# Patient Record
Sex: FEMALE | Race: BLACK OR AFRICAN AMERICAN | NOT HISPANIC OR LATINO | Employment: FULL TIME | URBAN - METROPOLITAN AREA
[De-identification: names, ages, dates, MRNs, and addresses within clinical notes are randomized per-mention and may not be internally consistent; named-entity substitution may affect disease eponyms.]

---

## 2020-06-19 ENCOUNTER — OFFICE VISIT (OUTPATIENT)
Dept: CARDIOLOGY | Facility: CLINIC | Age: 60
End: 2020-06-19

## 2020-06-19 VITALS
DIASTOLIC BLOOD PRESSURE: 82 MMHG | WEIGHT: 212 LBS | HEIGHT: 61 IN | HEART RATE: 75 BPM | SYSTOLIC BLOOD PRESSURE: 120 MMHG | BODY MASS INDEX: 40.02 KG/M2

## 2020-06-19 DIAGNOSIS — Z01.810 PREOP CARDIOVASCULAR EXAM: Primary | ICD-10-CM

## 2020-06-19 DIAGNOSIS — I10 BENIGN ESSENTIAL HTN: ICD-10-CM

## 2020-06-19 PROCEDURE — 93000 ELECTROCARDIOGRAM COMPLETE: CPT | Performed by: INTERNAL MEDICINE

## 2020-06-19 PROCEDURE — 99204 OFFICE O/P NEW MOD 45 MIN: CPT | Performed by: INTERNAL MEDICINE

## 2020-06-19 RX ORDER — AMLODIPINE BESYLATE AND BENAZEPRIL HYDROCHLORIDE 10; 20 MG/1; MG/1
1 CAPSULE ORAL DAILY
Status: ON HOLD | COMMUNITY
End: 2023-01-27

## 2020-06-19 RX ORDER — TRAZODONE HYDROCHLORIDE 100 MG/1
100 TABLET ORAL NIGHTLY
Status: ON HOLD | COMMUNITY
End: 2023-01-27

## 2020-06-19 NOTE — PROGRESS NOTES
Subjective:     Encounter Date:06/19/20      Patient ID: Hali Tejeda is a 59 y.o. female.    Chief Complaint: preop card evaluation  History of Present Illness    Dear Dr. Orozco,    I had the pleasure of seeing this patient in the office today for initial evaluation and consultation.  I appreciate that you sent her in to see us.  They come in today to be seen for preoperative assessment of cardiac risk prior to gastric sleeve removal.    She denies any cardiac symptoms.  She denies any chest pain, pressure, tightness, squeezing, or heartburn.  She has not experienced any feeling of palpitations, tachycardia or heart racing and no presyncope or syncope.  There have not been any problems with dizziness or lightheadedness.  There have not been any orthopnea or PND, and no problems with lower extremity edema.  She denies any shortness of breath at rest or with activity and has not had any wheezing.  She has not had any problems with unexplained nausea or vomiting. She has continued to perform daily activities of living without any specific problem or change in the level of activity.  She has not been recently hospitalized for any reason.    This patient has no known cardiac history.  This patient has no history of coronary artery disease, congestive heart failure, rheumatic fever, rheumatic heart disease, congenital heart disease or heart murmur.  This patient has never required invasive cardiovascular evaluation.        The following portions of the patient's history were reviewed and updated as appropriate: allergies, current medications, past family history, past medical history, past social history, past surgical history and problem list.    Past Medical History:   Diagnosis Date   • HTN (hypertension)        Past Surgical History:   Procedure Laterality Date   • HYSTERECTOMY     • LAPAROSCOPIC GASTRIC BANDING         Social History     Socioeconomic History   • Marital status: Other     Spouse name: Not  on file   • Number of children: Not on file   • Years of education: Not on file   • Highest education level: Not on file   Tobacco Use   • Smoking status: Former Smoker   • Smokeless tobacco: Never Used   Substance and Sexual Activity   • Alcohol use: Yes       Review of Systems   Constitution: Negative for chills, decreased appetite, fever and night sweats.   HENT: Negative for ear discharge, ear pain, hearing loss, nosebleeds and sore throat.    Eyes: Negative for blurred vision, double vision and pain.   Cardiovascular: Negative for cyanosis.   Respiratory: Negative for hemoptysis and sputum production.    Endocrine: Negative for cold intolerance and heat intolerance.   Hematologic/Lymphatic: Negative for adenopathy.   Skin: Negative for dry skin, itching, nail changes, rash and suspicious lesions.   Musculoskeletal: Negative for arthritis, gout, muscle cramps, muscle weakness, myalgias and neck pain.   Gastrointestinal: Negative for anorexia, bowel incontinence, constipation, diarrhea, dysphagia, hematemesis and jaundice.   Genitourinary: Negative for bladder incontinence, dysuria, flank pain, frequency, hematuria and nocturia.   Neurological: Negative for focal weakness, numbness, paresthesias and seizures.   Psychiatric/Behavioral: Negative for altered mental status, hallucinations, hypervigilance, suicidal ideas and thoughts of violence.   Allergic/Immunologic: Negative for persistent infections.         ECG 12 Lead  Date/Time: 6/19/2020 8:50 AM  Performed by: Simone Montenegro III, MD  Authorized by: Simone Montenegro III, MD   Comparison: compared with previous ECG   Similar to previous ECG  Rhythm: sinus rhythm  Rate: normal  Conduction: conduction normal  ST Segments: ST segments normal  T Waves: T waves normal  QRS axis: normal  Other: no other findings    Clinical impression: normal ECG               Objective:     Vitals:    06/19/20 0821   BP: 120/82   Pulse: 75   Weight: 96.2 kg (212 lb)   Height: 154.9 cm  "(61\")         Physical Exam   Constitutional: She is oriented to person, place, and time. She appears well-developed and well-nourished. No distress.   HENT:   Head: Normocephalic and atraumatic.   Nose: Nose normal.   Mouth/Throat: Oropharynx is clear and moist.   Eyes: Pupils are equal, round, and reactive to light. Conjunctivae and EOM are normal. Right eye exhibits no discharge. Left eye exhibits no discharge.   Neck: Normal range of motion. Neck supple. No tracheal deviation present. No thyromegaly present.   Cardiovascular: Normal rate, regular rhythm, S1 normal, S2 normal, normal heart sounds and normal pulses. Exam reveals no S3.   Pulmonary/Chest: Effort normal and breath sounds normal. No stridor. No respiratory distress. She exhibits no tenderness.   Abdominal: Soft. Bowel sounds are normal. She exhibits no distension and no mass. There is no tenderness. There is no rebound and no guarding.   Musculoskeletal: Normal range of motion. She exhibits no tenderness or deformity.   Lymphadenopathy:     She has no cervical adenopathy.   Neurological: She is alert and oriented to person, place, and time. She has normal reflexes.   Skin: Skin is warm and dry. No rash noted. She is not diaphoretic. No erythema.   Psychiatric: She has a normal mood and affect. Thought content normal.       Lab Review:             Performed        Assessment:          Diagnosis Plan   1. Preop cardiovascular exam  ECG 12 Lead   2. Benign essential HTN  ECG 12 Lead          Plan:       This patient is at low cardiac risk for the anticipated surgical procedure.  Her estimated risk probability for perioperative myocardial infarction or cardiac arrest is 0.04% ([place]).  She does not meet guideline recommendations for any additional cardiac testing at this time.    Thank you very much for allowing us to participate in the care of this pleasant patient.  Please don't hesitate to call if I can be of assistance in any way.      Current " Outpatient Medications:   •  amLODIPine-benazepril (LOTREL) 10-20 MG per capsule, Take 1 capsule by mouth Daily., Disp: , Rfl:   •  Multiple Vitamin (MULTI-VITAMIN PO), Take 1 tablet by mouth Daily., Disp: , Rfl:   •  traZODone (DESYREL) 100 MG tablet, Take 100 mg by mouth Every Night., Disp: , Rfl:          No follow-ups on file.

## 2020-07-09 PROCEDURE — 88313 SPECIAL STAINS GROUP 2: CPT | Performed by: SURGERY

## 2020-07-09 PROCEDURE — 88300 SURGICAL PATH GROSS: CPT | Performed by: SURGERY

## 2020-07-09 PROCEDURE — 88307 TISSUE EXAM BY PATHOLOGIST: CPT | Performed by: SURGERY

## 2020-07-09 PROCEDURE — 88342 IMHCHEM/IMCYTCHM 1ST ANTB: CPT | Performed by: SURGERY

## 2020-07-10 ENCOUNTER — LAB REQUISITION (OUTPATIENT)
Dept: LAB | Facility: HOSPITAL | Age: 60
End: 2020-07-10

## 2020-07-10 DIAGNOSIS — E66.01 MORBID (SEVERE) OBESITY DUE TO EXCESS CALORIES (HCC): ICD-10-CM

## 2020-07-10 DIAGNOSIS — K95.09 OTHER COMPLICATIONS OF GASTRIC BAND PROCEDURE: ICD-10-CM

## 2020-07-13 LAB
CYTO UR: NORMAL
LAB AP CASE REPORT: NORMAL
PATH REPORT.FINAL DX SPEC: NORMAL
PATH REPORT.GROSS SPEC: NORMAL

## 2023-01-12 ENCOUNTER — HOSPITAL ENCOUNTER (EMERGENCY)
Facility: HOSPITAL | Age: 63
Discharge: HOME | End: 2023-01-12
Attending: EMERGENCY MEDICINE | Admitting: EMERGENCY MEDICINE
Payer: COMMERCIAL

## 2023-01-12 ENCOUNTER — APPOINTMENT (OUTPATIENT)
Dept: CT IMAGING | Facility: HOSPITAL | Age: 63
End: 2023-01-12
Payer: COMMERCIAL

## 2023-01-12 VITALS
WEIGHT: 177 LBS | HEART RATE: 76 BPM | HEIGHT: 61 IN | BODY MASS INDEX: 33.42 KG/M2 | RESPIRATION RATE: 18 BRPM | DIASTOLIC BLOOD PRESSURE: 87 MMHG | OXYGEN SATURATION: 98 % | SYSTOLIC BLOOD PRESSURE: 144 MMHG | TEMPERATURE: 98.2 F

## 2023-01-12 DIAGNOSIS — K52.9 COLITIS: ICD-10-CM

## 2023-01-12 DIAGNOSIS — R10.32 ABDOMINAL PAIN, ACUTE, LEFT LOWER QUADRANT: Primary | ICD-10-CM

## 2023-01-12 LAB
ALBUMIN SERPL-MCNC: 3.5 G/DL (ref 3.5–5.2)
ALBUMIN/GLOB SERPL: 0.8 G/DL
ALP SERPL-CCNC: 61 U/L (ref 39–117)
ALT SERPL W P-5'-P-CCNC: 7 U/L (ref 1–33)
ANION GAP SERPL CALCULATED.3IONS-SCNC: 10.8 MMOL/L (ref 5–15)
AST SERPL-CCNC: 13 U/L (ref 1–32)
BACTERIA UR QL AUTO: ABNORMAL /HPF
BASOPHILS # BLD AUTO: 0.03 10*3/MM3 (ref 0–0.2)
BASOPHILS NFR BLD AUTO: 0.2 % (ref 0–1.5)
BILIRUB SERPL-MCNC: 0.3 MG/DL (ref 0–1.2)
BILIRUB UR QL STRIP: ABNORMAL
BUN SERPL-MCNC: 11 MG/DL (ref 8–23)
BUN/CREAT SERPL: 12.1 (ref 7–25)
CALCIUM SPEC-SCNC: 9.5 MG/DL (ref 8.6–10.5)
CHLORIDE SERPL-SCNC: 106 MMOL/L (ref 98–107)
CLARITY UR: ABNORMAL
CO2 SERPL-SCNC: 29.2 MMOL/L (ref 22–29)
COLOR UR: ABNORMAL
CREAT SERPL-MCNC: 0.91 MG/DL (ref 0.57–1)
D-LACTATE SERPL-SCNC: 1.1 MMOL/L (ref 0.5–2)
DEPRECATED RDW RBC AUTO: 42.4 FL (ref 37–54)
EGFRCR SERPLBLD CKD-EPI 2021: 71 ML/MIN/1.73
EOSINOPHIL # BLD AUTO: 0.06 10*3/MM3 (ref 0–0.4)
EOSINOPHIL NFR BLD AUTO: 0.4 % (ref 0.3–6.2)
ERYTHROCYTE [DISTWIDTH] IN BLOOD BY AUTOMATED COUNT: 13.3 % (ref 12.3–15.4)
GLOBULIN UR ELPH-MCNC: 4.3 GM/DL
GLUCOSE SERPL-MCNC: 77 MG/DL (ref 65–99)
GLUCOSE UR STRIP-MCNC: NEGATIVE MG/DL
GRAN CASTS URNS QL MICRO: ABNORMAL /LPF
HCT VFR BLD AUTO: 43.7 % (ref 34–46.6)
HGB BLD-MCNC: 14.4 G/DL (ref 12–15.9)
HGB UR QL STRIP.AUTO: ABNORMAL
HOLD SPECIMEN: NORMAL
HOLD SPECIMEN: NORMAL
HYALINE CASTS UR QL AUTO: ABNORMAL /LPF
IMM GRANULOCYTES # BLD AUTO: 0.08 10*3/MM3 (ref 0–0.05)
IMM GRANULOCYTES NFR BLD AUTO: 0.5 % (ref 0–0.5)
KETONES UR QL STRIP: ABNORMAL
LEUKOCYTE ESTERASE UR QL STRIP.AUTO: ABNORMAL
LIPASE SERPL-CCNC: 16 U/L (ref 13–60)
LYMPHOCYTES # BLD AUTO: 3.51 10*3/MM3 (ref 0.7–3.1)
LYMPHOCYTES NFR BLD AUTO: 22.9 % (ref 19.6–45.3)
MCH RBC QN AUTO: 29 PG (ref 26.6–33)
MCHC RBC AUTO-ENTMCNC: 33 G/DL (ref 31.5–35.7)
MCV RBC AUTO: 88.1 FL (ref 79–97)
MONOCYTES # BLD AUTO: 0.91 10*3/MM3 (ref 0.1–0.9)
MONOCYTES NFR BLD AUTO: 5.9 % (ref 5–12)
NEUTROPHILS NFR BLD AUTO: 10.73 10*3/MM3 (ref 1.7–7)
NEUTROPHILS NFR BLD AUTO: 70.1 % (ref 42.7–76)
NITRITE UR QL STRIP: NEGATIVE
NRBC BLD AUTO-RTO: 0 /100 WBC (ref 0–0.2)
PH UR STRIP.AUTO: 5.5 [PH] (ref 5–8)
PLATELET # BLD AUTO: 342 10*3/MM3 (ref 140–450)
PMV BLD AUTO: 11.1 FL (ref 6–12)
POTASSIUM SERPL-SCNC: 3.8 MMOL/L (ref 3.5–5.2)
PROT SERPL-MCNC: 7.8 G/DL (ref 6–8.5)
PROT UR QL STRIP: ABNORMAL
RBC # BLD AUTO: 4.96 10*6/MM3 (ref 3.77–5.28)
RBC # UR STRIP: ABNORMAL /HPF
RBC CASTS #/AREA URNS LPF: ABNORMAL /LPF
REF LAB TEST METHOD: ABNORMAL
SODIUM SERPL-SCNC: 146 MMOL/L (ref 136–145)
SP GR UR STRIP: >=1.03 (ref 1–1.03)
SQUAMOUS #/AREA URNS HPF: ABNORMAL /HPF
STARCH GRANULES URNS QL MICRO: ABNORMAL /HPF
UROBILINOGEN UR QL STRIP: ABNORMAL
WBC # UR STRIP: ABNORMAL /HPF
WBC NRBC COR # BLD: 15.32 10*3/MM3 (ref 3.4–10.8)
WHOLE BLOOD HOLD COAG: NORMAL
WHOLE BLOOD HOLD SPECIMEN: NORMAL

## 2023-01-12 PROCEDURE — 25010000002 ONDANSETRON PER 1 MG: Performed by: EMERGENCY MEDICINE

## 2023-01-12 PROCEDURE — 99283 EMERGENCY DEPT VISIT LOW MDM: CPT

## 2023-01-12 PROCEDURE — 81001 URINALYSIS AUTO W/SCOPE: CPT

## 2023-01-12 PROCEDURE — 85025 COMPLETE CBC W/AUTO DIFF WBC: CPT

## 2023-01-12 PROCEDURE — 25010000002 MORPHINE PER 10 MG: Performed by: EMERGENCY MEDICINE

## 2023-01-12 PROCEDURE — 83605 ASSAY OF LACTIC ACID: CPT

## 2023-01-12 PROCEDURE — 25010000002 PIPERACILLIN SOD-TAZOBACTAM PER 1 G: Performed by: EMERGENCY MEDICINE

## 2023-01-12 PROCEDURE — 80053 COMPREHEN METABOLIC PANEL: CPT

## 2023-01-12 PROCEDURE — 36415 COLL VENOUS BLD VENIPUNCTURE: CPT

## 2023-01-12 PROCEDURE — 74177 CT ABD & PELVIS W/CONTRAST: CPT

## 2023-01-12 PROCEDURE — 83690 ASSAY OF LIPASE: CPT

## 2023-01-12 PROCEDURE — 96375 TX/PRO/DX INJ NEW DRUG ADDON: CPT

## 2023-01-12 PROCEDURE — 96365 THER/PROPH/DIAG IV INF INIT: CPT

## 2023-01-12 PROCEDURE — 25010000002 IOPAMIDOL 61 % SOLUTION: Performed by: EMERGENCY MEDICINE

## 2023-01-12 RX ORDER — ONDANSETRON 4 MG/1
4 TABLET, ORALLY DISINTEGRATING ORAL 4 TIMES DAILY PRN
Qty: 12 TABLET | Refills: 0 | Status: SHIPPED | OUTPATIENT
Start: 2023-01-12 | End: 2023-03-06

## 2023-01-12 RX ORDER — DICYCLOMINE HYDROCHLORIDE 10 MG/1
10 CAPSULE ORAL 3 TIMES DAILY PRN
Qty: 21 CAPSULE | Refills: 0 | Status: SHIPPED | OUTPATIENT
Start: 2023-01-12 | End: 2023-01-26 | Stop reason: DRUGHIGH

## 2023-01-12 RX ORDER — SODIUM CHLORIDE 0.9 % (FLUSH) 0.9 %
10 SYRINGE (ML) INJECTION AS NEEDED
Status: DISCONTINUED | OUTPATIENT
Start: 2023-01-12 | End: 2023-01-12 | Stop reason: HOSPADM

## 2023-01-12 RX ORDER — MORPHINE SULFATE 2 MG/ML
2 INJECTION, SOLUTION INTRAMUSCULAR; INTRAVENOUS ONCE
Status: COMPLETED | OUTPATIENT
Start: 2023-01-12 | End: 2023-01-12

## 2023-01-12 RX ORDER — ONDANSETRON 2 MG/ML
4 INJECTION INTRAMUSCULAR; INTRAVENOUS ONCE
Status: COMPLETED | OUTPATIENT
Start: 2023-01-12 | End: 2023-01-12

## 2023-01-12 RX ORDER — AMOXICILLIN AND CLAVULANATE POTASSIUM 875; 125 MG/1; MG/1
1 TABLET, FILM COATED ORAL 2 TIMES DAILY
Qty: 14 TABLET | Refills: 0 | Status: SHIPPED | OUTPATIENT
Start: 2023-01-12 | End: 2023-01-19

## 2023-01-12 RX ADMIN — TAZOBACTAM SODIUM AND PIPERACILLIN SODIUM 3.38 G: 375; 3 INJECTION, SOLUTION INTRAVENOUS at 17:50

## 2023-01-12 RX ADMIN — ONDANSETRON 4 MG: 2 INJECTION INTRAMUSCULAR; INTRAVENOUS at 15:51

## 2023-01-12 RX ADMIN — IOPAMIDOL 85 ML: 612 INJECTION, SOLUTION INTRAVENOUS at 17:21

## 2023-01-12 RX ADMIN — MORPHINE SULFATE 2 MG: 2 INJECTION, SOLUTION INTRAMUSCULAR; INTRAVENOUS at 15:51

## 2023-01-12 NOTE — ED TRIAGE NOTES
Patient to ed from  with complaints of lower abd pain x 3 weeks. Patient states nausea and vomiting.

## 2023-01-12 NOTE — DISCHARGE INSTRUCTIONS
Take antibiotics as prescribed until they are gone.  Use nausea medicine to help with any nausea.  Use the Bentyl to help with any abdominal cramping.  Follow-up with your primary care doctor as well as Dr. Guerrero with gastroenterology.  Their office should call you to schedule an appointment.  Return here for any fevers over 100.4, worsened pain, uncontrolled nausea vomiting

## 2023-01-12 NOTE — ED PROVIDER NOTES
EMERGENCY DEPARTMENT ENCOUNTER    Room Number:  13/13  Date of encounter:  1/12/2023  PCP: Richard Delagdo MD  Patient Care Team:  Richard Delgado MD as PCP - General (Internal Medicine)   Independent Historians: Patient    HPI:  Chief Complaint: Abdominal pain  A complete HPI/ROS/PMH/PSH/SH/FH are unobtainable due to: Nothing    Chronic or social conditions impacting patient care (social determinants of health): None    Context: Hali Tejeda is a 62 y.o. female who presents to the ED c/o of lower quadrant abdominal pain for the last 2 to 3 weeks.  She reports it is gotten progressively worse.  She reports yesterday with more severe episode.  She has had some nausea and vomiting with it.  She states that in the past she has had symptoms in the same location and it resolved with over-the-counter medications.  She reports this time is not improving.  She called her primary care doctor who directed her to the emergency room for further evaluation.  The pain is in her left lower quadrant.  It does not radiate.  She reports it is severe.  She does report prior gastric surgeries.  She states that she used suppositories per her primary care doctor recommendation and she developed some diarrhea.    Review of prior external notes (non-ED): Cardiology note dated 6/19/2020 with benign essential hypertension.    Review of prior external test results outside of this encounter: She has surgical tissue pathology report dated 7/9/2020 with a portion of her stomach and explanted Lap-Band.    PAST MEDICAL HISTORY  Active Ambulatory Problems     Diagnosis Date Noted   • No Active Ambulatory Problems     Resolved Ambulatory Problems     Diagnosis Date Noted   • No Resolved Ambulatory Problems     Past Medical History:   Diagnosis Date   • HTN (hypertension)        The patient has started, but not completed, their COVID-19 vaccination series.    PAST SURGICAL HISTORY  Past Surgical History:   Procedure Laterality Date   • HYSTERECTOMY      • LAPAROSCOPIC GASTRIC BANDING           FAMILY HISTORY  Family History   Problem Relation Age of Onset   • Diabetes Mother          SOCIAL HISTORY  Social History     Socioeconomic History   • Marital status: Single   Tobacco Use   • Smoking status: Former   • Smokeless tobacco: Never   Substance and Sexual Activity   • Alcohol use: Yes         ALLERGIES  Sulfa antibiotics        REVIEW OF SYSTEMS  Review of Systems   No chest pain, no shortness of breath, positive vomiting, positive nausea, positive vomiting, no fever, no chills, no headache, positive diarrhea  All systems reviewed and negative except for those discussed in HPI.       PHYSICAL EXAM    I have reviewed the triage vital signs and nursing notes.    ED Triage Vitals   Temp Heart Rate Resp BP SpO2   01/12/23 1352 01/12/23 1352 01/12/23 1352 01/12/23 1355 01/12/23 1352   98.2 °F (36.8 °C) 107 18 (!) 165/111 95 %      Temp src Heart Rate Source Patient Position BP Location FiO2 (%)   -- -- -- -- --              Physical Exam  GENERAL: Awake, alert, no acute distress  SKIN: Warm, dry  HENT: Normocephalic, atraumatic  EYES: no scleral icterus  CV: regular rhythm, regular rate  RESPIRATORY: normal effort, lungs clear  ABDOMEN: soft, mild to moderate tenderness in the left lower quadrant, nondistended  MUSCULOSKELETAL: no deformity  NEURO: alert, moves all extremities, follows commands          LAB RESULTS  Recent Results (from the past 24 hour(s))   Lactic Acid, Plasma    Collection Time: 01/12/23  2:06 PM    Specimen: Blood   Result Value Ref Range    Lactate 1.1 0.5 - 2.0 mmol/L   Green Top (Gel)    Collection Time: 01/12/23  2:06 PM   Result Value Ref Range    Extra Tube Hold for add-ons.    Lavender Top    Collection Time: 01/12/23  2:06 PM   Result Value Ref Range    Extra Tube hold for add-on    Gold Top - SST    Collection Time: 01/12/23  2:06 PM   Result Value Ref Range    Extra Tube Hold for add-ons.    Light Blue Top    Collection Time: 01/12/23   2:06 PM   Result Value Ref Range    Extra Tube Hold for add-ons.    CBC Auto Differential    Collection Time: 01/12/23  2:06 PM    Specimen: Blood   Result Value Ref Range    WBC 15.32 (H) 3.40 - 10.80 10*3/mm3    RBC 4.96 3.77 - 5.28 10*6/mm3    Hemoglobin 14.4 12.0 - 15.9 g/dL    Hematocrit 43.7 34.0 - 46.6 %    MCV 88.1 79.0 - 97.0 fL    MCH 29.0 26.6 - 33.0 pg    MCHC 33.0 31.5 - 35.7 g/dL    RDW 13.3 12.3 - 15.4 %    RDW-SD 42.4 37.0 - 54.0 fl    MPV 11.1 6.0 - 12.0 fL    Platelets 342 140 - 450 10*3/mm3    Neutrophil % 70.1 42.7 - 76.0 %    Lymphocyte % 22.9 19.6 - 45.3 %    Monocyte % 5.9 5.0 - 12.0 %    Eosinophil % 0.4 0.3 - 6.2 %    Basophil % 0.2 0.0 - 1.5 %    Immature Grans % 0.5 0.0 - 0.5 %    Neutrophils, Absolute 10.73 (H) 1.70 - 7.00 10*3/mm3    Lymphocytes, Absolute 3.51 (H) 0.70 - 3.10 10*3/mm3    Monocytes, Absolute 0.91 (H) 0.10 - 0.90 10*3/mm3    Eosinophils, Absolute 0.06 0.00 - 0.40 10*3/mm3    Basophils, Absolute 0.03 0.00 - 0.20 10*3/mm3    Immature Grans, Absolute 0.08 (H) 0.00 - 0.05 10*3/mm3    nRBC 0.0 0.0 - 0.2 /100 WBC   Urinalysis With Microscopic If Indicated (No Culture) - Urine, Clean Catch    Collection Time: 01/12/23  2:10 PM    Specimen: Urine, Clean Catch   Result Value Ref Range    Color, UA Dark Yellow (A) Yellow, Straw    Appearance, UA Cloudy (A) Clear    pH, UA 5.5 5.0 - 8.0    Specific Gravity, UA >=1.030 1.005 - 1.030    Glucose, UA Negative Negative    Ketones, UA 15 mg/dL (1+) (A) Negative    Bilirubin, UA Small (1+) (A) Negative    Blood, UA Moderate (2+) (A) Negative    Protein, UA 30 mg/dL (1+) (A) Negative    Leuk Esterase, UA Small (1+) (A) Negative    Nitrite, UA Negative Negative    Urobilinogen, UA 1.0 E.U./dL 0.2 - 1.0 E.U./dL   Urinalysis, Microscopic Only - Urine, Clean Catch    Collection Time: 01/12/23  2:10 PM    Specimen: Urine, Clean Catch   Result Value Ref Range    RBC, UA 31-50 (A) None Seen, 0-2 /HPF    WBC, UA 13-20 (A) None Seen, 0-2 /HPF     Bacteria, UA None Seen None Seen /HPF    Squamous Epithelial Cells, UA 7-12 (A) None Seen, 0-2 /HPF    Hyaline Casts, UA Too Numerous to Count None Seen /LPF    RBC Casts 0-2 None Seen /LPF    Granular Casts, UA 0-2 None Seen /LPF    Starch, UA Small/1+ None Seen /HPF    Methodology Manual Light Microscopy    Comprehensive Metabolic Panel    Collection Time: 01/12/23  4:01 PM    Specimen: Arm, Left; Blood   Result Value Ref Range    Glucose 77 65 - 99 mg/dL    BUN 11 8 - 23 mg/dL    Creatinine 0.91 0.57 - 1.00 mg/dL    Sodium 146 (H) 136 - 145 mmol/L    Potassium 3.8 3.5 - 5.2 mmol/L    Chloride 106 98 - 107 mmol/L    CO2 29.2 (H) 22.0 - 29.0 mmol/L    Calcium 9.5 8.6 - 10.5 mg/dL    Total Protein 7.8 6.0 - 8.5 g/dL    Albumin 3.5 3.5 - 5.2 g/dL    ALT (SGPT) 7 1 - 33 U/L    AST (SGOT) 13 1 - 32 U/L    Alkaline Phosphatase 61 39 - 117 U/L    Total Bilirubin 0.3 0.0 - 1.2 mg/dL    Globulin 4.3 gm/dL    A/G Ratio 0.8 g/dL    BUN/Creatinine Ratio 12.1 7.0 - 25.0    Anion Gap 10.8 5.0 - 15.0 mmol/L    eGFR 71.0 >60.0 mL/min/1.73   Lipase    Collection Time: 01/12/23  4:01 PM    Specimen: Arm, Left; Blood   Result Value Ref Range    Lipase 16 13 - 60 U/L       Ordered the above labs and independently reviewed the results.        RADIOLOGY  CT Abdomen Pelvis With Contrast    Result Date: 1/12/2023  CT ABDOMEN AND PELVIS WITH IV CONTRAST  HISTORY: Left lower quadrant pain 2-3 weeks. Prior gastric surgery. Diarrhea.  TECHNIQUE: Radiation dose reduction techniques were utilized, including automated exposure control and exposure modulation based on body size. 3 mm images were obtained through the abdomen and pelvis after the administration of IV contrast.  COMPARISON: None  FINDINGS: Lower chest: Dependent bibasilar atelectasis. The heart size is within normal limits. Motion artifact..  Liver: Mild hepatic steatosis with geographic areas of sparing.  Biliary tract: Within normal limits.  Spleen: Within normal limits.   Pancreas: Within normal limits.  Adrenals: Within normal limits.  Kidneys:  Within normal limits.  Bowel:  Small hiatal hernia with thickening of the distal esophagus. Sleeve gastrectomy with incomplete distention of the stomach. Small bowel loops are normal in caliber normal appendix.  The proximal colon is stool-filled. Featureless appearance of the colon from the mid transverse level to the sigmoid with mildly prominent air-fluid levels measuring up to 5 cm. Long segment of inflammatory changes involving the splenic flexure to the sigmoid where there is more focal masslike thickening. Pericolonic inflammatory changes with small volume free fluid. Scattered colonic diverticula. FINDINGS favor an infectious or inflammatory etiology, ischemic considered less likely.  No obvious pneumatosis or free intraperitoneal air.  Vasculature:    Within normal limits.  Lymph Nodes:  Scattered subcentimeter nodes.                                                        Pelvic organs: Volume free fluid in the pelvis. Bladder incompletely distended. Hysterectomy..  Abdominal/Pelvic Wall: Tiny fat-containing supraumbilical hernia and umbilical hernia. Scarring infraumbilical region..  Bones: Multilevel degenerative changes. Grade 1 anterolisthesis of L4 4 on L5..      1.  Long segment of colitis of the transverse colon to the sigmoid with scattered diverticula, bowel wall thickening, pericolonic inflammation, and small volume free fluid. Findings favor an infectious or inflammatory process however, conservative follow-up after treatment and/or colonoscopy recommended to exclude an underlying mass. 2.  Scattered colonic diverticula. 3.  Status post sleeve gastrectomy with small hiatal hernia and thickening of the distal esophagus. 4.  Please see above for additional findings/recommendations.  I discussed these findings with Dr. Cruz at 4:36 PM on 01/12/2023.    This report was finalized on 1/12/2023 5:40 PM by Dr. Garrett Ramirez M.D.         I ordered the above noted radiological studies. Reviewed by me and discussed with radiologist.  See dictation for official radiology interpretation.      PROCEDURES    Procedures      MEDICATIONS GIVEN IN ER    Medications   sodium chloride 0.9 % flush 10 mL (has no administration in time range)   piperacillin-tazobactam (ZOSYN) 3.375 g in iso-osmotic dextrose 50 ml (premix) (has no administration in time range)   ondansetron (ZOFRAN) injection 4 mg (4 mg Intravenous Given 1/12/23 1551)   morphine injection 2 mg (2 mg Intravenous Given 1/12/23 1551)   iopamidol (ISOVUE-300) 61 % injection 100 mL (85 mL Intravenous Given 1/12/23 1721)         PROGRESS, DATA ANALYSIS, CONSULTS, AND MEDICAL DECISION MAKING    All labs have been independently reviewed by me.  All radiology studies have been reviewed by me and discussed with radiologist dictating the report.   EKG's independently viewed and interpreted by me.  Discussion below represents my analysis of pertinent findings related to patient's condition, differential diagnosis, treatment plan and final disposition.    Differential diagnosis includes but is not limited to diverticulitis, colitis, kidney stone, UTI, pyelonephritis, pelvic abscess.    ED Course as of 01/12/23 1748   Thu Jan 12, 2023 1737 Discussing with Dr. Ramirez with radiology by phone.  CT shows signs consistent with a colitis. [TR]   1744 I reviewed the work-up and findings with the patient at the bedside.  Answered all questions.  I reviewed the patient's CT scan and I see no free air or obstruction.  The patient has a colitis per the radiologist.  She does have a leukocytosis of 15,000.  She has a normal lactic acid which mostly excludes ischemic colitis.  Her urinalysis has no bacteria.  Plan to start her on antibiotics.  I will give her a dose of IV antibiotics here and then I will discharge her home on nausea medicine, antibiotics for colitis, GI follow-up.  She is agreeable. [TR]      ED  Course User Index  [TR] Jarrod Cruz MD           PPE: The patient wore a mask and I wore an N95 mask throughout the entire patient encounter.       AS OF 17:48 EST VITALS:    BP - (!) 165/111  HR - 107  TEMP - 98.2 °F (36.8 °C)  O2 SATS - 95%        DIAGNOSIS  Final diagnoses:   Abdominal pain, acute, left lower quadrant   Colitis         DISPOSITION  ED Disposition     ED Disposition   Discharge    Condition   Stable    Comment   --                Note Disclaimer: At [facility], we believe that sharing information builds trust and better relationships. You are receiving this note because you recently visited [facility]. It is possible you will see health information before a provider has talked with you about it. This kind of information can be easy to misunderstand. To help you fully understand what it means for your health, we urge you to discuss this note with your provider.       Jarrod Cruz MD  01/12/23 [id number]

## 2023-01-12 NOTE — Clinical Note
[facility] EMERGENCY DEPARTMENT  [address] [facility] [address]  Phone: [phone]    Hali Tejeda was seen and treated in our emergency department on 1/12/2023.  She may return to work on 01/14/2023.         Thank you for choosing [facility].    Jarrod Cruz MD

## 2023-01-24 ENCOUNTER — TELEPHONE (OUTPATIENT)
Dept: GASTROENTEROLOGY | Facility: CLINIC | Age: 63
End: 2023-01-24
Payer: COMMERCIAL

## 2023-01-24 ENCOUNTER — TELEPHONE (OUTPATIENT)
Dept: GASTROENTEROLOGY | Facility: CLINIC | Age: 63
End: 2023-01-24

## 2023-01-24 ENCOUNTER — OFFICE VISIT (OUTPATIENT)
Dept: GASTROENTEROLOGY | Facility: CLINIC | Age: 63
End: 2023-01-24
Payer: COMMERCIAL

## 2023-01-24 VITALS
HEART RATE: 80 BPM | WEIGHT: 174.4 LBS | SYSTOLIC BLOOD PRESSURE: 179 MMHG | HEIGHT: 67 IN | OXYGEN SATURATION: 94 % | DIASTOLIC BLOOD PRESSURE: 96 MMHG | BODY MASS INDEX: 27.37 KG/M2 | TEMPERATURE: 98.1 F

## 2023-01-24 DIAGNOSIS — K51.519: Primary | ICD-10-CM

## 2023-01-24 PROCEDURE — 99204 OFFICE O/P NEW MOD 45 MIN: CPT | Performed by: INTERNAL MEDICINE

## 2023-01-24 RX ORDER — LOSARTAN POTASSIUM AND HYDROCHLOROTHIAZIDE 12.5; 5 MG/1; MG/1
1 TABLET ORAL DAILY
COMMUNITY
Start: 2022-11-07 | End: 2023-02-21 | Stop reason: HOSPADM

## 2023-01-24 RX ORDER — LEVOCETIRIZINE DIHYDROCHLORIDE 5 MG/1
5 TABLET, FILM COATED ORAL AS NEEDED
COMMUNITY
Start: 2022-11-21 | End: 2023-02-21 | Stop reason: HOSPADM

## 2023-01-24 RX ORDER — MESALAMINE 1.2 G/1
4800 TABLET, DELAYED RELEASE ORAL
Qty: 120 TABLET | Refills: 11 | Status: SHIPPED | OUTPATIENT
Start: 2023-01-24 | End: 2023-02-21 | Stop reason: HOSPADM

## 2023-01-24 NOTE — TELEPHONE ENCOUNTER
Caller: Hali Tejeda    Relationship to patient: Self    Best call back number: [phone]    Patient is needing:PATIENT WAS SEEN TODAY (01/24/23) BY DR. STYLES AND IS CALLING TO SEE IF IT IS OKAY FOR PATIENT TO DRINK GINGER ALE AND EAT CHICKEN NOODLE SOUP WITH GRILLED CHEESE ON WHITE BREAD, SAYS SHE IS PICKING UP MEDICATION FROM PHARMACY NOW. BEST TO CONTACT BETWEEN NOW AND 3:05PM.

## 2023-01-24 NOTE — PROGRESS NOTES
Chief Complaint   Patient presents with   • Abdominal Pain     LLQ   • Diverticulitis     Hali Tejeda is a 62 y.o. female who presents with a 1 month history of left lower quadrant pain nausea vomiting diarrhea  HPI     62-year-old female with history of hypertension and Lap-Band presented to the ER on January 12 with several week history of left lower quadrant pain nausea vomiting diarrhea.  Labs show elevated white counts with colitis extending from the transverse to the sigmoid colon.  Scattered diverticulosis was noted in the sigmoid.  Patient referred for GI evaluation.  Interestingly though no diverticulitis was identified patient was given orders been and per reports she was told she had diverticulitis.    Past Medical History:   Diagnosis Date   • HTN (hypertension)        Current Outpatient Medications:   •  levocetirizine (XYZAL) 5 MG tablet, Take 5 mg by mouth As Needed., Disp: , Rfl:   •  losartan-hydrochlorothiazide (HYZAAR) 50-12.5 MG per tablet, Take 1 tablet by mouth Daily., Disp: , Rfl:   •  Multiple Vitamin (MULTI-VITAMIN PO), Take 1 tablet by mouth Daily., Disp: , Rfl:   •  amLODIPine-benazepril (LOTREL) 10-20 MG per capsule, Take 1 capsule by mouth Daily., Disp: , Rfl:   •  dicyclomine (BENTYL) 10 MG capsule, Take 1 capsule by mouth 3 (Three) Times a Day As Needed (abdominal cramping)., Disp: 21 capsule, Rfl: 0  •  mesalamine (Lialda) 1.2 g EC tablet, Take 4 tablets by mouth Daily With Breakfast., Disp: 120 tablet, Rfl: 11  •  ondansetron ODT (ZOFRAN-ODT) 4 MG disintegrating tablet, Place 1 tablet on the tongue 4 (Four) Times a Day As Needed for Nausea or Vomiting., Disp: 12 tablet, Rfl: 0  •  traZODone (DESYREL) 100 MG tablet, Take 100 mg by mouth Every Night., Disp: , Rfl:   Allergies   Allergen Reactions   • Sulfa Antibiotics Itching     Social History     Socioeconomic History   • Marital status: Single   Tobacco Use   • Smoking status: Former   • Smokeless tobacco: Never   • Tobacco  comments:     Quit 2004   Substance and Sexual Activity   • Alcohol use: Yes     Comment: occ wine   • Drug use: Never     Family History   Problem Relation Age of Onset   • Diabetes Mother      Review of Systems   Constitutional: Negative.    HENT: Negative.    Eyes: Negative.    Respiratory: Negative.    Cardiovascular: Negative.    Gastrointestinal: Positive for abdominal distention, abdominal pain, diarrhea, nausea and vomiting. Negative for anal bleeding, blood in stool, constipation and rectal pain.   Endocrine: Negative.    Musculoskeletal: Negative.    Skin: Negative.    Allergic/Immunologic: Negative.    Hematological: Negative.      Vitals:    01/24/23 1201   BP: 179/96   Pulse: 80   Temp: 98.1 °F (36.7 °C)   SpO2: 94%     Physical Exam  Vitals and nursing note reviewed.   Constitutional:       Appearance: Normal appearance. She is well-developed. She is obese.   HENT:      Head: Normocephalic and atraumatic.   Eyes:      General: No scleral icterus.     Pupils: Pupils are equal, round, and reactive to light.   Cardiovascular:      Rate and Rhythm: Normal rate and regular rhythm.      Heart sounds: Normal heart sounds. No murmur heard.    No friction rub. No gallop.   Pulmonary:      Effort: Pulmonary effort is normal.      Breath sounds: Normal breath sounds. No wheezing or rales.   Abdominal:      General: Bowel sounds are normal. There is no distension or abdominal bruit.      Palpations: Abdomen is soft. Abdomen is not rigid. There is no shifting dullness, fluid wave, mass or pulsatile mass.      Tenderness: There is abdominal tenderness. There is no guarding.      Hernia: No hernia is present.   Musculoskeletal:         General: No swelling or tenderness. Normal range of motion.   Skin:     General: Skin is warm and dry.      Coloration: Skin is not jaundiced.      Findings: No rash.   Neurological:      General: No focal deficit present.      Mental Status: She is alert and oriented to person, place,  and time.      Cranial Nerves: No cranial nerve deficit.   Psychiatric:         Behavior: Behavior normal.         Thought Content: Thought content normal.       Diagnoses and all orders for this visit:    Left sided colitis with complication (HCC)  -     Case Request; Standing  -     Implement Anesthesia orders day of procedure.; Standing  -     Obtain informed consent; Standing  -     Case Request    Other orders  -     losartan-hydrochlorothiazide (HYZAAR) 50-12.5 MG per tablet; Take 1 tablet by mouth Daily.  -     levocetirizine (XYZAL) 5 MG tablet; Take 5 mg by mouth As Needed.  -     mesalamine (Lialda) 1.2 g EC tablet; Take 4 tablets by mouth Daily With Breakfast.    Patient 62-year-old female with history of hypertension status post lap band and hysterectomy presenting with weeks of left lower quadrant pain and diarrhea with nausea and vomiting.  Patient underwent CT of the abdomen pelvis revealing transverse and left colon colitis as well as sigmoid diverticulosis.  For some reason patient was given Augmentin and told she had diverticulitis though clearly this is not the case.  CT findings show thickening of the transverse descending and sigmoid.  No apparent diverticulitis is identified.  At this point with ongoing symptoms which recurred as soon as she started to take anything above clear liquids would recommend trial of mesalamine and arrange colonoscopy to evaluate left colon.  We will follow-up clinically based on findings.

## 2023-01-24 NOTE — TELEPHONE ENCOUNTER
Returned patient's phone call. No answer. Left message on an identified VM. Advised she should avoid drinks with carbonation in them, chicken noodle soup would be fine. Advised grill cheese should be avoided. Needs to be a low fat food. Advised she may have turkey sandwich instead. Advised to call back for questions.

## 2023-01-24 NOTE — TELEPHONE ENCOUNTER
AICHA patient via telephone for COLONOSCOPY. Scheduled 02/13/2023 with arrival time of 1130AM. Prep paperwork mailed to verified address on file. Patient advised arrival time may change based on [facility] guidelines. AICHA NUÑEZ

## 2023-01-25 ENCOUNTER — TELEPHONE (OUTPATIENT)
Dept: GASTROENTEROLOGY | Facility: CLINIC | Age: 63
End: 2023-01-25

## 2023-01-25 NOTE — TELEPHONE ENCOUNTER
Caller: Hali Tejeda    Relationship to patient: Self    Best call back number: [phone]    Chief complaint: PATIENT STATED THAT SHE WAS SEEN YESTERDAY WITH DR. STYLES AND THAT SHE NEEDS TO RESCHEDULE HER COLONOSCOPY APPT TO A FRI.    Requested date: ON A FRI.    If rescheduling, when is the original appointment:02/13/23

## 2023-01-26 RX ORDER — DICYCLOMINE HYDROCHLORIDE 10 MG/1
10 CAPSULE ORAL 3 TIMES DAILY PRN
Qty: 90 CAPSULE | Refills: 1 | Status: ON HOLD | OUTPATIENT
Start: 2023-01-26 | End: 2023-01-27

## 2023-01-26 NOTE — TELEPHONE ENCOUNTER
Returned patient's phone call. She states she continues to have lower abdominal pain, especially on the left side. She states she has taken Mesalamine for two days and has not seen a change in her pain.   When asked if she is taking her Mesalamine 4 tablets every morning, she states she takes it differently.  She states her the label on her bottle says to take one tablet before each meal and at night.     Advised she needs to take all four tablets in the morning. She states she will start tomorrow.     She questions if she can get another prescription for Dicyclomine? She states she is out of the medication and it does help with the cramping most of the time.   She questions if she can get a higher dose or can she take it more frequently than 3 times a day.     Advised a note will be sent to Dr. Lutz. She verb understanding.

## 2023-01-26 NOTE — TELEPHONE ENCOUNTER
Caller: Hali Tejeda    Relationship to patient: Self    Best call back number: [phone]    Patient is needing: SHE IS REQUESTING DR. STYLES BE MADE AWARE THAT THE MEDICATION FOR HER STOMACH PAIN IS NOT WORKING AND SHE NEEDS A REPLACEMENT PAIN MEDICATION FOR HER PAIN IN HER STOMACH. I WAS UNABLE TO WARM TRANSFER/ NO ANSWER. PLEASE REVIEW AND CONTACT PATIENT. SAYS SHE WILL JUST KEEP HER ORIGINAL COLONOSCOPY APPT.

## 2023-01-27 ENCOUNTER — APPOINTMENT (OUTPATIENT)
Dept: CT IMAGING | Facility: HOSPITAL | Age: 63
DRG: 329 | End: 2023-01-27
Payer: COMMERCIAL

## 2023-01-27 ENCOUNTER — HOSPITAL ENCOUNTER (INPATIENT)
Facility: HOSPITAL | Age: 63
LOS: 25 days | Discharge: HOME | DRG: 329 | End: 2023-02-21
Attending: EMERGENCY MEDICINE | Admitting: HOSPITALIST
Payer: COMMERCIAL

## 2023-01-27 ENCOUNTER — TELEPHONE (OUTPATIENT)
Dept: GASTROENTEROLOGY | Facility: CLINIC | Age: 63
End: 2023-01-27

## 2023-01-27 DIAGNOSIS — K56.699 STRICTURE OF SIGMOID COLON: ICD-10-CM

## 2023-01-27 DIAGNOSIS — K52.9 COLITIS: Primary | ICD-10-CM

## 2023-01-27 DIAGNOSIS — R11.2 NAUSEA AND VOMITING, UNSPECIFIED VOMITING TYPE: ICD-10-CM

## 2023-01-27 LAB
ALBUMIN SERPL-MCNC: 4.2 G/DL (ref 3.5–5.2)
ALBUMIN/GLOB SERPL: 1 G/DL
ALP SERPL-CCNC: 67 U/L (ref 39–117)
ALT SERPL W P-5'-P-CCNC: 8 U/L (ref 1–33)
ANION GAP SERPL CALCULATED.3IONS-SCNC: 12.6 MMOL/L (ref 5–15)
AST SERPL-CCNC: 15 U/L (ref 1–32)
BACTERIA UR QL AUTO: ABNORMAL /HPF
BASOPHILS # BLD AUTO: 0.04 10*3/MM3 (ref 0–0.2)
BASOPHILS NFR BLD AUTO: 0.2 % (ref 0–1.5)
BILIRUB SERPL-MCNC: 0.5 MG/DL (ref 0–1.2)
BILIRUB UR QL STRIP: NEGATIVE
BUN SERPL-MCNC: 8 MG/DL (ref 8–23)
BUN/CREAT SERPL: 9.5 (ref 7–25)
CALCIUM SPEC-SCNC: 10.1 MG/DL (ref 8.6–10.5)
CHLORIDE SERPL-SCNC: 104 MMOL/L (ref 98–107)
CLARITY UR: CLEAR
CO2 SERPL-SCNC: 27.4 MMOL/L (ref 22–29)
COLOR UR: ABNORMAL
CREAT SERPL-MCNC: 0.84 MG/DL (ref 0.57–1)
D-LACTATE SERPL-SCNC: 1 MMOL/L (ref 0.5–2)
D-LACTATE SERPL-SCNC: 2.6 MMOL/L (ref 0.5–2)
DEPRECATED RDW RBC AUTO: 41.2 FL (ref 37–54)
EGFRCR SERPLBLD CKD-EPI 2021: 78.2 ML/MIN/1.73
EOSINOPHIL # BLD AUTO: 0.06 10*3/MM3 (ref 0–0.4)
EOSINOPHIL NFR BLD AUTO: 0.3 % (ref 0.3–6.2)
ERYTHROCYTE [DISTWIDTH] IN BLOOD BY AUTOMATED COUNT: 12.9 % (ref 12.3–15.4)
GLOBULIN UR ELPH-MCNC: 4.3 GM/DL
GLUCOSE SERPL-MCNC: 110 MG/DL (ref 65–99)
GLUCOSE UR STRIP-MCNC: NEGATIVE MG/DL
HCT VFR BLD AUTO: 46.2 % (ref 34–46.6)
HGB BLD-MCNC: 15.2 G/DL (ref 12–15.9)
HGB UR QL STRIP.AUTO: ABNORMAL
HOLD SPECIMEN: NORMAL
HOLD SPECIMEN: NORMAL
HYALINE CASTS UR QL AUTO: ABNORMAL /LPF
IMM GRANULOCYTES # BLD AUTO: 0.07 10*3/MM3 (ref 0–0.05)
IMM GRANULOCYTES NFR BLD AUTO: 0.4 % (ref 0–0.5)
KETONES UR QL STRIP: ABNORMAL
LEUKOCYTE ESTERASE UR QL STRIP.AUTO: ABNORMAL
LIPASE SERPL-CCNC: 20 U/L (ref 13–60)
LYMPHOCYTES # BLD AUTO: 2.11 10*3/MM3 (ref 0.7–3.1)
LYMPHOCYTES NFR BLD AUTO: 12.1 % (ref 19.6–45.3)
MCH RBC QN AUTO: 29.1 PG (ref 26.6–33)
MCHC RBC AUTO-ENTMCNC: 32.9 G/DL (ref 31.5–35.7)
MCV RBC AUTO: 88.3 FL (ref 79–97)
MONOCYTES # BLD AUTO: 0.95 10*3/MM3 (ref 0.1–0.9)
MONOCYTES NFR BLD AUTO: 5.5 % (ref 5–12)
NEUTROPHILS NFR BLD AUTO: 14.14 10*3/MM3 (ref 1.7–7)
NEUTROPHILS NFR BLD AUTO: 81.5 % (ref 42.7–76)
NITRITE UR QL STRIP: NEGATIVE
NRBC BLD AUTO-RTO: 0 /100 WBC (ref 0–0.2)
PH UR STRIP.AUTO: 6 [PH] (ref 5–8)
PLATELET # BLD AUTO: 330 10*3/MM3 (ref 140–450)
PMV BLD AUTO: 10.1 FL (ref 6–12)
POTASSIUM SERPL-SCNC: 3.4 MMOL/L (ref 3.5–5.2)
PROT SERPL-MCNC: 8.5 G/DL (ref 6–8.5)
PROT UR QL STRIP: ABNORMAL
RBC # BLD AUTO: 5.23 10*6/MM3 (ref 3.77–5.28)
RBC # UR STRIP: ABNORMAL /HPF
REF LAB TEST METHOD: ABNORMAL
SODIUM SERPL-SCNC: 144 MMOL/L (ref 136–145)
SP GR UR STRIP: >=1.03 (ref 1–1.03)
SQUAMOUS #/AREA URNS HPF: ABNORMAL /HPF
TRANS CELLS #/AREA URNS HPF: ABNORMAL /HPF
UROBILINOGEN UR QL STRIP: ABNORMAL
WBC # UR STRIP: ABNORMAL /HPF
WBC NRBC COR # BLD: 17.37 10*3/MM3 (ref 3.4–10.8)
WHOLE BLOOD HOLD COAG: NORMAL
WHOLE BLOOD HOLD SPECIMEN: NORMAL

## 2023-01-27 PROCEDURE — 74176 CT ABD & PELVIS W/O CONTRAST: CPT

## 2023-01-27 PROCEDURE — 25010000002 AMPICILLIN-SULBACTAM PER 1.5 G: Performed by: EMERGENCY MEDICINE

## 2023-01-27 PROCEDURE — 83690 ASSAY OF LIPASE: CPT

## 2023-01-27 PROCEDURE — 25010000002 HYDROMORPHONE 1 MG/ML SOLUTION: Performed by: EMERGENCY MEDICINE

## 2023-01-27 PROCEDURE — 25010000002 HYDROMORPHONE PER 4 MG: Performed by: HOSPITALIST

## 2023-01-27 PROCEDURE — 87040 BLOOD CULTURE FOR BACTERIA: CPT | Performed by: EMERGENCY MEDICINE

## 2023-01-27 PROCEDURE — 25010000002 AMPICILLIN-SULBACTAM PER 1.5 G: Performed by: HOSPITALIST

## 2023-01-27 PROCEDURE — 83605 ASSAY OF LACTIC ACID: CPT | Performed by: EMERGENCY MEDICINE

## 2023-01-27 PROCEDURE — 25010000002 IOPAMIDOL 61 % SOLUTION: Performed by: EMERGENCY MEDICINE

## 2023-01-27 PROCEDURE — 99284 EMERGENCY DEPT VISIT MOD MDM: CPT

## 2023-01-27 PROCEDURE — 36415 COLL VENOUS BLD VENIPUNCTURE: CPT

## 2023-01-27 PROCEDURE — 25010000002 ONDANSETRON PER 1 MG: Performed by: EMERGENCY MEDICINE

## 2023-01-27 PROCEDURE — 85025 COMPLETE CBC W/AUTO DIFF WBC: CPT

## 2023-01-27 PROCEDURE — 83605 ASSAY OF LACTIC ACID: CPT

## 2023-01-27 PROCEDURE — 80053 COMPREHEN METABOLIC PANEL: CPT

## 2023-01-27 PROCEDURE — 25010000002 SODIUM CHLORIDE 0.9 % WITH KCL 20 MEQ 20-0.9 MEQ/L-% SOLUTION: Performed by: HOSPITALIST

## 2023-01-27 PROCEDURE — 74177 CT ABD & PELVIS W/CONTRAST: CPT

## 2023-01-27 PROCEDURE — 81001 URINALYSIS AUTO W/SCOPE: CPT

## 2023-01-27 RX ORDER — ONDANSETRON 2 MG/ML
4 INJECTION INTRAMUSCULAR; INTRAVENOUS ONCE
Status: COMPLETED | OUTPATIENT
Start: 2023-01-27 | End: 2023-01-27

## 2023-01-27 RX ORDER — SODIUM CHLORIDE AND POTASSIUM CHLORIDE 150; 900 MG/100ML; MG/100ML
75 INJECTION, SOLUTION INTRAVENOUS CONTINUOUS
Status: DISCONTINUED | OUTPATIENT
Start: 2023-01-27 | End: 2023-02-01

## 2023-01-27 RX ORDER — HYDROMORPHONE HYDROCHLORIDE 1 MG/ML
0.5 INJECTION, SOLUTION INTRAMUSCULAR; INTRAVENOUS; SUBCUTANEOUS EVERY 4 HOURS PRN
Status: DISCONTINUED | OUTPATIENT
Start: 2023-01-27 | End: 2023-01-29

## 2023-01-27 RX ORDER — SODIUM CHLORIDE 0.9 % (FLUSH) 0.9 %
10 SYRINGE (ML) INJECTION AS NEEDED
Status: DISCONTINUED | OUTPATIENT
Start: 2023-01-27 | End: 2023-02-03

## 2023-01-27 RX ORDER — ONDANSETRON 2 MG/ML
4 INJECTION INTRAMUSCULAR; INTRAVENOUS EVERY 6 HOURS PRN
Status: DISCONTINUED | OUTPATIENT
Start: 2023-01-27 | End: 2023-01-30

## 2023-01-27 RX ORDER — FAMOTIDINE 10 MG/ML
20 INJECTION, SOLUTION INTRAVENOUS EVERY 12 HOURS SCHEDULED
Status: DISCONTINUED | OUTPATIENT
Start: 2023-01-27 | End: 2023-01-28

## 2023-01-27 RX ADMIN — HYDROMORPHONE HYDROCHLORIDE 0.5 MG: 1 INJECTION, SOLUTION INTRAMUSCULAR; INTRAVENOUS; SUBCUTANEOUS at 20:36

## 2023-01-27 RX ADMIN — AMPICILLIN SODIUM AND SULBACTAM SODIUM 3 G: 2; 1 INJECTION, POWDER, FOR SOLUTION INTRAMUSCULAR; INTRAVENOUS at 18:19

## 2023-01-27 RX ADMIN — ONDANSETRON 4 MG: 2 INJECTION INTRAMUSCULAR; INTRAVENOUS at 16:39

## 2023-01-27 RX ADMIN — SODIUM CHLORIDE, POTASSIUM CHLORIDE, SODIUM LACTATE AND CALCIUM CHLORIDE 1000 ML: 600; 310; 30; 20 INJECTION, SOLUTION INTRAVENOUS at 16:43

## 2023-01-27 RX ADMIN — AMPICILLIN SODIUM AND SULBACTAM SODIUM 3 G: 2; 1 INJECTION, POWDER, FOR SOLUTION INTRAMUSCULAR; INTRAVENOUS at 22:20

## 2023-01-27 RX ADMIN — HYDROMORPHONE HYDROCHLORIDE 1 MG: 1 INJECTION, SOLUTION INTRAMUSCULAR; INTRAVENOUS; SUBCUTANEOUS at 16:39

## 2023-01-27 RX ADMIN — POTASSIUM CHLORIDE AND SODIUM CHLORIDE 75 ML/HR: 900; 150 INJECTION, SOLUTION INTRAVENOUS at 22:19

## 2023-01-27 RX ADMIN — IOPAMIDOL 85 ML: 612 INJECTION, SOLUTION INTRAVENOUS at 17:06

## 2023-01-27 RX ADMIN — FAMOTIDINE 20 MG: 10 INJECTION INTRAVENOUS at 22:20

## 2023-01-27 NOTE — TELEPHONE ENCOUNTER
Caller: Hali Tejeda    Relationship: Self    Best call back number: [phone]    What is the best time to reach you: ANYTIME    Who are you requesting to speak with (clinical staff, provider,  specific staff member): CLINICAL    What was the call regarding: ON-GOING PAIN. PATIENT WOULD LIKE A CALL BACK ASAP.     Do you require a callback: YES

## 2023-01-27 NOTE — ED NOTES
"Nursing report ED to floor  Hali Tejeda  62 y.o.  female    HPI :   Chief Complaint   Patient presents with    Abdominal Pain       Admitting doctor:   Gideon Hope MD    Admitting diagnosis:   The encounter diagnosis was Colitis.    Code status:   Current Code Status       Date Active Code Status Order ID Comments User Context       Not on file            Allergies:   Sulfa antibiotics    Isolation:   No active isolations    Intake and Output  No intake or output data in the 24 hours ending 01/27/23 1810    Weight:       01/27/23  1428   Weight: 78.9 kg (174 lb)       Most recent vitals:   Vitals:    01/27/23 1428 01/27/23 1525 01/27/23 1540 01/27/23 1805   BP: (!) 163/105 (!) 160/114 (!) 167/110 114/78   Pulse:  90 84 82   Resp:       Temp:       TempSrc:       SpO2:  99% (!) 84% 97%   Weight: 78.9 kg (174 lb)      Height: 154.9 cm (61\")          Active LDAs/IV Access:   Lines, Drains & Airways       Active LDAs       Name Placement date Placement time Site Days    Peripheral IV 01/27/23 1809 Left Antecubital 01/27/23  1809  Antecubital  less than 1                    Labs (abnormal labs have a star):   Labs Reviewed   COMPREHENSIVE METABOLIC PANEL - Abnormal; Notable for the following components:       Result Value    Glucose 110 (*)     Potassium 3.4 (*)     All other components within normal limits    Narrative:     GFR Normal >60  Chronic Kidney Disease <60  Kidney Failure <15     URINALYSIS W/ MICROSCOPIC IF INDICATED (NO CULTURE) - Abnormal; Notable for the following components:    Color, UA Dark Yellow (*)     Ketones, UA 40 mg/dL (2+) (*)     Blood, UA Trace (*)     Protein, UA 30 mg/dL (1+) (*)     Leuk Esterase, UA Trace (*)     All other components within normal limits   LACTIC ACID, PLASMA - Abnormal; Notable for the following components:    Lactate 2.6 (*)     All other components within normal limits   CBC WITH AUTO DIFFERENTIAL - Abnormal; Notable for the following components:    WBC 17.37 (*)  "    Neutrophil % 81.5 (*)     Lymphocyte % 12.1 (*)     Neutrophils, Absolute 14.14 (*)     Monocytes, Absolute 0.95 (*)     Immature Grans, Absolute 0.07 (*)     All other components within normal limits   URINALYSIS, MICROSCOPIC ONLY - Abnormal; Notable for the following components:    RBC, UA 6-12 (*)     Bacteria, UA Trace (*)     Squamous Epithelial Cells, UA 3-6 (*)     All other components within normal limits   LIPASE - Normal   BLOOD CULTURE   BLOOD CULTURE   RAINBOW DRAW    Narrative:     The following orders were created for panel order [place] Draw.  Procedure                               Abnormality         Status                     ---------                               -----------         ------                     Green Top (Gel)[[id number]]                                  Final result               Lavender Top[[id number]]                                     Final result               Gold Top - SST[[id number]]                                   Final result               Light Blue Top[[id number]]                                   Final result                 Please view results for these tests on the individual orders.   LACTIC ACID, REFLEX   CBC AND DIFFERENTIAL    Narrative:     The following orders were created for panel order CBC & Differential.  Procedure                               Abnormality         Status                     ---------                               -----------         ------                     CBC Auto Differential[[id number]]        Abnormal            Final result                 Please view results for these tests on the individual orders.   GREEN TOP   LAVENDER TOP   GOLD TOP - SST   LIGHT BLUE TOP       EKG:   No orders to display       Meds given in ED:   Medications   sodium chloride 0.9 % flush 10 mL (has no administration in time range)   ampicillin-sulbactam (UNASYN) 3 g in sodium chloride 0.9 % 100 mL IVPB-VTB (has no administration in time range)    HYDROmorphone (DILAUDID) injection 1 mg (1 mg Intravenous Given 1/27/23 1639)   ondansetron (ZOFRAN) injection 4 mg (4 mg Intravenous Given 1/27/23 1639)   lactated ringers bolus 1,000 mL (1,000 mL Intravenous New Bag 1/27/23 1643)   iopamidol (ISOVUE-300) 61 % injection 100 mL (85 mL Intravenous Given by Other 1/27/23 1706)       Imaging results:  No radiology results for the last day    Ambulatory status:   - assist x 1    Social issues:   Social History     Socioeconomic History    Marital status: Single   Tobacco Use    Smoking status: Former    Smokeless tobacco: Never    Tobacco comments:     Quit 2004   Substance and Sexual Activity    Alcohol use: Yes     Comment: occ wine    Drug use: Never       NIH Stroke Scale:         Allan Pulliam RN  01/27/23 18:10 EST

## 2023-01-27 NOTE — TELEPHONE ENCOUNTER
----- Message from Shelley Robledo sent at 1/27/2023 11:15 AM EST -----  Regarding: STOMACH PAIN  Contact: [phone]      Caller: Hali Tejeda    Relationship to Patient: Self    Best Call Back Number: [phone]    Chief Complaint:  Patient is having stomach pain. Patient is taking medicine that was recently prescribed and stated that it is not working.

## 2023-01-27 NOTE — TELEPHONE ENCOUNTER
Called pt, she states dicyclomine is not helping and she is having a lot of abdominal pain and is going to the ER now.  Advised will send an update to Dr Lutz.

## 2023-01-27 NOTE — ED PROVIDER NOTES
EMERGENCY DEPARTMENT ENCOUNTER    Room Number:  34/34  Date seen:  1/27/2023  PCP: Richard Delgado MD      HPI:  Chief Complaint: Abdominal pain  A complete HPI/ROS/PMH/PSH/SH/FH are unobtainable due to: None  Context: Hali Tejeda is a 62 y.o. female who presents to the ED c/o abdominal pain.  Left lower quadrant pain for the past month.  Pain is constant.  She has had occasional loose stool.  Nothing makes this worse or better.  She was seen in the emergency department recently diagnosed with colitis.  She was given IV antibiotics x1 in the emergency department and discharged home with oral antibiotics.  She then followed up with Dr. Lutz, gastroenterology.  Plan to have an outpatient colonoscopy in the near future.  However, her pain is worsening which is why she is here today.  No fever, vomiting.          PAST MEDICAL HISTORY  Active Ambulatory Problems     Diagnosis Date Noted   • Left sided colitis with complication (HCC) 01/24/2023     Resolved Ambulatory Problems     Diagnosis Date Noted   • No Resolved Ambulatory Problems     Past Medical History:   Diagnosis Date   • HTN (hypertension)          PAST SURGICAL HISTORY  Past Surgical History:   Procedure Laterality Date   • COLONOSCOPY  2010    negative per pt   • HYSTERECTOMY     • LAPAROSCOPIC GASTRIC BANDING           FAMILY HISTORY  Family History   Problem Relation Age of Onset   • Diabetes Mother          SOCIAL HISTORY  Social History     Socioeconomic History   • Marital status: Single   Tobacco Use   • Smoking status: Former   • Smokeless tobacco: Never   • Tobacco comments:     Quit 2004   Substance and Sexual Activity   • Alcohol use: Yes     Comment: occ wine   • Drug use: Never         ALLERGIES  Sulfa antibiotics        REVIEW OF SYSTEMS  Review of Systems     All systems reviewed and negative except for those discussed in HPI.       PHYSICAL EXAM  ED Triage Vitals   Temp Heart Rate Resp BP SpO2   01/27/23 1425 01/27/23 1425 01/27/23 1425  01/27/23 1428 01/27/23 1425   98.9 °F (37.2 °C) 98 17 (!) 163/105 97 %      Temp src Heart Rate Source Patient Position BP Location FiO2 (%)   01/27/23 1425 -- -- -- --   Tympanic           Physical Exam      GENERAL: no acute distress  HENT: nares patent  EYES: no scleral icterus  CV: regular rhythm, normal rate  RESPIRATORY: normal effort, clear to auscultation bilaterally  ABDOMEN: soft, left lower quadrant tenderness without rebound or guarding  MUSCULOSKELETAL: no deformity  NEURO: alert, moves all extremities, follows commands  PSYCH:  calm, cooperative  SKIN: warm, dry    Vital signs and nursing notes reviewed.          LAB RESULTS  Recent Results (from the past 24 hour(s))   Comprehensive Metabolic Panel    Collection Time: 01/27/23  2:45 PM    Specimen: Blood   Result Value Ref Range    Glucose 110 (H) 65 - 99 mg/dL    BUN 8 8 - 23 mg/dL    Creatinine 0.84 0.57 - 1.00 mg/dL    Sodium 144 136 - 145 mmol/L    Potassium 3.4 (L) 3.5 - 5.2 mmol/L    Chloride 104 98 - 107 mmol/L    CO2 27.4 22.0 - 29.0 mmol/L    Calcium 10.1 8.6 - 10.5 mg/dL    Total Protein 8.5 6.0 - 8.5 g/dL    Albumin 4.2 3.5 - 5.2 g/dL    ALT (SGPT) 8 1 - 33 U/L    AST (SGOT) 15 1 - 32 U/L    Alkaline Phosphatase 67 39 - 117 U/L    Total Bilirubin 0.5 0.0 - 1.2 mg/dL    Globulin 4.3 gm/dL    A/G Ratio 1.0 g/dL    BUN/Creatinine Ratio 9.5 7.0 - 25.0    Anion Gap 12.6 5.0 - 15.0 mmol/L    eGFR 78.2 >60.0 mL/min/1.73   Lipase    Collection Time: 01/27/23  2:45 PM    Specimen: Blood   Result Value Ref Range    Lipase 20 13 - 60 U/L   Lactic Acid, Plasma    Collection Time: 01/27/23  2:45 PM    Specimen: Blood   Result Value Ref Range    Lactate 2.6 (C) 0.5 - 2.0 mmol/L   Green Top (Gel)    Collection Time: 01/27/23  2:45 PM   Result Value Ref Range    Extra Tube Hold for add-ons.    Lavender Top    Collection Time: 01/27/23  2:45 PM   Result Value Ref Range    Extra Tube hold for add-on    Gold Top - SST    Collection Time: 01/27/23  2:45 PM    Result Value Ref Range    Extra Tube Hold for add-ons.    Light Blue Top    Collection Time: 01/27/23  2:45 PM   Result Value Ref Range    Extra Tube Hold for add-ons.    CBC Auto Differential    Collection Time: 01/27/23  2:45 PM    Specimen: Blood   Result Value Ref Range    WBC 17.37 (H) 3.40 - 10.80 10*3/mm3    RBC 5.23 3.77 - 5.28 10*6/mm3    Hemoglobin 15.2 12.0 - 15.9 g/dL    Hematocrit 46.2 34.0 - 46.6 %    MCV 88.3 79.0 - 97.0 fL    MCH 29.1 26.6 - 33.0 pg    MCHC 32.9 31.5 - 35.7 g/dL    RDW 12.9 12.3 - 15.4 %    RDW-SD 41.2 37.0 - 54.0 fl    MPV 10.1 6.0 - 12.0 fL    Platelets 330 140 - 450 10*3/mm3    Neutrophil % 81.5 (H) 42.7 - 76.0 %    Lymphocyte % 12.1 (L) 19.6 - 45.3 %    Monocyte % 5.5 5.0 - 12.0 %    Eosinophil % 0.3 0.3 - 6.2 %    Basophil % 0.2 0.0 - 1.5 %    Immature Grans % 0.4 0.0 - 0.5 %    Neutrophils, Absolute 14.14 (H) 1.70 - 7.00 10*3/mm3    Lymphocytes, Absolute 2.11 0.70 - 3.10 10*3/mm3    Monocytes, Absolute 0.95 (H) 0.10 - 0.90 10*3/mm3    Eosinophils, Absolute 0.06 0.00 - 0.40 10*3/mm3    Basophils, Absolute 0.04 0.00 - 0.20 10*3/mm3    Immature Grans, Absolute 0.07 (H) 0.00 - 0.05 10*3/mm3    nRBC 0.0 0.0 - 0.2 /100 WBC   Urinalysis With Microscopic If Indicated (No Culture) - Urine, Clean Catch    Collection Time: 01/27/23  2:46 PM    Specimen: Urine, Clean Catch   Result Value Ref Range    Color, UA Dark Yellow (A) Yellow, Straw    Appearance, UA Clear Clear    pH, UA 6.0 5.0 - 8.0    Specific Gravity, UA >=1.030 1.005 - 1.030    Glucose, UA Negative Negative    Ketones, UA 40 mg/dL (2+) (A) Negative    Bilirubin, UA Negative Negative    Blood, UA Trace (A) Negative    Protein, UA 30 mg/dL (1+) (A) Negative    Leuk Esterase, UA Trace (A) Negative    Nitrite, UA Negative Negative    Urobilinogen, UA 1.0 E.U./dL 0.2 - 1.0 E.U./dL   Urinalysis, Microscopic Only - Urine, Clean Catch    Collection Time: 01/27/23  2:46 PM    Specimen: Urine, Clean Catch   Result Value Ref Range     RBC, UA 6-12 (A) None Seen, 0-2 /HPF    WBC, UA 0-2 None Seen, 0-2 /HPF    Bacteria, UA Trace (A) None Seen /HPF    Squamous Epithelial Cells, UA 3-6 (A) None Seen, 0-2 /HPF    Transitional Epithelial Cells, UA 0-2 0 - 2 /HPF    Hyaline Casts, UA 0-2 None Seen /LPF    Methodology Manual Light Microscopy        Ordered the above labs and reviewed the results.        RADIOLOGY  No Radiology Exams Resulted Within Past 24 Hours    Ordered the above noted radiological studies. Reviewed by me in PACS.          PROCEDURES  Procedures          MEDICATIONS GIVEN IN ER  Medications   sodium chloride 0.9 % flush 10 mL (has no administration in time range)   ampicillin-sulbactam (UNASYN) 3 g in sodium chloride 0.9 % 100 mL IVPB-VTB (has no administration in time range)   HYDROmorphone (DILAUDID) injection 1 mg (1 mg Intravenous Given 1/27/23 1639)   ondansetron (ZOFRAN) injection 4 mg (4 mg Intravenous Given 1/27/23 1639)   lactated ringers bolus 1,000 mL (1,000 mL Intravenous New Bag 1/27/23 1643)   iopamidol (ISOVUE-300) 61 % injection 100 mL (85 mL Intravenous Given by Other 1/27/23 1706)           MEDICAL DECISION MAKING, PROGRESS, and CONSULTS    All labs have been independently reviewed by me.  All radiology studies have been reviewed by me and discussed with radiologist dictating the report.   EKG's independently viewed and interpreted by me.  Discussion below represents my analysis of pertinent findings related to patient's condition, differential diagnosis, treatment plan and final disposition.      Orders placed during this visit:  Orders Placed This Encounter   Procedures   • Blood Culture - Blood,   • Blood Culture - Blood,   • CT Abdomen Pelvis Without Contrast   • [place] Draw   • Comprehensive Metabolic Panel   • Lipase   • Urinalysis With Microscopic If Indicated (No Culture) - Urine, Clean Catch   • Lactic Acid, Plasma   • CBC Auto Differential   • STAT Lactic Acid, Reflex   • Urinalysis, Microscopic Only -  Urine, Clean Catch   • NPO Diet NPO Type: Strict NPO   • Undress & Gown   • LIPPS (on-call MD unless specified)   • Insert Peripheral IV   • Inpatient Admission   • CBC & Differential   • Green Top (Gel)   • Lavender Top   • Gold Top - SST   • Light Blue Top         Additional sources:    - External (non-ED) record review: On medical chart review, patient saw Dr. Lutz with gastroenterology in 1/24/2023.  Patient had left colon colitis.  Patient completed course with Augmentin.  Patient has ongoing symptoms which recurred as soon as she started to take anything beyond clear liquids.  Therefore, Dr. Lutz recommended trial of mesalamine.  However, patient states that she has been unable to take those pills.    Of note, I do not see in the ER documentation from 1/12/2023 that she was diagnosed with diverticulitis.  Her discharge diagnosis was in fact colitis.        Differential diagnosis:    Differential diagnosis includes but not limited to:  - hepatobiliary pathology such as cholecystitis, cholangitis, and symptomatic cholelithiasis  - Pancreatitis  - Dyspepsia  - Small bowel obstruction  - Appendicitis  - Diverticulitis  - UTI including pyelonephritis  - Ureteral stone  - Zoster  - Colitis, including infectious and ischemic  - Atypical ACS        Independent interpretation of labs, radiology studies, and discussions with consultants:  ED Course as of 01/27/23 1808   Fri Jan 27, 2023   1606 WBC(!): 17.37 [TD]   1606 Lactate(!!): 2.6 [TD]   1606 Creatinine: 0.84 [TD]   1606 Sodium: 144 [TD]   1606 Potassium(!): 3.4 [TD]   1807 Patient is satting 97% on room air. [TD]   1807 I discussed the CT results with Dr. Valencia, radiology.  Patient continues to have sigmoid colitis but also now has right-sided colitis.  This is a new finding compared to CT scan from 2 weeks ago. [TD]      ED Course User Index  [TD] Morris Archer II, MD       Although the patient has had a recent CT scan, she has worsening leukocytosis  and has seemed to failed outpatient oral antibiotics.    I discussed the case with Dr. Hope, hospitalist.  We reviewed patient's labs and imaging and exam.  He will admit.       PPE: The patient wore a mask throughout the entire encounter. I wore a well-fitting mask.    DIAGNOSIS  Final diagnoses:   Colitis         DISPOSITION  Admit      Latest Documented Vital Signs:  As of 18:08 EST  BP- (!) 167/110 HR- 84 Temp- 98.9 °F (37.2 °C) (Tympanic) O2 sat- 97%      --    Please note that portions of this were completed with a voice recognition program.       Note Disclaimer: At [facility], we believe that sharing information builds trust and better relationships. You are receiving this note because you are receiving care at [facility] or recently visited. It is possible you will see health information before a provider has talked with you about it. This kind of information can be easy to misunderstand. To help you fully understand what it means for your health, we urge you to discuss this note with your provider.       Morris Archer II, MD  01/27/23 7281

## 2023-01-28 LAB
ANION GAP SERPL CALCULATED.3IONS-SCNC: 7.6 MMOL/L (ref 5–15)
BASOPHILS # BLD AUTO: 0.03 10*3/MM3 (ref 0–0.2)
BASOPHILS NFR BLD AUTO: 0.2 % (ref 0–1.5)
BUN SERPL-MCNC: 8 MG/DL (ref 8–23)
BUN/CREAT SERPL: 11.4 (ref 7–25)
CALCIUM SPEC-SCNC: 9.3 MG/DL (ref 8.6–10.5)
CHLORIDE SERPL-SCNC: 106 MMOL/L (ref 98–107)
CO2 SERPL-SCNC: 25.4 MMOL/L (ref 22–29)
CREAT SERPL-MCNC: 0.7 MG/DL (ref 0.57–1)
DEPRECATED RDW RBC AUTO: 39.8 FL (ref 37–54)
EGFRCR SERPLBLD CKD-EPI 2021: 97.3 ML/MIN/1.73
EOSINOPHIL # BLD AUTO: 0.05 10*3/MM3 (ref 0–0.4)
EOSINOPHIL NFR BLD AUTO: 0.3 % (ref 0.3–6.2)
ERYTHROCYTE [DISTWIDTH] IN BLOOD BY AUTOMATED COUNT: 12.8 % (ref 12.3–15.4)
GLUCOSE SERPL-MCNC: 73 MG/DL (ref 65–99)
HCT VFR BLD AUTO: 40.3 % (ref 34–46.6)
HGB BLD-MCNC: 13.1 G/DL (ref 12–15.9)
IMM GRANULOCYTES # BLD AUTO: 0.07 10*3/MM3 (ref 0–0.05)
IMM GRANULOCYTES NFR BLD AUTO: 0.5 % (ref 0–0.5)
LYMPHOCYTES # BLD AUTO: 2.52 10*3/MM3 (ref 0.7–3.1)
LYMPHOCYTES NFR BLD AUTO: 16.6 % (ref 19.6–45.3)
MCH RBC QN AUTO: 28.1 PG (ref 26.6–33)
MCHC RBC AUTO-ENTMCNC: 32.5 G/DL (ref 31.5–35.7)
MCV RBC AUTO: 86.5 FL (ref 79–97)
MONOCYTES # BLD AUTO: 1.05 10*3/MM3 (ref 0.1–0.9)
MONOCYTES NFR BLD AUTO: 6.9 % (ref 5–12)
NEUTROPHILS NFR BLD AUTO: 11.49 10*3/MM3 (ref 1.7–7)
NEUTROPHILS NFR BLD AUTO: 75.5 % (ref 42.7–76)
NRBC BLD AUTO-RTO: 0 /100 WBC (ref 0–0.2)
PLATELET # BLD AUTO: 287 10*3/MM3 (ref 140–450)
PMV BLD AUTO: 10.5 FL (ref 6–12)
POTASSIUM SERPL-SCNC: 3.4 MMOL/L (ref 3.5–5.2)
RBC # BLD AUTO: 4.66 10*6/MM3 (ref 3.77–5.28)
SODIUM SERPL-SCNC: 139 MMOL/L (ref 136–145)
WBC NRBC COR # BLD: 15.21 10*3/MM3 (ref 3.4–10.8)

## 2023-01-28 PROCEDURE — 99222 1ST HOSP IP/OBS MODERATE 55: CPT | Performed by: INTERNAL MEDICINE

## 2023-01-28 PROCEDURE — 25010000002 AMPICILLIN-SULBACTAM PER 1.5 G: Performed by: HOSPITALIST

## 2023-01-28 PROCEDURE — 25010000002 ONDANSETRON PER 1 MG: Performed by: HOSPITALIST

## 2023-01-28 PROCEDURE — 85025 COMPLETE CBC W/AUTO DIFF WBC: CPT | Performed by: HOSPITALIST

## 2023-01-28 PROCEDURE — 25010000002 HYDROMORPHONE PER 4 MG: Performed by: HOSPITALIST

## 2023-01-28 PROCEDURE — 80048 BASIC METABOLIC PNL TOTAL CA: CPT | Performed by: HOSPITALIST

## 2023-01-28 PROCEDURE — 25010000002 SODIUM CHLORIDE 0.9 % WITH KCL 20 MEQ 20-0.9 MEQ/L-% SOLUTION: Performed by: HOSPITALIST

## 2023-01-28 RX ORDER — POLYETHYLENE GLYCOL 3350 17 G/17G
17 POWDER, FOR SOLUTION ORAL ONCE
Status: COMPLETED | OUTPATIENT
Start: 2023-01-28 | End: 2023-01-28

## 2023-01-28 RX ORDER — POLYETHYLENE GLYCOL 3350 17 G/17G
17 POWDER, FOR SOLUTION ORAL DAILY
Status: DISCONTINUED | OUTPATIENT
Start: 2023-01-28 | End: 2023-01-28

## 2023-01-28 RX ORDER — DIPHENOXYLATE HYDROCHLORIDE AND ATROPINE SULFATE 2.5; .025 MG/1; MG/1
1 TABLET ORAL DAILY
Status: DISCONTINUED | OUTPATIENT
Start: 2023-01-28 | End: 2023-02-02

## 2023-01-28 RX ORDER — CETIRIZINE HYDROCHLORIDE 10 MG/1
10 TABLET ORAL DAILY
Status: DISCONTINUED | OUTPATIENT
Start: 2023-01-28 | End: 2023-02-02

## 2023-01-28 RX ORDER — MESALAMINE 1.2 G/1
4.8 TABLET, DELAYED RELEASE ORAL
Status: DISCONTINUED | OUTPATIENT
Start: 2023-01-28 | End: 2023-02-02

## 2023-01-28 RX ORDER — LOSARTAN POTASSIUM 25 MG/1
25 TABLET ORAL
Status: DISCONTINUED | OUTPATIENT
Start: 2023-01-28 | End: 2023-01-31

## 2023-01-28 RX ORDER — POLYETHYLENE GLYCOL 3350 17 G/17G
17 POWDER, FOR SOLUTION ORAL 2 TIMES DAILY
Status: DISCONTINUED | OUTPATIENT
Start: 2023-01-28 | End: 2023-02-02

## 2023-01-28 RX ORDER — LOSARTAN POTASSIUM 50 MG/1
50 TABLET ORAL
Status: DISCONTINUED | OUTPATIENT
Start: 2023-01-28 | End: 2023-01-28

## 2023-01-28 RX ORDER — PANTOPRAZOLE SODIUM 40 MG/1
40 TABLET, DELAYED RELEASE ORAL
Status: DISCONTINUED | OUTPATIENT
Start: 2023-01-29 | End: 2023-01-29

## 2023-01-28 RX ADMIN — FAMOTIDINE 20 MG: 10 INJECTION INTRAVENOUS at 10:46

## 2023-01-28 RX ADMIN — HYDROMORPHONE HYDROCHLORIDE 0.5 MG: 1 INJECTION, SOLUTION INTRAMUSCULAR; INTRAVENOUS; SUBCUTANEOUS at 12:15

## 2023-01-28 RX ADMIN — Medication 1 TABLET: at 15:20

## 2023-01-28 RX ADMIN — HYDROMORPHONE HYDROCHLORIDE 0.5 MG: 1 INJECTION, SOLUTION INTRAMUSCULAR; INTRAVENOUS; SUBCUTANEOUS at 07:40

## 2023-01-28 RX ADMIN — HYDROMORPHONE HYDROCHLORIDE 0.5 MG: 1 INJECTION, SOLUTION INTRAMUSCULAR; INTRAVENOUS; SUBCUTANEOUS at 01:46

## 2023-01-28 RX ADMIN — POLYETHYLENE GLYCOL 3350 17 G: 17 POWDER, FOR SOLUTION ORAL at 12:15

## 2023-01-28 RX ADMIN — AMPICILLIN SODIUM AND SULBACTAM SODIUM 3 G: 2; 1 INJECTION, POWDER, FOR SOLUTION INTRAMUSCULAR; INTRAVENOUS at 16:32

## 2023-01-28 RX ADMIN — LOSARTAN POTASSIUM 25 MG: 25 TABLET, FILM COATED ORAL at 15:20

## 2023-01-28 RX ADMIN — HYDROMORPHONE HYDROCHLORIDE 0.5 MG: 1 INJECTION, SOLUTION INTRAMUSCULAR; INTRAVENOUS; SUBCUTANEOUS at 16:25

## 2023-01-28 RX ADMIN — AMPICILLIN SODIUM AND SULBACTAM SODIUM 3 G: 2; 1 INJECTION, POWDER, FOR SOLUTION INTRAMUSCULAR; INTRAVENOUS at 10:46

## 2023-01-28 RX ADMIN — AMPICILLIN SODIUM AND SULBACTAM SODIUM 3 G: 2; 1 INJECTION, POWDER, FOR SOLUTION INTRAMUSCULAR; INTRAVENOUS at 05:05

## 2023-01-28 RX ADMIN — HYDROMORPHONE HYDROCHLORIDE 0.5 MG: 1 INJECTION, SOLUTION INTRAMUSCULAR; INTRAVENOUS; SUBCUTANEOUS at 20:41

## 2023-01-28 RX ADMIN — POTASSIUM CHLORIDE AND SODIUM CHLORIDE 75 ML/HR: 900; 150 INJECTION, SOLUTION INTRAVENOUS at 15:20

## 2023-01-28 RX ADMIN — POLYETHYLENE GLYCOL 3350 17 G: 17 POWDER, FOR SOLUTION ORAL at 20:41

## 2023-01-28 RX ADMIN — AMPICILLIN SODIUM AND SULBACTAM SODIUM 3 G: 2; 1 INJECTION, POWDER, FOR SOLUTION INTRAMUSCULAR; INTRAVENOUS at 23:13

## 2023-01-28 RX ADMIN — ONDANSETRON 4 MG: 2 INJECTION INTRAMUSCULAR; INTRAVENOUS at 13:29

## 2023-01-28 NOTE — CONSULTS
[facility]  Initial Inpatient Consult Note    Referring Provider: Gideon rhodes    Reason for Consultation: Diarrhea abnormal imaging    Subjective     History of present illness:    62 y.o. female complaining of roughly 1 month of abdominal pain, diarrhea.  Loose stools have a bit of blood in them.  There is nocturnal diarrhea.  Abdominal pain is lower quadrants bilaterally worse with movement better at rest.  No relation to bowel movements or eating.  She had a colonoscopy in 2010 that was normal.  She saw Dr. Lutz last week in the office who recommended a colonoscopy on February 13.  Her abdominal pain and diarrhea worsen so she came to the emergency room.  CAT scan 1 month ago showing left-sided colitis, CAT scan done yesterday in the emergency room results are below.  No extraintestinal manifestations of inflammatory bowel disease.  No recent sick contacts travel, and she is not had stool studies to evaluate for infectious processes yet.  she was empirically started on mesalamine but did show no improved    Past Medical History:  Past Medical History:   Diagnosis Date   • HTN (hypertension)      Past Surgical History:  Past Surgical History:   Procedure Laterality Date   • BREAST LUMPECTOMY Left     benign   • COLONOSCOPY  2010    negative per pt   • GASTRIC SLEEVE LAPAROSCOPIC  2020   • HYSTERECTOMY  2005   • LAPAROSCOPIC GASTRIC BANDING      lap band removal      Social History:   Social History     Tobacco Use   • Smoking status: Former   • Smokeless tobacco: Never   • Tobacco comments:     Quit 2004   Substance Use Topics   • Alcohol use: Yes     Alcohol/week: 1.0 standard drink     Types: 1 Glasses of wine per week     Comment: occ wine      Family History:  Family History   Problem Relation Age of Onset   • Diabetes Mother        Home Meds:  Medications Prior to Admission   Medication Sig Dispense Refill Last Dose   • levocetirizine (XYZAL) 5 MG tablet Take 5 mg by mouth As  Needed.      • losartan-hydrochlorothiazide (HYZAAR) 50-12.5 MG per tablet Take 1 tablet by mouth Daily.      • Multiple Vitamin (MULTI-VITAMIN PO) Take 1 tablet by mouth Daily.      • ondansetron ODT (ZOFRAN-ODT) 4 MG disintegrating tablet Place 1 tablet on the tongue 4 (Four) Times a Day As Needed for Nausea or Vomiting. 12 tablet 0    • mesalamine (Lialda) 1.2 g EC tablet Take 4 tablets by mouth Daily With Breakfast. 120 tablet 11      Current Meds:   ampicillin-sulbactam, 3 g, Intravenous, Q6H  famotidine, 20 mg, Intravenous, Q12H      Allergies:  Allergies   Allergen Reactions   • Sulfa Antibiotics Itching     Review of Systems  There is weakness of fatigue all other systems reviewed and negative     Objective     Vital Signs  Temp:  [96.8 °F (36 °C)-98.9 °F (37.2 °C)] 97.8 °F (36.6 °C)  Heart Rate:  [64-98] 70  Resp:  [14-18] 14  BP: (114-167)/() 128/79  Physical Exam:  General Appearance:    Alert, cooperative, in no acute distress   Head:    Normocephalic, without obvious abnormality, atraumatic   Eyes:          conjunctivae and sclerae normal, no   icterus   Throat:   no thrush, oral mucosa moist   Neck:   Supple, no adenopathy   Lungs:     Clear to auscultation bilaterally    Heart:    Regular rhythm and normal rate    Chest Wall:    No abnormalities observed   Abdomen:     Soft, nondistended, nontender; normal bowel sounds   Extremities:   no edema, no redness   Skin:   No bruising or rash   Psychiatric:  normal mood and insight     Results Review:   I reviewed the patient's new clinical results.    Results from last 7 days   Lab Units 01/28/23  0629 01/27/23  1445   WBC 10*3/mm3 15.21* 17.37*   HEMOGLOBIN g/dL 13.1 15.2   HEMATOCRIT % 40.3 46.2   PLATELETS 10*3/mm3 287 330     Results from last 7 days   Lab Units 01/28/23  0629 01/27/23  1445   SODIUM mmol/L 139 144   POTASSIUM mmol/L 3.4* 3.4*   CHLORIDE mmol/L 106 104   CO2 mmol/L 25.4 27.4   BUN mg/dL 8 8   CREATININE mg/dL 0.70 0.84   CALCIUM  mg/dL 9.3 10.1   BILIRUBIN mg/dL  --  0.5   ALK PHOS U/L  --  67   ALT (SGPT) U/L  --  8   AST (SGOT) U/L  --  15   GLUCOSE mg/dL 73 110*         Lab Results   Lab Value Date/Time    LIPASE 20 01/27/2023 1445    LIPASE 16 01/12/2023 1601       Radiology:  CT Abdomen Pelvis Without Contrast   Final Result   1. Colitis associated abnormal colonic dilatation involving the   ascending colon, hepatic flexure, proximal transverse colon where there   is also pericolonic inflammation. There is a second area of more   extensive abnormal wall thickening involving the proximal to mid sigmoid   colon which is decompressed with a greater degree of surrounding   inflammation. There is a potential associated obstructing component at   this location as the colon proximal to the sigmoid colon is dilated.   When compared to the prior exam 15 days ago, the right colitis is new   and what appears to represent colitis of the proximal sigmoid colon is   not significantly changed. The lack of interval change following   treatment and the presence of an obstructive component raises the   likelihood that there could be an associated neoplastic process at this   location though the distinction is difficult to make with CT and there   remains a great deal of inflammation surrounding the proximal sigmoid   colon.   2. Apparently, this exam was performed with intravenous contrast though   there is no contrast demonstrated on this exam. This suggests that there   has been extravasation of contrast into the arm and that could be   confirmed with physical exam or x-ray of the arm.   3. Previous gastric surgery. Small hiatal hernia.       Discussed with Dr. Cruz in the emergency department on 01/27/2023 at   5:50 PM.        Radiation dose reduction techniques were utilized, including automated   exposure control and exposure modulation based on body size.       This report was finalized on 1/27/2023 6:43 PM by Dr. Donovan Valencia M.D.               Assessment & Plan   Patient Active Problem List   Diagnosis   • Left sided colitis with complication (HCC)   • Colitis       Assessment:  1. Abdominal pain  2. Diarrhea  3. Rectal bleeding  4. Abnormal imaging x2    Plan:  · Suspect inflammatory bowel disease but we will need to rule out C. difficile and enteroinvasive bacterium first  · GI PCR and C. difficile ordered  · If negative consider unprepped lower endoscopic evaluation in the near future      I discussed the patients findings and my recommendations with patient and nursing staff.    Rodo Ramirez MD

## 2023-01-28 NOTE — PLAN OF CARE
Goal Outcome Evaluation:  Plan of Care Reviewed With: patient        Progress: no change  Outcome Evaluation: Patient from ED, c/o left lower abdominal pain and n/v. No nausea or vomiting noted since arriving to [facility]. IV Dilaudid given for pain, IVFs infusing, IV Unasyn given as ordered, pt tolerating clear liquid diet, hypoactive bowels sounds noted, VSS, afebrile, on room air, GI to consult this AM, scd's on, up ad clara, voiding freely.

## 2023-01-28 NOTE — H&P (VIEW-ONLY)
[facility], [facility]  Initial Inpatient Consult Note    Referring Provider: Gideon rhodes    Reason for Consultation: Diarrhea abnormal imaging    Subjective     History of present illness:    62 y.o. female complaining of roughly 1 month of abdominal pain, diarrhea.  Loose stools have a bit of blood in them.  There is nocturnal diarrhea.  Abdominal pain is lower quadrants bilaterally worse with movement better at rest.  No relation to bowel movements or eating.  She had a colonoscopy in 2010 that was normal.  She saw Dr. Lutz last week in the office who recommended a colonoscopy on February 13.  Her abdominal pain and diarrhea worsen so she came to the emergency room.  CAT scan 1 month ago showing left-sided colitis, CAT scan done yesterday in the emergency room results are below.  No extraintestinal manifestations of inflammatory bowel disease.  No recent sick contacts travel, and she is not had stool studies to evaluate for infectious processes yet.  she was empirically started on mesalamine but did show no improved    Past Medical History:  Past Medical History:   Diagnosis Date   • HTN (hypertension)      Past Surgical History:  Past Surgical History:   Procedure Laterality Date   • BREAST LUMPECTOMY Left     benign   • COLONOSCOPY  2010    negative per pt   • GASTRIC SLEEVE LAPAROSCOPIC  2020   • HYSTERECTOMY  2005   • LAPAROSCOPIC GASTRIC BANDING      lap band removal      Social History:   Social History     Tobacco Use   • Smoking status: Former   • Smokeless tobacco: Never   • Tobacco comments:     Quit 2004   Substance Use Topics   • Alcohol use: Yes     Alcohol/week: 1.0 standard drink     Types: 1 Glasses of wine per week     Comment: occ wine      Family History:  Family History   Problem Relation Age of Onset   • Diabetes Mother        Home Meds:  Medications Prior to Admission   Medication Sig Dispense Refill Last Dose   • levocetirizine (XYZAL) 5 MG tablet Take 5 mg by mouth As  Needed.      • losartan-hydrochlorothiazide (HYZAAR) 50-12.5 MG per tablet Take 1 tablet by mouth Daily.      • Multiple Vitamin (MULTI-VITAMIN PO) Take 1 tablet by mouth Daily.      • ondansetron ODT (ZOFRAN-ODT) 4 MG disintegrating tablet Place 1 tablet on the tongue 4 (Four) Times a Day As Needed for Nausea or Vomiting. 12 tablet 0    • mesalamine (Lialda) 1.2 g EC tablet Take 4 tablets by mouth Daily With Breakfast. 120 tablet 11      Current Meds:   ampicillin-sulbactam, 3 g, Intravenous, Q6H  famotidine, 20 mg, Intravenous, Q12H      Allergies:  Allergies   Allergen Reactions   • Sulfa Antibiotics Itching     Review of Systems  There is weakness of fatigue all other systems reviewed and negative     Objective     Vital Signs  Temp:  [96.8 °F (36 °C)-98.9 °F (37.2 °C)] 97.8 °F (36.6 °C)  Heart Rate:  [64-98] 70  Resp:  [14-18] 14  BP: (114-167)/() 128/79  Physical Exam:  General Appearance:    Alert, cooperative, in no acute distress   Head:    Normocephalic, without obvious abnormality, atraumatic   Eyes:          conjunctivae and sclerae normal, no   icterus   Throat:   no thrush, oral mucosa moist   Neck:   Supple, no adenopathy   Lungs:     Clear to auscultation bilaterally    Heart:    Regular rhythm and normal rate    Chest Wall:    No abnormalities observed   Abdomen:     Soft, nondistended, nontender; normal bowel sounds   Extremities:   no edema, no redness   Skin:   No bruising or rash   Psychiatric:  normal mood and insight     Results Review:   I reviewed the patient's new clinical results.    Results from last 7 days   Lab Units 01/28/23  0629 01/27/23  1445   WBC 10*3/mm3 15.21* 17.37*   HEMOGLOBIN g/dL 13.1 15.2   HEMATOCRIT % 40.3 46.2   PLATELETS 10*3/mm3 287 330     Results from last 7 days   Lab Units 01/28/23  0629 01/27/23  1445   SODIUM mmol/L 139 144   POTASSIUM mmol/L 3.4* 3.4*   CHLORIDE mmol/L 106 104   CO2 mmol/L 25.4 27.4   BUN mg/dL 8 8   CREATININE mg/dL 0.70 0.84   CALCIUM  mg/dL 9.3 10.1   BILIRUBIN mg/dL  --  0.5   ALK PHOS U/L  --  67   ALT (SGPT) U/L  --  8   AST (SGOT) U/L  --  15   GLUCOSE mg/dL 73 110*         Lab Results   Lab Value Date/Time    LIPASE 20 01/27/2023 1445    LIPASE 16 01/12/2023 1601       Radiology:  CT Abdomen Pelvis Without Contrast   Final Result   1. Colitis associated abnormal colonic dilatation involving the   ascending colon, hepatic flexure, proximal transverse colon where there   is also pericolonic inflammation. There is a second area of more   extensive abnormal wall thickening involving the proximal to mid sigmoid   colon which is decompressed with a greater degree of surrounding   inflammation. There is a potential associated obstructing component at   this location as the colon proximal to the sigmoid colon is dilated.   When compared to the prior exam 15 days ago, the right colitis is new   and what appears to represent colitis of the proximal sigmoid colon is   not significantly changed. The lack of interval change following   treatment and the presence of an obstructive component raises the   likelihood that there could be an associated neoplastic process at this   location though the distinction is difficult to make with CT and there   remains a great deal of inflammation surrounding the proximal sigmoid   colon.   2. Apparently, this exam was performed with intravenous contrast though   there is no contrast demonstrated on this exam. This suggests that there   has been extravasation of contrast into the arm and that could be   confirmed with physical exam or x-ray of the arm.   3. Previous gastric surgery. Small hiatal hernia.       Discussed with Dr. Cruz in the emergency department on 01/27/2023 at   5:50 PM.        Radiation dose reduction techniques were utilized, including automated   exposure control and exposure modulation based on body size.       This report was finalized on 1/27/2023 6:43 PM by Dr. Donovan Valencia M.D.               Assessment & Plan   Patient Active Problem List   Diagnosis   • Left sided colitis with complication (HCC)   • Colitis       Assessment:  1. Abdominal pain  2. Diarrhea  3. Rectal bleeding  4. Abnormal imaging x2    Plan:  · Suspect inflammatory bowel disease but we will need to rule out C. difficile and enteroinvasive bacterium first  · GI PCR and C. difficile ordered  · If negative consider unprepped lower endoscopic evaluation in the near future      I discussed the patients findings and my recommendations with patient and nursing staff.    Rodo Ramirez MD

## 2023-01-28 NOTE — H&P
"History and physical    Primary care physician  Dr. Delgado    Chief complaint  Abdominal pain    History of present illness  62-year-old female with history of hypertension presented to [facility] emergency room with left lower quadrant abdominal pain for last 1 month.  Patient also have occasional loose stools.  Patient denies any nausea vomiting.  Patient recently diagnosed with colitis and treated with oral antibiotics without any improvement.  Patient has noticed blood in the stools.  Patient evaluated in ER with CT abdominal pelvis which shows worsening colitis and failed outpatient treatment admit for management.  Patient has no fever chills chest pain shortness of breath with sweats weight loss or weight gain.     PAST MEDICAL HISTORY  • HTN        PAST SURGICAL HISTORY              Procedure Laterality Date   • COLONOSCOPY   2010     negative per pt   • HYSTERECTOMY       • LAPAROSCOPIC GASTRIC BANDING             FAMILY HISTORY           Problem Relation Age of Onset   • Diabetes Mother        SOCIAL HISTORY                 Socioeconomic History   • Marital status: Single   Tobacco Use   • Smoking status: Former   • Smokeless tobacco: Never   • Tobacco comments:       Quit 2004   Substance and Sexual Activity   • Alcohol use: Yes       Comment: occ wine   • Drug use: Never         ALLERGIES  Sulfa antibiotics  Home medications reviewed     REVIEW OF SYSTEMS  All systems reviewed and negative except for those discussed in HPI.      PHYSICAL EXAM  Blood pressure 128/79, pulse 70, temperature 97.8 °F (36.6 °C), temperature source Oral, resp. rate 14, height 154.9 cm (60.98\"), weight 78.9 kg (174 lb), SpO2 97 %.    GENERAL:  Awake and alert in no acute distress  HEENT:  Unremarkable  NECK:  Supple  CV: regular rhythm, normal rate  RESPIRATORY: normal effort, clear to auscultation bilaterally  ABDOMEN: soft, left lower quadrant tenderness bowel sounds positive  MUSCULOSKELETAL: no deformity  NEURO: " alert, moves all extremities, follows commands  PSYCH:  calm, cooperative  SKIN: warm, dry     LAB RESULTS  Lab Results (last 24 hours)     Procedure Component Value Units Date/Time    Basic Metabolic Panel [[id number]]  (Abnormal) Collected: 01/28/23 0629    Specimen: Blood Updated: 01/28/23 0840     Glucose 73 mg/dL      BUN 8 mg/dL      Creatinine 0.70 mg/dL      Sodium 139 mmol/L      Potassium 3.4 mmol/L      Chloride 106 mmol/L      CO2 25.4 mmol/L      Calcium 9.3 mg/dL      BUN/Creatinine Ratio 11.4     Anion Gap 7.6 mmol/L      eGFR 97.3 mL/min/1.73     Narrative:      GFR Normal >60  Chronic Kidney Disease <60  Kidney Failure <15      CBC & Differential [[id number]]  (Abnormal) Collected: 01/28/23 0629    Specimen: Blood Updated: 01/28/23 0753    Narrative:      The following orders were created for panel order CBC & Differential.  Procedure                               Abnormality         Status                     ---------                               -----------         ------                     CBC Auto Differential[[id number]]        Abnormal            Final result                 Please view results for these tests on the individual orders.    CBC Auto Differential [[id number]]  (Abnormal) Collected: 01/28/23 0629    Specimen: Blood Updated: 01/28/23 0753     WBC 15.21 10*3/mm3      RBC 4.66 10*6/mm3      Hemoglobin 13.1 g/dL      Hematocrit 40.3 %      MCV 86.5 fL      MCH 28.1 pg      MCHC 32.5 g/dL      RDW 12.8 %      RDW-SD 39.8 fl      MPV 10.5 fL      Platelets 287 10*3/mm3      Neutrophil % 75.5 %      Lymphocyte % 16.6 %      Monocyte % 6.9 %      Eosinophil % 0.3 %      Basophil % 0.2 %      Immature Grans % 0.5 %      Neutrophils, Absolute 11.49 10*3/mm3      Lymphocytes, Absolute 2.52 10*3/mm3      Monocytes, Absolute 1.05 10*3/mm3      Eosinophils, Absolute 0.05 10*3/mm3      Basophils, Absolute 0.03 10*3/mm3      Immature Grans, Absolute 0.07 10*3/mm3      nRBC 0.0 /100 WBC     STAT  Lactic Acid, Reflex [[id number]]  (Normal) Collected: 01/27/23 1808    Specimen: Blood from Arm, Left Updated: 01/27/23 1844     Lactate 1.0 mmol/L     Blood Culture - Blood, Arm, Left [[id number]] Collected: 01/27/23 1808    Specimen: Blood from Arm, Left Updated: 01/27/23 1819    Blood Culture - Blood, Arm, Right [[id number]] Collected: 01/27/23 1808    Specimen: Blood from Arm, Right Updated: 01/27/23 1818    Urinalysis, Microscopic Only - Urine, Clean Catch [[id number]]  (Abnormal) Collected: 01/27/23 1446    Specimen: Urine, Clean Catch Updated: 01/27/23 1611     RBC, UA 6-12 /HPF      WBC, UA 0-2 /HPF      Bacteria, UA Trace /HPF      Squamous Epithelial Cells, UA 3-6 /HPF      Transitional Epithelial Cells, UA 0-2 /HPF      Hyaline Casts, UA 0-2 /LPF      Methodology Manual Light Microscopy    [place] Draw [[id number]] Collected: 01/27/23 1445    Specimen: Blood Updated: 01/27/23 1601    Narrative:      The following orders were created for panel order [place] Draw.  Procedure                               Abnormality         Status                     ---------                               -----------         ------                     Green Top (Gel)[[id number]]                                  Final result               Lavender Top[[id number]]                                     Final result               Gold Top - SST[[id number]]                                   Final result               Light Blue Top[[id number]]                                   Final result                 Please view results for these tests on the individual orders.    Lavender Top [[id number]] Collected: 01/27/23 1445    Specimen: Blood Updated: 01/27/23 1601     Extra Tube hold for add-on     Comment: Auto resulted       Green Top (Gel) [[id number]] Collected: 01/27/23 1445    Specimen: Blood Updated: 01/27/23 1601     Extra Tube Hold for add-ons.     Comment: Auto resulted.       Gold Top - SST [[id number]] Collected: 01/27/23 1445     Specimen: Blood Updated: 01/27/23 1601     Extra Tube Hold for add-ons.     Comment: Auto resulted.       Light Blue Top [[id number]] Collected: 01/27/23 1445    Specimen: Blood Updated: 01/27/23 1601     Extra Tube Hold for add-ons.     Comment: Auto resulted       Urinalysis With Microscopic If Indicated (No Culture) - Urine, Clean Catch [[id number]]  (Abnormal) Collected: 01/27/23 1446    Specimen: Urine, Clean Catch Updated: 01/27/23 1554     Color, UA Dark Yellow     Appearance, UA Clear     pH, UA 6.0     Specific Gravity, UA >=1.030     Glucose, UA Negative     Ketones, UA 40 mg/dL (2+)     Bilirubin, UA Negative     Blood, UA Trace     Protein, UA 30 mg/dL (1+)     Leuk Esterase, UA Trace     Nitrite, UA Negative     Urobilinogen, UA 1.0 E.U./dL    Comprehensive Metabolic Panel [[id number]]  (Abnormal) Collected: 01/27/23 1445    Specimen: Blood Updated: 01/27/23 1523     Glucose 110 mg/dL      BUN 8 mg/dL      Creatinine 0.84 mg/dL      Sodium 144 mmol/L      Potassium 3.4 mmol/L      Chloride 104 mmol/L      CO2 27.4 mmol/L      Calcium 10.1 mg/dL      Total Protein 8.5 g/dL      Albumin 4.2 g/dL      ALT (SGPT) 8 U/L      AST (SGOT) 15 U/L      Alkaline Phosphatase 67 U/L      Total Bilirubin 0.5 mg/dL      Globulin 4.3 gm/dL      A/G Ratio 1.0 g/dL      BUN/Creatinine Ratio 9.5     Anion Gap 12.6 mmol/L      eGFR 78.2 mL/min/1.73     Narrative:      GFR Normal >60  Chronic Kidney Disease <60  Kidney Failure <15      Lipase [[id number]]  (Normal) Collected: 01/27/23 1445    Specimen: Blood Updated: 01/27/23 1523     Lipase 20 U/L     Lactic Acid, Plasma [[id number]]  (Abnormal) Collected: 01/27/23 1445    Specimen: Blood Updated: 01/27/23 1519     Lactate 2.6 mmol/L     CBC & Differential [[id number]]  (Abnormal) Collected: 01/27/23 1445    Specimen: Blood Updated: 01/27/23 1456    Narrative:      The following orders were created for panel order CBC & Differential.  Procedure                                Abnormality         Status                     ---------                               -----------         ------                     CBC Auto Differential[[id number]]        Abnormal            Final result                 Please view results for these tests on the individual orders.    CBC Auto Differential [[id number]]  (Abnormal) Collected: 01/27/23 1445    Specimen: Blood Updated: 01/27/23 1456     WBC 17.37 10*3/mm3      RBC 5.23 10*6/mm3      Hemoglobin 15.2 g/dL      Hematocrit 46.2 %      MCV 88.3 fL      MCH 29.1 pg      MCHC 32.9 g/dL      RDW 12.9 %      RDW-SD 41.2 fl      MPV 10.1 fL      Platelets 330 10*3/mm3      Neutrophil % 81.5 %      Lymphocyte % 12.1 %      Monocyte % 5.5 %      Eosinophil % 0.3 %      Basophil % 0.2 %      Immature Grans % 0.4 %      Neutrophils, Absolute 14.14 10*3/mm3      Lymphocytes, Absolute 2.11 10*3/mm3      Monocytes, Absolute 0.95 10*3/mm3      Eosinophils, Absolute 0.06 10*3/mm3      Basophils, Absolute 0.04 10*3/mm3      Immature Grans, Absolute 0.07 10*3/mm3      nRBC 0.0 /100 WBC         Imaging Results (Last 24 Hours)     Procedure Component Value Units Date/Time    CT Abdomen Pelvis Without Contrast [[id number]] Collected: 01/27/23 1801     Updated: 01/27/23 1846    Narrative:      CT ABDOMEN AND PELVIS WITHOUT IV CONTRAST     HISTORY: Left lower quadrant pain for one month     TECHNIQUE:  CT includes axial imaging from the lung bases to the  trochanters without intravenous contrast and without use of oral  contrast. Apparently, this was requested as exam with IV contrast though  the exam demonstrates no IV contrast suggesting that there was a  contrast extravasation during this exam and this is being looked into.  Data reconstructed in coronal and sagittal planes. Radiation dose  reduction techniques were utilized, including automated exposure control  and exposure modulation based on body size.     COMPARISON: CT abdomen and pelvis with IV contrast 01/12/2023      FINDINGS: There is abnormal distention of the cecum, right colon,  hepatic flexure, transverse colon. The right colon measures 7.3 cm  transverse dimension. There is also abnormal wall thickening involving  the ascending colon, hepatic flexure, proximal transverse colon. There  is also abnormal wall thickening involving the proximal and mid sigmoid  colon which is decompressed. There is abnormal pericolonic inflammation  particularly within the left pelvis surrounding the proximal sigmoid  colon though also surrounding the ascending colon and hepatic flexure of  the colon. There are air-filled, mildly distended small bowel loops  without small bowel dilatation.     Lung bases appear clear. There is a small hiatal hernia and there is  evidence for previous gastric surgery with chain sutures extending along  the stomach. The adrenal glands, kidneys, pancreas appear within normal  limits. There is no malena enlargement within the abdomen or pelvis.  There is no hydronephrosis.       Impression:      1. Colitis associated abnormal colonic dilatation involving the  ascending colon, hepatic flexure, proximal transverse colon where there  is also pericolonic inflammation. There is a second area of more  extensive abnormal wall thickening involving the proximal to mid sigmoid  colon which is decompressed with a greater degree of surrounding  inflammation. There is a potential associated obstructing component at  this location as the colon proximal to the sigmoid colon is dilated.  When compared to the prior exam 15 days ago, the right colitis is new  and what appears to represent colitis of the proximal sigmoid colon is  not significantly changed. The lack of interval change following  treatment and the presence of an obstructive component raises the  likelihood that there could be an associated neoplastic process at this  location though the distinction is difficult to make with CT and there  remains a great deal of  inflammation surrounding the proximal sigmoid  colon.  2. Apparently, this exam was performed with intravenous contrast though  there is no contrast demonstrated on this exam. This suggests that there  has been extravasation of contrast into the arm and that could be  confirmed with physical exam or x-ray of the arm.  3. Previous gastric surgery. Small hiatal hernia.     Discussed with Dr. Cruz in the emergency department on 01/27/2023 at  5:50 PM.      Radiation dose reduction techniques were utilized, including automated  exposure control and exposure modulation based on body size.     This report was finalized on 1/27/2023 6:43 PM by Dr. Donovan Valencia M.D.             Current Facility-Administered Medications:   •  ampicillin-sulbactam (UNASYN) 3 g in sodium chloride 0.9 % 100 mL IVPB-VTB, 3 g, Intravenous, Q6H, Gideon Hope MD, 3 g at 01/28/23 1046  •  cetirizine (zyrTEC) tablet 10 mg, 10 mg, Oral, Daily, Gideon Hope MD  •  HYDROmorphone (DILAUDID) injection 0.5 mg, 0.5 mg, Intravenous, Q4H PRN, Gideon Hope MD, 0.5 mg at 01/28/23 1215  •  losartan (COZAAR) tablet 25 mg, 25 mg, Oral, Q24H, Gideon Hope MD  •  mesalamine (LIALDA) EC tablet 4.8 g, 4.8 g, Oral, Daily With Breakfast, Gideon Hope MD  •  multivitamin (THERAGRAN) tablet 1 tablet, 1 tablet, Oral, Daily, Gideon Hope MD  •  ondansetron (ZOFRAN) injection 4 mg, 4 mg, Intravenous, Q6H PRN, Gideon Hope MD  •  [START ON 1/29/2023] pantoprazole (PROTONIX) EC tablet 40 mg, 40 mg, Oral, Q AM, Gideon Hope MD  •  polyethylene glycol (MIRALAX) packet 17 g, 17 g, Oral, BID, Gideon Hope MD  •  sodium chloride 0.9 % flush 10 mL, 10 mL, Intravenous, PRN, Morris Archer II, MD  •  sodium chloride 0.9 % with KCl 20 mEq/L infusion, 75 mL/hr, Intravenous, Continuous, Gideon Hope MD, Last Rate: 75 mL/hr at 01/27/23 2219, 75 mL/hr at 01/27/23 2219     ASSESSMENT  Worsening colitis with failed outpatient  treatment  Hematochezia  Hypertension  Gastroesophageal reflux disease    PLAN  Admit  IVF  IV antibiotics  Check GI panel and C. difficile toxin  Gastroenterology consult  Continue home medications  Stress ulcer DVT prophylaxis  Supportive care  Patient is full code  Discussed with nursing staff  Follow closely further recommendation current hospital course    SANG SINGLETARY MD

## 2023-01-28 NOTE — PLAN OF CARE
Goal Outcome Evaluation:              Outcome Evaluation: Patient c/o intermitting LLQ abdominal cramping, adequate pain control with Dilaudid IV, zofran given x1 for nausea, vss, afebrile, IVF infusing, received IV antibiotic, initiated Miralax today, no bowel movement this shift, needing stool sample, initiated contact spore isolation to rule out c-diff.

## 2023-01-29 LAB
ADV 40+41 DNA STL QL NAA+NON-PROBE: NOT DETECTED
ALBUMIN SERPL-MCNC: 3 G/DL (ref 3.5–5.2)
ALBUMIN/GLOB SERPL: 1 G/DL
ALP SERPL-CCNC: 49 U/L (ref 39–117)
ALT SERPL W P-5'-P-CCNC: 5 U/L (ref 1–33)
ANION GAP SERPL CALCULATED.3IONS-SCNC: 9 MMOL/L (ref 5–15)
AST SERPL-CCNC: 12 U/L (ref 1–32)
ASTRO TYP 1-8 RNA STL QL NAA+NON-PROBE: NOT DETECTED
BASOPHILS # BLD AUTO: 0.02 10*3/MM3 (ref 0–0.2)
BASOPHILS NFR BLD AUTO: 0.2 % (ref 0–1.5)
BILIRUB SERPL-MCNC: 0.4 MG/DL (ref 0–1.2)
BUN SERPL-MCNC: 5 MG/DL (ref 8–23)
BUN/CREAT SERPL: 9.4 (ref 7–25)
C CAYETANENSIS DNA STL QL NAA+NON-PROBE: NOT DETECTED
C COLI+JEJ+UPSA DNA STL QL NAA+NON-PROBE: NOT DETECTED
C DIFF TOX GENS STL QL NAA+PROBE: NEGATIVE
CALCIUM SPEC-SCNC: 8.5 MG/DL (ref 8.6–10.5)
CHLORIDE SERPL-SCNC: 109 MMOL/L (ref 98–107)
CHOLEST SERPL-MCNC: 143 MG/DL (ref 0–200)
CO2 SERPL-SCNC: 24 MMOL/L (ref 22–29)
CREAT SERPL-MCNC: 0.53 MG/DL (ref 0.57–1)
CRYPTOSP DNA STL QL NAA+NON-PROBE: NOT DETECTED
DEPRECATED RDW RBC AUTO: 39.2 FL (ref 37–54)
E HISTOLYT DNA STL QL NAA+NON-PROBE: NOT DETECTED
EAEC PAA PLAS AGGR+AATA ST NAA+NON-PRB: NOT DETECTED
EC STX1+STX2 GENES STL QL NAA+NON-PROBE: NOT DETECTED
EGFRCR SERPLBLD CKD-EPI 2021: 104.1 ML/MIN/1.73
EOSINOPHIL # BLD AUTO: 0.07 10*3/MM3 (ref 0–0.4)
EOSINOPHIL NFR BLD AUTO: 0.6 % (ref 0.3–6.2)
EPEC EAE GENE STL QL NAA+NON-PROBE: NOT DETECTED
ERYTHROCYTE [DISTWIDTH] IN BLOOD BY AUTOMATED COUNT: 12.5 % (ref 12.3–15.4)
ETEC LTA+ST1A+ST1B TOX ST NAA+NON-PROBE: NOT DETECTED
G LAMBLIA DNA STL QL NAA+NON-PROBE: NOT DETECTED
GLOBULIN UR ELPH-MCNC: 2.9 GM/DL
GLUCOSE SERPL-MCNC: 78 MG/DL (ref 65–99)
HBA1C MFR BLD: 5.6 % (ref 4.8–5.6)
HCT VFR BLD AUTO: 36.3 % (ref 34–46.6)
HDLC SERPL-MCNC: 50 MG/DL (ref 40–60)
HGB BLD-MCNC: 12.1 G/DL (ref 12–15.9)
IMM GRANULOCYTES # BLD AUTO: 0.05 10*3/MM3 (ref 0–0.05)
IMM GRANULOCYTES NFR BLD AUTO: 0.5 % (ref 0–0.5)
LDLC SERPL CALC-MCNC: 82 MG/DL (ref 0–100)
LDLC/HDLC SERPL: 1.65 {RATIO}
LYMPHOCYTES # BLD AUTO: 1.99 10*3/MM3 (ref 0.7–3.1)
LYMPHOCYTES NFR BLD AUTO: 18.4 % (ref 19.6–45.3)
MCH RBC QN AUTO: 28.6 PG (ref 26.6–33)
MCHC RBC AUTO-ENTMCNC: 33.3 G/DL (ref 31.5–35.7)
MCV RBC AUTO: 85.8 FL (ref 79–97)
MONOCYTES # BLD AUTO: 0.74 10*3/MM3 (ref 0.1–0.9)
MONOCYTES NFR BLD AUTO: 6.9 % (ref 5–12)
NEUTROPHILS NFR BLD AUTO: 7.92 10*3/MM3 (ref 1.7–7)
NEUTROPHILS NFR BLD AUTO: 73.4 % (ref 42.7–76)
NOROVIRUS GI+II RNA STL QL NAA+NON-PROBE: NOT DETECTED
NRBC BLD AUTO-RTO: 0 /100 WBC (ref 0–0.2)
P SHIGELLOIDES DNA STL QL NAA+NON-PROBE: NOT DETECTED
PLATELET # BLD AUTO: 245 10*3/MM3 (ref 140–450)
PMV BLD AUTO: 10.6 FL (ref 6–12)
POTASSIUM SERPL-SCNC: 2.9 MMOL/L (ref 3.5–5.2)
PROT SERPL-MCNC: 5.9 G/DL (ref 6–8.5)
RBC # BLD AUTO: 4.23 10*6/MM3 (ref 3.77–5.28)
RVA RNA STL QL NAA+NON-PROBE: NOT DETECTED
S ENT+BONG DNA STL QL NAA+NON-PROBE: NOT DETECTED
SAPO I+II+IV+V RNA STL QL NAA+NON-PROBE: NOT DETECTED
SHIGELLA SP+EIEC IPAH ST NAA+NON-PROBE: NOT DETECTED
SODIUM SERPL-SCNC: 142 MMOL/L (ref 136–145)
TRIGL SERPL-MCNC: 53 MG/DL (ref 0–150)
TSH SERPL DL<=0.05 MIU/L-ACNC: 1.38 UIU/ML (ref 0.27–4.2)
V CHOL+PARA+VUL DNA STL QL NAA+NON-PROBE: NOT DETECTED
V CHOLERAE DNA STL QL NAA+NON-PROBE: NOT DETECTED
VLDLC SERPL-MCNC: 11 MG/DL (ref 5–40)
WBC NRBC COR # BLD: 10.79 10*3/MM3 (ref 3.4–10.8)
Y ENTEROCOL DNA STL QL NAA+NON-PROBE: NOT DETECTED

## 2023-01-29 PROCEDURE — 25010000002 HYDROMORPHONE PER 4 MG: Performed by: HOSPITALIST

## 2023-01-29 PROCEDURE — 25010000002 SODIUM CHLORIDE 0.9 % WITH KCL 20 MEQ 20-0.9 MEQ/L-% SOLUTION: Performed by: HOSPITALIST

## 2023-01-29 PROCEDURE — 87493 C DIFF AMPLIFIED PROBE: CPT | Performed by: INTERNAL MEDICINE

## 2023-01-29 PROCEDURE — 87507 IADNA-DNA/RNA PROBE TQ 12-25: CPT | Performed by: INTERNAL MEDICINE

## 2023-01-29 PROCEDURE — 83993 ASSAY FOR CALPROTECTIN FECAL: CPT | Performed by: INTERNAL MEDICINE

## 2023-01-29 PROCEDURE — 25010000002 ONDANSETRON PER 1 MG: Performed by: HOSPITALIST

## 2023-01-29 PROCEDURE — 83036 HEMOGLOBIN GLYCOSYLATED A1C: CPT | Performed by: HOSPITALIST

## 2023-01-29 PROCEDURE — 25010000002 AMPICILLIN-SULBACTAM PER 1.5 G: Performed by: HOSPITALIST

## 2023-01-29 PROCEDURE — 80050 GENERAL HEALTH PANEL: CPT | Performed by: HOSPITALIST

## 2023-01-29 PROCEDURE — 99232 SBSQ HOSP IP/OBS MODERATE 35: CPT | Performed by: INTERNAL MEDICINE

## 2023-01-29 PROCEDURE — 80061 LIPID PANEL: CPT | Performed by: HOSPITALIST

## 2023-01-29 RX ORDER — BISACODYL 10 MG
10 SUPPOSITORY, RECTAL RECTAL DAILY
Status: DISCONTINUED | OUTPATIENT
Start: 2023-01-29 | End: 2023-02-02

## 2023-01-29 RX ORDER — PANTOPRAZOLE SODIUM 40 MG/1
40 TABLET, DELAYED RELEASE ORAL
Status: DISCONTINUED | OUTPATIENT
Start: 2023-01-29 | End: 2023-02-02

## 2023-01-29 RX ORDER — DOCUSATE SODIUM 100 MG/1
100 CAPSULE, LIQUID FILLED ORAL 2 TIMES DAILY
Status: DISCONTINUED | OUTPATIENT
Start: 2023-01-29 | End: 2023-02-02

## 2023-01-29 RX ORDER — HYDROCODONE BITARTRATE AND ACETAMINOPHEN 5; 325 MG/1; MG/1
1 TABLET ORAL EVERY 6 HOURS PRN
Status: DISCONTINUED | OUTPATIENT
Start: 2023-01-29 | End: 2023-01-31

## 2023-01-29 RX ORDER — POTASSIUM CHLORIDE 750 MG/1
10 TABLET, FILM COATED, EXTENDED RELEASE ORAL DAILY
Status: DISCONTINUED | OUTPATIENT
Start: 2023-01-29 | End: 2023-01-29

## 2023-01-29 RX ORDER — POTASSIUM CHLORIDE 750 MG/1
40 TABLET, FILM COATED, EXTENDED RELEASE ORAL ONCE
Status: COMPLETED | OUTPATIENT
Start: 2023-01-29 | End: 2023-01-29

## 2023-01-29 RX ADMIN — LOSARTAN POTASSIUM 25 MG: 25 TABLET, FILM COATED ORAL at 09:56

## 2023-01-29 RX ADMIN — POTASSIUM CHLORIDE AND SODIUM CHLORIDE 75 ML/HR: 900; 150 INJECTION, SOLUTION INTRAVENOUS at 21:24

## 2023-01-29 RX ADMIN — Medication 1 TABLET: at 09:56

## 2023-01-29 RX ADMIN — PANTOPRAZOLE SODIUM 40 MG: 40 TABLET, DELAYED RELEASE ORAL at 16:48

## 2023-01-29 RX ADMIN — POTASSIUM CHLORIDE AND SODIUM CHLORIDE 75 ML/HR: 900; 150 INJECTION, SOLUTION INTRAVENOUS at 07:28

## 2023-01-29 RX ADMIN — AMPICILLIN SODIUM AND SULBACTAM SODIUM 3 G: 2; 1 INJECTION, POWDER, FOR SOLUTION INTRAMUSCULAR; INTRAVENOUS at 10:10

## 2023-01-29 RX ADMIN — POTASSIUM CHLORIDE 40 MEQ: 750 TABLET, EXTENDED RELEASE ORAL at 16:48

## 2023-01-29 RX ADMIN — POLYETHYLENE GLYCOL 3350 17 G: 17 POWDER, FOR SOLUTION ORAL at 09:56

## 2023-01-29 RX ADMIN — HYDROMORPHONE HYDROCHLORIDE 0.5 MG: 1 INJECTION, SOLUTION INTRAMUSCULAR; INTRAVENOUS; SUBCUTANEOUS at 12:09

## 2023-01-29 RX ADMIN — PANTOPRAZOLE SODIUM 40 MG: 40 TABLET, DELAYED RELEASE ORAL at 05:31

## 2023-01-29 RX ADMIN — AMPICILLIN SODIUM AND SULBACTAM SODIUM 3 G: 2; 1 INJECTION, POWDER, FOR SOLUTION INTRAMUSCULAR; INTRAVENOUS at 23:12

## 2023-01-29 RX ADMIN — HYDROCODONE BITARTRATE AND ACETAMINOPHEN 1 TABLET: 5; 325 TABLET ORAL at 19:30

## 2023-01-29 RX ADMIN — Medication 10 MG: at 14:24

## 2023-01-29 RX ADMIN — HYDROMORPHONE HYDROCHLORIDE 0.5 MG: 1 INJECTION, SOLUTION INTRAMUSCULAR; INTRAVENOUS; SUBCUTANEOUS at 07:26

## 2023-01-29 RX ADMIN — HYDROMORPHONE HYDROCHLORIDE 0.5 MG: 1 INJECTION, SOLUTION INTRAMUSCULAR; INTRAVENOUS; SUBCUTANEOUS at 03:09

## 2023-01-29 RX ADMIN — DOCUSATE SODIUM 100 MG: 100 CAPSULE, LIQUID FILLED ORAL at 21:15

## 2023-01-29 RX ADMIN — AMPICILLIN SODIUM AND SULBACTAM SODIUM 3 G: 2; 1 INJECTION, POWDER, FOR SOLUTION INTRAMUSCULAR; INTRAVENOUS at 05:31

## 2023-01-29 RX ADMIN — AMPICILLIN SODIUM AND SULBACTAM SODIUM 3 G: 2; 1 INJECTION, POWDER, FOR SOLUTION INTRAMUSCULAR; INTRAVENOUS at 16:48

## 2023-01-29 RX ADMIN — ONDANSETRON 4 MG: 2 INJECTION INTRAMUSCULAR; INTRAVENOUS at 23:07

## 2023-01-29 NOTE — PROGRESS NOTES
[facility]  Inpatient Progress Note    Reason for Follow Up: Nominal pain, rectal bleeding, diarrhea    Subjective     Interval History:   No stool since being admitted, wants me to order a suppository    Current Facility-Administered Medications:   •  ampicillin-sulbactam (UNASYN) 3 g in sodium chloride 0.9 % 100 mL IVPB-VTB, 3 g, Intravenous, Q6H, Gideon Hope MD, 3 g at 01/29/23 1010  •  cetirizine (zyrTEC) tablet 10 mg, 10 mg, Oral, Daily, Gideon Hope MD  •  HYDROmorphone (DILAUDID) injection 0.5 mg, 0.5 mg, Intravenous, Q4H PRN, Gideon Hope MD, 0.5 mg at 01/29/23 1209  •  losartan (COZAAR) tablet 25 mg, 25 mg, Oral, Q24H, Gideon Hope MD, 25 mg at 01/29/23 0956  •  mesalamine (LIALDA) EC tablet 4.8 g, 4.8 g, Oral, Daily With Breakfast, Gideon Hope MD  •  multivitamin (THERAGRAN) tablet 1 tablet, 1 tablet, Oral, Daily, Gideon Hope MD, 1 tablet at 01/29/23 0956  •  ondansetron (ZOFRAN) injection 4 mg, 4 mg, Intravenous, Q6H PRN, Gideon Hope MD, 4 mg at 01/28/23 1329  •  pantoprazole (PROTONIX) EC tablet 40 mg, 40 mg, Oral, Q AM, Gideon Hope MD, 40 mg at 01/29/23 0531  •  polyethylene glycol (MIRALAX) packet 17 g, 17 g, Oral, BID, Gideon Hope MD, 17 g at 01/29/23 0956  •  sodium chloride 0.9 % flush 10 mL, 10 mL, Intravenous, PRN, Morris Archer II, MD  •  sodium chloride 0.9 % with KCl 20 mEq/L infusion, 75 mL/hr, Intravenous, Continuous, Gideon Hope MD, Last Rate: 75 mL/hr at 01/29/23 0728, 75 mL/hr at 01/29/23 0728  Review of Systems:    There is weakness of fatigue all other systems reviewed and negative    Objective     Vital Signs  Temp:  [97.6 °F (36.4 °C)-98.6 °F (37 °C)] 98.3 °F (36.8 °C)  Heart Rate:  [67-81] 81  Resp:  [14-16] 16  BP: (126-149)/(75-90) 142/78  Body mass index is 32.89 kg/m².    Intake/Output Summary (Last 24 hours) at 1/29/2023 1236  Last data filed at 1/29/2023 0500  Gross per 24 hour   Intake 1420 ml   Output 800 ml   Net 620 ml     No  intake/output data recorded.     Physical Exam:   General: patient awake, alert and cooperative   Eyes: Normal lids and lashes, no scleral icterus   Neck: supple, normal ROM   Skin: warm and dry, not jaundiced   Cardiovascular: regular rhythm and rate, no murmurs auscultated   Pulm: clear to auscultation bilaterally, regular and unlabored   Abdomen: soft, nontender, nondistended; normal bowel sounds   Extremities: no rash or edema   Psychiatric: Normal mood and behavior; memory intact     Results Review:     I reviewed the patient's new clinical results.    Results from last 7 days   Lab Units 01/29/23  0756 01/28/23  0629 01/27/23  1445   WBC 10*3/mm3 10.79 15.21* 17.37*   HEMOGLOBIN g/dL 12.1 13.1 15.2   HEMATOCRIT % 36.3 40.3 46.2   PLATELETS 10*3/mm3 245 287 330     Results from last 7 days   Lab Units 01/29/23  0756 01/28/23  0629 01/27/23  1445   SODIUM mmol/L 142 139 144   POTASSIUM mmol/L 2.9* 3.4* 3.4*   CHLORIDE mmol/L 109* 106 104   CO2 mmol/L 24.0 25.4 27.4   BUN mg/dL 5* 8 8   CREATININE mg/dL 0.53* 0.70 0.84   CALCIUM mg/dL 8.5* 9.3 10.1   BILIRUBIN mg/dL 0.4  --  0.5   ALK PHOS U/L 49  --  67   ALT (SGPT) U/L 5  --  8   AST (SGOT) U/L 12  --  15   GLUCOSE mg/dL 78 73 110*         Lab Results   Lab Value Date/Time    LIPASE 20 01/27/2023 1445    LIPASE 16 01/12/2023 1601       Radiology:  CT Abdomen Pelvis Without Contrast   Final Result   1. Colitis associated abnormal colonic dilatation involving the   ascending colon, hepatic flexure, proximal transverse colon where there   is also pericolonic inflammation. There is a second area of more   extensive abnormal wall thickening involving the proximal to mid sigmoid   colon which is decompressed with a greater degree of surrounding   inflammation. There is a potential associated obstructing component at   this location as the colon proximal to the sigmoid colon is dilated.   When compared to the prior exam 15 days ago, the right colitis is new   and what  appears to represent colitis of the proximal sigmoid colon is   not significantly changed. The lack of interval change following   treatment and the presence of an obstructive component raises the   likelihood that there could be an associated neoplastic process at this   location though the distinction is difficult to make with CT and there   remains a great deal of inflammation surrounding the proximal sigmoid   colon.   2. Apparently, this exam was performed with intravenous contrast though   there is no contrast demonstrated on this exam. This suggests that there   has been extravasation of contrast into the arm and that could be   confirmed with physical exam or x-ray of the arm.   3. Previous gastric surgery. Small hiatal hernia.       Discussed with Dr. Cruz in the emergency department on 01/27/2023 at   5:50 PM.        Radiation dose reduction techniques were utilized, including automated   exposure control and exposure modulation based on body size.       This report was finalized on 1/27/2023 6:43 PM by Dr. Donovan Valencia M.D.              Assessment & Plan     Patient Active Problem List   Diagnosis   • Left sided colitis with complication (HCC)   • Colitis       Assessment:  1. Abdominal pain  2. Diarrhea  3. Rectal bleeding  4. Abnormal imaging x2     Plan:  • Suspect inflammatory bowel disease but we will need to rule out C. difficile and enteroinvasive bacterium first  • GI PCR and C. difficile ordered  • If negative consider unprepped lower endoscopic evaluation in the near future  • Adding a suppository to her medical regimen, if that does not help with bowel movements we can consider enemas     I discussed the patients findings and my recommendations with patient and nursing staff.    Rodo Ramirez MD

## 2023-01-29 NOTE — PLAN OF CARE
Goal Outcome Evaluation:  Plan of Care Reviewed With: patient           Outcome Evaluation: VSS, no c/ nausea, pain managed with dilaudid, walked unit x3 times, IVF infusing and IV abx given, voiding freely, no BM/ stool sample, in contact spore isolation for C-Diff rule out, pt stated at home she uses a suppository to get herself going.

## 2023-01-29 NOTE — PROGRESS NOTES
"Daily progress note    Primary care physician  Dr. Delgado    Chief complaint  Doing same with no new complaint but still with abdominal pain and no BM since admission.  Patient denies any nausea vomiting and passing gas.    History of present illness  62-year-old female with history of hypertension presented to [facility] emergency room with left lower quadrant abdominal pain for last 1 month.  Patient also have occasional loose stools.  Patient denies any nausea vomiting.  Patient recently diagnosed with colitis and treated with oral antibiotics without any improvement.  Patient has noticed blood in the stools.  Patient evaluated in ER with CT abdominal pelvis which shows worsening colitis and failed outpatient treatment admit for management.  Patient has no fever chills chest pain shortness of breath with sweats weight loss or weight gain.     REVIEW OF SYSTEMS  All systems reviewed and negative except for those discussed in HPI.      PHYSICAL EXAM  Blood pressure 142/78, pulse 81, temperature 98.3 °F (36.8 °C), temperature source Oral, resp. rate 16, height 154.9 cm (60.98\"), weight 78.9 kg (174 lb), SpO2 99 %.    GENERAL:  Awake and alert in no acute distress  HEENT:  Unremarkable  NECK:  Supple  CV: regular rhythm, normal rate  RESPIRATORY: normal effort, clear to auscultation bilaterally  ABDOMEN: soft, left lower quadrant tenderness bowel sounds positive  MUSCULOSKELETAL: no deformity  NEURO: alert, moves all extremities, follows commands  PSYCH:  calm, cooperative  SKIN: warm, dry     LAB RESULTS  Lab Results (last 24 hours)     Procedure Component Value Units Date/Time    Comprehensive Metabolic Panel [[id number]]  (Abnormal) Collected: 01/29/23 0756    Specimen: Blood Updated: 01/29/23 0909     Glucose 78 mg/dL      BUN 5 mg/dL      Creatinine 0.53 mg/dL      Sodium 142 mmol/L      Potassium 2.9 mmol/L      Chloride 109 mmol/L      CO2 24.0 mmol/L      Calcium 8.5 mg/dL      Total Protein 5.9 g/dL  "     Albumin 3.0 g/dL      ALT (SGPT) 5 U/L      AST (SGOT) 12 U/L      Alkaline Phosphatase 49 U/L      Total Bilirubin 0.4 mg/dL      Globulin 2.9 gm/dL      A/G Ratio 1.0 g/dL      BUN/Creatinine Ratio 9.4     Anion Gap 9.0 mmol/L      eGFR 104.1 mL/min/1.73     Narrative:      GFR Normal >60  Chronic Kidney Disease <60  Kidney Failure <15      TSH [[id number]]  (Normal) Collected: 01/29/23 0756    Specimen: Blood Updated: 01/29/23 0904     TSH 1.380 uIU/mL     Lipid Panel [[id number]] Collected: 01/29/23 0756    Specimen: Blood Updated: 01/29/23 0859     Total Cholesterol 143 mg/dL      Triglycerides 53 mg/dL      HDL Cholesterol 50 mg/dL      LDL Cholesterol  82 mg/dL      VLDL Cholesterol 11 mg/dL      LDL/HDL Ratio 1.65    Narrative:      Cholesterol Reference Ranges  (U.S. Department of Health and Human Services ATP III Classifications)    Desirable          <200 mg/dL  Borderline High    200-239 mg/dL  High Risk          >240 mg/dL      Triglyceride Reference Ranges  (U.S. Department of Health and Human Services ATP III Classifications)    Normal           <150 mg/dL  Borderline High  150-199 mg/dL  High             200-499 mg/dL  Very High        >500 mg/dL    HDL Reference Ranges  (U.S. Department of Health and Human Services ATP III Classifications)    Low     <40 mg/dl (major risk factor for CHD)  High    >60 mg/dl ('negative' risk factor for CHD)        LDL Reference Ranges  (U.S. Department of Health and Human Services ATP III Classifications)    Optimal          <100 mg/dL  Near Optimal     100-129 mg/dL  Borderline High  130-159 mg/dL  High             160-189 mg/dL  Very High        >189 mg/dL    Hemoglobin A1c [[id number]]  (Normal) Collected: 01/29/23 0756    Specimen: Blood Updated: 01/29/23 0849     Hemoglobin A1C 5.60 %     Narrative:      Hemoglobin A1C Ranges:    Increased Risk for Diabetes  5.7% to 6.4%  Diabetes                     >= 6.5%  Diabetic Goal                < 7.0%    CBC &  Differential [[id number]]  (Abnormal) Collected: 01/29/23 0756    Specimen: Blood Updated: 01/29/23 0840    Narrative:      The following orders were created for panel order CBC & Differential.  Procedure                               Abnormality         Status                     ---------                               -----------         ------                     CBC Auto Differential[[id number]]        Abnormal            Final result                 Please view results for these tests on the individual orders.    CBC Auto Differential [[id number]]  (Abnormal) Collected: 01/29/23 0756    Specimen: Blood Updated: 01/29/23 0840     WBC 10.79 10*3/mm3      RBC 4.23 10*6/mm3      Hemoglobin 12.1 g/dL      Hematocrit 36.3 %      MCV 85.8 fL      MCH 28.6 pg      MCHC 33.3 g/dL      RDW 12.5 %      RDW-SD 39.2 fl      MPV 10.6 fL      Platelets 245 10*3/mm3      Neutrophil % 73.4 %      Lymphocyte % 18.4 %      Monocyte % 6.9 %      Eosinophil % 0.6 %      Basophil % 0.2 %      Immature Grans % 0.5 %      Neutrophils, Absolute 7.92 10*3/mm3      Lymphocytes, Absolute 1.99 10*3/mm3      Monocytes, Absolute 0.74 10*3/mm3      Eosinophils, Absolute 0.07 10*3/mm3      Basophils, Absolute 0.02 10*3/mm3      Immature Grans, Absolute 0.05 10*3/mm3      nRBC 0.0 /100 WBC     Blood Culture - Blood, Arm, Right [[id number]]  (Normal) Collected: 01/27/23 1808    Specimen: Blood from Arm, Right Updated: 01/28/23 1831     Blood Culture No growth at 24 hours    Blood Culture - Blood, Arm, Left [[id number]]  (Normal) Collected: 01/27/23 1808    Specimen: Blood from Arm, Left Updated: 01/28/23 1831     Blood Culture No growth at 24 hours        Imaging Results (Last 24 Hours)     ** No results found for the last 24 hours. **          Current Facility-Administered Medications:   •  ampicillin-sulbactam (UNASYN) 3 g in sodium chloride 0.9 % 100 mL IVPB-VTB, 3 g, Intravenous, Q6H, Gideon Hope MD, 3 g at 01/29/23 1010  •  bisacodyl  (DULCOLAX) suppository 10 mg, 10 mg, Rectal, Daily, Rodo Ramirez MD, 10 mg at 01/29/23 1424  •  cetirizine (zyrTEC) tablet 10 mg, 10 mg, Oral, Daily, Sang Hope MD  •  HYDROmorphone (DILAUDID) injection 0.5 mg, 0.5 mg, Intravenous, Q4H PRN, Sang Hope MD, 0.5 mg at 01/29/23 1209  •  losartan (COZAAR) tablet 25 mg, 25 mg, Oral, Q24H, Sang Hope MD, 25 mg at 01/29/23 0956  •  mesalamine (LIALDA) EC tablet 4.8 g, 4.8 g, Oral, Daily With Breakfast, Sang Hope MD  •  multivitamin (THERAGRAN) tablet 1 tablet, 1 tablet, Oral, Daily, Sang Hope MD, 1 tablet at 01/29/23 0956  •  ondansetron (ZOFRAN) injection 4 mg, 4 mg, Intravenous, Q6H PRN, Sang Hope MD, 4 mg at 01/28/23 1329  •  pantoprazole (PROTONIX) EC tablet 40 mg, 40 mg, Oral, Q AM, Sang Hope MD, 40 mg at 01/29/23 0531  •  polyethylene glycol (MIRALAX) packet 17 g, 17 g, Oral, BID, Sang Hope MD, 17 g at 01/29/23 0956  •  sodium chloride 0.9 % flush 10 mL, 10 mL, Intravenous, PRN, Morris Archer II, MD  •  sodium chloride 0.9 % with KCl 20 mEq/L infusion, 75 mL/hr, Intravenous, Continuous, Sang Hope MD, Last Rate: 75 mL/hr at 01/29/23 0728, 75 mL/hr at 01/29/23 0728     ASSESSMENT  Worsening colitis with failed outpatient treatment  Hematochezia  Hypertension  Gastroesophageal reflux disease    PLAN  CPM  IVF  IV antibiotics  Bowel program  Check GI panel and C. difficile toxin  Gastroenterology consult appreciated  Continue home medications  Stress ulcer DVT prophylaxis  Supportive care  PT OT  Discussed with nursing staff  Follow closely further recommendation current hospital course    SANG HOPE MD    Copied text in this note has been reviewed and is accurate as of 01/29/23

## 2023-01-30 LAB
ANION GAP SERPL CALCULATED.3IONS-SCNC: 7.7 MMOL/L (ref 5–15)
BASOPHILS # BLD AUTO: 0.03 10*3/MM3 (ref 0–0.2)
BASOPHILS NFR BLD AUTO: 0.2 % (ref 0–1.5)
BUN SERPL-MCNC: 3 MG/DL (ref 8–23)
BUN/CREAT SERPL: 5 (ref 7–25)
CALCIUM SPEC-SCNC: 8.7 MG/DL (ref 8.6–10.5)
CHLORIDE SERPL-SCNC: 107 MMOL/L (ref 98–107)
CO2 SERPL-SCNC: 25.3 MMOL/L (ref 22–29)
CREAT SERPL-MCNC: 0.6 MG/DL (ref 0.57–1)
DEPRECATED RDW RBC AUTO: 40.5 FL (ref 37–54)
EGFRCR SERPLBLD CKD-EPI 2021: 101 ML/MIN/1.73
EOSINOPHIL # BLD AUTO: 0.09 10*3/MM3 (ref 0–0.4)
EOSINOPHIL NFR BLD AUTO: 0.6 % (ref 0.3–6.2)
ERYTHROCYTE [DISTWIDTH] IN BLOOD BY AUTOMATED COUNT: 12.7 % (ref 12.3–15.4)
GLUCOSE SERPL-MCNC: 78 MG/DL (ref 65–99)
HCT VFR BLD AUTO: 39.6 % (ref 34–46.6)
HGB BLD-MCNC: 13 G/DL (ref 12–15.9)
IMM GRANULOCYTES # BLD AUTO: 0.04 10*3/MM3 (ref 0–0.05)
IMM GRANULOCYTES NFR BLD AUTO: 0.3 % (ref 0–0.5)
LYMPHOCYTES # BLD AUTO: 2.35 10*3/MM3 (ref 0.7–3.1)
LYMPHOCYTES NFR BLD AUTO: 16.3 % (ref 19.6–45.3)
MAGNESIUM SERPL-MCNC: 2 MG/DL (ref 1.6–2.4)
MCH RBC QN AUTO: 28.8 PG (ref 26.6–33)
MCHC RBC AUTO-ENTMCNC: 32.8 G/DL (ref 31.5–35.7)
MCV RBC AUTO: 87.6 FL (ref 79–97)
MONOCYTES # BLD AUTO: 0.79 10*3/MM3 (ref 0.1–0.9)
MONOCYTES NFR BLD AUTO: 5.5 % (ref 5–12)
NEUTROPHILS NFR BLD AUTO: 11.1 10*3/MM3 (ref 1.7–7)
NEUTROPHILS NFR BLD AUTO: 77.1 % (ref 42.7–76)
NRBC BLD AUTO-RTO: 0 /100 WBC (ref 0–0.2)
PLATELET # BLD AUTO: 239 10*3/MM3 (ref 140–450)
PMV BLD AUTO: 11 FL (ref 6–12)
POTASSIUM SERPL-SCNC: 3.3 MMOL/L (ref 3.5–5.2)
RBC # BLD AUTO: 4.52 10*6/MM3 (ref 3.77–5.28)
SODIUM SERPL-SCNC: 140 MMOL/L (ref 136–145)
WBC NRBC COR # BLD: 14.4 10*3/MM3 (ref 3.4–10.8)

## 2023-01-30 PROCEDURE — 25010000002 AMPICILLIN-SULBACTAM PER 1.5 G: Performed by: HOSPITALIST

## 2023-01-30 PROCEDURE — 83735 ASSAY OF MAGNESIUM: CPT | Performed by: HOSPITALIST

## 2023-01-30 PROCEDURE — 97161 PT EVAL LOW COMPLEX 20 MIN: CPT

## 2023-01-30 PROCEDURE — 97116 GAIT TRAINING THERAPY: CPT

## 2023-01-30 PROCEDURE — 80048 BASIC METABOLIC PNL TOTAL CA: CPT | Performed by: HOSPITALIST

## 2023-01-30 PROCEDURE — 85025 COMPLETE CBC W/AUTO DIFF WBC: CPT | Performed by: HOSPITALIST

## 2023-01-30 PROCEDURE — 25010000002 SODIUM CHLORIDE 0.9 % WITH KCL 20 MEQ 20-0.9 MEQ/L-% SOLUTION: Performed by: HOSPITALIST

## 2023-01-30 PROCEDURE — 99232 SBSQ HOSP IP/OBS MODERATE 35: CPT | Performed by: PHYSICIAN ASSISTANT

## 2023-01-30 PROCEDURE — 25010000002 HYDROMORPHONE PER 4 MG: Performed by: HOSPITALIST

## 2023-01-30 PROCEDURE — 25010000002 ONDANSETRON PER 1 MG: Performed by: HOSPITALIST

## 2023-01-30 RX ORDER — MINERAL OIL 100 G/100G
1 OIL RECTAL ONCE
Status: COMPLETED | OUTPATIENT
Start: 2023-01-30 | End: 2023-01-30

## 2023-01-30 RX ORDER — HYDROMORPHONE HYDROCHLORIDE 1 MG/ML
0.5 INJECTION, SOLUTION INTRAMUSCULAR; INTRAVENOUS; SUBCUTANEOUS ONCE
Status: COMPLETED | OUTPATIENT
Start: 2023-01-30 | End: 2023-01-30

## 2023-01-30 RX ORDER — ONDANSETRON 2 MG/ML
4 INJECTION INTRAMUSCULAR; INTRAVENOUS EVERY 6 HOURS
Status: DISCONTINUED | OUTPATIENT
Start: 2023-01-30 | End: 2023-02-03

## 2023-01-30 RX ORDER — POTASSIUM CHLORIDE 750 MG/1
40 TABLET, FILM COATED, EXTENDED RELEASE ORAL ONCE
Status: COMPLETED | OUTPATIENT
Start: 2023-01-30 | End: 2023-01-30

## 2023-01-30 RX ADMIN — AMPICILLIN SODIUM AND SULBACTAM SODIUM 3 G: 2; 1 INJECTION, POWDER, FOR SOLUTION INTRAMUSCULAR; INTRAVENOUS at 11:48

## 2023-01-30 RX ADMIN — Medication 10 MG: at 11:47

## 2023-01-30 RX ADMIN — POTASSIUM CHLORIDE AND SODIUM CHLORIDE 75 ML/HR: 900; 150 INJECTION, SOLUTION INTRAVENOUS at 18:42

## 2023-01-30 RX ADMIN — DOCUSATE SODIUM 100 MG: 100 CAPSULE, LIQUID FILLED ORAL at 21:35

## 2023-01-30 RX ADMIN — MINERAL OIL 1 ENEMA: 100 ENEMA RECTAL at 17:58

## 2023-01-30 RX ADMIN — ONDANSETRON 4 MG: 2 INJECTION INTRAMUSCULAR; INTRAVENOUS at 08:12

## 2023-01-30 RX ADMIN — AMPICILLIN SODIUM AND SULBACTAM SODIUM 3 G: 2; 1 INJECTION, POWDER, FOR SOLUTION INTRAMUSCULAR; INTRAVENOUS at 05:09

## 2023-01-30 RX ADMIN — AMPICILLIN SODIUM AND SULBACTAM SODIUM 3 G: 2; 1 INJECTION, POWDER, FOR SOLUTION INTRAMUSCULAR; INTRAVENOUS at 18:42

## 2023-01-30 RX ADMIN — POLYETHYLENE GLYCOL 3350 17 G: 17 POWDER, FOR SOLUTION ORAL at 11:47

## 2023-01-30 RX ADMIN — POTASSIUM CHLORIDE 40 MEQ: 750 TABLET, EXTENDED RELEASE ORAL at 15:32

## 2023-01-30 RX ADMIN — ONDANSETRON 4 MG: 2 INJECTION INTRAMUSCULAR; INTRAVENOUS at 15:32

## 2023-01-30 RX ADMIN — DOCUSATE SODIUM 100 MG: 100 CAPSULE, LIQUID FILLED ORAL at 11:47

## 2023-01-30 RX ADMIN — HYDROCODONE BITARTRATE AND ACETAMINOPHEN 1 TABLET: 5; 325 TABLET ORAL at 01:32

## 2023-01-30 RX ADMIN — LOSARTAN POTASSIUM 25 MG: 25 TABLET, FILM COATED ORAL at 11:47

## 2023-01-30 RX ADMIN — HYDROCODONE BITARTRATE AND ACETAMINOPHEN 1 TABLET: 5; 325 TABLET ORAL at 19:54

## 2023-01-30 RX ADMIN — HYDROMORPHONE HYDROCHLORIDE 0.5 MG: 1 INJECTION, SOLUTION INTRAMUSCULAR; INTRAVENOUS; SUBCUTANEOUS at 09:15

## 2023-01-30 RX ADMIN — PANTOPRAZOLE SODIUM 40 MG: 40 TABLET, DELAYED RELEASE ORAL at 18:36

## 2023-01-30 NOTE — PLAN OF CARE
Goal Outcome Evaluation:  Plan of Care Reviewed With: patient        Progress: no change  Outcome Evaluation: VSS, IVF infusing, stool sample collected and sent, zofran given for c/o nausea, norco given for c/o pain, in isolation for c-diff rule out, IV antibiotics given per orders

## 2023-01-30 NOTE — PROGRESS NOTES
Discharge Planning Assessment  [facility]     Patient Name: Hali Tejeda  MRN: [id number]  Today's Date: 1/30/2023    Admit Date: 1/27/2023    Plan: Home no needs   Discharge Needs Assessment     Row Name 01/30/23 1301       Living Environment    People in Home alone    Current Living Arrangements home    Primary Care Provided by self    Provides Primary Care For no one    Family Caregiver if Needed child(paige), adult;parent(s)    Quality of Family Relationships helpful;involved;supportive    Able to Return to Prior Arrangements yes       Transition Planning    Patient/Family Anticipates Transition to home    Patient/Family Anticipated Services at Transition none    Transportation Anticipated family or friend will provide       Discharge Needs Assessment    Equipment Currently Used at Home none    Concerns to be Addressed no discharge needs identified    Equipment Needed After Discharge none    Provided Post Acute Provider List? N/A    N/A Provider List Comment Denies needs. Plan is home               Discharge Plan     Row Name 01/30/23 5903       Plan    Plan Home no needs    Plan Comments Introduced self and role of CCP. Facesheet verified. Patient lives alone in one story house. There are 2 steps to enter. There are no steps once inside. Prior to admission, patient was independent with ADL's, worked full-time and caontinues to drive. Uses no DMEs. Has never used a Home Health agency nor been to a rehab facility. DC plan is to return home. Stated her mother will assist as needed and will provided transportation at DC. Denies any needs/equipment.              Continued Care and Services - Admitted Since 1/27/2023    Coordination has not been started for this encounter.          Demographic Summary     Row Name 01/30/23 1300       General Information    Admission Type inpatient    Arrived From home    Reason for Consult discharge planning    Preferred Language English               Functional Status     Row  Name 01/30/23 1300       Functional Status    Usual Activity Tolerance good    Current Activity Tolerance good       Functional Status, IADL    Medications independent    Meal Preparation independent    Housekeeping independent    Laundry independent    Shopping independent       Mental Status    General Appearance WDL WDL       Employment/    Employment Status employed full-time               Psychosocial     Row Name 01/30/23 1301       Values/Beliefs    Spiritual, Cultural Beliefs, Yarsanism Practices, Values that Affect Care no       Behavior WDL    Behavior WDL WDL       Emotion Mood WDL    Emotion/Mood/Affect WDL WDL       Speech WDL    Speech WDL WDL       Perceptual State WDL    Perceptual State WDL WDL       Coping/Stress    Major Change/Loss/Stressor none    Sources of Support adult child(paige);parent(s)    Reaction to Health Status adjusting       Developmental Stage (Eriksson's)    Developmental Stage Stage 7 (35-65 years/Middle Adulthood) Generativity vs. Stagnation               Abuse/Neglect     Row Name 01/30/23 1301       Personal Safety    Feels Unsafe at Home or Work/School no    Feels Threatened by Someone no    Does Anyone Try to Keep You From Having Contact with Others or Doing Things Outside Your Home? no    Physical Signs of Abuse Present no                Dayanna Diaz, RN

## 2023-01-30 NOTE — PLAN OF CARE
Goal Outcome Evaluation:  Plan of Care Reviewed With: patient, mother           Outcome Evaluation: Pt seen for PT eval this PM. Pt admitted with abdominal pain, nausea, vomiting. Pt typically independent: ambulatory, working, driving. Today, she completed all functional mobility independent and ambulated 800' without AD. No balance concerns and no indication for PT in the acute care setting. Encouraged pt to ambulate in hallway throughout the day she she feels up for it. PT to sign off, anticipate return home at d/c.

## 2023-01-30 NOTE — PROGRESS NOTES
"Daily progress note    Primary care physician  Dr. Delgado    Chief complaint  Still with severe abdominal pain relieved with IV Dilaudid.  Patient denies any nausea vomiting and still have no BM yet.    History of present illness  62-year-old female with history of hypertension presented to [facility] emergency room with left lower quadrant abdominal pain for last 1 month.  Patient also have occasional loose stools.  Patient denies any nausea vomiting.  Patient recently diagnosed with colitis and treated with oral antibiotics without any improvement.  Patient has noticed blood in the stools.  Patient evaluated in ER with CT abdominal pelvis which shows worsening colitis and failed outpatient treatment admit for management.  Patient has no fever chills chest pain shortness of breath with sweats weight loss or weight gain.     REVIEW OF SYSTEMS  Unremarkable except severe abdominal pain     PHYSICAL EXAM  Blood pressure 144/82, pulse 70, temperature 98 °F (36.7 °C), temperature source Oral, resp. rate 14, height 154.9 cm (60.98\"), weight 78.9 kg (174 lb), SpO2 92 %.    GENERAL:  Awake and alert in no acute distress  HEENT:  Unremarkable  NECK:  Supple  CV: regular rhythm, normal rate  RESPIRATORY: normal effort, clear to auscultation bilaterally  ABDOMEN: soft, left lower quadrant tenderness bowel sounds positive  MUSCULOSKELETAL: no deformity  NEURO: alert, moves all extremities, follows commands  PSYCH:  calm, cooperative  SKIN: warm, dry     LAB RESULTS  Lab Results (last 24 hours)     Procedure Component Value Units Date/Time    Basic Metabolic Panel [[id number]]  (Abnormal) Collected: 01/30/23 0531    Specimen: Blood Updated: 01/30/23 0636     Glucose 78 mg/dL      BUN 3 mg/dL      Creatinine 0.60 mg/dL      Sodium 140 mmol/L      Potassium 3.3 mmol/L      Chloride 107 mmol/L      CO2 25.3 mmol/L      Calcium 8.7 mg/dL      BUN/Creatinine Ratio 5.0     Anion Gap 7.7 mmol/L      eGFR 101.0 mL/min/1.73     " Narrative:      GFR Normal >60  Chronic Kidney Disease <60  Kidney Failure <15      Magnesium [[id number]]  (Normal) Collected: 01/30/23 0531    Specimen: Blood Updated: 01/30/23 0636     Magnesium 2.0 mg/dL     CBC & Differential [[id number]]  (Abnormal) Collected: 01/30/23 0531    Specimen: Blood Updated: 01/30/23 0619    Narrative:      The following orders were created for panel order CBC & Differential.  Procedure                               Abnormality         Status                     ---------                               -----------         ------                     CBC Auto Differential[[id number]]        Abnormal            Final result                 Please view results for these tests on the individual orders.    CBC Auto Differential [[id number]]  (Abnormal) Collected: 01/30/23 0531    Specimen: Blood Updated: 01/30/23 0619     WBC 14.40 10*3/mm3      RBC 4.52 10*6/mm3      Hemoglobin 13.0 g/dL      Hematocrit 39.6 %      MCV 87.6 fL      MCH 28.8 pg      MCHC 32.8 g/dL      RDW 12.7 %      RDW-SD 40.5 fl      MPV 11.0 fL      Platelets 239 10*3/mm3      Neutrophil % 77.1 %      Lymphocyte % 16.3 %      Monocyte % 5.5 %      Eosinophil % 0.6 %      Basophil % 0.2 %      Immature Grans % 0.3 %      Neutrophils, Absolute 11.10 10*3/mm3      Lymphocytes, Absolute 2.35 10*3/mm3      Monocytes, Absolute 0.79 10*3/mm3      Eosinophils, Absolute 0.09 10*3/mm3      Basophils, Absolute 0.03 10*3/mm3      Immature Grans, Absolute 0.04 10*3/mm3      nRBC 0.0 /100 WBC     Gastrointestinal Panel, PCR - Stool, Per Rectum [[id number]]  (Normal) Collected: 01/29/23 2021    Specimen: Stool from Per Rectum Updated: 01/29/23 2156     Campylobacter Not Detected     Plesiomonas shigelloides Not Detected     Salmonella Not Detected     Vibrio Not Detected     Vibrio cholerae Not Detected     Yersinia enterocolitica Not Detected     Enteroaggregative E. coli (EAEC) Not Detected     Enteropathogenic E. coli (EPEC) Not  Detected     Enterotoxigenic E. coli (ETEC) lt/st Not Detected     Shiga-like toxin-producing E. coli (STEC) stx1/stx2 Not Detected     Shigella/Enteroinvasive E. coli (EIEC) Not Detected     Cryptosporidium Not Detected     Cyclospora cayetanensis Not Detected     Entamoeba histolytica Not Detected     Giardia lamblia Not Detected     Adenovirus F40/41 Not Detected     Astrovirus Not Detected     Norovirus GI/GII Not Detected     Rotavirus A Not Detected     Sapovirus (I, II, IV or V) Not Detected    Narrative:      If Aeromonas, Staphylococcus aureus or Bacillus cereus are suspected, please order [id number]: Stool Culture, Aeromonas, S aureus, B Cereus.    Calprotectin, Fecal - Stool, [[id number]] Collected: 01/29/23 2021    Specimen: Stool Updated: 01/29/23 2127    Clostridioides difficile Toxin - Stool, Per Rectum [[id number]]  (Normal) Collected: 01/29/23 2021    Specimen: Stool from Per Rectum Updated: 01/29/23 2120    Narrative:      The following orders were created for panel order Clostridioides difficile Toxin - Stool, Per Rectum.  Procedure                               Abnormality         Status                     ---------                               -----------         ------                     Clostridioides difficile...[[id number]]  Normal              Final result                 Please view results for these tests on the individual orders.    Clostridioides difficile Toxin, PCR - Stool, Per Rectum [[id number]]  (Normal) Collected: 01/29/23 2021    Specimen: Stool from Per Rectum Updated: 01/29/23 2120     C. Difficile Toxins by PCR Negative    Narrative:      The result indicates the absence of toxigenic C. difficile from stool specimen.     Blood Culture - Blood, Arm, Right [[id number]]  (Normal) Collected: 01/27/23 1808    Specimen: Blood from Arm, Right Updated: 01/29/23 1831     Blood Culture No growth at 2 days    Blood Culture - Blood, Arm, Left [[id number]]  (Normal) Collected: 01/27/23 1808     Specimen: Blood from Arm, Left Updated: 01/29/23 1831     Blood Culture No growth at 2 days        Imaging Results (Last 24 Hours)     ** No results found for the last 24 hours. **          Current Facility-Administered Medications:   •  ampicillin-sulbactam (UNASYN) 3 g in sodium chloride 0.9 % 100 mL IVPB-VTB, 3 g, Intravenous, Q6H, Gideon Hope MD, 3 g at 01/30/23 1148  •  bisacodyl (DULCOLAX) suppository 10 mg, 10 mg, Rectal, Daily, Rodo Ramirez MD, 10 mg at 01/30/23 1147  •  cetirizine (zyrTEC) tablet 10 mg, 10 mg, Oral, Daily, Gideon Hope MD  •  docusate sodium (COLACE) capsule 100 mg, 100 mg, Oral, BID, Gideon Hope MD, 100 mg at 01/30/23 1147  •  HYDROcodone-acetaminophen (NORCO) 5-325 MG per tablet 1 tablet, 1 tablet, Oral, Q6H PRN, Gideon Hope MD, 1 tablet at 01/30/23 0132  •  losartan (COZAAR) tablet 25 mg, 25 mg, Oral, Q24H, Gideon Hope MD, 25 mg at 01/30/23 1147  •  mesalamine (LIALDA) EC tablet 4.8 g, 4.8 g, Oral, Daily With Breakfast, Gideon Hope MD  •  multivitamin (THERAGRAN) tablet 1 tablet, 1 tablet, Oral, Daily, Gideon Hope MD, 1 tablet at 01/29/23 0956  •  ondansetron (ZOFRAN) injection 4 mg, 4 mg, Intravenous, Q6H PRN, Gideon Hope MD, 4 mg at 01/30/23 0812  •  pantoprazole (PROTONIX) EC tablet 40 mg, 40 mg, Oral, BID AC, Gideon Hope MD, 40 mg at 01/29/23 1648  •  polyethylene glycol (MIRALAX) packet 17 g, 17 g, Oral, BID, Gideon Hope MD, 17 g at 01/30/23 1147  •  sodium chloride 0.9 % flush 10 mL, 10 mL, Intravenous, PRN, Morris Archer II, MD  •  sodium chloride 0.9 % with KCl 20 mEq/L infusion, 75 mL/hr, Intravenous, Continuous, Gideon Hope MD, Last Rate: 75 mL/hr at 01/30/23 0922, 75 mL/hr at 01/30/23 0922     ASSESSMENT  Worsening colitis with failed outpatient treatment  Hematochezia  Hypertension  Gastroesophageal reflux disease    PLAN  CPM  Continue IVF  Continue IV antibiotics  Replace potassium  Bowel program  Gastroenterology consult  appreciated  Continue home medications  Stress ulcer DVT prophylaxis  Supportive care  PT OT  Discussed with nursing staff  Discharge planning    SANG SINGLETARY MD    Copied text in this note has been reviewed and is accurate as of 01/30/23

## 2023-01-30 NOTE — PAYOR COMM NOTE
"Hali Aleman (62 y.o. Female)       INPATIENT REQUEST FOR [id number]    CONTACT ROME REYNAGA  P# [phone]  F# [phone]      Date of Birth   1960    Social Security Number       Address   [address] [facility] [address]    Home Phone   [phone]    MRN   [id number]       [place]   None    Marital Status   Single                            Admission Date   1/27/23    Admission Type   Emergency    Admitting Provider   Gideon Hope MD    Attending Provider   Gideon Hope MD    Department, Room/Bed   [facility] [address] [place], 89/1       Discharge Date       Discharge Disposition       Discharge Destination                               Attending Provider: Gideon Hope MD    Allergies: Sulfa Antibiotics    Isolation: None   Infection: None   Code Status: CPR    Ht: 154.9 cm (60.98\")   Wt: 78.9 kg (174 lb)    Admission Cmt: None   Principal Problem: Colitis [K52.9]                 Active Insurance as of 1/27/2023     Primary Coverage     Payor Plan Insurance Group Employer/Plan Group    [facility] [employer] [facility] EMPL[employer] [id number]     Payor Plan Address Payor Plan Phone Number Payor Plan Fax Number Effective Dates    [phone]  1/1/2022 - None Entered    [place] [address]       Subscriber Name Subscriber Birth Date Member ID       HALI ALEMAN 1960 [id number]                 Emergency Contacts      (Rel.) Home Phone Work Phone Mobile Phone    SANDRA ALEMAN (Daughter) [phone] -- --    BRADYAMAURI (Mother) [phone] -- [phone]               History & Physical      Gideon Hope MD at 01/28/23 0900          History and physical    Primary care physician  Dr. Delgado    Chief complaint  Abdominal pain    History of present illness  62-year-old female with history of hypertension presented to [facility] emergency room with left lower quadrant abdominal pain for last 1 month.  Patient also have " "occasional loose stools.  Patient denies any nausea vomiting.  Patient recently diagnosed with colitis and treated with oral antibiotics without any improvement.  Patient has noticed blood in the stools.  Patient evaluated in ER with CT abdominal pelvis which shows worsening colitis and failed outpatient treatment admit for management.  Patient has no fever chills chest pain shortness of breath with sweats weight loss or weight gain.     PAST MEDICAL HISTORY  • HTN        PAST SURGICAL HISTORY              Procedure Laterality Date   • COLONOSCOPY   2010     negative per pt   • HYSTERECTOMY       • LAPAROSCOPIC GASTRIC BANDING             FAMILY HISTORY           Problem Relation Age of Onset   • Diabetes Mother        SOCIAL HISTORY                 Socioeconomic History   • Marital status: Single   Tobacco Use   • Smoking status: Former   • Smokeless tobacco: Never   • Tobacco comments:       Quit 2004   Substance and Sexual Activity   • Alcohol use: Yes       Comment: occ wine   • Drug use: Never         ALLERGIES  Sulfa antibiotics  Home medications reviewed     REVIEW OF SYSTEMS  All systems reviewed and negative except for those discussed in HPI.      PHYSICAL EXAM  Blood pressure 128/79, pulse 70, temperature 97.8 °F (36.6 °C), temperature source Oral, resp. rate 14, height 154.9 cm (60.98\"), weight 78.9 kg (174 lb), SpO2 97 %.    GENERAL:  Awake and alert in no acute distress  HEENT:  Unremarkable  NECK:  Supple  CV: regular rhythm, normal rate  RESPIRATORY: normal effort, clear to auscultation bilaterally  ABDOMEN: soft, left lower quadrant tenderness bowel sounds positive  MUSCULOSKELETAL: no deformity  NEURO: alert, moves all extremities, follows commands  PSYCH:  calm, cooperative  SKIN: warm, dry     LAB RESULTS  Lab Results (last 24 hours)     Procedure Component Value Units Date/Time    Basic Metabolic Panel [[id number]]  (Abnormal) Collected: 01/28/23 0629    Specimen: Blood Updated: 01/28/23 0840     " Glucose 73 mg/dL      BUN 8 mg/dL      Creatinine 0.70 mg/dL      Sodium 139 mmol/L      Potassium 3.4 mmol/L      Chloride 106 mmol/L      CO2 25.4 mmol/L      Calcium 9.3 mg/dL      BUN/Creatinine Ratio 11.4     Anion Gap 7.6 mmol/L      eGFR 97.3 mL/min/1.73     Narrative:      GFR Normal >60  Chronic Kidney Disease <60  Kidney Failure <15      CBC & Differential [[id number]]  (Abnormal) Collected: 01/28/23 0629    Specimen: Blood Updated: 01/28/23 0753    Narrative:      The following orders were created for panel order CBC & Differential.  Procedure                               Abnormality         Status                     ---------                               -----------         ------                     CBC Auto Differential[[id number]]        Abnormal            Final result                 Please view results for these tests on the individual orders.    CBC Auto Differential [[id number]]  (Abnormal) Collected: 01/28/23 0629    Specimen: Blood Updated: 01/28/23 0753     WBC 15.21 10*3/mm3      RBC 4.66 10*6/mm3      Hemoglobin 13.1 g/dL      Hematocrit 40.3 %      MCV 86.5 fL      MCH 28.1 pg      MCHC 32.5 g/dL      RDW 12.8 %      RDW-SD 39.8 fl      MPV 10.5 fL      Platelets 287 10*3/mm3      Neutrophil % 75.5 %      Lymphocyte % 16.6 %      Monocyte % 6.9 %      Eosinophil % 0.3 %      Basophil % 0.2 %      Immature Grans % 0.5 %      Neutrophils, Absolute 11.49 10*3/mm3      Lymphocytes, Absolute 2.52 10*3/mm3      Monocytes, Absolute 1.05 10*3/mm3      Eosinophils, Absolute 0.05 10*3/mm3      Basophils, Absolute 0.03 10*3/mm3      Immature Grans, Absolute 0.07 10*3/mm3      nRBC 0.0 /100 WBC     STAT Lactic Acid, Reflex [[id number]]  (Normal) Collected: 01/27/23 1808    Specimen: Blood from Arm, Left Updated: 01/27/23 1844     Lactate 1.0 mmol/L     Blood Culture - Blood, Arm, Left [[id number]] Collected: 01/27/23 1808    Specimen: Blood from Arm, Left Updated: 01/27/23 1819    Blood Culture - Blood,  Arm, Right [[id number]] Collected: 01/27/23 1808    Specimen: Blood from Arm, Right Updated: 01/27/23 1818    Urinalysis, Microscopic Only - Urine, Clean Catch [[id number]]  (Abnormal) Collected: 01/27/23 1446    Specimen: Urine, Clean Catch Updated: 01/27/23 1611     RBC, UA 6-12 /HPF      WBC, UA 0-2 /HPF      Bacteria, UA Trace /HPF      Squamous Epithelial Cells, UA 3-6 /HPF      Transitional Epithelial Cells, UA 0-2 /HPF      Hyaline Casts, UA 0-2 /LPF      Methodology Manual Light Microscopy    [place] Draw [[id number]] Collected: 01/27/23 1445    Specimen: Blood Updated: 01/27/23 1601    Narrative:      The following orders were created for panel order [place] Draw.  Procedure                               Abnormality         Status                     ---------                               -----------         ------                     Green Top (Gel)[[id number]]                                  Final result               Lavender Top[[id number]]                                     Final result               Gold Top - SST[[id number]]                                   Final result               Light Blue Top[[id number]]                                   Final result                 Please view results for these tests on the individual orders.    Lavender Top [[id number]] Collected: 01/27/23 1445    Specimen: Blood Updated: 01/27/23 1601     Extra Tube hold for add-on     Comment: Auto resulted       Green Top (Gel) [[id number]] Collected: 01/27/23 1445    Specimen: Blood Updated: 01/27/23 1601     Extra Tube Hold for add-ons.     Comment: Auto resulted.       Gold Top - SST [[id number]] Collected: 01/27/23 1445    Specimen: Blood Updated: 01/27/23 1601     Extra Tube Hold for add-ons.     Comment: Auto resulted.       Light Blue Top [[id number]] Collected: 01/27/23 1445    Specimen: Blood Updated: 01/27/23 1601     Extra Tube Hold for add-ons.     Comment: Auto resulted       Urinalysis With Microscopic If Indicated  (No Culture) - Urine, Clean Catch [[id number]]  (Abnormal) Collected: 01/27/23 1446    Specimen: Urine, Clean Catch Updated: 01/27/23 1554     Color, UA Dark Yellow     Appearance, UA Clear     pH, UA 6.0     Specific Gravity, UA >=1.030     Glucose, UA Negative     Ketones, UA 40 mg/dL (2+)     Bilirubin, UA Negative     Blood, UA Trace     Protein, UA 30 mg/dL (1+)     Leuk Esterase, UA Trace     Nitrite, UA Negative     Urobilinogen, UA 1.0 E.U./dL    Comprehensive Metabolic Panel [[id number]]  (Abnormal) Collected: 01/27/23 1445    Specimen: Blood Updated: 01/27/23 1523     Glucose 110 mg/dL      BUN 8 mg/dL      Creatinine 0.84 mg/dL      Sodium 144 mmol/L      Potassium 3.4 mmol/L      Chloride 104 mmol/L      CO2 27.4 mmol/L      Calcium 10.1 mg/dL      Total Protein 8.5 g/dL      Albumin 4.2 g/dL      ALT (SGPT) 8 U/L      AST (SGOT) 15 U/L      Alkaline Phosphatase 67 U/L      Total Bilirubin 0.5 mg/dL      Globulin 4.3 gm/dL      A/G Ratio 1.0 g/dL      BUN/Creatinine Ratio 9.5     Anion Gap 12.6 mmol/L      eGFR 78.2 mL/min/1.73     Narrative:      GFR Normal >60  Chronic Kidney Disease <60  Kidney Failure <15      Lipase [[id number]]  (Normal) Collected: 01/27/23 1445    Specimen: Blood Updated: 01/27/23 1523     Lipase 20 U/L     Lactic Acid, Plasma [[id number]]  (Abnormal) Collected: 01/27/23 1445    Specimen: Blood Updated: 01/27/23 1519     Lactate 2.6 mmol/L     CBC & Differential [[id number]]  (Abnormal) Collected: 01/27/23 1445    Specimen: Blood Updated: 01/27/23 1456    Narrative:      The following orders were created for panel order CBC & Differential.  Procedure                               Abnormality         Status                     ---------                               -----------         ------                     CBC Auto Differential[[id number]]        Abnormal            Final result                 Please view results for these tests on the individual orders.    CBC Auto Differential  [[id number]]  (Abnormal) Collected: 01/27/23 1445    Specimen: Blood Updated: 01/27/23 1456     WBC 17.37 10*3/mm3      RBC 5.23 10*6/mm3      Hemoglobin 15.2 g/dL      Hematocrit 46.2 %      MCV 88.3 fL      MCH 29.1 pg      MCHC 32.9 g/dL      RDW 12.9 %      RDW-SD 41.2 fl      MPV 10.1 fL      Platelets 330 10*3/mm3      Neutrophil % 81.5 %      Lymphocyte % 12.1 %      Monocyte % 5.5 %      Eosinophil % 0.3 %      Basophil % 0.2 %      Immature Grans % 0.4 %      Neutrophils, Absolute 14.14 10*3/mm3      Lymphocytes, Absolute 2.11 10*3/mm3      Monocytes, Absolute 0.95 10*3/mm3      Eosinophils, Absolute 0.06 10*3/mm3      Basophils, Absolute 0.04 10*3/mm3      Immature Grans, Absolute 0.07 10*3/mm3      nRBC 0.0 /100 WBC         Imaging Results (Last 24 Hours)     Procedure Component Value Units Date/Time    CT Abdomen Pelvis Without Contrast [[id number]] Collected: 01/27/23 1801     Updated: 01/27/23 1846    Narrative:      CT ABDOMEN AND PELVIS WITHOUT IV CONTRAST     HISTORY: Left lower quadrant pain for one month     TECHNIQUE:  CT includes axial imaging from the lung bases to the  trochanters without intravenous contrast and without use of oral  contrast. Apparently, this was requested as exam with IV contrast though  the exam demonstrates no IV contrast suggesting that there was a  contrast extravasation during this exam and this is being looked into.  Data reconstructed in coronal and sagittal planes. Radiation dose  reduction techniques were utilized, including automated exposure control  and exposure modulation based on body size.     COMPARISON: CT abdomen and pelvis with IV contrast 01/12/2023     FINDINGS: There is abnormal distention of the cecum, right colon,  hepatic flexure, transverse colon. The right colon measures 7.3 cm  transverse dimension. There is also abnormal wall thickening involving  the ascending colon, hepatic flexure, proximal transverse colon. There  is also abnormal wall  thickening involving the proximal and mid sigmoid  colon which is decompressed. There is abnormal pericolonic inflammation  particularly within the left pelvis surrounding the proximal sigmoid  colon though also surrounding the ascending colon and hepatic flexure of  the colon. There are air-filled, mildly distended small bowel loops  without small bowel dilatation.     Lung bases appear clear. There is a small hiatal hernia and there is  evidence for previous gastric surgery with chain sutures extending along  the stomach. The adrenal glands, kidneys, pancreas appear within normal  limits. There is no malena enlargement within the abdomen or pelvis.  There is no hydronephrosis.       Impression:      1. Colitis associated abnormal colonic dilatation involving the  ascending colon, hepatic flexure, proximal transverse colon where there  is also pericolonic inflammation. There is a second area of more  extensive abnormal wall thickening involving the proximal to mid sigmoid  colon which is decompressed with a greater degree of surrounding  inflammation. There is a potential associated obstructing component at  this location as the colon proximal to the sigmoid colon is dilated.  When compared to the prior exam 15 days ago, the right colitis is new  and what appears to represent colitis of the proximal sigmoid colon is  not significantly changed. The lack of interval change following  treatment and the presence of an obstructive component raises the  likelihood that there could be an associated neoplastic process at this  location though the distinction is difficult to make with CT and there  remains a great deal of inflammation surrounding the proximal sigmoid  colon.  2. Apparently, this exam was performed with intravenous contrast though  there is no contrast demonstrated on this exam. This suggests that there  has been extravasation of contrast into the arm and that could be  confirmed with physical exam or x-ray  of the arm.  3. Previous gastric surgery. Small hiatal hernia.     Discussed with Dr. Cruz in the emergency department on 01/27/2023 at  5:50 PM.      Radiation dose reduction techniques were utilized, including automated  exposure control and exposure modulation based on body size.     This report was finalized on 1/27/2023 6:43 PM by Dr. Donovan Valencia M.D.             Current Facility-Administered Medications:   •  ampicillin-sulbactam (UNASYN) 3 g in sodium chloride 0.9 % 100 mL IVPB-VTB, 3 g, Intravenous, Q6H, Gideon Hope MD, 3 g at 01/28/23 1046  •  cetirizine (zyrTEC) tablet 10 mg, 10 mg, Oral, Daily, Gideon Hope MD  •  HYDROmorphone (DILAUDID) injection 0.5 mg, 0.5 mg, Intravenous, Q4H PRN, Gideon Hope MD, 0.5 mg at 01/28/23 1215  •  losartan (COZAAR) tablet 25 mg, 25 mg, Oral, Q24H, Gideon Hope MD  •  mesalamine (LIALDA) EC tablet 4.8 g, 4.8 g, Oral, Daily With Breakfast, Gideon Hope MD  •  multivitamin (THERAGRAN) tablet 1 tablet, 1 tablet, Oral, Daily, Gideon Hope MD  •  ondansetron (ZOFRAN) injection 4 mg, 4 mg, Intravenous, Q6H PRN, Gideon Hope MD  •  [START ON 1/29/2023] pantoprazole (PROTONIX) EC tablet 40 mg, 40 mg, Oral, Q AM, Gideon Hope MD  •  polyethylene glycol (MIRALAX) packet 17 g, 17 g, Oral, BID, Gideon Hope MD  •  sodium chloride 0.9 % flush 10 mL, 10 mL, Intravenous, PRN, Morris Archer II, MD  •  sodium chloride 0.9 % with KCl 20 mEq/L infusion, 75 mL/hr, Intravenous, Continuous, Gideon Hope MD, Last Rate: 75 mL/hr at 01/27/23 2219, 75 mL/hr at 01/27/23 2219     ASSESSMENT  Worsening colitis with failed outpatient treatment  Hematochezia  Hypertension  Gastroesophageal reflux disease    PLAN  Admit  IVF  IV antibiotics  Check GI panel and C. difficile toxin  Gastroenterology consult  Continue home medications  Stress ulcer DVT prophylaxis  Supportive care  Patient is full code  Discussed with nursing staff  Follow closely further recommendation current  "hospital course    SANG SINGLETARY MD      Electronically signed by Sang Singletary MD at 01/28/23 1308       Operative/Procedure Notes (last 72 hours)  Notes from 01/27/23 1400 through 01/30/23 1400   No notes of this type exist for this encounter.            Physician Progress Notes (last 72 hours)      Sang Singletary MD at 01/30/23 1247          Daily progress note    Primary care physician  Dr. Delgado    Chief complaint  Still with severe abdominal pain relieved with IV Dilaudid.  Patient denies any nausea vomiting and still have no BM yet.    History of present illness  62-year-old female with history of hypertension presented to [facility] [place], operated by [facility] emergency room with left lower quadrant abdominal pain for last 1 month.  Patient also have occasional loose stools.  Patient denies any nausea vomiting.  Patient recently diagnosed with colitis and treated with oral antibiotics without any improvement.  Patient has noticed blood in the stools.  Patient evaluated in ER with CT abdominal pelvis which shows worsening colitis and failed outpatient treatment admit for management.  Patient has no fever chills chest pain shortness of breath with sweats weight loss or weight gain.     REVIEW OF SYSTEMS  Unremarkable except severe abdominal pain     PHYSICAL EXAM  Blood pressure 144/82, pulse 70, temperature 98 °F (36.7 °C), temperature source Oral, resp. rate 14, height 154.9 cm (60.98\"), weight 78.9 kg (174 lb), SpO2 92 %.    GENERAL:  Awake and alert in no acute distress  HEENT:  Unremarkable  NECK:  Supple  CV: regular rhythm, normal rate  RESPIRATORY: normal effort, clear to auscultation bilaterally  ABDOMEN: soft, left lower quadrant tenderness bowel sounds positive  MUSCULOSKELETAL: no deformity  NEURO: alert, moves all extremities, follows commands  PSYCH:  calm, cooperative  SKIN: warm, dry     LAB RESULTS  Lab Results (last 24 hours)     Procedure Component Value Units Date/Time    Basic Metabolic Panel [[id number]]  " (Abnormal) Collected: 01/30/23 0531    Specimen: Blood Updated: 01/30/23 0636     Glucose 78 mg/dL      BUN 3 mg/dL      Creatinine 0.60 mg/dL      Sodium 140 mmol/L      Potassium 3.3 mmol/L      Chloride 107 mmol/L      CO2 25.3 mmol/L      Calcium 8.7 mg/dL      BUN/Creatinine Ratio 5.0     Anion Gap 7.7 mmol/L      eGFR 101.0 mL/min/1.73     Narrative:      GFR Normal >60  Chronic Kidney Disease <60  Kidney Failure <15      Magnesium [[id number]]  (Normal) Collected: 01/30/23 0531    Specimen: Blood Updated: 01/30/23 0636     Magnesium 2.0 mg/dL     CBC & Differential [[id number]]  (Abnormal) Collected: 01/30/23 0531    Specimen: Blood Updated: 01/30/23 0619    Narrative:      The following orders were created for panel order CBC & Differential.  Procedure                               Abnormality         Status                     ---------                               -----------         ------                     CBC Auto Differential[[id number]]        Abnormal            Final result                 Please view results for these tests on the individual orders.    CBC Auto Differential [[id number]]  (Abnormal) Collected: 01/30/23 0531    Specimen: Blood Updated: 01/30/23 0619     WBC 14.40 10*3/mm3      RBC 4.52 10*6/mm3      Hemoglobin 13.0 g/dL      Hematocrit 39.6 %      MCV 87.6 fL      MCH 28.8 pg      MCHC 32.8 g/dL      RDW 12.7 %      RDW-SD 40.5 fl      MPV 11.0 fL      Platelets 239 10*3/mm3      Neutrophil % 77.1 %      Lymphocyte % 16.3 %      Monocyte % 5.5 %      Eosinophil % 0.6 %      Basophil % 0.2 %      Immature Grans % 0.3 %      Neutrophils, Absolute 11.10 10*3/mm3      Lymphocytes, Absolute 2.35 10*3/mm3      Monocytes, Absolute 0.79 10*3/mm3      Eosinophils, Absolute 0.09 10*3/mm3      Basophils, Absolute 0.03 10*3/mm3      Immature Grans, Absolute 0.04 10*3/mm3      nRBC 0.0 /100 WBC     Gastrointestinal Panel, PCR - Stool, Per Rectum [[id number]]  (Normal) Collected: 01/29/23 2021     Specimen: Stool from Per Rectum Updated: 01/29/23 2156     Campylobacter Not Detected     Plesiomonas shigelloides Not Detected     Salmonella Not Detected     Vibrio Not Detected     Vibrio cholerae Not Detected     Yersinia enterocolitica Not Detected     Enteroaggregative E. coli (EAEC) Not Detected     Enteropathogenic E. coli (EPEC) Not Detected     Enterotoxigenic E. coli (ETEC) lt/st Not Detected     Shiga-like toxin-producing E. coli (STEC) stx1/stx2 Not Detected     Shigella/Enteroinvasive E. coli (EIEC) Not Detected     Cryptosporidium Not Detected     Cyclospora cayetanensis Not Detected     Entamoeba histolytica Not Detected     Giardia lamblia Not Detected     Adenovirus F40/41 Not Detected     Astrovirus Not Detected     Norovirus GI/GII Not Detected     Rotavirus A Not Detected     Sapovirus (I, II, IV or V) Not Detected    Narrative:      If Aeromonas, Staphylococcus aureus or Bacillus cereus are suspected, please order [id number]: Stool Culture, Aeromonas, S aureus, B Cereus.    Calprotectin, Fecal - Stool, [[id number]] Collected: 01/29/23 2021    Specimen: Stool Updated: 01/29/23 2127    Clostridioides difficile Toxin - Stool, Per Rectum [[id number]]  (Normal) Collected: 01/29/23 2021    Specimen: Stool from Per Rectum Updated: 01/29/23 2120    Narrative:      The following orders were created for panel order Clostridioides difficile Toxin - Stool, Per Rectum.  Procedure                               Abnormality         Status                     ---------                               -----------         ------                     Clostridioides difficile...[[id number]]  Normal              Final result                 Please view results for these tests on the individual orders.    Clostridioides difficile Toxin, PCR - Stool, Per Rectum [[id number]]  (Normal) Collected: 01/29/23 2021    Specimen: Stool from Per Rectum Updated: 01/29/23 2120     C. Difficile Toxins by PCR Negative    Narrative:      The  result indicates the absence of toxigenic C. difficile from stool specimen.     Blood Culture - Blood, Arm, Right [[id number]]  (Normal) Collected: 01/27/23 1808    Specimen: Blood from Arm, Right Updated: 01/29/23 1831     Blood Culture No growth at 2 days    Blood Culture - Blood, Arm, Left [[id number]]  (Normal) Collected: 01/27/23 1808    Specimen: Blood from Arm, Left Updated: 01/29/23 1831     Blood Culture No growth at 2 days        Imaging Results (Last 24 Hours)     ** No results found for the last 24 hours. **          Current Facility-Administered Medications:   •  ampicillin-sulbactam (UNASYN) 3 g in sodium chloride 0.9 % 100 mL IVPB-VTB, 3 g, Intravenous, Q6H, Gideon Hope MD, 3 g at 01/30/23 1148  •  bisacodyl (DULCOLAX) suppository 10 mg, 10 mg, Rectal, Daily, Rodo Ramirez MD, 10 mg at 01/30/23 1147  •  cetirizine (zyrTEC) tablet 10 mg, 10 mg, Oral, Daily, Gideon Hope MD  •  docusate sodium (COLACE) capsule 100 mg, 100 mg, Oral, BID, Gideon Hope MD, 100 mg at 01/30/23 1147  •  HYDROcodone-acetaminophen (NORCO) 5-325 MG per tablet 1 tablet, 1 tablet, Oral, Q6H PRN, Gideon Hope MD, 1 tablet at 01/30/23 0132  •  losartan (COZAAR) tablet 25 mg, 25 mg, Oral, Q24H, Gideon Hope MD, 25 mg at 01/30/23 1147  •  mesalamine (LIALDA) EC tablet 4.8 g, 4.8 g, Oral, Daily With Breakfast, Gideon Hope MD  •  multivitamin (THERAGRAN) tablet 1 tablet, 1 tablet, Oral, Daily, Gideon Hope MD, 1 tablet at 01/29/23 0956  •  ondansetron (ZOFRAN) injection 4 mg, 4 mg, Intravenous, Q6H PRN, Gideon Hope MD, 4 mg at 01/30/23 0812  •  pantoprazole (PROTONIX) EC tablet 40 mg, 40 mg, Oral, BID AC, Gideon Hope MD, 40 mg at 01/29/23 1648  •  polyethylene glycol (MIRALAX) packet 17 g, 17 g, Oral, BID, Gideon Hope MD, 17 g at 01/30/23 1147  •  sodium chloride 0.9 % flush 10 mL, 10 mL, Intravenous, PRN, Morris Archer II, MD  •  sodium chloride 0.9 % with KCl 20 mEq/L infusion, 75 mL/hr, Intravenous, Continuous,  Sang Hope MD, Last Rate: 75 mL/hr at 01/30/23 0922, 75 mL/hr at 01/30/23 0922     ASSESSMENT  Worsening colitis with failed outpatient treatment  Hematochezia  Hypertension  Gastroesophageal reflux disease    PLAN  CPM  Continue IVF  Continue IV antibiotics  Replace potassium  Bowel program  Gastroenterology consult appreciated  Continue home medications  Stress ulcer DVT prophylaxis  Supportive care  PT OT  Discussed with nursing staff  Discharge planning    SANG HOPE MD    Copied text in this note has been reviewed and is accurate as of 01/30/23        Electronically signed by Sang Hope MD at 01/30/23 1248     Rodo Ramirez MD at 01/29/23 1236          [facility]  Inpatient Progress Note    Reason for Follow Up: Nominal pain, rectal bleeding, diarrhea    Subjective     Interval History:   No stool since being admitted, wants me to order a suppository    Current Facility-Administered Medications:   •  ampicillin-sulbactam (UNASYN) 3 g in sodium chloride 0.9 % 100 mL IVPB-VTB, 3 g, Intravenous, Q6H, Sang Hope MD, 3 g at 01/29/23 1010  •  cetirizine (zyrTEC) tablet 10 mg, 10 mg, Oral, Daily, Sang Hope MD  •  HYDROmorphone (DILAUDID) injection 0.5 mg, 0.5 mg, Intravenous, Q4H PRN, Sang Hope MD, 0.5 mg at 01/29/23 1209  •  losartan (COZAAR) tablet 25 mg, 25 mg, Oral, Q24H, Sang Hope MD, 25 mg at 01/29/23 0956  •  mesalamine (LIALDA) EC tablet 4.8 g, 4.8 g, Oral, Daily With Breakfast, Sang Hope MD  •  multivitamin (THERAGRAN) tablet 1 tablet, 1 tablet, Oral, Daily, Sang Hope MD, 1 tablet at 01/29/23 0956  •  ondansetron (ZOFRAN) injection 4 mg, 4 mg, Intravenous, Q6H PRN, Sang Hope MD, 4 mg at 01/28/23 1329  •  pantoprazole (PROTONIX) EC tablet 40 mg, 40 mg, Oral, Q AM, Sang Hope MD, 40 mg at 01/29/23 0531  •  polyethylene glycol (MIRALAX) packet 17 g, 17 g, Oral, BID, Sang Hope MD, 17 g at 01/29/23 0956  •  sodium chloride 0.9 % flush 10 mL, 10  mL, Intravenous, PRN, Morris Archer II, MD  •  sodium chloride 0.9 % with KCl 20 mEq/L infusion, 75 mL/hr, Intravenous, Continuous, Gideon Hope MD, Last Rate: 75 mL/hr at 01/29/23 0728, 75 mL/hr at 01/29/23 0728  Review of Systems:    There is weakness of fatigue all other systems reviewed and negative    Objective     Vital Signs  Temp:  [97.6 °F (36.4 °C)-98.6 °F (37 °C)] 98.3 °F (36.8 °C)  Heart Rate:  [67-81] 81  Resp:  [14-16] 16  BP: (126-149)/(75-90) 142/78  Body mass index is 32.89 kg/m².    Intake/Output Summary (Last 24 hours) at 1/29/2023 1236  Last data filed at 1/29/2023 0500  Gross per 24 hour   Intake 1420 ml   Output 800 ml   Net 620 ml     No intake/output data recorded.     Physical Exam:   General: patient awake, alert and cooperative   Eyes: Normal lids and lashes, no scleral icterus   Neck: supple, normal ROM   Skin: warm and dry, not jaundiced   Cardiovascular: regular rhythm and rate, no murmurs auscultated   Pulm: clear to auscultation bilaterally, regular and unlabored   Abdomen: soft, nontender, nondistended; normal bowel sounds   Extremities: no rash or edema   Psychiatric: Normal mood and behavior; memory intact     Results Review:     I reviewed the patient's new clinical results.    Results from last 7 days   Lab Units 01/29/23  0756 01/28/23  0629 01/27/23  1445   WBC 10*3/mm3 10.79 15.21* 17.37*   HEMOGLOBIN g/dL 12.1 13.1 15.2   HEMATOCRIT % 36.3 40.3 46.2   PLATELETS 10*3/mm3 245 287 330     Results from last 7 days   Lab Units 01/29/23  0756 01/28/23  0629 01/27/23  1445   SODIUM mmol/L 142 139 144   POTASSIUM mmol/L 2.9* 3.4* 3.4*   CHLORIDE mmol/L 109* 106 104   CO2 mmol/L 24.0 25.4 27.4   BUN mg/dL 5* 8 8   CREATININE mg/dL 0.53* 0.70 0.84   CALCIUM mg/dL 8.5* 9.3 10.1   BILIRUBIN mg/dL 0.4  --  0.5   ALK PHOS U/L 49  --  67   ALT (SGPT) U/L 5  --  8   AST (SGOT) U/L 12  --  15   GLUCOSE mg/dL 78 73 110*         Lab Results   Lab Value Date/Time    LIPASE 20 01/27/2023  1445    LIPASE 16 01/12/2023 1601       Radiology:  CT Abdomen Pelvis Without Contrast   Final Result   1. Colitis associated abnormal colonic dilatation involving the   ascending colon, hepatic flexure, proximal transverse colon where there   is also pericolonic inflammation. There is a second area of more   extensive abnormal wall thickening involving the proximal to mid sigmoid   colon which is decompressed with a greater degree of surrounding   inflammation. There is a potential associated obstructing component at   this location as the colon proximal to the sigmoid colon is dilated.   When compared to the prior exam 15 days ago, the right colitis is new   and what appears to represent colitis of the proximal sigmoid colon is   not significantly changed. The lack of interval change following   treatment and the presence of an obstructive component raises the   likelihood that there could be an associated neoplastic process at this   location though the distinction is difficult to make with CT and there   remains a great deal of inflammation surrounding the proximal sigmoid   colon.   2. Apparently, this exam was performed with intravenous contrast though   there is no contrast demonstrated on this exam. This suggests that there   has been extravasation of contrast into the arm and that could be   confirmed with physical exam or x-ray of the arm.   3. Previous gastric surgery. Small hiatal hernia.       Discussed with Dr. Cruz in the emergency department on 01/27/2023 at   5:50 PM.        Radiation dose reduction techniques were utilized, including automated   exposure control and exposure modulation based on body size.       This report was finalized on 1/27/2023 6:43 PM by Dr. Donovan Valencia M.D.              Assessment & Plan     Patient Active Problem List   Diagnosis   • Left sided colitis with complication (HCC)   • Colitis       Assessment:  1. Abdominal pain  2. Diarrhea  3. Rectal  "bleeding  4. Abnormal imaging x2     Plan:  • Suspect inflammatory bowel disease but we will need to rule out C. difficile and enteroinvasive bacterium first  • GI PCR and C. difficile ordered  • If negative consider unprepped lower endoscopic evaluation in the near future  • Adding a suppository to her medical regimen, if that does not help with bowel movements we can consider enemas     I discussed the patients findings and my recommendations with patient and nursing staff.    Rodo Ramirez MD                Electronically signed by Rodo Ramirez MD at 01/29/23 1236     Gideon Hope MD at 01/29/23 0982          Daily progress note    Primary care physician  Dr. Delgado    Chief complaint  Doing same with no new complaint but still with abdominal pain and no BM since admission.  Patient denies any nausea vomiting and passing gas.    History of present illness  62-year-old female with history of hypertension presented to [facility] emergency room with left lower quadrant abdominal pain for last 1 month.  Patient also have occasional loose stools.  Patient denies any nausea vomiting.  Patient recently diagnosed with colitis and treated with oral antibiotics without any improvement.  Patient has noticed blood in the stools.  Patient evaluated in ER with CT abdominal pelvis which shows worsening colitis and failed outpatient treatment admit for management.  Patient has no fever chills chest pain shortness of breath with sweats weight loss or weight gain.     REVIEW OF SYSTEMS  All systems reviewed and negative except for those discussed in HPI.      PHYSICAL EXAM  Blood pressure 142/78, pulse 81, temperature 98.3 °F (36.8 °C), temperature source Oral, resp. rate 16, height 154.9 cm (60.98\"), weight 78.9 kg (174 lb), SpO2 99 %.    GENERAL:  Awake and alert in no acute distress  HEENT:  Unremarkable  NECK:  Supple  CV: regular rhythm, normal rate  RESPIRATORY: normal effort, clear to auscultation " bilaterally  ABDOMEN: soft, left lower quadrant tenderness bowel sounds positive  MUSCULOSKELETAL: no deformity  NEURO: alert, moves all extremities, follows commands  PSYCH:  calm, cooperative  SKIN: warm, dry     LAB RESULTS  Lab Results (last 24 hours)     Procedure Component Value Units Date/Time    Comprehensive Metabolic Panel [[id number]]  (Abnormal) Collected: 01/29/23 0756    Specimen: Blood Updated: 01/29/23 0909     Glucose 78 mg/dL      BUN 5 mg/dL      Creatinine 0.53 mg/dL      Sodium 142 mmol/L      Potassium 2.9 mmol/L      Chloride 109 mmol/L      CO2 24.0 mmol/L      Calcium 8.5 mg/dL      Total Protein 5.9 g/dL      Albumin 3.0 g/dL      ALT (SGPT) 5 U/L      AST (SGOT) 12 U/L      Alkaline Phosphatase 49 U/L      Total Bilirubin 0.4 mg/dL      Globulin 2.9 gm/dL      A/G Ratio 1.0 g/dL      BUN/Creatinine Ratio 9.4     Anion Gap 9.0 mmol/L      eGFR 104.1 mL/min/1.73     Narrative:      GFR Normal >60  Chronic Kidney Disease <60  Kidney Failure <15      TSH [[id number]]  (Normal) Collected: 01/29/23 0756    Specimen: Blood Updated: 01/29/23 0904     TSH 1.380 uIU/mL     Lipid Panel [[id number]] Collected: 01/29/23 0756    Specimen: Blood Updated: 01/29/23 0859     Total Cholesterol 143 mg/dL      Triglycerides 53 mg/dL      HDL Cholesterol 50 mg/dL      LDL Cholesterol  82 mg/dL      VLDL Cholesterol 11 mg/dL      LDL/HDL Ratio 1.65    Narrative:      Cholesterol Reference Ranges  (U.S. Department of Health and Human Services ATP III Classifications)    Desirable          <200 mg/dL  Borderline High    200-239 mg/dL  High Risk          >240 mg/dL      Triglyceride Reference Ranges  (U.S. Department of Health and Human Services ATP III Classifications)    Normal           <150 mg/dL  Borderline High  150-199 mg/dL  High             200-499 mg/dL  Very High        >500 mg/dL    HDL Reference Ranges  (U.S. Department of Health and Human Services ATP III Classifications)    Low     <40 mg/dl (major risk  factor for CHD)  High    >60 mg/dl ('negative' risk factor for CHD)        LDL Reference Ranges  (U.S. Department of Health and Human Services ATP III Classifications)    Optimal          <100 mg/dL  Near Optimal     100-129 mg/dL  Borderline High  130-159 mg/dL  High             160-189 mg/dL  Very High        >189 mg/dL    Hemoglobin A1c [[id number]]  (Normal) Collected: 01/29/23 0756    Specimen: Blood Updated: 01/29/23 0849     Hemoglobin A1C 5.60 %     Narrative:      Hemoglobin A1C Ranges:    Increased Risk for Diabetes  5.7% to 6.4%  Diabetes                     >= 6.5%  Diabetic Goal                < 7.0%    CBC & Differential [[id number]]  (Abnormal) Collected: 01/29/23 0756    Specimen: Blood Updated: 01/29/23 0840    Narrative:      The following orders were created for panel order CBC & Differential.  Procedure                               Abnormality         Status                     ---------                               -----------         ------                     CBC Auto Differential[[id number]]        Abnormal            Final result                 Please view results for these tests on the individual orders.    CBC Auto Differential [[id number]]  (Abnormal) Collected: 01/29/23 0756    Specimen: Blood Updated: 01/29/23 0840     WBC 10.79 10*3/mm3      RBC 4.23 10*6/mm3      Hemoglobin 12.1 g/dL      Hematocrit 36.3 %      MCV 85.8 fL      MCH 28.6 pg      MCHC 33.3 g/dL      RDW 12.5 %      RDW-SD 39.2 fl      MPV 10.6 fL      Platelets 245 10*3/mm3      Neutrophil % 73.4 %      Lymphocyte % 18.4 %      Monocyte % 6.9 %      Eosinophil % 0.6 %      Basophil % 0.2 %      Immature Grans % 0.5 %      Neutrophils, Absolute 7.92 10*3/mm3      Lymphocytes, Absolute 1.99 10*3/mm3      Monocytes, Absolute 0.74 10*3/mm3      Eosinophils, Absolute 0.07 10*3/mm3      Basophils, Absolute 0.02 10*3/mm3      Immature Grans, Absolute 0.05 10*3/mm3      nRBC 0.0 /100 WBC     Blood Culture - Blood, Arm, Right  [[id number]]  (Normal) Collected: 01/27/23 1808    Specimen: Blood from Arm, Right Updated: 01/28/23 1831     Blood Culture No growth at 24 hours    Blood Culture - Blood, Arm, Left [[id number]]  (Normal) Collected: 01/27/23 1808    Specimen: Blood from Arm, Left Updated: 01/28/23 1831     Blood Culture No growth at 24 hours        Imaging Results (Last 24 Hours)     ** No results found for the last 24 hours. **          Current Facility-Administered Medications:   •  ampicillin-sulbactam (UNASYN) 3 g in sodium chloride 0.9 % 100 mL IVPB-VTB, 3 g, Intravenous, Q6H, Gideon Hope MD, 3 g at 01/29/23 1010  •  bisacodyl (DULCOLAX) suppository 10 mg, 10 mg, Rectal, Daily, Rodo Ramirez MD, 10 mg at 01/29/23 1424  •  cetirizine (zyrTEC) tablet 10 mg, 10 mg, Oral, Daily, Gideon Hope MD  •  HYDROmorphone (DILAUDID) injection 0.5 mg, 0.5 mg, Intravenous, Q4H PRN, Gideon Hope MD, 0.5 mg at 01/29/23 1209  •  losartan (COZAAR) tablet 25 mg, 25 mg, Oral, Q24H, Gideon Hope MD, 25 mg at 01/29/23 0956  •  mesalamine (LIALDA) EC tablet 4.8 g, 4.8 g, Oral, Daily With Breakfast, Gideon Hope MD  •  multivitamin (THERAGRAN) tablet 1 tablet, 1 tablet, Oral, Daily, Gideon Hope MD, 1 tablet at 01/29/23 0956  •  ondansetron (ZOFRAN) injection 4 mg, 4 mg, Intravenous, Q6H PRN, Gideon Hope MD, 4 mg at 01/28/23 1329  •  pantoprazole (PROTONIX) EC tablet 40 mg, 40 mg, Oral, Q AM, Gideon Hope MD, 40 mg at 01/29/23 0531  •  polyethylene glycol (MIRALAX) packet 17 g, 17 g, Oral, BID, Gideon Hope MD, 17 g at 01/29/23 0956  •  sodium chloride 0.9 % flush 10 mL, 10 mL, Intravenous, PRN, Morris Archer II, MD  •  sodium chloride 0.9 % with KCl 20 mEq/L infusion, 75 mL/hr, Intravenous, Continuous, Gideon Hope MD, Last Rate: 75 mL/hr at 01/29/23 0728, 75 mL/hr at 01/29/23 0728     ASSESSMENT  Worsening colitis with failed outpatient treatment  Hematochezia  Hypertension  Gastroesophageal reflux disease    PLAN  CPM  IVF  IV  antibiotics  Bowel program  Check GI panel and C. difficile toxin  Gastroenterology consult appreciated  Continue home medications  Stress ulcer DVT prophylaxis  Supportive care  PT OT  Discussed with nursing staff  Follow closely further recommendation current hospital course    SANG HOPE MD    Copied text in this note has been reviewed and is accurate as of 01/29/23        Electronically signed by Sang Hope MD at 01/29/23 1544          Consult Notes (last 72 hours)      Rodo Ramirez MD at 01/28/23 1134          [facility]  Initial Inpatient Consult Note    Referring Provider: Sang hope    Reason for Consultation: Diarrhea abnormal imaging    Subjective     History of present illness:    62 y.o. female complaining of roughly 1 month of abdominal pain, diarrhea.  Loose stools have a bit of blood in them.  There is nocturnal diarrhea.  Abdominal pain is lower quadrants bilaterally worse with movement better at rest.  No relation to bowel movements or eating.  She had a colonoscopy in 2010 that was normal.  She saw Dr. Lutz last week in the office who recommended a colonoscopy on February 13.  Her abdominal pain and diarrhea worsen so she came to the emergency room.  CAT scan 1 month ago showing left-sided colitis, CAT scan done yesterday in the emergency room results are below.  No extraintestinal manifestations of inflammatory bowel disease.  No recent sick contacts travel, and she is not had stool studies to evaluate for infectious processes yet.  she was empirically started on mesalamine but did show no improved    Past Medical History:  Past Medical History:   Diagnosis Date   • HTN (hypertension)      Past Surgical History:  Past Surgical History:   Procedure Laterality Date   • BREAST LUMPECTOMY Left     benign   • COLONOSCOPY  2010    negative per pt   • GASTRIC SLEEVE LAPAROSCOPIC  2020   • HYSTERECTOMY  2005   • LAPAROSCOPIC GASTRIC BANDING      lap band removal       Social History:   Social History     Tobacco Use   • Smoking status: Former   • Smokeless tobacco: Never   • Tobacco comments:     Quit 2004   Substance Use Topics   • Alcohol use: Yes     Alcohol/week: 1.0 standard drink     Types: 1 Glasses of wine per week     Comment: occ wine      Family History:  Family History   Problem Relation Age of Onset   • Diabetes Mother        Home Meds:  Medications Prior to Admission   Medication Sig Dispense Refill Last Dose   • levocetirizine (XYZAL) 5 MG tablet Take 5 mg by mouth As Needed.      • losartan-hydrochlorothiazide (HYZAAR) 50-12.5 MG per tablet Take 1 tablet by mouth Daily.      • Multiple Vitamin (MULTI-VITAMIN PO) Take 1 tablet by mouth Daily.      • ondansetron ODT (ZOFRAN-ODT) 4 MG disintegrating tablet Place 1 tablet on the tongue 4 (Four) Times a Day As Needed for Nausea or Vomiting. 12 tablet 0    • mesalamine (Lialda) 1.2 g EC tablet Take 4 tablets by mouth Daily With Breakfast. 120 tablet 11      Current Meds:   ampicillin-sulbactam, 3 g, Intravenous, Q6H  famotidine, 20 mg, Intravenous, Q12H      Allergies:  Allergies   Allergen Reactions   • Sulfa Antibiotics Itching     Review of Systems  There is weakness of fatigue all other systems reviewed and negative     Objective     Vital Signs  Temp:  [96.8 °F (36 °C)-98.9 °F (37.2 °C)] 97.8 °F (36.6 °C)  Heart Rate:  [64-98] 70  Resp:  [14-18] 14  BP: (114-167)/() 128/79  Physical Exam:  General Appearance:    Alert, cooperative, in no acute distress   Head:    Normocephalic, without obvious abnormality, atraumatic   Eyes:          conjunctivae and sclerae normal, no   icterus   Throat:   no thrush, oral mucosa moist   Neck:   Supple, no adenopathy   Lungs:     Clear to auscultation bilaterally    Heart:    Regular rhythm and normal rate    Chest Wall:    No abnormalities observed   Abdomen:     Soft, nondistended, nontender; normal bowel sounds   Extremities:   no edema, no redness   Skin:   No bruising  or rash   Psychiatric:  normal mood and insight     Results Review:   I reviewed the patient's new clinical results.    Results from last 7 days   Lab Units 01/28/23  0629 01/27/23  1445   WBC 10*3/mm3 15.21* 17.37*   HEMOGLOBIN g/dL 13.1 15.2   HEMATOCRIT % 40.3 46.2   PLATELETS 10*3/mm3 287 330     Results from last 7 days   Lab Units 01/28/23  0629 01/27/23  1445   SODIUM mmol/L 139 144   POTASSIUM mmol/L 3.4* 3.4*   CHLORIDE mmol/L 106 104   CO2 mmol/L 25.4 27.4   BUN mg/dL 8 8   CREATININE mg/dL 0.70 0.84   CALCIUM mg/dL 9.3 10.1   BILIRUBIN mg/dL  --  0.5   ALK PHOS U/L  --  67   ALT (SGPT) U/L  --  8   AST (SGOT) U/L  --  15   GLUCOSE mg/dL 73 110*         Lab Results   Lab Value Date/Time    LIPASE 20 01/27/2023 1445    LIPASE 16 01/12/2023 1601       Radiology:  CT Abdomen Pelvis Without Contrast   Final Result   1. Colitis associated abnormal colonic dilatation involving the   ascending colon, hepatic flexure, proximal transverse colon where there   is also pericolonic inflammation. There is a second area of more   extensive abnormal wall thickening involving the proximal to mid sigmoid   colon which is decompressed with a greater degree of surrounding   inflammation. There is a potential associated obstructing component at   this location as the colon proximal to the sigmoid colon is dilated.   When compared to the prior exam 15 days ago, the right colitis is new   and what appears to represent colitis of the proximal sigmoid colon is   not significantly changed. The lack of interval change following   treatment and the presence of an obstructive component raises the   likelihood that there could be an associated neoplastic process at this   location though the distinction is difficult to make with CT and there   remains a great deal of inflammation surrounding the proximal sigmoid   colon.   2. Apparently, this exam was performed with intravenous contrast though   there is no contrast demonstrated on  this exam. This suggests that there   has been extravasation of contrast into the arm and that could be   confirmed with physical exam or x-ray of the arm.   3. Previous gastric surgery. Small hiatal hernia.       Discussed with Dr. Cruz in the emergency department on 01/27/2023 at   5:50 PM.        Radiation dose reduction techniques were utilized, including automated   exposure control and exposure modulation based on body size.       This report was finalized on 1/27/2023 6:43 PM by Dr. Donovan Valencia M.D.              Assessment & Plan   Patient Active Problem List   Diagnosis   • Left sided colitis with complication (HCC)   • Colitis       Assessment:  1. Abdominal pain  2. Diarrhea  3. Rectal bleeding  4. Abnormal imaging x2    Plan:  · Suspect inflammatory bowel disease but we will need to rule out C. difficile and enteroinvasive bacterium first  · GI PCR and C. difficile ordered  · If negative consider unprepped lower endoscopic evaluation in the near future      I discussed the patients findings and my recommendations with patient and nursing staff.    Rodo Ramirez MD  Electronically signed by Rodo Ramirez MD at 01/28/23 1137          All medication doses during the admission are shown, including meds that are no longer on order.  Scheduled Meds Sorted by Name  for Hali Tejeda as of 1/28/23 through 1/30/23    1 Day 3 Days 7 Days 10 Days < Today >   Legend:                          Inactive     Active     Other Encounter     Linked                 Medications 01/28/23 01/29/23 01/30/23   ampicillin-sulbactam (UNASYN) 3 g in sodium chloride 0.9 % 100 mL IVPB-VTB  Dose: 3 g  Freq: Every 6 Hours Route: IV  Indications of Use: INTRA-ABDOMINAL INFECTION  Start: 01/27/23 2015 End: 02/01/23 2259   Admin Instructions:   Activate vial before using.    0505   1046   1632     2313        0531   1010   1648     2312        0509   1148   2410     2300          ampicillin-sulbactam (UNASYN) 3 g in  sodium chloride 0.9 % 100 mL IVPB-VTB  Dose: 3 g  Freq: Once Route: IV  Indications of Use: INTRA-ABDOMINAL INFECTION  Start: 01/27/23 1630 End: 01/27/23 1849   Admin Instructions:   Activate vial before using.         bisacodyl (DULCOLAX) suppository 10 mg  Dose: 10 mg  Freq: Daily Route: RE  Start: 01/29/23 1330   Admin Instructions:   Hold for diarrhea     1424        1147          cetirizine (zyrTEC) tablet 10 mg  Dose: 10 mg  Freq: Daily Route: PO  Start: 01/28/23 1430    (1330)        (0956)        (1146)          docusate sodium (COLACE) capsule 100 mg  Dose: 100 mg  Freq: 2 Times Daily Route: PO  Start: 01/29/23 2100   Admin Instructions:   Swallow whole.  Do not open, crush, or chew capsule.     2115 1147 2100         famotidine (PEPCID) injection 20 mg  Dose: 20 mg  Freq: Every 12 Hours Scheduled Route: IV  Start: 01/27/23 2100 End: 01/28/23 1259   Admin Instructions:   Give IV push over 2 minutes.    1046   1259-D/C'd         HYDROmorphone (DILAUDID) injection 0.5 mg  Dose: 0.5 mg  Freq: Once Route: IV  Start: 01/30/23 1000 End: 01/30/23 0915   Admin Instructions:   Based on patient request - if ordered for moderate or severe pain, provider allows for administration of a medication prescribed for a lower pain scale.  If given for pain, use the following pain scale:  Mild Pain = Pain Score of 1-3, CPOT 1-2  Moderate Pain = Pain Score of 4-6, CPOT 3-4  Severe Pain = Pain Score of 7-10, CPOT 5-8      0915          HYDROmorphone (DILAUDID) injection 1 mg  Dose: 1 mg  Freq: Once Route: IV  Start: 01/27/23 1630 End: 01/27/23 1639   Admin Instructions:   Based on patient request - if ordered for moderate or severe pain, provider allows for administration of a medication prescribed for a lower pain scale.      Caution: Look alike/sound alike drug alert    If given for pain, use the following pain scale:  Mild Pain = Pain Score of 1-3, CPOT 1-2  Moderate Pain = Pain Score of 4-6, CPOT 3-4  Severe Pain =  "Pain Score of 7-10, CPOT 5-8         iopamidol (ISOVUE-300) 61 % injection 100 mL  Dose: 100 mL  Freq: Once in Imaging Route: IV  Start: 01/28/23 1345 End: 01/28/23 1257    1257-D/C'd          iopamidol (ISOVUE-300) 61 % injection 100 mL  Dose: 100 mL  Freq: Once in Imaging Route: IV  Start: 01/27/23 1707 End: 01/27/23 1706         lactated ringers bolus 1,000 mL  Dose: 1,000 mL  Freq: Once Route: IV  Start: 01/28/23 1345 End: 01/28/23 1257    1257-D/C'd          lactated ringers bolus 1,000 mL  Dose: 1,000 mL  Freq: Once Route: IV  Last Dose: 1,000 mL (01/27/23 1643)  Start: 01/27/23 1630 End: 01/27/23 1713         losartan (COZAAR) tablet 25 mg  Dose: 25 mg  Freq: Every 24 Hours Scheduled Route: PO  Start: 01/28/23 1430    1520        0956        1147          losartan (COZAAR) tablet 50 mg  Dose: 50 mg  Freq: Every 24 Hours Scheduled Route: PO  Start: 01/28/23 1345 End: 01/28/23 1259    1259-D/C'd          mesalamine (LIALDA) EC tablet 4.8 g  Dose: 4.8 g  Freq: Daily With Breakfast Route: PO  Start: 01/28/23 1430   Admin Instructions:   Do not crush, split, or chew.    (5179)        (7161)        (8942)          multivitamin (THERAGRAN) tablet 1 tablet  Dose: 1 tablet  Freq: Daily Route: PO  Start: 01/28/23 1430   Admin Instructions:       1520        0956        (1146)          ondansetron (ZOFRAN) injection 4 mg  Dose: 4 mg  Freq: Once Route: IV  Start: 01/27/23 1630 End: 01/27/23 1639   Admin Instructions:   \"If multiple N/V medications ordered, use in the following order: Ondansetron, Prochlorperazine, Promethazine. Use PO unless patient refuses or patient unable to swallow.\"         pantoprazole (PROTONIX) EC tablet 40 mg  Dose: 40 mg  Freq: 2 Times Daily Before Meals Route: PO  Start: 01/29/23 1730   Admin Instructions:   Swallow whole; do not crush, split, or chew.     [phone]         pantoprazole (PROTONIX) EC tablet 40 mg  Dose: 40 mg  Freq: Every Early Morning Route: PO  Start: " 01/29/23 0600 End: 01/29/23 1545   Admin Instructions:   Swallow whole; do not crush, split, or chew.     0531   1545-D/C'd        polyethylene glycol (MIRALAX) packet 17 g  Dose: 17 g  Freq: 2 Times Daily Route: PO  Start: 01/28/23 2100   Admin Instructions:   Use 4-8 ounces of water, tea, or juice for each 17 gram dose.    2041        [phone]         polyethylene glycol (MIRALAX) packet 17 g  Dose: 17 g  Freq: Daily Route: PO  Start: 01/28/23 1345 End: 01/28/23 1259   Admin Instructions:   Use 4-8 ounces of water, tea, or juice for each 17 gram dose.    1259-D/C'd          polyethylene glycol (MIRALAX) packet 17 g  Dose: 17 g  Freq: Once Route: PO  Start: 01/28/23 1230 End: 01/28/23 1215   Admin Instructions:   Use 4-8 ounces of water, tea, or juice for each 17 gram dose.    1215            potassium chloride (K-DUR,KLOR-CON) ER tablet 10 mEq  Dose: 10 mEq  Freq: Daily Route: PO  Start: 01/29/23 1630 End: 01/29/23 1545   Admin Instructions:   Swallow whole; do not crush, split, or chew.     1545-D/C'd         potassium chloride (K-DUR,KLOR-CON) ER tablet 40 mEq  Dose: 40 mEq  Freq: Once Route: PO  Start: 01/30/23 1345   Admin Instructions:   Swallow whole; do not crush, split, or chew.      1345          potassium chloride (K-DUR,KLOR-CON) ER tablet 40 mEq  Dose: 40 mEq  Freq: Once Route: PO  Start: 01/29/23 1645 End: 01/29/23 1648   Admin Instructions:   Swallow whole; do not crush, split, or chew.     1648           Medications 01/28/23 01/29/23 01/30/23         Continuous Meds Sorted by Name  for Nikhil Hali as of 1/28/23 through 1/30/23  Legend:                          Inactive     Active     Other Encounter     Linked                 Medications 01/28/23 01/29/23 01/30/23   sodium chloride 0.9 % with KCl 20 mEq/L infusion  Rate: 75 mL/hr Dose: 75 mL/hr  Freq: Continuous Route: IV  Last Dose: 75 mL/hr (01/30/23 0922)  Start: 01/27/23 2030    1519   1520       0728   0967 [id number]    "   0922                PRN Meds Sorted by Name  for Hali Tejeda as of 1/28/23 through 1/30/23  Legend:                          Inactive     Active     Other Encounter     Linked                 Medications 01/28/23 01/29/23 01/30/23   HYDROcodone-acetaminophen (NORCO) 5-325 MG per tablet 1 tablet  Dose: 1 tablet  Freq: Every 6 Hours PRN Route: PO  PRN Reason: Moderate Pain  Start: 01/29/23 1545 End: 02/05/23 1544   Admin Instructions:   Based on patient request - if ordered for moderate or severe pain, provider allows for administration of a medication prescribed for a lower pain scale.  [TREY]    Do not exceed 4 grams of acetaminophen in a 24 hr period. Max dose of 2gm for AST/ALT greater than 120 units/L        If given for pain, use the following pain scale:   Mild Pain = Pain Score of 1-3, CPOT 1-2  Moderate Pain = Pain Score of 4-6, CPOT 3-4  Severe Pain = Pain Score of 7-10, CPOT 5-8     (7172) [C]   1930       0132   0922         HYDROmorphone (DILAUDID) injection 0.5 mg  Dose: 0.5 mg  Freq: Every 4 Hours PRN Route: IV  PRN Reason: Severe Pain  Start: 01/27/23 1938 End: 01/29/23 1545   Admin Instructions:   Based on patient request - if ordered for moderate or severe pain, provider allows for administration of a medication prescribed for a lower pain scale.  If given for pain, use the following pain scale:  Mild Pain = Pain Score of 1-3, CPOT 1-2  Moderate Pain = Pain Score of 4-6, CPOT 3-4  Severe Pain = Pain Score of 7-10, CPOT 5-8    0146   0740   1215     1625   2041       0309   0726   1209     1545-D/C'd         ondansetron (ZOFRAN) injection 4 mg  Dose: 4 mg  Freq: Every 6 Hours PRN Route: IV  PRN Reasons: Nausea,Vomiting  Start: 01/27/23 1939   Admin Instructions:   \"If multiple N/V medications ordered, use in the following order: Ondansetron, Prochlorperazine, Promethazine. Use PO unless patient refuses or patient unable to swallow.\"    [id number]          sodium chloride 0.9 " % flush 10 mL  Dose: 10 mL  Freq: As Needed Route: IV  PRN Reason: Line Care  Start: 01/27/23 1440

## 2023-01-30 NOTE — THERAPY DISCHARGE NOTE
Patient Name: Hali Tejeda  : 1960    MRN: [id number]                              Today's Date: 2023       Admit Date: 2023    Visit Dx:     ICD-10-CM ICD-9-CM   1. Colitis  K52.9 558.9     Patient Active Problem List   Diagnosis   • Left sided colitis with complication (HCC)   • Colitis     Past Medical History:   Diagnosis Date   • HTN (hypertension)      Past Surgical History:   Procedure Laterality Date   • BREAST LUMPECTOMY Left     benign   • COLONOSCOPY      negative per pt   • GASTRIC SLEEVE LAPAROSCOPIC  2020   • HYSTERECTOMY  2005   • LAPAROSCOPIC GASTRIC BANDING      lap band removal      General Information     Row Name 23 1438          Physical Therapy Time and Intention    Document Type discharge evaluation/summary  -CW     Mode of Treatment individual therapy;physical therapy  -CW     Row Name 23 1438          General Information    Patient Profile Reviewed yes  -CW     Prior Level of Function independent:  -CW     Existing Precautions/Restrictions fall  -CW     Barriers to Rehab none identified  -CW     Row Name 23 1438          Living Environment    People in Home alone  -CW     Row Name 23 1438          Cognition    Orientation Status (Cognition) oriented x 4  -CW           User Key  (r) = Recorded By, (t) = Taken By, (c) = Cosigned By    Initials Name Provider Type    CW Sunni Wolfe PT Physical Therapist               Mobility     Row Name 23 1439          Bed Mobility    Bed Mobility supine-sit  -CW     Supine-Sit [place] (Bed Mobility) independent  -CW     Row Name 23 1439          Sit-Stand Transfer    Sit-Stand [place] (Transfers) independent  -CW     Row Name 23 1439          Gait/Stairs (Locomotion)    [place] Level (Gait) independent  -CW     Distance in Feet (Gait) 800'  -CW     Deviations/Abnormal Patterns (Gait) gait speed decreased  -CW     Comment, (Gait/Stairs) no loss of balance  -CW            User Key  (r) = Recorded By, (t) = Taken By, (c) = Cosigned By    Initials Name Provider Type    Sunni Gutierres PT Physical Therapist               Obj/Interventions     [facility] Name 01/30/23 1439          Range of Motion Comprehensive    General Range of Motion bilateral lower extremity ROM WFL  -[facility] Name 01/30/23 1439          Strength Comprehensive (MMT)    General Manual Muscle Testing (MMT) Assessment no strength deficits identified  -CW     Row Name 01/30/23 [facility]9          Balance    Balance Assessment standing dynamic balance;standing static balance  -     Static Standing Balance independent  -     Dynamic Standing Balance independent  -CW     [facility] Name 01/30/23 1439          Sensory Assessment (Somatosensory)    Sensory Assessment (Somatosensory) sensation intact  -           User Key  (r) = Recorded By, (t) = Taken By, (c) = Cosigned By    Initials Name Provider Type    Sunni Gutierres PT Physical Therapist               Goals/Plan    No documentation.                Clinical Impression     [facility] Name 01/30/23 1439          Pain    Pretreatment Pain Rating 0/10 - no pain  -     Posttreatment Pain Rating 0/10 - no pain  -CW     Row Name 01/30/23 [facility]9          Plan of Care Review    Plan of Care Reviewed With patient;mother  -     Outcome Evaluation Pt seen for PT eval this PM. Pt admitted with abdominal pain, nausea, vomiting. Pt typically independent: ambulatory, working, driving. Today, she completed all functional mobility independent and ambulated 800' without AD. No balance concerns and no indication for PT in the acute care setting. Encouraged pt to ambulate in hallway throughout the day she she feels up for it. PT to sign off, anticipate return home at d/c.  -[facility] Name 01/30/23 1439          Therapy Assessment/Plan (PT)    Criteria for Skilled Interventions Met (PT) no;no problems identified which require skilled intervention;does not meet criteria for skilled intervention   -CW     Therapy Frequency (PT) evaluation only  -CW     Row Name 01/30/23 1439          Vital Signs    O2 Delivery Pre Treatment room air  -CW     Row Name 01/30/23 1439          Positioning and Restraints    Pre-Treatment Position in bed  -CW     Post Treatment Position bed  -CW     In Bed sitting EOB;with family/caregiver;notified nsg  -CW           User Key  (r) = Recorded By, (t) = Taken By, (c) = Cosigned By    Initials Name Provider Type    Sunni Gutierres PT Physical Therapist               Outcome Measures     Row Name 01/30/23 1441          How much help from another person do you currently need...    Turning from your back to your side while in flat bed without using bedrails? 4  -CW     Moving from lying on back to sitting on the side of a flat bed without bedrails? 4  -CW     Moving to and from a bed to a chair (including a wheelchair)? 4  -CW     Standing up from a chair using your arms (e.g., wheelchair, bedside chair)? 4  -CW     Climbing 3-5 steps with a railing? 4  -CW     To walk in hospital room? 4  -CW     AM-PAC 6 Clicks Score (PT) 24  -CW     Highest level of mobility 8 --> Walked 250 feet or more  -CW     Row Name 01/30/23 1441          Functional Assessment    Outcome Measure Options AM-PAC 6 Clicks Basic Mobility (PT)  -CW           User Key  (r) = Recorded By, (t) = Taken By, (c) = Cosigned By    Initials Name Provider Type    Sunni Gutierres PT Physical Therapist              Physical Therapy Education     Title: PT OT SLP Therapies (In Progress)     Topic: Physical Therapy (In Progress)     Point: Mobility training (Done)     Learning Progress Summary           Patient Acceptance, E, VU by ANDREA at 1/30/2023 1441                   Point: Home exercise program (Not Started)     Learner Progress:  Not documented in this visit.          Point: Body mechanics (Not Started)     Learner Progress:  Not documented in this visit.          Point: Precautions (Not Started)     Learner  Progress:  Not documented in this visit.                      User Key     Initials Effective Dates Name Provider Type Discipline    CW 12/13/22 -  Sunni Wolfe PT Physical Therapist PT              PT Recommendation and Plan     Plan of Care Reviewed With: patient, mother  Outcome Evaluation: Pt seen for PT eval this PM. Pt admitted with abdominal pain, nausea, vomiting. Pt typically independent: ambulatory, working, driving. Today, she completed all functional mobility independent and ambulated 800' without AD. No balance concerns and no indication for PT in the acute care setting. Encouraged pt to ambulate in hallway throughout the day she she feels up for it. PT to sign off, anticipate return home at d/c.     Time Calculation:    PT Charges     Row Name 01/30/23 1438             Time Calculation    Start Time 1422  -CW      Stop Time 1435  -CW      Time Calculation (min) 13 min  -CW      PT Received On 01/30/23  -CW         Time Calculation- PT    Total Timed Code Minutes- PT 10 minute(s)  -CW         Timed Charges    58211 - Gait Training Minutes  10  -CW         Total Minutes    Timed Charges Total Minutes 10  -CW       Total Minutes 10  -CW            User Key  (r) = Recorded By, (t) = Taken By, (c) = Cosigned By    Initials Name Provider Type    CW Sunni Wolfe PT Physical Therapist              Therapy Charges for Today     Code Description Service Date Service Provider Modifiers Qty    69304811078 HC GAIT TRAINING EA 15 MIN 1/30/2023 Sunni Wolfe, PT GP 1    78648750216 HC PT EVAL LOW COMPLEXITY 3 1/30/2023 Sunni Wolfe, PT GP 1          PT G-Codes  Outcome Measure Options: AM-PAC 6 Clicks Basic Mobility (PT)  AM-PAC 6 Clicks Score (PT): 24    PT Discharge Summary  Anticipated Discharge Disposition (PT): home    Sunni Wolfe PT  1/30/2023

## 2023-01-30 NOTE — PROGRESS NOTES
[facility]  Inpatient Progress Note    Reason for Follow-up: Abdominal pain, rectal bleeding, diarrhea     Subjective     Interval History:   Suppository without good results. Reports small, hard bowel movement this morning.  She also endorses nausea and vomiting.      Current Facility-Administered Medications:   •  ampicillin-sulbactam (UNASYN) 3 g in sodium chloride 0.9 % 100 mL IVPB-VTB, 3 g, Intravenous, Q6H, Gideon Hope MD, 3 g at 01/30/23 1148  •  bisacodyl (DULCOLAX) suppository 10 mg, 10 mg, Rectal, Daily, Rodo Ramirez MD, 10 mg at 01/30/23 1147  •  cetirizine (zyrTEC) tablet 10 mg, 10 mg, Oral, Daily, Gideon Hope MD  •  docusate sodium (COLACE) capsule 100 mg, 100 mg, Oral, BID, Gideon Hope MD, 100 mg at 01/30/23 1147  •  HYDROcodone-acetaminophen (NORCO) 5-325 MG per tablet 1 tablet, 1 tablet, Oral, Q6H PRN, Gideon Hope MD, 1 tablet at 01/30/23 0132  •  losartan (COZAAR) tablet 25 mg, 25 mg, Oral, Q24H, Gideon Hope MD, 25 mg at 01/30/23 1147  •  mesalamine (LIALDA) EC tablet 4.8 g, 4.8 g, Oral, Daily With Breakfast, Gideon Hope MD  •  multivitamin (THERAGRAN) tablet 1 tablet, 1 tablet, Oral, Daily, Gideon Hope MD, 1 tablet at 01/29/23 0956  •  ondansetron (ZOFRAN) injection 4 mg, 4 mg, Intravenous, Q6H PRN, Gideon Hope MD, 4 mg at 01/30/23 0812  •  pantoprazole (PROTONIX) EC tablet 40 mg, 40 mg, Oral, BID AC, Gideon Hope MD, 40 mg at 01/29/23 1648  •  polyethylene glycol (MIRALAX) packet 17 g, 17 g, Oral, BID, Gideon Hope MD, 17 g at 01/30/23 1147  •  sodium chloride 0.9 % flush 10 mL, 10 mL, Intravenous, PRN, Morris Archer II, MD  •  sodium chloride 0.9 % with KCl 20 mEq/L infusion, 75 mL/hr, Intravenous, Continuous, Gideon Hope MD, Last Rate: 75 mL/hr at 01/30/23 0922, 75 mL/hr at 01/30/23 0922  Review of Systems:    Constitutional: No fevers, chills, sweats   Respiratory: No shortness of breath, cough   Cardiovascular: No Chest pain, palpitations    Gastrointestinal: + nausea, vomiting, left lower quadrant pain  Genitourinary: No hematuria, dysuria    Objective     Vital Signs  Temp:  [98 °F (36.7 °C)-99.4 °F (37.4 °C)] 98 °F (36.7 °C)  Heart Rate:  [67-82] 70  Resp:  [14-16] 14  BP: (132-172)/(82-94) 144/82  Body mass index is 32.89 kg/m².    Intake/Output Summary (Last 24 hours) at 1/30/2023 1201  Last data filed at 1/30/2023 0922  Gross per 24 hour   Intake 997 ml   Output 1300 ml   Net -303 ml     I/O this shift:  In: 897 [I.V.:897]  Out: 200 [Urine:200]     Physical Exam:   General: Awake, alert and conversive. No acute distress.   Eyes: Normal lids and lashes, no scleral icterus.   Neck: Supple and symmetric. Trachea midline.    Skin: Warm and dry, not jaundiced.    Cardiovascular: Regular rate and rhythm. No murmur.   Pulm: Quiet, even, nonlabored breathing. Clear to auscultation bilaterally.    Abdomen: Soft, nondistended, left lower quadrant tenderness. No rebound or guarding. Bowel sounds present in all 4 quadrants.   Extremities: No rashes or edema.   Psychiatric: Appropriate mood and affect. Cooperative.     Results Review:     I reviewed the patient's new clinical results.    Results from last 7 days   Lab Units 01/30/23  0531 01/29/23  0756 01/28/23  0629   WBC 10*3/mm3 14.40* 10.79 15.21*   HEMOGLOBIN g/dL 13.0 12.1 13.1   HEMATOCRIT % 39.6 36.3 40.3   PLATELETS 10*3/mm3 239 245 287     Results from last 7 days   Lab Units 01/30/23  0531 01/29/23  0756 01/28/23  0629 01/27/23  1445   SODIUM mmol/L 140 142 139 144   POTASSIUM mmol/L 3.3* 2.9* 3.4* 3.4*   CHLORIDE mmol/L 107 109* 106 104   CO2 mmol/L 25.3 24.0 25.4 27.4   BUN mg/dL 3* 5* 8 8   CREATININE mg/dL 0.60 0.53* 0.70 0.84   CALCIUM mg/dL 8.7 8.5* 9.3 10.1   BILIRUBIN mg/dL  --  0.4  --  0.5   ALK PHOS U/L  --  49  --  67   ALT (SGPT) U/L  --  5  --  8   AST (SGOT) U/L  --  12  --  15   GLUCOSE mg/dL 78 78 73 110*         Lab Results   Lab Value Date/Time    LIPASE 20 01/27/2023 5098     LIPASE 16 01/12/2023 1601       Radiology:  CT Abdomen Pelvis Without Contrast   Final Result   1. Colitis associated abnormal colonic dilatation involving the   ascending colon, hepatic flexure, proximal transverse colon where there   is also pericolonic inflammation. There is a second area of more   extensive abnormal wall thickening involving the proximal to mid sigmoid   colon which is decompressed with a greater degree of surrounding   inflammation. There is a potential associated obstructing component at   this location as the colon proximal to the sigmoid colon is dilated.   When compared to the prior exam 15 days ago, the right colitis is new   and what appears to represent colitis of the proximal sigmoid colon is   not significantly changed. The lack of interval change following   treatment and the presence of an obstructive component raises the   likelihood that there could be an associated neoplastic process at this   location though the distinction is difficult to make with CT and there   remains a great deal of inflammation surrounding the proximal sigmoid   colon.   2. Apparently, this exam was performed with intravenous contrast though   there is no contrast demonstrated on this exam. This suggests that there   has been extravasation of contrast into the arm and that could be   confirmed with physical exam or x-ray of the arm.   3. Previous gastric surgery. Small hiatal hernia.       Discussed with Dr. Cruz in the emergency department on 01/27/2023 at   5:50 PM.        Radiation dose reduction techniques were utilized, including automated   exposure control and exposure modulation based on body size.       This report was finalized on 1/27/2023 6:43 PM by Dr. Donovan Valencia M.D.              Assessment & Plan     Patient Active Problem List   Diagnosis   • Left sided colitis with complication (HCC)   • Colitis       Assessment:  1. Abdominal pain  2. Diarrhea  3. Rectal bleeding  4. CT imaging  with colitis      Plan:  · GI PCR and Cdiff negative. High index of suspicion for inflammatory bowel disease  · Scheduled antiemetics.  · Mineral oil enema.   · Diet as tolerated.  · As long as we can get her nausea under control, we will plan to attempt bowel preparation tomorrow.    I discussed the patient's findings and my recommendations with patient, nursing staff and Dr. Guerrero.    [place]on dictation used throughout this note.            FATIMAH Berrios  [facility]  [address] [place] [address]  Office: [phone]

## 2023-01-31 PROBLEM — R11.2 NAUSEA AND VOMITING: Status: ACTIVE | Noted: 2023-01-27

## 2023-01-31 LAB
ANION GAP SERPL CALCULATED.3IONS-SCNC: 11 MMOL/L (ref 5–15)
BASOPHILS # BLD AUTO: 0.03 10*3/MM3 (ref 0–0.2)
BASOPHILS NFR BLD AUTO: 0.2 % (ref 0–1.5)
BUN SERPL-MCNC: 4 MG/DL (ref 8–23)
BUN/CREAT SERPL: 6.8 (ref 7–25)
CALCIUM SPEC-SCNC: 8.9 MG/DL (ref 8.6–10.5)
CALPROTECTIN STL-MCNT: 269 UG/G (ref 0–120)
CHLORIDE SERPL-SCNC: 102 MMOL/L (ref 98–107)
CO2 SERPL-SCNC: 23 MMOL/L (ref 22–29)
CREAT SERPL-MCNC: 0.59 MG/DL (ref 0.57–1)
DEPRECATED RDW RBC AUTO: 40 FL (ref 37–54)
EGFRCR SERPLBLD CKD-EPI 2021: 101.4 ML/MIN/1.73
EOSINOPHIL # BLD AUTO: 0.04 10*3/MM3 (ref 0–0.4)
EOSINOPHIL NFR BLD AUTO: 0.2 % (ref 0.3–6.2)
ERYTHROCYTE [DISTWIDTH] IN BLOOD BY AUTOMATED COUNT: 12.8 % (ref 12.3–15.4)
GLUCOSE SERPL-MCNC: 79 MG/DL (ref 65–99)
HCT VFR BLD AUTO: 42.8 % (ref 34–46.6)
HGB BLD-MCNC: 13.7 G/DL (ref 12–15.9)
IMM GRANULOCYTES # BLD AUTO: 0.13 10*3/MM3 (ref 0–0.05)
IMM GRANULOCYTES NFR BLD AUTO: 0.7 % (ref 0–0.5)
LYMPHOCYTES # BLD AUTO: 1.99 10*3/MM3 (ref 0.7–3.1)
LYMPHOCYTES NFR BLD AUTO: 11.1 % (ref 19.6–45.3)
MCH RBC QN AUTO: 27.9 PG (ref 26.6–33)
MCHC RBC AUTO-ENTMCNC: 32 G/DL (ref 31.5–35.7)
MCV RBC AUTO: 87.2 FL (ref 79–97)
MONOCYTES # BLD AUTO: 0.58 10*3/MM3 (ref 0.1–0.9)
MONOCYTES NFR BLD AUTO: 3.2 % (ref 5–12)
NEUTROPHILS NFR BLD AUTO: 15.23 10*3/MM3 (ref 1.7–7)
NEUTROPHILS NFR BLD AUTO: 84.6 % (ref 42.7–76)
NRBC BLD AUTO-RTO: 0 /100 WBC (ref 0–0.2)
PLATELET # BLD AUTO: 283 10*3/MM3 (ref 140–450)
PMV BLD AUTO: 10.7 FL (ref 6–12)
POTASSIUM SERPL-SCNC: 3 MMOL/L (ref 3.5–5.2)
RBC # BLD AUTO: 4.91 10*6/MM3 (ref 3.77–5.28)
SODIUM SERPL-SCNC: 136 MMOL/L (ref 136–145)
WBC NRBC COR # BLD: 18 10*3/MM3 (ref 3.4–10.8)

## 2023-01-31 PROCEDURE — 25010000002 ONDANSETRON PER 1 MG: Performed by: PHYSICIAN ASSISTANT

## 2023-01-31 PROCEDURE — 25010000002 AMPICILLIN-SULBACTAM PER 1.5 G: Performed by: HOSPITALIST

## 2023-01-31 PROCEDURE — 25010000002 PROCHLORPERAZINE 10 MG/2ML SOLUTION: Performed by: PHYSICIAN ASSISTANT

## 2023-01-31 PROCEDURE — 80048 BASIC METABOLIC PNL TOTAL CA: CPT | Performed by: HOSPITALIST

## 2023-01-31 PROCEDURE — 25010000002 SODIUM CHLORIDE 0.9 % WITH KCL 20 MEQ 20-0.9 MEQ/L-% SOLUTION: Performed by: HOSPITALIST

## 2023-01-31 PROCEDURE — 85025 COMPLETE CBC W/AUTO DIFF WBC: CPT | Performed by: HOSPITALIST

## 2023-01-31 PROCEDURE — 25010000002 HYDROMORPHONE PER 4 MG: Performed by: HOSPITALIST

## 2023-01-31 PROCEDURE — 99232 SBSQ HOSP IP/OBS MODERATE 35: CPT | Performed by: PHYSICIAN ASSISTANT

## 2023-01-31 RX ORDER — POTASSIUM CHLORIDE 1.5 G/1.77G
40 POWDER, FOR SOLUTION ORAL ONCE
Status: DISCONTINUED | OUTPATIENT
Start: 2023-01-31 | End: 2023-02-02

## 2023-01-31 RX ORDER — HYDROMORPHONE HYDROCHLORIDE 1 MG/ML
0.5 INJECTION, SOLUTION INTRAMUSCULAR; INTRAVENOUS; SUBCUTANEOUS 4 TIMES DAILY PRN
Status: DISCONTINUED | OUTPATIENT
Start: 2023-01-31 | End: 2023-02-03

## 2023-01-31 RX ORDER — POLYETHYLENE GLYCOL 3350 17 G/17G
0.5 POWDER, FOR SOLUTION ORAL EVERY 12 HOURS
Status: COMPLETED | OUTPATIENT
Start: 2023-01-31 | End: 2023-02-01

## 2023-01-31 RX ORDER — MINERAL OIL 100 G/100G
1 OIL RECTAL ONCE
Status: DISCONTINUED | OUTPATIENT
Start: 2023-01-31 | End: 2023-02-02

## 2023-01-31 RX ORDER — POTASSIUM CHLORIDE 750 MG/1
40 TABLET, FILM COATED, EXTENDED RELEASE ORAL ONCE
Status: COMPLETED | OUTPATIENT
Start: 2023-01-31 | End: 2023-01-31

## 2023-01-31 RX ORDER — LOSARTAN POTASSIUM 50 MG/1
50 TABLET ORAL
Status: DISCONTINUED | OUTPATIENT
Start: 2023-02-01 | End: 2023-02-01

## 2023-01-31 RX ORDER — HYDRALAZINE HYDROCHLORIDE 20 MG/ML
10 INJECTION INTRAMUSCULAR; INTRAVENOUS EVERY 4 HOURS PRN
Status: DISCONTINUED | OUTPATIENT
Start: 2023-01-31 | End: 2023-02-02

## 2023-01-31 RX ORDER — BISACODYL 5 MG/1
20 TABLET, DELAYED RELEASE ORAL ONCE
Status: COMPLETED | OUTPATIENT
Start: 2023-01-31 | End: 2023-01-31

## 2023-01-31 RX ORDER — PROCHLORPERAZINE EDISYLATE 5 MG/ML
5 INJECTION INTRAMUSCULAR; INTRAVENOUS EVERY 6 HOURS PRN
Status: DISCONTINUED | OUTPATIENT
Start: 2023-01-31 | End: 2023-02-03

## 2023-01-31 RX ADMIN — ONDANSETRON 4 MG: 2 INJECTION INTRAMUSCULAR; INTRAVENOUS at 00:07

## 2023-01-31 RX ADMIN — ONDANSETRON 4 MG: 2 INJECTION INTRAMUSCULAR; INTRAVENOUS at 16:48

## 2023-01-31 RX ADMIN — POTASSIUM CHLORIDE AND SODIUM CHLORIDE 75 ML/HR: 900; 150 INJECTION, SOLUTION INTRAVENOUS at 12:18

## 2023-01-31 RX ADMIN — Medication 1 TABLET: at 09:15

## 2023-01-31 RX ADMIN — AMPICILLIN SODIUM AND SULBACTAM SODIUM 3 G: 2; 1 INJECTION, POWDER, FOR SOLUTION INTRAMUSCULAR; INTRAVENOUS at 18:26

## 2023-01-31 RX ADMIN — LOSARTAN POTASSIUM 25 MG: 25 TABLET, FILM COATED ORAL at 09:15

## 2023-01-31 RX ADMIN — ONDANSETRON 4 MG: 2 INJECTION INTRAMUSCULAR; INTRAVENOUS at 23:03

## 2023-01-31 RX ADMIN — POTASSIUM CHLORIDE 40 MEQ: 750 TABLET, EXTENDED RELEASE ORAL at 18:37

## 2023-01-31 RX ADMIN — PANTOPRAZOLE SODIUM 40 MG: 40 TABLET, DELAYED RELEASE ORAL at 16:48

## 2023-01-31 RX ADMIN — AMPICILLIN SODIUM AND SULBACTAM SODIUM 3 G: 2; 1 INJECTION, POWDER, FOR SOLUTION INTRAMUSCULAR; INTRAVENOUS at 12:09

## 2023-01-31 RX ADMIN — PANTOPRAZOLE SODIUM 40 MG: 40 TABLET, DELAYED RELEASE ORAL at 07:56

## 2023-01-31 RX ADMIN — ONDANSETRON 4 MG: 2 INJECTION INTRAMUSCULAR; INTRAVENOUS at 09:16

## 2023-01-31 RX ADMIN — AMPICILLIN SODIUM AND SULBACTAM SODIUM 3 G: 2; 1 INJECTION, POWDER, FOR SOLUTION INTRAMUSCULAR; INTRAVENOUS at 23:07

## 2023-01-31 RX ADMIN — POLYETHYLENE GLYCOL 3350 0.5 BOTTLE: 17 POWDER, FOR SOLUTION ORAL at 18:27

## 2023-01-31 RX ADMIN — DOCUSATE SODIUM 100 MG: 100 CAPSULE, LIQUID FILLED ORAL at 09:16

## 2023-01-31 RX ADMIN — HYDROMORPHONE HYDROCHLORIDE 0.5 MG: 1 INJECTION, SOLUTION INTRAMUSCULAR; INTRAVENOUS; SUBCUTANEOUS at 22:59

## 2023-01-31 RX ADMIN — AMPICILLIN SODIUM AND SULBACTAM SODIUM 3 G: 2; 1 INJECTION, POWDER, FOR SOLUTION INTRAMUSCULAR; INTRAVENOUS at 04:02

## 2023-01-31 RX ADMIN — AMPICILLIN SODIUM AND SULBACTAM SODIUM 3 G: 2; 1 INJECTION, POWDER, FOR SOLUTION INTRAMUSCULAR; INTRAVENOUS at 00:07

## 2023-01-31 RX ADMIN — HYDROMORPHONE HYDROCHLORIDE 0.5 MG: 1 INJECTION, SOLUTION INTRAMUSCULAR; INTRAVENOUS; SUBCUTANEOUS at 14:51

## 2023-01-31 RX ADMIN — Medication 10 MG: at 09:15

## 2023-01-31 RX ADMIN — BISACODYL 20 MG: 5 TABLET ORAL at 16:48

## 2023-01-31 RX ADMIN — ONDANSETRON 4 MG: 2 INJECTION INTRAMUSCULAR; INTRAVENOUS at 04:02

## 2023-01-31 RX ADMIN — HYDROCODONE BITARTRATE AND ACETAMINOPHEN 1 TABLET: 5; 325 TABLET ORAL at 03:01

## 2023-01-31 RX ADMIN — PROCHLORPERAZINE EDISYLATE 5 MG: 5 INJECTION INTRAMUSCULAR; INTRAVENOUS at 12:36

## 2023-01-31 NOTE — PLAN OF CARE
Goal Outcome Evaluation:  Plan of Care Reviewed With: patient           Outcome Evaluation: VSS. C/O abdominal pain, 10/10, refusing to take oral meds. Zofran scheduled. Patient c/o nausea, no emesis noted. Mineral enema given, resulting in a small BM. For colonoscopy per gastro

## 2023-01-31 NOTE — PLAN OF CARE
Goal Outcome Evaluation:  Plan of Care Reviewed With: patient           Outcome Evaluation: VSS, norco given x2 for c/o abd pain, scheduled zofran for nausea, IVF infusing, IV abx given, voiding freely, no BM this ahift, family visited earlier in the shift, slept between care, will continue to monitor

## 2023-01-31 NOTE — PLAN OF CARE
Goal Outcome Evaluation:  Plan of Care Reviewed With: patient        Progress: no change  Outcome Evaluation: Hypertensive at times, other VSS, pt refused Norco, dilaudid provided slight relief, zofran and compazine for nausea, IVFs and IV abx as ordered, one small formed BM, voiding without issue, slept on and off, pt educated on plan of care, up in chair for part of shift, encouraged ambulation.

## 2023-01-31 NOTE — PROGRESS NOTES
[facility]  Inpatient Progress Note    Reason for Follow-up:  Abdominal pain, rectal bleeding, diarrhea     Subjective     Interval History:   Nausea persists despite scheduled Zofran. Small bm following enema yesterday but none since. Diffuse abdominal discomfort.  Refusing p.o. medications.      Current Facility-Administered Medications:   •  ampicillin-sulbactam (UNASYN) 3 g in sodium chloride 0.9 % 100 mL IVPB-VTB, 3 g, Intravenous, Q6H, Gideon Hope MD, 3 g at 01/31/23 0402  •  bisacodyl (DULCOLAX) suppository 10 mg, 10 mg, Rectal, Daily, Rodo Ramirez MD, 10 mg at 01/31/23 0915  •  cetirizine (zyrTEC) tablet 10 mg, 10 mg, Oral, Daily, Gideon Hope MD  •  docusate sodium (COLACE) capsule 100 mg, 100 mg, Oral, BID, Gideon Hope MD, 100 mg at 01/31/23 0916  •  HYDROcodone-acetaminophen (NORCO) 5-325 MG per tablet 1 tablet, 1 tablet, Oral, Q6H PRN, Gideon Hope MD, 1 tablet at 01/31/23 0301  •  losartan (COZAAR) tablet 25 mg, 25 mg, Oral, Q24H, Gideon Hope MD, 25 mg at 01/31/23 0915  •  mesalamine (LIALDA) EC tablet 4.8 g, 4.8 g, Oral, Daily With Breakfast, Gideon Hope MD  •  multivitamin (THERAGRAN) tablet 1 tablet, 1 tablet, Oral, Daily, Gideon Hope MD, 1 tablet at 01/31/23 0915  •  ondansetron (ZOFRAN) injection 4 mg, 4 mg, Intravenous, Q6H, Raisa Bond PA, 4 mg at 01/31/23 0916  •  pantoprazole (PROTONIX) EC tablet 40 mg, 40 mg, Oral, BID AC, Gideon Hope MD, 40 mg at 01/31/23 0756  •  polyethylene glycol (MIRALAX) packet 17 g, 17 g, Oral, BID, Gideon Hope MD, 17 g at 01/30/23 1147  •  sodium chloride 0.9 % flush 10 mL, 10 mL, Intravenous, PRN, Morris Archer II, MD  •  sodium chloride 0.9 % with KCl 20 mEq/L infusion, 75 mL/hr, Intravenous, Continuous, Gideon Hope MD, Last Rate: 75 mL/hr at 01/30/23 1842, 75 mL/hr at 01/30/23 1842  Review of Systems:    Constitutional: No fevers, chills, sweats   Respiratory: No shortness of breath, cough    Cardiovascular: No Chest pain, palpitations   Gastrointestinal: +nasuea, constipation  Genitourinary: No hematuria, dysuria    Objective     Vital Signs  Temp:  [97.8 °F (36.6 °C)-99.5 °F (37.5 °C)] 98.8 °F (37.1 °C)  Heart Rate:  [64-81] 64  Resp:  [16] 16  BP: (162-194)/() 180/101  Body mass index is 32.89 kg/m².    Intake/Output Summary (Last 24 hours) at 1/31/2023 1013  Last data filed at 1/31/2023 0941  Gross per 24 hour   Intake 850 ml   Output 400 ml   Net 450 ml     I/O this shift:  In: 30 [P.O.:30]  Out: -      Physical Exam:   General: Awake, alert and conversive. No acute distress.   Eyes: Normal lids and lashes, no scleral icterus.   Neck: Supple and symmetric. Trachea midline.    Skin: Warm and dry, not jaundiced.    Cardiovascular: Regular rate and rhythm. No murmur.   Pulm: Quiet, even, nonlabored breathing. Clear to auscultation bilaterally.    Abdomen: Soft, nondistended, very mild tenderness to palpation. No rebound or guarding. Bowel sounds present in all 4 quadrants.   Extremities: No rashes or edema.   Psychiatric: Appropriate mood and affect. Cooperative.     Results Review:     I reviewed the patient's new clinical results.    Results from last 7 days   Lab Units 01/31/23  0605 01/30/23  0531 01/29/23  0756   WBC 10*3/mm3 18.00* 14.40* 10.79   HEMOGLOBIN g/dL 13.7 13.0 12.1   HEMATOCRIT % 42.8 39.6 36.3   PLATELETS 10*3/mm3 283 239 245     Results from last 7 days   Lab Units 01/31/23  0605 01/30/23  0531 01/29/23  0756 01/28/23  0629 01/27/23  1445   SODIUM mmol/L 136 140 142   < > 144   POTASSIUM mmol/L 3.0* 3.3* 2.9*   < > 3.4*   CHLORIDE mmol/L 102 107 109*   < > 104   CO2 mmol/L 23.0 25.3 24.0   < > 27.4   BUN mg/dL 4* 3* 5*   < > 8   CREATININE mg/dL 0.59 0.60 0.53*   < > 0.84   CALCIUM mg/dL 8.9 8.7 8.5*   < > 10.1   BILIRUBIN mg/dL  --   --  0.4  --  0.5   ALK PHOS U/L  --   --  49  --  67   ALT (SGPT) U/L  --   --  5  --  8   AST (SGOT) U/L  --   --  12  --  15   GLUCOSE mg/dL  79 78 78   < > 110*    < > = values in this interval not displayed.         Lab Results   Lab Value Date/Time    LIPASE 20 01/27/2023 1445    LIPASE 16 01/12/2023 1601       Radiology:  CT Abdomen Pelvis Without Contrast   Final Result   1. Colitis associated abnormal colonic dilatation involving the   ascending colon, hepatic flexure, proximal transverse colon where there   is also pericolonic inflammation. There is a second area of more   extensive abnormal wall thickening involving the proximal to mid sigmoid   colon which is decompressed with a greater degree of surrounding   inflammation. There is a potential associated obstructing component at   this location as the colon proximal to the sigmoid colon is dilated.   When compared to the prior exam 15 days ago, the right colitis is new   and what appears to represent colitis of the proximal sigmoid colon is   not significantly changed. The lack of interval change following   treatment and the presence of an obstructive component raises the   likelihood that there could be an associated neoplastic process at this   location though the distinction is difficult to make with CT and there   remains a great deal of inflammation surrounding the proximal sigmoid   colon.   2. Apparently, this exam was performed with intravenous contrast though   there is no contrast demonstrated on this exam. This suggests that there   has been extravasation of contrast into the arm and that could be   confirmed with physical exam or x-ray of the arm.   3. Previous gastric surgery. Small hiatal hernia.       Discussed with Dr. Cruz in the emergency department on 01/27/2023 at   5:50 PM.        Radiation dose reduction techniques were utilized, including automated   exposure control and exposure modulation based on body size.       This report was finalized on 1/27/2023 6:43 PM by Dr. Donovan Valencia M.D.              Assessment & Plan     Patient Active Problem List   Diagnosis    • Left sided colitis with complication (HCC)   • Colitis       Assessment:  1. Abdominal pain  2. Diarrhea-resolved  3. Persistent nausea  4. Rectal bleeding  5. CT imaging with colitis      Plan:  · Trial of Compazine.  · While she reports nausea, she has been able to intake liquids without incident.  · Clear liquid diet.  · Bowel preparation later this afternoon.  Plan for bidirectional endoscopy tomorrow with Dr. Guerrero.    I discussed the patient's findings and my recommendations with patient, nursing staff and Dr. Beal.    Dragon dictation used throughout this note.            FATIMAH Berrios  [facility]  [address] [place] [address]  Office: [phone]

## 2023-01-31 NOTE — PROGRESS NOTES
"Daily progress note    Primary care physician  Dr. Delgado    Chief complaint  Doing same with no new complaint but still with abdominal pain and nausea no vomiting diarrhea no BM yet.  Patient passing gas and tolerating diet.    History of present illness  62-year-old female with history of hypertension presented to [facility] emergency room with left lower quadrant abdominal pain for last 1 month.  Patient also have occasional loose stools.  Patient denies any nausea vomiting.  Patient recently diagnosed with colitis and treated with oral antibiotics without any improvement.  Patient has noticed blood in the stools.  Patient evaluated in ER with CT abdominal pelvis which shows worsening colitis and failed outpatient treatment admit for management.  Patient has no fever chills chest pain shortness of breath with sweats weight loss or weight gain.     REVIEW OF SYSTEMS  Unremarkable except severe abdominal pain and nausea     PHYSICAL EXAM  Blood pressure 174/98, pulse 64, temperature 98.8 °F (37.1 °C), temperature source Oral, resp. rate 16, height 154.9 cm (60.98\"), weight 78.9 kg (174 lb), SpO2 98 %.    GENERAL:  Awake and alert in no acute distress  HEENT:  Unremarkable  NECK:  Supple  CV: regular rhythm, normal rate  RESPIRATORY: normal effort, clear to auscultation bilaterally  ABDOMEN: soft, left lower quadrant tenderness bowel sounds positive  MUSCULOSKELETAL: no deformity  NEURO: alert, moves all extremities, follows commands  PSYCH:  calm, cooperative  SKIN: warm, dry     LAB RESULTS  Lab Results (last 24 hours)     Procedure Component Value Units Date/Time    Basic Metabolic Panel [[id number]]  (Abnormal) Collected: 01/31/23 0605    Specimen: Blood Updated: 01/31/23 0723     Glucose 79 mg/dL      BUN 4 mg/dL      Creatinine 0.59 mg/dL      Sodium 136 mmol/L      Potassium 3.0 mmol/L      Chloride 102 mmol/L      CO2 23.0 mmol/L      Calcium 8.9 mg/dL      BUN/Creatinine Ratio 6.8     Anion Gap 11.0 " mmol/L      eGFR 101.4 mL/min/1.73     Narrative:      GFR Normal >60  Chronic Kidney Disease <60  Kidney Failure <15      CBC & Differential [[id number]]  (Abnormal) Collected: 01/31/23 0605    Specimen: Blood Updated: 01/31/23 0703    Narrative:      The following orders were created for panel order CBC & Differential.  Procedure                               Abnormality         Status                     ---------                               -----------         ------                     CBC Auto Differential[[id number]]        Abnormal            Final result                 Please view results for these tests on the individual orders.    CBC Auto Differential [[id number]]  (Abnormal) Collected: 01/31/23 0605    Specimen: Blood Updated: 01/31/23 0703     WBC 18.00 10*3/mm3      RBC 4.91 10*6/mm3      Hemoglobin 13.7 g/dL      Hematocrit 42.8 %      MCV 87.2 fL      MCH 27.9 pg      MCHC 32.0 g/dL      RDW 12.8 %      RDW-SD 40.0 fl      MPV 10.7 fL      Platelets 283 10*3/mm3      Neutrophil % 84.6 %      Lymphocyte % 11.1 %      Monocyte % 3.2 %      Eosinophil % 0.2 %      Basophil % 0.2 %      Immature Grans % 0.7 %      Neutrophils, Absolute 15.23 10*3/mm3      Lymphocytes, Absolute 1.99 10*3/mm3      Monocytes, Absolute 0.58 10*3/mm3      Eosinophils, Absolute 0.04 10*3/mm3      Basophils, Absolute 0.03 10*3/mm3      Immature Grans, Absolute 0.13 10*3/mm3      nRBC 0.0 /100 WBC     Blood Culture - Blood, Arm, Right [[id number]]  (Normal) Collected: 01/27/23 1808    Specimen: Blood from Arm, Right Updated: 01/30/23 1831     Blood Culture No growth at 3 days    Blood Culture - Blood, Arm, Left [[id number]]  (Normal) Collected: 01/27/23 1808    Specimen: Blood from Arm, Left Updated: 01/30/23 1831     Blood Culture No growth at 3 days        Imaging Results (Last 24 Hours)     ** No results found for the last 24 hours. **          Current Facility-Administered Medications:   •  ampicillin-sulbactam (UNASYN) 3 g  in sodium chloride 0.9 % 100 mL IVPB-VTB, 3 g, Intravenous, Q6H, Gideon Hope MD, 3 g at 01/31/23 1209  •  bisacodyl (DULCOLAX) EC tablet 20 mg, 20 mg, Oral, Once, Raisa Bond, PA  •  bisacodyl (DULCOLAX) suppository 10 mg, 10 mg, Rectal, Daily, Rodo Ramirez MD, 10 mg at 01/31/23 0915  •  cetirizine (zyrTEC) tablet 10 mg, 10 mg, Oral, Daily, Gideon Hope MD  •  docusate sodium (COLACE) capsule 100 mg, 100 mg, Oral, BID, Gideon Hope MD, 100 mg at 01/31/23 0916  •  HYDROcodone-acetaminophen (NORCO) 5-325 MG per tablet 1 tablet, 1 tablet, Oral, Q6H PRN, Gideon Hope MD, 1 tablet at 01/31/23 0301  •  losartan (COZAAR) tablet 25 mg, 25 mg, Oral, Q24H, Gideon Hope MD, 25 mg at 01/31/23 0915  •  mesalamine (LIALDA) EC tablet 4.8 g, 4.8 g, Oral, Daily With Breakfast, Gideon Hope MD  •  mineral oil enema 1 enema, 1 enema, Rectal, Once, Raisa Bond, PA  •  multivitamin (THERAGRAN) tablet 1 tablet, 1 tablet, Oral, Daily, Gideon Hope MD, 1 tablet at 01/31/23 0915  •  ondansetron (ZOFRAN) injection 4 mg, 4 mg, Intravenous, Q6H, Raisa Bond, PA, 4 mg at 01/31/23 0916  •  pantoprazole (PROTONIX) EC tablet 40 mg, 40 mg, Oral, BID AC, Gideon Hope MD, 40 mg at 01/31/23 0756  •  polyethylene glycol (MIRALAX) packet 17 g, 17 g, Oral, BID, Gideon Hope MD, 17 g at 01/30/23 1147  •  polyethylene glycol (MIRALAX) powder 0.5 bottle, 0.5 bottle, Oral, Q12H, Raisa Bond PA  •  prochlorperazine (COMPAZINE) injection 5 mg, 5 mg, Intravenous, Q6H PRN, Raisa Bond PA, 5 mg at 01/31/23 1236  •  sodium chloride 0.9 % flush 10 mL, 10 mL, Intravenous, PRN, Morris Archer II, MD  •  sodium chloride 0.9 % with KCl 20 mEq/L infusion, 75 mL/hr, Intravenous, Continuous, Gideon Hope MD, Last Rate: 75 mL/hr at 01/31/23 1218, 75 mL/hr at 01/31/23 1218     ASSESSMENT  Worsening colitis with failed outpatient treatment  Hematochezia  Hypertension  Gastroesophageal reflux  disease    PLAN  CPM  Continue IVF  Continue IV antibiotics  Replace potassium  Bowel program  Colonoscopy in a.m.  Symptomatic treatment of pain and nausea  Gastroenterology consult appreciated  Continue home medications  Stress ulcer DVT prophylaxis  Supportive care  PT OT  Discussed with nursing staff  Follow closely and further recommendation according to hospital course    SANG SINGLETARY MD    Copied text in this note has been reviewed and is accurate as of 01/31/23

## 2023-01-31 NOTE — PAYOR COMM NOTE
"Hali Aleman (62 y.o. Female)       U/D FOR [id number]    CONTACT ROME REYNAGA  P# [phone]  F# [phone]      Date of Birth   1960    Social Security Number       Address   [address] [place] [address]    Home Phone   [phone]    MRN   [id number]       Latter day   None    Marital Status   Single                            Admission Date   1/27/23    Admission Type   Emergency    Admitting Provider   Gideon Hope MD    Attending Provider   Gideon Hope MD    Department, Room/Bed   [facility] [employer], 89/1       Discharge Date       Discharge Disposition       Discharge Destination                               Attending Provider: Gideon Hope MD    Allergies: Sulfa Antibiotics    Isolation: None   Infection: None   Code Status: CPR    Ht: 154.9 cm (60.98\")   Wt: 78.9 kg (174 lb)    Admission Cmt: None   Principal Problem: Colitis [K52.9]                 Active Insurance as of 1/27/2023     Primary Coverage     Payor Plan Insurance Group Employer/Plan Group    [facility] [employer] CR[employer] [id number]     Payor Plan Address Payor Plan Phone Number Payor Plan Fax Number Effective Dates    [id number] [phone]  1/1/2022 - None Entered    [place] [address]       Subscriber Name Subscriber Birth Date Member ID       HALI ALEMAN 1960 [id number]                 Emergency Contacts      (Rel.) Home Phone Work Phone Mobile Phone    SANDRA ALEMAN (Daughter) [phone] -- --    ISAACAMAURI POSADAS (Mother) [phone] -- [phone]               Physician Progress Notes (last 48 hours)      Gideon Hope MD at 01/31/23 1353          Daily progress note    Primary care physician  Dr. Delgado    Chief complaint  Doing same with no new complaint but still with abdominal pain and nausea no vomiting diarrhea no BM yet.  Patient passing gas and tolerating diet.    History of present illness  62-year-old female with history of " "hypertension presented to [facility] emergency room with left lower quadrant abdominal pain for last 1 month.  Patient also have occasional loose stools.  Patient denies any nausea vomiting.  Patient recently diagnosed with colitis and treated with oral antibiotics without any improvement.  Patient has noticed blood in the stools.  Patient evaluated in ER with CT abdominal pelvis which shows worsening colitis and failed outpatient treatment admit for management.  Patient has no fever chills chest pain shortness of breath with sweats weight loss or weight gain.     REVIEW OF SYSTEMS  Unremarkable except severe abdominal pain and nausea     PHYSICAL EXAM  Blood pressure 174/98, pulse 64, temperature 98.8 °F (37.1 °C), temperature source Oral, resp. rate 16, height 154.9 cm (60.98\"), weight 78.9 kg (174 lb), SpO2 98 %.    GENERAL:  Awake and alert in no acute distress  HEENT:  Unremarkable  NECK:  Supple  CV: regular rhythm, normal rate  RESPIRATORY: normal effort, clear to auscultation bilaterally  ABDOMEN: soft, left lower quadrant tenderness bowel sounds positive  MUSCULOSKELETAL: no deformity  NEURO: alert, moves all extremities, follows commands  PSYCH:  calm, cooperative  SKIN: warm, dry     LAB RESULTS  Lab Results (last 24 hours)     Procedure Component Value Units Date/Time    Basic Metabolic Panel [[id number]]  (Abnormal) Collected: 01/31/23 0605    Specimen: Blood Updated: 01/31/23 0723     Glucose 79 mg/dL      BUN 4 mg/dL      Creatinine 0.59 mg/dL      Sodium 136 mmol/L      Potassium 3.0 mmol/L      Chloride 102 mmol/L      CO2 23.0 mmol/L      Calcium 8.9 mg/dL      BUN/Creatinine Ratio 6.8     Anion Gap 11.0 mmol/L      eGFR 101.4 mL/min/1.73     Narrative:      GFR Normal >60  Chronic Kidney Disease <60  Kidney Failure <15      CBC & Differential [[id number]]  (Abnormal) Collected: 01/31/23 0605    Specimen: Blood Updated: 01/31/23 0703    Narrative:      The following orders were created for " panel order CBC & Differential.  Procedure                               Abnormality         Status                     ---------                               -----------         ------                     CBC Auto Differential[[id number]]        Abnormal            Final result                 Please view results for these tests on the individual orders.    CBC Auto Differential [[id number]]  (Abnormal) Collected: 01/31/23 0605    Specimen: Blood Updated: 01/31/23 0703     WBC 18.00 10*3/mm3      RBC 4.91 10*6/mm3      Hemoglobin 13.7 g/dL      Hematocrit 42.8 %      MCV 87.2 fL      MCH 27.9 pg      MCHC 32.0 g/dL      RDW 12.8 %      RDW-SD 40.0 fl      MPV 10.7 fL      Platelets 283 10*3/mm3      Neutrophil % 84.6 %      Lymphocyte % 11.1 %      Monocyte % 3.2 %      Eosinophil % 0.2 %      Basophil % 0.2 %      Immature Grans % 0.7 %      Neutrophils, Absolute 15.23 10*3/mm3      Lymphocytes, Absolute 1.99 10*3/mm3      Monocytes, Absolute 0.58 10*3/mm3      Eosinophils, Absolute 0.04 10*3/mm3      Basophils, Absolute 0.03 10*3/mm3      Immature Grans, Absolute 0.13 10*3/mm3      nRBC 0.0 /100 WBC     Blood Culture - Blood, Arm, Right [[id number]]  (Normal) Collected: 01/27/23 1808    Specimen: Blood from Arm, Right Updated: 01/30/23 1831     Blood Culture No growth at 3 days    Blood Culture - Blood, Arm, Left [[id number]]  (Normal) Collected: 01/27/23 1808    Specimen: Blood from Arm, Left Updated: 01/30/23 1831     Blood Culture No growth at 3 days        Imaging Results (Last 24 Hours)     ** No results found for the last 24 hours. **          Current Facility-Administered Medications:   •  ampicillin-sulbactam (UNASYN) 3 g in sodium chloride 0.9 % 100 mL IVPB-VTB, 3 g, Intravenous, Q6H, Gideon Hope MD, 3 g at 01/31/23 1209  •  bisacodyl (DULCOLAX) EC tablet 20 mg, 20 mg, Oral, Once, Raisa Bond PA  •  bisacodyl (DULCOLAX) suppository 10 mg, 10 mg, Rectal, Daily, Rodo Ramirez MD, 10 mg at  01/31/23 0915  •  cetirizine (zyrTEC) tablet 10 mg, 10 mg, Oral, Daily, Gideon Hope MD  •  docusate sodium (COLACE) capsule 100 mg, 100 mg, Oral, BID, Gideon Hope MD, 100 mg at 01/31/23 0916  •  HYDROcodone-acetaminophen (NORCO) 5-325 MG per tablet 1 tablet, 1 tablet, Oral, Q6H PRN, Gideon Hope MD, 1 tablet at 01/31/23 0301  •  losartan (COZAAR) tablet 25 mg, 25 mg, Oral, Q24H, Gideon Hope MD, 25 mg at 01/31/23 0915  •  mesalamine (LIALDA) EC tablet 4.8 g, 4.8 g, Oral, Daily With Breakfast, Gideon Hope MD  •  mineral oil enema 1 enema, 1 enema, Rectal, Once, Raisa Bond PA  •  multivitamin (THERAGRAN) tablet 1 tablet, 1 tablet, Oral, Daily, Gideon Hope MD, 1 tablet at 01/31/23 0915  •  ondansetron (ZOFRAN) injection 4 mg, 4 mg, Intravenous, Q6H, Raisa Bond PA, 4 mg at 01/31/23 0916  •  pantoprazole (PROTONIX) EC tablet 40 mg, 40 mg, Oral, BID AC, Gideon Hope MD, 40 mg at 01/31/23 0756  •  polyethylene glycol (MIRALAX) packet 17 g, 17 g, Oral, BID, Gideon Hope MD, 17 g at 01/30/23 1147  •  polyethylene glycol (MIRALAX) powder 0.5 bottle, 0.5 bottle, Oral, Q12H, Raisa Bond PA  •  prochlorperazine (COMPAZINE) injection 5 mg, 5 mg, Intravenous, Q6H PRN, Raisa Bond PA, 5 mg at 01/31/23 1236  •  sodium chloride 0.9 % flush 10 mL, 10 mL, Intravenous, PRN, Morris Archer II, MD  •  sodium chloride 0.9 % with KCl 20 mEq/L infusion, 75 mL/hr, Intravenous, Continuous, Gideon Hope MD, Last Rate: 75 mL/hr at 01/31/23 1218, 75 mL/hr at 01/31/23 1218     ASSESSMENT  Worsening colitis with failed outpatient treatment  Hematochezia  Hypertension  Gastroesophageal reflux disease    PLAN  CPM  Continue IVF  Continue IV antibiotics  Replace potassium  Bowel program  Colonoscopy in a.m.  Symptomatic treatment of pain and nausea  Gastroenterology consult appreciated  Continue home medications  Stress ulcer DVT prophylaxis  Supportive care  PT OT  Discussed with nursing  staff  Follow closely and further recommendation according to hospital course    SANG HOPE MD    Copied text in this note has been reviewed and is accurate as of 01/31/23        Electronically signed by Sang Hope MD at 01/31/23 1354     Raisa Bond PA at 01/31/23 1013          [facility]  Inpatient Progress Note    Reason for Follow-up:  Abdominal pain, rectal bleeding, diarrhea     Subjective     Interval History:   Nausea persists despite scheduled Zofran. Small bm following enema yesterday but none since. Diffuse abdominal discomfort.  Refusing p.o. medications.      Current Facility-Administered Medications:   •  ampicillin-sulbactam (UNASYN) 3 g in sodium chloride 0.9 % 100 mL IVPB-VTB, 3 g, Intravenous, Q6H, Sang Hope MD, 3 g at 01/31/23 0402  •  bisacodyl (DULCOLAX) suppository 10 mg, 10 mg, Rectal, Daily, Rodo Ramirez MD, 10 mg at 01/31/23 0915  •  cetirizine (zyrTEC) tablet 10 mg, 10 mg, Oral, Daily, Sang Hope MD  •  docusate sodium (COLACE) capsule 100 mg, 100 mg, Oral, BID, Sang Hope MD, 100 mg at 01/31/23 0916  •  HYDROcodone-acetaminophen (NORCO) 5-325 MG per tablet 1 tablet, 1 tablet, Oral, Q6H PRN, Sang Hope MD, 1 tablet at 01/31/23 0301  •  losartan (COZAAR) tablet 25 mg, 25 mg, Oral, Q24H, Sang Hope MD, 25 mg at 01/31/23 0915  •  mesalamine (LIALDA) EC tablet 4.8 g, 4.8 g, Oral, Daily With Breakfast, Sang Hope MD  •  multivitamin (THERAGRAN) tablet 1 tablet, 1 tablet, Oral, Daily, Sang Hope MD, 1 tablet at 01/31/23 0915  •  ondansetron (ZOFRAN) injection 4 mg, 4 mg, Intravenous, Q6H, Raisa Bond PA, 4 mg at 01/31/23 0916  •  pantoprazole (PROTONIX) EC tablet 40 mg, 40 mg, Oral, BID AC, Sang Hope MD, 40 mg at 01/31/23 0756  •  polyethylene glycol (MIRALAX) packet 17 g, 17 g, Oral, BID, Sang Hope MD, 17 g at 01/30/23 1147  •  sodium chloride 0.9 % flush 10 mL, 10 mL, Intravenous, PRN, Morris Archer II,  MD  •  sodium chloride 0.9 % with KCl 20 mEq/L infusion, 75 mL/hr, Intravenous, Continuous, Gideon Hope MD, Last Rate: 75 mL/hr at 01/30/23 1842, 75 mL/hr at 01/30/23 1842  Review of Systems:    Constitutional: No fevers, chills, sweats   Respiratory: No shortness of breath, cough   Cardiovascular: No Chest pain, palpitations   Gastrointestinal: +nasuea, constipation  Genitourinary: No hematuria, dysuria    Objective     Vital Signs  Temp:  [97.8 °F (36.6 °C)-99.5 °F (37.5 °C)] 98.8 °F (37.1 °C)  Heart Rate:  [64-81] 64  Resp:  [16] 16  BP: (162-194)/() 180/101  Body mass index is 32.89 kg/m².    Intake/Output Summary (Last 24 hours) at 1/31/2023 1013  Last data filed at 1/31/2023 0941  Gross per 24 hour   Intake 850 ml   Output 400 ml   Net 450 ml     I/O this shift:  In: 30 [P.O.:30]  Out: -      Physical Exam:   General: Awake, alert and conversive. No acute distress.   Eyes: Normal lids and lashes, no scleral icterus.   Neck: Supple and symmetric. Trachea midline.    Skin: Warm and dry, not jaundiced.    Cardiovascular: Regular rate and rhythm. No murmur.   Pulm: Quiet, even, nonlabored breathing. Clear to auscultation bilaterally.    Abdomen: Soft, nondistended, very mild tenderness to palpation. No rebound or guarding. Bowel sounds present in all 4 quadrants.   Extremities: No rashes or edema.   Psychiatric: Appropriate mood and affect. Cooperative.     Results Review:     I reviewed the patient's new clinical results.    Results from last 7 days   Lab Units 01/31/23  0605 01/30/23  0531 01/29/23  0756   WBC 10*3/mm3 18.00* 14.40* 10.79   HEMOGLOBIN g/dL 13.7 13.0 12.1   HEMATOCRIT % 42.8 39.6 36.3   PLATELETS 10*3/mm3 283 239 245     Results from last 7 days   Lab Units 01/31/23  0605 01/30/23  0531 01/29/23  0756 01/28/23  0629 01/27/23  1445   SODIUM mmol/L 136 140 142   < > 144   POTASSIUM mmol/L 3.0* 3.3* 2.9*   < > 3.4*   CHLORIDE mmol/L 102 107 109*   < > 104   CO2 mmol/L 23.0 25.3 24.0   < >  27.4   BUN mg/dL 4* 3* 5*   < > 8   CREATININE mg/dL 0.59 0.60 0.53*   < > 0.84   CALCIUM mg/dL 8.9 8.7 8.5*   < > 10.1   BILIRUBIN mg/dL  --   --  0.4  --  0.5   ALK PHOS U/L  --   --  49  --  67   ALT (SGPT) U/L  --   --  5  --  8   AST (SGOT) U/L  --   --  12  --  15   GLUCOSE mg/dL 79 78 78   < > 110*    < > = values in this interval not displayed.         Lab Results   Lab Value Date/Time    LIPASE 20 01/27/2023 1445    LIPASE 16 01/12/2023 1601       Radiology:  CT Abdomen Pelvis Without Contrast   Final Result   1. Colitis associated abnormal colonic dilatation involving the   ascending colon, hepatic flexure, proximal transverse colon where there   is also pericolonic inflammation. There is a second area of more   extensive abnormal wall thickening involving the proximal to mid sigmoid   colon which is decompressed with a greater degree of surrounding   inflammation. There is a potential associated obstructing component at   this location as the colon proximal to the sigmoid colon is dilated.   When compared to the prior exam 15 days ago, the right colitis is new   and what appears to represent colitis of the proximal sigmoid colon is   not significantly changed. The lack of interval change following   treatment and the presence of an obstructive component raises the   likelihood that there could be an associated neoplastic process at this   location though the distinction is difficult to make with CT and there   remains a great deal of inflammation surrounding the proximal sigmoid   colon.   2. Apparently, this exam was performed with intravenous contrast though   there is no contrast demonstrated on this exam. This suggests that there   has been extravasation of contrast into the arm and that could be   confirmed with physical exam or x-ray of the arm.   3. Previous gastric surgery. Small hiatal hernia.       Discussed with Dr. Cruz in the emergency department on 01/27/2023 at   5:50 PM.         Radiation dose reduction techniques were utilized, including automated   exposure control and exposure modulation based on body size.       This report was finalized on 1/27/2023 6:43 PM by Dr. Donovan Valencia M.D.              Assessment & Plan     Patient Active Problem List   Diagnosis   • Left sided colitis with complication (HCC)   • Colitis       Assessment:  1. Abdominal pain  2. Diarrhea-resolved  3. Persistent nausea  4. Rectal bleeding  5. CT imaging with colitis      Plan:  · Trial of Compazine.  · While she reports nausea, she has been able to intake liquids without incident.  · Clear liquid diet.  · Bowel preparation later this afternoon.  Plan for bidirectional endoscopy tomorrow with Dr. Guerrero.    I discussed the patient's findings and my recommendations with patient, nursing staff and Dr. Beal.    Dragon dictation used throughout this note.            FATIMAH Berrios  [facility]  [address] [place] [address]  Office: [phone]                  Electronically signed by Raisa Bond PA at 01/31/23 1214     Gideon Hope MD at 01/30/23 1247          Daily progress note    Primary care physician  Dr. Delgado    Chief complaint  Still with severe abdominal pain relieved with IV Dilaudid.  Patient denies any nausea vomiting and still have no BM yet.    History of present illness  62-year-old female with history of hypertension presented to [facility] emergency room with left lower quadrant abdominal pain for last 1 month.  Patient also have occasional loose stools.  Patient denies any nausea vomiting.  Patient recently diagnosed with colitis and treated with oral antibiotics without any improvement.  Patient has noticed blood in the stools.  Patient evaluated in ER with CT abdominal pelvis which shows worsening colitis and failed outpatient treatment admit for management.  Patient has no fever chills chest pain shortness  "of breath with sweats weight loss or weight gain.     REVIEW OF SYSTEMS  Unremarkable except severe abdominal pain     PHYSICAL EXAM  Blood pressure 144/82, pulse 70, temperature 98 °F (36.7 °C), temperature source Oral, resp. rate 14, height 154.9 cm (60.98\"), weight 78.9 kg (174 lb), SpO2 92 %.    GENERAL:  Awake and alert in no acute distress  HEENT:  Unremarkable  NECK:  Supple  CV: regular rhythm, normal rate  RESPIRATORY: normal effort, clear to auscultation bilaterally  ABDOMEN: soft, left lower quadrant tenderness bowel sounds positive  MUSCULOSKELETAL: no deformity  NEURO: alert, moves all extremities, follows commands  PSYCH:  calm, cooperative  SKIN: warm, dry     LAB RESULTS  Lab Results (last 24 hours)     Procedure Component Value Units Date/Time    Basic Metabolic Panel [[id number]]  (Abnormal) Collected: 01/30/23 0531    Specimen: Blood Updated: 01/30/23 0636     Glucose 78 mg/dL      BUN 3 mg/dL      Creatinine 0.60 mg/dL      Sodium 140 mmol/L      Potassium 3.3 mmol/L      Chloride 107 mmol/L      CO2 25.3 mmol/L      Calcium 8.7 mg/dL      BUN/Creatinine Ratio 5.0     Anion Gap 7.7 mmol/L      eGFR 101.0 mL/min/1.73     Narrative:      GFR Normal >60  Chronic Kidney Disease <60  Kidney Failure <15      Magnesium [[id number]]  (Normal) Collected: 01/30/23 0531    Specimen: Blood Updated: 01/30/23 0636     Magnesium 2.0 mg/dL     CBC & Differential [[id number]]  (Abnormal) Collected: 01/30/23 0531    Specimen: Blood Updated: 01/30/23 0619    Narrative:      The following orders were created for panel order CBC & Differential.  Procedure                               Abnormality         Status                     ---------                               -----------         ------                     CBC Auto Differential[[id number]]        Abnormal            Final result                 Please view results for these tests on the individual orders.    CBC Auto Differential [[id number]]  (Abnormal) " Collected: 01/30/23 0531    Specimen: Blood Updated: 01/30/23 0619     WBC 14.40 10*3/mm3      RBC 4.52 10*6/mm3      Hemoglobin 13.0 g/dL      Hematocrit 39.6 %      MCV 87.6 fL      MCH 28.8 pg      MCHC 32.8 g/dL      RDW 12.7 %      RDW-SD 40.5 fl      MPV 11.0 fL      Platelets 239 10*3/mm3      Neutrophil % 77.1 %      Lymphocyte % 16.3 %      Monocyte % 5.5 %      Eosinophil % 0.6 %      Basophil % 0.2 %      Immature Grans % 0.3 %      Neutrophils, Absolute 11.10 10*3/mm3      Lymphocytes, Absolute 2.35 10*3/mm3      Monocytes, Absolute 0.79 10*3/mm3      Eosinophils, Absolute 0.09 10*3/mm3      Basophils, Absolute 0.03 10*3/mm3      Immature Grans, Absolute 0.04 10*3/mm3      nRBC 0.0 /100 WBC     Gastrointestinal Panel, PCR - Stool, Per Rectum [[id number]]  (Normal) Collected: 01/29/23 2021    Specimen: Stool from Per Rectum Updated: 01/29/23 2156     Campylobacter Not Detected     Plesiomonas shigelloides Not Detected     Salmonella Not Detected     Vibrio Not Detected     Vibrio cholerae Not Detected     Yersinia enterocolitica Not Detected     Enteroaggregative E. coli (EAEC) Not Detected     Enteropathogenic E. coli (EPEC) Not Detected     Enterotoxigenic E. coli (ETEC) lt/st Not Detected     Shiga-like toxin-producing E. coli (STEC) stx1/stx2 Not Detected     Shigella/Enteroinvasive E. coli (EIEC) Not Detected     Cryptosporidium Not Detected     Cyclospora cayetanensis Not Detected     Entamoeba histolytica Not Detected     Giardia lamblia Not Detected     Adenovirus F40/41 Not Detected     Astrovirus Not Detected     Norovirus GI/GII Not Detected     Rotavirus A Not Detected     Sapovirus (I, II, IV or V) Not Detected    Narrative:      If Aeromonas, Staphylococcus aureus or Bacillus cereus are suspected, please order [id number]: Stool Culture, Aeromonas, S aureus, B Cereus.    Calprotectin, Fecal - Stool, [[id number]] Collected: 01/29/23 2021    Specimen: Stool Updated: 01/29/23 2127    Clostridioides  difficile Toxin - Stool, Per Rectum [[id number]]  (Normal) Collected: 01/29/23 2021    Specimen: Stool from Per Rectum Updated: 01/29/23 2120    Narrative:      The following orders were created for panel order Clostridioides difficile Toxin - Stool, Per Rectum.  Procedure                               Abnormality         Status                     ---------                               -----------         ------                     Clostridioides difficile...[[id number]]  Normal              Final result                 Please view results for these tests on the individual orders.    Clostridioides difficile Toxin, PCR - Stool, Per Rectum [[id number]]  (Normal) Collected: 01/29/23 2021    Specimen: Stool from Per Rectum Updated: 01/29/23 2120     C. Difficile Toxins by PCR Negative    Narrative:      The result indicates the absence of toxigenic C. difficile from stool specimen.     Blood Culture - Blood, Arm, Right [[id number]]  (Normal) Collected: 01/27/23 1808    Specimen: Blood from Arm, Right Updated: 01/29/23 1831     Blood Culture No growth at 2 days    Blood Culture - Blood, Arm, Left [[id number]]  (Normal) Collected: 01/27/23 1808    Specimen: Blood from Arm, Left Updated: 01/29/23 1831     Blood Culture No growth at 2 days        Imaging Results (Last 24 Hours)     ** No results found for the last 24 hours. **          Current Facility-Administered Medications:   •  ampicillin-sulbactam (UNASYN) 3 g in sodium chloride 0.9 % 100 mL IVPB-VTB, 3 g, Intravenous, Q6H, Gideon Hope MD, 3 g at 01/30/23 1148  •  bisacodyl (DULCOLAX) suppository 10 mg, 10 mg, Rectal, Daily, Rodo Ramirez MD, 10 mg at 01/30/23 1147  •  cetirizine (zyrTEC) tablet 10 mg, 10 mg, Oral, Daily, Gideon Hope MD  •  docusate sodium (COLACE) capsule 100 mg, 100 mg, Oral, BID, Gideon Hope MD, 100 mg at 01/30/23 1147  •  HYDROcodone-acetaminophen (NORCO) 5-325 MG per tablet 1 tablet, 1 tablet, Oral, Q6H PRN, Gideon Hope MD, 1 tablet at  01/30/23 0132  •  losartan (COZAAR) tablet 25 mg, 25 mg, Oral, Q24H, Sang Hope MD, 25 mg at 01/30/23 1147  •  mesalamine (LIALDA) EC tablet 4.8 g, 4.8 g, Oral, Daily With Breakfast, Sang Hope MD  •  multivitamin (THERAGRAN) tablet 1 tablet, 1 tablet, Oral, Daily, Sang Hope MD, 1 tablet at 01/29/23 0956  •  ondansetron (ZOFRAN) injection 4 mg, 4 mg, Intravenous, Q6H PRN, Sang Hope MD, 4 mg at 01/30/23 0812  •  pantoprazole (PROTONIX) EC tablet 40 mg, 40 mg, Oral, BID AC, Sang Hope MD, 40 mg at 01/29/23 1648  •  polyethylene glycol (MIRALAX) packet 17 g, 17 g, Oral, BID, Sang Hope MD, 17 g at 01/30/23 1147  •  sodium chloride 0.9 % flush 10 mL, 10 mL, Intravenous, PRN, Morris Archer II, MD  •  sodium chloride 0.9 % with KCl 20 mEq/L infusion, 75 mL/hr, Intravenous, Continuous, Sang Hpoe MD, Last Rate: 75 mL/hr at 01/30/23 0922, 75 mL/hr at 01/30/23 0922     ASSESSMENT  Worsening colitis with failed outpatient treatment  Hematochezia  Hypertension  Gastroesophageal reflux disease    PLAN  CPM  Continue IVF  Continue IV antibiotics  Replace potassium  Bowel program  Gastroenterology consult appreciated  Continue home medications  Stress ulcer DVT prophylaxis  Supportive care  PT OT  Discussed with nursing staff  Discharge planning    SANG HOPE MD    Copied text in this note has been reviewed and is accurate as of 01/30/23        Electronically signed by Sang Hope MD at 01/30/23 1248     Raisa Bond PA at 01/30/23 1201          [facility] [place], operated by [facility]  Inpatient Progress Note    Reason for Follow-up: Abdominal pain, rectal bleeding, diarrhea     Subjective     Interval History:   Suppository without good results. Reports small, hard bowel movement this morning.  She also endorses nausea and vomiting.      Current Facility-Administered Medications:   •  ampicillin-sulbactam (UNASYN) 3 g in sodium chloride 0.9 % 100 mL IVPB-VTB, 3 g, Intravenous, Q6H, Ahmed,  MD Gideon, 3 g at 01/30/23 1148  •  bisacodyl (DULCOLAX) suppository 10 mg, 10 mg, Rectal, Daily, Rodo Ramirez MD, 10 mg at 01/30/23 1147  •  cetirizine (zyrTEC) tablet 10 mg, 10 mg, Oral, Daily, Gideon Hope MD  •  docusate sodium (COLACE) capsule 100 mg, 100 mg, Oral, BID, Gideon Hope MD, 100 mg at 01/30/23 1147  •  HYDROcodone-acetaminophen (NORCO) 5-325 MG per tablet 1 tablet, 1 tablet, Oral, Q6H PRN, Gideon Hope MD, 1 tablet at 01/30/23 0132  •  losartan (COZAAR) tablet 25 mg, 25 mg, Oral, Q24H, Gideon Hope MD, 25 mg at 01/30/23 1147  •  mesalamine (LIALDA) EC tablet 4.8 g, 4.8 g, Oral, Daily With Breakfast, Gideon Hope MD  •  multivitamin (THERAGRAN) tablet 1 tablet, 1 tablet, Oral, Daily, Gideon Hope MD, 1 tablet at 01/29/23 0956  •  ondansetron (ZOFRAN) injection 4 mg, 4 mg, Intravenous, Q6H PRN, Gideon Hope MD, 4 mg at 01/30/23 0812  •  pantoprazole (PROTONIX) EC tablet 40 mg, 40 mg, Oral, BID AC, Gideon Hope MD, 40 mg at 01/29/23 1648  •  polyethylene glycol (MIRALAX) packet 17 g, 17 g, Oral, BID, Gideon Hope MD, 17 g at 01/30/23 1147  •  sodium chloride 0.9 % flush 10 mL, 10 mL, Intravenous, PRN, Morris Archer II, MD  •  sodium chloride 0.9 % with KCl 20 mEq/L infusion, 75 mL/hr, Intravenous, Continuous, Gideon Hope MD, Last Rate: 75 mL/hr at 01/30/23 0922, 75 mL/hr at 01/30/23 0922  Review of Systems:    Constitutional: No fevers, chills, sweats   Respiratory: No shortness of breath, cough   Cardiovascular: No Chest pain, palpitations   Gastrointestinal: + nausea, vomiting, left lower quadrant pain  Genitourinary: No hematuria, dysuria    Objective     Vital Signs  Temp:  [98 °F (36.7 °C)-99.4 °F (37.4 °C)] 98 °F (36.7 °C)  Heart Rate:  [67-82] 70  Resp:  [14-16] 14  BP: (132-172)/(82-94) 144/82  Body mass index is 32.89 kg/m².    Intake/Output Summary (Last 24 hours) at 1/30/2023 1201  Last data filed at 1/30/2023 0922  Gross per 24 hour   Intake 997 ml   Output 1300 ml    Net -303 ml     I/O this shift:  In: 897 [I.V.:897]  Out: 200 [Urine:200]     Physical Exam:   General: Awake, alert and conversive. No acute distress.   Eyes: Normal lids and lashes, no scleral icterus.   Neck: Supple and symmetric. Trachea midline.    Skin: Warm and dry, not jaundiced.    Cardiovascular: Regular rate and rhythm. No murmur.   Pulm: Quiet, even, nonlabored breathing. Clear to auscultation bilaterally.    Abdomen: Soft, nondistended, left lower quadrant tenderness. No rebound or guarding. Bowel sounds present in all 4 quadrants.   Extremities: No rashes or edema.   Psychiatric: Appropriate mood and affect. Cooperative.     Results Review:     I reviewed the patient's new clinical results.    Results from last 7 days   Lab Units 01/30/23  0531 01/29/23  0756 01/28/23  0629   WBC 10*3/mm3 14.40* 10.79 15.21*   HEMOGLOBIN g/dL 13.0 12.1 13.1   HEMATOCRIT % 39.6 36.3 40.3   PLATELETS 10*3/mm3 239 245 287     Results from last 7 days   Lab Units 01/30/23  0531 01/29/23  0756 01/28/23  0629 01/27/23  1445   SODIUM mmol/L 140 142 139 144   POTASSIUM mmol/L 3.3* 2.9* 3.4* 3.4*   CHLORIDE mmol/L 107 109* 106 104   CO2 mmol/L 25.3 24.0 25.4 27.4   BUN mg/dL 3* 5* 8 8   CREATININE mg/dL 0.60 0.53* 0.70 0.84   CALCIUM mg/dL 8.7 8.5* 9.3 10.1   BILIRUBIN mg/dL  --  0.4  --  0.5   ALK PHOS U/L  --  49  --  67   ALT (SGPT) U/L  --  5  --  8   AST (SGOT) U/L  --  12  --  15   GLUCOSE mg/dL 78 78 73 110*         Lab Results   Lab Value Date/Time    LIPASE 20 01/27/2023 1445    LIPASE 16 01/12/2023 1601       Radiology:  CT Abdomen Pelvis Without Contrast   Final Result   1. Colitis associated abnormal colonic dilatation involving the   ascending colon, hepatic flexure, proximal transverse colon where there   is also pericolonic inflammation. There is a second area of more   extensive abnormal wall thickening involving the proximal to mid sigmoid   colon which is decompressed with a greater degree of surrounding    inflammation. There is a potential associated obstructing component at   this location as the colon proximal to the sigmoid colon is dilated.   When compared to the prior exam 15 days ago, the right colitis is new   and what appears to represent colitis of the proximal sigmoid colon is   not significantly changed. The lack of interval change following   treatment and the presence of an obstructive component raises the   likelihood that there could be an associated neoplastic process at this   location though the distinction is difficult to make with CT and there   remains a great deal of inflammation surrounding the proximal sigmoid   colon.   2. Apparently, this exam was performed with intravenous contrast though   there is no contrast demonstrated on this exam. This suggests that there   has been extravasation of contrast into the arm and that could be   confirmed with physical exam or x-ray of the arm.   3. Previous gastric surgery. Small hiatal hernia.       Discussed with Dr. Cruz in the emergency department on 01/27/2023 at   5:50 PM.        Radiation dose reduction techniques were utilized, including automated   exposure control and exposure modulation based on body size.       This report was finalized on 1/27/2023 6:43 PM by Dr. Donovan Valencia M.D.              Assessment & Plan     Patient Active Problem List   Diagnosis   • Left sided colitis with complication (HCC)   • Colitis       Assessment:  1. Abdominal pain  2. Diarrhea  3. Rectal bleeding  4. CT imaging with colitis      Plan:  · GI PCR and Cdiff negative. High index of suspicion for inflammatory bowel disease  · Scheduled antiemetics.  · Mineral oil enema.   · Diet as tolerated.  · As long as we can get her nausea under control, we will plan to attempt bowel preparation tomorrow.    I discussed the patient's findings and my recommendations with patient, nursing staff and Dr. Guerrero.    Dragon dictation used throughout this note.             FATIMAH Berrios  [facility]  [address] [place] [address]  Office: [phone]                  Electronically signed by Raisa Bond PA at 01/30/23 1921       Consult Notes (last 48 hours)  Notes from 01/29/23 1430 through 01/31/23 1430   No notes of this type exist for this encounter.

## 2023-02-01 ENCOUNTER — ANESTHESIA (OUTPATIENT)
Dept: GASTROENTEROLOGY | Facility: HOSPITAL | Age: 63
DRG: 329 | End: 2023-02-01
Payer: COMMERCIAL

## 2023-02-01 ENCOUNTER — APPOINTMENT (OUTPATIENT)
Dept: CT IMAGING | Facility: HOSPITAL | Age: 63
DRG: 329 | End: 2023-02-01
Payer: COMMERCIAL

## 2023-02-01 ENCOUNTER — ANESTHESIA EVENT (OUTPATIENT)
Dept: GASTROENTEROLOGY | Facility: HOSPITAL | Age: 63
DRG: 329 | End: 2023-02-01
Payer: COMMERCIAL

## 2023-02-01 LAB
ANION GAP SERPL CALCULATED.3IONS-SCNC: 9.6 MMOL/L (ref 5–15)
BACTERIA SPEC AEROBE CULT: NORMAL
BACTERIA SPEC AEROBE CULT: NORMAL
BASOPHILS # BLD AUTO: 0.02 10*3/MM3 (ref 0–0.2)
BASOPHILS NFR BLD AUTO: 0.1 % (ref 0–1.5)
BUN SERPL-MCNC: 7 MG/DL (ref 8–23)
BUN/CREAT SERPL: 11.9 (ref 7–25)
CALCIUM SPEC-SCNC: 8.9 MG/DL (ref 8.6–10.5)
CHLORIDE SERPL-SCNC: 104 MMOL/L (ref 98–107)
CO2 SERPL-SCNC: 25.4 MMOL/L (ref 22–29)
CREAT SERPL-MCNC: 0.59 MG/DL (ref 0.57–1)
DEPRECATED RDW RBC AUTO: 40.2 FL (ref 37–54)
EGFRCR SERPLBLD CKD-EPI 2021: 101.4 ML/MIN/1.73
EOSINOPHIL # BLD AUTO: 0.05 10*3/MM3 (ref 0–0.4)
EOSINOPHIL NFR BLD AUTO: 0.3 % (ref 0.3–6.2)
ERYTHROCYTE [DISTWIDTH] IN BLOOD BY AUTOMATED COUNT: 12.9 % (ref 12.3–15.4)
GLUCOSE SERPL-MCNC: 79 MG/DL (ref 65–99)
HCT VFR BLD AUTO: 38.6 % (ref 34–46.6)
HGB BLD-MCNC: 12.8 G/DL (ref 12–15.9)
IMM GRANULOCYTES # BLD AUTO: 0.1 10*3/MM3 (ref 0–0.05)
IMM GRANULOCYTES NFR BLD AUTO: 0.6 % (ref 0–0.5)
LYMPHOCYTES # BLD AUTO: 2.37 10*3/MM3 (ref 0.7–3.1)
LYMPHOCYTES NFR BLD AUTO: 14.7 % (ref 19.6–45.3)
MCH RBC QN AUTO: 28.8 PG (ref 26.6–33)
MCHC RBC AUTO-ENTMCNC: 33.2 G/DL (ref 31.5–35.7)
MCV RBC AUTO: 86.9 FL (ref 79–97)
MONOCYTES # BLD AUTO: 0.77 10*3/MM3 (ref 0.1–0.9)
MONOCYTES NFR BLD AUTO: 4.8 % (ref 5–12)
NEUTROPHILS NFR BLD AUTO: 12.84 10*3/MM3 (ref 1.7–7)
NEUTROPHILS NFR BLD AUTO: 79.5 % (ref 42.7–76)
NRBC BLD AUTO-RTO: 0 /100 WBC (ref 0–0.2)
PLATELET # BLD AUTO: 268 10*3/MM3 (ref 140–450)
PMV BLD AUTO: 10.7 FL (ref 6–12)
POTASSIUM SERPL-SCNC: 4.1 MMOL/L (ref 3.5–5.2)
RBC # BLD AUTO: 4.44 10*6/MM3 (ref 3.77–5.28)
SODIUM SERPL-SCNC: 139 MMOL/L (ref 136–145)
WBC NRBC COR # BLD: 16.15 10*3/MM3 (ref 3.4–10.8)

## 2023-02-01 PROCEDURE — 25010000002 IOPAMIDOL 61 % SOLUTION: Performed by: HOSPITALIST

## 2023-02-01 PROCEDURE — 25010000002 AMPICILLIN-SULBACTAM PER 1.5 G: Performed by: HOSPITALIST

## 2023-02-01 PROCEDURE — 25010000002 HYDROMORPHONE PER 4 MG: Performed by: HOSPITALIST

## 2023-02-01 PROCEDURE — 0DB68ZX EXCISION OF STOMACH, VIA NATURAL OR ARTIFICIAL OPENING ENDOSCOPIC, DIAGNOSTIC: ICD-10-PCS | Performed by: INTERNAL MEDICINE

## 2023-02-01 PROCEDURE — 0DB98ZX EXCISION OF DUODENUM, VIA NATURAL OR ARTIFICIAL OPENING ENDOSCOPIC, DIAGNOSTIC: ICD-10-PCS | Performed by: INTERNAL MEDICINE

## 2023-02-01 PROCEDURE — 0W3P8ZZ CONTROL BLEEDING IN GASTROINTESTINAL TRACT, VIA NATURAL OR ARTIFICIAL OPENING ENDOSCOPIC: ICD-10-PCS | Performed by: INTERNAL MEDICINE

## 2023-02-01 PROCEDURE — 45378 DIAGNOSTIC COLONOSCOPY: CPT | Performed by: INTERNAL MEDICINE

## 2023-02-01 PROCEDURE — 85025 COMPLETE CBC W/AUTO DIFF WBC: CPT | Performed by: HOSPITALIST

## 2023-02-01 PROCEDURE — 88342 IMHCHEM/IMCYTCHM 1ST ANTB: CPT | Performed by: INTERNAL MEDICINE

## 2023-02-01 PROCEDURE — 0 LIDOCAINE 1 % SOLUTION: Performed by: NURSE ANESTHETIST, CERTIFIED REGISTERED

## 2023-02-01 PROCEDURE — 25010000002 HYDRALAZINE PER 20 MG: Performed by: HOSPITALIST

## 2023-02-01 PROCEDURE — 25010000002 ONDANSETRON PER 1 MG: Performed by: PHYSICIAN ASSISTANT

## 2023-02-01 PROCEDURE — 43239 EGD BIOPSY SINGLE/MULTIPLE: CPT | Performed by: INTERNAL MEDICINE

## 2023-02-01 PROCEDURE — 80048 BASIC METABOLIC PNL TOTAL CA: CPT | Performed by: HOSPITALIST

## 2023-02-01 PROCEDURE — 74177 CT ABD & PELVIS W/CONTRAST: CPT

## 2023-02-01 PROCEDURE — 25010000002 PROPOFOL 10 MG/ML EMULSION: Performed by: NURSE ANESTHETIST, CERTIFIED REGISTERED

## 2023-02-01 PROCEDURE — 25010000002 PIPERACILLIN SOD-TAZOBACTAM PER 1 G: Performed by: INTERNAL MEDICINE

## 2023-02-01 PROCEDURE — 25010000002 SODIUM CHLORIDE 0.9 % WITH KCL 20 MEQ 20-0.9 MEQ/L-% SOLUTION: Performed by: HOSPITALIST

## 2023-02-01 PROCEDURE — 88305 TISSUE EXAM BY PATHOLOGIST: CPT | Performed by: INTERNAL MEDICINE

## 2023-02-01 DEVICE — DEV CLIP ENDO RESOLUTION360 CONTRL ROT 235CM: Type: IMPLANTABLE DEVICE | Site: COLON | Status: FUNCTIONAL

## 2023-02-01 RX ORDER — SODIUM CHLORIDE 0.9 % (FLUSH) 0.9 %
10 SYRINGE (ML) INJECTION AS NEEDED
Status: DISCONTINUED | OUTPATIENT
Start: 2023-02-01 | End: 2023-02-02

## 2023-02-01 RX ORDER — SODIUM CHLORIDE 0.9 % (FLUSH) 0.9 %
10 SYRINGE (ML) INJECTION EVERY 12 HOURS SCHEDULED
Status: DISCONTINUED | OUTPATIENT
Start: 2023-02-01 | End: 2023-02-02

## 2023-02-01 RX ORDER — HYDRALAZINE HYDROCHLORIDE 20 MG/ML
5 INJECTION INTRAMUSCULAR; INTRAVENOUS
Status: DISCONTINUED | OUTPATIENT
Start: 2023-02-01 | End: 2023-02-01 | Stop reason: HOSPADM

## 2023-02-01 RX ORDER — METOPROLOL TARTRATE 5 MG/5ML
INJECTION INTRAVENOUS AS NEEDED
Status: DISCONTINUED | OUTPATIENT
Start: 2023-02-01 | End: 2023-02-01 | Stop reason: SURG

## 2023-02-01 RX ORDER — LIDOCAINE HYDROCHLORIDE 10 MG/ML
INJECTION, SOLUTION INFILTRATION; PERINEURAL AS NEEDED
Status: DISCONTINUED | OUTPATIENT
Start: 2023-02-01 | End: 2023-02-01 | Stop reason: SURG

## 2023-02-01 RX ORDER — SODIUM CHLORIDE AND POTASSIUM CHLORIDE 150; 900 MG/100ML; MG/100ML
50 INJECTION, SOLUTION INTRAVENOUS CONTINUOUS
Status: DISCONTINUED | OUTPATIENT
Start: 2023-02-01 | End: 2023-02-02

## 2023-02-01 RX ORDER — PROPOFOL 10 MG/ML
VIAL (ML) INTRAVENOUS AS NEEDED
Status: DISCONTINUED | OUTPATIENT
Start: 2023-02-01 | End: 2023-02-01 | Stop reason: SURG

## 2023-02-01 RX ORDER — LOSARTAN POTASSIUM 100 MG/1
100 TABLET ORAL
Status: DISCONTINUED | OUTPATIENT
Start: 2023-02-02 | End: 2023-02-03

## 2023-02-01 RX ORDER — SODIUM CHLORIDE 9 MG/ML
30 INJECTION, SOLUTION INTRAVENOUS CONTINUOUS PRN
Status: DISCONTINUED | OUTPATIENT
Start: 2023-02-01 | End: 2023-02-03

## 2023-02-01 RX ADMIN — HYDRALAZINE HYDROCHLORIDE 10 MG: 20 INJECTION INTRAMUSCULAR; INTRAVENOUS at 16:34

## 2023-02-01 RX ADMIN — AMPICILLIN SODIUM AND SULBACTAM SODIUM 3 G: 2; 1 INJECTION, POWDER, FOR SOLUTION INTRAMUSCULAR; INTRAVENOUS at 04:51

## 2023-02-01 RX ADMIN — ONDANSETRON 4 MG: 2 INJECTION INTRAMUSCULAR; INTRAVENOUS at 09:39

## 2023-02-01 RX ADMIN — METOROPROLOL TARTRATE 1 MG: 5 INJECTION, SOLUTION INTRAVENOUS at 16:02

## 2023-02-01 RX ADMIN — LOSARTAN POTASSIUM 50 MG: 50 TABLET, FILM COATED ORAL at 09:39

## 2023-02-01 RX ADMIN — PROPOFOL 100 MG: 10 INJECTION, EMULSION INTRAVENOUS at 15:14

## 2023-02-01 RX ADMIN — LIDOCAINE HYDROCHLORIDE 50 MG: 10 INJECTION, SOLUTION INFILTRATION; PERINEURAL at 15:14

## 2023-02-01 RX ADMIN — ONDANSETRON 4 MG: 2 INJECTION INTRAMUSCULAR; INTRAVENOUS at 22:40

## 2023-02-01 RX ADMIN — SODIUM CHLORIDE 30 ML/HR: 9 INJECTION, SOLUTION INTRAVENOUS at 13:32

## 2023-02-01 RX ADMIN — POTASSIUM CHLORIDE AND SODIUM CHLORIDE 75 ML/HR: 900; 150 INJECTION, SOLUTION INTRAVENOUS at 04:45

## 2023-02-01 RX ADMIN — HYDROMORPHONE HYDROCHLORIDE 0.5 MG: 1 INJECTION, SOLUTION INTRAMUSCULAR; INTRAVENOUS; SUBCUTANEOUS at 18:42

## 2023-02-01 RX ADMIN — METOPROLOL TARTRATE 2.5 MG: 1 INJECTION, SOLUTION INTRAVENOUS at 17:01

## 2023-02-01 RX ADMIN — ONDANSETRON 4 MG: 2 INJECTION INTRAMUSCULAR; INTRAVENOUS at 04:46

## 2023-02-01 RX ADMIN — IOPAMIDOL 85 ML: 612 INJECTION, SOLUTION INTRAVENOUS at 17:54

## 2023-02-01 RX ADMIN — Medication 10 ML: at 21:13

## 2023-02-01 RX ADMIN — ONDANSETRON 4 MG: 2 INJECTION INTRAMUSCULAR; INTRAVENOUS at 18:42

## 2023-02-01 RX ADMIN — POLYETHYLENE GLYCOL 3350 0.5 BOTTLE: 17 POWDER, FOR SOLUTION ORAL at 04:46

## 2023-02-01 RX ADMIN — HYDROMORPHONE HYDROCHLORIDE 0.5 MG: 1 INJECTION, SOLUTION INTRAMUSCULAR; INTRAVENOUS; SUBCUTANEOUS at 11:43

## 2023-02-01 RX ADMIN — METOROPROLOL TARTRATE 1 MG: 5 INJECTION, SOLUTION INTRAVENOUS at 15:55

## 2023-02-01 RX ADMIN — METOPROLOL TARTRATE 2.5 MG: 1 INJECTION, SOLUTION INTRAVENOUS at 17:10

## 2023-02-01 RX ADMIN — AMPICILLIN SODIUM AND SULBACTAM SODIUM 3 G: 2; 1 INJECTION, POWDER, FOR SOLUTION INTRAMUSCULAR; INTRAVENOUS at 11:41

## 2023-02-01 RX ADMIN — PROPOFOL 300 MCG/KG/MIN: 10 INJECTION, EMULSION INTRAVENOUS at 15:14

## 2023-02-01 RX ADMIN — TAZOBACTAM SODIUM AND PIPERACILLIN SODIUM 3.38 G: 375; 3 INJECTION, SOLUTION INTRAVENOUS at 22:38

## 2023-02-01 NOTE — NURSING NOTE
Pt came to post-op with elevated b/p.  205/126.  Per anesthesia give hydralazine 10mg now.  Given.

## 2023-02-01 NOTE — PLAN OF CARE
Goal Outcome Evaluation:  Plan of Care Reviewed With: patient        Progress: improving  Outcome Evaluation: VSS, voiding without issue, multiple loose liquid BMs this morning prior to EGD/scope, tap water enema given per order, dilaudid for pain, scheduled zofran given, IVFs and IV abx as ordered, on room air, waiting for patient to return from endoscopy. Pt reports feeling less pain and nausea today.

## 2023-02-01 NOTE — NURSING NOTE
Pt refused her miralax and colace, reminded/encouraged her that she needed to continue to drink the bowel prep in the pitcher (has only taken sips thus far). Educated her on need to complete, and that there would be another pitcher in the morning. She took a few more sips and stated she would try and understood the need.

## 2023-02-01 NOTE — PROGRESS NOTES
"Daily progress note    Primary care physician  Dr. Delgado    Chief complaint  Doing same with no new complaint but still with abdominal pain and going for upper and lower endoscopy    History of present illness  62-year-old female with history of hypertension presented to [facility] emergency room with left lower quadrant abdominal pain for last 1 month.  Patient also have occasional loose stools.  Patient denies any nausea vomiting.  Patient recently diagnosed with colitis and treated with oral antibiotics without any improvement.  Patient has noticed blood in the stools.  Patient evaluated in ER with CT abdominal pelvis which shows worsening colitis and failed outpatient treatment admit for management.  Patient has no fever chills chest pain shortness of breath with sweats weight loss or weight gain.     REVIEW OF SYSTEMS  Unremarkable except severe abdominal pain      PHYSICAL EXAM  Blood pressure (!) 190/96, pulse 66, temperature 98.4 °F (36.9 °C), temperature source Oral, resp. rate 16, height 154.9 cm (60.98\"), weight 78.9 kg (174 lb), SpO2 97 %.    GENERAL:  Awake and alert in no acute distress  HEENT:  Unremarkable  NECK:  Supple  CV: regular rhythm, normal rate  RESPIRATORY: normal effort, clear to auscultation bilaterally  ABDOMEN: soft, left lower quadrant tenderness bowel sounds positive  MUSCULOSKELETAL: no deformity  NEURO: alert, moves all extremities, follows commands  PSYCH:  calm, cooperative  SKIN: warm, dry     LAB RESULTS  Lab Results (last 24 hours)     Procedure Component Value Units Date/Time    Basic Metabolic Panel [[id number]]  (Abnormal) Collected: 02/01/23 0542    Specimen: Blood Updated: 02/01/23 0659     Glucose 79 mg/dL      BUN 7 mg/dL      Creatinine 0.59 mg/dL      Sodium 139 mmol/L      Potassium 4.1 mmol/L      Chloride 104 mmol/L      CO2 25.4 mmol/L      Calcium 8.9 mg/dL      BUN/Creatinine Ratio 11.9     Anion Gap 9.6 mmol/L      eGFR 101.4 mL/min/1.73     Narrative:  "     GFR Normal >60  Chronic Kidney Disease <60  Kidney Failure <15      CBC & Differential [[id number]]  (Abnormal) Collected: 02/01/23 0542    Specimen: Blood Updated: 02/01/23 0625    Narrative:      The following orders were created for panel order CBC & Differential.  Procedure                               Abnormality         Status                     ---------                               -----------         ------                     CBC Auto Differential[[id number]]        Abnormal            Final result                 Please view results for these tests on the individual orders.    CBC Auto Differential [[id number]]  (Abnormal) Collected: 02/01/23 0542    Specimen: Blood Updated: 02/01/23 0625     WBC 16.15 10*3/mm3      RBC 4.44 10*6/mm3      Hemoglobin 12.8 g/dL      Hematocrit 38.6 %      MCV 86.9 fL      MCH 28.8 pg      MCHC 33.2 g/dL      RDW 12.9 %      RDW-SD 40.2 fl      MPV 10.7 fL      Platelets 268 10*3/mm3      Neutrophil % 79.5 %      Lymphocyte % 14.7 %      Monocyte % 4.8 %      Eosinophil % 0.3 %      Basophil % 0.1 %      Immature Grans % 0.6 %      Neutrophils, Absolute 12.84 10*3/mm3      Lymphocytes, Absolute 2.37 10*3/mm3      Monocytes, Absolute 0.77 10*3/mm3      Eosinophils, Absolute 0.05 10*3/mm3      Basophils, Absolute 0.02 10*3/mm3      Immature Grans, Absolute 0.10 10*3/mm3      nRBC 0.0 /100 WBC     Blood Culture - Blood, Arm, Right [[id number]]  (Normal) Collected: 01/27/23 1808    Specimen: Blood from Arm, Right Updated: 01/31/23 1831     Blood Culture No growth at 4 days    Blood Culture - Blood, Arm, Left [[id number]]  (Normal) Collected: 01/27/23 1808    Specimen: Blood from Arm, Left Updated: 01/31/23 1831     Blood Culture No growth at 4 days    Calprotectin, Fecal - Stool, [[id number]]  (Abnormal) Collected: 01/29/23 2021    Specimen: Stool Updated: 01/31/23 1709     Calprotectin, Fecal 269 ug/g      Comment: Concentration     Interpretation   Follow-Up  <16 - 50 ug/g      Normal           None  >50 -120 ug/g     Borderline       Re-evaluate in 4-6 weeks      >120 ug/g     Abnormal         Repeat as clinically                                     indicated       Narrative:      Performed at:  [address]  [id number]  : Marycruz Ospina MD, Phone:  [phone]        Imaging Results (Last 24 Hours)     ** No results found for the last 24 hours. **          Current Facility-Administered Medications:   •  ampicillin-sulbactam (UNASYN) 3 g in sodium chloride 0.9 % 100 mL IVPB-VTB, 3 g, Intravenous, Q6H, Gideon Hope MD, 3 g at 02/01/23 1141  •  bisacodyl (DULCOLAX) suppository 10 mg, 10 mg, Rectal, Daily, Rodo Ramirez MD, 10 mg at 01/31/23 0915  •  cetirizine (zyrTEC) tablet 10 mg, 10 mg, Oral, Daily, Gideon Hope MD  •  docusate sodium (COLACE) capsule 100 mg, 100 mg, Oral, BID, Gideon Hope MD, 100 mg at 01/31/23 0916  •  hydrALAZINE (APRESOLINE) injection 10 mg, 10 mg, Intravenous, Q4H PRN, Gideon Hope MD  •  HYDROmorphone (DILAUDID) injection 0.5 mg, 0.5 mg, Intravenous, 4x Daily PRN, Gideon Hope MD, 0.5 mg at 02/01/23 1143  •  losartan (COZAAR) tablet 50 mg, 50 mg, Oral, Q24H, Gideon Hope MD, 50 mg at 02/01/23 0939  •  mesalamine (LIALDA) EC tablet 4.8 g, 4.8 g, Oral, Daily With Breakfast, Gideon Hope MD  •  mineral oil enema 1 enema, 1 enema, Rectal, Once, Raisa Bond PA  •  multivitamin (THERAGRAN) tablet 1 tablet, 1 tablet, Oral, Daily, Gideon Hope MD, 1 tablet at 01/31/23 0915  •  ondansetron (ZOFRAN) injection 4 mg, 4 mg, Intravenous, Q6H, Raisa Bond, PA, 4 mg at 02/01/23 0939  •  pantoprazole (PROTONIX) EC tablet 40 mg, 40 mg, Oral, BID AC, Gideon Hope MD, 40 mg at 01/31/23 1648  •  polyethylene glycol (MIRALAX) packet 17 g, 17 g, Oral, BID, Gideon Hope MD, 17 g at 01/30/23 1147  •  potassium chloride (KLOR-CON) packet 40 mEq, 40 mEq, Oral, Once, Gideon Hope MD  •  prochlorperazine  (COMPAZINE) injection 5 mg, 5 mg, Intravenous, Q6H PRN, Raisa Bond, PA, 5 mg at 01/31/23 1236  •  sodium chloride 0.9 % flush 10 mL, 10 mL, Intravenous, PRN, Morris Archer II, MD  •  sodium chloride 0.9 % flush 10 mL, 10 mL, Intravenous, Q12H, Seng Guerrero MD  •  sodium chloride 0.9 % flush 10 mL, 10 mL, Intravenous, PRN, Seng Guerrero MD  •  sodium chloride 0.9 % infusion, 30 mL/hr, Intravenous, Continuous PRN, Seng Guerrero MD, Last Rate: 30 mL/hr at 02/01/23 1332, 30 mL/hr at 02/01/23 1332  •  sodium chloride 0.9 % with KCl 20 mEq/L infusion, 75 mL/hr, Intravenous, Continuous, Sang Hope MD, Last Rate: 75 mL/hr at 02/01/23 0445, 75 mL/hr at 02/01/23 0445     ASSESSMENT  Worsening colitis with failed outpatient treatment  Hematochezia  Hypertension  Gastroesophageal reflux disease    PLAN  CPM  Continue IVF  Continue IV antibiotics  Replace potassium  Bowel program  Upper and lower endoscopy today  Symptomatic treatment of pain and nausea  Gastroenterology consult appreciated  Continue home medications  Stress ulcer DVT prophylaxis  Supportive care  PT OT  Discussed with nursing staff  Follow closely and further recommendation according to hospital course    SANG HOPE MD    Copied text in this note has been reviewed and is accurate as of 02/01/23

## 2023-02-01 NOTE — ANESTHESIA PREPROCEDURE EVALUATION
Anesthesia Evaluation     Patient summary reviewed and Nursing notes reviewed   NPO Solid Status: > 8 hours  NPO Liquid Status: > 4 hours           Airway   Mallampati: II  Neck ROM: full  No difficulty expected  Dental - normal exam     Pulmonary     breath sounds clear to auscultation  Cardiovascular     Rhythm: regular    (+) hypertension,       Neuro/Psych  GI/Hepatic/Renal/Endo    (+) obesity,       ROS Comment: S/p gastric sleeve    Musculoskeletal     Abdominal   (+) obese,    Substance History      OB/GYN          Other                        Anesthesia Plan    ASA 2     MAC     intravenous induction     Anesthetic plan, risks, benefits, and alternatives have been provided, discussed and informed consent has been obtained with: patient.        CODE STATUS:    Level Of Support Discussed With: Patient  Code Status (Patient has no pulse and is not breathing): CPR (Attempt to Resuscitate)  Medical Interventions (Patient has pulse or is breathing): Full Support

## 2023-02-01 NOTE — PROGRESS NOTES
"Nutrition Services    Patient Name:  Hali Tejeda  YOB: 1960  MRN: [id number]  Admit Date:  1/27/2023    Assessment Date:  02/01/23    Comment: Patient NPO for altered GI function. She is in ENDO for colonoscopy and EGD. Per MD, she has been having abd pain x 1 month. She has been finding blood in her stool at times. recent dx of colitis. She has been having small BMs. RD hopeful that patient will get a diet after scopes are finished. Will follow and monitor closely for results and diet upgrade.       CLINICAL NUTRITION ASSESSMENT      Reason for Assessment NPO/Clear Liquid Status x 5 days      Diagnosis/Problem   Colitis    Medical/Surgical History Past Medical History:   Diagnosis Date   • HTN (hypertension)        Past Surgical History:   Procedure Laterality Date   • BREAST LUMPECTOMY Left     benign   • COLONOSCOPY  2010    negative per pt   • GASTRIC SLEEVE LAPAROSCOPIC  2020   • HYSTERECTOMY  2005   • LAPAROSCOPIC GASTRIC BANDING      lap band removal        Encounter Information        Nutrition History:  Patient NPO for altered GI function. She is in ENDO for colonoscopy and EGD. Per MD, she has been having abd pain x 1 month. She has been finding blood in her stool at times. recent dx of colitis. She has been having small BMs. RD hopeful that patient will get a diet after scopes are finished. Will follow and monitor closely for results and diet upgrade.    Food Preferences:    Supplements:    Factors Affecting Intake: altered GI function     Anthropometrics        Current Height  Current Weight  BMI kg/m2 Height: 154.9 cm (60.98\")  Weight: 78.9 kg (174 lb) (01/27/23 1955)  Body mass index is 32.89 kg/m².   Adjusted BMI (if applicable)        Admission Weight 177# (80.3 kg)        Ideal Body Weight (IBW) 105# (47.8 kg)    Adjusted IBW (if applicable)        Usual Body Weight (UBW)    Weight Change/Trend Stable       Weight History Wt Readings from Last 30 Encounters:   01/27/23 1955 " "78.9 kg (174 lb)   01/27/23 1428 78.9 kg (174 lb)   01/24/23 1201 79.1 kg (174 lb 6.4 oz)   01/12/23 1355 80.3 kg (177 lb)   06/19/20 0821 96.2 kg (212 lb)           --  Estimated/Assessed Needs       Energy Requirements    Height for Calculation  Height: 154.9 cm (60.98\")   Weight for Calculation 177# (80.3 kg)    Method for Estimation  25 kcal/kg, 30 kcal/kg   EST Needs (kcal/day) 2008-2409       Protein Requirements    Weight for Calculation 177# (80.3 kg)    EST Protein Needs (g/kg) 0.8 gm/kg, 1.0 gm/kg   EST Daily Needs (g/day) 64-80       Fluid Requirements     Method for Estimation 1 mL/kcal    Estimated Needs (mL/day) 2008-2409       Fluid Deficit    Current Na Level (mEq/L)    Desired Na Level (mEq/L)    Estimated Fluid Deficit (L)           Tests/Procedures        Tests/Procedures No new tests/procedures     Labs       Pertinent Labs    Results from last 7 days   Lab Units 02/01/23  0542 01/31/23  0605 01/30/23  0531 01/29/23  0756 01/28/23  0629 01/27/23  1445   SODIUM mmol/L 139 136 140 142   < > 144   POTASSIUM mmol/L 4.1 3.0* 3.3* 2.9*   < > 3.4*   CHLORIDE mmol/L 104 102 107 109*   < > 104   CO2 mmol/L 25.4 23.0 25.3 24.0   < > 27.4   BUN mg/dL 7* 4* 3* 5*   < > 8   CREATININE mg/dL 0.59 0.59 0.60 0.53*   < > 0.84   CALCIUM mg/dL 8.9 8.9 8.7 8.5*   < > 10.1   BILIRUBIN mg/dL  --   --   --  0.4  --  0.5   ALK PHOS U/L  --   --   --  49  --  67   ALT (SGPT) U/L  --   --   --  5  --  8   AST (SGOT) U/L  --   --   --  12  --  15   GLUCOSE mg/dL 79 79 78 78   < > 110*    < > = values in this interval not displayed.     Results from last 7 days   Lab Units 02/01/23  0542 01/31/23  0605 01/30/23  0531 01/29/23  0756   MAGNESIUM mg/dL  --   --  2.0  --    HEMOGLOBIN g/dL 12.8   < > 13.0 12.1   HEMATOCRIT % 38.6   < > 39.6 36.3   WBC 10*3/mm3 16.15*   < > 14.40* 10.79   TRIGLYCERIDES mg/dL  --   --   --  53   ALBUMIN g/dL  --   --   --  3.0*    < > = values in this interval not displayed.     Results from last " 7 days   Lab Units 02/01/23  0542 01/31/23  0605 01/30/23  0531 01/29/23  0756 01/28/23  0629   PLATELETS 10*3/mm3 268 283 239 245 287     No results found for: COVID19  Lab Results   Component Value Date    HGBA1C 5.60 01/29/2023          Medications           Scheduled Medications ampicillin-sulbactam, 3 g, Intravenous, Q6H  bisacodyl, 10 mg, Rectal, Daily  cetirizine, 10 mg, Oral, Daily  docusate sodium, 100 mg, Oral, BID  [START ON 2/2/2023] losartan, 100 mg, Oral, Q24H  mesalamine, 4.8 g, Oral, Daily With Breakfast  mineral oil, 1 enema, Rectal, Once  multivitamin, 1 tablet, Oral, Daily  ondansetron, 4 mg, Intravenous, Q6H  pantoprazole, 40 mg, Oral, BID AC  polyethylene glycol, 17 g, Oral, BID  potassium chloride, 40 mEq, Oral, Once  sodium chloride, 10 mL, Intravenous, Q12H       Infusions sodium chloride, 30 mL/hr, Last Rate: 30 mL/hr (02/01/23 1332)  sodium chloride 0.9 % with KCl 20 mEq, 50 mL/hr       PRN Medications •  hydrALAZINE  •  HYDROmorphone  •  prochlorperazine  •  sodium chloride  •  sodium chloride  •  sodium chloride     Physical Findings          Physical Appearance alert, obese, oriented   Oral/Mouth Cavity WNL   Edema  no edema   Gastrointestinal normoactive, last bowel movement:1/31, bowel prep    Skin  skin intact   Tubes/Drains none   NFPE Not applicable at this time   --  Current Nutrition Orders & Evaluation of Intake       Oral Nutrition     Food Allergies NKFA   Current PO Diet NPO Diet NPO Type: Strict NPO   Supplement n/a   PO Evaluation     % PO Intake     # of Days Evaluated    --  PES STATEMENT / NUTRITION DIAGNOSIS      Nutrition Dx Problem  Problem: Altered GI Function and Inadequate Nutrient Intake  Etiology: Medical Diagnosiscolitis   Signs/Symptoms: NPO and PO Diet Not Tolerated  Comment:    --  NUTRITION INTERVENTION / PLAN OF CARE      Intervention Goal(s) Reduce/improve symptoms, Meet estimated needs, Disease management/therapy, Establish PO intake, Tolerate PO  and No  significant weight loss         RD Intervention/Action Follow Tx Progress and Care plan reviewed         Prescription/Orders:       PO Diet       Supplements       Snacks       Enteral Nutrition       Parenteral Nutrition    New Prescription Ordered? No changes at this time   --      Monitor/Evaluation Per protocol   Discharge Plan/Needs Pending clinical course   Education Will instruct as appropriate   --    RD to follow per protocol.      Electronically signed by:  Shelby Berg RD  02/01/23 14:41 EST

## 2023-02-01 NOTE — PLAN OF CARE
Goal Outcome Evaluation:  Plan of Care Reviewed With: patient           Outcome Evaluation: VSS, Dilaudid given for pain, no c/o nausea, scheduled zofran given, bowel prep restarted this am, has had 1 loose BM with small amout of fecal matter, IVF and IV abx as ordered, encouraged intake of the bowel prep, voiding freely, slept between care, will continue to monitor.

## 2023-02-01 NOTE — NURSING NOTE
B/p 202/119.  Spoke with anesthesia.  Give metoprolo tartrate 2.5mg now. Given at 1701. B/p decrease to 189/106. Second dose given at 1710. Pt ambulated to bathroom. B/p taken upon return 157/85 at 1721

## 2023-02-02 ENCOUNTER — PREP FOR SURGERY (OUTPATIENT)
Dept: OTHER | Facility: HOSPITAL | Age: 63
End: 2023-02-02
Payer: COMMERCIAL

## 2023-02-02 DIAGNOSIS — K52.9 COLITIS: Primary | ICD-10-CM

## 2023-02-02 DIAGNOSIS — K56.699 STRICTURE OF SIGMOID COLON: ICD-10-CM

## 2023-02-02 LAB
ANION GAP SERPL CALCULATED.3IONS-SCNC: 14 MMOL/L (ref 5–15)
BASOPHILS # BLD AUTO: 0.03 10*3/MM3 (ref 0–0.2)
BASOPHILS NFR BLD AUTO: 0.2 % (ref 0–1.5)
BUN SERPL-MCNC: 6 MG/DL (ref 8–23)
BUN/CREAT SERPL: 11.3 (ref 7–25)
CALCIUM SPEC-SCNC: 8.3 MG/DL (ref 8.6–10.5)
CHLORIDE SERPL-SCNC: 100 MMOL/L (ref 98–107)
CO2 SERPL-SCNC: 21 MMOL/L (ref 22–29)
CREAT SERPL-MCNC: 0.53 MG/DL (ref 0.57–1)
DEPRECATED RDW RBC AUTO: 41.4 FL (ref 37–54)
EGFRCR SERPLBLD CKD-EPI 2021: 104.1 ML/MIN/1.73
EOSINOPHIL # BLD AUTO: 0.09 10*3/MM3 (ref 0–0.4)
EOSINOPHIL NFR BLD AUTO: 0.5 % (ref 0.3–6.2)
ERYTHROCYTE [DISTWIDTH] IN BLOOD BY AUTOMATED COUNT: 13.1 % (ref 12.3–15.4)
GLUCOSE SERPL-MCNC: 55 MG/DL (ref 65–99)
HCT VFR BLD AUTO: 37.1 % (ref 34–46.6)
HGB BLD-MCNC: 12.5 G/DL (ref 12–15.9)
IMM GRANULOCYTES # BLD AUTO: 0.11 10*3/MM3 (ref 0–0.05)
IMM GRANULOCYTES NFR BLD AUTO: 0.6 % (ref 0–0.5)
LYMPHOCYTES # BLD AUTO: 2.22 10*3/MM3 (ref 0.7–3.1)
LYMPHOCYTES NFR BLD AUTO: 11.9 % (ref 19.6–45.3)
MCH RBC QN AUTO: 29.2 PG (ref 26.6–33)
MCHC RBC AUTO-ENTMCNC: 33.7 G/DL (ref 31.5–35.7)
MCV RBC AUTO: 86.7 FL (ref 79–97)
MONOCYTES # BLD AUTO: 0.95 10*3/MM3 (ref 0.1–0.9)
MONOCYTES NFR BLD AUTO: 5.1 % (ref 5–12)
NEUTROPHILS NFR BLD AUTO: 15.27 10*3/MM3 (ref 1.7–7)
NEUTROPHILS NFR BLD AUTO: 81.7 % (ref 42.7–76)
NRBC BLD AUTO-RTO: 0 /100 WBC (ref 0–0.2)
PLATELET # BLD AUTO: 262 10*3/MM3 (ref 140–450)
PMV BLD AUTO: 10.6 FL (ref 6–12)
POTASSIUM SERPL-SCNC: 3.2 MMOL/L (ref 3.5–5.2)
RBC # BLD AUTO: 4.28 10*6/MM3 (ref 3.77–5.28)
SODIUM SERPL-SCNC: 135 MMOL/L (ref 136–145)
WBC NRBC COR # BLD: 18.67 10*3/MM3 (ref 3.4–10.8)

## 2023-02-02 PROCEDURE — 99222 1ST HOSP IP/OBS MODERATE 55: CPT | Performed by: COLON & RECTAL SURGERY

## 2023-02-02 PROCEDURE — 80048 BASIC METABOLIC PNL TOTAL CA: CPT | Performed by: HOSPITALIST

## 2023-02-02 PROCEDURE — 25010000002 HYDROMORPHONE PER 4 MG: Performed by: INTERNAL MEDICINE

## 2023-02-02 PROCEDURE — 25010000002 ONDANSETRON PER 1 MG: Performed by: INTERNAL MEDICINE

## 2023-02-02 PROCEDURE — 25010000002 PIPERACILLIN SOD-TAZOBACTAM PER 1 G: Performed by: COLON & RECTAL SURGERY

## 2023-02-02 PROCEDURE — 25010000002 PIPERACILLIN SOD-TAZOBACTAM PER 1 G: Performed by: INTERNAL MEDICINE

## 2023-02-02 PROCEDURE — 85025 COMPLETE CBC W/AUTO DIFF WBC: CPT | Performed by: HOSPITALIST

## 2023-02-02 PROCEDURE — 25010000002 HYDROMORPHONE PER 4 MG: Performed by: HOSPITALIST

## 2023-02-02 PROCEDURE — 99232 SBSQ HOSP IP/OBS MODERATE 35: CPT | Performed by: INTERNAL MEDICINE

## 2023-02-02 PROCEDURE — 25010000002 HYDRALAZINE PER 20 MG: Performed by: COLON & RECTAL SURGERY

## 2023-02-02 PROCEDURE — 25010000002 SODIUM CHLORIDE 0.9 % WITH KCL 20 MEQ 20-0.9 MEQ/L-% SOLUTION: Performed by: HOSPITALIST

## 2023-02-02 PROCEDURE — 25010000002 ONDANSETRON PER 1 MG: Performed by: PHYSICIAN ASSISTANT

## 2023-02-02 RX ORDER — CELECOXIB 200 MG/1
200 CAPSULE ORAL ONCE
Status: CANCELLED | OUTPATIENT
Start: 2023-02-03 | End: 2023-02-02

## 2023-02-02 RX ORDER — SCOLOPAMINE TRANSDERMAL SYSTEM 1 MG/1
1 PATCH, EXTENDED RELEASE TRANSDERMAL CONTINUOUS
Status: CANCELLED | OUTPATIENT
Start: 2023-02-03 | End: 2023-02-06

## 2023-02-02 RX ORDER — DEXTROSE AND SODIUM CHLORIDE 5; .9 G/100ML; G/100ML
75 INJECTION, SOLUTION INTRAVENOUS CONTINUOUS
Status: DISCONTINUED | OUTPATIENT
Start: 2023-02-02 | End: 2023-02-02

## 2023-02-02 RX ORDER — HYDRALAZINE HYDROCHLORIDE 20 MG/ML
20 INJECTION INTRAMUSCULAR; INTRAVENOUS EVERY 4 HOURS PRN
Status: DISCONTINUED | OUTPATIENT
Start: 2023-02-02 | End: 2023-02-03

## 2023-02-02 RX ORDER — ALVIMOPAN 12 MG/1
12 CAPSULE ORAL ONCE
Status: CANCELLED | OUTPATIENT
Start: 2023-02-03 | End: 2023-02-02

## 2023-02-02 RX ORDER — POTASSIUM CHLORIDE, DEXTROSE MONOHYDRATE AND SODIUM CHLORIDE 300; 5; 900 MG/100ML; G/100ML; MG/100ML
100 INJECTION, SOLUTION INTRAVENOUS CONTINUOUS
Status: DISCONTINUED | OUTPATIENT
Start: 2023-02-02 | End: 2023-02-03

## 2023-02-02 RX ORDER — CEFAZOLIN SODIUM 2 G/100ML
2 INJECTION, SOLUTION INTRAVENOUS ONCE
Status: CANCELLED | OUTPATIENT
Start: 2023-02-03 | End: 2023-02-02

## 2023-02-02 RX ORDER — HYDRALAZINE HYDROCHLORIDE 20 MG/ML
20 INJECTION INTRAMUSCULAR; INTRAVENOUS EVERY 4 HOURS PRN
Status: DISCONTINUED | OUTPATIENT
Start: 2023-02-02 | End: 2023-02-02

## 2023-02-02 RX ORDER — PANTOPRAZOLE SODIUM 40 MG/10ML
40 INJECTION, POWDER, LYOPHILIZED, FOR SOLUTION INTRAVENOUS EVERY 12 HOURS SCHEDULED
Status: DISCONTINUED | OUTPATIENT
Start: 2023-02-02 | End: 2023-02-02

## 2023-02-02 RX ORDER — PANTOPRAZOLE SODIUM 40 MG/10ML
40 INJECTION, POWDER, LYOPHILIZED, FOR SOLUTION INTRAVENOUS EVERY 12 HOURS SCHEDULED
Status: DISCONTINUED | OUTPATIENT
Start: 2023-02-02 | End: 2023-02-03

## 2023-02-02 RX ORDER — CHLORHEXIDINE GLUCONATE 4 G/100ML
SOLUTION TOPICAL 2 TIMES DAILY
Qty: 236 ML | Refills: 0 | Status: SHIPPED | OUTPATIENT
Start: 2023-02-02 | End: 2023-02-03

## 2023-02-02 RX ORDER — GABAPENTIN 400 MG/1
400 CAPSULE ORAL ONCE
Status: CANCELLED | OUTPATIENT
Start: 2023-02-03 | End: 2023-02-02

## 2023-02-02 RX ORDER — METRONIDAZOLE 500 MG/100ML
500 INJECTION, SOLUTION INTRAVENOUS ONCE
Status: CANCELLED | OUTPATIENT
Start: 2023-02-03 | End: 2023-02-02

## 2023-02-02 RX ORDER — POTASSIUM CHLORIDE, DEXTROSE MONOHYDRATE AND SODIUM CHLORIDE 300; 5; 900 MG/100ML; G/100ML; MG/100ML
100 INJECTION, SOLUTION INTRAVENOUS CONTINUOUS
Status: DISCONTINUED | OUTPATIENT
Start: 2023-02-02 | End: 2023-02-02

## 2023-02-02 RX ORDER — ACETAMINOPHEN 500 MG
1000 TABLET ORAL ONCE
Status: CANCELLED | OUTPATIENT
Start: 2023-02-03 | End: 2023-02-02

## 2023-02-02 RX ADMIN — TAZOBACTAM SODIUM AND PIPERACILLIN SODIUM 3.38 G: 375; 3 INJECTION, SOLUTION INTRAVENOUS at 05:43

## 2023-02-02 RX ADMIN — ONDANSETRON 4 MG: 2 INJECTION INTRAMUSCULAR; INTRAVENOUS at 10:05

## 2023-02-02 RX ADMIN — HYDROMORPHONE HYDROCHLORIDE 0.5 MG: 1 INJECTION, SOLUTION INTRAMUSCULAR; INTRAVENOUS; SUBCUTANEOUS at 12:25

## 2023-02-02 RX ADMIN — LOSARTAN POTASSIUM 100 MG: 100 TABLET, FILM COATED ORAL at 10:05

## 2023-02-02 RX ADMIN — DEXTROSE MONOHYDRATE, SODIUM CHLORIDE, AND POTASSIUM CHLORIDE 100 ML/HR: 50; 9; 2.98 INJECTION, SOLUTION INTRAVENOUS at 12:22

## 2023-02-02 RX ADMIN — PANTOPRAZOLE SODIUM 40 MG: 40 INJECTION, POWDER, FOR SOLUTION INTRAVENOUS at 20:30

## 2023-02-02 RX ADMIN — PANTOPRAZOLE SODIUM 40 MG: 40 TABLET, DELAYED RELEASE ORAL at 06:31

## 2023-02-02 RX ADMIN — ONDANSETRON 4 MG: 2 INJECTION INTRAMUSCULAR; INTRAVENOUS at 23:01

## 2023-02-02 RX ADMIN — POTASSIUM CHLORIDE AND SODIUM CHLORIDE 50 ML/HR: 900; 150 INJECTION, SOLUTION INTRAVENOUS at 06:28

## 2023-02-02 RX ADMIN — DEXTROSE MONOHYDRATE, SODIUM CHLORIDE, AND POTASSIUM CHLORIDE 100 ML/HR: 50; 9; 2.98 INJECTION, SOLUTION INTRAVENOUS at 22:13

## 2023-02-02 RX ADMIN — HYDROMORPHONE HYDROCHLORIDE 0.5 MG: 1 INJECTION, SOLUTION INTRAMUSCULAR; INTRAVENOUS; SUBCUTANEOUS at 20:31

## 2023-02-02 RX ADMIN — TAZOBACTAM SODIUM AND PIPERACILLIN SODIUM 3.38 G: 375; 3 INJECTION, SOLUTION INTRAVENOUS at 23:01

## 2023-02-02 RX ADMIN — ONDANSETRON 4 MG: 2 INJECTION INTRAMUSCULAR; INTRAVENOUS at 04:26

## 2023-02-02 RX ADMIN — HYDRALAZINE HYDROCHLORIDE 20 MG: 20 INJECTION INTRAMUSCULAR; INTRAVENOUS at 17:53

## 2023-02-02 RX ADMIN — TAZOBACTAM SODIUM AND PIPERACILLIN SODIUM 3.38 G: 375; 3 INJECTION, SOLUTION INTRAVENOUS at 14:48

## 2023-02-02 RX ADMIN — HYDROMORPHONE HYDROCHLORIDE 0.5 MG: 1 INJECTION, SOLUTION INTRAMUSCULAR; INTRAVENOUS; SUBCUTANEOUS at 02:19

## 2023-02-02 RX ADMIN — DEXTROSE AND SODIUM CHLORIDE 75 ML/HR: 5; 900 INJECTION, SOLUTION INTRAVENOUS at 08:42

## 2023-02-02 NOTE — PLAN OF CARE
Goal Outcome Evaluation:  Plan of Care Reviewed With: patient        Progress: no change  Outcome Evaluation: Max Temp 99.4'F, BP readings improved during the night. Dr. Paez ordered for consult to Colorectal Surgeon, Dr. Javier Underwood informed. Unasyn discontinued and started on IV Zosyn as per Dr. Seng Guerrero. NPO, had some ice chips, nausea well controlled.  IV Fluids on flow. Up with standby assist, gait steady,  had 3 loose BM. IV Dilaudid given PRN for moderate pain/burning mid upper abdomen/epigastrium. Rested well in between.

## 2023-02-02 NOTE — PLAN OF CARE
I reviewed CT with radiology.  She has tiny air bubble in sigmoid near area of stenosis, likely microperforation.  Her abdominal exam is benign.  Will place on Zosyn.  Monitor serial abdominal exams.  If abdominal exam changes then may consider surgical consult.  Repeat labs in AM.  Okay with ice chips.  Will reassess in AM.

## 2023-02-02 NOTE — PROGRESS NOTES
Continued Stay Note  [facility]     Patient Name: Hali Tejeda  MRN: [id number]  Today's Date: 2/2/2023    Admit Date: 1/27/2023    Plan: Home (FU after surgery on 2/3)   Discharge Plan     Row Name 02/02/23 1539       Plan    Plan Home (FU after surgery on 2/3)    Plan Comments Patient is scheduled for surgery in am. CCP will FU on poss needs/equipment after surgery.               Discharge Codes    No documentation.               Expected Discharge Date and Time     Expected Discharge Date Expected Discharge Time    Feb 6, 2023             Dayanna Diaz RN

## 2023-02-02 NOTE — PROGRESS NOTES
[facility] Clinical Pharmacy Services: Piperacillin-Tazobactam Consult    Pt Name: Hali Tejeda   : 1960    Indication: Intra-Abdominal Infection    Relevant clinical data and objective history reviewed:    Past Medical History:   Diagnosis Date    HTN (hypertension)      Creatinine   Date Value Ref Range Status   2023 0.59 0.57 - 1.00 mg/dL Final   2023 0.59 0.57 - 1.00 mg/dL Final   2023 0.60 0.57 - 1.00 mg/dL Final     BUN   Date Value Ref Range Status   2023 7 (L) 8 - 23 mg/dL Final     Estimated Creatinine Clearance: 92.8 mL/min (by C-G formula based on SCr of 0.59 mg/dL).    Lab Results   Component Value Date    WBC 16.15 (H) 2023     Temp Readings from Last 3 Encounters:   23 98.5 °F (36.9 °C) (Oral)   23 98.1 °F (36.7 °C)   23 98.2 °F (36.8 °C)      Assessment/Plan  Estimated CrCl >20 mL/min at this time; BMI 32.89 kg/m2  Will start piperacillin-tazobactam 3.375 g IV every 8 hours     Pharmacy will continue to follow daily while on piperacillin-tazobactam and adjust as needed. Thank you for this consult.    Brittney Cano [facility]  Clinical Pharmacist

## 2023-02-02 NOTE — PROGRESS NOTES
"Daily progress note    Primary care physician  Dr. Delgado    Chief complaint  Doing same with no new complaint but still with severe lower abdominal pain    History of present illness  62-year-old female with history of hypertension presented to [facility] emergency room with left lower quadrant abdominal pain for last 1 month.  Patient also have occasional loose stools.  Patient denies any nausea vomiting.  Patient recently diagnosed with colitis and treated with oral antibiotics without any improvement.  Patient has noticed blood in the stools.  Patient evaluated in ER with CT abdominal pelvis which shows worsening colitis and failed outpatient treatment admit for management.  Patient has no fever chills chest pain shortness of breath with sweats weight loss or weight gain.     REVIEW OF SYSTEMS  Unremarkable except severe abdominal pain      PHYSICAL EXAM  Blood pressure 155/86, pulse 71, temperature 98.4 °F (36.9 °C), resp. rate 16, height 154.9 cm (60.98\"), weight 78.9 kg (174 lb), SpO2 97 %.    GENERAL:  Awake and alert in no acute distress  HEENT:  Unremarkable  NECK:  Supple  CV: regular rhythm, normal rate  RESPIRATORY: normal effort, clear to auscultation bilaterally  ABDOMEN: soft, left lower quadrant tenderness bowel sounds positive  MUSCULOSKELETAL: no deformity  NEURO: alert, moves all extremities, follows commands  PSYCH:  calm, cooperative  SKIN: warm, dry     LAB RESULTS  Lab Results (last 24 hours)     Procedure Component Value Units Date/Time    Basic Metabolic Panel [[id number]]  (Abnormal) Collected: 02/02/23 0535    Specimen: Blood Updated: 02/02/23 0627     Glucose 55 mg/dL      BUN 6 mg/dL      Creatinine 0.53 mg/dL      Sodium 135 mmol/L      Potassium 3.2 mmol/L      Chloride 100 mmol/L      CO2 21.0 mmol/L      Calcium 8.3 mg/dL      BUN/Creatinine Ratio 11.3     Anion Gap 14.0 mmol/L      eGFR 104.1 mL/min/1.73     Narrative:      GFR Normal >60  Chronic Kidney Disease <60  Kidney " Failure <15      CBC & Differential [[id number]]  (Abnormal) Collected: 02/02/23 0535    Specimen: Blood Updated: 02/02/23 0608    Narrative:      The following orders were created for panel order CBC & Differential.  Procedure                               Abnormality         Status                     ---------                               -----------         ------                     CBC Auto Differential[[id number]]        Abnormal            Final result                 Please view results for these tests on the individual orders.    CBC Auto Differential [[id number]]  (Abnormal) Collected: 02/02/23 0535    Specimen: Blood Updated: 02/02/23 0608     WBC 18.67 10*3/mm3      RBC 4.28 10*6/mm3      Hemoglobin 12.5 g/dL      Hematocrit 37.1 %      MCV 86.7 fL      MCH 29.2 pg      MCHC 33.7 g/dL      RDW 13.1 %      RDW-SD 41.4 fl      MPV 10.6 fL      Platelets 262 10*3/mm3      Neutrophil % 81.7 %      Lymphocyte % 11.9 %      Monocyte % 5.1 %      Eosinophil % 0.5 %      Basophil % 0.2 %      Immature Grans % 0.6 %      Neutrophils, Absolute 15.27 10*3/mm3      Lymphocytes, Absolute 2.22 10*3/mm3      Monocytes, Absolute 0.95 10*3/mm3      Eosinophils, Absolute 0.09 10*3/mm3      Basophils, Absolute 0.03 10*3/mm3      Immature Grans, Absolute 0.11 10*3/mm3      nRBC 0.0 /100 WBC     Tissue Pathology Exam [[id number]] Collected: 02/01/23 1521    Specimen: Tissue from Small Intestine; Tissue from Gastric, Antrum Updated: 02/01/23 2352    Blood Culture - Blood, Arm, Left [[id number]]  (Normal) Collected: 01/27/23 1808    Specimen: Blood from Arm, Left Updated: 02/01/23 1831     Blood Culture No growth at 5 days    Blood Culture - Blood, Arm, Right [[id number]]  (Normal) Collected: 01/27/23 1808    Specimen: Blood from Arm, Right Updated: 02/01/23 1831     Blood Culture No growth at 5 days        Imaging Results (Last 24 Hours)     Procedure Component Value Units Date/Time    CT Abdomen Pelvis With Contrast [[id number]]  Collected: 02/01/23 1848     Updated: 02/01/23 2105    Narrative:      CT ABDOMEN PELVIS W CONTRAST-     Radiation dose reduction techniques were utilized, including automated  exposure control and exposure modulation based on body size.     CLINICAL: 62-year-old female with colitis, complicated colonoscopy with  potential perforation     COMPARISON: Pre-colonoscopy CT 01/27/2023, 01/12/2023     FINDINGS: No gross free intraperitoneal air. There is considerable wall  thickening at the descending sigmoid junction, similar to the previous  examination. On image #112 there is a gas bubble which is just  superficial to the wall, not present on the previous examination and  consistent with microperforation. There is pericolonic fat induration at  the descending sigmoid junction which is more pronounced compared to the  previous examination, this suggests new edema and/or blood at this  location. The colon is moderately distended and primarily fluid-filled.  Wall thickening demonstrated along the transverse colon appears slightly  more pronounced compared to the previous examination. At the splenic  flexure there are small metallic markers or clips in position. There is  a small cleft along the inferior margin of the spleen which was present  on the 01/12/2023 examination. I see no convincing evidence of splenic  injury, there is a small amount of free peritoneal fluid interposed  between the spleen and splenic flexure. Small amount of free fluid is  also demonstrated along the right and left paracolic gutters.  Small-to-moderate amount of free fluid is demonstrated within the low  pelvis. Attenuation compatible with complex fluid.     A few diverticula arising from the descending colon similar to the  previous examinations. Moderate distention of the small bowel which is  filled with gas and fluid, likely ileus. Small bowel obstruction not  completely excluded.     Consolidation/atelectasis at the base of both lower  lobes, along the  costophrenic sulci. Small hiatal hernia. Postoperative change involving  the stomach which is largely collapsed. The liver, gallbladder and  pancreas have a satisfactory appearance, no biliary duct dilatation.  Diameter of the aorta is normal. There is a tiny left renal cortical  cyst, the kidneys are otherwise normal, no calculus or obstructive  uropathy. The urinary bladder is satisfactory in appearance. Vaginal  cuff is typical post hysterectomy. There is scarring of the anterior  abdominal wall.     CONCLUSION: There is colon wall thickening consistent with colitis, this  is most pronounced at the descending sigmoid junction. Increasing  pericolonic fat induration which could be due to worsening edema or  blood. There is a solitary tiny gas bubble at this location which  appears extrinsic to the colon wall, image 112, suggesting  microperforation however no gross free intraperitoneal gas is  demonstrated.     There is free intraperitoneal fluid, attenuation compatible with complex  fluid, the amount has increased since the previous examinations. Dilated  small bowel perhaps related to generalized ileus, small bowel  obstructing process is not excluded.     There appears to be a cleft along the inferior margin of the spleen  similar to 1/12/2023, no convincing evidence of acute traumatic splenic  injury. There is a small amount of fluid interposed between the spleen  and splenic flexure. Small clips seen at the splenic flexure indicating  the site of injury.     Findings of this report discussed with Dr. Seng Guerrero on 02/01/2023.        This report was finalized on 2/1/2023 9:02 PM by Dr. John Steel M.D.           Upper and lower endoscopy results noted and discussed with patient    Current Facility-Administered Medications:   •  dextrose 5 % and sodium chloride 0.9 % with KCl 40 mEq/L infusion, 100 mL/hr, Intravenous, Continuous, Lino Gaston MD, Last Rate: 100 mL/hr at 02/02/23 1222,  100 mL/hr at 02/02/23 1222  •  hydrALAZINE (APRESOLINE) injection 20 mg, 20 mg, Intravenous, Q4H PRN, Lino Gaston MD  •  HYDROmorphone (DILAUDID) injection 0.5 mg, 0.5 mg, Intravenous, 4x Daily PRN, Seng Guerrero MD, 0.5 mg at 02/02/23 1225  •  losartan (COZAAR) tablet 100 mg, 100 mg, Oral, Q24H, Seng Guerrero MD, 100 mg at 02/02/23 1005  •  ondansetron (ZOFRAN) injection 4 mg, 4 mg, Intravenous, Q6H, Seng Guerrero MD, 4 mg at 02/02/23 1005  •  pantoprazole (PROTONIX) injection 40 mg, 40 mg, Intravenous, Q12H, Lino Gaston MD  •  piperacillin-tazobactam (ZOSYN) 3.375 g in iso-osmotic dextrose 50 ml (premix), 3.375 g, Intravenous, Once, Lino Gaston MD  •  piperacillin-tazobactam (ZOSYN) 3.375 g in iso-osmotic dextrose 50 ml (premix), 3.375 g, Intravenous, Q8H, Lino Gaston MD  •  prochlorperazine (COMPAZINE) injection 5 mg, 5 mg, Intravenous, Q6H PRN, Seng Guerrero MD, 5 mg at 01/31/23 1236  •  sodium chloride 0.9 % flush 10 mL, 10 mL, Intravenous, PRN, Seng Guerrero MD  •  sodium chloride 0.9 % infusion, 30 mL/hr, Intravenous, Continuous PRN, Seng Guerrero MD, Last Rate: 30 mL/hr at 02/01/23 1332, Restarted at 02/01/23 1509     ASSESSMENT  Acute colitis with sigmoid stricture and microperforation  Hematochezia  Hypertension  Gastroesophageal reflux disease    PLAN  CPM  Continue IVF  Continue IV antibiotics  Pain management  OR in a.m.  Colorectal surgery appreciated  Symptomatic treatment of pain and nausea  Continue home medications  Stress ulcer DVT prophylaxis  Supportive care  PT OT  Discussed with nursing staff gastroenterology and colorectal surgery  Follow closely and further recommendation according to hospital course    SANG SINGLETARY MD    Copied text in this note has been reviewed and is accurate as of 02/02/23

## 2023-02-02 NOTE — CONSULTS
Hali Tejeda is a 62 y.o. female who is seen as a consult at the request of Gideon Hope MD for colitis, colonic microperforation.      HPI:  Pt's abdominal pain began about 2 months ago. She presented to the ED 1/12/23 and was discharged with diagnosis of colitis and treated with antibiotics. She was seen by Dr. Lutz shortly thereafter, who scheduled a colonoscopy and started the patient on mesalamine. However, the patient's abdominal pain and constipation/diarrhea continued to worsen, and she again presented to the ED on 1/27/23, where CT noted worsening colitis. She underwent colonoscopy yesterday, where a sigmoid stricture was noted and a traumatic mucosal tear at the splenic flexure was noted and repaired with endoscopic clips. CT was performed to assess for any further injury and noted a small bubble of air adjacent to the sigmoid, suggesting microperforation, without any sign of gross free intraperitoneal air. Consult was placed to colorectal surgery.    Today the patient states that she is feeling better today than she has been. She has had some chest discomfort that she attributes to esophageal reflux, but denies abdominal pain at this time, and states that she has been having BMs every time she urinates. She states she has felt hot and cold but states that was because of the temperature in her room. She just woke up and is sleepy at the time of my visit but is coherent and provides an appropriate history. She states that she is open to surgery whenever it needs to be done and just wants to feel better.    Past Medical History:   Diagnosis Date   • HTN (hypertension)        Past Surgical History:   Procedure Laterality Date   • BREAST LUMPECTOMY Left     benign   • COLONOSCOPY  2010    negative per pt   • COLONOSCOPY N/A 2/1/2023    Procedure: COLONOSCOPY to splenic flexure with clip placementx5;  Surgeon: Seng Guerrero MD;  Location: [facility] ENDOSCOPY;  Service: Gastroenterology;  Laterality: N/A;   pre: abnormal imaging, diarrhea  post: sigmoid stenosis, dilatation of colon, poor prep, diverticulosis, mucosal tear   • ENDOSCOPY N/A 2/1/2023    Procedure: ESOPHAGOGASTRODUODENOSCOPY with cold biopsies;  Surgeon: Seng Guerrero MD;  Location: [facility] ENDOSCOPY;  Service: Gastroenterology;  Laterality: N/A;  pre: abnormal imaging, nausea, vomitting  post: mild gastritis, small hiatal hernia   • GASTRIC SLEEVE LAPAROSCOPIC  2020   • HYSTERECTOMY  2005   • LAPAROSCOPIC GASTRIC BANDING      lap band removal       Social History:   reports that she has quit smoking. She has never used smokeless tobacco. She reports current alcohol use of about 1.0 standard drink per week. She reports that she does not use drugs.      Family History   Problem Relation Age of Onset   • Diabetes Mother        No current facility-administered medications on file prior to encounter.     Current Outpatient Medications on File Prior to Encounter   Medication Sig Dispense Refill   • levocetirizine (XYZAL) 5 MG tablet Take 5 mg by mouth As Needed.     • losartan-hydrochlorothiazide (HYZAAR) 50-12.5 MG per tablet Take 1 tablet by mouth Daily.     • Multiple Vitamin (MULTI-VITAMIN PO) Take 1 tablet by mouth Daily.     • ondansetron ODT (ZOFRAN-ODT) 4 MG disintegrating tablet Place 1 tablet on the tongue 4 (Four) Times a Day As Needed for Nausea or Vomiting. 12 tablet 0   • mesalamine (Lialda) 1.2 g EC tablet Take 4 tablets by mouth Daily With Breakfast. 120 tablet 11       Current Facility-Administered Medications:   •  dextrose 5 % and sodium chloride 0.9 % infusion, 75 mL/hr, Intravenous, Continuous, Gideon Hope MD, Last Rate: 75 mL/hr at 02/02/23 0842, 75 mL/hr at 02/02/23 0842  •  hydrALAZINE (APRESOLINE) injection 10 mg, 10 mg, Intravenous, Q4H PRN, Gideon Hope MD, 10 mg at 02/01/23 1634  •  HYDROmorphone (DILAUDID) injection 0.5 mg, 0.5 mg, Intravenous, 4x Daily PRN, Gideon Hope MD, 0.5 mg at 02/02/23 0219  •  losartan (COZAAR) tablet  100 mg, 100 mg, Oral, Q24H, Gideon Hope MD  •  mesalamine (LIALDA) EC tablet 4.8 g, 4.8 g, Oral, Daily With Breakfast, Gideon Hope MD  •  mineral oil enema 1 enema, 1 enema, Rectal, Once, Raisa Bond PA  •  multivitamin (THERAGRAN) tablet 1 tablet, 1 tablet, Oral, Daily, Gideon Hope MD, 1 tablet at 01/31/23 0915  •  ondansetron (ZOFRAN) injection 4 mg, 4 mg, Intravenous, Q6H, Raisa Bond PA, 4 mg at 02/02/23 0426  •  pantoprazole (PROTONIX) EC tablet 40 mg, 40 mg, Oral, BID AC, Gideon Hope MD, 40 mg at 02/02/23 0631  •  piperacillin-tazobactam (ZOSYN) 3.375 g in iso-osmotic dextrose 50 ml (premix), 3.375 g, Intravenous, Q8H, Seng Guerrero MD, 3.375 g at 02/02/23 0543  •  polyethylene glycol (MIRALAX) packet 17 g, 17 g, Oral, BID, Gideon Hope MD, 17 g at 01/30/23 1147  •  potassium chloride (KLOR-CON) packet 40 mEq, 40 mEq, Oral, Once, Gideon Hope MD  •  prochlorperazine (COMPAZINE) injection 5 mg, 5 mg, Intravenous, Q6H PRN, Riasa Bond PA, 5 mg at 01/31/23 1236  •  sodium chloride 0.9 % flush 10 mL, 10 mL, Intravenous, PRN, Morris Archer II, MD  •  sodium chloride 0.9 % flush 10 mL, 10 mL, Intravenous, Q12H, Seng Guerrero MD, 10 mL at 02/01/23 2113  •  sodium chloride 0.9 % flush 10 mL, 10 mL, Intravenous, PRN, Seng Guerrero MD  •  sodium chloride 0.9 % infusion, 30 mL/hr, Intravenous, Continuous PRN, Seng Guerrero MD, Last Rate: 30 mL/hr at 02/01/23 1332, Restarted at 02/01/23 1509    Allergy  Sulfa antibiotics    Vitals:    02/02/23 0457   BP: 150/91   Pulse: 71   Resp: 16   Temp: 99.4 °F (37.4 °C)   SpO2: 97%     Body mass index is 32.89 kg/m².    Physical Exam  Vitals reviewed.   Constitutional:       General: She is not in acute distress.     Appearance: Normal appearance. She is not toxic-appearing or diaphoretic.   HENT:      Head: Normocephalic and atraumatic.   Cardiovascular:      Rate and Rhythm: Normal rate.   Pulmonary:      Effort: Pulmonary effort  is normal. No respiratory distress.   Abdominal:      Palpations: Abdomen is soft.      Comments: nontender to light and deep palpation in all 4 quadrants   Skin:     General: Skin is warm and dry.   Neurological:      General: No focal deficit present.      Mental Status: She is alert.   Psychiatric:         Mood and Affect: Mood normal.         Behavior: Behavior normal.       Assessment:  - colitis with sigmoid stricture and microperforation    Plan:  • Patient is feeling better today than she has been. Her abdominal exam is benign at this time. Vital signs are stable. However, her WBC count continues to be elevated, and she has been having worsening pain over the past 2 months along with worsening colitis noted on radiologic exam. We will closely monitor her symptoms, as she is at high risk for needing surgical intervention during this admission.   • Continue NPO  • Recommend converting medications from oral to IV  • Plan discussed with Dr. Gaston    I have reviewed the history and plan as obtained by Adwoa Rogel PA-C:. I have independently examined the patient and I personally performed >50% of the management in this split shared patient. My exam confirms her physical findings and I agree with the plan as listed, with the addition to the following    Physical Exam  Constitutional:       General: Patient is not in acute distress.     Appearance: Normal appearance. Not toxic-appearing.   HENT:      Head: Normocephalic and atraumatic.   Cardiovascular:      Rate and Rhythm: Normal rate and regular rhythm.   Pulmonary:      Effort: Pulmonary effort is normal. No respiratory distress.      Breath sounds: Normal breath sounds. No wheezing or rhonchi.   Abdominal:      General: Bowel sounds are normal. There is sl distension.      Palpations: Abdomen is soft.      Tenderness: There is llq abdominal tenderness. There is no guarding or rebound.   Skin:     General: Skin is warm and dry.   Neurological:      General: No  focal deficit present.      Mental Status: Alert and oriented to person, place, and time.   Psychiatric:         Mood and Affect: Mood normal.         Behavior: Behavior normal.     I have personally reviewed:d/w Dr. Guerrero who does not believe IBD  [x]  Laboratory   []  Microbiology   [x]  Radiology  I reviewed images from yesterday images and report for microperf at sigmoid. Significant proximal bowel dilation.  I reviewed Ct images and report from admission with significant colon thickening.  Also ct from 1 mon ago images and report - sigmoid is thick  []  EKG/Telemetry   []  Cardiology/Vascular   []  Pathology   [x]  Some old records cy report with <1 cm circumference of the sigmoid at the stricture      Assessment: sigmoid stricture.causing near obstruction of colon  Plan:I recommend laparoscopic robotic sigmoid resection with possible colostomy or diverting ileostomy  •  I described with patient typical surgical time, postop recovery including the enhanced recovery protocol, pain management, and restrictions. I discussed with patient risks including but not limited to anastomotic breakdown and possible need for ostomy, bleeding, infection (interabdominal, abdominal wall or subcutaneous), pneumonia, DVT, PE, heart attack, or stroke,  the benefits, and alternatives.  The patient had opportunity to ask questions.  I answered all questions.  Patient understands and wishes to proceed with procedure.  • Discussed with patient, family and primary care team and Adwoa Rogel PA-C:

## 2023-02-02 NOTE — PROGRESS NOTES
Nutrition Services    Patient Name:  Hali Tejeda  YOB: 1960  MRN: [id number]  Admit Date:  1/27/2023    Assessment Date:  02/02/23    NUTRITION NOTE       Reason for Encounter Follow-up         Nutrition Order NPO Diet NPO Type: Ice Chips    EN/PN Order     Supplements/Snacks          Pertinent Information S/m'd attending and colorectal surgeon about plan for nutrition. Per colorectal surgeon, plans for nutrition to start after surgery (date has not been indicated).         Intervention/Plan Continue to follow and monitor for nutrition plans      RD to follow up per protocol.    Electronically signed by:  Shelby Berg RD  02/02/23 13:51 EST

## 2023-02-02 NOTE — PLAN OF CARE
Goal Outcome Evaluation:  Plan of Care Reviewed With: patient           Outcome Evaluation: pt pleasant and cooperative, NPO w ice chips, only c/o minimal abd pain today, denied scheduled zofran this afternoon, voiding freely, BP elevated but not requiring PRN meds yet on this shift, plan for surgery tomorrow, consent is signed, family visited at bedside, IVF adjusted this AM bc of low blood sugar w AM lab work although pt was asymptomatic

## 2023-02-02 NOTE — ANESTHESIA POSTPROCEDURE EVALUATION
"Patient: Hali Tejeda    Procedure Summary     Date: 02/01/23 Room / Location:  [facility]    Anesthesia Start: 1509 Anesthesia Stop: 1626    Procedures:       ESOPHAGOGASTRODUODENOSCOPY with cold biopsies (Esophagus)      COLONOSCOPY to splenic flexure with clip placementx5 Diagnosis:       Colitis      Nausea and vomiting, unspecified vomiting type      (Colitis [K52.9])      (Nausea and vomiting, unspecified vomiting type [R11.2])    Surgeons: Seng Guerrero MD Provider: Jhonathan Nguyen MD    Anesthesia Type: MAC ASA Status: 2          Anesthesia Type: MAC    Vitals  Vitals Value Taken Time   /85 02/01/23 1721   Temp     Pulse 73 02/01/23 1721   Resp 16 02/01/23 1721   SpO2 96 % 02/01/23 1721           Post Anesthesia Care and Evaluation    Level of consciousness: awake and alert  Pain management: adequate    Airway patency: patent  Anesthetic complications: No anesthetic complications  PONV Status: none  Cardiovascular status: acceptable  Respiratory status: acceptable  Hydration status: acceptable    Comments: /94 (BP Location: Left arm, Patient Position: Lying)   Pulse 73   Temp 36.7 °C (98 °F) (Oral)   Resp 16   Ht 154.9 cm (60.98\")   Wt 78.9 kg (174 lb)   SpO2 97%   BMI 32.89 kg/m²         "

## 2023-02-02 NOTE — PROGRESS NOTES
[facility]  Inpatient Progress Note    Reason for Followup:  colitis    Subjective:  Patient reports she still has some left lower abd pain although somewhat improved today.    Current Facility-Administered Medications:   •  dextrose 5 % and sodium chloride 0.9 % with KCl 40 mEq/L infusion, 100 mL/hr, Intravenous, Continuous, Lino Gaston MD, Last Rate: 100 mL/hr at 02/02/23 1222, 100 mL/hr at 02/02/23 1222  •  hydrALAZINE (APRESOLINE) injection 20 mg, 20 mg, Intravenous, Q4H PRN, Lino Gaston MD  •  HYDROmorphone (DILAUDID) injection 0.5 mg, 0.5 mg, Intravenous, 4x Daily PRN, Seng Guerrero MD, 0.5 mg at 02/02/23 1225  •  losartan (COZAAR) tablet 100 mg, 100 mg, Oral, Q24H, Seng Guerrero MD, 100 mg at 02/02/23 1005  •  ondansetron (ZOFRAN) injection 4 mg, 4 mg, Intravenous, Q6H, Seng Guerrero MD, 4 mg at 02/02/23 1005  •  pantoprazole (PROTONIX) injection 40 mg, 40 mg, Intravenous, Q12H, Lino Gaston MD  •  piperacillin-tazobactam (ZOSYN) 3.375 g in iso-osmotic dextrose 50 ml (premix), 3.375 g, Intravenous, Q8H, Lino Gaston MD, 3.375 g at 02/02/23 1448  •  prochlorperazine (COMPAZINE) injection 5 mg, 5 mg, Intravenous, Q6H PRN, Seng Guerrero MD, 5 mg at 01/31/23 1236  •  sodium chloride 0.9 % flush 10 mL, 10 mL, Intravenous, PRN, Seng Guerrero MD  •  sodium chloride 0.9 % infusion, 30 mL/hr, Intravenous, Continuous PRN, Seng Guerrero MD, Last Rate: 30 mL/hr at 02/01/23 1332, Restarted at 02/01/23 1509  Review of Systems:   Gen: No fever or chills  GI: +abd pain, no nausea, vomiting      Objective     Vital Signs  Temp:  [97.9 °F (36.6 °C)-99.4 °F (37.4 °C)] 98.4 °F (36.9 °C)  Heart Rate:  [69-83] 71  Resp:  [16] 16  BP: (150-205)/() 155/86  Body mass index is 32.89 kg/m².    Intake/Output Summary (Last 24 hours) at 2/2/2023 1526  Last data filed at 2/2/2023 1133  Gross per 24 hour   Intake 1113 ml   Output 300 ml   Net 813 ml     I/O this shift:  In: 113  [I.V.:113]  Out: 300 [Urine:300]     Physical Exam:   General: patient awake, alert and cooperative   Eyes: Normal lids and lashes, no scleral icterus   Neck: supple, normal ROM   Skin: warm and dry, not jaundiced   Cardiovascular: regular rate, well-perfused extremities   Pulm: Equal expansion bilaterally, no increased WOB   Abdomen: soft, minimal tenderness in left lower quadrant, nondistended; no rebound or guarding    Extremities: no rash or edema              Neuro: A&O, no obvious sign of focal deficit   Psychiatric: Normal mood and behavior; memory intact       Results Review:     I reviewed the patient's new clinical results.    Results from last 7 days   Lab Units 02/02/23  0535 02/01/23  0542 01/31/23  0605   WBC 10*3/mm3 18.67* 16.15* 18.00*   HEMOGLOBIN g/dL 12.5 12.8 13.7   HEMATOCRIT % 37.1 38.6 42.8   PLATELETS 10*3/mm3 262 268 283     Results from last 7 days   Lab Units 02/02/23  0535 02/01/23  0542 01/31/23  0605 01/30/23  0531 01/29/23  0756 01/28/23  0629 01/27/23  1445   SODIUM mmol/L 135* 139 136   < > 142   < > 144   POTASSIUM mmol/L 3.2* 4.1 3.0*   < > 2.9*   < > 3.4*   CHLORIDE mmol/L 100 104 102   < > 109*   < > 104   CO2 mmol/L 21.0* 25.4 23.0   < > 24.0   < > 27.4   BUN mg/dL 6* 7* 4*   < > 5*   < > 8   CREATININE mg/dL 0.53* 0.59 0.59   < > 0.53*   < > 0.84   CALCIUM mg/dL 8.3* 8.9 8.9   < > 8.5*   < > 10.1   BILIRUBIN mg/dL  --   --   --   --  0.4  --  0.5   ALK PHOS U/L  --   --   --   --  49  --  67   ALT (SGPT) U/L  --   --   --   --  5  --  8   AST (SGOT) U/L  --   --   --   --  12  --  15   GLUCOSE mg/dL 55* 79 79   < > 78   < > 110*    < > = values in this interval not displayed.         Lab Results   Lab Value Date/Time    LIPASE 20 01/27/2023 1445    LIPASE 16 01/12/2023 1601       Radiology:  CONCLUSION: There is colon wall thickening consistent with colitis, this  is most pronounced at the descending sigmoid junction. Increasing  pericolonic fat induration which could be due  to worsening edema or  blood. There is a solitary tiny gas bubble at this location which  appears extrinsic to the colon wall, image 112, suggesting  microperforation however no gross free intraperitoneal gas is  demonstrated.     There is free intraperitoneal fluid, attenuation compatible with complex  fluid, the amount has increased since the previous examinations. Dilated  small bowel perhaps related to generalized ileus, small bowel  obstructing process is not excluded.     There appears to be a cleft along the inferior margin of the spleen  similar to 1/12/2023, no convincing evidence of acute traumatic splenic  injury. There is a small amount of fluid interposed between the spleen  and splenic flexure. Small clips seen at the splenic flexure indicating  the site of injury.        Assessment & Plan   Assessment:   Abdominal Pain  Colitis  Diverticulosis (possibly recovering from episode of diverticulitis)  Nausea  Microperforation near sigmoid stenosis following scope  Rectal Bleeding    Plan:   - EGD 2/1 with sleeve gastrectomy, small HH, gastritis biopsied, normal duodenum biopsied with pathology pending  - Colonoscopy 2/1 with external hemorrhoids,  benign appearing intrinsic stenosis in sigmoid unable to traverse with peds colonoscope, able to traverse with gastroscope, dilation of lumen upstream the stenosis, copious stool, scope induced mucosal tear at 55 cm clipped x5, scope then aborted   - CT abd/pelvis performed following colonoscopy showed microperforation near sigmoid stenosis  - Benign abdominal exam  - Continue Zosyn  - Bowel rest  - Surgery following     I discussed the patient's findings and my recommendations with patient and consulting provider.

## 2023-02-03 ENCOUNTER — ANESTHESIA EVENT (OUTPATIENT)
Dept: PERIOP | Facility: HOSPITAL | Age: 63
DRG: 329 | End: 2023-02-03
Payer: COMMERCIAL

## 2023-02-03 ENCOUNTER — ANESTHESIA (OUTPATIENT)
Dept: PERIOP | Facility: HOSPITAL | Age: 63
DRG: 329 | End: 2023-02-03
Payer: COMMERCIAL

## 2023-02-03 PROBLEM — I10 HYPERTENSION: Status: ACTIVE | Noted: 2023-02-03

## 2023-02-03 PROBLEM — Q51.9 UTERINE ANOMALY: Status: ACTIVE | Noted: 2023-02-03

## 2023-02-03 LAB
ABO GROUP BLD: NORMAL
ALBUMIN SERPL-MCNC: 2.4 G/DL (ref 3.5–5.2)
ANION GAP SERPL CALCULATED.3IONS-SCNC: 7 MMOL/L (ref 5–15)
ANION GAP SERPL CALCULATED.3IONS-SCNC: 7 MMOL/L (ref 5–15)
BASOPHILS # BLD AUTO: 0.02 10*3/MM3 (ref 0–0.2)
BASOPHILS NFR BLD AUTO: 0.1 % (ref 0–1.5)
BLD GP AB SCN SERPL QL: NEGATIVE
BUN SERPL-MCNC: 3 MG/DL (ref 8–23)
BUN SERPL-MCNC: 5 MG/DL (ref 8–23)
BUN/CREAT SERPL: 5.7 (ref 7–25)
BUN/CREAT SERPL: 6.8 (ref 7–25)
CALCIUM SPEC-SCNC: 7.4 MG/DL (ref 8.6–10.5)
CALCIUM SPEC-SCNC: 8.4 MG/DL (ref 8.6–10.5)
CHLORIDE SERPL-SCNC: 105 MMOL/L (ref 98–107)
CHLORIDE SERPL-SCNC: 107 MMOL/L (ref 98–107)
CO2 SERPL-SCNC: 23 MMOL/L (ref 22–29)
CO2 SERPL-SCNC: 25 MMOL/L (ref 22–29)
CREAT SERPL-MCNC: 0.53 MG/DL (ref 0.57–1)
CREAT SERPL-MCNC: 0.74 MG/DL (ref 0.57–1)
DEPRECATED RDW RBC AUTO: 41.1 FL (ref 37–54)
EGFRCR SERPLBLD CKD-EPI 2021: 104.1 ML/MIN/1.73
EGFRCR SERPLBLD CKD-EPI 2021: 91 ML/MIN/1.73
EOSINOPHIL # BLD AUTO: 0.11 10*3/MM3 (ref 0–0.4)
EOSINOPHIL NFR BLD AUTO: 0.8 % (ref 0.3–6.2)
ERYTHROCYTE [DISTWIDTH] IN BLOOD BY AUTOMATED COUNT: 13.1 % (ref 12.3–15.4)
GLUCOSE BLDC GLUCOMTR-MCNC: 129 MG/DL (ref 70–130)
GLUCOSE SERPL-MCNC: 132 MG/DL (ref 65–99)
GLUCOSE SERPL-MCNC: 202 MG/DL (ref 65–99)
HCT VFR BLD AUTO: 34 % (ref 34–46.6)
HCT VFR BLD AUTO: 36.3 % (ref 34–46.6)
HGB BLD-MCNC: 10.8 G/DL (ref 12–15.9)
HGB BLD-MCNC: 12.1 G/DL (ref 12–15.9)
IMM GRANULOCYTES # BLD AUTO: 0.11 10*3/MM3 (ref 0–0.05)
IMM GRANULOCYTES NFR BLD AUTO: 0.8 % (ref 0–0.5)
LYMPHOCYTES # BLD AUTO: 1.94 10*3/MM3 (ref 0.7–3.1)
LYMPHOCYTES NFR BLD AUTO: 13.8 % (ref 19.6–45.3)
MCH RBC QN AUTO: 28.9 PG (ref 26.6–33)
MCHC RBC AUTO-ENTMCNC: 33.3 G/DL (ref 31.5–35.7)
MCV RBC AUTO: 86.8 FL (ref 79–97)
MONOCYTES # BLD AUTO: 1 10*3/MM3 (ref 0.1–0.9)
MONOCYTES NFR BLD AUTO: 7.1 % (ref 5–12)
NEUTROPHILS NFR BLD AUTO: 10.89 10*3/MM3 (ref 1.7–7)
NEUTROPHILS NFR BLD AUTO: 77.4 % (ref 42.7–76)
NRBC BLD AUTO-RTO: 0 /100 WBC (ref 0–0.2)
PHOSPHATE SERPL-MCNC: 3.5 MG/DL (ref 2.5–4.5)
PLATELET # BLD AUTO: 251 10*3/MM3 (ref 140–450)
PMV BLD AUTO: 10.7 FL (ref 6–12)
POTASSIUM SERPL-SCNC: 3.5 MMOL/L (ref 3.5–5.2)
POTASSIUM SERPL-SCNC: 4.7 MMOL/L (ref 3.5–5.2)
RBC # BLD AUTO: 4.18 10*6/MM3 (ref 3.77–5.28)
RH BLD: POSITIVE
SODIUM SERPL-SCNC: 137 MMOL/L (ref 136–145)
SODIUM SERPL-SCNC: 137 MMOL/L (ref 136–145)
T&S EXPIRATION DATE: NORMAL
WBC NRBC COR # BLD: 14.07 10*3/MM3 (ref 3.4–10.8)

## 2023-02-03 PROCEDURE — 86900 BLOOD TYPING SEROLOGIC ABO: CPT

## 2023-02-03 PROCEDURE — 25010000002 ONDANSETRON PER 1 MG: Performed by: INTERNAL MEDICINE

## 2023-02-03 PROCEDURE — P9041 ALBUMIN (HUMAN),5%, 50ML: HCPCS | Performed by: ANESTHESIOLOGY

## 2023-02-03 PROCEDURE — 80048 BASIC METABOLIC PNL TOTAL CA: CPT | Performed by: COLON & RECTAL SURGERY

## 2023-02-03 PROCEDURE — 86850 RBC ANTIBODY SCREEN: CPT | Performed by: PHYSICIAN ASSISTANT

## 2023-02-03 PROCEDURE — 0DBM0ZZ EXCISION OF DESCENDING COLON, OPEN APPROACH: ICD-10-PCS | Performed by: COLON & RECTAL SURGERY

## 2023-02-03 PROCEDURE — 86920 COMPATIBILITY TEST SPIN: CPT

## 2023-02-03 PROCEDURE — 25010000002 FENTANYL CITRATE (PF) 50 MCG/ML SOLUTION: Performed by: REGISTERED NURSE

## 2023-02-03 PROCEDURE — 86900 BLOOD TYPING SEROLOGIC ABO: CPT | Performed by: PHYSICIAN ASSISTANT

## 2023-02-03 PROCEDURE — 36430 TRANSFUSION BLD/BLD COMPNT: CPT

## 2023-02-03 PROCEDURE — 86923 COMPATIBILITY TEST ELECTRIC: CPT

## 2023-02-03 PROCEDURE — 25010000002 PIPERACILLIN SOD-TAZOBACTAM PER 1 G: Performed by: ANESTHESIOLOGY

## 2023-02-03 PROCEDURE — P9016 RBC LEUKOCYTES REDUCED: HCPCS

## 2023-02-03 PROCEDURE — 82962 GLUCOSE BLOOD TEST: CPT

## 2023-02-03 PROCEDURE — 25010000002 LIDOCAINE PER 10 MG: Performed by: REGISTERED NURSE

## 2023-02-03 PROCEDURE — 25010000002 HYDROMORPHONE PER 4 MG: Performed by: INTERNAL MEDICINE

## 2023-02-03 PROCEDURE — 0DTN0ZZ RESECTION OF SIGMOID COLON, OPEN APPROACH: ICD-10-PCS | Performed by: COLON & RECTAL SURGERY

## 2023-02-03 PROCEDURE — 25010000002 PROPOFOL 10 MG/ML EMULSION: Performed by: REGISTERED NURSE

## 2023-02-03 PROCEDURE — 25010000002 ONDANSETRON PER 1 MG: Performed by: REGISTERED NURSE

## 2023-02-03 PROCEDURE — 85014 HEMATOCRIT: CPT | Performed by: COLON & RECTAL SURGERY

## 2023-02-03 PROCEDURE — 0 BUPIVACAINE LIPOSOME 1.3 % SUSPENSION: Performed by: ANESTHESIOLOGY

## 2023-02-03 PROCEDURE — 88307 TISSUE EXAM BY PATHOLOGIST: CPT | Performed by: COLON & RECTAL SURGERY

## 2023-02-03 PROCEDURE — 44141 PARTIAL REMOVAL OF COLON: CPT | Performed by: PHYSICIAN ASSISTANT

## 2023-02-03 PROCEDURE — 25010000002 HYDROMORPHONE PER 4 MG: Performed by: ANESTHESIOLOGY

## 2023-02-03 PROCEDURE — 25010000002 ALBUMIN HUMAN 5% PER 50 ML: Performed by: ANESTHESIOLOGY

## 2023-02-03 PROCEDURE — 25010000002 PIPERACILLIN SOD-TAZOBACTAM PER 1 G: Performed by: COLON & RECTAL SURGERY

## 2023-02-03 PROCEDURE — C9290 INJ, BUPIVACAINE LIPOSOME: HCPCS | Performed by: ANESTHESIOLOGY

## 2023-02-03 PROCEDURE — 25010000002 NEOSTIGMINE 5 MG/10ML SOLUTION: Performed by: ANESTHESIOLOGY

## 2023-02-03 PROCEDURE — 25010000002 MAGNESIUM SULFATE PER 500 MG OF MAGNESIUM: Performed by: REGISTERED NURSE

## 2023-02-03 PROCEDURE — 44139 MOBILIZATION OF COLON: CPT | Performed by: COLON & RECTAL SURGERY

## 2023-02-03 PROCEDURE — 86901 BLOOD TYPING SEROLOGIC RH(D): CPT | Performed by: PHYSICIAN ASSISTANT

## 2023-02-03 PROCEDURE — 85025 COMPLETE CBC W/AUTO DIFF WBC: CPT | Performed by: COLON & RECTAL SURGERY

## 2023-02-03 PROCEDURE — 8E0W4CZ ROBOTIC ASSISTED PROCEDURE OF TRUNK REGION, PERCUTANEOUS ENDOSCOPIC APPROACH: ICD-10-PCS | Performed by: COLON & RECTAL SURGERY

## 2023-02-03 PROCEDURE — 0D1M0Z4 BYPASS DESCENDING COLON TO CUTANEOUS, OPEN APPROACH: ICD-10-PCS | Performed by: COLON & RECTAL SURGERY

## 2023-02-03 PROCEDURE — 99232 SBSQ HOSP IP/OBS MODERATE 35: CPT | Performed by: PHYSICIAN ASSISTANT

## 2023-02-03 PROCEDURE — 80069 RENAL FUNCTION PANEL: CPT | Performed by: COLON & RECTAL SURGERY

## 2023-02-03 PROCEDURE — 85018 HEMOGLOBIN: CPT | Performed by: COLON & RECTAL SURGERY

## 2023-02-03 PROCEDURE — 25010000002 KETOROLAC TROMETHAMINE PER 15 MG: Performed by: COLON & RECTAL SURGERY

## 2023-02-03 PROCEDURE — 25010000002 DEXAMETHASONE PER 1 MG: Performed by: REGISTERED NURSE

## 2023-02-03 PROCEDURE — 44141 PARTIAL REMOVAL OF COLON: CPT | Performed by: COLON & RECTAL SURGERY

## 2023-02-03 PROCEDURE — 86901 BLOOD TYPING SEROLOGIC RH(D): CPT

## 2023-02-03 PROCEDURE — C1765 ADHESION BARRIER: HCPCS | Performed by: COLON & RECTAL SURGERY

## 2023-02-03 PROCEDURE — 0W3G0ZZ CONTROL BLEEDING IN PERITONEAL CAVITY, OPEN APPROACH: ICD-10-PCS | Performed by: COLON & RECTAL SURGERY

## 2023-02-03 DEVICE — SUREFORM 45 RELOAD BLUE
Type: IMPLANTABLE DEVICE | Site: ABDOMEN | Status: FUNCTIONAL
Brand: SUREFORM

## 2023-02-03 DEVICE — FLOSEAL HEMOSTATIC MATRIX, 10ML
Type: IMPLANTABLE DEVICE | Site: ABDOMEN | Status: FUNCTIONAL
Brand: FLOSEAL HEMOSTATIC MATRIX

## 2023-02-03 DEVICE — SUREFORM 45 RELOAD GREEN
Type: IMPLANTABLE DEVICE | Site: SIGMOID COLON | Status: FUNCTIONAL
Brand: SUREFORM

## 2023-02-03 DEVICE — KNOTLESS TISSUE CONTROL DEVICE, VIOLET UNIDIRECTIONAL (ANTIBACTERIAL) SYNTHETIC ABSORBABLE DEVICE
Type: IMPLANTABLE DEVICE | Site: ABDOMEN | Status: FUNCTIONAL
Brand: STRATAFIX

## 2023-02-03 DEVICE — PROXIMATE RELOADABLE LINEAR CUTTER WITH SAFETY LOCK-OUT, 75MM
Type: IMPLANTABLE DEVICE | Site: ABDOMEN | Status: FUNCTIONAL
Brand: PROXIMATE

## 2023-02-03 RX ORDER — SODIUM CHLORIDE, SODIUM LACTATE, POTASSIUM CHLORIDE, CALCIUM CHLORIDE 600; 310; 30; 20 MG/100ML; MG/100ML; MG/100ML; MG/100ML
9 INJECTION, SOLUTION INTRAVENOUS CONTINUOUS
Status: DISCONTINUED | OUTPATIENT
Start: 2023-02-03 | End: 2023-02-03

## 2023-02-03 RX ORDER — ROCURONIUM BROMIDE 10 MG/ML
INJECTION, SOLUTION INTRAVENOUS AS NEEDED
Status: DISCONTINUED | OUTPATIENT
Start: 2023-02-03 | End: 2023-02-03 | Stop reason: SURG

## 2023-02-03 RX ORDER — GABAPENTIN 250 MG/5ML
250 SOLUTION ORAL EVERY 8 HOURS SCHEDULED
Status: DISCONTINUED | OUTPATIENT
Start: 2023-02-04 | End: 2023-02-05

## 2023-02-03 RX ORDER — KETOROLAC TROMETHAMINE 15 MG/ML
15 INJECTION, SOLUTION INTRAMUSCULAR; INTRAVENOUS EVERY 8 HOURS
Status: DISCONTINUED | OUTPATIENT
Start: 2023-02-03 | End: 2023-02-05

## 2023-02-03 RX ORDER — FLUMAZENIL 0.1 MG/ML
0.2 INJECTION INTRAVENOUS AS NEEDED
Status: DISCONTINUED | OUTPATIENT
Start: 2023-02-03 | End: 2023-02-03 | Stop reason: HOSPADM

## 2023-02-03 RX ORDER — SODIUM CHLORIDE 0.9 % (FLUSH) 0.9 %
3-10 SYRINGE (ML) INJECTION AS NEEDED
Status: DISCONTINUED | OUTPATIENT
Start: 2023-02-03 | End: 2023-02-03 | Stop reason: HOSPADM

## 2023-02-03 RX ORDER — CEFAZOLIN SODIUM 2 G/100ML
2 INJECTION, SOLUTION INTRAVENOUS ONCE
Status: DISCONTINUED | OUTPATIENT
Start: 2023-02-03 | End: 2023-02-03 | Stop reason: HOSPADM

## 2023-02-03 RX ORDER — PROPOFOL 10 MG/ML
VIAL (ML) INTRAVENOUS AS NEEDED
Status: DISCONTINUED | OUTPATIENT
Start: 2023-02-03 | End: 2023-02-03 | Stop reason: SURG

## 2023-02-03 RX ORDER — HYDROMORPHONE HYDROCHLORIDE 1 MG/ML
0.25 INJECTION, SOLUTION INTRAMUSCULAR; INTRAVENOUS; SUBCUTANEOUS
Status: DISCONTINUED | OUTPATIENT
Start: 2023-02-03 | End: 2023-02-03 | Stop reason: HOSPADM

## 2023-02-03 RX ORDER — PROMETHAZINE HYDROCHLORIDE 25 MG/1
25 SUPPOSITORY RECTAL ONCE AS NEEDED
Status: DISCONTINUED | OUTPATIENT
Start: 2023-02-03 | End: 2023-02-03

## 2023-02-03 RX ORDER — LIDOCAINE HYDROCHLORIDE ANHYDROUS AND DEXTROSE MONOHYDRATE 5; 400 G/100ML; MG/100ML
INJECTION, SOLUTION INTRAVENOUS CONTINUOUS PRN
Status: DISCONTINUED | OUTPATIENT
Start: 2023-02-03 | End: 2023-02-03 | Stop reason: SURG

## 2023-02-03 RX ORDER — NITROGLYCERIN 0.4 MG/1
0.4 TABLET SUBLINGUAL
Status: DISCONTINUED | OUTPATIENT
Start: 2023-02-03 | End: 2023-02-20

## 2023-02-03 RX ORDER — ACETAMINOPHEN 10 MG/ML
1000 INJECTION, SOLUTION INTRAVENOUS ONCE
Status: COMPLETED | OUTPATIENT
Start: 2023-02-03 | End: 2023-02-03

## 2023-02-03 RX ORDER — EPHEDRINE SULFATE 50 MG/ML
5 INJECTION, SOLUTION INTRAVENOUS ONCE AS NEEDED
Status: DISCONTINUED | OUTPATIENT
Start: 2023-02-03 | End: 2023-02-03 | Stop reason: HOSPADM

## 2023-02-03 RX ORDER — DIPHENHYDRAMINE HYDROCHLORIDE 50 MG/ML
12.5 INJECTION INTRAMUSCULAR; INTRAVENOUS
Status: DISCONTINUED | OUTPATIENT
Start: 2023-02-03 | End: 2023-02-03

## 2023-02-03 RX ORDER — BUPIVACAINE HYDROCHLORIDE AND EPINEPHRINE 2.5; 5 MG/ML; UG/ML
INJECTION, SOLUTION EPIDURAL; INFILTRATION; INTRACAUDAL; PERINEURAL AS NEEDED
Status: DISCONTINUED | OUTPATIENT
Start: 2023-02-03 | End: 2023-02-03 | Stop reason: HOSPADM

## 2023-02-03 RX ORDER — FAMOTIDINE 10 MG/ML
20 INJECTION, SOLUTION INTRAVENOUS ONCE
Status: COMPLETED | OUTPATIENT
Start: 2023-02-03 | End: 2023-02-03

## 2023-02-03 RX ORDER — HYDROMORPHONE HYDROCHLORIDE 1 MG/ML
0.5 INJECTION, SOLUTION INTRAMUSCULAR; INTRAVENOUS; SUBCUTANEOUS
Status: DISCONTINUED | OUTPATIENT
Start: 2023-02-03 | End: 2023-02-03

## 2023-02-03 RX ORDER — GABAPENTIN 400 MG/1
400 CAPSULE ORAL ONCE
Status: COMPLETED | OUTPATIENT
Start: 2023-02-03 | End: 2023-02-03

## 2023-02-03 RX ORDER — OXYCODONE AND ACETAMINOPHEN 7.5; 325 MG/1; MG/1
1 TABLET ORAL EVERY 4 HOURS PRN
Status: DISCONTINUED | OUTPATIENT
Start: 2023-02-03 | End: 2023-02-03 | Stop reason: HOSPADM

## 2023-02-03 RX ORDER — SCOLOPAMINE TRANSDERMAL SYSTEM 1 MG/1
1 PATCH, EXTENDED RELEASE TRANSDERMAL CONTINUOUS
Status: DISCONTINUED | OUTPATIENT
Start: 2023-02-03 | End: 2023-02-06

## 2023-02-03 RX ORDER — NALOXONE HCL 0.4 MG/ML
0.4 VIAL (ML) INJECTION
Status: DISCONTINUED | OUTPATIENT
Start: 2023-02-03 | End: 2023-02-03 | Stop reason: HOSPADM

## 2023-02-03 RX ORDER — HYDROCODONE BITARTRATE AND ACETAMINOPHEN 7.5; 325 MG/1; MG/1
1 TABLET ORAL ONCE AS NEEDED
Status: DISCONTINUED | OUTPATIENT
Start: 2023-02-03 | End: 2023-02-03

## 2023-02-03 RX ORDER — DEXAMETHASONE SODIUM PHOSPHATE 4 MG/ML
INJECTION, SOLUTION INTRA-ARTICULAR; INTRALESIONAL; INTRAMUSCULAR; INTRAVENOUS; SOFT TISSUE AS NEEDED
Status: DISCONTINUED | OUTPATIENT
Start: 2023-02-03 | End: 2023-02-03 | Stop reason: SURG

## 2023-02-03 RX ORDER — GLYCOPYRROLATE 0.2 MG/ML
INJECTION INTRAMUSCULAR; INTRAVENOUS AS NEEDED
Status: DISCONTINUED | OUTPATIENT
Start: 2023-02-03 | End: 2023-02-03 | Stop reason: SURG

## 2023-02-03 RX ORDER — SODIUM CHLORIDE, SODIUM LACTATE, POTASSIUM CHLORIDE, CALCIUM CHLORIDE 600; 310; 30; 20 MG/100ML; MG/100ML; MG/100ML; MG/100ML
INJECTION, SOLUTION INTRAVENOUS CONTINUOUS PRN
Status: DISCONTINUED | OUTPATIENT
Start: 2023-02-03 | End: 2023-02-03

## 2023-02-03 RX ORDER — METOCLOPRAMIDE HYDROCHLORIDE 5 MG/ML
10 INJECTION INTRAMUSCULAR; INTRAVENOUS ONCE AS NEEDED
Status: DISCONTINUED | OUTPATIENT
Start: 2023-02-03 | End: 2023-02-03 | Stop reason: HOSPADM

## 2023-02-03 RX ORDER — ULTRASOUND COUPLING MEDIUM
GEL (GRAM) TOPICAL AS NEEDED
Status: DISCONTINUED | OUTPATIENT
Start: 2023-02-03 | End: 2023-02-03 | Stop reason: HOSPADM

## 2023-02-03 RX ORDER — NALOXONE HCL 0.4 MG/ML
0.4 VIAL (ML) INJECTION
Status: DISCONTINUED | OUTPATIENT
Start: 2023-02-03 | End: 2023-02-20

## 2023-02-03 RX ORDER — NALOXONE HCL 0.4 MG/ML
0.2 VIAL (ML) INJECTION AS NEEDED
Status: DISCONTINUED | OUTPATIENT
Start: 2023-02-03 | End: 2023-02-03 | Stop reason: HOSPADM

## 2023-02-03 RX ORDER — DIPHENHYDRAMINE HCL 25 MG
25 CAPSULE ORAL
Status: DISCONTINUED | OUTPATIENT
Start: 2023-02-03 | End: 2023-02-03

## 2023-02-03 RX ORDER — ACETAMINOPHEN 10 MG/ML
1000 INJECTION, SOLUTION INTRAVENOUS EVERY 6 HOURS
Status: DISCONTINUED | OUTPATIENT
Start: 2023-02-04 | End: 2023-02-07

## 2023-02-03 RX ORDER — MAGNESIUM SULFATE HEPTAHYDRATE 500 MG/ML
INJECTION, SOLUTION INTRAMUSCULAR; INTRAVENOUS AS NEEDED
Status: DISCONTINUED | OUTPATIENT
Start: 2023-02-03 | End: 2023-02-03 | Stop reason: SURG

## 2023-02-03 RX ORDER — ONDANSETRON 2 MG/ML
4 INJECTION INTRAMUSCULAR; INTRAVENOUS EVERY 6 HOURS PRN
Status: DISCONTINUED | OUTPATIENT
Start: 2023-02-03 | End: 2023-02-21

## 2023-02-03 RX ORDER — SODIUM CHLORIDE, SODIUM LACTATE, POTASSIUM CHLORIDE, CALCIUM CHLORIDE 600; 310; 30; 20 MG/100ML; MG/100ML; MG/100ML; MG/100ML
125 INJECTION, SOLUTION INTRAVENOUS CONTINUOUS
Status: DISCONTINUED | OUTPATIENT
Start: 2023-02-03 | End: 2023-02-05

## 2023-02-03 RX ORDER — MAGNESIUM HYDROXIDE 1200 MG/15ML
LIQUID ORAL AS NEEDED
Status: DISCONTINUED | OUTPATIENT
Start: 2023-02-03 | End: 2023-02-03 | Stop reason: HOSPADM

## 2023-02-03 RX ORDER — ONDANSETRON 2 MG/ML
INJECTION INTRAMUSCULAR; INTRAVENOUS AS NEEDED
Status: DISCONTINUED | OUTPATIENT
Start: 2023-02-03 | End: 2023-02-03 | Stop reason: SURG

## 2023-02-03 RX ORDER — INDOCYANINE GREEN AND WATER 25 MG
KIT INJECTION AS NEEDED
Status: DISCONTINUED | OUTPATIENT
Start: 2023-02-03 | End: 2023-02-03 | Stop reason: SURG

## 2023-02-03 RX ORDER — MIDAZOLAM HYDROCHLORIDE 1 MG/ML
1 INJECTION INTRAMUSCULAR; INTRAVENOUS
Status: DISCONTINUED | OUTPATIENT
Start: 2023-02-03 | End: 2023-02-03 | Stop reason: HOSPADM

## 2023-02-03 RX ORDER — BUPIVACAINE HYDROCHLORIDE 2.5 MG/ML
INJECTION, SOLUTION EPIDURAL; INFILTRATION; INTRACAUDAL
Status: COMPLETED | OUTPATIENT
Start: 2023-02-03 | End: 2023-02-03

## 2023-02-03 RX ORDER — ALVIMOPAN 12 MG/1
12 CAPSULE ORAL ONCE
Status: COMPLETED | OUTPATIENT
Start: 2023-02-03 | End: 2023-02-03

## 2023-02-03 RX ORDER — LABETALOL HYDROCHLORIDE 5 MG/ML
5 INJECTION, SOLUTION INTRAVENOUS
Status: DISCONTINUED | OUTPATIENT
Start: 2023-02-03 | End: 2023-02-03 | Stop reason: HOSPADM

## 2023-02-03 RX ORDER — ONDANSETRON 2 MG/ML
4 INJECTION INTRAMUSCULAR; INTRAVENOUS ONCE AS NEEDED
Status: DISCONTINUED | OUTPATIENT
Start: 2023-02-03 | End: 2023-02-03 | Stop reason: HOSPADM

## 2023-02-03 RX ORDER — METRONIDAZOLE 500 MG/100ML
500 INJECTION, SOLUTION INTRAVENOUS ONCE
Status: DISCONTINUED | OUTPATIENT
Start: 2023-02-03 | End: 2023-02-03 | Stop reason: HOSPADM

## 2023-02-03 RX ORDER — FENTANYL CITRATE 50 UG/ML
INJECTION, SOLUTION INTRAMUSCULAR; INTRAVENOUS AS NEEDED
Status: DISCONTINUED | OUTPATIENT
Start: 2023-02-03 | End: 2023-02-03 | Stop reason: SURG

## 2023-02-03 RX ORDER — HYDRALAZINE HYDROCHLORIDE 20 MG/ML
5 INJECTION INTRAMUSCULAR; INTRAVENOUS
Status: DISCONTINUED | OUTPATIENT
Start: 2023-02-03 | End: 2023-02-03 | Stop reason: HOSPADM

## 2023-02-03 RX ORDER — SODIUM CHLORIDE 9 MG/ML
INJECTION, SOLUTION INTRAVENOUS AS NEEDED
Status: DISCONTINUED | OUTPATIENT
Start: 2023-02-03 | End: 2023-02-03 | Stop reason: HOSPADM

## 2023-02-03 RX ORDER — LIDOCAINE HYDROCHLORIDE 10 MG/ML
0.5 INJECTION, SOLUTION EPIDURAL; INFILTRATION; INTRACAUDAL; PERINEURAL ONCE AS NEEDED
Status: DISCONTINUED | OUTPATIENT
Start: 2023-02-03 | End: 2023-02-03 | Stop reason: HOSPADM

## 2023-02-03 RX ORDER — PHENYLEPHRINE HCL IN 0.9% NACL 1 MG/10 ML
SYRINGE (ML) INTRAVENOUS AS NEEDED
Status: DISCONTINUED | OUTPATIENT
Start: 2023-02-03 | End: 2023-02-03 | Stop reason: SURG

## 2023-02-03 RX ORDER — CELECOXIB 200 MG/1
200 CAPSULE ORAL ONCE
Status: COMPLETED | OUTPATIENT
Start: 2023-02-03 | End: 2023-02-03

## 2023-02-03 RX ORDER — PROMETHAZINE HYDROCHLORIDE 25 MG/1
25 TABLET ORAL ONCE AS NEEDED
Status: DISCONTINUED | OUTPATIENT
Start: 2023-02-03 | End: 2023-02-03

## 2023-02-03 RX ORDER — ALVIMOPAN 12 MG/1
12 CAPSULE ORAL 2 TIMES DAILY
Status: DISCONTINUED | OUTPATIENT
Start: 2023-02-04 | End: 2023-02-06

## 2023-02-03 RX ORDER — HYDROMORPHONE HYDROCHLORIDE 1 MG/ML
0.25 INJECTION, SOLUTION INTRAMUSCULAR; INTRAVENOUS; SUBCUTANEOUS
Status: DISPENSED | OUTPATIENT
Start: 2023-02-03 | End: 2023-02-13

## 2023-02-03 RX ORDER — FENTANYL CITRATE 50 UG/ML
50 INJECTION, SOLUTION INTRAMUSCULAR; INTRAVENOUS
Status: DISCONTINUED | OUTPATIENT
Start: 2023-02-03 | End: 2023-02-03

## 2023-02-03 RX ORDER — LABETALOL HYDROCHLORIDE 5 MG/ML
INJECTION, SOLUTION INTRAVENOUS AS NEEDED
Status: DISCONTINUED | OUTPATIENT
Start: 2023-02-03 | End: 2023-02-03 | Stop reason: SURG

## 2023-02-03 RX ORDER — NEOSTIGMINE METHYLSULFATE 0.5 MG/ML
INJECTION, SOLUTION INTRAVENOUS AS NEEDED
Status: DISCONTINUED | OUTPATIENT
Start: 2023-02-03 | End: 2023-02-03 | Stop reason: SURG

## 2023-02-03 RX ORDER — LIDOCAINE HYDROCHLORIDE 20 MG/ML
INJECTION, SOLUTION INFILTRATION; PERINEURAL AS NEEDED
Status: DISCONTINUED | OUTPATIENT
Start: 2023-02-03 | End: 2023-02-03 | Stop reason: SURG

## 2023-02-03 RX ORDER — ALBUMIN, HUMAN INJ 5% 5 %
SOLUTION INTRAVENOUS CONTINUOUS PRN
Status: DISCONTINUED | OUTPATIENT
Start: 2023-02-03 | End: 2023-02-03 | Stop reason: SURG

## 2023-02-03 RX ORDER — ACETAMINOPHEN 500 MG
1000 TABLET ORAL ONCE
Status: COMPLETED | OUTPATIENT
Start: 2023-02-03 | End: 2023-02-03

## 2023-02-03 RX ORDER — SODIUM CHLORIDE 0.9 % (FLUSH) 0.9 %
3 SYRINGE (ML) INJECTION EVERY 12 HOURS SCHEDULED
Status: DISCONTINUED | OUTPATIENT
Start: 2023-02-03 | End: 2023-02-03 | Stop reason: HOSPADM

## 2023-02-03 RX ORDER — KETAMINE HCL IN NACL, ISO-OSM 100MG/10ML
SYRINGE (ML) INJECTION AS NEEDED
Status: DISCONTINUED | OUTPATIENT
Start: 2023-02-03 | End: 2023-02-03 | Stop reason: SURG

## 2023-02-03 RX ORDER — SODIUM CHLORIDE 9 MG/ML
INJECTION, SOLUTION INTRAVENOUS CONTINUOUS PRN
Status: DISCONTINUED | OUTPATIENT
Start: 2023-02-03 | End: 2023-02-03 | Stop reason: SURG

## 2023-02-03 RX ADMIN — FENTANYL CITRATE 50 MCG: 50 INJECTION, SOLUTION INTRAMUSCULAR; INTRAVENOUS at 16:08

## 2023-02-03 RX ADMIN — ONDANSETRON 4 MG: 2 INJECTION INTRAMUSCULAR; INTRAVENOUS at 13:36

## 2023-02-03 RX ADMIN — ROCURONIUM BROMIDE 35 MG: 50 INJECTION INTRAVENOUS at 17:46

## 2023-02-03 RX ADMIN — DEXTROSE MONOHYDRATE, SODIUM CHLORIDE, AND POTASSIUM CHLORIDE 100 ML/HR: 50; 9; 2.98 INJECTION, SOLUTION INTRAVENOUS at 08:18

## 2023-02-03 RX ADMIN — HYDROMORPHONE HYDROCHLORIDE 0.25 MG: 1 INJECTION, SOLUTION INTRAMUSCULAR; INTRAVENOUS; SUBCUTANEOUS at 21:33

## 2023-02-03 RX ADMIN — ROCURONIUM BROMIDE 20 MG: 50 INJECTION INTRAVENOUS at 14:10

## 2023-02-03 RX ADMIN — HYDROMORPHONE HYDROCHLORIDE 0.5 MG: 1 INJECTION, SOLUTION INTRAMUSCULAR; INTRAVENOUS; SUBCUTANEOUS at 04:47

## 2023-02-03 RX ADMIN — Medication 200 MCG: at 18:42

## 2023-02-03 RX ADMIN — ROCURONIUM BROMIDE 10 MG: 50 INJECTION INTRAVENOUS at 17:13

## 2023-02-03 RX ADMIN — KETOROLAC TROMETHAMINE 15 MG: 15 INJECTION, SOLUTION INTRAMUSCULAR; INTRAVENOUS at 23:37

## 2023-02-03 RX ADMIN — Medication 200 MCG: at 18:02

## 2023-02-03 RX ADMIN — ACETAMINOPHEN 1000 MG: 10 INJECTION, SOLUTION INTRAVENOUS at 20:30

## 2023-02-03 RX ADMIN — LIDOCAINE HYDROCHLORIDE 2 MG/MIN: 4 INJECTION, SOLUTION INTRAVENOUS at 13:43

## 2023-02-03 RX ADMIN — ALBUMIN HUMAN: 0.05 INJECTION, SOLUTION INTRAVENOUS at 20:07

## 2023-02-03 RX ADMIN — ALVIMOPAN 12 MG: 12 CAPSULE ORAL at 12:52

## 2023-02-03 RX ADMIN — ONDANSETRON 4 MG: 2 INJECTION INTRAMUSCULAR; INTRAVENOUS at 04:47

## 2023-02-03 RX ADMIN — ROCURONIUM BROMIDE 10 MG: 50 INJECTION INTRAVENOUS at 14:51

## 2023-02-03 RX ADMIN — TAZOBACTAM SODIUM AND PIPERACILLIN SODIUM 3.38 G: 375; 3 INJECTION, SOLUTION INTRAVENOUS at 08:06

## 2023-02-03 RX ADMIN — ROCURONIUM BROMIDE 10 MG: 50 INJECTION INTRAVENOUS at 15:19

## 2023-02-03 RX ADMIN — FENTANYL CITRATE 50 MCG: 50 INJECTION, SOLUTION INTRAMUSCULAR; INTRAVENOUS at 13:24

## 2023-02-03 RX ADMIN — Medication 200 MCG: at 18:52

## 2023-02-03 RX ADMIN — LOSARTAN POTASSIUM 100 MG: 100 TABLET, FILM COATED ORAL at 08:07

## 2023-02-03 RX ADMIN — TAZOBACTAM SODIUM AND PIPERACILLIN SODIUM 3.38 G: 375; 3 INJECTION, SOLUTION INTRAVENOUS at 19:45

## 2023-02-03 RX ADMIN — SODIUM CHLORIDE, POTASSIUM CHLORIDE, SODIUM LACTATE AND CALCIUM CHLORIDE: 600; 310; 30; 20 INJECTION, SOLUTION INTRAVENOUS at 13:25

## 2023-02-03 RX ADMIN — GABAPENTIN 400 MG: 400 CAPSULE ORAL at 12:51

## 2023-02-03 RX ADMIN — ROCURONIUM BROMIDE 50 MG: 50 INJECTION INTRAVENOUS at 13:29

## 2023-02-03 RX ADMIN — MAGNESIUM SULFATE HEPTAHYDRATE 2 G: 500 INJECTION, SOLUTION INTRAMUSCULAR; INTRAVENOUS at 13:33

## 2023-02-03 RX ADMIN — Medication 200 MCG: at 17:53

## 2023-02-03 RX ADMIN — FAMOTIDINE 20 MG: 10 INJECTION INTRAVENOUS at 12:51

## 2023-02-03 RX ADMIN — GLYCOPYRROLATE 0.5 MG: 1 INJECTION INTRAMUSCULAR; INTRAVENOUS at 19:57

## 2023-02-03 RX ADMIN — DEXAMETHASONE SODIUM PHOSPHATE 8 MG: 4 INJECTION, SOLUTION INTRA-ARTICULAR; INTRALESIONAL; INTRAMUSCULAR; INTRAVENOUS; SOFT TISSUE at 13:36

## 2023-02-03 RX ADMIN — NEOSTIGMINE METHYLSULFATE 2.5 MG: 0.5 INJECTION INTRAVENOUS at 19:57

## 2023-02-03 RX ADMIN — LABETALOL HYDROCHLORIDE 10 MG: 5 INJECTION, SOLUTION INTRAVENOUS at 14:14

## 2023-02-03 RX ADMIN — LIDOCAINE HYDROCHLORIDE 100 MG: 20 INJECTION, SOLUTION INFILTRATION; PERINEURAL at 13:24

## 2023-02-03 RX ADMIN — FENTANYL CITRATE 50 MCG: 50 INJECTION, SOLUTION INTRAMUSCULAR; INTRAVENOUS at 15:39

## 2023-02-03 RX ADMIN — Medication 150 MCG: at 16:23

## 2023-02-03 RX ADMIN — HYDROMORPHONE HYDROCHLORIDE 0.25 MG: 1 INJECTION, SOLUTION INTRAMUSCULAR; INTRAVENOUS; SUBCUTANEOUS at 21:03

## 2023-02-03 RX ADMIN — BUPIVACAINE HYDROCHLORIDE 30 ML: 2.5 INJECTION, SOLUTION EPIDURAL; INFILTRATION; INTRACAUDAL; PERINEURAL at 13:27

## 2023-02-03 RX ADMIN — PROPOFOL 50 MG: 10 INJECTION, EMULSION INTRAVENOUS at 17:13

## 2023-02-03 RX ADMIN — INDOCYANINE GREEN AND WATER 7.5 MG: KIT at 16:31

## 2023-02-03 RX ADMIN — ONDANSETRON 4 MG: 2 INJECTION INTRAMUSCULAR; INTRAVENOUS at 10:55

## 2023-02-03 RX ADMIN — PROPOFOL 150 MG: 10 INJECTION, EMULSION INTRAVENOUS at 13:25

## 2023-02-03 RX ADMIN — PANTOPRAZOLE SODIUM 40 MG: 40 INJECTION, POWDER, FOR SOLUTION INTRAVENOUS at 08:07

## 2023-02-03 RX ADMIN — SODIUM CHLORIDE: 9 INJECTION, SOLUTION INTRAVENOUS at 18:21

## 2023-02-03 RX ADMIN — SCOPALAMINE 1 PATCH: 1 PATCH, EXTENDED RELEASE TRANSDERMAL at 12:51

## 2023-02-03 RX ADMIN — BUPIVACAINE 20 ML: 13.3 INJECTION, SUSPENSION, LIPOSOMAL INFILTRATION at 13:35

## 2023-02-03 RX ADMIN — Medication 200 MCG: at 18:17

## 2023-02-03 RX ADMIN — CELECOXIB 200 MG: 200 CAPSULE ORAL at 12:51

## 2023-02-03 RX ADMIN — Medication 200 MCG: at 17:42

## 2023-02-03 RX ADMIN — Medication 20 MG: at 14:10

## 2023-02-03 RX ADMIN — Medication 50 MCG: at 16:16

## 2023-02-03 RX ADMIN — ACETAMINOPHEN 1000 MG: 500 TABLET, FILM COATED ORAL at 12:51

## 2023-02-03 RX ADMIN — Medication 30 MG: at 13:24

## 2023-02-03 NOTE — PLAN OF CARE
Goal Outcome Evaluation:  Plan of Care Reviewed With: patient           Outcome Evaluation: VSS, NPO at midnight, ilaudid given x2 for pain, zofran gived per mar, IVF infusing, IV abx given, up with asst, family visited briefly this evening, surgery to be done later today, will continue to monitor.

## 2023-02-03 NOTE — PROGRESS NOTES
Colorectal Surgery Progress Note    S: Pt feeling a little nervous but ready for surgery. Asks questions about surgery details and recovery.     O:  Temp:  [97.9 °F (36.6 °C)-99.7 °F (37.6 °C)] 99.7 °F (37.6 °C)  Heart Rate:  [71-88] 73  Resp:  [18] 18  BP: (123-179)/(73-99) 138/87    Intake/Output Summary (Last 24 hours) at 2/3/2023 0812  Last data filed at 2/2/2023 1851  Gross per 24 hour   Intake 113 ml   Output 1200 ml   Net -1087 ml     PE  Good color. Awake, alert, coherent. No acute distress.    Results from last 7 days   Lab Units 02/03/23  0526   WBC 10*3/mm3 14.07*   HEMOGLOBIN g/dL 12.1   HEMATOCRIT % 36.3   PLATELETS 10*3/mm3 251     Results from last 7 days   Lab Units 02/03/23  0526   SODIUM mmol/L 137   POTASSIUM mmol/L 3.5   CHLORIDE mmol/L 105   CO2 mmol/L 25.0   BUN mg/dL 3*   CREATININE mg/dL 0.53*   EGFR mL/min/1.73 104.1   GLUCOSE mg/dL 132*   CALCIUM mg/dL 8.4*       A/P: 62 y.o. female with sigmoid stricture causing near obstruction of colon  • To OR this afternoon for robotic sigmoid resection with possible colostomy or diverting ileostomy  • Consent signed   • Type and screen ordered  • Consult placed to WOCN to nely for colostomy  • Answered patient questions regarding incisions, colostomy, postoperative recovery

## 2023-02-03 NOTE — PROGRESS NOTES
"Daily progress note    Primary care physician  Dr. Delgado    Chief complaint  Doing same with no new complaint and going for surgery    History of present illness  62-year-old female with history of hypertension presented to [facility] emergency room with left lower quadrant abdominal pain for last 1 month.  Patient also have occasional loose stools.  Patient denies any nausea vomiting.  Patient recently diagnosed with colitis and treated with oral antibiotics without any improvement.  Patient has noticed blood in the stools.  Patient evaluated in ER with CT abdominal pelvis which shows worsening colitis and failed outpatient treatment admit for management.  Patient has no fever chills chest pain shortness of breath with sweats weight loss or weight gain.     REVIEW OF SYSTEMS  Unremarkable except severe abdominal pain      PHYSICAL EXAM  Blood pressure 173/83, pulse 72, temperature 98.6 °F (37 °C), temperature source Oral, resp. rate 16, height 154.9 cm (60.98\"), weight 78.9 kg (174 lb), SpO2 98 %.    GENERAL:  Awake and alert in no acute distress  HEENT:  Unremarkable  NECK:  Supple  CV: regular rhythm, normal rate  RESPIRATORY: normal effort, clear to auscultation bilaterally  ABDOMEN: soft, left lower quadrant tenderness bowel sounds positive  MUSCULOSKELETAL: no deformity  NEURO: alert, moves all extremities, follows commands  PSYCH:  calm, cooperative  SKIN: warm, dry     LAB RESULTS  Lab Results (last 24 hours)     Procedure Component Value Units Date/Time    POC Glucose Once [[id number]]  (Normal) Collected: 02/03/23 1157    Specimen: Blood Updated: 02/03/23 1208     Glucose 129 mg/dL      Comment: Meter: [id number] : [id number] Johanna Edward RN       Basic Metabolic Panel [[id number]]  (Abnormal) Collected: 02/03/23 0526    Specimen: Blood Updated: 02/03/23 0629     Glucose 132 mg/dL      BUN 3 mg/dL      Creatinine 0.53 mg/dL      Sodium 137 mmol/L      Potassium 3.5 mmol/L      Chloride 105 " mmol/L      CO2 25.0 mmol/L      Calcium 8.4 mg/dL      BUN/Creatinine Ratio 5.7     Anion Gap 7.0 mmol/L      eGFR 104.1 mL/min/1.73     Narrative:      GFR Normal >60  Chronic Kidney Disease <60  Kidney Failure <15      CBC & Differential [[id number]]  (Abnormal) Collected: 02/03/23 0526    Specimen: Blood Updated: 02/03/23 0608    Narrative:      The following orders were created for panel order CBC & Differential.  Procedure                               Abnormality         Status                     ---------                               -----------         ------                     CBC Auto Differential[[id number]]        Abnormal            Final result                 Please view results for these tests on the individual orders.    CBC Auto Differential [[id number]]  (Abnormal) Collected: 02/03/23 0526    Specimen: Blood Updated: 02/03/23 0608     WBC 14.07 10*3/mm3      RBC 4.18 10*6/mm3      Hemoglobin 12.1 g/dL      Hematocrit 36.3 %      MCV 86.8 fL      MCH 28.9 pg      MCHC 33.3 g/dL      RDW 13.1 %      RDW-SD 41.1 fl      MPV 10.7 fL      Platelets 251 10*3/mm3      Neutrophil % 77.4 %      Lymphocyte % 13.8 %      Monocyte % 7.1 %      Eosinophil % 0.8 %      Basophil % 0.1 %      Immature Grans % 0.8 %      Neutrophils, Absolute 10.89 10*3/mm3      Lymphocytes, Absolute 1.94 10*3/mm3      Monocytes, Absolute 1.00 10*3/mm3      Eosinophils, Absolute 0.11 10*3/mm3      Basophils, Absolute 0.02 10*3/mm3      Immature Grans, Absolute 0.11 10*3/mm3      nRBC 0.0 /100 WBC         Imaging Results (Last 24 Hours)     ** No results found for the last 24 hours. **        Upper and lower endoscopy results noted and discussed with patient    Current Facility-Administered Medications:   •  acetaminophen (TYLENOL) tablet 1,000 mg, 1,000 mg, Oral, Once, Lino Gaston MD  •  alvimopan (ENTEREG) capsule 12 mg, 12 mg, Oral, Once, Lino Gaston MD  •  ceFAZolin in dextrose (ANCEF) IVPB solution 2 g, 2 g,  Intravenous, Once **AND** metroNIDAZOLE (FLAGYL) IVPB 500 mg, 500 mg, Intravenous, Once, Lino Gaston MD  •  celecoxib (CeleBREX) capsule 200 mg, 200 mg, Oral, Once, Lino Gaston MD  •  dextrose 5 % and sodium chloride 0.9 % with KCl 40 mEq/L infusion, 100 mL/hr, Intravenous, Continuous, Lino Gaston MD, Last Rate: 100 mL/hr at 02/03/23 0818, 100 mL/hr at 02/03/23 0818  •  famotidine (PEPCID) injection 20 mg, 20 mg, Intravenous, Once, ePter Hayes MD  •  fentaNYL citrate (PF) (SUBLIMAZE) injection 50 mcg, 50 mcg, Intravenous, Q10 Min PRN, Peter Hayes MD  •  gabapentin (NEURONTIN) capsule 400 mg, 400 mg, Oral, Once, Lino Gaston MD  •  hydrALAZINE (APRESOLINE) injection 20 mg, 20 mg, Intravenous, Q4H PRN, Lino Gaston MD, 20 mg at 02/02/23 1753  •  [MAR Hold] HYDROmorphone (DILAUDID) injection 0.5 mg, 0.5 mg, Intravenous, 4x Daily PRN, Seng Guerrero MD, 0.5 mg at 02/03/23 0447  •  lactated ringers infusion, 9 mL/hr, Intravenous, Continuous, Peter Hayes MD  •  lidocaine PF 1% (XYLOCAINE) injection 0.5 mL, 0.5 mL, Injection, Once PRN, Peter Hayes MD  •  losartan (COZAAR) tablet 100 mg, 100 mg, Oral, Q24H, Seng Guerrero MD, 100 mg at 02/03/23 0807  •  midazolam (VERSED) injection 1 mg, 1 mg, Intravenous, Q10 Min PRN, Peter Hayes MD  •  [MAR Hold] ondansetron (ZOFRAN) injection 4 mg, 4 mg, Intravenous, Q6H, Seng Guerrero MD, 4 mg at 02/03/23 1055  •  [MAR Hold] pantoprazole (PROTONIX) injection 40 mg, 40 mg, Intravenous, Q12H, Lino Gaston MD, 40 mg at 02/03/23 0807  •  piperacillin-tazobactam (ZOSYN) 3.375 g in iso-osmotic dextrose 50 ml (premix), 3.375 g, Intravenous, Q8H, Lino Gaston MD, 3.375 g at 02/03/23 0806  •  [MAR Hold] prochlorperazine (COMPAZINE) injection 5 mg, 5 mg, Intravenous, Q6H PRN, Seng Guerrero MD, 5 mg at 01/31/23 1236  •  scopolamine patch 1 mg/72 hr, 1 patch, Transdermal, Continuous, Lino Gaston MD  •  [MAR  Hold] sodium chloride 0.9 % flush 10 mL, 10 mL, Intravenous, PRN, Seng Guerrero MD  •  sodium chloride 0.9 % flush 3 mL, 3 mL, Intravenous, Q12H, Peter Hayes MD  •  sodium chloride 0.9 % flush 3-10 mL, 3-10 mL, Intravenous, PRN, Peter Hayes MD  •  sodium chloride 0.9 % infusion, 30 mL/hr, Intravenous, Continuous PRN, Seng Guerrero MD, Last Rate: 30 mL/hr at 02/01/23 1332, Restarted at 02/01/23 1509     ASSESSMENT  Acute colitis with sigmoid stricture and microperforation  Hematochezia  Hypertension  Gastroesophageal reflux disease    PLAN  CPM  Continue IVF  Continue IV antibiotics  Pain management  OR today for robotic sigmoid resection with possible colostomy or diverting ileostomy  Colorectal surgery appreciated  Symptomatic treatment of pain and nausea  Continue home medications  Stress ulcer DVT prophylaxis  Supportive care  PT OT  Discussed with family nursing staff gastroenterology and colorectal surgery  Follow closely and further recommendation according to hospital course    SANG SINGLETARY MD    Copied text in this note has been reviewed and is accurate as of 02/03/23

## 2023-02-03 NOTE — PROGRESS NOTES
[facility]  Inpatient Progress Note    Reason for Follow-up:  colitis    Subjective     Interval History:   Sitting up at bedside. Abdominal pain controlled with pain medication. Plans for colon resection with Dr. Gaston later today.     Current Facility-Administered Medications:   •  dextrose 5 % and sodium chloride 0.9 % with KCl 40 mEq/L infusion, 100 mL/hr, Intravenous, Continuous, Lino Gaston MD, Last Rate: 100 mL/hr at 02/03/23 0818, 100 mL/hr at 02/03/23 0818  •  hydrALAZINE (APRESOLINE) injection 20 mg, 20 mg, Intravenous, Q4H PRN, Lino Gaston MD, 20 mg at 02/02/23 1753  •  HYDROmorphone (DILAUDID) injection 0.5 mg, 0.5 mg, Intravenous, 4x Daily PRN, Seng Guerrero MD, 0.5 mg at 02/03/23 0447  •  losartan (COZAAR) tablet 100 mg, 100 mg, Oral, Q24H, Seng Guerrero MD, 100 mg at 02/03/23 0807  •  ondansetron (ZOFRAN) injection 4 mg, 4 mg, Intravenous, Q6H, Seng Guerrero MD, 4 mg at 02/03/23 0447  •  pantoprazole (PROTONIX) injection 40 mg, 40 mg, Intravenous, Q12H, Lino Gaston MD, 40 mg at 02/03/23 0807  •  piperacillin-tazobactam (ZOSYN) 3.375 g in iso-osmotic dextrose 50 ml (premix), 3.375 g, Intravenous, Q8H, Lino Gaston MD, 3.375 g at 02/03/23 0806  •  prochlorperazine (COMPAZINE) injection 5 mg, 5 mg, Intravenous, Q6H PRN, Seng Guerrero MD, 5 mg at 01/31/23 1236  •  sodium chloride 0.9 % flush 10 mL, 10 mL, Intravenous, PRN, Seng Guerrero MD  •  sodium chloride 0.9 % infusion, 30 mL/hr, Intravenous, Continuous PRN, Seng Guerrero MD, Last Rate: 30 mL/hr at 02/01/23 1332, Restarted at 02/01/23 1509  Review of Systems:    Constitutional: No fevers, chills, sweats   Respiratory: No shortness of breath, cough   Cardiovascular: No Chest pain, palpitations   Gastrointestinal: No nausea, vomiting, diarrhea   Genitourinary: No hematuria, dysuria    Objective     Vital Signs  Temp:  [97.9 °F (36.6 °C)-99.7 °F (37.6 °C)] 99.7 °F (37.6 °C)  Heart Rate:  [71-88]  73  Resp:  [18] 18  BP: (123-179)/(73-99) 138/87  Body mass index is 32.89 kg/m².    Intake/Output Summary (Last 24 hours) at 2/3/2023 0853  Last data filed at 2/2/2023 1851  Gross per 24 hour   Intake --   Output 1200 ml   Net -1200 ml     No intake/output data recorded.     Physical Exam:   General: Awake, alert and conversive. No acute distress.   Eyes: Normal lids and lashes, no scleral icterus.   Neck: Supple and symmetric. Trachea midline.    Skin: Warm and dry, not jaundiced.    Cardiovascular: Regular rate and rhythm. No murmur.   Pulm: Quiet, even, nonlabored breathing. Clear to auscultation bilaterally.    Abdomen: Soft, nondistended, nontender. No rebound or guarding. Bowel sounds present in all 4 quadrants.   Extremities: No rashes or edema.   Psychiatric: Appropriate mood and affect. Cooperative.     Results Review:     I reviewed the patient's new clinical results.    Results from last 7 days   Lab Units 02/03/23  0526 02/02/23  0535 02/01/23  0542   WBC 10*3/mm3 14.07* 18.67* 16.15*   HEMOGLOBIN g/dL 12.1 12.5 12.8   HEMATOCRIT % 36.3 37.1 38.6   PLATELETS 10*3/mm3 251 262 268     Results from last 7 days   Lab Units 02/03/23  0526 02/02/23  0535 02/01/23  0542 01/30/23  0531 01/29/23  0756 01/28/23  0629 01/27/23  1445   SODIUM mmol/L 137 135* 139   < > 142   < > 144   POTASSIUM mmol/L 3.5 3.2* 4.1   < > 2.9*   < > 3.4*   CHLORIDE mmol/L 105 100 104   < > 109*   < > 104   CO2 mmol/L 25.0 21.0* 25.4   < > 24.0   < > 27.4   BUN mg/dL 3* 6* 7*   < > 5*   < > 8   CREATININE mg/dL 0.53* 0.53* 0.59   < > 0.53*   < > 0.84   CALCIUM mg/dL 8.4* 8.3* 8.9   < > 8.5*   < > 10.1   BILIRUBIN mg/dL  --   --   --   --  0.4  --  0.5   ALK PHOS U/L  --   --   --   --  49  --  67   ALT (SGPT) U/L  --   --   --   --  5  --  8   AST (SGOT) U/L  --   --   --   --  12  --  15   GLUCOSE mg/dL 132* 55* 79   < > 78   < > 110*    < > = values in this interval not displayed.         Lab Results   Lab Value Date/Time    LIPASE  20 01/27/2023 1445    LIPASE 16 01/12/2023 1601       Radiology:  CT Abdomen Pelvis With Contrast   Final Result      CT Abdomen Pelvis Without Contrast   Final Result   1. Colitis associated abnormal colonic dilatation involving the   ascending colon, hepatic flexure, proximal transverse colon where there   is also pericolonic inflammation. There is a second area of more   extensive abnormal wall thickening involving the proximal to mid sigmoid   colon which is decompressed with a greater degree of surrounding   inflammation. There is a potential associated obstructing component at   this location as the colon proximal to the sigmoid colon is dilated.   When compared to the prior exam 15 days ago, the right colitis is new   and what appears to represent colitis of the proximal sigmoid colon is   not significantly changed. The lack of interval change following   treatment and the presence of an obstructive component raises the   likelihood that there could be an associated neoplastic process at this   location though the distinction is difficult to make with CT and there   remains a great deal of inflammation surrounding the proximal sigmoid   colon.   2. Apparently, this exam was performed with intravenous contrast though   there is no contrast demonstrated on this exam. This suggests that there   has been extravasation of contrast into the arm and that could be   confirmed with physical exam or x-ray of the arm.   3. Previous gastric surgery. Small hiatal hernia.       Discussed with Dr. Cruz in the emergency department on 01/27/2023 at   5:50 PM.        Radiation dose reduction techniques were utilized, including automated   exposure control and exposure modulation based on body size.       This report was finalized on 1/27/2023 6:43 PM by Dr. Donovan Valencia M.D.              Assessment & Plan     Patient Active Problem List   Diagnosis   • Left sided colitis with complication (HCC)   • Colitis   • Nausea  and vomiting   • Stricture of sigmoid colon (HCC)       Assessment:  1.  Abdominal pain  2.  Colitis  3.  Diverticulosis, possibly recovering from episode of diverticulitis, nausea, microperforation near sigmoid stenosis  4.  Rectal bleeding      Plan:  · Plans for colon resection today with Dr. Gaston.    · Negar and postoperative recommendations per colorectal surgery.  · Continue twice daily pantoprazole.  · Follow pathology from EGD biopsies, treat H. pylori if present    I discussed the patient's findings and my recommendations with patient.    Dragon dictation used throughout this note.            FATIMAH Berrios  [facility]  [address] [place] [address]  Office: [phone]

## 2023-02-03 NOTE — NURSING NOTE
02/03/23 0928   Stoma Site Marking   Procedure Marking For colostomy   Site Marked LUQ: left upper quadrant   Patient Assessment Screen rectus muscle identified;creases/scars avoided;marked within patient's visual field;bony prominences avoided;waistband avoided   How Was Patient Marked? surgical marker;transparent dressing   Stoma Marking Comments [facility]- marked patient for colostomy. Patient with deep fold above and below stoma marking site. Provided edu about the colostomy- ADLs, routine care, poucing. Answered patient's questions. Will follow along.   Patient Position During Marking sitting;standing

## 2023-02-03 NOTE — ANESTHESIA PREPROCEDURE EVALUATION
Anesthesia Evaluation     Patient summary reviewed and Nursing notes reviewed   NPO Solid Status: > 8 hours  NPO Liquid Status: > 4 hours           Airway   Mallampati: III  Neck ROM: full  No difficulty expected  Dental - normal exam     Pulmonary     breath sounds clear to auscultation  Cardiovascular     Rhythm: regular    (+) hypertension,       Neuro/Psych  GI/Hepatic/Renal/Endo    (+) obesity,       ROS Comment: S/p gastric sleeve    Musculoskeletal     Abdominal   (+) obese,    Substance History      OB/GYN          Other                          Anesthesia Plan    ASA 2     general     (CMAC, surgeon requests TAP blocks)  intravenous induction     Anesthetic plan, risks, benefits, and alternatives have been provided, discussed and informed consent has been obtained with: patient.        CODE STATUS:

## 2023-02-04 LAB
ANION GAP SERPL CALCULATED.3IONS-SCNC: 7 MMOL/L (ref 5–15)
APTT PPP: 31.8 SECONDS (ref 22.7–35.4)
BASOPHILS # BLD AUTO: 0.05 10*3/MM3 (ref 0–0.2)
BASOPHILS NFR BLD AUTO: 0.3 % (ref 0–1.5)
BH BB BLOOD EXPIRATION DATE: NORMAL
BH BB BLOOD EXPIRATION DATE: NORMAL
BH BB BLOOD TYPE BARCODE: 5100
BH BB BLOOD TYPE BARCODE: 5100
BH BB DISPENSE STATUS: NORMAL
BH BB DISPENSE STATUS: NORMAL
BH BB PRODUCT CODE: NORMAL
BH BB PRODUCT CODE: NORMAL
BH BB UNIT NUMBER: NORMAL
BH BB UNIT NUMBER: NORMAL
BUN SERPL-MCNC: 8 MG/DL (ref 8–23)
BUN/CREAT SERPL: 8.3 (ref 7–25)
CALCIUM SPEC-SCNC: 7.3 MG/DL (ref 8.6–10.5)
CHLORIDE SERPL-SCNC: 110 MMOL/L (ref 98–107)
CO2 SERPL-SCNC: 20 MMOL/L (ref 22–29)
CREAT SERPL-MCNC: 0.96 MG/DL (ref 0.57–1)
CROSSMATCH INTERPRETATION: NORMAL
CROSSMATCH INTERPRETATION: NORMAL
DEPRECATED RDW RBC AUTO: 43.6 FL (ref 37–54)
EGFRCR SERPLBLD CKD-EPI 2021: 66.6 ML/MIN/1.73
EOSINOPHIL # BLD AUTO: 0.04 10*3/MM3 (ref 0–0.4)
EOSINOPHIL NFR BLD AUTO: 0.3 % (ref 0.3–6.2)
ERYTHROCYTE [DISTWIDTH] IN BLOOD BY AUTOMATED COUNT: 14.3 % (ref 12.3–15.4)
GLUCOSE SERPL-MCNC: 149 MG/DL (ref 65–99)
HCT VFR BLD AUTO: 32.9 % (ref 34–46.6)
HGB BLD-MCNC: 11 G/DL (ref 12–15.9)
IMM GRANULOCYTES # BLD AUTO: 0.11 10*3/MM3 (ref 0–0.05)
IMM GRANULOCYTES NFR BLD AUTO: 0.7 % (ref 0–0.5)
INR PPP: 1.49 (ref 0.9–1.1)
LYMPHOCYTES # BLD AUTO: 1.41 10*3/MM3 (ref 0.7–3.1)
LYMPHOCYTES NFR BLD AUTO: 9.1 % (ref 19.6–45.3)
MAGNESIUM SERPL-MCNC: 2 MG/DL (ref 1.6–2.4)
MCH RBC QN AUTO: 28.6 PG (ref 26.6–33)
MCHC RBC AUTO-ENTMCNC: 33.4 G/DL (ref 31.5–35.7)
MCV RBC AUTO: 85.5 FL (ref 79–97)
MONOCYTES # BLD AUTO: 0.68 10*3/MM3 (ref 0.1–0.9)
MONOCYTES NFR BLD AUTO: 4.4 % (ref 5–12)
NEUTROPHILS NFR BLD AUTO: 13.26 10*3/MM3 (ref 1.7–7)
NEUTROPHILS NFR BLD AUTO: 85.2 % (ref 42.7–76)
NRBC BLD AUTO-RTO: 0 /100 WBC (ref 0–0.2)
PLATELET # BLD AUTO: 186 10*3/MM3 (ref 140–450)
PMV BLD AUTO: 11 FL (ref 6–12)
POTASSIUM SERPL-SCNC: 4.3 MMOL/L (ref 3.5–5.2)
PROTHROMBIN TIME: 18.2 SECONDS (ref 11.7–14.2)
QT INTERVAL: 355 MS
RBC # BLD AUTO: 3.85 10*6/MM3 (ref 3.77–5.28)
SODIUM SERPL-SCNC: 137 MMOL/L (ref 136–145)
UNIT  ABO: NORMAL
UNIT  ABO: NORMAL
UNIT  RH: NORMAL
UNIT  RH: NORMAL
WBC NRBC COR # BLD: 15.55 10*3/MM3 (ref 3.4–10.8)

## 2023-02-04 PROCEDURE — 99232 SBSQ HOSP IP/OBS MODERATE 35: CPT | Performed by: INTERNAL MEDICINE

## 2023-02-04 PROCEDURE — 86927 PLASMA FRESH FROZEN: CPT

## 2023-02-04 PROCEDURE — 93005 ELECTROCARDIOGRAM TRACING: CPT | Performed by: HOSPITALIST

## 2023-02-04 PROCEDURE — 25010000002 PIPERACILLIN SOD-TAZOBACTAM PER 1 G: Performed by: COLON & RECTAL SURGERY

## 2023-02-04 PROCEDURE — 85610 PROTHROMBIN TIME: CPT | Performed by: COLON & RECTAL SURGERY

## 2023-02-04 PROCEDURE — 85025 COMPLETE CBC W/AUTO DIFF WBC: CPT | Performed by: COLON & RECTAL SURGERY

## 2023-02-04 PROCEDURE — 36430 TRANSFUSION BLD/BLD COMPNT: CPT

## 2023-02-04 PROCEDURE — 25010000002 ALBUMIN HUMAN 5% PER 50 ML: Performed by: COLON & RECTAL SURGERY

## 2023-02-04 PROCEDURE — 93010 ELECTROCARDIOGRAM REPORT: CPT | Performed by: INTERNAL MEDICINE

## 2023-02-04 PROCEDURE — 80048 BASIC METABOLIC PNL TOTAL CA: CPT | Performed by: COLON & RECTAL SURGERY

## 2023-02-04 PROCEDURE — 97164 PT RE-EVAL EST PLAN CARE: CPT

## 2023-02-04 PROCEDURE — P9059 PLASMA, FRZ BETWEEN 8-24HOUR: HCPCS

## 2023-02-04 PROCEDURE — 25010000002 KETOROLAC TROMETHAMINE PER 15 MG: Performed by: COLON & RECTAL SURGERY

## 2023-02-04 PROCEDURE — 99024 POSTOP FOLLOW-UP VISIT: CPT | Performed by: COLON & RECTAL SURGERY

## 2023-02-04 PROCEDURE — 83735 ASSAY OF MAGNESIUM: CPT | Performed by: COLON & RECTAL SURGERY

## 2023-02-04 PROCEDURE — P9041 ALBUMIN (HUMAN),5%, 50ML: HCPCS | Performed by: COLON & RECTAL SURGERY

## 2023-02-04 PROCEDURE — 97110 THERAPEUTIC EXERCISES: CPT

## 2023-02-04 PROCEDURE — 25010000002 HYDROMORPHONE PER 4 MG: Performed by: COLON & RECTAL SURGERY

## 2023-02-04 PROCEDURE — 85730 THROMBOPLASTIN TIME PARTIAL: CPT | Performed by: COLON & RECTAL SURGERY

## 2023-02-04 RX ORDER — SODIUM CHLORIDE, SODIUM LACTATE, POTASSIUM CHLORIDE, CALCIUM CHLORIDE 600; 310; 30; 20 MG/100ML; MG/100ML; MG/100ML; MG/100ML
999 INJECTION, SOLUTION INTRAVENOUS CONTINUOUS
Status: DISCONTINUED | OUTPATIENT
Start: 2023-02-04 | End: 2023-02-04

## 2023-02-04 RX ORDER — ALBUMIN, HUMAN INJ 5% 5 %
500 SOLUTION INTRAVENOUS ONCE
Status: COMPLETED | OUTPATIENT
Start: 2023-02-04 | End: 2023-02-04

## 2023-02-04 RX ADMIN — ACETAMINOPHEN 1000 MG: 10 INJECTION, SOLUTION INTRAVENOUS at 20:59

## 2023-02-04 RX ADMIN — KETOROLAC TROMETHAMINE 15 MG: 15 INJECTION, SOLUTION INTRAMUSCULAR; INTRAVENOUS at 10:35

## 2023-02-04 RX ADMIN — HYDROMORPHONE HYDROCHLORIDE 0.25 MG: 1 INJECTION, SOLUTION INTRAMUSCULAR; INTRAVENOUS; SUBCUTANEOUS at 13:54

## 2023-02-04 RX ADMIN — ALVIMOPAN 12 MG: 12 CAPSULE ORAL at 22:14

## 2023-02-04 RX ADMIN — KETOROLAC TROMETHAMINE 15 MG: 15 INJECTION, SOLUTION INTRAMUSCULAR; INTRAVENOUS at 14:42

## 2023-02-04 RX ADMIN — ALVIMOPAN 12 MG: 12 CAPSULE ORAL at 11:59

## 2023-02-04 RX ADMIN — GABAPENTIN 250 MG: 250 SOLUTION ORAL at 22:15

## 2023-02-04 RX ADMIN — GABAPENTIN 250 MG: 250 SOLUTION ORAL at 13:54

## 2023-02-04 RX ADMIN — SODIUM CHLORIDE, POTASSIUM CHLORIDE, SODIUM LACTATE AND CALCIUM CHLORIDE 999 ML/HR: 600; 310; 30; 20 INJECTION, SOLUTION INTRAVENOUS at 01:06

## 2023-02-04 RX ADMIN — ACETAMINOPHEN 1000 MG: 10 INJECTION, SOLUTION INTRAVENOUS at 03:57

## 2023-02-04 RX ADMIN — ACETAMINOPHEN 1000 MG: 10 INJECTION, SOLUTION INTRAVENOUS at 14:40

## 2023-02-04 RX ADMIN — SODIUM CHLORIDE, POTASSIUM CHLORIDE, SODIUM LACTATE AND CALCIUM CHLORIDE 75 ML/HR: 600; 310; 30; 20 INJECTION, SOLUTION INTRAVENOUS at 07:51

## 2023-02-04 RX ADMIN — SODIUM CHLORIDE, POTASSIUM CHLORIDE, SODIUM LACTATE AND CALCIUM CHLORIDE 125 ML/HR: 600; 310; 30; 20 INJECTION, SOLUTION INTRAVENOUS at 20:59

## 2023-02-04 RX ADMIN — ALBUMIN (HUMAN) 500 ML: 12.5 INJECTION, SOLUTION INTRAVENOUS at 11:59

## 2023-02-04 RX ADMIN — ACETAMINOPHEN 1000 MG: 10 INJECTION, SOLUTION INTRAVENOUS at 10:35

## 2023-02-04 RX ADMIN — TAZOBACTAM SODIUM AND PIPERACILLIN SODIUM 3.38 G: 375; 3 INJECTION, SOLUTION INTRAVENOUS at 17:39

## 2023-02-04 NOTE — THERAPY RE-EVALUATION
Patient Name: Hali Tejeda  : 1960    MRN: [id number]                              Today's Date: 2023       Admit Date: 2023    Visit Dx:     ICD-10-CM ICD-9-CM   1. Colitis  K52.9 558.9   2. Nausea and vomiting, unspecified vomiting type  R11.2 787.01   3. Stricture of sigmoid colon (HCC)  K56.699 560.9     Patient Active Problem List   Diagnosis   • Left sided colitis with complication (HCC)   • Colitis   • Nausea and vomiting   • Stricture of sigmoid colon (HCC)   • Uterine anomaly   • Hypertension   • Avulsion fracture of distal fibula     Past Medical History:   Diagnosis Date   • Abnormal EKG 2020   • Avulsion fracture of distal fibula 2015   • H. pylori infection 2020   • Hepatic steatosis 2020   • Hiatal hernia    • HTN (hypertension)    • Hyperlipidemia    • Insomnia    • Left sided colitis with complication (HCC) 2023    ADMITTED TO [facility]   • LGSIL on Pap smear of cervix     (+) HPV   • LGSIL Pap smear of vagina 2016   • Snoring    • Stricture of sigmoid colon (HCC) 2023   • Uterine fibroid    • Vaginal atrophy      Past Surgical History:   Procedure Laterality Date   • BREAST LUMPECTOMY Left     benign   •  SECTION N/A    • COLONOSCOPY N/A 2014    COULD ONLY GET SCOPE TO 70 CM DUE TO SIGNIFICANT STRICTURE SECONDARY TO SEVERE DIVERTICULITIS OR CANCER, ACBE ORDERED, DR. PARAG HUDSON AT [facility]   • COLONOSCOPY N/A 2023    MODERATE STENOSIS IN SIGMOID-DILATED, MANY DIVERTICULA IN SIGMOID AND DESCENDING, COPIOUS AMTS OF STOOL, MUCOSAL TEAR 55 CM FROM ANAL VERGE, DR. JENNI SMITH AT [facility]   • COLPOSCOPY N/A 2016   • ENDOSCOPY N/A 2023    GASTRITIS, SMALL HIATAL HERNIA, DR. JENNI SMITH AT [facility]   • ENDOSCOPY N/A 09/15/2009    DR. PARAG HUDSON AT [facility]   • GASTRIC SLEEVE LAPAROSCOPIC N/A 2020    WITH REMOVAL OF GASTRIC BAND, DR. OLIMPIA PALACIOS AT [facility]   • HYSTERECTOMY N/A 2005    WITH RSO   • LAPAROSCOPIC  GASTRIC BANDING N/A 10/20/2019    DR. PARAG HUDSON AT [place]   • SALPINGO OOPHORECTOMY Left     LSO, IN TEEN YEARS   • WISDOM TOOTH EXTRACTION Bilateral       General Information     Row Name 02/04/23 0908          Physical Therapy Time and Intention    Document Type re-evaluation  -CS     Mode of Treatment individual therapy;physical therapy  -     Row Name 02/04/23 0908          General Information    Patient Profile Reviewed yes  -CS     Existing Precautions/Restrictions fall  -CS     Row Name 02/04/23 0908          Cognition    Orientation Status (Cognition) oriented x 4  -CS     Row Name 02/04/23 0908          Safety Issues, Functional Mobility    Impairments Affecting Function (Mobility) strength;pain;endurance/activity tolerance  -CS           User Key  (r) = Recorded By, (t) = Taken By, (c) = Cosigned By    Initials Name Provider Type    Gian Tamayo, PT Physical Therapist               Mobility     Row Name 02/04/23 0909          Bed Mobility    Bed Mobility supine-sit;sit-supine  -CS     Supine-Sit [place] (Bed Mobility) minimum assist (75% patient effort)  -CS     Sit-Supine [place] (Bed Mobility) moderate assist (50% patient effort)  -CS     Assistive Device (Bed Mobility) bed rails;head of bed elevated  -CS     Row Name 02/04/23 0909          Sit-Stand Transfer    Sit-Stand [place] (Transfers) minimum assist (75% patient effort)  -CS     Comment, (Sit-Stand Transfer) performed 8x  -     Row Name 02/04/23 0909          Gait/Stairs (Locomotion)    [place] Level (Gait) minimum assist (75% patient effort)  -CS     Assistive Device (Gait) walker, front-wheeled  -CS     Distance in Feet (Gait) 10' limited by weakness  -CS           User Key  (r) = Recorded By, (t) = Taken By, (c) = Cosigned By    Initials Name Provider Type    Gian Tamayo, PT Physical Therapist               Obj/Interventions     Row Name 02/04/23 0909          Range of Motion Comprehensive    General  Range of Motion bilateral lower extremity ROM WFL  -CS     Row Name 02/04/23 0909          Strength Comprehensive (MMT)    Comment, General Manual Muscle Testing (MMT) Assessment >3/5  -CS           User Key  (r) = Recorded By, (t) = Taken By, (c) = Cosigned By    Initials Name Provider Type    Gian Tamayo, PT Physical Therapist               Goals/Plan     Row Name 02/04/23 0910          Bed Mobility Goal 1 (PT)    Activity/Assistive Device (Bed Mobility Goal 1, PT) bed mobility activities, all  -CS     [place] Level/Cues Needed (Bed Mobility Goal 1, PT) modified independence  -CS     Time Frame (Bed Mobility Goal 1, PT) 1 week  -CS     Row Name 02/04/23 0910          Transfer Goal 1 (PT)    Activity/Assistive Device (Transfer Goal 1, PT) sit-to-stand/stand-to-sit;bed-to-chair/chair-to-bed  -CS     [place] Level/Cues Needed (Transfer Goal 1, PT) modified independence  -CS     Time Frame (Transfer Goal 1, PT) 1 week  -CS     Row Name 02/04/23 0910          Gait Training Goal 1 (PT)    Activity/Assistive Device (Gait Training Goal 1, PT) assistive device use  -CS     [place] Level (Gait Training Goal 1, PT) modified independence  -CS     Distance (Gait Training Goal 1, PT) 300  -CS     Time Frame (Gait Training Goal 1, PT) 1 week  -CS     Row Name 02/04/23 0910          Therapy Assessment/Plan (PT)    Planned Therapy Interventions (PT) balance training;bed mobility training;gait training;neuromuscular re-education;home exercise program;patient/family education;transfer training;strengthening;ROM (range of motion);stair training;stretching  -CS           User Key  (r) = Recorded By, (t) = Taken By, (c) = Cosigned By    Initials Name Provider Type    Gian Tamayo, PT Physical Therapist               Clinical Impression     Row Name 02/04/23 0909          Pain    Pretreatment Pain Rating 0/10 - no pain  -CS     Posttreatment Pain Rating 0/10 - no pain  -CS     Pain Location incisional  -CS      Pain Intervention(s) Ambulation/increased activity;Repositioned  -CS     Additional Documentation Pain Scale: FACES Pre/Post-Treatment (Group)  -CS     Row Name 02/04/23 0909          Pain Scale: FACES Pre/Post-Treatment    Pain: FACES Scale, Pretreatment 6-->hurts even more  -CS     Posttreatment Pain Rating 6-->hurts even more  -CS     Row Name 02/04/23 0909          Plan of Care Review    Plan of Care Reviewed With patient  -CS     Row Name 02/04/23 0909          Therapy Assessment/Plan (PT)    Patient/Family Therapy Goals Statement (PT) home  -CS     Therapy Frequency (PT) daily  -CS     Row Name 02/04/23 0909          Positioning and Restraints    Pre-Treatment Position in bed  -CS     Post Treatment Position bed  -CS     In Bed supine;encouraged to call for assist;exit alarm on;call light within reach  -CS           User Key  (r) = Recorded By, (t) = Taken By, (c) = Cosigned By    Initials Name Provider Type    Gian Tamayo, PT Physical Therapist               Outcome Measures     Row Name 02/04/23 0910          How much help from another person do you currently need...    Turning from your back to your side while in flat bed without using bedrails? 3  -CS     Moving from lying on back to sitting on the side of a flat bed without bedrails? 3  -CS     Moving to and from a bed to a chair (including a wheelchair)? 3  -CS     Standing up from a chair using your arms (e.g., wheelchair, bedside chair)? 3  -CS     Climbing 3-5 steps with a railing? 1  -CS     To walk in hospital room? 2  -CS     AM-PAC 6 Clicks Score (PT) 15  -CS     Highest level of mobility 4 --> Transferred to chair/commode  -CS     Row Name 02/04/23 0910          Functional Assessment    Outcome Measure Options AM-PAC 6 Clicks Basic Mobility (PT)  -CS           User Key  (r) = Recorded By, (t) = Taken By, (c) = Cosigned By    Initials Name Provider Type    Gian Tamayo, PT Physical Therapist                             Physical  Therapy Education     Title: PT OT SLP Therapies (Done)     Topic: Physical Therapy (Done)     Point: Mobility training (Done)     Learning Progress Summary           Patient Acceptance, E,TB, VU,NR by  at 2/4/2023 0911    Acceptance, E, VU by  at 1/30/2023 1441                   Point: Home exercise program (Done)     Learning Progress Summary           Patient Acceptance, E,TB, VU,NR by  at 2/4/2023 0911                   Point: Body mechanics (Done)     Learning Progress Summary           Patient Acceptance, E,TB, VU,NR by CS at 2/4/2023 0911                   Point: Precautions (Done)     Learning Progress Summary           Patient Acceptance, E,TB, VU,NR by  at 2/4/2023 0911                               User Key     Initials Effective Dates Name Provider Type Discipline     06/16/21 -  Gian Guillen, PT Physical Therapist PT     12/13/22 -  Sunni Wolfe PT Physical Therapist PT              PT Recommendation and Plan  Planned Therapy Interventions (PT): balance training, bed mobility training, gait training, neuromuscular re-education, home exercise program, patient/family education, transfer training, strengthening, ROM (range of motion), stair training, stretching  Plan of Care Reviewed With: patient     Time Calculation:    PT Charges     Row Name 02/04/23 0915             Time Calculation    Start Time 0837  -      Stop Time 0906  -      Time Calculation (min) 29 min  -      PT Received On 02/04/23  -      PT - Next Appointment 02/05/23  -      PT Goal Re-Cert Due Date 02/11/23  -            User Key  (r) = Recorded By, (t) = Taken By, (c) = Cosigned By    Initials Name Provider Type     Gian Guillen, PT Physical Therapist              Therapy Charges for Today     Code Description Service Date Service Provider Modifiers Qty    57514311968 HC PT THER PROC EA 15 MIN 2/4/2023 Gian Guillen, PT GP 1    94205266559 HC PT RE-EVAL ESTABLISHED PLAN 2 2/4/2023 Wilmer  Gian ARMANDO, PT GP 1          PT G-Codes  Outcome Measure Options: AM-PAC 6 Clicks Basic Mobility (PT)  AM-PAC 6 Clicks Score (PT): 15  PT Discharge Summary  Anticipated Discharge Disposition (PT): home with assist, skilled nursing facility    Gian Guillen, PT  2/4/2023

## 2023-02-04 NOTE — PROGRESS NOTES
[facility] Clinical Pharmacy Services: Piperacillin-Tazobactam Consult    Pt Name: Hali Tejeda   : 1960    Indication: Intra-Abdominal Infection    Relevant clinical data and objective history reviewed:    Past Medical History:   Diagnosis Date    Abnormal EKG 2020    Avulsion fracture of distal fibula 2015    H. pylori infection 2020    Hepatic steatosis 2020    Hiatal hernia     HTN (hypertension)     Hyperlipidemia     Insomnia     Left sided colitis with complication (HCC) 2023    ADMITTED TO [facility] on Pap smear of cervix     (+) HPV    LGSIL Pap smear of vagina 2016    Snoring     Stricture of sigmoid colon (HCC) 2023    Uterine fibroid     Vaginal atrophy      Creatinine   Date Value Ref Range Status   2023 0.96 0.57 - 1.00 mg/dL Final   2023 0.74 0.57 - 1.00 mg/dL Final   2023 0.53 (L) 0.57 - 1.00 mg/dL Final     BUN   Date Value Ref Range Status   2023 8 8 - 23 mg/dL Final     Estimated Creatinine Clearance: 58.6 mL/min (by C-G formula based on SCr of 0.96 mg/dL).    Lab Results   Component Value Date    WBC 15.55 (H) 2023     Temp Readings from Last 3 Encounters:   23 98 °F (36.7 °C) (Oral)   23 98.1 °F (36.7 °C)   23 98.2 °F (36.8 °C)      Assessment/Plan  Estimated CrCl >20 mL/min at this time; BMI 34.6 kg/m2  Will start piperacillin-tazobactam 3.375 g IV every 8 hours     Pharmacy will continue to follow daily while on piperacillin-tazobactam and adjust as needed. Thank you for this consult.    Cheyenne Gowers, [facility]  Clinical Pharmacist

## 2023-02-04 NOTE — PERIOPERATIVE NURSING NOTE
Dr. Haddad aware patient is awake ETT pulled, and NG tube also pulled as it was found to be coiled in back of throat, and Dr. Gaston aware.

## 2023-02-04 NOTE — ANESTHESIA POSTPROCEDURE EVALUATION
"Patient: Hali Tejeda    Procedure Summary     Date: 02/03/23 Room / Location: [facility] OR [address] [place] [address] MAIN OR    Anesthesia Start: 1316 Anesthesia Stop: 2026    Procedure: COLON RESECTION LAPAROSCOPIC CONVERTED TO OPEN SIGMOID AND LEFT MOBILIZATION OF SPLENIC FLEXURE WITH DAVINCI ROBOT (Abdomen) Diagnosis:       Colitis      Stricture of sigmoid colon (HCC)      (Colitis [K52.9])      (Stricture of sigmoid colon (HCC) [K56.699])    Surgeons: Lino Gaston MD Provider: Peter Hayes MD    Anesthesia Type: general ASA Status: 2          Anesthesia Type: general    Vitals  Vitals Value Taken Time   /83 02/03/23 2131   Temp 36.7 °C (98.1 °F) 02/03/23 2130   Pulse 89 02/03/23 2142   Resp 16 02/03/23 2130   SpO2 100 % 02/03/23 2142   Vitals shown include unvalidated device data.        Post Anesthesia Care and Evaluation    Patient location during evaluation: bedside  Patient participation: complete - patient participated  Level of consciousness: awake and alert  Pain management: adequate    Airway patency: patent  Anesthetic complications: No anesthetic complications    Cardiovascular status: acceptable  Respiratory status: acceptable  Hydration status: acceptable    Comments: /83   Pulse 90   Temp 36.7 °C (98.1 °F) (Oral)   Resp 16   Ht 154.9 cm (60.98\")   Wt 78.9 kg (174 lb)   SpO2 99%   BMI 32.89 kg/m²       "

## 2023-02-04 NOTE — PROGRESS NOTES
"Daily progress note    Primary care physician  Dr. Delgado    Chief complaint  S/p colon resection with colostomy in place.  Patient awake and alert and feeling little better with no new complaints.      History of present illness  62-year-old female with history of hypertension presented to [facility] emergency room with left lower quadrant abdominal pain for last 1 month.  Patient also have occasional loose stools.  Patient denies any nausea vomiting.  Patient recently diagnosed with colitis and treated with oral antibiotics without any improvement.  Patient has noticed blood in the stools.  Patient evaluated in ER with CT abdominal pelvis which shows worsening colitis and failed outpatient treatment admit for management.  Patient has no fever chills chest pain shortness of breath with sweats weight loss or weight gain.     REVIEW OF SYSTEMS  Unremarkable except severe abdominal pain      PHYSICAL EXAM  Blood pressure (!) 88/58, pulse 104, temperature 97.8 °F (36.6 °C), temperature source Oral, resp. rate 18, height 154.9 cm (60.98\"), weight 83.1 kg (183 lb 3.2 oz), SpO2 99 %.    GENERAL:  Awake and alert in no acute distress  HEENT:  Unremarkable  NECK:  Supple  CV: regular rhythm, normal rate  RESPIRATORY: normal effort, clear to auscultation bilaterally  ABDOMEN: soft, left lower quadrant tenderness bowel sounds positive  MUSCULOSKELETAL: no deformity  NEURO: alert, moves all extremities, follows commands  PSYCH:  calm, cooperative  SKIN: warm, dry     LAB RESULTS  Lab Results (last 24 hours)     Procedure Component Value Units Date/Time    Basic Metabolic Panel [[id number]]  (Abnormal) Collected: 02/04/23 0430    Specimen: Blood Updated: 02/04/23 0534     Glucose 149 mg/dL      BUN 8 mg/dL      Creatinine 0.96 mg/dL      Sodium 137 mmol/L      Potassium 4.3 mmol/L      Comment: Slight hemolysis detected by analyzer. Results may be affected.        Chloride 110 mmol/L      CO2 20.0 mmol/L      Calcium " 7.3 mg/dL      BUN/Creatinine Ratio 8.3     Anion Gap 7.0 mmol/L      eGFR 66.6 mL/min/1.73     Narrative:      GFR Normal >60  Chronic Kidney Disease <60  Kidney Failure <15      Magnesium [[id number]]  (Normal) Collected: 02/04/23 0430    Specimen: Blood Updated: 02/04/23 0531     Magnesium 2.0 mg/dL     aPTT [[id number]]  (Normal) Collected: 02/04/23 0430    Specimen: Blood Updated: 02/04/23 0527     PTT 31.8 seconds     Protime-INR [[id number]]  (Abnormal) Collected: 02/04/23 0430    Specimen: Blood Updated: 02/04/23 0527     Protime 18.2 Seconds      INR 1.49    CBC & Differential [[id number]]  (Abnormal) Collected: 02/04/23 0430    Specimen: Blood Updated: 02/04/23 0512    Narrative:      The following orders were created for panel order CBC & Differential.  Procedure                               Abnormality         Status                     ---------                               -----------         ------                     CBC Auto Differential[[id number]]        Abnormal            Final result                 Please view results for these tests on the individual orders.    CBC Auto Differential [[id number]]  (Abnormal) Collected: 02/04/23 0430    Specimen: Blood Updated: 02/04/23 0512     WBC 15.55 10*3/mm3      RBC 3.85 10*6/mm3      Hemoglobin 11.0 g/dL      Hematocrit 32.9 %      MCV 85.5 fL      MCH 28.6 pg      MCHC 33.4 g/dL      RDW 14.3 %      RDW-SD 43.6 fl      MPV 11.0 fL      Platelets 186 10*3/mm3      Neutrophil % 85.2 %      Lymphocyte % 9.1 %      Monocyte % 4.4 %      Eosinophil % 0.3 %      Basophil % 0.3 %      Immature Grans % 0.7 %      Neutrophils, Absolute 13.26 10*3/mm3      Lymphocytes, Absolute 1.41 10*3/mm3      Monocytes, Absolute 0.68 10*3/mm3      Eosinophils, Absolute 0.04 10*3/mm3      Basophils, Absolute 0.05 10*3/mm3      Immature Grans, Absolute 0.11 10*3/mm3      nRBC 0.0 /100 WBC     Tissue Pathology Exam [[id number]] Collected: 02/03/23 1449    Specimen: Tissue from Large  Intestine, Sigmoid Colon; Tissue from Large Intestine, Left / Descending Colon Updated: 02/03/23 2226    Renal Function Panel [[id number]]  (Abnormal) Collected: 02/03/23 2119    Specimen: Blood Updated: 02/03/23 2158     Glucose 202 mg/dL      BUN 5 mg/dL      Creatinine 0.74 mg/dL      Sodium 137 mmol/L      Potassium 4.7 mmol/L      Chloride 107 mmol/L      CO2 23.0 mmol/L      Calcium 7.4 mg/dL      Albumin 2.4 g/dL      Phosphorus 3.5 mg/dL      Anion Gap 7.0 mmol/L      BUN/Creatinine Ratio 6.8     eGFR 91.0 mL/min/1.73     Narrative:      GFR Normal >60  Chronic Kidney Disease <60  Kidney Failure <15      Hemoglobin & Hematocrit, Blood [[id number]]  (Abnormal) Collected: 02/03/23 2031    Specimen: Blood Updated: 02/03/23 2043     Hemoglobin 10.8 g/dL      Hematocrit 34.0 %         Imaging Results (Last 24 Hours)     ** No results found for the last 24 hours. **        Upper and lower endoscopy results noted and discussed with patient    Current Facility-Administered Medications:   •  acetaminophen (OFIRMEV) injection 1,000 mg, 1,000 mg, Intravenous, Q6H, Lino Gaston MD, 1,000 mg at 02/04/23 1440  •  alvimopan (ENTEREG) capsule 12 mg, 12 mg, Oral, BID, Lino Gaston MD, 12 mg at 02/04/23 1159  •  gabapentin (NEURONTIN) 50 mg/mL solution 250 mg, 250 mg, Oral, Q8H, Lino Gaston MD, 250 mg at 02/04/23 1354  •  HYDROmorphone (DILAUDID) injection 0.25 mg, 0.25 mg, Intravenous, Q2H PRN, 0.25 mg at 02/04/23 1354 **AND** naloxone (NARCAN) injection 0.4 mg, 0.4 mg, Intravenous, Q5 Min PRN, Lino Gaston MD  •  ketorolac (TORADOL) injection 15 mg, 15 mg, Intravenous, Q8H, Lino Gaston MD, 15 mg at 02/04/23 1035  •  lactated ringers infusion, 125 mL/hr, Intravenous, Continuous, Lino Gaston MD, Last Rate: 125 mL/hr at 02/04/23 1035, 125 mL/hr at 02/04/23 1035  •  nitroglycerin (NITROSTAT) SL tablet 0.4 mg, 0.4 mg, Sublingual, Q5 Min PRN, Lino Gaston MD  •  ondansetron (ZOFRAN) injection 4 mg, 4  mg, Intravenous, Q6H PRN, Lino Gaston MD  •  piperacillin-tazobactam (ZOSYN) 3.375 g in iso-osmotic dextrose 50 ml (premix), 3.375 g, Intravenous, Once, Lnio Gaston MD, Currently Infusing at 02/04/23 1354  •  piperacillin-tazobactam (ZOSYN) 3.375 g in iso-osmotic dextrose 50 ml (premix), 3.375 g, Intravenous, Q8H, Lino Gaston MD  •  scopolamine patch 1 mg/72 hr, 1 patch, Transdermal, Continuous, Lino Gaston MD, 1 patch at 02/03/23 1251     ASSESSMENT  Acute colitis with sigmoid stricture and microperforation s/p colon resection and colostomy  Hematochezia  Hypertension with low BP now  Gastroesophageal reflux disease    PLAN  CPM  Postop care  IVF  Continue IV antibiotics  Pain management  Symptomatic treatment of pain and nausea  Continue home medications  Stress ulcer DVT prophylaxis  Supportive care  PT OT  Discussed with family nursing staff   Follow closely and further recommendation according to hospital course    SANG SINGLETARY MD    Copied text in this note has been reviewed and is accurate as of 02/04/23

## 2023-02-04 NOTE — PROGRESS NOTES
Addendum: Discussed with Dr. Gaston, double vision related to post anesthesia and she is on a scopolamine patch ; Neurology consult canceled    [facility]  Inpatient Progress Note    Reason for Follow Up: Colitis, sigmoid stricture    Subjective     Interval History:   Colon resection yesterday with Dr. Gaston.  Feels some postoperative soreness.  Complains of double vision since waking up from anesthesia, nurse reports continued complaints of this    Current Facility-Administered Medications:   •  acetaminophen (OFIRMEV) injection 1,000 mg, 1,000 mg, Intravenous, Q6H, Lino Gaston MD, 1,000 mg at 02/04/23 0357  •  alvimopan (ENTEREG) capsule 12 mg, 12 mg, Oral, BID, Lino Gaston MD  •  gabapentin (NEURONTIN) 50 mg/mL solution 250 mg, 250 mg, Oral, Q8H, Lino Gaston MD  •  HYDROmorphone (DILAUDID) injection 0.25 mg, 0.25 mg, Intravenous, Q2H PRN **AND** naloxone (NARCAN) injection 0.4 mg, 0.4 mg, Intravenous, Q5 Min PRN, Lino Gaston MD  •  ketorolac (TORADOL) injection 15 mg, 15 mg, Intravenous, Q8H, Lino Gaston MD, 15 mg at 02/03/23 2337  •  lactated ringers infusion, 75 mL/hr, Intravenous, Continuous, Lino Gaston MD, Last Rate: 75 mL/hr at 02/04/23 0751, 75 mL/hr at 02/04/23 0751  •  nitroglycerin (NITROSTAT) SL tablet 0.4 mg, 0.4 mg, Sublingual, Q5 Min PRN, Lino Gaston MD  •  ondansetron (ZOFRAN) injection 4 mg, 4 mg, Intravenous, Q6H PRN, Lino Gaston MD  •  scopolamine patch 1 mg/72 hr, 1 patch, Transdermal, Continuous, Lino Gaston MD, 1 patch at 02/03/23 1251  Review of Systems:   All systems reviewed and negative except for: Neuro: Double vision; GI: Soreness      Objective     Vital Signs  Temp:  [97.2 °F (36.2 °C)-98.9 °F (37.2 °C)] 98 °F (36.7 °C)  Heart Rate:  [] 105  Resp:  [16-20] 18  BP: ()/() 86/76  Body mass index is 34.63 kg/m².    Intake/Output Summary (Last 24 hours) at 2/4/2023 0824  Last data filed at 2/4/2023  0522  Gross per 24 hour   Intake 5550 ml   Output 1325 ml   Net 4225 ml     No intake/output data recorded.     Physical Exam:   General: patient awake, alert and cooperative   Eyes: Normal lids and lashes, no scleral icterus; no double vision with her glasses off,  seeing double with her glasses on   Neck: supple, normal ROM   Skin: warm and dry, not jaundiced   Cardiovascular: regular rhythm and rate, no murmurs auscultated   Pulm: clear to auscultation bilaterally, regular and unlabored   Abdomen: soft, nondistended; ostomy with serosanguineous drainage   Rectal: deferred   Extremities: no rash or edema   Psychiatric: Normal mood and behavior; memory intact     Results Review:     I reviewed the patient's new clinical results.    Results from last 7 days   Lab Units 02/04/23 0430 02/03/23 2031 02/03/23  0526 02/02/23  0535   WBC 10*3/mm3 15.55*  --  14.07* 18.67*   HEMOGLOBIN g/dL 11.0* 10.8* 12.1 12.5   HEMATOCRIT % 32.9* 34.0 36.3 37.1   PLATELETS 10*3/mm3 186  --  251 262     Results from last 7 days   Lab Units 02/04/23 0430 02/03/23 2119 02/03/23  0526 01/30/23  0531 01/29/23  0756   SODIUM mmol/L 137 137 137   < > 142   POTASSIUM mmol/L 4.3 4.7 3.5   < > 2.9*   CHLORIDE mmol/L 110* 107 105   < > 109*   CO2 mmol/L 20.0* 23.0 25.0   < > 24.0   BUN mg/dL 8 5* 3*   < > 5*   CREATININE mg/dL 0.96 0.74 0.53*   < > 0.53*   CALCIUM mg/dL 7.3* 7.4* 8.4*   < > 8.5*   BILIRUBIN mg/dL  --   --   --   --  0.4   ALK PHOS U/L  --   --   --   --  49   ALT (SGPT) U/L  --   --   --   --  5   AST (SGOT) U/L  --   --   --   --  12   GLUCOSE mg/dL 149* 202* 132*   < > 78    < > = values in this interval not displayed.     Results from last 7 days   Lab Units 02/04/23  0430   INR  1.49*     Lab Results   Lab Value Date/Time    LIPASE 20 01/27/2023 1445    LIPASE 16 01/12/2023 1601       Radiology:  CT Abdomen Pelvis With Contrast   Final Result      CT Abdomen Pelvis Without Contrast   Final Result   1. Colitis associated  abnormal colonic dilatation involving the   ascending colon, hepatic flexure, proximal transverse colon where there   is also pericolonic inflammation. There is a second area of more   extensive abnormal wall thickening involving the proximal to mid sigmoid   colon which is decompressed with a greater degree of surrounding   inflammation. There is a potential associated obstructing component at   this location as the colon proximal to the sigmoid colon is dilated.   When compared to the prior exam 15 days ago, the right colitis is new   and what appears to represent colitis of the proximal sigmoid colon is   not significantly changed. The lack of interval change following   treatment and the presence of an obstructive component raises the   likelihood that there could be an associated neoplastic process at this   location though the distinction is difficult to make with CT and there   remains a great deal of inflammation surrounding the proximal sigmoid   colon.   2. Apparently, this exam was performed with intravenous contrast though   there is no contrast demonstrated on this exam. This suggests that there   has been extravasation of contrast into the arm and that could be   confirmed with physical exam or x-ray of the arm.   3. Previous gastric surgery. Small hiatal hernia.       Discussed with Dr. Cruz in the emergency department on 01/27/2023 at   5:50 PM.        Radiation dose reduction techniques were utilized, including automated   exposure control and exposure modulation based on body size.       This report was finalized on 1/27/2023 6:43 PM by Dr. Donovan Valencia M.D.              Assessment & Plan     Patient Active Problem List   Diagnosis   • Left sided colitis with complication (HCC)   • Colitis   • Nausea and vomiting   • Stricture of sigmoid colon (HCC)   • Uterine anomaly   • Hypertension   • Avulsion fracture of distal fibula       Impression  1.  Left-sided colitis    2.  Sigmoid  stricture    3.  EGD appearance of gastritis: Biopsies pending    4.  Double vision: Inconsistent complaints between her left eye and right eye but this has been a consistent complaint since yesterday and warrants work-up    Plan  Postop management as per general surgery  Follow-up EGD biopsies, treat as indicated  Neurology consult for complaints of double vision post OR  Continue supportive care measures    I discussed the patients findings and my recommendations with patient and nursing staff.    All necessary PPE, including face mask and eye protection, were worn during this encounter.  Hand sanitization was performed both before and after the patient interaction.    Over 25 minutes was spent reviewing the patient's chart and records, in discussion with the patient, in examination of the patient, and in discussion with members of the patient's medical team.    Adwoa Isidro MD

## 2023-02-04 NOTE — PLAN OF CARE
Goal Outcome Evaluation:  Plan of Care Reviewed With: patient        Pt is a re-evaluation status post operative procedure for sigmoid stricture causing near obstruction of colon day 1. This visit she was able to sit edge of bed, stand 8x for strengthening exercises and able to take some steps though she was limited by dizziness and weakness. She anticipates discharge home vs snf depending on how her mobility progresses while in the hospital. Physical therapy will progress mobility and function while here in hospital.

## 2023-02-04 NOTE — PROGRESS NOTES
Postop day 1  Patient is feeling pretty good  Rediscussed surgery and goals for the day of being out of bed and she can have ice chips and 1 popsicle  Abdomen is soft ostomy is pink  Labs reviewed    Stay with just sips for diet    A/p  No Lovenox because splenic injury  INR elevated and giving FFP  Blood pressure on the low side so in addition to the FFP giving albumin and increase her IV  Recheck labs tomorrow< BR>

## 2023-02-05 ENCOUNTER — APPOINTMENT (OUTPATIENT)
Dept: ULTRASOUND IMAGING | Facility: HOSPITAL | Age: 63
DRG: 329 | End: 2023-02-05
Payer: COMMERCIAL

## 2023-02-05 LAB
ALBUMIN SERPL-MCNC: 2.6 G/DL (ref 3.5–5.2)
ALBUMIN/GLOB SERPL: 1.1 G/DL
ALP SERPL-CCNC: 38 U/L (ref 39–117)
ALT SERPL W P-5'-P-CCNC: 11 U/L (ref 1–33)
AMORPH URATE CRY URNS QL MICRO: ABNORMAL /HPF
ANION GAP SERPL CALCULATED.3IONS-SCNC: 9.6 MMOL/L (ref 5–15)
AST SERPL-CCNC: 28 U/L (ref 1–32)
BACTERIA UR QL AUTO: ABNORMAL /HPF
BASOPHILS # BLD AUTO: 0.03 10*3/MM3 (ref 0–0.2)
BASOPHILS NFR BLD AUTO: 0.2 % (ref 0–1.5)
BH BB BLOOD EXPIRATION DATE: NORMAL
BH BB BLOOD TYPE BARCODE: 5100
BH BB DISPENSE STATUS: NORMAL
BH BB PRODUCT CODE: NORMAL
BH BB UNIT NUMBER: NORMAL
BILIRUB SERPL-MCNC: 0.7 MG/DL (ref 0–1.2)
BILIRUB UR QL STRIP: NEGATIVE
BUN SERPL-MCNC: 19 MG/DL (ref 8–23)
BUN/CREAT SERPL: 8 (ref 7–25)
CALCIUM SPEC-SCNC: 8.5 MG/DL (ref 8.6–10.5)
CHLORIDE SERPL-SCNC: 105 MMOL/L (ref 98–107)
CLARITY UR: ABNORMAL
CO2 SERPL-SCNC: 22.4 MMOL/L (ref 22–29)
COLOR UR: YELLOW
CREAT SERPL-MCNC: 2.37 MG/DL (ref 0.57–1)
CREAT UR-MCNC: 134.6 MG/DL
DEPRECATED RDW RBC AUTO: 44.3 FL (ref 37–54)
EGFRCR SERPLBLD CKD-EPI 2021: 22.5 ML/MIN/1.73
EOSINOPHIL # BLD AUTO: 0.05 10*3/MM3 (ref 0–0.4)
EOSINOPHIL NFR BLD AUTO: 0.3 % (ref 0.3–6.2)
ERYTHROCYTE [DISTWIDTH] IN BLOOD BY AUTOMATED COUNT: 14.4 % (ref 12.3–15.4)
GLOBULIN UR ELPH-MCNC: 2.4 GM/DL
GLUCOSE SERPL-MCNC: 118 MG/DL (ref 65–99)
GLUCOSE UR STRIP-MCNC: NEGATIVE MG/DL
HCT VFR BLD AUTO: 24.7 % (ref 34–46.6)
HGB BLD-MCNC: 8.5 G/DL (ref 12–15.9)
HGB UR QL STRIP.AUTO: ABNORMAL
HYALINE CASTS UR QL AUTO: ABNORMAL /LPF
IMM GRANULOCYTES # BLD AUTO: 0.14 10*3/MM3 (ref 0–0.05)
IMM GRANULOCYTES NFR BLD AUTO: 0.9 % (ref 0–0.5)
KETONES UR QL STRIP: NEGATIVE
LEUKOCYTE ESTERASE UR QL STRIP.AUTO: NEGATIVE
LYMPHOCYTES # BLD AUTO: 1.68 10*3/MM3 (ref 0.7–3.1)
LYMPHOCYTES NFR BLD AUTO: 10.8 % (ref 19.6–45.3)
MCH RBC QN AUTO: 29.2 PG (ref 26.6–33)
MCHC RBC AUTO-ENTMCNC: 34.4 G/DL (ref 31.5–35.7)
MCV RBC AUTO: 84.9 FL (ref 79–97)
MONOCYTES # BLD AUTO: 0.56 10*3/MM3 (ref 0.1–0.9)
MONOCYTES NFR BLD AUTO: 3.6 % (ref 5–12)
NEUTROPHILS NFR BLD AUTO: 13.12 10*3/MM3 (ref 1.7–7)
NEUTROPHILS NFR BLD AUTO: 84.2 % (ref 42.7–76)
NITRITE UR QL STRIP: NEGATIVE
NRBC BLD AUTO-RTO: 0 /100 WBC (ref 0–0.2)
PH UR STRIP.AUTO: 5.5 [PH] (ref 5–8)
PLATELET # BLD AUTO: 189 10*3/MM3 (ref 140–450)
PMV BLD AUTO: 11.1 FL (ref 6–12)
POTASSIUM SERPL-SCNC: 4 MMOL/L (ref 3.5–5.2)
PROT SERPL-MCNC: 5 G/DL (ref 6–8.5)
PROT UR QL STRIP: ABNORMAL
RBC # BLD AUTO: 2.91 10*6/MM3 (ref 3.77–5.28)
RBC # UR STRIP: ABNORMAL /HPF
REF LAB TEST METHOD: ABNORMAL
SODIUM SERPL-SCNC: 137 MMOL/L (ref 136–145)
SODIUM UR-SCNC: <20 MMOL/L
SP GR UR STRIP: 1.02 (ref 1–1.03)
SQUAMOUS #/AREA URNS HPF: ABNORMAL /HPF
UNIT  ABO: NORMAL
UNIT  RH: NORMAL
UROBILINOGEN UR QL STRIP: ABNORMAL
WBC # UR STRIP: ABNORMAL /HPF
WBC NRBC COR # BLD: 15.58 10*3/MM3 (ref 3.4–10.8)

## 2023-02-05 PROCEDURE — 76775 US EXAM ABDO BACK WALL LIM: CPT

## 2023-02-05 PROCEDURE — 25010000002 ONDANSETRON PER 1 MG: Performed by: COLON & RECTAL SURGERY

## 2023-02-05 PROCEDURE — P9041 ALBUMIN (HUMAN),5%, 50ML: HCPCS | Performed by: COLON & RECTAL SURGERY

## 2023-02-05 PROCEDURE — 25010000002 PIPERACILLIN SOD-TAZOBACTAM PER 1 G: Performed by: COLON & RECTAL SURGERY

## 2023-02-05 PROCEDURE — 97110 THERAPEUTIC EXERCISES: CPT

## 2023-02-05 PROCEDURE — 25010000002 HYDROMORPHONE PER 4 MG: Performed by: COLON & RECTAL SURGERY

## 2023-02-05 PROCEDURE — 99024 POSTOP FOLLOW-UP VISIT: CPT | Performed by: PHYSICIAN ASSISTANT

## 2023-02-05 PROCEDURE — 25010000002 ALBUMIN HUMAN 5% PER 50 ML: Performed by: COLON & RECTAL SURGERY

## 2023-02-05 PROCEDURE — 85025 COMPLETE CBC W/AUTO DIFF WBC: CPT | Performed by: HOSPITALIST

## 2023-02-05 PROCEDURE — 80053 COMPREHEN METABOLIC PANEL: CPT | Performed by: HOSPITALIST

## 2023-02-05 PROCEDURE — 25010000002 KETOROLAC TROMETHAMINE PER 15 MG: Performed by: COLON & RECTAL SURGERY

## 2023-02-05 PROCEDURE — 99232 SBSQ HOSP IP/OBS MODERATE 35: CPT | Performed by: INTERNAL MEDICINE

## 2023-02-05 PROCEDURE — 81001 URINALYSIS AUTO W/SCOPE: CPT | Performed by: INTERNAL MEDICINE

## 2023-02-05 PROCEDURE — 82570 ASSAY OF URINE CREATININE: CPT | Performed by: INTERNAL MEDICINE

## 2023-02-05 PROCEDURE — 84300 ASSAY OF URINE SODIUM: CPT | Performed by: INTERNAL MEDICINE

## 2023-02-05 RX ORDER — GABAPENTIN 250 MG/5ML
125 SOLUTION ORAL EVERY 8 HOURS SCHEDULED
Status: DISCONTINUED | OUTPATIENT
Start: 2023-02-05 | End: 2023-02-09

## 2023-02-05 RX ORDER — ALBUMIN, HUMAN INJ 5% 5 %
500 SOLUTION INTRAVENOUS ONCE
Status: COMPLETED | OUTPATIENT
Start: 2023-02-05 | End: 2023-02-05

## 2023-02-05 RX ORDER — SODIUM CHLORIDE 9 MG/ML
50 INJECTION, SOLUTION INTRAVENOUS CONTINUOUS
Status: DISCONTINUED | OUTPATIENT
Start: 2023-02-05 | End: 2023-02-07

## 2023-02-05 RX ADMIN — TAZOBACTAM SODIUM AND PIPERACILLIN SODIUM 3.38 G: 375; 3 INJECTION, SOLUTION INTRAVENOUS at 00:21

## 2023-02-05 RX ADMIN — ALVIMOPAN 12 MG: 12 CAPSULE ORAL at 21:12

## 2023-02-05 RX ADMIN — TAZOBACTAM SODIUM AND PIPERACILLIN SODIUM 3.38 G: 375; 3 INJECTION, SOLUTION INTRAVENOUS at 09:54

## 2023-02-05 RX ADMIN — SODIUM CHLORIDE 150 ML/HR: 9 INJECTION, SOLUTION INTRAVENOUS at 18:08

## 2023-02-05 RX ADMIN — KETOROLAC TROMETHAMINE 15 MG: 15 INJECTION, SOLUTION INTRAMUSCULAR; INTRAVENOUS at 00:21

## 2023-02-05 RX ADMIN — TAZOBACTAM SODIUM AND PIPERACILLIN SODIUM 3.38 G: 375; 3 INJECTION, SOLUTION INTRAVENOUS at 17:45

## 2023-02-05 RX ADMIN — ACETAMINOPHEN 1000 MG: 10 INJECTION, SOLUTION INTRAVENOUS at 11:55

## 2023-02-05 RX ADMIN — SODIUM CHLORIDE 150 ML/HR: 9 INJECTION, SOLUTION INTRAVENOUS at 11:55

## 2023-02-05 RX ADMIN — GABAPENTIN 125 MG: 250 SOLUTION ORAL at 21:12

## 2023-02-05 RX ADMIN — HYDROMORPHONE HYDROCHLORIDE 0.25 MG: 1 INJECTION, SOLUTION INTRAMUSCULAR; INTRAVENOUS; SUBCUTANEOUS at 04:08

## 2023-02-05 RX ADMIN — GABAPENTIN 250 MG: 250 SOLUTION ORAL at 14:57

## 2023-02-05 RX ADMIN — ONDANSETRON 4 MG: 2 INJECTION INTRAMUSCULAR; INTRAVENOUS at 04:08

## 2023-02-05 RX ADMIN — ALBUMIN (HUMAN) 500 ML: 12.5 INJECTION, SOLUTION INTRAVENOUS at 10:10

## 2023-02-05 RX ADMIN — ALVIMOPAN 12 MG: 12 CAPSULE ORAL at 10:17

## 2023-02-05 RX ADMIN — ACETAMINOPHEN 1000 MG: 10 INJECTION, SOLUTION INTRAVENOUS at 04:08

## 2023-02-05 RX ADMIN — ACETAMINOPHEN 1000 MG: 10 INJECTION, SOLUTION INTRAVENOUS at 17:45

## 2023-02-05 NOTE — PLAN OF CARE
Goal Outcome Evaluation:  Plan of Care Reviewed With: patient        Progress: improving  Outcome Evaluation: 's SBP, O2 1L NC, up ambulated in hallway, cont IVF, pt reports pain control with scheduled meds, cont IVF Zosyn, mod amt liquid brown stool in ostomy bag, F/C BSD, pt using IS and CDB every 1-2 hours, kee ice and popsicle, denies nausea, safety maintained

## 2023-02-05 NOTE — OP NOTE
Surgeon: Lino Gaston MD    Surgical  Assistant: Lilibeth Rahman PA-C; Adwoa Rogel PA-C     Preoperative diagnosis: Colitis [K52.9]  Stricture of sigmoid colon (HCC) [K56.699]    Post-Op Diagnosis Codes:     * Colitis [K52.9]     * Stricture of sigmoid colon (HCC) [K56.699]  Small splenic injury  Procedure: Laparoscopic robot-assisted left and sigmoid colectomy, converted to open, mobilization of splenic flexure  Estimated Blood Loss: 950 ml    Specimens:   Specimens     ID Source Type Tests Collected By Collected At Frozen?    A Large Intestine, Sigmoid Colon Tissue · TISSUE PATHOLOGY EXAM   Lino Gaston MD 2/3/23 1449 No    This specimen was not marked as sent.    B Large Intestine, Left / Descending Colon Tissue · TISSUE PATHOLOGY EXAM   Lino Gaston MD 2/3/23 1843 No    This specimen was not marked as sent.         Order Name Source Comment Collection Info Order Time   HEMOGLOBIN AND HEMATOCRIT, BLOOD   Collected By: Usha García RN 2/3/2023  8:03 PM   RENAL FUNCTION PANEL   Collected By: Usha García RN 2/3/2023  8:03 PM     Release to patient   Routine Release        BASIC METABOLIC PANEL   Collected By: Corina Dhaliwal 2/3/2023 10:49 PM   MAGNESIUM   Collected By: Corina Dhaliwal 2/3/2023 10:49 PM     Release to patient   Routine Release        PROTIME-INR   Collected By: Corina Dhaliwal 2/3/2023 10:49 PM   APTT   Collected By: Corina Dhaliwal 2/3/2023 10:49 PM   PREPARE RBC Other   2/3/2023  6:13 PM     When to Transfuse?   Hold          Indication for Transfusion   Surgery          Surgery Date   2/3/2023        PREPARE RBC Other   2/3/2023  8:08 PM     When to Transfuse?   Hold          Indication for Transfusion   Surgery          Surgery Date   2/3/2023        TISSUE PATHOLOGY EXAM Large Intestine, Sigmoid Colon  Collected By: Lino Gaston MD 2/3/2023  5:47 PM     Release to patient   Routine Release            Indication:  Hali Tejeda is a 62 y.o. female  who comes in with Colitis    Nausea and vomiting, unspecified vomiting type    Stricture of sigmoid colon (HCC)  Patient understands risks, benefits,and alternatives wishes to proceed.      Procedure Details:    Patient was brought to the operating room, SCD's in place. After general anesthesia was achieved the patient had a TAP block done by anesthesia.  The patient was then placed in Yellofins lithotomy and prepped and draped in sterile fashion. Marcaine 0.25% with epinephrine was used as a local infiltration at the trocar sites. First incision was made in the left upper quadrant midclavicular line right below the costal margin and Veress needle was used to gain access into the abdomen and insufflated up to 15 mmHg. A 8 mm trocar was placed in the abdomen, I inspected the abdomen and there did not appear to be any peritoneal disease. I then went ahead and placed 3 other trocars in a diagonal down to a few centimeters away from the right ASIS.  The patient had adhesions from her previous midline incision from her hysterectomy.  I took those all down sharply.  There was adhesed small bowel to the pelvic brim.  I left those to take down robotically.  I put the patient in steep Trendelenburg 6 degrees tilted to the right and docked the robot. I placed all instruments under direct visualization and then I went to the console.   The sigmoid was plastered to the left pelvic sidewall.  The mesentery was taut and drawn to the left side.  There were a couple of loops of small bowel that were adherent to the right pelvic sidewall.  I took those down sharply and then was able to get the small bowel to retract to the upper abdomen.  Then at the sacral promontory I lifted the mesentery and was able to get into the retrorectal space.  I was hoping that this would give me some guidance and help to get around the sigmoid and upper rectum.  It did not give me as much help as I had hoped so I then turned my attention to the  descending colon and was able to identify the white line of Toldt.  I got into that space and started taking the left colon down and then slowly taking the sigmoid off the pelvic sidewall. I was able to identify the iliac and the ureter.  As I was dissecting I made sure that they were protected.  I then kept taking the sigmoid off the left pelvic sidewall. With the amount of inflammation it was very hard to get any traction and I had to take very small bites with the scissors.  Everything that was touched in that area bled.  Getting into the mesentery, the inflammation made everything very hypervascular and she had a lot of oozing.  After a couple of hours of very tedious dissection I was finally able to get the sigmoid isolated and get the mesentery taken down with the vessel sealer and used a green load of the 45 mm stapler across the upper rectum.  I then took the LOREE with several burns of the vessel sealer.  Then I took the mesentery up along the descending at the descending to sigmoid junction approximately 2-3 cm above where the inflammation started and used 2 fires of the blue load to go across the descending colon.  I then started to mobilize the left colon to bring it up to do the colostomy.  I had the assistant place a Marco needle through the abdominal wall at the pre-marked area for the colostomy and with stretch I could get the left colon to come up.  I had to mobilize some more and had to give some significant traction on the colon to get it to come up.  I noticed that made a hole in the colon at the staple line and that I had to over sew and there was some leakage of stool.  I forgot that I had made an enterotomy in the sigmoid colon that leaked some stool and I over sewed that with a 2-0 Vicryl.  The specimen was quite thick and I did not want to make the colostomy opening so large so I made the second incision at the 8 trocar in the right lower quadrant a little bit larger and put a medium Pj  retractor in and brought out the specimen and sent that off.  I then made an elliptical incision around the colostomy pre-marked site and carried it down to the fascia and incised the fascia.  I tried to bring the descending colon up and it would not come up.  I extended the incision a little bit cephalad to try and mobilize the left colon around the splenic flexure but I was unable to see.  With the tugging I believe I had made a subcapsular tear in the spleen.  There was bleeding so I made the decision to make an upper midline incision and got an extra large wound retractor in and I was able to identify the 1.5 cm tear in the capsule of the spleen.  I was able to cauterize it and then I put FloSeal around it.  I was able to control the bleeding that way.  I then was able to get the colon mobilized along the splenic flexure and mobilized the transverse colon more so that we could get to the left lower quadrant to bring the colostomy out.  The colon was very dilated because it was obstructed and this made the dissection even more difficult.  I then brought the colon out the colostomy site.  I closed the fascia in the right lower quadrant in 2 layers with a #1 PDS.  I put a couple of sutures of the PDS around the colostomy site so there would not be a chance of herniation.  I then closed the midline incision with a #1 loop PDS, 1 proximally and 1 distally and then tied them together.  I irrigated out all of the subcutaneous tissue and then closed the skin with skin clips.  There was approximately 6 cm of distal descending colon that was dusky so I used electrocautery to amputate that part off and took the mesentery associated with it.  I then brooked the colostomy with 3-0 Vicryl.  All instrument, lap counts and needle counts were correct.  The patient was stable throughout the entire case and was taken to recovery.         Assistant: Lilibeth Rahman PA-C; Adwoa Rogel PA-C was responsible for performing the  following activities: Retraction, Suction, Irrigation, Suturing, Closing and Placing Dressing and their skilled assistance was necessary for the success of this case

## 2023-02-05 NOTE — PLAN OF CARE
Goal Outcome Evaluation:               VSS. Albumin given. IVF continues. Urine output increased. Ostomy putting out brown liquid. Clear liquid diet started at dinner; pt tolerating at this point. Pt slept most of the day; up to chair for dinner; visited with mother at bedside. Renal consulted; renal US and urine studies ordered.

## 2023-02-05 NOTE — PROGRESS NOTES
Patient seen and examined. Labs and chart reviewed. With racheal likely secondary to atn. Admitted with colitis. S/p surgery. Agree with ivf. Will f/u with consult in am. Order u/s and urine studies

## 2023-02-05 NOTE — PROGRESS NOTES
Colorectal Surgery Progress Note    POD 2    S: Positive ostomy function. positive ambulating. Pain controlled with ERAS and occasional dilaudid. 1 episode of emesis yesterday, but no nausea today. Parrish in place.    O:  Temp:  [97.4 °F (36.3 °C)-99 °F (37.2 °C)] 98.5 °F (36.9 °C)  Heart Rate:  [] 94  Resp:  [18-20] 20  BP: ()/(55-67) 97/65    Intake/Output Summary (Last 24 hours) at 2/5/2023 1316  Last data filed at 2/5/2023 1205  Gross per 24 hour   Intake 1589 ml   Output 900 ml   Net 689 ml     Abd:   soft, non distended. Ostomy dusky, productive of brown stool.   Incisions: clean, dry, intact, no erythema    Results from last 7 days   Lab Units 02/05/23  0334   WBC 10*3/mm3 15.58*   HEMOGLOBIN g/dL 8.5*   HEMATOCRIT % 24.7*   PLATELETS 10*3/mm3 189     Results from last 7 days   Lab Units 02/05/23  0334 02/04/23  0430 02/03/23  2119   SODIUM mmol/L 137   < > 137   POTASSIUM mmol/L 4.0   < > 4.7   CHLORIDE mmol/L 105   < > 107   CO2 mmol/L 22.4   < > 23.0   BUN mg/dL 19   < > 5*   CREATININE mg/dL 2.37*   < > 0.74   EGFR mL/min/1.73 22.5*   < > 91.0   GLUCOSE mg/dL 118*   < > 202*   CALCIUM mg/dL 8.5*   < > 7.4*   PHOSPHORUS mg/dL  --   --  3.5    < > = values in this interval not displayed.       A/P: 62 y.o. female s/p robot-assisted laparoscopic converted to open sigmoid and left colon resection with mobilization of splenic flexure POD 2  • Continue sips only  • IVF for increased creatinine  • Albumin for lower BP  • Morning labs ordered  • D/c mi

## 2023-02-05 NOTE — PROGRESS NOTES
"Daily progress note    Primary care physician  Dr. Delgado    Chief complaint  Doing same with no distress but sleepy and tired.    History of present illness  62-year-old female with history of hypertension presented to [facility] emergency room with left lower quadrant abdominal pain for last 1 month.  Patient also have occasional loose stools.  Patient denies any nausea vomiting.  Patient recently diagnosed with colitis and treated with oral antibiotics without any improvement.  Patient has noticed blood in the stools.  Patient evaluated in ER with CT abdominal pelvis which shows worsening colitis and failed outpatient treatment admit for management.  Patient has no fever chills chest pain shortness of breath with sweats weight loss or weight gain.     REVIEW OF SYSTEMS  Unremarkable except abdominal pain      PHYSICAL EXAM  Blood pressure 97/65, pulse 94, temperature 98.5 °F (36.9 °C), temperature source Oral, resp. rate 20, height 154.9 cm (60.98\"), weight 83.1 kg (183 lb 3.2 oz), SpO2 91 %.    GENERAL:  Awake and alert in no acute distress  HEENT:  Unremarkable  NECK:  Supple  CV: regular rhythm, normal rate  RESPIRATORY: normal effort, clear to auscultation bilaterally  ABDOMEN: soft, left lower quadrant tenderness bowel sounds positive  MUSCULOSKELETAL: no deformity  NEURO: alert, moves all extremities, follows commands  PSYCH:  calm, cooperative  SKIN: warm, dry     LAB RESULTS  Lab Results (last 24 hours)     Procedure Component Value Units Date/Time    Comprehensive Metabolic Panel [[id number]]  (Abnormal) Collected: 02/05/23 0334    Specimen: Blood Updated: 02/05/23 0451     Glucose 118 mg/dL      BUN 19 mg/dL      Creatinine 2.37 mg/dL      Sodium 137 mmol/L      Potassium 4.0 mmol/L      Chloride 105 mmol/L      CO2 22.4 mmol/L      Calcium 8.5 mg/dL      Total Protein 5.0 g/dL      Albumin 2.6 g/dL      ALT (SGPT) 11 U/L      AST (SGOT) 28 U/L      Alkaline Phosphatase 38 U/L      Total " Bilirubin 0.7 mg/dL      Globulin 2.4 gm/dL      A/G Ratio 1.1 g/dL      BUN/Creatinine Ratio 8.0     Anion Gap 9.6 mmol/L      eGFR 22.5 mL/min/1.73     Narrative:      GFR Normal >60  Chronic Kidney Disease <60  Kidney Failure <15      CBC & Differential [[id number]]  (Abnormal) Collected: 02/05/23 0334    Specimen: Blood Updated: 02/05/23 0432    Narrative:      The following orders were created for panel order CBC & Differential.  Procedure                               Abnormality         Status                     ---------                               -----------         ------                     CBC Auto Differential[[id number]]        Abnormal            Final result                 Please view results for these tests on the individual orders.    CBC Auto Differential [[id number]]  (Abnormal) Collected: 02/05/23 0334    Specimen: Blood Updated: 02/05/23 0432     WBC 15.58 10*3/mm3      RBC 2.91 10*6/mm3      Hemoglobin 8.5 g/dL      Hematocrit 24.7 %      MCV 84.9 fL      MCH 29.2 pg      MCHC 34.4 g/dL      RDW 14.4 %      RDW-SD 44.3 fl      MPV 11.1 fL      Platelets 189 10*3/mm3      Neutrophil % 84.2 %      Lymphocyte % 10.8 %      Monocyte % 3.6 %      Eosinophil % 0.3 %      Basophil % 0.2 %      Immature Grans % 0.9 %      Neutrophils, Absolute 13.12 10*3/mm3      Lymphocytes, Absolute 1.68 10*3/mm3      Monocytes, Absolute 0.56 10*3/mm3      Eosinophils, Absolute 0.05 10*3/mm3      Basophils, Absolute 0.03 10*3/mm3      Immature Grans, Absolute 0.14 10*3/mm3      nRBC 0.0 /100 WBC         Imaging Results (Last 24 Hours)     ** No results found for the last 24 hours. **        Upper and lower endoscopy results noted and discussed with patient    Current Facility-Administered Medications:   •  acetaminophen (OFIRMEV) injection 1,000 mg, 1,000 mg, Intravenous, Q6H, Lino Gaston MD, 1,000 mg at 02/05/23 8312  •  alvimopan (ENTEREG) capsule 12 mg, 12 mg, Oral, BID, Lino Gaston MD, 12 mg at  02/05/23 1017  •  gabapentin (NEURONTIN) 50 mg/mL solution 250 mg, 250 mg, Oral, Q8H, Lino Gaston MD, 250 mg at 02/04/23 2215  •  HYDROmorphone (DILAUDID) injection 0.25 mg, 0.25 mg, Intravenous, Q2H PRN, 0.25 mg at 02/05/23 0408 **AND** naloxone (NARCAN) injection 0.4 mg, 0.4 mg, Intravenous, Q5 Min PRN, Lino Gaston MD  •  nitroglycerin (NITROSTAT) SL tablet 0.4 mg, 0.4 mg, Sublingual, Q5 Min PRN, Lino Gaston MD  •  ondansetron (ZOFRAN) injection 4 mg, 4 mg, Intravenous, Q6H PRN, Lino Gaston MD, 4 mg at 02/05/23 0408  •  piperacillin-tazobactam (ZOSYN) 3.375 g in iso-osmotic dextrose 50 ml (premix), 3.375 g, Intravenous, Once, Lino Gaston MD, Currently Infusing at 02/04/23 1354  •  piperacillin-tazobactam (ZOSYN) 3.375 g in iso-osmotic dextrose 50 ml (premix), 3.375 g, Intravenous, Q8H, Lino Gaston MD, 3.375 g at 02/05/23 0954  •  scopolamine patch 1 mg/72 hr, 1 patch, Transdermal, Continuous, Lino Gaston MD, 1 patch at 02/03/23 1251  •  sodium chloride 0.9 % infusion, 150 mL/hr, Intravenous, Continuous, Lino Gaston MD, Last Rate: 150 mL/hr at 02/05/23 1155, 150 mL/hr at 02/05/23 1155     ASSESSMENT  Acute colitis with sigmoid stricture and microperforation s/p colon resection and colostomy  Acute kidney injury  Hypertension   Gastroesophageal reflux disease    PLAN  CPM  Postop care  IVF  Continue IV antibiotics  Pain management  Symptomatic treatment of pain and nausea  Continue home medications  Stress ulcer DVT prophylaxis  Nephrology consult  Colorectal surgery and GI to follow patient  Supportive care  PT OT  Discussed with family nursing staff   Follow closely and further recommendation according to hospital course    SANG SINGLETARY MD    Copied text in this note has been reviewed and is accurate as of 02/05/23

## 2023-02-05 NOTE — PLAN OF CARE
Goal Outcome Evaluation:  Plan of Care Reviewed With: patient     Pt walked around the RN station twice. Much improved mobility. Tolerated well.

## 2023-02-05 NOTE — PROGRESS NOTES
CRS attending  Patient up in the chair  She states she is hungry but she got mom with some clear liquids earlier  Abdomen is soft ostomy is a venous congested but viable  Creatinine elevated    Status post left and sigmoid resection for suspected diverticulitis stricture  Also splenic injury with almost 1 L blood loss  Acute blood loss anemia  I think that is the reason of her elevated creatinine is that she is under r esuscitated at this time.  Giving her extra fluid.  I would anticipate that her hemoglobin is going to be lower tomorrow because of all the fluid today.  I do not think that she is actively bleeding.

## 2023-02-05 NOTE — THERAPY TREATMENT NOTE
Patient Name: Hali Tejeda  : 1960    MRN: [id number]                              Today's Date: 2023       Admit Date: 2023    Visit Dx:     ICD-10-CM ICD-9-CM   1. Colitis  K52.9 558.9   2. Nausea and vomiting, unspecified vomiting type  R11.2 787.01   3. Stricture of sigmoid colon (HCC)  K56.699 560.9     Patient Active Problem List   Diagnosis   • Left sided colitis with complication (HCC)   • Colitis   • Nausea and vomiting   • Stricture of sigmoid colon (HCC)   • Uterine anomaly   • Hypertension   • Avulsion fracture of distal fibula     Past Medical History:   Diagnosis Date   • Abnormal EKG 2020   • Avulsion fracture of distal fibula 2015   • H. pylori infection 2020   • Hepatic steatosis 2020   • Hiatal hernia    • HTN (hypertension)    • Hyperlipidemia    • Insomnia    • Left sided colitis with complication (HCC) 2023    ADMITTED TO [facility]   • LGSIL on Pap smear of cervix     (+) HPV   • LGSIL Pap smear of vagina 2016   • Snoring    • Stricture of sigmoid colon (HCC) 2023   • Uterine fibroid    • Vaginal atrophy      Past Surgical History:   Procedure Laterality Date   • BREAST LUMPECTOMY Left     benign   •  SECTION N/A    • COLONOSCOPY N/A 2014    COULD ONLY GET SCOPE TO 70 CM DUE TO SIGNIFICANT STRICTURE SECONDARY TO SEVERE DIVERTICULITIS OR CANCER, ACBE ORDERED, DR. PARAG HUDSON AT [place]   • COLONOSCOPY N/A 2023    MODERATE STENOSIS IN SIGMOID-DILATED, MANY DIVERTICULA IN SIGMOID AND DESCENDING, COPIOUS AMTS OF STOOL, MUCOSAL TEAR 55 CM FROM ANAL VERGE, DR. JENNI SMITH AT [facility]   • COLPOSCOPY N/A 2016   • ENDOSCOPY N/A 2023    GASTRITIS, SMALL HIATAL HERNIA, DR. JENNI SMITH AT [facility]   • ENDOSCOPY N/A 09/15/2009    DR. PARAG HUDSON AT [place]   • GASTRIC SLEEVE LAPAROSCOPIC N/A 2020    WITH REMOVAL OF GASTRIC BAND, DR. OLIMPIA PALACIOS AT [facility]   • HYSTERECTOMY N/A 2005    WITH RSO   • LAPAROSCOPIC  GASTRIC BANDING N/A 10/20/2019    DR. PARAG HUDSON AT [place]   • SALPINGO OOPHORECTOMY Left     LSO, IN TEEN YEARS   • WISDOM TOOTH EXTRACTION Bilateral       General Information     Row Name 02/05/23 0934          Physical Therapy Time and Intention    Document Type therapy note (daily note)  -CS     Mode of Treatment individual therapy;physical therapy  -CS     Row Name 02/05/23 0934          General Information    Patient Profile Reviewed yes  -CS     Existing Precautions/Restrictions fall  -CS     Row Name 02/05/23 0934          Cognition    Orientation Status (Cognition) oriented x 4  -CS     Row Name 02/05/23 0934          Safety Issues, Functional Mobility    Impairments Affecting Function (Mobility) strength;pain;endurance/activity tolerance  -CS           User Key  (r) = Recorded By, (t) = Taken By, (c) = Cosigned By    Initials Name Provider Type    Gian Tamayo, PT Physical Therapist               Mobility     Row Name 02/05/23 0934          Bed Mobility    Bed Mobility supine-sit;sit-supine  -CS     Supine-Sit [place] (Bed Mobility) contact guard  -CS     Sit-Supine [place] (Bed Mobility) minimum assist (75% patient effort)  -CS     Assistive Device (Bed Mobility) bed rails;head of bed elevated  -CS     Row Name 02/05/23 0934          Sit-Stand Transfer    Sit-Stand [place] (Transfers) contact guard  -CS     Row Name 02/05/23 0934          Gait/Stairs (Locomotion)    [place] Level (Gait) minimum assist (75% patient effort)  -CS     Assistive Device (Gait) walker, front-wheeled  -CS     Distance in Feet (Gait) 240  -CS     Deviations/Abnormal Patterns (Gait) gait speed decreased  -CS           User Key  (r) = Recorded By, (t) = Taken By, (c) = Cosigned By    Initials Name Provider Type    Gian Tamayo, PT Physical Therapist               Obj/Interventions    No documentation.                Goals/Plan    No documentation.                Clinical Impression     Row Name  02/05/23 0935          Pain    Pretreatment Pain Rating 0/10 - no pain  -CS     Posttreatment Pain Rating 0/10 - no pain  -CS     Row Name 02/05/23 0935          Plan of Care Review    Plan of Care Reviewed With patient  -CS     Row Name 02/05/23 0935          Positioning and Restraints    Pre-Treatment Position in bed  -CS     Post Treatment Position bed  -CS     In Bed supine;call light within reach;encouraged to call for assist;exit alarm on  -CS           User Key  (r) = Recorded By, (t) = Taken By, (c) = Cosigned By    Initials Name Provider Type    Gian Tamayo, PT Physical Therapist               Outcome Measures     Row Name 02/05/23 0935          How much help from another person do you currently need...    Turning from your back to your side while in flat bed without using bedrails? 3  -CS     Moving from lying on back to sitting on the side of a flat bed without bedrails? 3  -CS     Moving to and from a bed to a chair (including a wheelchair)? 3  -CS     Standing up from a chair using your arms (e.g., wheelchair, bedside chair)? 3  -CS     Climbing 3-5 steps with a railing? 3  -CS     To walk in hospital room? 3  -CS     AM-PAC 6 Clicks Score (PT) 18  -CS     Highest level of mobility 6 --> Walked 10 steps or more  -CS     Row Name 02/05/23 0935          Functional Assessment    Outcome Measure Options AM-PAC 6 Clicks Basic Mobility (PT)  -CS           User Key  (r) = Recorded By, (t) = Taken By, (c) = Cosigned By    Initials Name Provider Type    Gian Tamayo, PT Physical Therapist                             Physical Therapy Education     Title: PT OT SLP Therapies (Done)     Topic: Physical Therapy (Done)     Point: Mobility training (Done)     Learning Progress Summary           Patient Acceptance, E,TB, VU,NR by CS at 2/5/2023 0935    Acceptance, E,TB, VU,NR by CS at 2/4/2023 0911    Acceptance, E, VU by CW at 1/30/2023 1441                   Point: Home exercise program (Done)      Learning Progress Summary           Patient Acceptance, E,TB, VU,NR by  at 2/5/2023 0935    Acceptance, E,TB, VU,NR by CS at 2/4/2023 0911                   Point: Body mechanics (Done)     Learning Progress Summary           Patient Acceptance, E,TB, VU,NR by  at 2/5/2023 0935    Acceptance, E,TB, VU,NR by CS at 2/4/2023 0911                   Point: Precautions (Done)     Learning Progress Summary           Patient Acceptance, E,TB, VU,NR by  at 2/5/2023 0935    Acceptance, E,TB, VU,NR by CS at 2/4/2023 0911                               User Key     Initials Effective Dates Name Provider Type Discipline     06/16/21 -  Gian Guillen, PT Physical Therapist PT     12/13/22 -  Sunni Wolfe PT Physical Therapist PT              PT Recommendation and Plan  Planned Therapy Interventions (PT): balance training, bed mobility training, gait training, neuromuscular re-education, home exercise program, patient/family education, transfer training, strengthening, ROM (range of motion), stair training, stretching  Plan of Care Reviewed With: patient     Time Calculation:    PT Charges     Row Name 02/05/23 0936             Time Calculation    Start Time 0907  -      Stop Time 0936  -      Time Calculation (min) 29 min  -      PT Received On 02/05/23  -      PT - Next Appointment 02/06/23  -            User Key  (r) = Recorded By, (t) = Taken By, (c) = Cosigned By    Initials Name Provider Type     Gian Guillen, PT Physical Therapist              Therapy Charges for Today     Code Description Service Date Service Provider Modifiers Qty    82608386227 HC PT THER PROC EA 15 MIN 2/4/2023 Gian Guillen, PT GP 1    98597559906 HC PT RE-EVAL ESTABLISHED PLAN 2 2/4/2023 Gian Guillen, PT GP 1    45384377260 HC PT THER PROC EA 15 MIN 2/5/2023 Gian Gulilen, PT GP 2          PT G-Codes  Outcome Measure Options: AM-PAC 6 Clicks Basic Mobility (PT)  AM-PAC 6 Clicks Score (PT): 18  PT Discharge  Summary  Anticipated Discharge Disposition (PT): home with assist, skilled nursing facility    Gian Guillen, PT  2/5/2023

## 2023-02-05 NOTE — PROGRESS NOTES
[facility]  Inpatient Progress Note    Reason for Follow Up: Colitis, sigmoid stricture    Subjective     Interval History:   Postop day 2 from colon resection.  Creatinine up.  Hemoglobin down.  Brown stool in ostomy.  Hungry and wants to eat.    Current Facility-Administered Medications:   •  acetaminophen (OFIRMEV) injection 1,000 mg, 1,000 mg, Intravenous, Q6H, Lino Gaston MD, 1,000 mg at 02/05/23 0408  •  albumin human 5 % solution 500 mL, 500 mL, Intravenous, Once, Lino Gaston MD  •  alvimopan (ENTEREG) capsule 12 mg, 12 mg, Oral, BID, Lino Gaston MD, 12 mg at 02/04/23 2214  •  gabapentin (NEURONTIN) 50 mg/mL solution 250 mg, 250 mg, Oral, Q8H, Lino Gaston MD, 250 mg at 02/04/23 2215  •  HYDROmorphone (DILAUDID) injection 0.25 mg, 0.25 mg, Intravenous, Q2H PRN, 0.25 mg at 02/05/23 0408 **AND** naloxone (NARCAN) injection 0.4 mg, 0.4 mg, Intravenous, Q5 Min PRN, Lino Gaston MD  •  lactated ringers infusion, 125 mL/hr, Intravenous, Continuous, Lino Gaston MD, Last Rate: 125 mL/hr at 02/04/23 2059, 125 mL/hr at 02/04/23 2059  •  nitroglycerin (NITROSTAT) SL tablet 0.4 mg, 0.4 mg, Sublingual, Q5 Min PRN, Lino Gaston MD  •  ondansetron (ZOFRAN) injection 4 mg, 4 mg, Intravenous, Q6H PRN, Lino Gaston MD, 4 mg at 02/05/23 0408  •  piperacillin-tazobactam (ZOSYN) 3.375 g in iso-osmotic dextrose 50 ml (premix), 3.375 g, Intravenous, Once, Lino Gaston MD, Currently Infusing at 02/04/23 1354  •  piperacillin-tazobactam (ZOSYN) 3.375 g in iso-osmotic dextrose 50 ml (premix), 3.375 g, Intravenous, Q8H, Lino Gaston MD, 3.375 g at 02/05/23 0021  •  scopolamine patch 1 mg/72 hr, 1 patch, Transdermal, Continuous, Lino Gaston MD, 1 patch at 02/03/23 1251  Review of Systems:   All systems reviewed and negative except for: Constitution: Hunger      Objective     Vital Signs  Temp:  [97.4 °F (36.3 °C)-99 °F (37.2 °C)] 99 °F (37.2 °C)  Heart Rate:   [] 98  Resp:  [18] 18  BP: ()/(55-67) 92/56  Body mass index is 34.63 kg/m².    Intake/Output Summary (Last 24 hours) at 2/5/2023 0852  Last data filed at 2/5/2023 0820  Gross per 24 hour   Intake 1589 ml   Output 975 ml   Net 614 ml     I/O this shift:  In: -   Out: 300 [Urine:200; Stool:100]     Physical Exam:   General: patient awake, alert and cooperative   Eyes: Normal lids and lashes, no scleral icterus   Neck: supple, normal ROM   Skin: warm and dry, not jaundiced   Cardiovascular: regular rhythm and rate, no murmurs auscultated   Pulm: clear to auscultation bilaterally, regular and unlabored   Abdomen: soft, nondistended; ostomy with liquidy brown stool   Rectal: deferred   Extremities: no rash or edema   Psychiatric: Normal mood and behavior; memory intact     Results Review:     I reviewed the patient's new clinical results.    Results from last 7 days   Lab Units 02/05/23 0334 02/04/23 0430 02/03/23 2031 02/03/23  0526   WBC 10*3/mm3 15.58* 15.55*  --  14.07*   HEMOGLOBIN g/dL 8.5* 11.0* 10.8* 12.1   HEMATOCRIT % 24.7* 32.9* 34.0 36.3   PLATELETS 10*3/mm3 189 186  --  251     Results from last 7 days   Lab Units 02/05/23 0334 02/04/23 0430 02/03/23  2119   SODIUM mmol/L 137 137 137   POTASSIUM mmol/L 4.0 4.3 4.7   CHLORIDE mmol/L 105 110* 107   CO2 mmol/L 22.4 20.0* 23.0   BUN mg/dL 19 8 5*   CREATININE mg/dL 2.37* 0.96 0.74   CALCIUM mg/dL 8.5* 7.3* 7.4*   BILIRUBIN mg/dL 0.7  --   --    ALK PHOS U/L 38*  --   --    ALT (SGPT) U/L 11  --   --    AST (SGOT) U/L 28  --   --    GLUCOSE mg/dL 118* 149* 202*     Results from last 7 days   Lab Units 02/04/23  0430   INR  1.49*     Lab Results   Lab Value Date/Time    LIPASE 20 01/27/2023 1445    LIPASE 16 01/12/2023 1601       Radiology:  CT Abdomen Pelvis With Contrast   Final Result      CT Abdomen Pelvis Without Contrast   Final Result   1. Colitis associated abnormal colonic dilatation involving the   ascending colon, hepatic flexure,  proximal transverse colon where there   is also pericolonic inflammation. There is a second area of more   extensive abnormal wall thickening involving the proximal to mid sigmoid   colon which is decompressed with a greater degree of surrounding   inflammation. There is a potential associated obstructing component at   this location as the colon proximal to the sigmoid colon is dilated.   When compared to the prior exam 15 days ago, the right colitis is new   and what appears to represent colitis of the proximal sigmoid colon is   not significantly changed. The lack of interval change following   treatment and the presence of an obstructive component raises the   likelihood that there could be an associated neoplastic process at this   location though the distinction is difficult to make with CT and there   remains a great deal of inflammation surrounding the proximal sigmoid   colon.   2. Apparently, this exam was performed with intravenous contrast though   there is no contrast demonstrated on this exam. This suggests that there   has been extravasation of contrast into the arm and that could be   confirmed with physical exam or x-ray of the arm.   3. Previous gastric surgery. Small hiatal hernia.       Discussed with Dr. Cruz in the emergency department on 01/27/2023 at   5:50 PM.        Radiation dose reduction techniques were utilized, including automated   exposure control and exposure modulation based on body size.       This report was finalized on 1/27/2023 6:43 PM by Dr. Donovan Valencia M.D.              Assessment & Plan     Patient Active Problem List   Diagnosis   • Left sided colitis with complication (HCC)   • Colitis   • Nausea and vomiting   • Stricture of sigmoid colon (HCC)   • Uterine anomaly   • Hypertension   • Avulsion fracture of distal fibula       Impression  1.  Left-sided colitis    2.  Sigmoid stricture: Status post left colonic resection    3.  EGD appearance of gastritis:  Biopsies pending    4.  JUANY    Plan  Diet as per colorectal surgery  Waiting EGD and surgical pathology  Continue supportive care measures  Follow creatinine    I discussed the patients findings and my recommendations with patient and nursing staff.    All necessary PPE, including face mask and eye protection, were worn during this encounter.  Hand sanitization was performed both before and after the patient interaction.    Over 25 minutes was spent reviewing the patient's chart and records, in discussion with the patient, in examination of the patient, and in discussion with members of the patient's medical team.    Adwoa Isidro MD

## 2023-02-06 LAB
ALBUMIN SERPL-MCNC: 2.4 G/DL (ref 3.5–5.2)
ALBUMIN/GLOB SERPL: 0.9 G/DL
ALP SERPL-CCNC: 41 U/L (ref 39–117)
ALT SERPL W P-5'-P-CCNC: 14 U/L (ref 1–33)
ANION GAP SERPL CALCULATED.3IONS-SCNC: 7.5 MMOL/L (ref 5–15)
AST SERPL-CCNC: 32 U/L (ref 1–32)
BACTERIA UR QL AUTO: ABNORMAL /HPF
BILIRUB SERPL-MCNC: 0.7 MG/DL (ref 0–1.2)
BILIRUB UR QL STRIP: NEGATIVE
BUN SERPL-MCNC: 22 MG/DL (ref 8–23)
BUN/CREAT SERPL: 12 (ref 7–25)
BURR CELLS BLD QL SMEAR: ABNORMAL
CALCIUM SPEC-SCNC: 8.5 MG/DL (ref 8.6–10.5)
CHLORIDE SERPL-SCNC: 112 MMOL/L (ref 98–107)
CLARITY UR: CLEAR
CO2 SERPL-SCNC: 20.5 MMOL/L (ref 22–29)
COLOR UR: YELLOW
CREAT SERPL-MCNC: 1.84 MG/DL (ref 0.57–1)
CREAT UR-MCNC: 57.8 MG/DL
DEPRECATED RDW RBC AUTO: 44.9 FL (ref 37–54)
EGFRCR SERPLBLD CKD-EPI 2021: 30.5 ML/MIN/1.73
EOSINOPHIL # BLD MANUAL: 0.14 10*3/MM3 (ref 0–0.4)
EOSINOPHIL NFR BLD MANUAL: 1 % (ref 0.3–6.2)
ERYTHROCYTE [DISTWIDTH] IN BLOOD BY AUTOMATED COUNT: 14.1 % (ref 12.3–15.4)
GLOBULIN UR ELPH-MCNC: 2.6 GM/DL
GLUCOSE SERPL-MCNC: 54 MG/DL (ref 65–99)
GLUCOSE UR STRIP-MCNC: NEGATIVE MG/DL
HCT VFR BLD AUTO: 24.2 % (ref 34–46.6)
HGB BLD-MCNC: 7.7 G/DL (ref 12–15.9)
HGB UR QL STRIP.AUTO: ABNORMAL
HYALINE CASTS UR QL AUTO: ABNORMAL /LPF
KETONES UR QL STRIP: ABNORMAL
LAB AP CASE REPORT: NORMAL
LAB AP DIAGNOSIS COMMENT: NORMAL
LEUKOCYTE ESTERASE UR QL STRIP.AUTO: NEGATIVE
LYMPHOCYTES # BLD MANUAL: 1.48 10*3/MM3 (ref 0.7–3.1)
LYMPHOCYTES NFR BLD MANUAL: 2.1 % (ref 5–12)
MAGNESIUM SERPL-MCNC: 1.9 MG/DL (ref 1.6–2.4)
MCH RBC QN AUTO: 27.7 PG (ref 26.6–33)
MCHC RBC AUTO-ENTMCNC: 31.8 G/DL (ref 31.5–35.7)
MCV RBC AUTO: 87.1 FL (ref 79–97)
MONOCYTES # BLD: 0.3 10*3/MM3 (ref 0.1–0.9)
NEUTROPHILS # BLD AUTO: 12.27 10*3/MM3 (ref 1.7–7)
NEUTROPHILS NFR BLD MANUAL: 86.5 % (ref 42.7–76)
NITRITE UR QL STRIP: NEGATIVE
PATH REPORT.FINAL DX SPEC: NORMAL
PATH REPORT.GROSS SPEC: NORMAL
PH UR STRIP.AUTO: 6 [PH] (ref 5–8)
PLAT MORPH BLD: NORMAL
PLATELET # BLD AUTO: 215 10*3/MM3 (ref 140–450)
PMV BLD AUTO: 10.8 FL (ref 6–12)
POIKILOCYTOSIS BLD QL SMEAR: ABNORMAL
POTASSIUM SERPL-SCNC: 3.3 MMOL/L (ref 3.5–5.2)
PROT ?TM UR-MCNC: 59.9 MG/DL
PROT SERPL-MCNC: 5 G/DL (ref 6–8.5)
PROT UR QL STRIP: ABNORMAL
RBC # BLD AUTO: 2.78 10*6/MM3 (ref 3.77–5.28)
RBC # UR STRIP: ABNORMAL /HPF
REF LAB TEST METHOD: ABNORMAL
SODIUM SERPL-SCNC: 140 MMOL/L (ref 136–145)
SODIUM UR-SCNC: 48 MMOL/L
SP GR UR STRIP: 1.02 (ref 1–1.03)
SQUAMOUS #/AREA URNS HPF: ABNORMAL /HPF
UROBILINOGEN UR QL STRIP: ABNORMAL
VARIANT LYMPHS NFR BLD MANUAL: 10.4 % (ref 19.6–45.3)
WBC # UR STRIP: ABNORMAL /HPF
WBC MORPH BLD: NORMAL
WBC NRBC COR # BLD: 14.19 10*3/MM3 (ref 3.4–10.8)

## 2023-02-06 PROCEDURE — 25010000002 HYDROMORPHONE PER 4 MG: Performed by: COLON & RECTAL SURGERY

## 2023-02-06 PROCEDURE — 82570 ASSAY OF URINE CREATININE: CPT | Performed by: INTERNAL MEDICINE

## 2023-02-06 PROCEDURE — 84156 ASSAY OF PROTEIN URINE: CPT | Performed by: INTERNAL MEDICINE

## 2023-02-06 PROCEDURE — P9016 RBC LEUKOCYTES REDUCED: HCPCS

## 2023-02-06 PROCEDURE — 25010000002 PIPERACILLIN SOD-TAZOBACTAM PER 1 G: Performed by: HOSPITALIST

## 2023-02-06 PROCEDURE — 86923 COMPATIBILITY TEST ELECTRIC: CPT

## 2023-02-06 PROCEDURE — 83735 ASSAY OF MAGNESIUM: CPT | Performed by: INTERNAL MEDICINE

## 2023-02-06 PROCEDURE — 36430 TRANSFUSION BLD/BLD COMPNT: CPT

## 2023-02-06 PROCEDURE — 25010000002 ONDANSETRON PER 1 MG: Performed by: COLON & RECTAL SURGERY

## 2023-02-06 PROCEDURE — 99232 SBSQ HOSP IP/OBS MODERATE 35: CPT | Performed by: INTERNAL MEDICINE

## 2023-02-06 PROCEDURE — 85025 COMPLETE CBC W/AUTO DIFF WBC: CPT | Performed by: PHYSICIAN ASSISTANT

## 2023-02-06 PROCEDURE — 25010000002 PIPERACILLIN SOD-TAZOBACTAM PER 1 G: Performed by: COLON & RECTAL SURGERY

## 2023-02-06 PROCEDURE — 81001 URINALYSIS AUTO W/SCOPE: CPT | Performed by: INTERNAL MEDICINE

## 2023-02-06 PROCEDURE — 25010000002 METOCLOPRAMIDE PER 10 MG: Performed by: COLON & RECTAL SURGERY

## 2023-02-06 PROCEDURE — 86900 BLOOD TYPING SEROLOGIC ABO: CPT

## 2023-02-06 PROCEDURE — 80053 COMPREHEN METABOLIC PANEL: CPT | Performed by: PHYSICIAN ASSISTANT

## 2023-02-06 PROCEDURE — 99024 POSTOP FOLLOW-UP VISIT: CPT | Performed by: PHYSICIAN ASSISTANT

## 2023-02-06 PROCEDURE — 85007 BL SMEAR W/DIFF WBC COUNT: CPT | Performed by: PHYSICIAN ASSISTANT

## 2023-02-06 PROCEDURE — 84300 ASSAY OF URINE SODIUM: CPT | Performed by: INTERNAL MEDICINE

## 2023-02-06 PROCEDURE — 25010000002 HYDRALAZINE PER 20 MG: Performed by: HOSPITALIST

## 2023-02-06 RX ORDER — FAMOTIDINE 20 MG/1
20 TABLET, FILM COATED ORAL
Status: DISCONTINUED | OUTPATIENT
Start: 2023-02-06 | End: 2023-02-07

## 2023-02-06 RX ORDER — HYDRALAZINE HYDROCHLORIDE 20 MG/ML
10 INJECTION INTRAMUSCULAR; INTRAVENOUS EVERY 4 HOURS PRN
Status: DISCONTINUED | OUTPATIENT
Start: 2023-02-06 | End: 2023-02-20

## 2023-02-06 RX ORDER — METOCLOPRAMIDE HYDROCHLORIDE 5 MG/ML
10 INJECTION INTRAMUSCULAR; INTRAVENOUS EVERY 6 HOURS
Status: DISCONTINUED | OUTPATIENT
Start: 2023-02-06 | End: 2023-02-18

## 2023-02-06 RX ADMIN — GABAPENTIN 125 MG: 250 SOLUTION ORAL at 15:28

## 2023-02-06 RX ADMIN — TAZOBACTAM SODIUM AND PIPERACILLIN SODIUM 3.38 G: 375; 3 INJECTION, SOLUTION INTRAVENOUS at 20:48

## 2023-02-06 RX ADMIN — HYDRALAZINE HYDROCHLORIDE 10 MG: 20 INJECTION INTRAMUSCULAR; INTRAVENOUS at 17:35

## 2023-02-06 RX ADMIN — FAMOTIDINE 20 MG: 20 TABLET, FILM COATED ORAL at 15:25

## 2023-02-06 RX ADMIN — METOCLOPRAMIDE 10 MG: 5 INJECTION, SOLUTION INTRAMUSCULAR; INTRAVENOUS at 23:08

## 2023-02-06 RX ADMIN — TAZOBACTAM SODIUM AND PIPERACILLIN SODIUM 3.38 G: 375; 3 INJECTION, SOLUTION INTRAVENOUS at 16:58

## 2023-02-06 RX ADMIN — ONDANSETRON 4 MG: 2 INJECTION INTRAMUSCULAR; INTRAVENOUS at 12:48

## 2023-02-06 RX ADMIN — ACETAMINOPHEN 1000 MG: 10 INJECTION, SOLUTION INTRAVENOUS at 23:08

## 2023-02-06 RX ADMIN — HYDROMORPHONE HYDROCHLORIDE 0.25 MG: 1 INJECTION, SOLUTION INTRAMUSCULAR; INTRAVENOUS; SUBCUTANEOUS at 08:01

## 2023-02-06 RX ADMIN — METOCLOPRAMIDE 10 MG: 5 INJECTION, SOLUTION INTRAMUSCULAR; INTRAVENOUS at 18:22

## 2023-02-06 RX ADMIN — HYDROMORPHONE HYDROCHLORIDE 0.25 MG: 1 INJECTION, SOLUTION INTRAMUSCULAR; INTRAVENOUS; SUBCUTANEOUS at 18:22

## 2023-02-06 RX ADMIN — GABAPENTIN 125 MG: 250 SOLUTION ORAL at 05:48

## 2023-02-06 RX ADMIN — HYDROMORPHONE HYDROCHLORIDE 0.25 MG: 1 INJECTION, SOLUTION INTRAMUSCULAR; INTRAVENOUS; SUBCUTANEOUS at 04:59

## 2023-02-06 RX ADMIN — HYDRALAZINE HYDROCHLORIDE 10 MG: 20 INJECTION INTRAMUSCULAR; INTRAVENOUS at 20:47

## 2023-02-06 RX ADMIN — TAZOBACTAM SODIUM AND PIPERACILLIN SODIUM 3.38 G: 375; 3 INJECTION, SOLUTION INTRAVENOUS at 08:01

## 2023-02-06 RX ADMIN — ACETAMINOPHEN 1000 MG: 10 INJECTION, SOLUTION INTRAVENOUS at 05:48

## 2023-02-06 RX ADMIN — GABAPENTIN 125 MG: 250 SOLUTION ORAL at 20:48

## 2023-02-06 RX ADMIN — ACETAMINOPHEN 1000 MG: 10 INJECTION, SOLUTION INTRAVENOUS at 00:37

## 2023-02-06 RX ADMIN — TAZOBACTAM SODIUM AND PIPERACILLIN SODIUM 3.38 G: 375; 3 INJECTION, SOLUTION INTRAVENOUS at 01:38

## 2023-02-06 RX ADMIN — ACETAMINOPHEN 1000 MG: 10 INJECTION, SOLUTION INTRAVENOUS at 16:53

## 2023-02-06 RX ADMIN — HYDROMORPHONE HYDROCHLORIDE 0.25 MG: 1 INJECTION, SOLUTION INTRAMUSCULAR; INTRAVENOUS; SUBCUTANEOUS at 15:28

## 2023-02-06 NOTE — PAYOR COMM NOTE
"Hali Aleman (62 y.o. Female)     PLEASE SEE ATTACHED FOR CONTINUED STAY REVIEW.     REF#[id number]    PLEASE CALL  OR  [phone]    THANK YOU    ANDREA CHAVES LPN CCP    Date of Birth   1960    Social Security Number       Address   [address] [facility] [address]    Home Phone   [phone]    MRN   [id number]       Taoist   None    Marital Status   Single                            Admission Date   1/27/23    Admission Type   Emergency    Admitting Provider   Gideon Hope MD    Attending Provider   Gideon Hope MD    Department, Room/Bed   [address], [address]/1       Discharge Date       Discharge Disposition       Discharge Destination                               Attending Provider: Gideon Hope MD    Allergies: Hydrocodone, Sulfa Antibiotics    Isolation: None   Infection: None   Code Status: CPR    Ht: 154.9 cm (60.98\")   Wt: 83.1 kg (183 lb 3.2 oz)    Admission Cmt: None   Principal Problem: Colitis [K52.9]                 Active Insurance as of 1/27/2023     Primary Coverage     Payor Plan Insurance Group Employer/Plan Group    [facility] [employer] [facility] [employer] EM[employer] [id number]     Payor Plan Address Payor Plan Phone Number Payor Plan Fax Number Effective Dates    [address] [phone]  1/1/2022 - None Entered    [facility] [id number]       Subscriber Name Subscriber Birth Date Member ID       HALI ALEMAN 1960 [id number]                 Emergency Contacts      (Rel.) Home Phone Work Phone Mobile Phone    SANDRA ALEMAN (Daughter) [phone] -- --    AMAURI ABREU (Mother) [phone] -- [phone]            Rebuck: [id number] [id number]  Tax ID [id number]  Oxygen Therapy (last day)     Date/Time SpO2 Device (Oxygen Therapy) Flow (L/min) Oxygen Concentration (%) ETCO2 (mmHg)    02/06/23 1224 92 room air -- -- --    02/06/23 0800 -- room air -- -- --    02/06/23 0718 91 room air -- -- --    02/05/23 " 2255 97 nasal cannula 1 -- --    02/05/23 2015 -- nasal cannula 1 -- --    02/05/23 1915 96 nasal cannula 1 -- --    02/05/23 1603 91 nasal cannula 1 -- --    02/05/23 1455 95 nasal cannula 1 -- --    02/05/23 1300 95 nasal cannula 1 -- --    02/05/23 1202 91 room air -- -- --    02/05/23 0820 -- nasal cannula 1 -- --    02/05/23 0747 98 nasal cannula 1.5 -- --    02/05/23 0345 99 nasal cannula 1 -- --    02/05/23 0021 -- nasal cannula 2 -- --          Intake & Output (last day)       02/05 0701  02/06 0700 02/06 0701  02/07 0700    P.O.      I.V. (mL/kg)      Blood      IV Piggyback      Total Intake(mL/kg)      Urine (mL/kg/hr) 1275 (0.6) 300 (0.7)    Emesis/NG output      Stool 850     Total Output 2125 300    Net -2125 -300          Urine Unmeasured Occurrence 0 x         Lines, Drains & Airways     Active LDAs     Name Placement date Placement time Site Days    Peripheral IV 02/01/23 2225 Left;Posterior Forearm 02/01/23  2225  Forearm  4    Peripheral IV 02/03/23 1230 Left Antecubital 02/03/23  1230  Antecubital  2    Colostomy LLQ 02/03/23  1944  LLQ  2                  Operative/Procedure Notes (last 24 hours)  Notes from 02/05/23 1227 through 02/06/23 1227   No notes of this type exist for this encounter.            Physician Progress Notes (last 24 hours)      Joie Fabian MD at 02/06/23 1003          [facility]  Inpatient Progress Note    Reason for Follow Up: Colitis, sigmoid stricture    Subjective     Interval History:   Complains of heartburn.  Having brown watery ostomy output.  No nausea.    Current Facility-Administered Medications:   •  acetaminophen (OFIRMEV) injection 1,000 mg, 1,000 mg, Intravenous, Q6H, Lino Gaston MD, 1,000 mg at 02/06/23 0545  •  famotidine (PEPCID) tablet 20 mg, 20 mg, Oral, BID AC, Joie Fabian MD  •  gabapentin (NEURONTIN) 50 mg/mL solution 125 mg, 125 mg, Oral, Q8H, Lino Gaston MD, 125 mg at 02/06/23 0548  •  HYDROmorphone  (DILAUDID) injection 0.25 mg, 0.25 mg, Intravenous, Q2H PRN, 0.25 mg at 02/06/23 0801 **AND** naloxone (NARCAN) injection 0.4 mg, 0.4 mg, Intravenous, Q5 Min PRN, Lino Gaston MD  •  nitroglycerin (NITROSTAT) SL tablet 0.4 mg, 0.4 mg, Sublingual, Q5 Min PRN, Lino Gaston MD  •  ondansetron (ZOFRAN) injection 4 mg, 4 mg, Intravenous, Q6H PRN, Lino Gaston MD, 4 mg at 02/05/23 0408  •  piperacillin-tazobactam (ZOSYN) 3.375 g in iso-osmotic dextrose 50 ml (premix), 3.375 g, Intravenous, Once, Lino Gaston MD, Currently Infusing at 02/04/23 1354  •  piperacillin-tazobactam (ZOSYN) 3.375 g in iso-osmotic dextrose 50 ml (premix), 3.375 g, Intravenous, Q8H, Lino Gaston MD, 3.375 g at 02/06/23 0801  •  scopolamine patch 1 mg/72 hr, 1 patch, Transdermal, Continuous, Lino Gaston MD, 1 patch at 02/03/23 1251  •  sodium chloride 0.9 % infusion, 125 mL/hr, Intravenous, Continuous, Matthew Soliz MD, Last Rate: 125 mL/hr at 02/06/23 1139, 125 mL/hr at 02/06/23 1139  Review of Systems:    Positive for heartburn, negative for nausea, negative for fever chills    Objective     Vital Signs  Temp:  [97.7 °F (36.5 °C)-99.7 °F (37.6 °C)] 98.5 °F (36.9 °C)  Heart Rate:  [] 89  Resp:  [18-20] 18  BP: ()/(65-94) 126/88  Body mass index is 34.63 kg/m².    Intake/Output Summary (Last 24 hours) at 2/6/2023 1152  Last data filed at 2/6/2023 0950  Gross per 24 hour   Intake --   Output 2125 ml   Net -2125 ml     I/O this shift:  In: -   Out: 300 [Urine:300]     Physical Exam:   General: patient awake, alert and cooperative   Eyes: Normal lids and lashes, no scleral icterus   Neck: supple, normal ROM   Skin: warm and dry, not jaundiced   Pulm:   regular and unlabored   Abdomen: soft, nontender, nondistended, ostomy in left lower quadrant with brown watery output   Psychiatric: Normal mood and behavior; memory intact     Results Review:     I reviewed the patient's new clinical results.    Results from  last 7 days   Lab Units 02/06/23  0443 02/05/23  0334 02/04/23  0430   WBC 10*3/mm3 14.19* 15.58* 15.55*   HEMOGLOBIN g/dL 7.7* 8.5* 11.0*   HEMATOCRIT % 24.2* 24.7* 32.9*   PLATELETS 10*3/mm3 215 189 186     Results from last 7 days   Lab Units 02/06/23 0443 02/05/23  0334 02/04/23 0430   SODIUM mmol/L 140 137 137   POTASSIUM mmol/L 3.3* 4.0 4.3   CHLORIDE mmol/L 112* 105 110*   CO2 mmol/L 20.5* 22.4 20.0*   BUN mg/dL 22 19 8   CREATININE mg/dL 1.84* 2.37* 0.96   CALCIUM mg/dL 8.5* 8.5* 7.3*   BILIRUBIN mg/dL 0.7 0.7  --    ALK PHOS U/L 41 38*  --    ALT (SGPT) U/L 14 11  --    AST (SGOT) U/L 32 28  --    GLUCOSE mg/dL 54* 118* 149*     Results from last 7 days   Lab Units 02/04/23  0430   INR  1.49*     Lab Results   Lab Value Date/Time    LIPASE 20 01/27/2023 1445    LIPASE 16 01/12/2023 1601       Radiology:  US Renal Bilateral   Final Result   No hydronephrosis seen on either side.       This report was finalized on 2/5/2023 10:43 PM by Dr. Grace Sanchez M.D.          CT Abdomen Pelvis With Contrast   Final Result      CT Abdomen Pelvis Without Contrast   Final Result   1. Colitis associated abnormal colonic dilatation involving the   ascending colon, hepatic flexure, proximal transverse colon where there   is also pericolonic inflammation. There is a second area of more   extensive abnormal wall thickening involving the proximal to mid sigmoid   colon which is decompressed with a greater degree of surrounding   inflammation. There is a potential associated obstructing component at   this location as the colon proximal to the sigmoid colon is dilated.   When compared to the prior exam 15 days ago, the right colitis is new   and what appears to represent colitis of the proximal sigmoid colon is   not significantly changed. The lack of interval change following   treatment and the presence of an obstructive component raises the   likelihood that there could be an associated neoplastic process at this    location though the distinction is difficult to make with CT and there   remains a great deal of inflammation surrounding the proximal sigmoid   colon.   2. Apparently, this exam was performed with intravenous contrast though   there is no contrast demonstrated on this exam. This suggests that there   has been extravasation of contrast into the arm and that could be   confirmed with physical exam or x-ray of the arm.   3. Previous gastric surgery. Small hiatal hernia.       Discussed with Dr. Cruz in the emergency department on 01/27/2023 at   5:50 PM.        Radiation dose reduction techniques were utilized, including automated   exposure control and exposure modulation based on body size.       This report was finalized on 1/27/2023 6:43 PM by Dr. Donovan Valencia M.D.              Assessment & Plan     Patient Active Problem List   Diagnosis   • Left sided colitis with complication (HCC)   • Colitis   • Nausea and vomiting   • Stricture of sigmoid colon (HCC)   • Uterine anomaly   • Hypertension   • Avulsion fracture of distal fibula     All problems are new to me today  Assessment:  1.  Left-sided colitis     2.  Sigmoid stricture: Status post left colon/sigmoid resection     3.  EGD appearance of gastritis: Biopsies pending     4.  JUANY    5. heartburn         Plan:  · Follow-up path from EGD, colon resection  · Continue supportive care  · Add famotidine for heartburn-encourage patient to sit upright when able especially after taking p.o.  · Diet per colorectal surgery    I discussed the patients findings and my recommendations with patient.    Joie Fabian MD                  Electronically signed by Joie Fabian MD at 02/06/23 1592     Lilibeth Rahman PA-C at 02/06/23 0807          Progress Note    Pod 3    S: Denies abdominal pain. Pt reports episode of emesis last night. States she does not like liquids and wants to try solid foods.     O:  Temp:  [97.7 °F (36.5 °C)-99.7 °F (37.6 °C)]  98.5 °F (36.9 °C)  Heart Rate:  [] 89  Resp:  [18-20] 18  BP: ()/(65-94) 126/88      Intake/Output Summary (Last 24 hours) at 2/6/2023 0856  Last data filed at 2/6/2023 0718  Gross per 24 hour   Intake --   Output 1925 ml   Net -1925 ml       Abd:  soft, non-distended  Incision: clean, dry  Colostomy PPP with custard consistency brown stool  Stark catheter in place.     Results from last 7 days   Lab Units 02/06/23  0443   WBC 10*3/mm3 14.19*   HEMOGLOBIN g/dL 7.7*   HEMATOCRIT % 24.2*   PLATELETS 10*3/mm3 215     Results from last 7 days   Lab Units 02/06/23  0443 02/04/23  0430 02/03/23  2119   SODIUM mmol/L 140   < > 137   POTASSIUM mmol/L 3.3*   < > 4.7   CHLORIDE mmol/L 112*   < > 107   CO2 mmol/L 20.5*   < > 23.0   BUN mg/dL 22   < > 5*   CREATININE mg/dL 1.84*   < > 0.74   EGFR mL/min/1.73 30.5*   < > 91.0   GLUCOSE mg/dL 54*   < > 202*   CALCIUM mg/dL 8.5*   < > 7.4*   PHOSPHORUS mg/dL  --   --  3.5    < > = values in this interval not displayed.       A/P: 62 y.o. female with s/p S/P robotic assisted laparoscopic left and sigmoid resection with colostomy converted open due to splenic injury 02/03/2023.    - Full liquid diet. If Pt tolerates this, can advance to a regular diet.   - D/C stark catheter  - Hg 7.7 likely secondary to acute blood loss during procedure in combination with fluid resuscitation. Will transfuse 1 unit of PRBC.         Electronically signed by Lilibeth Rahman PA-C at 02/06/23 0905     Dionne Rojo MD at 02/05/23 1610        Patient seen and examined. Labs and chart reviewed. With racheal likely secondary to atn. Admitted with colitis. S/p surgery. Agree with ivf. Will f/u with consult in am. Order u/s and urine studies    Electronically signed by Dionne Rojo MD at 02/05/23 1612     Lino Gaston MD at 02/05/23 1557        CRS attending  Patient up in the chair  She states she is hungry but she got mom with some clear liquids earlier  Abdomen is soft ostomy is a  "venous congested but viable  Creatinine elevated    Status post left and sigmoid resection for suspected diverticulitis stricture  Also splenic injury with almost 1 L blood loss  Acute blood loss anemia  I think that is the reason of her elevated creatinine is that she is under r esuscitated at this time.  Giving her extra fluid.  I would anticipate that her hemoglobin is going to be lower tomorrow because of all the fluid today.  I do not think that she is actively bleeding.   Electronically signed by Lino Gaston MD at 02/05/23 1600     Gideon Hope MD at 02/05/23 1318          Daily progress note    Primary care physician  Dr. Delgado    Chief complaint  Doing same with no distress but sleepy and tired.    History of present illness  62-year-old female with history of hypertension presented to [facility] emergency room with left lower quadrant abdominal pain for last 1 month.  Patient also have occasional loose stools.  Patient denies any nausea vomiting.  Patient recently diagnosed with colitis and treated with oral antibiotics without any improvement.  Patient has noticed blood in the stools.  Patient evaluated in ER with CT abdominal pelvis which shows worsening colitis and failed outpatient treatment admit for management.  Patient has no fever chills chest pain shortness of breath with sweats weight loss or weight gain.     REVIEW OF SYSTEMS  Unremarkable except abdominal pain      PHYSICAL EXAM  Blood pressure 97/65, pulse 94, temperature 98.5 °F (36.9 °C), temperature source Oral, resp. rate 20, height 154.9 cm (60.98\"), weight 83.1 kg (183 lb 3.2 oz), SpO2 91 %.    GENERAL:  Awake and alert in no acute distress  HEENT:  Unremarkable  NECK:  Supple  CV: regular rhythm, normal rate  RESPIRATORY: normal effort, clear to auscultation bilaterally  ABDOMEN: soft, left lower quadrant tenderness bowel sounds positive  MUSCULOSKELETAL: no deformity  NEURO: alert, moves all extremities, follows " commands  PSYCH:  calm, cooperative  SKIN: warm, dry     LAB RESULTS  Lab Results (last 24 hours)     Procedure Component Value Units Date/Time    Comprehensive Metabolic Panel [[id number]]  (Abnormal) Collected: 02/05/23 0334    Specimen: Blood Updated: 02/05/23 0451     Glucose 118 mg/dL      BUN 19 mg/dL      Creatinine 2.37 mg/dL      Sodium 137 mmol/L      Potassium 4.0 mmol/L      Chloride 105 mmol/L      CO2 22.4 mmol/L      Calcium 8.5 mg/dL      Total Protein 5.0 g/dL      Albumin 2.6 g/dL      ALT (SGPT) 11 U/L      AST (SGOT) 28 U/L      Alkaline Phosphatase 38 U/L      Total Bilirubin 0.7 mg/dL      Globulin 2.4 gm/dL      A/G Ratio 1.1 g/dL      BUN/Creatinine Ratio 8.0     Anion Gap 9.6 mmol/L      eGFR 22.5 mL/min/1.73     Narrative:      GFR Normal >60  Chronic Kidney Disease <60  Kidney Failure <15      CBC & Differential [[id number]]  (Abnormal) Collected: 02/05/23 0334    Specimen: Blood Updated: 02/05/23 0432    Narrative:      The following orders were created for panel order CBC & Differential.  Procedure                               Abnormality         Status                     ---------                               -----------         ------                     CBC Auto Differential[[id number]]        Abnormal            Final result                 Please view results for these tests on the individual orders.    CBC Auto Differential [[id number]]  (Abnormal) Collected: 02/05/23 0334    Specimen: Blood Updated: 02/05/23 0432     WBC 15.58 10*3/mm3      RBC 2.91 10*6/mm3      Hemoglobin 8.5 g/dL      Hematocrit 24.7 %      MCV 84.9 fL      MCH 29.2 pg      MCHC 34.4 g/dL      RDW 14.4 %      RDW-SD 44.3 fl      MPV 11.1 fL      Platelets 189 10*3/mm3      Neutrophil % 84.2 %      Lymphocyte % 10.8 %      Monocyte % 3.6 %      Eosinophil % 0.3 %      Basophil % 0.2 %      Immature Grans % 0.9 %      Neutrophils, Absolute 13.12 10*3/mm3      Lymphocytes, Absolute 1.68 10*3/mm3      Monocytes,  Absolute 0.56 10*3/mm3      Eosinophils, Absolute 0.05 10*3/mm3      Basophils, Absolute 0.03 10*3/mm3      Immature Grans, Absolute 0.14 10*3/mm3      nRBC 0.0 /100 WBC         Imaging Results (Last 24 Hours)     ** No results found for the last 24 hours. **        Upper and lower endoscopy results noted and discussed with patient    Current Facility-Administered Medications:   •  acetaminophen (OFIRMEV) injection 1,000 mg, 1,000 mg, Intravenous, Q6H, Lino Gaston MD, 1,000 mg at 02/05/23 1155  •  alvimopan (ENTEREG) capsule 12 mg, 12 mg, Oral, BID, Lino Gaston MD, 12 mg at 02/05/23 1017  •  gabapentin (NEURONTIN) 50 mg/mL solution 250 mg, 250 mg, Oral, Q8H, Lino Gaston MD, 250 mg at 02/04/23 2215  •  HYDROmorphone (DILAUDID) injection 0.25 mg, 0.25 mg, Intravenous, Q2H PRN, 0.25 mg at 02/05/23 0408 **AND** naloxone (NARCAN) injection 0.4 mg, 0.4 mg, Intravenous, Q5 Min PRN, Lino Gaston MD  •  nitroglycerin (NITROSTAT) SL tablet 0.4 mg, 0.4 mg, Sublingual, Q5 Min PRN, Lino Gaston MD  •  ondansetron (ZOFRAN) injection 4 mg, 4 mg, Intravenous, Q6H PRN, Lino Gaston MD, 4 mg at 02/05/23 0408  •  piperacillin-tazobactam (ZOSYN) 3.375 g in iso-osmotic dextrose 50 ml (premix), 3.375 g, Intravenous, Once, Lino Gaston MD, Currently Infusing at 02/04/23 1354  •  piperacillin-tazobactam (ZOSYN) 3.375 g in iso-osmotic dextrose 50 ml (premix), 3.375 g, Intravenous, Q8H, Lino Gaston MD, 3.375 g at 02/05/23 0954  •  scopolamine patch 1 mg/72 hr, 1 patch, Transdermal, Continuous, Lino Gaston MD, 1 patch at 02/03/23 1251  •  sodium chloride 0.9 % infusion, 150 mL/hr, Intravenous, Continuous, Lino Gaston MD, Last Rate: 150 mL/hr at 02/05/23 1155, 150 mL/hr at 02/05/23 1155     ASSESSMENT  Acute colitis with sigmoid stricture and microperforation s/p colon resection and colostomy  Acute kidney injury  Hypertension   Gastroesophageal reflux disease    PLAN  CPM  Postop  care  IVF  Continue IV antibiotics  Pain management  Symptomatic treatment of pain and nausea  Continue home medications  Stress ulcer DVT prophylaxis  Nephrology consult  Colorectal surgery and GI to follow patient  Supportive care  PT OT  Discussed with family nursing staff   Follow closely and further recommendation according to hospital course    SANG SINGLETARY MD    Copied text in this note has been reviewed and is accurate as of 23        Electronically signed by Sang Singletary MD at 23 1319          Consult Notes (last 24 hours)      Matthew Soliz MD at 23 1017      Consult Orders    1. Inpatient Nephrology Consult [[id number]] ordered by Sang Singletary MD at 23 1319                                                                                                               [facility]                                                                                             Nephrology Initial Consult Note    Patient Identification:  Name: Hali Tejeda MRN: [id number]  Age: 62 y.o. : 1960  Sex: female  Date:2023    Requesting Physician: As per consult order.  Reason for Consultation: Acute kidney injury  Information from:patient/ family/ chart      History of Present Illness: This is a 62 y.o. year old female with no prior history of any chronic kidney disease.  Creatinine was 0.5 on admission and has been steadily rising, 0.96 on , abruptly increased up to 2.37 yesterday and improved, 1.84 today.  She was initially admitted on  with abdominal pain and loose stools with colitis.  Initially treated with oral antibiotics and eventually switched to IV.  Currently being followed by GI and colorectal surgery.  She is status post left sigmoid resection for suspected diverticulitis stricture.  She sustained a splenic injury with acute blood loss anemia.  She has been hypotensive into the 80s but with aggressive fluid resuscitation overnight her renal  indices have improved today.  She is anxious to advance her diet she denies any current nausea vomiting abdominal pain, no fevers or chills.  Denies any shortness of breath or chest pain.  Renal ultrasound showed no hydronephrosis, normal-sized kidneys      The following medical history and medications personally reviewed by me:    Problem List:   Patient Active Problem List    Diagnosis    • *Colitis [K52.9]    • Uterine anomaly [Q51.9]    • Hypertension [I10]    • Nausea and vomiting [R11.2]    • Stricture of sigmoid colon (HCC) [K56.699]    • Left sided colitis with complication (HCC) [K51.519]    • Avulsion fracture of distal fibula [S82.839A]        Past Medical History:  Past Medical History:   Diagnosis Date   • Abnormal EKG 2020   • Avulsion fracture of distal fibula 2015   • H. pylori infection 2020   • Hepatic steatosis 2020   • Hiatal hernia    • HTN (hypertension)    • Hyperlipidemia    • Insomnia    • Left sided colitis with complication (HCC) 2023    ADMITTED TO [facility]   • LGSIL on Pap smear of cervix     (+) HPV   • LGSIL Pap smear of vagina 2016   • Snoring    • Stricture of sigmoid colon (HCC) 2023   • Uterine fibroid    • Vaginal atrophy        Past Surgical History:  Past Surgical History:   Procedure Laterality Date   • BREAST LUMPECTOMY Left     benign   •  SECTION N/A    • COLON RESECTION N/A 2/3/2023    Procedure: COLON RESECTION LAPAROSCOPIC CONVERTED TO OPEN SIGMOID AND LEFT MOBILIZATION OF SPLENIC FLEXURE WITH DAVINCI ROBOT;  Surgeon: Lino Gaston MD;  Location: [facility];  Service: Robotics - DaVinci;  Laterality: N/A;   • COLONOSCOPY N/A 2014    COULD ONLY GET SCOPE TO 70 CM DUE TO SIGNIFICANT STRICTURE SECONDARY TO SEVERE DIVERTICULITIS OR CANCER, ACBE ORDERED, DR. PARAG HUDSON AT [facility]   • COLONOSCOPY N/A 2023    MODERATE STENOSIS IN SIGMOID-DILATED, MANY DIVERTICULA IN SIGMOID AND DESCENDING, COPIOUS AMTS OF STOOL,  MUCOSAL TEAR 55 CM FROM ANAL VERGE, DR. JENNI SMITH AT [facility]   • COLPOSCOPY N/A 03/04/2016   • ENDOSCOPY N/A 02/01/2023    GASTRITIS, SMALL HIATAL HERNIA, DR. JENNI SMITH AT [facility]   • ENDOSCOPY N/A 09/15/2009    DR. PARAG HUDSON AT [facility]   • GASTRIC SLEEVE LAPAROSCOPIC N/A 07/09/2020    WITH REMOVAL OF GASTRIC BAND, DR. OLIMPIA PALACIOS AT [facility]   • HYSTERECTOMY N/A 01/2005    WITH RSO   • LAPAROSCOPIC GASTRIC BANDING N/A 10/20/2019    DR. PARAG HUDSON AT [facility]   • SALPINGO OOPHORECTOMY Left     LSO, IN TEEN YEARS   • WISDOM TOOTH EXTRACTION Bilateral         Home Meds:   Medications Prior to Admission   Medication Sig Dispense Refill Last Dose   • levocetirizine (XYZAL) 5 MG tablet Take 5 mg by mouth As Needed.      • losartan-hydrochlorothiazide (HYZAAR) 50-12.5 MG per tablet Take 1 tablet by mouth Daily.      • Multiple Vitamin (MULTI-VITAMIN PO) Take 1 tablet by mouth Daily.      • ondansetron ODT (ZOFRAN-ODT) 4 MG disintegrating tablet Place 1 tablet on the tongue 4 (Four) Times a Day As Needed for Nausea or Vomiting. 12 tablet 0    • mesalamine (Lialda) 1.2 g EC tablet Take 4 tablets by mouth Daily With Breakfast. 120 tablet 11        Current Meds:   Current Facility-Administered Medications   Medication Dose Route Frequency Provider Last Rate Last Admin   • acetaminophen (OFIRMEV) injection 1,000 mg  1,000 mg Intravenous Q6H Lino Gaston MD   1,000 mg at 02/06/23 0548   • gabapentin (NEURONTIN) 50 mg/mL solution 125 mg  125 mg Oral Q8H Lino Gaston MD   125 mg at 02/06/23 0548   • HYDROmorphone (DILAUDID) injection 0.25 mg  0.25 mg Intravenous Q2H PRN Lino Gaston MD   0.25 mg at 02/06/23 0801    And   • naloxone (NARCAN) injection 0.4 mg  0.4 mg Intravenous Q5 Min PRN Lino Gaston MD       • nitroglycerin (NITROSTAT) SL tablet 0.4 mg  0.4 mg Sublingual Q5 Min PRN Lino Gaston MD       • ondansetron (ZOFRAN) injection 4 mg  4 mg Intravenous Q6H PRN Lino Gaston MD   4 mg at 02/05/23  0408   • piperacillin-tazobactam (ZOSYN) 3.375 g in iso-osmotic dextrose 50 ml (premix)  3.375 g Intravenous Once Lino Gaston MD   Currently Infusing at 23 1354   • piperacillin-tazobactam (ZOSYN) 3.375 g in iso-osmotic dextrose 50 ml (premix)  3.375 g Intravenous Q8H Lino Gaston MD   3.375 g at 23 0801   • scopolamine patch 1 mg/72 hr  1 patch Transdermal Continuous Lino Gaston MD   1 patch at 23 1251   • sodium chloride 0.9 % infusion  150 mL/hr Intravenous Continuous Lino Gaston  mL/hr at 23 1808 150 mL/hr at 23 1808       Allergies:  Allergies   Allergen Reactions   • Hydrocodone Unknown - Low Severity   • Sulfa Antibiotics Itching       Social History:   Social History     Socioeconomic History   • Marital status: Single   Tobacco Use   • Smoking status: Former     Types: Cigarettes     Quit date: 2004     Years since quittin.0     Passive exposure: Past   • Smokeless tobacco: Never   • Tobacco comments:     Quit    Vaping Use   • Vaping Use: Never used   Substance and Sexual Activity   • Alcohol use: Yes     Alcohol/week: 1.0 standard drink     Types: 1 Glasses of wine per week     Comment: occ wine   • Drug use: Never   • Sexual activity: Defer     Birth control/protection: Hysterectomy, Post-menopausal        Family History:  Family History   Problem Relation Age of Onset   • Diabetes Mother    • Gout Brother    • Colon cancer Paternal Grandmother    • Cancer Paternal Grandmother    • Cancer Paternal Aunt    • Colon cancer Paternal Aunt         Review of Systems: as per HPI, in addition:    General:      Complains of weakness / fatigue,                       No fevers / chills                       no weight loss  HEENT:       no dysphagia / odynophagia  Neck:           normal range of motion, no swelling  Respiratory: no cough / congestion                      No shortness of air                       No wheezing  CV:              No chest  "pain                       No palpitations  Abdomen/GI: no nausea / vomiting                      No diarrhea / constipation                      No abdominal pain  :             no dysuria / urinary frequency                       No urgency, normal output  Endocrine:   no polyuria / polydipsia,                      No heat or cold intolerance  Skin:           no rashes or skin breakdown   Vascular:   No edema                     No claudication  Psych:        no depression/ anxiety  Neuro:        no focal weakness, no seizures  Musculoskeletal: no joint pain or deformities      Physical Exam:  Vitals:   Temp (24hrs), Av.6 °F (37 °C), Min:97.7 °F (36.5 °C), Max:99.7 °F (37.6 °C)    /88 (BP Location: Left arm, Patient Position: Lying)   Pulse 89   Temp 98.5 °F (36.9 °C) (Oral)   Resp 18   Ht 154.9 cm (60.98\")   Wt 83.1 kg (183 lb 3.2 oz)   SpO2 91%   BMI 34.63 kg/m²   Intake/Output:     Intake/Output Summary (Last 24 hours) at 2023 1017  Last data filed at 2023 0950  Gross per 24 hour   Intake --   Output 2125 ml   Net -2125 ml        Wt Readings from Last 1 Encounters:   23 2226 83.1 kg (183 lb 3.2 oz)   23 1955 78.9 kg (174 lb)   23 1428 78.9 kg (174 lb)       Exam:    General Appearance:  Awake, alert, oriented x3, no acute distress  Chronically ill trace-appearing   Head and Face:  Normocephalic, atraumatic, mucus membranes moist, oropharynx clear   Eyes:  No icterus, pupils equal round and reactive to light, extraocular movements intact    ENMT: Moist mucosa, tongue symmetric    Neck: Supple  no jugular venous distention  no thyromegaly   Pulmonary:  Respiratory effort: Normal  Auscultation of lungs: Clear bilaterally  No wheezes  No rhonchi  Good air movement, good expansion   Chest wall:  No tenderness or deformity   Cardiovascular:  Auscultation of the heart: Normal rhythm, no murmurs  Trace edema of bilateral lower extremities   Abdomen:  Abdomen: soft, " non-tender, diminished bowel sounds, dressing in place  Liver and spleen: no hepatosplenomegaly   Musculoskeletal: Digits and nails: normal  Normal range of motion  No joint swelling or gross deformities    Skin: Skin inspection: color normal, no visible rashes or lesions  Skin palpation: texture, turgor normal, no palpable lesions   Lymphatic:  no cervical lymphadenopathy    Psychiatric: Judgement and insight: normal  Orientation to person place and time: normal  Mood and affect: normal       DATA:  Radiology and Labs:  The following labs independently reviewed by me, additional AM labs ordered  Old records independently reviewed showing normal baseline creatinine  The following radiologic studies independently viewed by me, findings renal ultrasound no obstruction   Interval notes, chart personally reviewed by me.  I have reviewed and summarized old records as detailed above  Plan of care discussed with patient  New problems include acute renal failure, hypokalemia, worsening anemia      Risk/ complexity of medical care/ medical decision making: High risk, new severe JUANY  Chronic illness with severe exacerbation or progression      Labs:   Recent Results (from the past 24 hour(s))   Urinalysis With Microscopic If Indicated (No Culture) - Indwelling Urethral Catheter    Collection Time: 02/05/23  5:43 PM    Specimen: Indwelling Urethral Catheter; Urine   Result Value Ref Range    Color, UA Yellow Yellow, Straw    Appearance, UA Slightly Cloudy (A) Clear    pH, UA 5.5 5.0 - 8.0    Specific Gravity, UA 1.020 1.005 - 1.030    Glucose, UA Negative Negative    Ketones, UA Negative Negative    Bilirubin, UA Negative Negative    Blood, UA Large (3+) (A) Negative    Protein, UA 30 mg/dL (1+) (A) Negative    Leuk Esterase, UA Negative Negative    Nitrite, UA Negative Negative    Urobilinogen, UA 0.2 E.U./dL 0.2 - 1.0 E.U./dL   Sodium, Urine, Random - Indwelling Urethral Catheter    Collection Time: 02/05/23  5:43 PM     Specimen: Indwelling Urethral Catheter; Urine   Result Value Ref Range    Sodium, Urine <20 mmol/L   Creatinine Urine Random (kidney function) GFR component - Indwelling Urethral Catheter    Collection Time: 02/05/23  5:43 PM    Specimen: Indwelling Urethral Catheter; Urine   Result Value Ref Range    Creatinine, Urine 134.6 mg/dL   Urinalysis, Microscopic Only - Indwelling Urethral Catheter    Collection Time: 02/05/23  5:43 PM    Specimen: Indwelling Urethral Catheter; Urine   Result Value Ref Range    RBC, UA 3-5 (A) None Seen, 0-2 /HPF    WBC, UA None Seen None Seen, 0-2 /HPF    Bacteria, UA Trace (A) None Seen /HPF    Squamous Epithelial Cells, UA 0-2 None Seen, 0-2 /HPF    Hyaline Casts, UA None Seen None Seen /LPF    Amorphous Crystals, UA Small/1+ None Seen /HPF    Methodology Manual Light Microscopy    Comprehensive Metabolic Panel    Collection Time: 02/06/23  4:43 AM    Specimen: Blood   Result Value Ref Range    Glucose 54 (L) 65 - 99 mg/dL    BUN 22 8 - 23 mg/dL    Creatinine 1.84 (H) 0.57 - 1.00 mg/dL    Sodium 140 136 - 145 mmol/L    Potassium 3.3 (L) 3.5 - 5.2 mmol/L    Chloride 112 (H) 98 - 107 mmol/L    CO2 20.5 (L) 22.0 - 29.0 mmol/L    Calcium 8.5 (L) 8.6 - 10.5 mg/dL    Total Protein 5.0 (L) 6.0 - 8.5 g/dL    Albumin 2.4 (L) 3.5 - 5.2 g/dL    ALT (SGPT) 14 1 - 33 U/L    AST (SGOT) 32 1 - 32 U/L    Alkaline Phosphatase 41 39 - 117 U/L    Total Bilirubin 0.7 0.0 - 1.2 mg/dL    Globulin 2.6 gm/dL    A/G Ratio 0.9 g/dL    BUN/Creatinine Ratio 12.0 7.0 - 25.0    Anion Gap 7.5 5.0 - 15.0 mmol/L    eGFR 30.5 (L) >60.0 mL/min/1.73   CBC Auto Differential    Collection Time: 02/06/23  4:43 AM    Specimen: Blood   Result Value Ref Range    WBC 14.19 (H) 3.40 - 10.80 10*3/mm3    RBC 2.78 (L) 3.77 - 5.28 10*6/mm3    Hemoglobin 7.7 (L) 12.0 - 15.9 g/dL    Hematocrit 24.2 (L) 34.0 - 46.6 %    MCV 87.1 79.0 - 97.0 fL    MCH 27.7 26.6 - 33.0 pg    MCHC 31.8 31.5 - 35.7 g/dL    RDW 14.1 12.3 - 15.4 %    RDW-SD  44.9 37.0 - 54.0 fl    MPV 10.8 6.0 - 12.0 fL    Platelets 215 140 - 450 10*3/mm3   Manual Differential    Collection Time: 02/06/23  4:43 AM    Specimen: Blood   Result Value Ref Range    Neutrophil % 86.5 (H) 42.7 - 76.0 %    Lymphocyte % 10.4 (L) 19.6 - 45.3 %    Monocyte % 2.1 (L) 5.0 - 12.0 %    Eosinophil % 1.0 0.3 - 6.2 %    Neutrophils Absolute 12.27 (H) 1.70 - 7.00 10*3/mm3    Lymphocytes Absolute 1.48 0.70 - 3.10 10*3/mm3    Monocytes Absolute 0.30 0.10 - 0.90 10*3/mm3    Eosinophils Absolute 0.14 0.00 - 0.40 10*3/mm3    [place] Cells Mod/2+ None Seen    Poikilocytes Mod/2+ None Seen    WBC Morphology Normal Normal    Platelet Morphology Normal Normal   Prepare RBC, 1 Units    Collection Time: 02/06/23  9:13 AM   Result Value Ref Range    Product Code J2391T98     Unit Number [id number]     UNIT  ABO O     UNIT  RH POS     Crossmatch Interpretation Compatible     Dispense Status XM     Blood Expiration Date [id number]     Blood Type Barcode 5100        Radiology:  Imaging Results (Last 24 Hours)     Procedure Component Value Units Date/Time    US Renal Bilateral [[id number]] Collected: 02/05/23 2241     Updated: 02/05/23 2246    Narrative:      RENAL ULTRASOUND     HISTORY: Acute kidney injury     COMPARISON: 02/01/2023     TECHNIQUE: Axial and color Doppler sonographic images were obtained  through the kidneys and bladder.     FINDINGS:  Right kidney measures 10.0 x 3.8 x 4.6 cm. Left kidney measures 10.1 x  4.8 x 4.1 cm. Renal echotexture right kidney is normal. There is no  hydronephrosis. The visualization of the left kidney is more limited,  but again, no obvious hydronephrosis is seen, and renal echotexture is  grossly normal. The urinary bladder is decompressed with a Parrish  catheter.       Impression:      No hydronephrosis seen on either side.     This report was finalized on 2/5/2023 10:43 PM by Dr. Grace Sanchez M.D.                ASSESSMENT:   New acute renal failure, creatinine peaked  at 2.3 yesterday and is improved this morning after fluid resuscitation.  Likely she was either a severely volume deplete or possibly sustained some element of ATN from her hypotension.    Colitis status post sigmoid colon resection    Nausea and vomiting    Stricture of sigmoid colon  Hypokalemia  Hypotension, improved  Worsening anemia, in part dilutional  Metabolic acidosis, expansion acidosis      DISCUSSION/PLAN:   Waste products peaked yesterday and improved today with good urine output  Continue IV fluids but decrease rate just a bit to 125 cc/h  Replace potassium orally and check magnesium/phosphorus  Renal ultrasound with no obstruction  Check urine electrolytes and follow-up a.m. labs  Continue renally dosed IV antibiotics    Continue to monitor electrolytes and volume closely, avoid IV contrast and nephrotoxic medications     I appreciate the consult request, please call if any questions      Matthew Soliz M.D  [facility]  Office phone number: [phone]  Answering service phone number: [phone]      2/6/2023        Dictation via Dragon dictation software      Electronically signed by Matthew Soliz MD at 02/06/23 8143

## 2023-02-06 NOTE — PROGRESS NOTES
"Daily progress note    Primary care physician  Dr. Delgado    Chief complaint  Doing better with no new complaints and tolerating liquid diet.     History of present illness  62-year-old female with history of hypertension presented to [facility] emergency room with left lower quadrant abdominal pain for last 1 month.  Patient also have occasional loose stools.  Patient denies any nausea vomiting.  Patient recently diagnosed with colitis and treated with oral antibiotics without any improvement.  Patient has noticed blood in the stools.  Patient evaluated in ER with CT abdominal pelvis which shows worsening colitis and failed outpatient treatment admit for management.  Patient has no fever chills chest pain shortness of breath with sweats weight loss or weight gain.     REVIEW OF SYSTEMS  Unremarkable except abdominal pain      PHYSICAL EXAM  Blood pressure 153/91, pulse 95, temperature 98.4 °F (36.9 °C), temperature source Oral, resp. rate 18, height 154.9 cm (60.98\"), weight 83.1 kg (183 lb 3.2 oz), SpO2 94 %.    GENERAL:  Awake and alert in no acute distress  HEENT:  Unremarkable  NECK:  Supple  CV: regular rhythm, normal rate  RESPIRATORY: normal effort, clear to auscultation bilaterally  ABDOMEN: soft, left lower quadrant tenderness bowel sounds positive  MUSCULOSKELETAL: no deformity  NEURO: alert, moves all extremities, follows commands  PSYCH:  calm, cooperative  SKIN: warm, dry     LAB RESULTS  Lab Results (last 24 hours)     Procedure Component Value Units Date/Time    Tissue Pathology Exam [[id number]] Collected: 02/03/23 1449    Specimen: Tissue from Large Intestine, Sigmoid Colon; Tissue from Large Intestine, Left / Descending Colon Updated: 02/06/23 1248     Case Report --     Surgical Pathology Report                         Case: [id number]                                  Authorizing Provider:  Lino Gaston MD        Collected:           02/03/2023 02:49 PM          Ordering Location:     " [facility]  Received:            02/03/2023 10:26 PM                                 MAIN OR                                                                      Pathologist:           Allan Thomson MD                                                         Specimens:   1) - Large Intestine, Sigmoid Colon                                                                 2) - Large Intestine, Left / Descending Colon                                               Final Diagnosis --     1. Sigmoid Colon, Resection:   A. Diverticulosis with acute and chronic diverticulitis and peridiverticular abscesses with inflamed granulation tissue.   B. Associated marked acute and chronic serositis with fistula tract, organizing fat necrosis and fibrous adhesions.   C. No epithelial dysplasia nor malignancy identified.   D. Focal minimal active cryptitis noted, no well-developed crypt abscess formation nor granulomatous          inflammation identified.   E. Proximal and distal surgical margins are viable and negative for neoplasm/malignancy.  F. Seven benign reactive lymph nodes identified (0/7).    2.  Left Descending Colon, Partial Resection:    A. Colonic tissue with ischemic mucosal ulceration, transmural vascular congestion and edema, focal marked submucosal                      acute necrotizing inflammation with extension into muscularis propria and associated acute and chronic serositis.   B. Gross and microscopic changes consistent with anastomotic site with marked fibrous adhesion and organizing        blood clot.  C. No epithelial dysplasia nor malignancy identified.  D. Surgical margins appear viable.  E. Two benign reactive lymph nodes identified (0/2).    Jat/kds        Comment --     Multiple sections from part 2 (clinically left descending colon) show gross and microscopic changes consistent with anastomotic site with focal marked acute necrotizing submucosal inflammation and ischemic changes.  No  "dysplasia nor malignancy is seen in any of the reviewed sections.  Morphologic features are consistent with ischemic ulceration with active colitis.      Jat/kds        Gross Description --     1. Large Intestine, Sigmoid Colon.  Received in formalin labeled \"sigmoid colon\" is a 19.2 cm in length segment of sigmoid colon with a diameter that ranges from 3.9 to 4.5 cm.  The Both margins are stapled closed.  There is a moderate amount of attached fibroadipose tissue along the entire length of the specimen.  The serosa is pink to red-tan smooth to markedly roughed with diffuse fibrous adhesions.  The mucosa is pink-tan with an increased number of folds consistent with multiple diverticulum that extend into the surrounding fibroadipose tissue.  The mucosa is also markedly edematous and erythemic.  There are no mucosal masses grossly identified.  Sectioning through the surrounding fibroadipose tissue reveals focal areas of pink purulent exudate especially surrounding the diverticulum consistent with abscess formation.  The bowel wall ranges from 0.3 to 1.1 cm in thickness.  There is a possible transmural defect 5.2 cm from the closest margin.  Further dissection through the surrounding fibroadipose tissue reveals seven possible lymph nodes ranging from 0.3 to 1 cm in greatest dimension.  Representative sections are submitted as follows:      1A - surgical mucosal margin closest to defect   1B - opposite surgical mucosal margin   1C-1F - random diverticulum to include surrounding abscess  1G-1H - transmural defect   1I  - six whole possible lymph nodes   1J - two possible whole lymph nodes    Ks/uso/denny/kds  2. Large Intestine, Left / Descending Colon.  Received in formalin labeled \"left/descending colon\" are two segments of large bowel. The shorter segment is 6.5 cm in length with a diameter that ranges from 2.2 to 4.2 cm.  Both margins are stapled closed.  The serosa is pink to purple-gray, smooth to shaggy and " disrupted.  The serosa is remarkable for multiple fibrous adhesions.  The mucosa is pink to green-tan, markedly granular and there are no mucosal lesions grossly identified.  There is an anastomotic site that appears intact.  The bowel wall near this anastomotic site is markedly thin averaging 0.1 cm in thickness with the remaining bowel wall averaging 0.5 cm in thickness.  The bowel wall is remarkable for focal areas of submucosal hemorrhage.  The longer  segment of bowel is 15.5 cm in length with a diameter that ranges from 2.4 to 5.5 cm.  One margin is received opened and the opposite margin is stapled closed.  The serosa is red-tan, markedly granular with extensive fibrous adhesions and focal areas of adherent blood clot.  The mucosa is pink-tan with a normal amount of folds markedly edematous and remarkable for a 2.5 x 2 cm red ulcerated lesion with four metallic clips.  The ulcerated area is 5.6 cm from the closest undesignated margin and 11.4 cm of the opposite undesignated margin.  The overlying serosa is inked blue. Sectioning reveals a maximum lesional depth of 0.2 cm with the lesion confined to the submucosa.  There is no extension through the muscularis propria or into the surrounding fibroadipose tissue.  There is an additional 0.5 x 0.4 cm area of granular ulceration 4.5 cm from the larger ulceration.  This ulceration is 3.5 cm from the closest margin (the opposite closest margin from the other ulceration).  Dissection through the surrounding fibroadipose tissue reveals four possible lymph nodes ranging from 0.2 to 0.3 cm in greatest dimension.  Representative sections are submitted as follows:      Gl[place]stone segment:   2A-2B - undesignated surgical mucosal margin submitted en face  2C-2D - anastomotic site and granular mucosa   2E - random edematous mucosa     Longer segment:  2F - opened surgical mucosal margin submitted en face, furthest from ulceration  2G - stapled surgical mucosal margin closest to  ulceration submitted en face  2H-2K - ulceration to include overlying inked serosa   2L - ulceration to include normal mucosa towards closest margin   2M - ulceration to include normal mucosa towards furthest margin  2N - random edematous mucosa   2O - four whole probable lymph nodes     Ks/uso/jat/kds      Magnesium [[id number]]  (Normal) Collected: 02/06/23 1045    Specimen: Blood Updated: 02/06/23 1146     Magnesium 1.9 mg/dL     Manual Differential [[id number]]  (Abnormal) Collected: 02/06/23 0443    Specimen: Blood Updated: 02/06/23 0543     Neutrophil % 86.5 %      Lymphocyte % 10.4 %      Monocyte % 2.1 %      Eosinophil % 1.0 %      Neutrophils Absolute 12.27 10*3/mm3      Lymphocytes Absolute 1.48 10*3/mm3      Monocytes Absolute 0.30 10*3/mm3      Eosinophils Absolute 0.14 10*3/mm3      Cira Cells Mod/2+     Poikilocytes Mod/2+     WBC Morphology Normal     Platelet Morphology Normal    CBC & Differential [[id number]]  (Abnormal) Collected: 02/06/23 0443    Specimen: Blood Updated: 02/06/23 0543    Narrative:      The following orders were created for panel order CBC & Differential.  Procedure                               Abnormality         Status                     ---------                               -----------         ------                     CBC Auto Differential[[id number]]        Abnormal            Final result                 Please view results for these tests on the individual orders.    CBC Auto Differential [[id number]]  (Abnormal) Collected: 02/06/23 0443    Specimen: Blood Updated: 02/06/23 0543     WBC 14.19 10*3/mm3      RBC 2.78 10*6/mm3      Hemoglobin 7.7 g/dL      Hematocrit 24.2 %      MCV 87.1 fL      MCH 27.7 pg      MCHC 31.8 g/dL      RDW 14.1 %      RDW-SD 44.9 fl      MPV 10.8 fL      Platelets 215 10*3/mm3     Comprehensive Metabolic Panel [[id number]]  (Abnormal) Collected: 02/06/23 0443    Specimen: Blood Updated: 02/06/23 0514     Glucose 54 mg/dL      BUN 22 mg/dL       Creatinine 1.84 mg/dL      Sodium 140 mmol/L      Potassium 3.3 mmol/L      Chloride 112 mmol/L      CO2 20.5 mmol/L      Calcium 8.5 mg/dL      Total Protein 5.0 g/dL      Albumin 2.4 g/dL      ALT (SGPT) 14 U/L      AST (SGOT) 32 U/L      Alkaline Phosphatase 41 U/L      Total Bilirubin 0.7 mg/dL      Globulin 2.6 gm/dL      A/G Ratio 0.9 g/dL      BUN/Creatinine Ratio 12.0     Anion Gap 7.5 mmol/L      eGFR 30.5 mL/min/1.73     Narrative:      GFR Normal >60  Chronic Kidney Disease <60  Kidney Failure <15      Sodium, Urine, Random - Indwelling Urethral Catheter [[id number]] Collected: 02/05/23 1743    Specimen: Urine from Indwelling Urethral Catheter Updated: 02/05/23 2020     Sodium, Urine <20 mmol/L     Narrative:      Reference intervals for random urine have not been established.  Clinical usage is dependent upon physician's interpretation in combination with other laboratory tests.       Urinalysis, Microscopic Only - Indwelling Urethral Catheter [[id number]]  (Abnormal) Collected: 02/05/23 1743    Specimen: Urine from Indwelling Urethral Catheter Updated: 02/05/23 1836     RBC, UA 3-5 /HPF      WBC, UA None Seen /HPF      Bacteria, UA Trace /HPF      Squamous Epithelial Cells, UA 0-2 /HPF      Hyaline Casts, UA None Seen /LPF      Amorphous Crystals, UA Small/1+ /HPF      Methodology Manual Light Microscopy    Urinalysis With Microscopic If Indicated (No Culture) - Indwelling Urethral Catheter [[id number]]  (Abnormal) Collected: 02/05/23 1743    Specimen: Urine from Indwelling Urethral Catheter Updated: 02/05/23 1835     Color, UA Yellow     Appearance, UA Slightly Cloudy     pH, UA 5.5     Specific Gravity, UA 1.020     Glucose, UA Negative     Ketones, UA Negative     Bilirubin, UA Negative     Blood, UA Large (3+)     Protein, UA 30 mg/dL (1+)     Leuk Esterase, UA Negative     Nitrite, UA Negative     Urobilinogen, UA 0.2 E.U./dL    Creatinine Urine Random (kidney function) GFR component - Indwelling  Urethral Catheter [[id number]] Collected: 02/05/23 1743    Specimen: Urine from Indwelling Urethral Catheter Updated: 02/05/23 1823     Creatinine, Urine 134.6 mg/dL     Narrative:      Reference intervals for random urine have not been established.  Clinical usage is dependent upon physician's interpretation in combination with other laboratory tests.           Imaging Results (Last 24 Hours)     Procedure Component Value Units Date/Time    US Renal Bilateral [[id number]] Collected: 02/05/23 2241     Updated: 02/05/23 2246    Narrative:      RENAL ULTRASOUND     HISTORY: Acute kidney injury     COMPARISON: 02/01/2023     TECHNIQUE: Axial and color Doppler sonographic images were obtained  through the kidneys and bladder.     FINDINGS:  Right kidney measures 10.0 x 3.8 x 4.6 cm. Left kidney measures 10.1 x  4.8 x 4.1 cm. Renal echotexture right kidney is normal. There is no  hydronephrosis. The visualization of the left kidney is more limited,  but again, no obvious hydronephrosis is seen, and renal echotexture is  grossly normal. The urinary bladder is decompressed with a Parrish  catheter.       Impression:      No hydronephrosis seen on either side.     This report was finalized on 2/5/2023 10:43 PM by Dr. Grace Sanchez M.D.           Upper and lower endoscopy results noted and discussed with patient    Current Facility-Administered Medications:   •  acetaminophen (OFIRMEV) injection 1,000 mg, 1,000 mg, Intravenous, Q6H, Lino Gaston MD, 1,000 mg at 02/06/23 0548  •  famotidine (PEPCID) tablet 20 mg, 20 mg, Oral, BID Caren NICHOLS Lauren C., MD  •  gabapentin (NEURONTIN) 50 mg/mL solution 125 mg, 125 mg, Oral, Q8H, Lino Gaston MD, 125 mg at 02/06/23 0548  •  HYDROmorphone (DILAUDID) injection 0.25 mg, 0.25 mg, Intravenous, Q2H PRN, 0.25 mg at 02/06/23 0801 **AND** naloxone (NARCAN) injection 0.4 mg, 0.4 mg, Intravenous, Q5 Min PRN, Lino Gaston MD  •  nitroglycerin (NITROSTAT) SL tablet 0.4 mg, 0.4  mg, Sublingual, Q5 Min PRN, Lino Gaston MD  •  ondansetron (ZOFRAN) injection 4 mg, 4 mg, Intravenous, Q6H PRN, Lino Gaston MD, 4 mg at 02/06/23 1248  •  piperacillin-tazobactam (ZOSYN) 3.375 g in iso-osmotic dextrose 50 ml (premix), 3.375 g, Intravenous, Once, Lino Gaston MD, Currently Infusing at 02/04/23 1354  •  piperacillin-tazobactam (ZOSYN) 3.375 g in iso-osmotic dextrose 50 ml (premix), 3.375 g, Intravenous, Q8H, Lino Gaston MD, 3.375 g at 02/06/23 0801  •  sodium chloride 0.9 % infusion, 125 mL/hr, Intravenous, Continuous, Matthew Soliz MD, Last Rate: 125 mL/hr at 02/06/23 1139, 125 mL/hr at 02/06/23 1139     ASSESSMENT  Acute colitis with sigmoid stricture and microperforation s/p colon resection and colostomy  Acute kidney injury  Hypertension   Gastroesophageal reflux disease    PLAN  CPM  Postop care  IVF  Continue IV antibiotics  Advance diet as tolerated  Pain management  Symptomatic treatment of pain and nausea  Continue home medications  Stress ulcer DVT prophylaxis  Nephrology consult appreciated  Colorectal surgery and GI to follow patient  Supportive care  PT OT  Discussed with family nursing staff   Follow closely and further recommendation according to hospital course    SANG SINGLETARY MD    Copied text in this note has been reviewed and is accurate as of 02/06/23

## 2023-02-06 NOTE — PROGRESS NOTES
[facility]  Inpatient Progress Note    Reason for Follow Up: Colitis, sigmoid stricture    Subjective     Interval History:   Complains of heartburn.  Having brown watery ostomy output.  No nausea.    Current Facility-Administered Medications:   •  acetaminophen (OFIRMEV) injection 1,000 mg, 1,000 mg, Intravenous, Q6H, Lino Gaston MD, 1,000 mg at 02/06/23 0548  •  famotidine (PEPCID) tablet 20 mg, 20 mg, Oral, BID Caren NICHOLS Lauren C., MD  •  gabapentin (NEURONTIN) 50 mg/mL solution 125 mg, 125 mg, Oral, Q8H, Lino Gaston MD, 125 mg at 02/06/23 0548  •  HYDROmorphone (DILAUDID) injection 0.25 mg, 0.25 mg, Intravenous, Q2H PRN, 0.25 mg at 02/06/23 0801 **AND** naloxone (NARCAN) injection 0.4 mg, 0.4 mg, Intravenous, Q5 Min PRN, Lino Gaston MD  •  nitroglycerin (NITROSTAT) SL tablet 0.4 mg, 0.4 mg, Sublingual, Q5 Min PRN, Lino Gaston MD  •  ondansetron (ZOFRAN) injection 4 mg, 4 mg, Intravenous, Q6H PRN, Lino Gaston MD, 4 mg at 02/05/23 0408  •  piperacillin-tazobactam (ZOSYN) 3.375 g in iso-osmotic dextrose 50 ml (premix), 3.375 g, Intravenous, Once, Lino Gaston MD, Currently Infusing at 02/04/23 1354  •  piperacillin-tazobactam (ZOSYN) 3.375 g in iso-osmotic dextrose 50 ml (premix), 3.375 g, Intravenous, Q8H, Lino Gaston MD, 3.375 g at 02/06/23 0801  •  scopolamine patch 1 mg/72 hr, 1 patch, Transdermal, Continuous, Lino Gaston MD, 1 patch at 02/03/23 1251  •  sodium chloride 0.9 % infusion, 125 mL/hr, Intravenous, Continuous, Matthew Soliz MD, Last Rate: 125 mL/hr at 02/06/23 1139, 125 mL/hr at 02/06/23 1139  Review of Systems:    Positive for heartburn, negative for nausea, negative for fever chills    Objective     Vital Signs  Temp:  [97.7 °F (36.5 °C)-99.7 °F (37.6 °C)] 98.5 °F (36.9 °C)  Heart Rate:  [] 89  Resp:  [18-20] 18  BP: ()/(65-94) 126/88  Body mass index is 34.63 kg/m².    Intake/Output Summary (Last 24 hours) at  2/6/2023 1152  Last data filed at 2/6/2023 0950  Gross per 24 hour   Intake --   Output 2125 ml   Net -2125 ml     I/O this shift:  In: -   Out: 300 [Urine:300]     Physical Exam:   General: patient awake, alert and cooperative   Eyes: Normal lids and lashes, no scleral icterus   Neck: supple, normal ROM   Skin: warm and dry, not jaundiced   Pulm:   regular and unlabored   Abdomen: soft, nontender, nondistended, ostomy in left lower quadrant with brown watery output   Psychiatric: Normal mood and behavior; memory intact     Results Review:     I reviewed the patient's new clinical results.    Results from last 7 days   Lab Units 02/06/23 0443 02/05/23 0334 02/04/23 0430   WBC 10*3/mm3 14.19* 15.58* 15.55*   HEMOGLOBIN g/dL 7.7* 8.5* 11.0*   HEMATOCRIT % 24.2* 24.7* 32.9*   PLATELETS 10*3/mm3 215 189 186     Results from last 7 days   Lab Units 02/06/23 0443 02/05/23  0334 02/04/23  0430   SODIUM mmol/L 140 137 137   POTASSIUM mmol/L 3.3* 4.0 4.3   CHLORIDE mmol/L 112* 105 110*   CO2 mmol/L 20.5* 22.4 20.0*   BUN mg/dL 22 19 8   CREATININE mg/dL 1.84* 2.37* 0.96   CALCIUM mg/dL 8.5* 8.5* 7.3*   BILIRUBIN mg/dL 0.7 0.7  --    ALK PHOS U/L 41 38*  --    ALT (SGPT) U/L 14 11  --    AST (SGOT) U/L 32 28  --    GLUCOSE mg/dL 54* 118* 149*     Results from last 7 days   Lab Units 02/04/23  0430   INR  1.49*     Lab Results   Lab Value Date/Time    LIPASE 20 01/27/2023 1445    LIPASE 16 01/12/2023 1601       Radiology:  US Renal Bilateral   Final Result   No hydronephrosis seen on either side.       This report was finalized on 2/5/2023 10:43 PM by Dr. Grace Sanchez M.D.          CT Abdomen Pelvis With Contrast   Final Result      CT Abdomen Pelvis Without Contrast   Final Result   1. Colitis associated abnormal colonic dilatation involving the   ascending colon, hepatic flexure, proximal transverse colon where there   is also pericolonic inflammation. There is a second area of more   extensive abnormal wall  thickening involving the proximal to mid sigmoid   colon which is decompressed with a greater degree of surrounding   inflammation. There is a potential associated obstructing component at   this location as the colon proximal to the sigmoid colon is dilated.   When compared to the prior exam 15 days ago, the right colitis is new   and what appears to represent colitis of the proximal sigmoid colon is   not significantly changed. The lack of interval change following   treatment and the presence of an obstructive component raises the   likelihood that there could be an associated neoplastic process at this   location though the distinction is difficult to make with CT and there   remains a great deal of inflammation surrounding the proximal sigmoid   colon.   2. Apparently, this exam was performed with intravenous contrast though   there is no contrast demonstrated on this exam. This suggests that there   has been extravasation of contrast into the arm and that could be   confirmed with physical exam or x-ray of the arm.   3. Previous gastric surgery. Small hiatal hernia.       Discussed with Dr. Cruz in the emergency department on 01/27/2023 at   5:50 PM.        Radiation dose reduction techniques were utilized, including automated   exposure control and exposure modulation based on body size.       This report was finalized on 1/27/2023 6:43 PM by Dr. Donovan Valencia M.D.              Assessment & Plan     Patient Active Problem List   Diagnosis   • Left sided colitis with complication (HCC)   • Colitis   • Nausea and vomiting   • Stricture of sigmoid colon (HCC)   • Uterine anomaly   • Hypertension   • Avulsion fracture of distal fibula     All problems are new to me today  Assessment:  1.  Left-sided colitis     2.  Sigmoid stricture: Status post left colon/sigmoid resection     3.  EGD appearance of gastritis: Biopsies pending     4.  JUANY    5. heartburn         Plan:  · Follow-up path from EGD, colon  resection  · Continue supportive care  · Add famotidine for heartburn-encourage patient to sit upright when able especially after taking p.o.  · Diet per colorectal surgery    I discussed the patients findings and my recommendations with patient.    Joie Fabian MD

## 2023-02-06 NOTE — NURSING NOTE
"CWOCN- patient with new colostomy. Today she is nauseated and \"woozy\". We did not do any ostomy teaching besides just looking at the pouch. I assisted patient to bedside commode and she did urinate. Will continue teaching tomorrow. Stoma is viable. I placed her in a 1 piece Coloplast flat with barrier ring.      02/06/23 1501   Colostomy LLQ   Placement date: If unknown, DO NOT use \"Add Comment\" note/Placement time: If unknown, DO NOT use \"Add Comment\" note: 02/03/23 1944   Inserted by: DR. GRANADO  Hand Hygiene Completed: Yes  Location: LLQ   Stomal Appliance Changed;2 piece;Leaking;Drainable   Stoma Appearance round;moist;swollen;red   Peristomal Assessment Intact   Accessories/Skin Care cleansed with water;skin barrier ring   Stoma Function stool   Stool Color brown   Stool Consistency loose   Treatment Bag change   Output (mL) 250 mL       "

## 2023-02-06 NOTE — PROGRESS NOTES
Progress Note    Pod 3    S: Denies abdominal pain. Pt reports episode of emesis last night. States she does not like liquids and wants to try solid foods.     O:  Temp:  [97.7 °F (36.5 °C)-99.7 °F (37.6 °C)] 98.5 °F (36.9 °C)  Heart Rate:  [] 89  Resp:  [18-20] 18  BP: ()/(65-94) 126/88      Intake/Output Summary (Last 24 hours) at 2/6/2023 0856  Last data filed at 2/6/2023 0718  Gross per 24 hour   Intake --   Output 1925 ml   Net -1925 ml       Abd:  soft, non-distended  Incision: clean, dry  Colostomy PPP with custard consistency brown stool  Stark catheter in place.     Results from last 7 days   Lab Units 02/06/23  0443   WBC 10*3/mm3 14.19*   HEMOGLOBIN g/dL 7.7*   HEMATOCRIT % 24.2*   PLATELETS 10*3/mm3 215     Results from last 7 days   Lab Units 02/06/23  0443 02/04/23  0430 02/03/23  2119   SODIUM mmol/L 140   < > 137   POTASSIUM mmol/L 3.3*   < > 4.7   CHLORIDE mmol/L 112*   < > 107   CO2 mmol/L 20.5*   < > 23.0   BUN mg/dL 22   < > 5*   CREATININE mg/dL 1.84*   < > 0.74   EGFR mL/min/1.73 30.5*   < > 91.0   GLUCOSE mg/dL 54*   < > 202*   CALCIUM mg/dL 8.5*   < > 7.4*   PHOSPHORUS mg/dL  --   --  3.5    < > = values in this interval not displayed.       A/P: 62 y.o. female with s/p S/P robotic assisted laparoscopic left and sigmoid resection with colostomy converted open due to splenic injury 02/03/2023.    - Full liquid diet. If Pt tolerates this, can advance to a regular diet.   - D/C stark catheter  - Hg 7.7 likely secondary to acute blood loss during procedure in combination with fluid resuscitation. Will transfuse 1 unit of PRBC.

## 2023-02-06 NOTE — PROGRESS NOTES
Patient's vomited twice today 300 each time patient is complaining of back of her throat pain and discomfort  Blood pressure is coming up 160/110 at bedside  Temperature patient had a temperature of 100  Abdomen is soft  Discussed with patient she needs to be out of bed and walking to help with ileus and potential atelectasis fever  N.p.o. for now  We will add Reglan  Decreasing IV fluid to 50

## 2023-02-06 NOTE — CONSULTS
[facility]                                                                                             Nephrology Initial Consult Note    Patient Identification:  Name: Hali Tejeda MRN: [id number]  Age: 62 y.o. : 1960  Sex: female  Date:2023    Requesting Physician: As per consult order.  Reason for Consultation: Acute kidney injury  Information from:patient/ family/ chart      History of Present Illness: This is a 62 y.o. year old female with no prior history of any chronic kidney disease.  Creatinine was 0.5 on admission and has been steadily rising, 0.96 on , abruptly increased up to 2.37 yesterday and improved, 1.84 today.  She was initially admitted on  with abdominal pain and loose stools with colitis.  Initially treated with oral antibiotics and eventually switched to IV.  Currently being followed by GI and colorectal surgery.  She is status post left sigmoid resection for suspected diverticulitis stricture.  She sustained a splenic injury with acute blood loss anemia.  She has been hypotensive into the 80s but with aggressive fluid resuscitation overnight her renal indices have improved today.  She is anxious to advance her diet she denies any current nausea vomiting abdominal pain, no fevers or chills.  Denies any shortness of breath or chest pain.  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 pylori infection 2020   • Hepatic steatosis 2020   • Hiatal hernia    • HTN (hypertension)    • Hyperlipidemia    • Insomnia    • Left sided colitis with complication (HCC) 2023    ADMITTED TO [facility]   • LGSIL on Pap smear of cervix     (+) HPV   • LGSIL Pap smear of vagina 2016   • Snoring    • Stricture of sigmoid colon (HCC) 2023   • Uterine fibroid    • Vaginal atrophy        Past Surgical History:  Past Surgical History:   Procedure Laterality Date   • BREAST LUMPECTOMY Left     benign   •  SECTION N/A    • COLON RESECTION N/A 2/3/2023    Procedure: COLON RESECTION LAPAROSCOPIC CONVERTED TO OPEN SIGMOID AND LEFT MOBILIZATION OF SPLENIC FLEXURE WITH DAVINCI ROBOT;  Surgeon: Lino Gaston MD;  Location: [facility];  Service: Robotics - DaVinci;  Laterality: N/A;   • COLONOSCOPY N/A 2014    COULD ONLY GET SCOPE TO 70 CM DUE TO SIGNIFICANT STRICTURE SECONDARY TO SEVERE DIVERTICULITIS OR CANCER, ACBE ORDERED, DR. PARAG HUDSON AT [place]   • COLONOSCOPY N/A 2023    MODERATE STENOSIS IN SIGMOID-DILATED, MANY DIVERTICULA IN SIGMOID AND DESCENDING, COPIOUS AMTS OF STOOL, MUCOSAL TEAR 55 CM FROM ANAL VERGE, DR. JENNI SMITH AT [facility]   • COLPOSCOPY N/A 2016   • ENDOSCOPY N/A 2023    GASTRITIS, SMALL HIATAL HERNIA, DR. JENNI SMITH AT [facility]   • ENDOSCOPY N/A 09/15/2009    DR. PARAG HUDSON AT [place]   • GASTRIC SLEEVE LAPAROSCOPIC N/A 2020    WITH REMOVAL OF GASTRIC BAND, DR. OLIMPIA PALACIOS AT [facility]   • HYSTERECTOMY N/A 2005    WITH RSO   • LAPAROSCOPIC GASTRIC BANDING N/A 10/20/2019    DR. PARAG HUDSON AT [place]   • SALPINGO OOPHORECTOMY Left     LSO, IN TEEN YEARS   • WISDOM TOOTH EXTRACTION Bilateral         Home Meds:   Medications Prior to Admission   Medication Sig Dispense Refill Last Dose   • levocetirizine (XYZAL) 5 MG tablet Take 5 mg by mouth As Needed.       • losartan-hydrochlorothiazide (HYZAAR) 50-12.5 MG per tablet Take 1 tablet by mouth Daily.      • Multiple Vitamin (MULTI-VITAMIN PO) Take 1 tablet by mouth Daily.      • ondansetron ODT (ZOFRAN-ODT) 4 MG disintegrating tablet Place 1 tablet on the tongue 4 (Four) Times a Day As Needed for Nausea or Vomiting. 12 tablet 0    • mesalamine (Lialda) 1.2 g EC tablet Take 4 tablets by mouth Daily With Breakfast. 120 tablet 11        Current Meds:   Current Facility-Administered Medications   Medication Dose Route Frequency Provider Last Rate Last Admin   • acetaminophen (OFIRMEV) injection 1,000 mg  1,000 mg Intravenous Q6H Lino Gaston MD   1,000 mg at 02/06/23 0548   • gabapentin (NEURONTIN) 50 mg/mL solution 125 mg  125 mg Oral Q8H Lino Gaston MD   125 mg at 02/06/23 0548   • HYDROmorphone (DILAUDID) injection 0.25 mg  0.25 mg Intravenous Q2H PRN Lino Gaston MD   0.25 mg at 02/06/23 0801    And   • naloxone (NARCAN) injection 0.4 mg  0.4 mg Intravenous Q5 Min PRN Lino Gaston MD       • nitroglycerin (NITROSTAT) SL tablet 0.4 mg  0.4 mg Sublingual Q5 Min PRN Lino Gaston MD       • ondansetron (ZOFRAN) injection 4 mg  4 mg Intravenous Q6H PRN Lino Gaston MD   4 mg at 02/05/23 0408   • piperacillin-tazobactam (ZOSYN) 3.375 g in iso-osmotic dextrose 50 ml (premix)  3.375 g Intravenous Once Lino Gaston MD   Currently Infusing at 02/04/23 1354   • piperacillin-tazobactam (ZOSYN) 3.375 g in iso-osmotic dextrose 50 ml (premix)  3.375 g Intravenous Q8H Lino Gaston MD   3.375 g at 02/06/23 0801   • scopolamine patch 1 mg/72 hr  1 patch Transdermal Continuous Lino Gaston MD   1 patch at 02/03/23 1251   • sodium chloride 0.9 % infusion  150 mL/hr Intravenous Continuous Lino Gaston  mL/hr at 02/05/23 1808 150 mL/hr at 02/05/23 1808       Allergies:  Allergies   Allergen Reactions   • Hydrocodone Unknown - Low Severity   • Sulfa Antibiotics Itching       Social History:    Social History     Socioeconomic History   • Marital status: Single   Tobacco Use   • Smoking status: Former     Types: Cigarettes     Quit date: 2004     Years since quittin.0     Passive exposure: Past   • Smokeless tobacco: Never   • Tobacco comments:     Quit    Vaping Use   • Vaping Use: Never used   Substance and Sexual Activity   • Alcohol use: Yes     Alcohol/week: 1.0 standard drink     Types: 1 Glasses of wine per week     Comment: occ wine   • Drug use: Never   • Sexual activity: Defer     Birth control/protection: Hysterectomy, Post-menopausal        Family History:  Family History   Problem Relation Age of Onset   • Diabetes Mother    • Gout Brother    • Colon cancer Paternal Grandmother    • Cancer Paternal Grandmother    • Cancer Paternal Aunt    • Colon cancer Paternal Aunt         Review of Systems: as per HPI, in addition:    General:      Complains of weakness / fatigue,                       No fevers / chills                       no weight loss  HEENT:       no dysphagia / odynophagia  Neck:           normal range of motion, no swelling  Respiratory: no cough / congestion                      No shortness of air                       No wheezing  CV:              No chest pain                       No palpitations  Abdomen/GI: no nausea / vomiting                      No diarrhea / constipation                      No abdominal pain  :             no dysuria / urinary frequency                       No urgency, normal output  Endocrine:   no polyuria / polydipsia,                      No heat or cold intolerance  Skin:           no rashes or skin breakdown   Vascular:   No edema                     No claudication  Psych:        no depression/ anxiety  Neuro:        no focal weakness, no seizures  Musculoskeletal: no joint pain or deformities      Physical Exam:  Vitals:   Temp (24hrs), Av.6 °F (37 °C), Min:97.7 °F (36.5 °C), Max:99.7 °F (37.6 °C)    /88 (BP Location:  "Left arm, Patient Position: Lying)   Pulse 89   Temp 98.5 °F (36.9 °C) (Oral)   Resp 18   Ht 154.9 cm (60.98\")   Wt 83.1 kg (183 lb 3.2 oz)   SpO2 91%   BMI 34.63 kg/m²   Intake/Output:     Intake/Output Summary (Last 24 hours) at 2/6/2023 1017  Last data filed at 2/6/2023 0950  Gross per 24 hour   Intake --   Output 2125 ml   Net -2125 ml        Wt Readings from Last 1 Encounters:   02/03/23 2226 83.1 kg (183 lb 3.2 oz)   01/27/23 1955 78.9 kg (174 lb)   01/27/23 1428 78.9 kg (174 lb)       Exam:    General Appearance:  Awake, alert, oriented x3, no acute distress  Chronically ill trace-appearing   Head and Face:  Normocephalic, atraumatic, mucus membranes moist, oropharynx clear   Eyes:  No icterus, pupils equal round and reactive to light, extraocular movements intact    ENMT: Moist mucosa, tongue symmetric    Neck: Supple  no jugular venous distention  no thyromegaly   Pulmonary:  Respiratory effort: Normal  Auscultation of lungs: Clear bilaterally  No wheezes  No rhonchi  Good air movement, good expansion   Chest wall:  No tenderness or deformity   Cardiovascular:  Auscultation of the heart: Normal rhythm, no murmurs  Trace edema of bilateral lower extremities   Abdomen:  Abdomen: soft, non-tender, diminished bowel sounds, dressing in place  Liver and spleen: no hepatosplenomegaly   Musculoskeletal: Digits and nails: normal  Normal range of motion  No joint swelling or gross deformities    Skin: Skin inspection: color normal, no visible rashes or lesions  Skin palpation: texture, turgor normal, no palpable lesions   Lymphatic:  no cervical lymphadenopathy    Psychiatric: Judgement and insight: normal  Orientation to person place and time: normal  Mood and affect: normal       DATA:  Radiology and Labs:  The following labs independently reviewed by me, additional AM labs ordered  Old records independently reviewed showing normal baseline creatinine  The following radiologic studies independently viewed by " me, findings renal ultrasound no obstruction   Interval notes, chart personally reviewed by me.  I have reviewed and summarized old records as detailed above  Plan of care discussed with patient  New problems include acute renal failure, hypokalemia, worsening anemia      Risk/ complexity of medical care/ medical decision making: High risk, new severe JUANY  Chronic illness with severe exacerbation or progression      Labs:   Recent Results (from the past 24 hour(s))   Urinalysis With Microscopic If Indicated (No Culture) - Indwelling Urethral Catheter    Collection Time: 02/05/23  5:43 PM    Specimen: Indwelling Urethral Catheter; Urine   Result Value Ref Range    Color, UA Yellow Yellow, Straw    Appearance, UA Slightly Cloudy (A) Clear    pH, UA 5.5 5.0 - 8.0    Specific Gravity, UA 1.020 1.005 - 1.030    Glucose, UA Negative Negative    Ketones, UA Negative Negative    Bilirubin, UA Negative Negative    Blood, UA Large (3+) (A) Negative    Protein, UA 30 mg/dL (1+) (A) Negative    Leuk Esterase, UA Negative Negative    Nitrite, UA Negative Negative    Urobilinogen, UA 0.2 E.U./dL 0.2 - 1.0 E.U./dL   Sodium, Urine, Random - Indwelling Urethral Catheter    Collection Time: 02/05/23  5:43 PM    Specimen: Indwelling Urethral Catheter; Urine   Result Value Ref Range    Sodium, Urine <20 mmol/L   Creatinine Urine Random (kidney function) GFR component - Indwelling Urethral Catheter    Collection Time: 02/05/23  5:43 PM    Specimen: Indwelling Urethral Catheter; Urine   Result Value Ref Range    Creatinine, Urine 134.6 mg/dL   Urinalysis, Microscopic Only - Indwelling Urethral Catheter    Collection Time: 02/05/23  5:43 PM    Specimen: Indwelling Urethral Catheter; Urine   Result Value Ref Range    RBC, UA 3-5 (A) None Seen, 0-2 /HPF    WBC, UA None Seen None Seen, 0-2 /HPF    Bacteria, UA Trace (A) None Seen /HPF    Squamous Epithelial Cells, UA 0-2 None Seen, 0-2 /HPF    Hyaline Casts, UA None Seen None Seen /LPF     Amorphous Crystals, UA Small/1+ None Seen /HPF    Methodology Manual Light Microscopy    Comprehensive Metabolic Panel    Collection Time: 02/06/23  4:43 AM    Specimen: Blood   Result Value Ref Range    Glucose 54 (L) 65 - 99 mg/dL    BUN 22 8 - 23 mg/dL    Creatinine 1.84 (H) 0.57 - 1.00 mg/dL    Sodium 140 136 - 145 mmol/L    Potassium 3.3 (L) 3.5 - 5.2 mmol/L    Chloride 112 (H) 98 - 107 mmol/L    CO2 20.5 (L) 22.0 - 29.0 mmol/L    Calcium 8.5 (L) 8.6 - 10.5 mg/dL    Total Protein 5.0 (L) 6.0 - 8.5 g/dL    Albumin 2.4 (L) 3.5 - 5.2 g/dL    ALT (SGPT) 14 1 - 33 U/L    AST (SGOT) 32 1 - 32 U/L    Alkaline Phosphatase 41 39 - 117 U/L    Total Bilirubin 0.7 0.0 - 1.2 mg/dL    Globulin 2.6 gm/dL    A/G Ratio 0.9 g/dL    BUN/Creatinine Ratio 12.0 7.0 - 25.0    Anion Gap 7.5 5.0 - 15.0 mmol/L    eGFR 30.5 (L) >60.0 mL/min/1.73   CBC Auto Differential    Collection Time: 02/06/23  4:43 AM    Specimen: Blood   Result Value Ref Range    WBC 14.19 (H) 3.40 - 10.80 10*3/mm3    RBC 2.78 (L) 3.77 - 5.28 10*6/mm3    Hemoglobin 7.7 (L) 12.0 - 15.9 g/dL    Hematocrit 24.2 (L) 34.0 - 46.6 %    MCV 87.1 79.0 - 97.0 fL    MCH 27.7 26.6 - 33.0 pg    MCHC 31.8 31.5 - 35.7 g/dL    RDW 14.1 12.3 - 15.4 %    RDW-SD 44.9 37.0 - 54.0 fl    MPV 10.8 6.0 - 12.0 fL    Platelets 215 140 - 450 10*3/mm3   Manual Differential    Collection Time: 02/06/23  4:43 AM    Specimen: Blood   Result Value Ref Range    Neutrophil % 86.5 (H) 42.7 - 76.0 %    Lymphocyte % 10.4 (L) 19.6 - 45.3 %    Monocyte % 2.1 (L) 5.0 - 12.0 %    Eosinophil % 1.0 0.3 - 6.2 %    Neutrophils Absolute 12.27 (H) 1.70 - 7.00 10*3/mm3    Lymphocytes Absolute 1.48 0.70 - 3.10 10*3/mm3    Monocytes Absolute 0.30 0.10 - 0.90 10*3/mm3    Eosinophils Absolute 0.14 0.00 - 0.40 10*3/mm3    Cira Cells Mod/2+ None Seen    Poikilocytes Mod/2+ None Seen    WBC Morphology Normal Normal    Platelet Morphology Normal Normal   Prepare RBC, 1 Units    Collection Time: 02/06/23  9:13 AM    Result Value Ref Range    Product Code S5913D04     Unit Number [id number]     UNIT  ABO O     UNIT  RH POS     Crossmatch Interpretation Compatible     Dispense Status XM     Blood Expiration Date [id number]     Blood Type Barcode 5100        Radiology:  Imaging Results (Last 24 Hours)     Procedure Component Value Units Date/Time    US Renal Bilateral [[id number]] Collected: 02/05/23 2241     Updated: 02/05/23 2246    Narrative:      RENAL ULTRASOUND     HISTORY: Acute kidney injury     COMPARISON: 02/01/2023     TECHNIQUE: Axial and color Doppler sonographic images were obtained  through the kidneys and bladder.     FINDINGS:  Right kidney measures 10.0 x 3.8 x 4.6 cm. Left kidney measures 10.1 x  4.8 x 4.1 cm. Renal echotexture right kidney is normal. There is no  hydronephrosis. The visualization of the left kidney is more limited,  but again, no obvious hydronephrosis is seen, and renal echotexture is  grossly normal. The urinary bladder is decompressed with a Parrish  catheter.       Impression:      No hydronephrosis seen on either side.     This report was finalized on 2/5/2023 10:43 PM by Dr. Grace Sanchez M.D.                ASSESSMENT:   New acute renal failure, creatinine peaked at 2.3 yesterday and is improved this morning after fluid resuscitation.  Likely she was either a severely volume deplete or possibly sustained some element of ATN from her hypotension.    Colitis status post sigmoid colon resection    Nausea and vomiting    Stricture of sigmoid colon  Hypokalemia  Hypotension, improved  Worsening anemia, in part dilutional  Metabolic acidosis, expansion acidosis      DISCUSSION/PLAN:   Waste products peaked yesterday and improved today with good urine output  Continue IV fluids but decrease rate just a bit to 125 cc/h  Replace potassium orally and check magnesium/phosphorus  Renal ultrasound with no obstruction  Check urine electrolytes and follow-up a.m. labs  Continue renally dosed  IV antibiotics    Continue to monitor electrolytes and volume closely, avoid IV contrast and nephrotoxic medications     I appreciate the consult request, please call if any questions      Matthew Soliz M.D  [facility]  Office phone number: [phone]  Answering service phone number: [phone]      2/6/2023        Dictation via Dragon dictation software

## 2023-02-07 ENCOUNTER — APPOINTMENT (OUTPATIENT)
Dept: GENERAL RADIOLOGY | Facility: HOSPITAL | Age: 63
DRG: 329 | End: 2023-02-07
Payer: COMMERCIAL

## 2023-02-07 LAB
ALBUMIN SERPL-MCNC: 2.4 G/DL (ref 3.5–5.2)
ALBUMIN SERPL-MCNC: 2.4 G/DL (ref 3.5–5.2)
ANION GAP SERPL CALCULATED.3IONS-SCNC: 10.2 MMOL/L (ref 5–15)
ANION GAP SERPL CALCULATED.3IONS-SCNC: 15.5 MMOL/L (ref 5–15)
ANISOCYTOSIS BLD QL: ABNORMAL
BH BB BLOOD EXPIRATION DATE: NORMAL
BH BB BLOOD TYPE BARCODE: 5100
BH BB DISPENSE STATUS: NORMAL
BH BB PRODUCT CODE: NORMAL
BH BB UNIT NUMBER: NORMAL
BUN SERPL-MCNC: 12 MG/DL (ref 8–23)
BUN SERPL-MCNC: 16 MG/DL (ref 8–23)
BUN/CREAT SERPL: 16.7 (ref 7–25)
BUN/CREAT SERPL: 18.8 (ref 7–25)
BURR CELLS BLD QL SMEAR: ABNORMAL
CALCIUM SPEC-SCNC: 8.6 MG/DL (ref 8.6–10.5)
CALCIUM SPEC-SCNC: 8.9 MG/DL (ref 8.6–10.5)
CHLORIDE SERPL-SCNC: 111 MMOL/L (ref 98–107)
CHLORIDE SERPL-SCNC: 116 MMOL/L (ref 98–107)
CO2 SERPL-SCNC: 18.5 MMOL/L (ref 22–29)
CO2 SERPL-SCNC: 19.8 MMOL/L (ref 22–29)
CREAT SERPL-MCNC: 0.72 MG/DL (ref 0.57–1)
CREAT SERPL-MCNC: 0.85 MG/DL (ref 0.57–1)
CROSSMATCH INTERPRETATION: NORMAL
DEPRECATED RDW RBC AUTO: 43.5 FL (ref 37–54)
EGFRCR SERPLBLD CKD-EPI 2021: 77.1 ML/MIN/1.73
EGFRCR SERPLBLD CKD-EPI 2021: 94.1 ML/MIN/1.73
ERYTHROCYTE [DISTWIDTH] IN BLOOD BY AUTOMATED COUNT: 14.1 % (ref 12.3–15.4)
GLUCOSE BLDC GLUCOMTR-MCNC: 181 MG/DL (ref 70–130)
GLUCOSE BLDC GLUCOMTR-MCNC: 43 MG/DL (ref 70–130)
GLUCOSE BLDC GLUCOMTR-MCNC: 70 MG/DL (ref 70–130)
GLUCOSE SERPL-MCNC: 34 MG/DL (ref 65–99)
GLUCOSE SERPL-MCNC: 71 MG/DL (ref 65–99)
HCT VFR BLD AUTO: 30 % (ref 34–46.6)
HGB BLD-MCNC: 10.1 G/DL (ref 12–15.9)
LYMPHOCYTES # BLD MANUAL: 0.77 10*3/MM3 (ref 0.7–3.1)
LYMPHOCYTES NFR BLD MANUAL: 4 % (ref 5–12)
MAGNESIUM SERPL-MCNC: 1.7 MG/DL (ref 1.6–2.4)
MCH RBC QN AUTO: 28.3 PG (ref 26.6–33)
MCHC RBC AUTO-ENTMCNC: 33.7 G/DL (ref 31.5–35.7)
MCV RBC AUTO: 84 FL (ref 79–97)
MICROCYTES BLD QL: ABNORMAL
MONOCYTES # BLD: 0.34 10*3/MM3 (ref 0.1–0.9)
NEUTROPHILS # BLD AUTO: 7.37 10*3/MM3 (ref 1.7–7)
NEUTROPHILS NFR BLD MANUAL: 86.9 % (ref 42.7–76)
PHOSPHATE SERPL-MCNC: 1.9 MG/DL (ref 2.5–4.5)
PHOSPHATE SERPL-MCNC: 3.2 MG/DL (ref 2.5–4.5)
PLAT MORPH BLD: NORMAL
PLATELET # BLD AUTO: 275 10*3/MM3 (ref 140–450)
PMV BLD AUTO: 10.7 FL (ref 6–12)
POIKILOCYTOSIS BLD QL SMEAR: ABNORMAL
POTASSIUM SERPL-SCNC: 2.7 MMOL/L (ref 3.5–5.2)
POTASSIUM SERPL-SCNC: 3.1 MMOL/L (ref 3.5–5.2)
RBC # BLD AUTO: 3.57 10*6/MM3 (ref 3.77–5.28)
SODIUM SERPL-SCNC: 145 MMOL/L (ref 136–145)
SODIUM SERPL-SCNC: 146 MMOL/L (ref 136–145)
UNIT  ABO: NORMAL
UNIT  RH: NORMAL
VARIANT LYMPHS NFR BLD MANUAL: 9.1 % (ref 19.6–45.3)
WBC MORPH BLD: NORMAL
WBC NRBC COR # BLD: 8.48 10*3/MM3 (ref 3.4–10.8)

## 2023-02-07 PROCEDURE — 80069 RENAL FUNCTION PANEL: CPT | Performed by: INTERNAL MEDICINE

## 2023-02-07 PROCEDURE — 25010000002 MAGNESIUM SULFATE IN D5W 1G/100ML (PREMIX) 1-5 GM/100ML-% SOLUTION: Performed by: INTERNAL MEDICINE

## 2023-02-07 PROCEDURE — 85007 BL SMEAR W/DIFF WBC COUNT: CPT | Performed by: HOSPITALIST

## 2023-02-07 PROCEDURE — 99232 SBSQ HOSP IP/OBS MODERATE 35: CPT | Performed by: INTERNAL MEDICINE

## 2023-02-07 PROCEDURE — 83735 ASSAY OF MAGNESIUM: CPT | Performed by: INTERNAL MEDICINE

## 2023-02-07 PROCEDURE — 82962 GLUCOSE BLOOD TEST: CPT

## 2023-02-07 PROCEDURE — 25010000002 METOCLOPRAMIDE PER 10 MG: Performed by: COLON & RECTAL SURGERY

## 2023-02-07 PROCEDURE — 74018 RADEX ABDOMEN 1 VIEW: CPT

## 2023-02-07 PROCEDURE — 99024 POSTOP FOLLOW-UP VISIT: CPT | Performed by: PHYSICIAN ASSISTANT

## 2023-02-07 PROCEDURE — 85025 COMPLETE CBC W/AUTO DIFF WBC: CPT | Performed by: HOSPITALIST

## 2023-02-07 PROCEDURE — 0 POTASSIUM CHLORIDE 10 MEQ/100ML SOLUTION: Performed by: INTERNAL MEDICINE

## 2023-02-07 PROCEDURE — 25010000002 PIPERACILLIN SOD-TAZOBACTAM PER 1 G: Performed by: HOSPITALIST

## 2023-02-07 PROCEDURE — 25010000002 HYDROMORPHONE PER 4 MG: Performed by: COLON & RECTAL SURGERY

## 2023-02-07 RX ORDER — MAGNESIUM SULFATE 1 G/100ML
1 INJECTION INTRAVENOUS ONCE
Status: COMPLETED | OUTPATIENT
Start: 2023-02-07 | End: 2023-02-07

## 2023-02-07 RX ORDER — DEXTROSE AND SODIUM CHLORIDE 5; .9 G/100ML; G/100ML
100 INJECTION, SOLUTION INTRAVENOUS CONTINUOUS
Status: DISCONTINUED | OUTPATIENT
Start: 2023-02-07 | End: 2023-02-10

## 2023-02-07 RX ORDER — AMLODIPINE BESYLATE 5 MG/1
5 TABLET ORAL
Status: DISCONTINUED | OUTPATIENT
Start: 2023-02-07 | End: 2023-02-08

## 2023-02-07 RX ORDER — POTASSIUM CHLORIDE 7.45 MG/ML
10 INJECTION INTRAVENOUS
Status: DISCONTINUED | OUTPATIENT
Start: 2023-02-07 | End: 2023-02-20

## 2023-02-07 RX ORDER — FAMOTIDINE 10 MG/ML
20 INJECTION, SOLUTION INTRAVENOUS
Status: DISCONTINUED | OUTPATIENT
Start: 2023-02-07 | End: 2023-02-20

## 2023-02-07 RX ADMIN — HYDROMORPHONE HYDROCHLORIDE 0.25 MG: 1 INJECTION, SOLUTION INTRAMUSCULAR; INTRAVENOUS; SUBCUTANEOUS at 04:08

## 2023-02-07 RX ADMIN — POTASSIUM CHLORIDE 10 MEQ: 7.46 INJECTION, SOLUTION INTRAVENOUS at 20:15

## 2023-02-07 RX ADMIN — GABAPENTIN 125 MG: 250 SOLUTION ORAL at 06:21

## 2023-02-07 RX ADMIN — SODIUM CHLORIDE 50 ML/HR: 9 INJECTION, SOLUTION INTRAVENOUS at 04:08

## 2023-02-07 RX ADMIN — METOCLOPRAMIDE 10 MG: 5 INJECTION, SOLUTION INTRAMUSCULAR; INTRAVENOUS at 06:22

## 2023-02-07 RX ADMIN — POTASSIUM CHLORIDE 10 MEQ: 7.46 INJECTION, SOLUTION INTRAVENOUS at 22:07

## 2023-02-07 RX ADMIN — ACETAMINOPHEN 1000 MG: 10 INJECTION, SOLUTION INTRAVENOUS at 14:08

## 2023-02-07 RX ADMIN — TAZOBACTAM SODIUM AND PIPERACILLIN SODIUM 3.38 G: 375; 3 INJECTION, SOLUTION INTRAVENOUS at 22:07

## 2023-02-07 RX ADMIN — HYDROMORPHONE HYDROCHLORIDE 0.25 MG: 1 INJECTION, SOLUTION INTRAMUSCULAR; INTRAVENOUS; SUBCUTANEOUS at 18:25

## 2023-02-07 RX ADMIN — FAMOTIDINE 20 MG: 10 INJECTION INTRAVENOUS at 13:59

## 2023-02-07 RX ADMIN — ACETAMINOPHEN 1000 MG: 10 INJECTION, SOLUTION INTRAVENOUS at 06:21

## 2023-02-07 RX ADMIN — TAZOBACTAM SODIUM AND PIPERACILLIN SODIUM 3.38 G: 375; 3 INJECTION, SOLUTION INTRAVENOUS at 06:21

## 2023-02-07 RX ADMIN — HYDROMORPHONE HYDROCHLORIDE 0.25 MG: 1 INJECTION, SOLUTION INTRAMUSCULAR; INTRAVENOUS; SUBCUTANEOUS at 22:07

## 2023-02-07 RX ADMIN — METOCLOPRAMIDE 10 MG: 5 INJECTION, SOLUTION INTRAMUSCULAR; INTRAVENOUS at 18:06

## 2023-02-07 RX ADMIN — HYDROMORPHONE HYDROCHLORIDE 0.25 MG: 1 INJECTION, SOLUTION INTRAMUSCULAR; INTRAVENOUS; SUBCUTANEOUS at 00:32

## 2023-02-07 RX ADMIN — FAMOTIDINE 20 MG: 10 INJECTION INTRAVENOUS at 18:06

## 2023-02-07 RX ADMIN — METOCLOPRAMIDE 10 MG: 5 INJECTION, SOLUTION INTRAMUSCULAR; INTRAVENOUS at 23:30

## 2023-02-07 RX ADMIN — MAGNESIUM SULFATE IN DEXTROSE 1 G: 10 INJECTION, SOLUTION INTRAVENOUS at 13:28

## 2023-02-07 RX ADMIN — POTASSIUM CHLORIDE 10 MEQ: 7.46 INJECTION, SOLUTION INTRAVENOUS at 23:30

## 2023-02-07 RX ADMIN — DEXTROSE AND SODIUM CHLORIDE 50 ML/HR: 5; 900 INJECTION, SOLUTION INTRAVENOUS at 09:26

## 2023-02-07 RX ADMIN — GABAPENTIN 125 MG: 250 SOLUTION ORAL at 15:06

## 2023-02-07 RX ADMIN — FAMOTIDINE 20 MG: 20 TABLET, FILM COATED ORAL at 14:10

## 2023-02-07 RX ADMIN — TAZOBACTAM SODIUM AND PIPERACILLIN SODIUM 3.38 G: 375; 3 INJECTION, SOLUTION INTRAVENOUS at 15:06

## 2023-02-07 RX ADMIN — METOCLOPRAMIDE 10 MG: 5 INJECTION, SOLUTION INTRAMUSCULAR; INTRAVENOUS at 14:59

## 2023-02-07 RX ADMIN — POTASSIUM PHOSPHATE, MONOBASIC AND POTASSIUM PHOSPHATE, DIBASIC 30 MMOL: 224; 236 INJECTION, SOLUTION, CONCENTRATE INTRAVENOUS at 09:22

## 2023-02-07 NOTE — PROGRESS NOTES
LOS: 11 days     Chief Complaint/ Reason for encounter: JUANY    Subjective   02/07/23 : Feels about the same today, still some generalized abdominal pain  Anxious to increase her diet, no nausea or vomiting today but did vomit twice yesterday  No shortness of breath or chest pain  No fevers or chills  Minimal edema      Medical history reviewed:  History of Present Illness    Subjective    History taken from: Patient and chart    Vital Signs  Temp:  [98.4 °F (36.9 °C)-100 °F (37.8 °C)] 98.4 °F (36.9 °C)  Heart Rate:  [] 102  Resp:  [16-18] 18  BP: (153-170)/() 165/95       Wt Readings from Last 1 Encounters:   02/03/23 2226 83.1 kg (183 lb 3.2 oz)   01/27/23 1955 78.9 kg (174 lb)   01/27/23 1428 78.9 kg (174 lb)       Objective    Objective:  General Appearance:  Comfortable, mildly ill-appearing, in no acute distress and not in pain.  Awake, alert, oriented  HEENT: Mucous membranes moist, no injury, oropharynx clear  Lungs:  Normal effort and normal respiratory rate.  Breath sounds clear to auscultation.  No  respiratory distress.  No rales, decreased breath sounds or rhonchi.    Heart: Normal rate.  Regular rhythm.  S1, S2 normal.  No murmur.   Abdomen: Abdomen is soft.  Bowel sounds are normal, no abdominal tenderness.  There is no rebound or guarding  Extremities: Trace ankle edema of bilateral lower extremities  Neurological: No focal motor or sensory deficits, pupils reactive  Skin:  Warm and dry.  No rash or cyanosis.       Results Review:    Intake/Output:     Intake/Output Summary (Last 24 hours) at 2/7/2023 1004  Last data filed at 2/7/2023 0403  Gross per 24 hour   Intake 462.5 ml   Output 1550 ml   Net -1087.5 ml         DATA:  Radiology and Labs:  The following labs independently reviewed by me. Additional labs ordered for tomorrow a.m.  Interval notes, chart personally reviewed by me.   Old records independently reviewed showing normal baseline creatinine  The following radiologic studies  independently viewed by me, findings KUB showing NG tube in the proximal stomach surgical incision in place bibasilar consolidation distended bowel loops  New problems include JUANY from hypotension  Discussed with patient herself at bedside    Risk/ complexity of medical care/ medical decision making moderate complexity, postop renal failure and fluid and electrolyte management    Labs:   Recent Results (from the past 24 hour(s))   Magnesium    Collection Time: 02/06/23 10:45 AM    Specimen: Blood   Result Value Ref Range    Magnesium 1.9 1.6 - 2.4 mg/dL   Sodium, Urine, Random - Urine, Clean Catch    Collection Time: 02/06/23 10:17 PM    Specimen: Urine, Clean Catch   Result Value Ref Range    Sodium, Urine 48 mmol/L   Urinalysis With Microscopic If Indicated (No Culture) - Urine, Clean Catch    Collection Time: 02/06/23 10:17 PM    Specimen: Urine, Clean Catch   Result Value Ref Range    Color, UA Yellow Yellow, Straw    Appearance, UA Clear Clear    pH, UA 6.0 5.0 - 8.0    Specific Gravity, UA 1.022 1.005 - 1.030    Glucose, UA Negative Negative    Ketones, UA 15 mg/dL (1+) (A) Negative    Bilirubin, UA Negative Negative    Blood, UA Moderate (2+) (A) Negative    Protein, UA 30 mg/dL (1+) (A) Negative    Leuk Esterase, UA Negative Negative    Nitrite, UA Negative Negative    Urobilinogen, UA 0.2 E.U./dL 0.2 - 1.0 E.U./dL   Protein, Urine, Random - Urine, Clean Catch    Collection Time: 02/06/23 10:17 PM    Specimen: Urine, Clean Catch   Result Value Ref Range    Total Protein, Urine 59.9 mg/dL   Creatinine Urine Random (kidney function) GFR component - Urine, Clean Catch    Collection Time: 02/06/23 10:17 PM    Specimen: Urine, Clean Catch   Result Value Ref Range    Creatinine, Urine 57.8 mg/dL   Urinalysis, Microscopic Only - Urine, Clean Catch    Collection Time: 02/06/23 10:17 PM    Specimen: Urine, Clean Catch   Result Value Ref Range    RBC, UA 21-30 (A) None Seen, 0-2 /HPF    WBC, UA 3-5 (A) None Seen, 0-2  /HPF    Bacteria, UA None Seen None Seen /HPF    Squamous Epithelial Cells, UA 3-6 (A) None Seen, 0-2 /HPF    Hyaline Casts, UA 0-2 None Seen /LPF    Methodology Automated Microscopy    Magnesium    Collection Time: 02/07/23  4:40 AM    Specimen: Blood   Result Value Ref Range    Magnesium 1.7 1.6 - 2.4 mg/dL   Renal Function Panel    Collection Time: 02/07/23  4:40 AM    Specimen: Blood   Result Value Ref Range    Glucose 34 (C) 65 - 99 mg/dL    BUN 16 8 - 23 mg/dL    Creatinine 0.85 0.57 - 1.00 mg/dL    Sodium 145 136 - 145 mmol/L    Potassium 2.7 (L) 3.5 - 5.2 mmol/L    Chloride 111 (H) 98 - 107 mmol/L    CO2 18.5 (L) 22.0 - 29.0 mmol/L    Calcium 8.9 8.6 - 10.5 mg/dL    Albumin 2.4 (L) 3.5 - 5.2 g/dL    Phosphorus 1.9 (C) 2.5 - 4.5 mg/dL    Anion Gap 15.5 (H) 5.0 - 15.0 mmol/L    BUN/Creatinine Ratio 18.8 7.0 - 25.0    eGFR 77.1 >60.0 mL/min/1.73   CBC Auto Differential    Collection Time: 02/07/23  4:40 AM    Specimen: Blood   Result Value Ref Range    WBC 8.48 3.40 - 10.80 10*3/mm3    RBC 3.57 (L) 3.77 - 5.28 10*6/mm3    Hemoglobin 10.1 (L) 12.0 - 15.9 g/dL    Hematocrit 30.0 (L) 34.0 - 46.6 %    MCV 84.0 79.0 - 97.0 fL    MCH 28.3 26.6 - 33.0 pg    MCHC 33.7 31.5 - 35.7 g/dL    RDW 14.1 12.3 - 15.4 %    RDW-SD 43.5 37.0 - 54.0 fl    MPV 10.7 6.0 - 12.0 fL    Platelets 275 140 - 450 10*3/mm3   Manual Differential    Collection Time: 02/07/23  4:40 AM    Specimen: Blood   Result Value Ref Range    Neutrophil % 86.9 (H) 42.7 - 76.0 %    Lymphocyte % 9.1 (L) 19.6 - 45.3 %    Monocyte % 4.0 (L) 5.0 - 12.0 %    Neutrophils Absolute 7.37 (H) 1.70 - 7.00 10*3/mm3    Lymphocytes Absolute 0.77 0.70 - 3.10 10*3/mm3    Monocytes Absolute 0.34 0.10 - 0.90 10*3/mm3    Anisocytosis Mod/2+ None Seen    [place] Cells Large/3+ None Seen    Microcytes Slight/1+ None Seen    Poikilocytes Large/3+ None Seen    WBC Morphology Normal Normal    Platelet Morphology Normal Normal   Prepare RBC, 1 Units    Collection Time: 02/07/23  6:00  AM   Result Value Ref Range    Product Code G0786Q46     Unit Number [id number]     UNIT  ABO O     UNIT  RH POS     Crossmatch Interpretation Compatible     Dispense Status PT     Blood Expiration Date [id number]     Blood Type Barcode 5100    POC Glucose Once    Collection Time: 02/07/23  7:44 AM    Specimen: Blood   Result Value Ref Range    Glucose 43 (C) 70 - 130 mg/dL   POC Glucose Once    Collection Time: 02/07/23  7:53 AM    Specimen: Blood   Result Value Ref Range    Glucose 181 (H) 70 - 130 mg/dL       Radiology:  Pertinent radiology studies were reviewed as described above      Medications have been reviewed separately in chart overview      ASSESSMENT:   acute renal failure, creatinine peaked at 2.3 yesterday and is already back to baseline which suggest this was all due to prerenal causes with hypotension contributing.  Urine with ketones but urine studies not prerenal suggesting some component of ATN    Colitis status post sigmoid colon resection    Nausea and vomiting, better today    Stricture of sigmoid colon  Hypokalemia, lower today  Hypotension, improved with IV fluids  Anemia, better posttransfusion  Metabolic acidosis, expansion acidosis, stable        DISCUSSION/PLAN:   Renal function continues to rapidly improve and creatinine is near baseline today  Continue IV fluids, adding dextrose for hypoglycemia  Replace potassium and phosphorus IV  Recheck a renal panel this evening with additional electrolyte replacement as needed  IV antibiotics, standard dosing  Add bicarb to IV fluids if acidosis worsens    Renal ultrasound showed no obstruction     Continue to monitor electrolytes and volume closely, avoid IV contrast and nephrotoxic medications     Matthew Soliz MD   [facility]   Office phone number: [phone]  Answering service phone number: [phone]    02/07/23  10:04 EST    Dictation performed using Dragon dictation software

## 2023-02-07 NOTE — NURSING NOTE
CWOCN- follow up on colostomy. Patient has just gotten back in bed and is very drowsy. Not able to do any ostomy teaching. I will try to come earlier when she is more awake.   Ostomy appliance is intact.

## 2023-02-07 NOTE — PROGRESS NOTES
[facility]  Inpatient Progress Note    Reason for Follow Up:  Colitis, sigmoid stricture    Subjective     Interval History:   She seems to be feeling a little better.  Awaiting pathology from her 10/1/2023 EGD.  I reviewed the results of pathology from her 2/3/2023 sigmoid colectomy.  Pathology is benign.    Current Facility-Administered Medications:   •  acetaminophen (OFIRMEV) injection 1,000 mg, 1,000 mg, Intravenous, Q6H, Lino Gaston MD, 1,000 mg at 02/07/23 0621  •  dextrose 5 % and sodium chloride 0.9 % infusion, 50 mL/hr, Intravenous, Continuous, Gideon Hope MD, Last Rate: 50 mL/hr at 02/07/23 0926, 50 mL/hr at 02/07/23 0926  •  famotidine (PEPCID) injection 20 mg, 20 mg, Intravenous, BID Josiah NICHOLS Brittany A, PA-C  •  gabapentin (NEURONTIN) 50 mg/mL solution 125 mg, 125 mg, Oral, Q8H, Lino Gaston MD, 125 mg at 02/07/23 0621  •  hydrALAZINE (APRESOLINE) injection 10 mg, 10 mg, Intravenous, Q4H PRN, Gideon Hope MD, 10 mg at 02/06/23 2047  •  HYDROmorphone (DILAUDID) injection 0.25 mg, 0.25 mg, Intravenous, Q2H PRN, 0.25 mg at 02/07/23 0408 **AND** naloxone (NARCAN) injection 0.4 mg, 0.4 mg, Intravenous, Q5 Min PRN, Lino Gaston MD  •  magnesium sulfate in D5W 1g/100mL (PREMIX), 1 g, Intravenous, Once, Matthew Soliz MD  •  metoclopramide (REGLAN) injection 10 mg, 10 mg, Intravenous, Q6H, Lino Gaston MD, 10 mg at 02/07/23 0622  •  nitroglycerin (NITROSTAT) SL tablet 0.4 mg, 0.4 mg, Sublingual, Q5 Min PRN, Lino Gaston MD  •  ondansetron (ZOFRAN) injection 4 mg, 4 mg, Intravenous, Q6H PRN, Lino Gaston MD, 4 mg at 02/06/23 1248  •  piperacillin-tazobactam (ZOSYN) 3.375 g in iso-osmotic dextrose 50 ml (premix), 3.375 g, Intravenous, Q8H, Gideon Hope MD, 3.375 g at 02/07/23 0621  •  potassium chloride 10 mEq in 100 mL IVPB, 10 mEq, Intravenous, Q1H PRN, Matthew Soliz MD  •  potassium phosphate 45 mmol in sodium chloride 0.9 % 500 mL  infusion, 45 mmol, Intravenous, PRN **OR** potassium phosphate 30 mmol in sodium chloride 0.9 % 250 mL infusion, 30 mmol, Intravenous, PRN **OR** potassium phosphate 15 mmol in sodium chloride 0.9 % 100 mL infusion, 15 mmol, Intravenous, PRN **OR** sodium phosphates 40 mmol in sodium chloride 0.9 % 500 mL IVPB, 40 mmol, Intravenous, PRN **OR** sodium phosphates 20 mmol in sodium chloride 0.9 % 250 mL IVPB, 20 mmol, Intravenous, PRN, Matthew Soliz MD  Review of Systems:    The following systems were reviewed and negative;  constitution, respiratory, cardiovascular, musculoskeletal and neurological    Objective     Vital Signs  Temp:  [98.4 °F (36.9 °C)-99.1 °F (37.3 °C)] 98.4 °F (36.9 °C)  Heart Rate:  [] 102  Resp:  [16-18] 18  BP: (153-170)/() 165/95  Body mass index is 34.63 kg/m².    Intake/Output Summary (Last 24 hours) at 2/7/2023 1250  Last data filed at 2/7/2023 1048  Gross per 24 hour   Intake 462.5 ml   Output 2000 ml   Net -1537.5 ml     I/O this shift:  In: -   Out: 450 [Urine:300; Stool:150]     Physical Exam:   General: patient awake, alert and cooperative   Eyes: Normal lids and lashes, no scleral icterus   Neck: supple, normal ROM   Skin: warm and dry, not jaundiced   Cardiovascular: regular rhythm and rate, no murmurs auscultated   Pulm: clear to auscultation bilaterally, regular and unlabored   Abdomen: soft, mildly tender, nondistended; decreased bowel sounds with midline abdominal surgical wound dressing and left lower quadrant ostomy in place.   Extremities: no rash or edema   Psychiatric: Normal mood and behavior; memory intact     Results Review:     I reviewed the patient's new clinical results.    Results from last 7 days   Lab Units 02/07/23  0440 02/06/23  0443 02/05/23  0334   WBC 10*3/mm3 8.48 14.19* 15.58*   HEMOGLOBIN g/dL 10.1* 7.7* 8.5*   HEMATOCRIT % 30.0* 24.2* 24.7*   PLATELETS 10*3/mm3 275 215 189     Results from last 7 days   Lab Units 02/07/23  3083  02/06/23  0443 02/05/23  0334   SODIUM mmol/L 145 140 137   POTASSIUM mmol/L 2.7* 3.3* 4.0   CHLORIDE mmol/L 111* 112* 105   CO2 mmol/L 18.5* 20.5* 22.4   BUN mg/dL 16 22 19   CREATININE mg/dL 0.85 1.84* 2.37*   CALCIUM mg/dL 8.9 8.5* 8.5*   BILIRUBIN mg/dL  --  0.7 0.7   ALK PHOS U/L  --  41 38*   ALT (SGPT) U/L  --  14 11   AST (SGOT) U/L  --  32 28   GLUCOSE mg/dL 34* 54* 118*     Results from last 7 days   Lab Units 02/04/23  0430   INR  1.49*     Lab Results   Lab Value Date/Time    LIPASE 20 01/27/2023 1445    LIPASE 16 01/12/2023 1601       Radiology:  XR Abdomen KUB   Final Result      US Renal Bilateral   Final Result   No hydronephrosis seen on either side.       This report was finalized on 2/5/2023 10:43 PM by Dr. Grace Sanchez M.D.          CT Abdomen Pelvis With Contrast   Final Result      CT Abdomen Pelvis Without Contrast   Final Result   1. Colitis associated abnormal colonic dilatation involving the   ascending colon, hepatic flexure, proximal transverse colon where there   is also pericolonic inflammation. There is a second area of more   extensive abnormal wall thickening involving the proximal to mid sigmoid   colon which is decompressed with a greater degree of surrounding   inflammation. There is a potential associated obstructing component at   this location as the colon proximal to the sigmoid colon is dilated.   When compared to the prior exam 15 days ago, the right colitis is new   and what appears to represent colitis of the proximal sigmoid colon is   not significantly changed. The lack of interval change following   treatment and the presence of an obstructive component raises the   likelihood that there could be an associated neoplastic process at this   location though the distinction is difficult to make with CT and there   remains a great deal of inflammation surrounding the proximal sigmoid   colon.   2. Apparently, this exam was performed with intravenous contrast though    there is no contrast demonstrated on this exam. This suggests that there   has been extravasation of contrast into the arm and that could be   confirmed with physical exam or x-ray of the arm.   3. Previous gastric surgery. Small hiatal hernia.       Discussed with Dr. Cruz in the emergency department on 01/27/2023 at   5:50 PM.        Radiation dose reduction techniques were utilized, including automated   exposure control and exposure modulation based on body size.       This report was finalized on 1/27/2023 6:43 PM by Dr. Donovan Valencia M.D.              Assessment & Plan     Patient Active Problem List   Diagnosis   • Left sided colitis with complication (HCC)   • Colitis   • Nausea and vomiting   • Stricture of sigmoid colon (HCC)   • Uterine anomaly   • Hypertension   • Avulsion fracture of distal fibula       Assessment/Recommendations:    All problems are new to me today  Assessment:  1.  Left-sided colitis     2.  Sigmoid stricture: Status post left colon/sigmoid resection.  Pathology is consistent with diverticulitis and a diverticular stricture?     3.  EGD appearance of gastritis: Biopsies pending     4.  JUANY     5. heartburn     Plan:  • Follow-up path from EGD  • Defer NG tube management and diet to surgery.    I discussed the patients findings and my recommendations with patient.    Robbin Logan MD

## 2023-02-07 NOTE — PAYOR COMM NOTE
"Hali Aleman (62 y.o. Female)     PLEASE SEE ATTACHED FOR INPT CONTINUED STAY AUTH.    REF#[id number]    PLEASE CALL   OR  [phone]    THANK YOU    ANDREA CHAVES LPN [facility]    Date of Birth   1960    Social Security Number       Address   [address] [place] [id number]    Home Phone   [phone]    MRN   [id number]       Synagogue   None    Marital Status   Single                            Admission Date   1/27/23    Admission Type   Emergency    Admitting Provider   Gideon Hope MD    Attending Provider   Gideon Hope MD    Department, Room/Bed   [address], [address]/1       Discharge Date       Discharge Disposition       Discharge Destination                               Attending Provider: Gideon Hope MD    Allergies: Hydrocodone, Sulfa Antibiotics    Isolation: None   Infection: None   Code Status: CPR    Ht: 154.9 cm (60.98\")   Wt: 83.1 kg (183 lb 3.2 oz)    Admission Cmt: None   Principal Problem: Colitis [K52.9]                 Active Insurance as of 1/27/2023     Primary Coverage     Payor Plan Insurance Group Employer/Plan Group    [facility] [employer] [facility] [employer] EMPLOYEE [id number]     Payor Plan Address Payor Plan Phone Number Payor Plan Fax Number Effective Dates    [address] [phone]  1/1/2022 - None Entered    [place] [address]       Subscriber Name Subscriber Birth Date Member ID       HALI ALEMAN 1960 [id number]                 Emergency Contacts      (Rel.) Home Phone Work Phone Mobile Phone    SANDRA ALEMAN (Daughter) [phone] -- --    AMAURI ABREU (Mother) [phone] -- [phone]            Oxygen Therapy (last day)     Date/Time SpO2 Device (Oxygen Therapy) Flow (L/min) Oxygen Concentration (%) ETCO2 (mmHg)    02/07/23 1312 -- room air -- -- --    02/07/23 0926 -- room air -- -- --    02/07/23 0714 93 room air -- -- --    02/06/23 2235 -- room air -- -- --    02/06/23 1912 -- " room air -- -- --    02/06/23 1630 92 room air -- -- --    02/06/23 1400 -- room air -- -- --    02/06/23 1326 94 room air -- -- --    02/06/23 1259 92 -- -- -- --    02/06/23 1224 92 room air -- -- --    02/06/23 0800 -- room air -- -- --    02/06/23 0718 91 room air -- -- --          Intake & Output (last day)       02/06 0701 02/07 0700 02/07 0701 02/08 0700    Blood 462.5     Total Intake(mL/kg) 462.5 (5.6)     Urine (mL/kg/hr) 1000 (0.5) 700 (1)    Emesis/NG output 600     Stool 250 150    Total Output 1850 850    Net -1387.5 -850          Urine Unmeasured Occurrence 1 x 0 x        Lines, Drains & Airways     Active LDAs     Name Placement date Placement time Site Days    Peripheral IV 02/03/23 1230 Left Antecubital 02/03/23  1230  Antecubital  4    Peripheral IV 02/07/23 1337 Posterior;Right Hand 02/07/23  1337  Hand  less than 1    Colostomy LLQ 02/03/23  1944  LLQ  3                Operative/Procedure Notes (last 24 hours)  Notes from 02/06/23 1546 through 02/07/23 1546   No notes of this type exist for this encounter.            Physician Progress Notes (last 24 hours)      Gideon Hope MD at 02/07/23 1506          Daily progress note    Primary care physician  Dr. Delgado    Chief complaint  Doing better with no new complaints and wants to eat     History of present illness  62-year-old female with history of hypertension presented to [facility] emergency room with left lower quadrant abdominal pain for last 1 month.  Patient also have occasional loose stools.  Patient denies any nausea vomiting.  Patient recently diagnosed with colitis and treated with oral antibiotics without any improvement.  Patient has noticed blood in the stools.  Patient evaluated in ER with CT abdominal pelvis which shows worsening colitis and failed outpatient treatment admit for management.  Patient has no fever chills chest pain shortness of breath with sweats weight loss or weight gain.     REVIEW OF  "SYSTEMS  Unremarkable except abdominal pain      PHYSICAL EXAM  Blood pressure 161/99, pulse 95, temperature 98.9 °F (37.2 °C), temperature source Oral, resp. rate 18, height 154.9 cm (60.98\"), weight 83.1 kg (183 lb 3.2 oz), SpO2 93 %.    GENERAL:  Awake and alert in no acute distress  HEENT:  Unremarkable  NECK:  Supple  CV: regular rhythm, normal rate  RESPIRATORY: normal effort, clear to auscultation bilaterally  ABDOMEN: soft, left lower quadrant tenderness bowel sounds positive  MUSCULOSKELETAL: no deformity  NEURO: alert, moves all extremities, follows commands  PSYCH:  calm, cooperative  SKIN: warm, dry     LAB RESULTS  Lab Results (last 24 hours)     Procedure Component Value Units Date/Time    POC Glucose Once [[id number]]  (Normal) Collected: 02/07/23 1503    Specimen: Blood Updated: 02/07/23 1504     Glucose 70 mg/dL      Comment: Meter: [id number] : [id number] Lev EUCEDA       Manual Differential [[id number]]  (Abnormal) Collected: 02/07/23 0440    Specimen: Blood Updated: 02/07/23 0803     Neutrophil % 86.9 %      Lymphocyte % 9.1 %      Monocyte % 4.0 %      Neutrophils Absolute 7.37 10*3/mm3      Lymphocytes Absolute 0.77 10*3/mm3      Monocytes Absolute 0.34 10*3/mm3      Anisocytosis Mod/2+     [place] Cells Large/3+     Microcytes Slight/1+     Poikilocytes Large/3+     WBC Morphology Normal     Platelet Morphology Normal    CBC & Differential [[id number]]  (Abnormal) Collected: 02/07/23 0440    Specimen: Blood Updated: 02/07/23 0803    Narrative:      The following orders were created for panel order CBC & Differential.  Procedure                               Abnormality         Status                     ---------                               -----------         ------                     CBC Auto Differential[[id number]]        Abnormal            Final result                 Please view results for these tests on the individual orders.    CBC Auto Differential [[id number]]  (Abnormal) " Collected: 02/07/23 0440    Specimen: Blood Updated: 02/07/23 0803     WBC 8.48 10*3/mm3      RBC 3.57 10*6/mm3      Hemoglobin 10.1 g/dL      Hematocrit 30.0 %      MCV 84.0 fL      MCH 28.3 pg      MCHC 33.7 g/dL      RDW 14.1 %      RDW-SD 43.5 fl      MPV 10.7 fL      Platelets 275 10*3/mm3     POC Glucose Once [[id number]]  (Abnormal) Collected: 02/07/23 0753    Specimen: Blood Updated: 02/07/23 0754     Glucose 181 mg/dL      Comment: Meter: [id number] : [id number] Kathi Kline RN       POC Glucose Once [[id number]]  (Abnormal) Collected: 02/07/23 0744    Specimen: Blood Updated: 02/07/23 0748     Glucose 43 mg/dL      Comment: Treated Patient Meter: [id number] : [id number] Kathi Kline RN       Renal Function Panel [[id number]]  (Abnormal) Collected: 02/07/23 0440    Specimen: Blood Updated: 02/07/23 0743     Glucose 34 mg/dL      BUN 16 mg/dL      Creatinine 0.85 mg/dL      Sodium 145 mmol/L      Potassium 2.7 mmol/L      Chloride 111 mmol/L      CO2 18.5 mmol/L      Calcium 8.9 mg/dL      Albumin 2.4 g/dL      Phosphorus 1.9 mg/dL      Anion Gap 15.5 mmol/L      BUN/Creatinine Ratio 18.8     eGFR 77.1 mL/min/1.73     Narrative:      GFR Normal >60  Chronic Kidney Disease <60  Kidney Failure <15      Magnesium [[id number]]  (Normal) Collected: 02/07/23 0440    Specimen: Blood Updated: 02/07/23 0657     Magnesium 1.7 mg/dL     Sodium, Urine, Random - Urine, Clean Catch [[id number]] Collected: 02/06/23 2217    Specimen: Urine, Clean Catch Updated: 02/06/23 2307     Sodium, Urine 48 mmol/L     Narrative:      Reference intervals for random urine have not been established.  Clinical usage is dependent upon physician's interpretation in combination with other laboratory tests.       Protein, Urine, Random - Urine, Clean Catch [[id number]] Collected: 02/06/23 2217    Specimen: Urine, Clean Catch Updated: 02/06/23 2307     Total Protein, Urine 59.9 mg/dL     Narrative:      Reference intervals for random urine have  not been established.  Clinical usage is dependent upon physician's interpretation in combination with other laboratory tests.       Creatinine Urine Random (kidney function) GFR component - Urine, Clean Catch [[id number]] Collected: 02/06/23 2217    Specimen: Urine, Clean Catch Updated: 02/06/23 2307     Creatinine, Urine 57.8 mg/dL     Narrative:      Reference intervals for random urine have not been established.  Clinical usage is dependent upon physician's interpretation in combination with other laboratory tests.       Urinalysis, Microscopic Only - Urine, Clean Catch [[id number]]  (Abnormal) Collected: 02/06/23 2217    Specimen: Urine, Clean Catch Updated: 02/06/23 2255     RBC, UA 21-30 /HPF      WBC, UA 3-5 /HPF      Bacteria, UA None Seen /HPF      Squamous Epithelial Cells, UA 3-6 /HPF      Hyaline Casts, UA 0-2 /LPF      Methodology Automated Microscopy    Urinalysis With Microscopic If Indicated (No Culture) - Urine, Clean Catch [[id number]]  (Abnormal) Collected: 02/06/23 2217    Specimen: Urine, Clean Catch Updated: 02/06/23 2255     Color, UA Yellow     Appearance, UA Clear     pH, UA 6.0     Specific Gravity, UA 1.022     Glucose, UA Negative     Ketones, UA 15 mg/dL (1+)     Bilirubin, UA Negative     Blood, UA Moderate (2+)     Protein, UA 30 mg/dL (1+)     Leuk Esterase, UA Negative     Nitrite, UA Negative     Urobilinogen, UA 0.2 E.U./dL        Imaging Results (Last 24 Hours)     Procedure Component Value Units Date/Time    XR Abdomen KUB [[id number]] Collected: 02/07/23 0058     Updated: 02/07/23 0102    Narrative:      KUB     HISTORY: Nasogastric tube placement     COMPARISON: 02/01/2023     FINDINGS:  Nasogastric tube appears to extend into the proximal stomach. Midline  surgical incision is noted. Patient is noted to have bibasilar  consolidation. Gaseous distention of bowel loops is noted.     This report was finalized on 2/7/2023 12:59 AM by Dr. Grace Sanchez M.D.           Upper and  lower endoscopy results noted and discussed with patient    Current Facility-Administered Medications:   •  acetaminophen (OFIRMEV) injection 1,000 mg, 1,000 mg, Intravenous, Q6H, Lino Gaston MD, 1,000 mg at 02/07/23 1408  •  dextrose 5 % and sodium chloride 0.9 % infusion, 50 mL/hr, Intravenous, Continuous, Gideon Hope MD, Last Rate: 50 mL/hr at 02/07/23 0926, 50 mL/hr at 02/07/23 0926  •  famotidine (PEPCID) injection 20 mg, 20 mg, Intravenous, BID Josiah NICHOLS Brittany A, PA-C, 20 mg at 02/07/23 1359  •  gabapentin (NEURONTIN) 50 mg/mL solution 125 mg, 125 mg, Oral, Q8H, Lino Gaston MD, 125 mg at 02/07/23 1506  •  hydrALAZINE (APRESOLINE) injection 10 mg, 10 mg, Intravenous, Q4H PRN, Gideon Hope MD, 10 mg at 02/06/23 2047  •  HYDROmorphone (DILAUDID) injection 0.25 mg, 0.25 mg, Intravenous, Q2H PRN, 0.25 mg at 02/07/23 0408 **AND** naloxone (NARCAN) injection 0.4 mg, 0.4 mg, Intravenous, Q5 Min PRN, Lino Gaston MD  •  metoclopramide (REGLAN) injection 10 mg, 10 mg, Intravenous, Q6H, Lino Gaston MD, 10 mg at 02/07/23 1459  •  nitroglycerin (NITROSTAT) SL tablet 0.4 mg, 0.4 mg, Sublingual, Q5 Min PRN, Lino Gaston MD  •  ondansetron (ZOFRAN) injection 4 mg, 4 mg, Intravenous, Q6H PRN, Lino Gaston MD, 4 mg at 02/06/23 1248  •  piperacillin-tazobactam (ZOSYN) 3.375 g in iso-osmotic dextrose 50 ml (premix), 3.375 g, Intravenous, Q8H, Gideon Hope MD, 3.375 g at 02/07/23 1506  •  potassium chloride 10 mEq in 100 mL IVPB, 10 mEq, Intravenous, Q1H PRN, Matthew Soliz MD  •  potassium phosphate 45 mmol in sodium chloride 0.9 % 500 mL infusion, 45 mmol, Intravenous, PRN **OR** potassium phosphate 30 mmol in sodium chloride 0.9 % 250 mL infusion, 30 mmol, Intravenous, PRN **OR** potassium phosphate 15 mmol in sodium chloride 0.9 % 100 mL infusion, 15 mmol, Intravenous, PRN **OR** sodium phosphates 40 mmol in sodium chloride 0.9 % 500 mL IVPB, 40 mmol, Intravenous, PRN **OR** sodium  phosphates 20 mmol in sodium chloride 0.9 % 250 mL IVPB, 20 mmol, Intravenous, PRN, Matthew Soliz MD     ASSESSMENT  Acute colitis with sigmoid stricture and microperforation s/p colon resection and colostomy  Acute kidney injury  Hypertension   Gastroesophageal reflux disease    PLAN  CPM  Postop care  IVF  Continue IV antibiotics  N.p.o.   Pain management  Continue home medications  Stress ulcer DVT prophylaxis  Nephrology consult appreciated  Colorectal surgery and GI to follow patient  Supportive care  PT OT  Discussed with family nursing staff   Follow closely and further recommendation according to hospital course    SANG HOPE MD    Copied text in this note has been reviewed and is accurate as of 02/07/23        Electronically signed by Sang Hope MD at 02/07/23 1509     Robbin Logan MD at 02/07/23 1250          [facility]  Inpatient Progress Note    Reason for Follow Up:  Colitis, sigmoid stricture    Subjective     Interval History:   She seems to be feeling a little better.  Awaiting pathology from her 10/1/2023 EGD.  I reviewed the results of pathology from her 2/3/2023 sigmoid colectomy.  Pathology is benign.    Current Facility-Administered Medications:   •  acetaminophen (OFIRMEV) injection 1,000 mg, 1,000 mg, Intravenous, Q6H, Lino Gaston MD, 1,000 mg at 02/07/23 0621  •  dextrose 5 % and sodium chloride 0.9 % infusion, 50 mL/hr, Intravenous, Continuous, Sang Hope MD, Last Rate: 50 mL/hr at 02/07/23 0926, 50 mL/hr at 02/07/23 0926  •  famotidine (PEPCID) injection 20 mg, 20 mg, Intravenous, BID AC, Lilibeth Rahman PA-C  •  gabapentin (NEURONTIN) 50 mg/mL solution 125 mg, 125 mg, Oral, Q8H, Lino Gaston MD, 125 mg at 02/07/23 0621  •  hydrALAZINE (APRESOLINE) injection 10 mg, 10 mg, Intravenous, Q4H PRN, Sang Hope MD, 10 mg at 02/06/23 2047  •  HYDROmorphone (DILAUDID) injection 0.25 mg, 0.25 mg, Intravenous, Q2H PRN, 0.25 mg at 02/07/23 0408  **AND** naloxone (NARCAN) injection 0.4 mg, 0.4 mg, Intravenous, Q5 Min PRN, Lino Gaston MD  •  magnesium sulfate in D5W 1g/100mL (PREMIX), 1 g, Intravenous, Once, Matthew Soliz MD  •  metoclopramide (REGLAN) injection 10 mg, 10 mg, Intravenous, Q6H, Lino Gaston MD, 10 mg at 02/07/23 0622  •  nitroglycerin (NITROSTAT) SL tablet 0.4 mg, 0.4 mg, Sublingual, Q5 Min PRN, Lino Gaston MD  •  ondansetron (ZOFRAN) injection 4 mg, 4 mg, Intravenous, Q6H PRN, Lino Gaston MD, 4 mg at 02/06/23 1248  •  piperacillin-tazobactam (ZOSYN) 3.375 g in iso-osmotic dextrose 50 ml (premix), 3.375 g, Intravenous, Q8H, Gideon Hope MD, 3.375 g at 02/07/23 0621  •  potassium chloride 10 mEq in 100 mL IVPB, 10 mEq, Intravenous, Q1H PRN, Matthew Soliz MD  •  potassium phosphate 45 mmol in sodium chloride 0.9 % 500 mL infusion, 45 mmol, Intravenous, PRN **OR** potassium phosphate 30 mmol in sodium chloride 0.9 % 250 mL infusion, 30 mmol, Intravenous, PRN **OR** potassium phosphate 15 mmol in sodium chloride 0.9 % 100 mL infusion, 15 mmol, Intravenous, PRN **OR** sodium phosphates 40 mmol in sodium chloride 0.9 % 500 mL IVPB, 40 mmol, Intravenous, PRN **OR** sodium phosphates 20 mmol in sodium chloride 0.9 % 250 mL IVPB, 20 mmol, Intravenous, PRN, Matthew Soliz MD  Review of Systems:    The following systems were reviewed and negative;  constitution, respiratory, cardiovascular, musculoskeletal and neurological    Objective     Vital Signs  Temp:  [98.4 °F (36.9 °C)-99.1 °F (37.3 °C)] 98.4 °F (36.9 °C)  Heart Rate:  [] 102  Resp:  [16-18] 18  BP: (153-170)/() 165/95  Body mass index is 34.63 kg/m².    Intake/Output Summary (Last 24 hours) at 2/7/2023 1250  Last data filed at 2/7/2023 1048  Gross per 24 hour   Intake 462.5 ml   Output 2000 ml   Net -1537.5 ml     I/O this shift:  In: -   Out: 450 [Urine:300; Stool:150]     Physical Exam:   General: patient awake, alert and  cooperative   Eyes: Normal lids and lashes, no scleral icterus   Neck: supple, normal ROM   Skin: warm and dry, not jaundiced   Cardiovascular: regular rhythm and rate, no murmurs auscultated   Pulm: clear to auscultation bilaterally, regular and unlabored   Abdomen: soft, mildly tender, nondistended; decreased bowel sounds with midline abdominal surgical wound dressing and left lower quadrant ostomy in place.   Extremities: no rash or edema   Psychiatric: Normal mood and behavior; memory intact     Results Review:     I reviewed the patient's new clinical results.    Results from last 7 days   Lab Units 02/07/23  0440 02/06/23 0443 02/05/23  0334   WBC 10*3/mm3 8.48 14.19* 15.58*   HEMOGLOBIN g/dL 10.1* 7.7* 8.5*   HEMATOCRIT % 30.0* 24.2* 24.7*   PLATELETS 10*3/mm3 275 215 189     Results from last 7 days   Lab Units 02/07/23  0440 02/06/23 0443 02/05/23  0334   SODIUM mmol/L 145 140 137   POTASSIUM mmol/L 2.7* 3.3* 4.0   CHLORIDE mmol/L 111* 112* 105   CO2 mmol/L 18.5* 20.5* 22.4   BUN mg/dL 16 22 19   CREATININE mg/dL 0.85 1.84* 2.37*   CALCIUM mg/dL 8.9 8.5* 8.5*   BILIRUBIN mg/dL  --  0.7 0.7   ALK PHOS U/L  --  41 38*   ALT (SGPT) U/L  --  14 11   AST (SGOT) U/L  --  32 28   GLUCOSE mg/dL 34* 54* 118*     Results from last 7 days   Lab Units 02/04/23  0430   INR  1.49*     Lab Results   Lab Value Date/Time    LIPASE 20 01/27/2023 1445    LIPASE 16 01/12/2023 1601       Radiology:  XR Abdomen KUB   Final Result      US Renal Bilateral   Final Result   No hydronephrosis seen on either side.       This report was finalized on 2/5/2023 10:43 PM by Dr. Grace Sanchez M.D.          CT Abdomen Pelvis With Contrast   Final Result      CT Abdomen Pelvis Without Contrast   Final Result   1. Colitis associated abnormal colonic dilatation involving the   ascending colon, hepatic flexure, proximal transverse colon where there   is also pericolonic inflammation. There is a second area of more   extensive abnormal  wall thickening involving the proximal to mid sigmoid   colon which is decompressed with a greater degree of surrounding   inflammation. There is a potential associated obstructing component at   this location as the colon proximal to the sigmoid colon is dilated.   When compared to the prior exam 15 days ago, the right colitis is new   and what appears to represent colitis of the proximal sigmoid colon is   not significantly changed. The lack of interval change following   treatment and the presence of an obstructive component raises the   likelihood that there could be an associated neoplastic process at this   location though the distinction is difficult to make with CT and there   remains a great deal of inflammation surrounding the proximal sigmoid   colon.   2. Apparently, this exam was performed with intravenous contrast though   there is no contrast demonstrated on this exam. This suggests that there   has been extravasation of contrast into the arm and that could be   confirmed with physical exam or x-ray of the arm.   3. Previous gastric surgery. Small hiatal hernia.       Discussed with Dr. Cruz in the emergency department on 01/27/2023 at   5:50 PM.        Radiation dose reduction techniques were utilized, including automated   exposure control and exposure modulation based on body size.       This report was finalized on 1/27/2023 6:43 PM by Dr. Donovan Valencia M.D.              Assessment & Plan     Patient Active Problem List   Diagnosis   • Left sided colitis with complication (HCC)   • Colitis   • Nausea and vomiting   • Stricture of sigmoid colon (HCC)   • Uterine anomaly   • Hypertension   • Avulsion fracture of distal fibula       Assessment/Recommendations:    All problems are new to me today  Assessment:  1.  Left-sided colitis     2.  Sigmoid stricture: Status post left colon/sigmoid resection.  Pathology is consistent with diverticulitis and a diverticular stricture?     3.  EGD  appearance of gastritis: Biopsies pending     4.  JUANY     5. heartburn     Plan:  • Follow-up path from EGD  • Defer NG tube management and diet to surgery.    I discussed the patients findings and my recommendations with patient.    Robbin Logan MD    Electronically signed by Robbin Logan MD at 02/07/23 1254     Matthew Soliz MD at 02/07/23 1003             LOS: 11 days     Chief Complaint/ Reason for encounter: JUANY    Subjective   02/07/23 : Feels about the same today, still some generalized abdominal pain  Anxious to increase her diet, no nausea or vomiting today but did vomit twice yesterday  No shortness of breath or chest pain  No fevers or chills  Minimal edema      Medical history reviewed:  History of Present Illness    Subjective    History taken from: Patient and chart    Vital Signs  Temp:  [98.4 °F (36.9 °C)-100 °F (37.8 °C)] 98.4 °F (36.9 °C)  Heart Rate:  [] 102  Resp:  [16-18] 18  BP: (153-170)/() 165/95       Wt Readings from Last 1 Encounters:   02/03/23 2226 83.1 kg (183 lb 3.2 oz)   01/27/23 1955 78.9 kg (174 lb)   01/27/23 1428 78.9 kg (174 lb)       Objective    Objective:  General Appearance:  Comfortable, mildly ill-appearing, in no acute distress and not in pain.  Awake, alert, oriented  HEENT: Mucous membranes moist, no injury, oropharynx clear  Lungs:  Normal effort and normal respiratory rate.  Breath sounds clear to auscultation.  No  respiratory distress.  No rales, decreased breath sounds or rhonchi.    Heart: Normal rate.  Regular rhythm.  S1, S2 normal.  No murmur.   Abdomen: Abdomen is soft.  Bowel sounds are normal, no abdominal tenderness.  There is no rebound or guarding  Extremities: Trace ankle edema of bilateral lower extremities  Neurological: No focal motor or sensory deficits, pupils reactive  Skin:  Warm and dry.  No rash or cyanosis.       Results Review:    Intake/Output:     Intake/Output Summary (Last 24 hours) at 2/7/2023 1004  Last data filed at  2/7/2023 0403  Gross per 24 hour   Intake 462.5 ml   Output 1550 ml   Net -1087.5 ml         DATA:  Radiology and Labs:  The following labs independently reviewed by me. Additional labs ordered for tomorrow a.m.  Interval notes, chart personally reviewed by me.   Old records independently reviewed showing normal baseline creatinine  The following radiologic studies independently viewed by me, findings KUB showing NG tube in the proximal stomach surgical incision in place bibasilar consolidation distended bowel loops  New problems include JUANY from hypotension  Discussed with patient herself at bedside    Risk/ complexity of medical care/ medical decision making moderate complexity, postop renal failure and fluid and electrolyte management    Labs:   Recent Results (from the past 24 hour(s))   Magnesium    Collection Time: 02/06/23 10:45 AM    Specimen: Blood   Result Value Ref Range    Magnesium 1.9 1.6 - 2.4 mg/dL   Sodium, Urine, Random - Urine, Clean Catch    Collection Time: 02/06/23 10:17 PM    Specimen: Urine, Clean Catch   Result Value Ref Range    Sodium, Urine 48 mmol/L   Urinalysis With Microscopic If Indicated (No Culture) - Urine, Clean Catch    Collection Time: 02/06/23 10:17 PM    Specimen: Urine, Clean Catch   Result Value Ref Range    Color, UA Yellow Yellow, Straw    Appearance, UA Clear Clear    pH, UA 6.0 5.0 - 8.0    Specific Gravity, UA 1.022 1.005 - 1.030    Glucose, UA Negative Negative    Ketones, UA 15 mg/dL (1+) (A) Negative    Bilirubin, UA Negative Negative    Blood, UA Moderate (2+) (A) Negative    Protein, UA 30 mg/dL (1+) (A) Negative    Leuk Esterase, UA Negative Negative    Nitrite, UA Negative Negative    Urobilinogen, UA 0.2 E.U./dL 0.2 - 1.0 E.U./dL   Protein, Urine, Random - Urine, Clean Catch    Collection Time: 02/06/23 10:17 PM    Specimen: Urine, Clean Catch   Result Value Ref Range    Total Protein, Urine 59.9 mg/dL   Creatinine Urine Random (kidney function) GFR component -  Urine, Clean Catch    Collection Time: 02/06/23 10:17 PM    Specimen: Urine, Clean Catch   Result Value Ref Range    Creatinine, Urine 57.8 mg/dL   Urinalysis, Microscopic Only - Urine, Clean Catch    Collection Time: 02/06/23 10:17 PM    Specimen: Urine, Clean Catch   Result Value Ref Range    RBC, UA 21-30 (A) None Seen, 0-2 /HPF    WBC, UA 3-5 (A) None Seen, 0-2 /HPF    Bacteria, UA None Seen None Seen /HPF    Squamous Epithelial Cells, UA 3-6 (A) None Seen, 0-2 /HPF    Hyaline Casts, UA 0-2 None Seen /LPF    Methodology Automated Microscopy    Magnesium    Collection Time: 02/07/23  4:40 AM    Specimen: Blood   Result Value Ref Range    Magnesium 1.7 1.6 - 2.4 mg/dL   Renal Function Panel    Collection Time: 02/07/23  4:40 AM    Specimen: Blood   Result Value Ref Range    Glucose 34 (C) 65 - 99 mg/dL    BUN 16 8 - 23 mg/dL    Creatinine 0.85 0.57 - 1.00 mg/dL    Sodium 145 136 - 145 mmol/L    Potassium 2.7 (L) 3.5 - 5.2 mmol/L    Chloride 111 (H) 98 - 107 mmol/L    CO2 18.5 (L) 22.0 - 29.0 mmol/L    Calcium 8.9 8.6 - 10.5 mg/dL    Albumin 2.4 (L) 3.5 - 5.2 g/dL    Phosphorus 1.9 (C) 2.5 - 4.5 mg/dL    Anion Gap 15.5 (H) 5.0 - 15.0 mmol/L    BUN/Creatinine Ratio 18.8 7.0 - 25.0    eGFR 77.1 >60.0 mL/min/1.73   CBC Auto Differential    Collection Time: 02/07/23  4:40 AM    Specimen: Blood   Result Value Ref Range    WBC 8.48 3.40 - 10.80 10*3/mm3    RBC 3.57 (L) 3.77 - 5.28 10*6/mm3    Hemoglobin 10.1 (L) 12.0 - 15.9 g/dL    Hematocrit 30.0 (L) 34.0 - 46.6 %    MCV 84.0 79.0 - 97.0 fL    MCH 28.3 26.6 - 33.0 pg    MCHC 33.7 31.5 - 35.7 g/dL    RDW 14.1 12.3 - 15.4 %    RDW-SD 43.5 37.0 - 54.0 fl    MPV 10.7 6.0 - 12.0 fL    Platelets 275 140 - 450 10*3/mm3   Manual Differential    Collection Time: 02/07/23  4:40 AM    Specimen: Blood   Result Value Ref Range    Neutrophil % 86.9 (H) 42.7 - 76.0 %    Lymphocyte % 9.1 (L) 19.6 - 45.3 %    Monocyte % 4.0 (L) 5.0 - 12.0 %    Neutrophils Absolute 7.37 (H) 1.70 - 7.00  10*3/mm3    Lymphocytes Absolute 0.77 0.70 - 3.10 10*3/mm3    Monocytes Absolute 0.34 0.10 - 0.90 10*3/mm3    Anisocytosis Mod/2+ None Seen    Norman Cells Large/3+ None Seen    Microcytes Slight/1+ None Seen    Poikilocytes Large/3+ None Seen    WBC Morphology Normal Normal    Platelet Morphology Normal Normal   Prepare RBC, 1 Units    Collection Time: 02/07/23  6:00 AM   Result Value Ref Range    Product Code [id number]     Unit Number [id number]     UNIT  ABO O     UNIT  RH POS     Crossmatch Interpretation Compatible     Dispense Status PT     Blood Expiration Date [id number]     Blood Type Barcode 5100    POC Glucose Once    Collection Time: 02/07/23  7:44 AM    Specimen: Blood   Result Value Ref Range    Glucose 43 (C) 70 - 130 mg/dL   POC Glucose Once    Collection Time: 02/07/23  7:53 AM    Specimen: Blood   Result Value Ref Range    Glucose 181 (H) 70 - 130 mg/dL       Radiology:  Pertinent radiology studies were reviewed as described above      Medications have been reviewed separately in chart overview      ASSESSMENT:   acute renal failure, creatinine peaked at 2.3 yesterday and is already back to baseline which suggest this was all due to prerenal causes with hypotension contributing.  Urine with ketones but urine studies not prerenal suggesting some component of ATN    Colitis status post sigmoid colon resection    Nausea and vomiting, better today    Stricture of sigmoid colon  Hypokalemia, lower today  Hypotension, improved with IV fluids  Anemia, better posttransfusion  Metabolic acidosis, expansion acidosis, stable        DISCUSSION/PLAN:   Renal function continues to rapidly improve and creatinine is near baseline today  Continue IV fluids, adding dextrose for hypoglycemia  Replace potassium and phosphorus IV  Recheck a renal panel this evening with additional electrolyte replacement as needed  IV antibiotics, standard dosing  Add bicarb to IV fluids if acidosis worsens    Renal ultrasound  showed no obstruction     Continue to monitor electrolytes and volume closely, avoid IV contrast and nephrotoxic medications     Matthew Soliz MD   [facility]   Office phone number: [phone]  Answering service phone number: [phone]    02/07/23  10:04 EST    Dictation performed using Dragon dictation software      Electronically signed by Matthew Soliz MD at 02/07/23 1008     Lilibeth Rahman PA-C at 02/07/23 0823     Attestation signed by Lino Gaston MD at 02/07/23 1536    I have reviewed this documentation and agree.                  Progress Note    Pod 4    S: Pt with multiple episodes of emesis yesterday. NGT placed and Reglan started. Pt denies any N/V today.     O:  Temp:  [98.4 °F (36.9 °C)-100 °F (37.8 °C)] 98.4 °F (36.9 °C)  Heart Rate:  [] 102  Resp:  [16-18] 18  BP: (153-170)/() 165/95      Intake/Output Summary (Last 24 hours) at 2/7/2023 0823  Last data filed at 2/7/2023 0403  Gross per 24 hour   Intake 462.5 ml   Output 1750 ml   Net -1287.5 ml       Abd:   soft, non-distended  Incision: clean, dry  Ostomy: PPP    Results from last 7 days   Lab Units 02/07/23  0440   WBC 10*3/mm3 8.48   HEMOGLOBIN g/dL 10.1*   HEMATOCRIT % 30.0*   PLATELETS 10*3/mm3 275     Results from last 7 days   Lab Units 02/07/23  0440   SODIUM mmol/L 145   POTASSIUM mmol/L 2.7*   CHLORIDE mmol/L 111*   CO2 mmol/L 18.5*   BUN mg/dL 16   CREATININE mg/dL 0.85   EGFR mL/min/1.73 77.1   GLUCOSE mg/dL 34*   CALCIUM mg/dL 8.9   PHOSPHORUS mg/dL 1.9*       Results from last 7 days   Lab Units 02/07/23  0440   MAGNESIUM mg/dL 1.7       A/P: 62 y.o. female S/P robotic assisted laparoscopic left and sigmoid resection with colostomy converted open due to splenic injury 02/03/2023.    - Post-operative ileus. Keep NGT and continue Reglan. Discussed with Pt the importance of getting out of bed, ambulating, and sitting up in chair. Pt agreed to walk at least 3x today. She is to sit in the  chair throughout the remainder of the day. Plan discussed with RN.   - S/P transfusion of PRBC x1 unit with improvement in Hg from 7.7 to 10.1                 Electronically signed by Lino Gaston MD at 02/07/23 1536     Lino Gaston MD at 02/06/23 1733        Patient's vomited twice today 300 each time patient is complaining of back of her throat pain and discomfort  Blood pressure is coming up 160/110 at bedside  Temperature patient had a temperature of 100  Abdomen is soft  Discussed with patient she needs to be out of bed and walking to help with ileus and potential atelectasis fever  N.p.o. for now  We will add Reglan  Decreasing IV fluid to 50    Electronically signed by Lino Gaston MD at 02/06/23 1730       Consult Notes (last 24 hours)  Notes from 02/06/23 1546 through 02/07/23 1546   No notes of this type exist for this encounter.

## 2023-02-07 NOTE — SIGNIFICANT NOTE
02/07/23 1503   OTHER   Discipline physical therapist   Rehab Time/Intention   Session Not Performed patient/family declined treatment  (Pt continually declines PT today despite encouragement and education regarding importance of PT in the acute care setting. RNRaisa notitifed.)   Recommendation   PT - Next Appointment 02/08/23

## 2023-02-07 NOTE — CASE MANAGEMENT/SOCIAL WORK
Continued Stay Note  [facility]     Patient Name: Hali Tejeda  MRN: [id number]  Today's Date: 2/7/2023    Admit Date: 1/27/2023    Plan: home; referral to [facility]   Discharge Plan     Row Name 02/07/23 0927       Plan    Plan home; referral to [facility]    Patient/Family in Agreement with Plan yes    Plan Comments Spoke with pt bedside. Discussed d/c planning, pt drowsy but reports she plans home with family at d/c and agreeable to [facility], referral to [facility]. CCP will continue to follow for d/c needs.               Discharge Codes    No documentation.               Expected Discharge Date and Time     Expected Discharge Date Expected Discharge Time    Feb 8, 2023             LULU Jerry

## 2023-02-07 NOTE — PROGRESS NOTES
Patient is lying in bed  Per nursing she dismissed today  She did sit in the chair but did not walk farther than the doorway  Patient pulled out her NG tube  Abdomen soft ostomy productive    Status post left & sigmoid colectomy  Ileus  Explained again why she needs to get up and get moving.  She is will be continued n.p.o. no clear liquids yet until patient starts to ambulate

## 2023-02-07 NOTE — DISCHARGE PLACEMENT REQUEST
"Hali Aleman (62 y.o. Female)     Date of Birth   1960    Social Security Number       Address   [address] [place] [address]    Home Phone   [phone]    MRN   [id number]       Buddhist   None    Marital Status   Single                            Admission Date   1/27/23    Admission Type   Emergency    Admitting Provider   Gideon Hope MD    Attending Provider   Gideon Hope MD    Department, Room/Bed   [address], [address]       Discharge Date       Discharge Disposition       Discharge Destination                               Attending Provider: Gideon Hope MD    Allergies: Hydrocodone, Sulfa Antibiotics    Isolation: None   Infection: None   Code Status: CPR    Ht: 154.9 cm (60.98\")   Wt: 83.1 kg (183 lb 3.2 oz)    Admission Cmt: None   Principal Problem: Colitis [K52.9]                 Active Insurance as of 1/27/2023     Primary Coverage     Payor Plan Insurance Group Employer/Plan Group    [facility] [employer] EMPL[employer] [id number]     Payor Plan Address Payor Plan Phone Number Payor Plan Fax Number Effective Dates    [address] [phone]  1/1/2022 - None Entered    [facility] [id number]       Subscriber Name Subscriber Birth Date Member ID       HALI ALEMAN 1960 [id number]                 Emergency Contacts      (Rel.) Home Phone Work Phone Mobile Phone    SANDRA ALEMAN (Daughter) [phone] -- --    AMAURI ABREU (Mother) [phone] -- [phone]        "

## 2023-02-07 NOTE — PROGRESS NOTES
Progress Note    Pod 4    S: Pt with multiple episodes of emesis yesterday. NGT placed and Reglan started. Pt denies any N/V today.     O:  Temp:  [98.4 °F (36.9 °C)-100 °F (37.8 °C)] 98.4 °F (36.9 °C)  Heart Rate:  [] 102  Resp:  [16-18] 18  BP: (153-170)/() 165/95      Intake/Output Summary (Last 24 hours) at 2/7/2023 0823  Last data filed at 2/7/2023 0403  Gross per 24 hour   Intake 462.5 ml   Output 1750 ml   Net -1287.5 ml       Abd:   soft, non-distended  Incision: clean, dry  Ostomy: PPP    Results from last 7 days   Lab Units 02/07/23  0440   WBC 10*3/mm3 8.48   HEMOGLOBIN g/dL 10.1*   HEMATOCRIT % 30.0*   PLATELETS 10*3/mm3 275     Results from last 7 days   Lab Units 02/07/23  0440   SODIUM mmol/L 145   POTASSIUM mmol/L 2.7*   CHLORIDE mmol/L 111*   CO2 mmol/L 18.5*   BUN mg/dL 16   CREATININE mg/dL 0.85   EGFR mL/min/1.73 77.1   GLUCOSE mg/dL 34*   CALCIUM mg/dL 8.9   PHOSPHORUS mg/dL 1.9*       Results from last 7 days   Lab Units 02/07/23  0440   MAGNESIUM mg/dL 1.7       A/P: 62 y.o. female S/P robotic assisted laparoscopic left and sigmoid resection with colostomy converted open due to splenic injury 02/03/2023.    - Post-operative ileus. Keep NGT and continue Reglan. Discussed with Pt the importance of getting out of bed, ambulating, and sitting up in chair. Pt agreed to walk at least 3x today. She is to sit in the chair throughout the remainder of the day. Plan discussed with RN.   - S/P transfusion of PRBC x1 unit with improvement in Hg from 7.7 to 10.1

## 2023-02-08 ENCOUNTER — APPOINTMENT (OUTPATIENT)
Dept: CT IMAGING | Facility: HOSPITAL | Age: 63
DRG: 329 | End: 2023-02-08
Payer: COMMERCIAL

## 2023-02-08 LAB
ALBUMIN SERPL-MCNC: 2.2 G/DL (ref 3.5–5.2)
ANION GAP SERPL CALCULATED.3IONS-SCNC: 6 MMOL/L (ref 5–15)
BUN SERPL-MCNC: 9 MG/DL (ref 8–23)
BUN/CREAT SERPL: 14.5 (ref 7–25)
CALCIUM SPEC-SCNC: 8.4 MG/DL (ref 8.6–10.5)
CHLORIDE SERPL-SCNC: 116 MMOL/L (ref 98–107)
CO2 SERPL-SCNC: 21 MMOL/L (ref 22–29)
CREAT SERPL-MCNC: 0.62 MG/DL (ref 0.57–1)
DEPRECATED RDW RBC AUTO: 43.4 FL (ref 37–54)
EGFRCR SERPLBLD CKD-EPI 2021: 100.2 ML/MIN/1.73
EOSINOPHIL # BLD MANUAL: 0.19 10*3/MM3 (ref 0–0.4)
EOSINOPHIL NFR BLD MANUAL: 1 % (ref 0.3–6.2)
ERYTHROCYTE [DISTWIDTH] IN BLOOD BY AUTOMATED COUNT: 14.1 % (ref 12.3–15.4)
GLUCOSE SERPL-MCNC: 79 MG/DL (ref 65–99)
HCT VFR BLD AUTO: 28.2 % (ref 34–46.6)
HGB BLD-MCNC: 9.7 G/DL (ref 12–15.9)
LYMPHOCYTES # BLD MANUAL: 3.16 10*3/MM3 (ref 0.7–3.1)
LYMPHOCYTES NFR BLD MANUAL: 6 % (ref 5–12)
MAGNESIUM SERPL-MCNC: 2.2 MG/DL (ref 1.6–2.4)
MCH RBC QN AUTO: 28.7 PG (ref 26.6–33)
MCHC RBC AUTO-ENTMCNC: 34.4 G/DL (ref 31.5–35.7)
MCV RBC AUTO: 83.4 FL (ref 79–97)
MONOCYTES # BLD: 1.12 10*3/MM3 (ref 0.1–0.9)
NEUTROPHILS # BLD AUTO: 14.13 10*3/MM3 (ref 1.7–7)
NEUTROPHILS NFR BLD MANUAL: 76 % (ref 42.7–76)
NRBC BLD AUTO-RTO: 0.3 /100 WBC (ref 0–0.2)
PHOSPHATE SERPL-MCNC: 1.8 MG/DL (ref 2.5–4.5)
PHOSPHATE SERPL-MCNC: 1.9 MG/DL (ref 2.5–4.5)
PLATELET # BLD AUTO: 281 10*3/MM3 (ref 140–450)
PMV BLD AUTO: 10.7 FL (ref 6–12)
POTASSIUM SERPL-SCNC: 3.3 MMOL/L (ref 3.5–5.2)
POTASSIUM SERPL-SCNC: 3.5 MMOL/L (ref 3.5–5.2)
RBC # BLD AUTO: 3.38 10*6/MM3 (ref 3.77–5.28)
RBC MORPH BLD: NORMAL
SMALL PLATELETS BLD QL SMEAR: ADEQUATE
SODIUM SERPL-SCNC: 143 MMOL/L (ref 136–145)
VARIANT LYMPHS NFR BLD MANUAL: 17 % (ref 19.6–45.3)
WBC MORPH BLD: NORMAL
WBC NRBC COR # BLD: 18.59 10*3/MM3 (ref 3.4–10.8)

## 2023-02-08 PROCEDURE — 25010000002 ONDANSETRON PER 1 MG: Performed by: COLON & RECTAL SURGERY

## 2023-02-08 PROCEDURE — 25010000002 IOPAMIDOL 61 % SOLUTION: Performed by: HOSPITALIST

## 2023-02-08 PROCEDURE — 25010000002 HYDROMORPHONE PER 4 MG: Performed by: COLON & RECTAL SURGERY

## 2023-02-08 PROCEDURE — 99232 SBSQ HOSP IP/OBS MODERATE 35: CPT | Performed by: INTERNAL MEDICINE

## 2023-02-08 PROCEDURE — 83735 ASSAY OF MAGNESIUM: CPT | Performed by: INTERNAL MEDICINE

## 2023-02-08 PROCEDURE — 84100 ASSAY OF PHOSPHORUS: CPT | Performed by: HOSPITALIST

## 2023-02-08 PROCEDURE — 25010000002 HYDRALAZINE PER 20 MG: Performed by: HOSPITALIST

## 2023-02-08 PROCEDURE — 80069 RENAL FUNCTION PANEL: CPT | Performed by: INTERNAL MEDICINE

## 2023-02-08 PROCEDURE — 85025 COMPLETE CBC W/AUTO DIFF WBC: CPT | Performed by: HOSPITALIST

## 2023-02-08 PROCEDURE — 84132 ASSAY OF SERUM POTASSIUM: CPT | Performed by: HOSPITALIST

## 2023-02-08 PROCEDURE — 85007 BL SMEAR W/DIFF WBC COUNT: CPT | Performed by: HOSPITALIST

## 2023-02-08 PROCEDURE — 99233 SBSQ HOSP IP/OBS HIGH 50: CPT | Performed by: PHYSICIAN ASSISTANT

## 2023-02-08 PROCEDURE — 0 DIATRIZOATE MEGLUMINE & SODIUM PER 1 ML: Performed by: COLON & RECTAL SURGERY

## 2023-02-08 PROCEDURE — 74177 CT ABD & PELVIS W/CONTRAST: CPT

## 2023-02-08 PROCEDURE — 25010000002 PIPERACILLIN SOD-TAZOBACTAM PER 1 G: Performed by: HOSPITALIST

## 2023-02-08 PROCEDURE — 25010000002 METOCLOPRAMIDE PER 10 MG: Performed by: COLON & RECTAL SURGERY

## 2023-02-08 PROCEDURE — 0 POTASSIUM CHLORIDE 10 MEQ/100ML SOLUTION: Performed by: INTERNAL MEDICINE

## 2023-02-08 RX ORDER — AMLODIPINE BESYLATE 10 MG/1
10 TABLET ORAL
Status: DISCONTINUED | OUTPATIENT
Start: 2023-02-09 | End: 2023-02-21 | Stop reason: HOSPADM

## 2023-02-08 RX ADMIN — TAZOBACTAM SODIUM AND PIPERACILLIN SODIUM 3.38 G: 375; 3 INJECTION, SOLUTION INTRAVENOUS at 14:35

## 2023-02-08 RX ADMIN — FAMOTIDINE 20 MG: 10 INJECTION INTRAVENOUS at 18:14

## 2023-02-08 RX ADMIN — FAMOTIDINE 20 MG: 10 INJECTION INTRAVENOUS at 08:21

## 2023-02-08 RX ADMIN — METOCLOPRAMIDE 10 MG: 5 INJECTION, SOLUTION INTRAMUSCULAR; INTRAVENOUS at 11:39

## 2023-02-08 RX ADMIN — HYDROMORPHONE HYDROCHLORIDE 0.25 MG: 1 INJECTION, SOLUTION INTRAMUSCULAR; INTRAVENOUS; SUBCUTANEOUS at 04:13

## 2023-02-08 RX ADMIN — HYDROMORPHONE HYDROCHLORIDE 0.25 MG: 1 INJECTION, SOLUTION INTRAMUSCULAR; INTRAVENOUS; SUBCUTANEOUS at 19:49

## 2023-02-08 RX ADMIN — POTASSIUM CHLORIDE 10 MEQ: 7.46 INJECTION, SOLUTION INTRAVENOUS at 04:14

## 2023-02-08 RX ADMIN — TAZOBACTAM SODIUM AND PIPERACILLIN SODIUM 3.38 G: 375; 3 INJECTION, SOLUTION INTRAVENOUS at 21:00

## 2023-02-08 RX ADMIN — METOCLOPRAMIDE 10 MG: 5 INJECTION, SOLUTION INTRAMUSCULAR; INTRAVENOUS at 18:14

## 2023-02-08 RX ADMIN — AMLODIPINE BESYLATE 5 MG: 5 TABLET ORAL at 15:12

## 2023-02-08 RX ADMIN — POTASSIUM CHLORIDE 10 MEQ: 7.46 INJECTION, SOLUTION INTRAVENOUS at 02:08

## 2023-02-08 RX ADMIN — DIATRIZOATE MEGLUMINE AND DIATRIZOATE SODIUM 30 ML: 660; 100 LIQUID ORAL; RECTAL at 10:51

## 2023-02-08 RX ADMIN — POTASSIUM CHLORIDE 10 MEQ: 7.46 INJECTION, SOLUTION INTRAVENOUS at 05:23

## 2023-02-08 RX ADMIN — HYDRALAZINE HYDROCHLORIDE 10 MG: 20 INJECTION INTRAMUSCULAR; INTRAVENOUS at 14:52

## 2023-02-08 RX ADMIN — IOPAMIDOL 100 ML: 612 INJECTION, SOLUTION INTRAVENOUS at 14:05

## 2023-02-08 RX ADMIN — TAZOBACTAM SODIUM AND PIPERACILLIN SODIUM 3.38 G: 375; 3 INJECTION, SOLUTION INTRAVENOUS at 04:14

## 2023-02-08 RX ADMIN — HYDROMORPHONE HYDROCHLORIDE 0.25 MG: 1 INJECTION, SOLUTION INTRAMUSCULAR; INTRAVENOUS; SUBCUTANEOUS at 13:24

## 2023-02-08 RX ADMIN — ONDANSETRON 4 MG: 2 INJECTION INTRAMUSCULAR; INTRAVENOUS at 19:49

## 2023-02-08 RX ADMIN — POTASSIUM PHOSPHATE, MONOBASIC AND POTASSIUM PHOSPHATE, DIBASIC 15 MMOL: 224; 236 INJECTION, SOLUTION, CONCENTRATE INTRAVENOUS at 11:32

## 2023-02-08 RX ADMIN — ONDANSETRON 4 MG: 2 INJECTION INTRAMUSCULAR; INTRAVENOUS at 13:35

## 2023-02-08 RX ADMIN — METOCLOPRAMIDE 10 MG: 5 INJECTION, SOLUTION INTRAMUSCULAR; INTRAVENOUS at 05:24

## 2023-02-08 NOTE — PROGRESS NOTES
Patient pulled out NG tube  She was up to the [facility]    Abdomen soft  Ostomy pink patent productive  A/p  White count increased today and got CAT scan.  It has not been read yet.  On my view there are some areas of fluid :one in the left mid quadrant, some in the midline but not organized, fluid in the pelvis.  There is a small amount of small bowel dilation.  The stomach is of normal caliber.    Continue antibiotics  Okay to start clears  Discussed again the importance of her walking.

## 2023-02-08 NOTE — PROGRESS NOTES
"Daily progress note    Primary care physician  Dr. Delgado    Chief complaint  Doing better with no new complaints and wants to eat     History of present illness  62-year-old female with history of hypertension presented to [facility] emergency room with left lower quadrant abdominal pain for last 1 month.  Patient also have occasional loose stools.  Patient denies any nausea vomiting.  Patient recently diagnosed with colitis and treated with oral antibiotics without any improvement.  Patient has noticed blood in the stools.  Patient evaluated in ER with CT abdominal pelvis which shows worsening colitis and failed outpatient treatment admit for management.  Patient has no fever chills chest pain shortness of breath with sweats weight loss or weight gain.     REVIEW OF SYSTEMS  Unremarkable except abdominal pain      PHYSICAL EXAM  Blood pressure (!) 173/101, pulse 92, temperature 98.4 °F (36.9 °C), temperature source Oral, resp. rate 18, height 154.9 cm (60.98\"), weight 83.1 kg (183 lb 3.2 oz), SpO2 98 %.    GENERAL:  Awake and alert in no acute distress  HEENT:  Unremarkable  NECK:  Supple  CV: regular rhythm, normal rate  RESPIRATORY: normal effort, clear to auscultation bilaterally  ABDOMEN: soft, left lower quadrant tenderness bowel sounds positive  MUSCULOSKELETAL: no deformity  NEURO: alert, moves all extremities, follows commands  PSYCH:  calm, cooperative  SKIN: warm, dry     LAB RESULTS  Lab Results (last 24 hours)     Procedure Component Value Units Date/Time    Manual Differential [[id number]]  (Abnormal) Collected: 02/08/23 0405    Specimen: Blood Updated: 02/08/23 0753     Neutrophil % 76.0 %      Lymphocyte % 17.0 %      Monocyte % 6.0 %      Eosinophil % 1.0 %      Neutrophils Absolute 14.13 10*3/mm3      Lymphocytes Absolute 3.16 10*3/mm3      Monocytes Absolute 1.12 10*3/mm3      Eosinophils Absolute 0.19 10*3/mm3      RBC Morphology Normal     WBC Morphology Normal     Platelet Estimate " Adequate    CBC & Differential [[id number]]  (Abnormal) Collected: 02/08/23 0405    Specimen: Blood Updated: 02/08/23 0753    Narrative:      The following orders were created for panel order CBC & Differential.  Procedure                               Abnormality         Status                     ---------                               -----------         ------                     CBC Auto Differential[[id number]]        Abnormal            Final result                 Please view results for these tests on the individual orders.    CBC Auto Differential [[id number]]  (Abnormal) Collected: 02/08/23 0405    Specimen: Blood Updated: 02/08/23 0753     WBC 18.59 10*3/mm3      RBC 3.38 10*6/mm3      Hemoglobin 9.7 g/dL      Hematocrit 28.2 %      MCV 83.4 fL      MCH 28.7 pg      MCHC 34.4 g/dL      RDW 14.1 %      RDW-SD 43.4 fl      MPV 10.7 fL      Platelets 281 10*3/mm3      nRBC 0.3 /100 WBC     Renal Function Panel [[id number]]  (Abnormal) Collected: 02/08/23 0405    Specimen: Blood Updated: 02/08/23 0558     Glucose 79 mg/dL      BUN 9 mg/dL      Creatinine 0.62 mg/dL      Sodium 143 mmol/L      Potassium 3.5 mmol/L      Chloride 116 mmol/L      CO2 21.0 mmol/L      Calcium 8.4 mg/dL      Albumin 2.2 g/dL      Phosphorus 1.9 mg/dL      Anion Gap 6.0 mmol/L      BUN/Creatinine Ratio 14.5     eGFR 100.2 mL/min/1.73     Narrative:      GFR Normal >60  Chronic Kidney Disease <60  Kidney Failure <15      Magnesium [[id number]]  (Normal) Collected: 02/08/23 0405    Specimen: Blood Updated: 02/08/23 0547     Magnesium 2.2 mg/dL     Renal Function Panel [[id number]]  (Abnormal) Collected: 02/07/23 1514    Specimen: Blood Updated: 02/07/23 1604     Glucose 71 mg/dL      BUN 12 mg/dL      Creatinine 0.72 mg/dL      Sodium 146 mmol/L      Potassium 3.1 mmol/L      Chloride 116 mmol/L      CO2 19.8 mmol/L      Calcium 8.6 mg/dL      Albumin 2.4 g/dL      Phosphorus 3.2 mg/dL      Anion Gap 10.2 mmol/L      BUN/Creatinine Ratio  16.7     eGFR 94.1 mL/min/1.73     Narrative:      GFR Normal >60  Chronic Kidney Disease <60  Kidney Failure <15      POC Glucose Once [[id number]]  (Normal) Collected: 02/07/23 1503    Specimen: Blood Updated: 02/07/23 1504     Glucose 70 mg/dL      Comment: Meter: [id number] : [id number] Lev EUCEDA           Imaging Results (Last 24 Hours)     Procedure Component Value Units Date/Time    CT Abdomen Pelvis With Contrast [[id number]] Resulted: 02/08/23 1439     Updated: 02/08/23 1406        Upper and lower endoscopy results noted and discussed with patient    Current Facility-Administered Medications:   •  amLODIPine (NORVASC) tablet 5 mg, 5 mg, Oral, Q24H, Gideon Hope MD  •  dextrose 5 % and sodium chloride 0.9 % infusion, 50 mL/hr, Intravenous, Continuous, Gideon Hope MD, Last Rate: 50 mL/hr at 02/07/23 0926, 50 mL/hr at 02/07/23 0926  •  diatrizoate meglumine-sodium (GASTROGRAFIN) 66-10 % oral solution  - ADS Override Pull, , , ,   •  famotidine (PEPCID) injection 20 mg, 20 mg, Intravenous, BID Josiah NICHOLS Brittany A, PA-C, 20 mg at 02/08/23 0821  •  gabapentin (NEURONTIN) 50 mg/mL solution 125 mg, 125 mg, Oral, Q8H, Lino Gaston MD, 125 mg at 02/07/23 1506  •  hydrALAZINE (APRESOLINE) injection 10 mg, 10 mg, Intravenous, Q4H PRN, Gideon Hope MD, 10 mg at 02/06/23 2047  •  HYDROmorphone (DILAUDID) injection 0.25 mg, 0.25 mg, Intravenous, Q2H PRN, 0.25 mg at 02/08/23 1324 **AND** naloxone (NARCAN) injection 0.4 mg, 0.4 mg, Intravenous, Q5 Min PRN, Lino Gaston MD  •  metoclopramide (REGLAN) injection 10 mg, 10 mg, Intravenous, Q6H, Lino Gaston MD, 10 mg at 02/08/23 1139  •  nitroglycerin (NITROSTAT) SL tablet 0.4 mg, 0.4 mg, Sublingual, Q5 Min PRN, iLno Gaston MD  •  ondansetron (ZOFRAN) injection 4 mg, 4 mg, Intravenous, Q6H PRN, Lino Gaston MD, 4 mg at 02/08/23 1335  •  piperacillin-tazobactam (ZOSYN) 3.375 g in iso-osmotic dextrose 50 ml (premix), 3.375 g, Intravenous, Q8H,  Sang Hope MD, 3.375 g at 02/08/23 1435  •  potassium chloride 10 mEq in 100 mL IVPB, 10 mEq, Intravenous, Q1H PRN, Matthew Soliz MD, Last Rate: 100 mL/hr at 02/08/23 0523, 10 mEq at 02/08/23 0523  •  potassium phosphate 45 mmol in sodium chloride 0.9 % 500 mL infusion, 45 mmol, Intravenous, PRN **OR** potassium phosphate 30 mmol in sodium chloride 0.9 % 250 mL infusion, 30 mmol, Intravenous, PRN **OR** potassium phosphate 15 mmol in sodium chloride 0.9 % 100 mL infusion, 15 mmol, Intravenous, PRN, 15 mmol at 02/08/23 1132 **OR** sodium phosphates 40 mmol in sodium chloride 0.9 % 500 mL IVPB, 40 mmol, Intravenous, PRN **OR** sodium phosphates 20 mmol in sodium chloride 0.9 % 250 mL IVPB, 20 mmol, Intravenous, PRN, Matthew Soliz MD     ASSESSMENT  Acute colitis with sigmoid stricture and microperforation s/p colon resection and colostomy  Acute kidney injury resolved  Hypertension with elevated BP  Gastroesophageal reflux disease    PLAN  CPM  Postop care  IVF  Continue IV antibiotics  Clear liquid diet  Adjust blood pressure medications  Hydralazine as needed  Pain management  Continue home medications  Stress ulcer DVT prophylaxis  Nephrology consult appreciated  Colorectal surgery and GI to follow patient  Supportive care  PT OT  Discussed with family nursing staff   Follow closely and further recommendation according to hospital course    SANG HOPE MD    Copied text in this note has been reviewed and is accurate as of 02/08/23

## 2023-02-08 NOTE — PROGRESS NOTES
LOS: 12 days     Chief Complaint/ Reason for encounter: JUANY    Subjective   02/07/23 : Feels about the same today, still some generalized abdominal pain  Anxious to increase her diet, no nausea or vomiting today but did vomit twice yesterday  No shortness of breath or chest pain  No fevers or chills  Minimal edema    2/8: She is upset with the fact that her ostomy bag has stool in it  Remains n.p.o. per surgery recommendations  Denies any shortness of breath or chest pain, no fevers or chills no abdominal pain nausea vomiting or edema    Medical history reviewed:  History of Present Illness    Subjective    History taken from: Patient and chart    Vital Signs  Temp:  [98 °F (36.7 °C)-98.9 °F (37.2 °C)] 98 °F (36.7 °C)  Heart Rate:  [] 93  Resp:  [18] 18  BP: (159-173)/(96-99) 159/96       Wt Readings from Last 1 Encounters:   02/03/23 2226 83.1 kg (183 lb 3.2 oz)   01/27/23 1955 78.9 kg (174 lb)   01/27/23 1428 78.9 kg (174 lb)       Objective    Objective:  General Appearance:  Comfortable, relatively well-appearing, in no acute distress and not in pain.  Awake, alert, oriented  HEENT: Mucous membranes moist, no injury, oropharynx clear  Lungs:  Normal effort and normal respiratory rate.  Breath sounds clear to auscultation.  No  respiratory distress.  No rales, decreased breath sounds or rhonchi.    Heart: Normal rate.  Regular rhythm.  S1, S2 normal.  No murmur.   Abdomen: Abdomen is soft.  Bowel sounds are normal, no abdominal tenderness.  There is no rebound or guarding  Extremities: Trace ankle edema of bilateral lower extremities  Neurological: No focal motor or sensory deficits, pupils reactive  Skin:  Warm and dry.  No rash or cyanosis.       Results Review:    Intake/Output:     Intake/Output Summary (Last 24 hours) at 2/8/2023 1137  Last data filed at 2/8/2023 1103  Gross per 24 hour   Intake 0 ml   Output 500 ml   Net -500 ml         DATA:  Radiology and Labs:  The following labs independently  reviewed by me. Additional labs ordered for tomorrow a.m.  Interval notes, chart personally reviewed by me.   Old records independently reviewed showing normal baseline creatinine    New problems include hypokalemia and hypophosphatemia  Discussed with patient herself at bedside    Risk/ complexity of medical care/ medical decision making moderate complexity, postop renal failure and fluid and electrolyte management    Labs:   Recent Results (from the past 24 hour(s))   POC Glucose Once    Collection Time: 02/07/23  3:03 PM    Specimen: Blood   Result Value Ref Range    Glucose 70 70 - 130 mg/dL   Renal Function Panel    Collection Time: 02/07/23  3:14 PM    Specimen: Blood   Result Value Ref Range    Glucose 71 65 - 99 mg/dL    BUN 12 8 - 23 mg/dL    Creatinine 0.72 0.57 - 1.00 mg/dL    Sodium 146 (H) 136 - 145 mmol/L    Potassium 3.1 (L) 3.5 - 5.2 mmol/L    Chloride 116 (H) 98 - 107 mmol/L    CO2 19.8 (L) 22.0 - 29.0 mmol/L    Calcium 8.6 8.6 - 10.5 mg/dL    Albumin 2.4 (L) 3.5 - 5.2 g/dL    Phosphorus 3.2 2.5 - 4.5 mg/dL    Anion Gap 10.2 5.0 - 15.0 mmol/L    BUN/Creatinine Ratio 16.7 7.0 - 25.0    eGFR 94.1 >60.0 mL/min/1.73   Magnesium    Collection Time: 02/08/23  4:05 AM    Specimen: Blood   Result Value Ref Range    Magnesium 2.2 1.6 - 2.4 mg/dL   Renal Function Panel    Collection Time: 02/08/23  4:05 AM    Specimen: Blood   Result Value Ref Range    Glucose 79 65 - 99 mg/dL    BUN 9 8 - 23 mg/dL    Creatinine 0.62 0.57 - 1.00 mg/dL    Sodium 143 136 - 145 mmol/L    Potassium 3.5 3.5 - 5.2 mmol/L    Chloride 116 (H) 98 - 107 mmol/L    CO2 21.0 (L) 22.0 - 29.0 mmol/L    Calcium 8.4 (L) 8.6 - 10.5 mg/dL    Albumin 2.2 (L) 3.5 - 5.2 g/dL    Phosphorus 1.9 (C) 2.5 - 4.5 mg/dL    Anion Gap 6.0 5.0 - 15.0 mmol/L    BUN/Creatinine Ratio 14.5 7.0 - 25.0    eGFR 100.2 >60.0 mL/min/1.73   CBC Auto Differential    Collection Time: 02/08/23  4:05 AM    Specimen: Blood   Result Value Ref Range    WBC 18.59 (H) 3.40 -  10.80 10*3/mm3    RBC 3.38 (L) 3.77 - 5.28 10*6/mm3    Hemoglobin 9.7 (L) 12.0 - 15.9 g/dL    Hematocrit 28.2 (L) 34.0 - 46.6 %    MCV 83.4 79.0 - 97.0 fL    MCH 28.7 26.6 - 33.0 pg    MCHC 34.4 31.5 - 35.7 g/dL    RDW 14.1 12.3 - 15.4 %    RDW-SD 43.4 37.0 - 54.0 fl    MPV 10.7 6.0 - 12.0 fL    Platelets 281 140 - 450 10*3/mm3    nRBC 0.3 (H) 0.0 - 0.2 /100 WBC   Manual Differential    Collection Time: 02/08/23  4:05 AM    Specimen: Blood   Result Value Ref Range    Neutrophil % 76.0 42.7 - 76.0 %    Lymphocyte % 17.0 (L) 19.6 - 45.3 %    Monocyte % 6.0 5.0 - 12.0 %    Eosinophil % 1.0 0.3 - 6.2 %    Neutrophils Absolute 14.13 (H) 1.70 - 7.00 10*3/mm3    Lymphocytes Absolute 3.16 (H) 0.70 - 3.10 10*3/mm3    Monocytes Absolute 1.12 (H) 0.10 - 0.90 10*3/mm3    Eosinophils Absolute 0.19 0.00 - 0.40 10*3/mm3    RBC Morphology Normal Normal    WBC Morphology Normal Normal    Platelet Estimate Adequate Normal       Radiology:  Pertinent radiology studies were reviewed as described above      Medications have been reviewed separately in chart overview      ASSESSMENT:   acute renal failure, creatinine peaked at 2.3 and is back to baseline today, on IV fluids while n.p.o.    Colitis status post sigmoid colon resection    Nausea and vomiting, better today    Stricture of sigmoid colon, status post colostomy  Hypokalemia, improved but still low  Hypotension, now borderline hypertensive  Anemia, better posttransfusion  Metabolic acidosis, expansion acidosis, stable        DISCUSSION/PLAN:   Renal function excellent today, stable, near baseline.  Good urine output  Continue IV fluids with dextrose until diet is advanced  Replace potassium and phosphorus IV per protocol  IV antibiotics, standard dosing  Add bicarb to IV fluids if acidosis worsens but levels improved today  Renal ultrasound showed no obstruction     Continue to monitor electrolytes and volume closely, avoid IV contrast and nephrotoxic medications     Matthew FRAUSTO  Heydi MICHAELS   [facility]   Office phone number: [phone]  Answering service phone number: [phone]    02/08/23  11:37 EST    Dictation performed using Dragon dictation Stio

## 2023-02-08 NOTE — PROGRESS NOTES
[facility]  Inpatient Progress Note    Reason for Follow Up: Colitis, sigmoid stricture    Subjective     Interval History:   Hungry, wants to eat.  Getting a CT abdomen and pelvis for elevated white count today.    Current Facility-Administered Medications:   •  amLODIPine (NORVASC) tablet 5 mg, 5 mg, Oral, Q24H, Gideon Hope MD  •  dextrose 5 % and sodium chloride 0.9 % infusion, 50 mL/hr, Intravenous, Continuous, Gideon Hope MD, Last Rate: 50 mL/hr at 02/07/23 0926, 50 mL/hr at 02/07/23 0926  •  diatrizoate meglumine-sodium (GASTROGRAFIN) 66-10 % oral solution  - ADS Override Pull, , , ,   •  famotidine (PEPCID) injection 20 mg, 20 mg, Intravenous, BID Josiah NICHOLS Brittany A, PA-C, 20 mg at 02/08/23 0821  •  gabapentin (NEURONTIN) 50 mg/mL solution 125 mg, 125 mg, Oral, Q8H, Lino Gaston MD, 125 mg at 02/07/23 1506  •  hydrALAZINE (APRESOLINE) injection 10 mg, 10 mg, Intravenous, Q4H PRN, Gideon Hope MD, 10 mg at 02/06/23 2047  •  HYDROmorphone (DILAUDID) injection 0.25 mg, 0.25 mg, Intravenous, Q2H PRN, 0.25 mg at 02/08/23 0413 **AND** naloxone (NARCAN) injection 0.4 mg, 0.4 mg, Intravenous, Q5 Min PRN, Lino Gaston MD  •  metoclopramide (REGLAN) injection 10 mg, 10 mg, Intravenous, Q6H, Lino Gaston MD, 10 mg at 02/08/23 1139  •  nitroglycerin (NITROSTAT) SL tablet 0.4 mg, 0.4 mg, Sublingual, Q5 Min PRN, Lino Gaston MD  •  ondansetron (ZOFRAN) injection 4 mg, 4 mg, Intravenous, Q6H PRN, Lino Gaston MD, 4 mg at 02/06/23 1248  •  piperacillin-tazobactam (ZOSYN) 3.375 g in iso-osmotic dextrose 50 ml (premix), 3.375 g, Intravenous, Q8H, Gideon Hope MD, 3.375 g at 02/08/23 0414  •  potassium chloride 10 mEq in 100 mL IVPB, 10 mEq, Intravenous, Q1H PRN, Matthew Soliz MD, Last Rate: 100 mL/hr at 02/08/23 0523, 10 mEq at 02/08/23 0523  •  potassium phosphate 45 mmol in sodium chloride 0.9 % 500 mL infusion, 45 mmol, Intravenous, PRN **OR** potassium phosphate  30 mmol in sodium chloride 0.9 % 250 mL infusion, 30 mmol, Intravenous, PRN **OR** potassium phosphate 15 mmol in sodium chloride 0.9 % 100 mL infusion, 15 mmol, Intravenous, PRN, 15 mmol at 02/08/23 1132 **OR** sodium phosphates 40 mmol in sodium chloride 0.9 % 500 mL IVPB, 40 mmol, Intravenous, PRN **OR** sodium phosphates 20 mmol in sodium chloride 0.9 % 250 mL IVPB, 20 mmol, Intravenous, PRN, Matthew Soliz MD  Review of Systems:   All systems reviewed and negative except for: Constitution: Hunger      Objective     Vital Signs  Temp:  [98 °F (36.7 °C)-98.9 °F (37.2 °C)] 98 °F (36.7 °C)  Heart Rate:  [] 93  Resp:  [18] 18  BP: (159-173)/(96-99) 159/96  Body mass index is 34.63 kg/m².    Intake/Output Summary (Last 24 hours) at 2/8/2023 1223  Last data filed at 2/8/2023 1103  Gross per 24 hour   Intake 0 ml   Output 560 ml   Net -560 ml     I/O this shift:  In: -   Out: 160 [Urine:100; Stool:60]     Physical Exam:   General: patient awake, alert and cooperative   Eyes: Normal lids and lashes, no scleral icterus   Neck: supple, normal ROM   Skin: warm and dry, not jaundiced   Cardiovascular: regular rhythm and rate, no murmurs auscultated   Pulm: clear to auscultation bilaterally, regular and unlabored   Abdomen: soft, nondistended; ostomy with liquidy brown stool   Rectal: deferred   Extremities: no rash or edema   Psychiatric: Normal mood and behavior; memory intact     Results Review:     I reviewed the patient's new clinical results.    Results from last 7 days   Lab Units 02/08/23  0405 02/07/23  0440 02/06/23  0443   WBC 10*3/mm3 18.59* 8.48 14.19*   HEMOGLOBIN g/dL 9.7* 10.1* 7.7*   HEMATOCRIT % 28.2* 30.0* 24.2*   PLATELETS 10*3/mm3 281 275 215     Results from last 7 days   Lab Units 02/08/23  0405 02/07/23  1514 02/07/23  0440 02/06/23  0443 02/05/23  0334   SODIUM mmol/L 143 146* 145 140 137   POTASSIUM mmol/L 3.5 3.1* 2.7* 3.3* 4.0   CHLORIDE mmol/L 116* 116* 111* 112* 105   CO2 mmol/L  21.0* 19.8* 18.5* 20.5* 22.4   BUN mg/dL 9 12 16 22 19   CREATININE mg/dL 0.62 0.72 0.85 1.84* 2.37*   CALCIUM mg/dL 8.4* 8.6 8.9 8.5* 8.5*   BILIRUBIN mg/dL  --   --   --  0.7 0.7   ALK PHOS U/L  --   --   --  41 38*   ALT (SGPT) U/L  --   --   --  14 11   AST (SGOT) U/L  --   --   --  32 28   GLUCOSE mg/dL 79 71 34* 54* 118*     Results from last 7 days   Lab Units 02/04/23  0430   INR  1.49*     Lab Results   Lab Value Date/Time    LIPASE 20 01/27/2023 1445    LIPASE 16 01/12/2023 1601       Radiology:  XR Abdomen KUB   Final Result      US Renal Bilateral   Final Result   No hydronephrosis seen on either side.       This report was finalized on 2/5/2023 10:43 PM by Dr. Grace Sanchez M.D.          CT Abdomen Pelvis With Contrast   Final Result      CT Abdomen Pelvis Without Contrast   Final Result   1. Colitis associated abnormal colonic dilatation involving the   ascending colon, hepatic flexure, proximal transverse colon where there   is also pericolonic inflammation. There is a second area of more   extensive abnormal wall thickening involving the proximal to mid sigmoid   colon which is decompressed with a greater degree of surrounding   inflammation. There is a potential associated obstructing component at   this location as the colon proximal to the sigmoid colon is dilated.   When compared to the prior exam 15 days ago, the right colitis is new   and what appears to represent colitis of the proximal sigmoid colon is   not significantly changed. The lack of interval change following   treatment and the presence of an obstructive component raises the   likelihood that there could be an associated neoplastic process at this   location though the distinction is difficult to make with CT and there   remains a great deal of inflammation surrounding the proximal sigmoid   colon.   2. Apparently, this exam was performed with intravenous contrast though   there is no contrast demonstrated on this exam. This  suggests that there   has been extravasation of contrast into the arm and that could be   confirmed with physical exam or x-ray of the arm.   3. Previous gastric surgery. Small hiatal hernia.       Discussed with Dr. Cruz in the emergency department on 01/27/2023 at   5:50 PM.        Radiation dose reduction techniques were utilized, including automated   exposure control and exposure modulation based on body size.       This report was finalized on 1/27/2023 6:43 PM by Dr. Donovan Valencia M.D.          CT Abdomen Pelvis With Contrast    (Results Pending)       Assessment & Plan     Patient Active Problem List   Diagnosis   • Left sided colitis with complication (HCC)   • Colitis   • Nausea and vomiting   • Stricture of sigmoid colon (HCC)   • Uterine anomaly   • Hypertension   • Avulsion fracture of distal fibula       Impression  1.  Left-sided colitis: Diverticular disease    2.  Sigmoid stricture: Status post left colonic resection, diverticular disease    3.  EGD appearance of gastritis: Biopsies pending    4.  JUANY: Resolving    Plan  Diet as per colorectal surgery  Waiting EGD and surgical pathology--we will check on these results  Continue supportive care measures    Message sent to my office to arrange for outpatient follow-up    GI will sign off but remain available as needed in this patient's care.  Thank you.      I discussed the patients findings and my recommendations with patient and nursing staff.    All necessary PPE, including face mask and eye protection, were worn during this encounter.  Hand sanitization was performed both before and after the patient interaction.    Over 25 minutes was spent reviewing the patient's chart and records, in discussion with the patient, in examination of the patient, and in discussion with members of the patient's medical team.    Adwoa Isidro MD

## 2023-02-08 NOTE — PROGRESS NOTES
"Nutrition Services    Patient Name:  Hali Tejeda  YOB: 1960  MRN: [id number]  Admit Date:  1/27/2023    FOLLOW UP - CLINICAL NUTRITION    Assessment Date:  02/08/23    Encounter Information         Reason for Encounter NPO/clear liquid/Full liquid x 13 days     Current Issues Little to no intake x 13 days. Pt switching from liquid diet to NPO since admission. High risk for malnutrition. Pt currently off floor getting NG tube placed in IR. NG tube to drain fluid. S/m'd attending and colorectal surgeon about plan for nutrition. Per surgery she will likely need TPN. (see recommendations below)    Recommendations/Plan of Care:  1. TPN goal macronutrients:            1350 Dextrose calories            100 gms Amino Acids            100mL 20% SMOF lipids daily (pending TG level)   2. Slow increase to goal rate, monitor K+, Mg, and phos for signs of refeeding- replace as needed.    RD to follow per protocol.      Current Nutrition Orders & Evaluation of Intake       Oral Nutrition     Current PO Diet NPO Diet NPO Type: Sips with Meds   Supplement n/a   PO Evaluation     % PO Intake 0%    # of Days Evaluated 13 days    Factors Affecting Intake  altered GI function   --  Anthropometrics          Height    Weight Height: 154.9 cm (60.98\")  Weight: 83.1 kg (183 lb 3.2 oz) (02/03/23 2226)    BMI kg/m2 Body mass index is 34.63 kg/m².    Weight trend No new weight available     Estimated/Assessed Needs       Energy Requirements    Height for Calculation  Height: 154.9 cm (60.98\")   Weight for Calculation 83 kg     Method for Estimation  22 kcal/kg, 25 kcal/kg   EST Needs (kcal/day) 2410-4093 kcal/day        Protein Requirements    Weight for Calculation 83 kg    EST Protein Needs (g/kg) 1.0 - 1.2 gm/kg   EST Daily Needs (g/day)  gm/day        Fluid Requirements     Method for Estimation 25 mL/kg    Estimated Needs (mL/day) 2075       Fluid Deficit    Current Na Level (mEq/L) 143   Desired Na Level " (mEq/L)    Estimated Fluid Deficit (L)       Labs        Pertinent Labs Reviewed, listed below     Results from last 7 days   Lab Units 02/08/23 0405 02/07/23  1514 02/07/23 0440 02/06/23 0443 02/05/23 0334   SODIUM mmol/L 143 146* 145 140 137   POTASSIUM mmol/L 3.5 3.1* 2.7* 3.3* 4.0   CHLORIDE mmol/L 116* 116* 111* 112* 105   CO2 mmol/L 21.0* 19.8* 18.5* 20.5* 22.4   BUN mg/dL 9 12 16 22 19   CREATININE mg/dL 0.62 0.72 0.85 1.84* 2.37*   CALCIUM mg/dL 8.4* 8.6 8.9 8.5* 8.5*   BILIRUBIN mg/dL  --   --   --  0.7 0.7   ALK PHOS U/L  --   --   --  41 38*   ALT (SGPT) U/L  --   --   --  14 11   AST (SGOT) U/L  --   --   --  32 28   GLUCOSE mg/dL 79 71 34* 54* 118*     Results from last 7 days   Lab Units 02/08/23 0405 02/07/23  1514 02/07/23 0440 02/06/23  1045   MAGNESIUM mg/dL 2.2  --  1.7 1.9   PHOSPHORUS mg/dL 1.9*   < > 1.9*  --    HEMOGLOBIN g/dL 9.7*  --  10.1*  --    HEMATOCRIT % 28.2*  --  30.0*  --    WBC 10*3/mm3 18.59*  --  8.48  --    ALBUMIN g/dL 2.2*   < > 2.4*  --     < > = values in this interval not displayed.     Results from last 7 days   Lab Units 02/08/23 0405 02/07/23 0440 02/06/23 0443 02/05/23 0334 02/04/23 0430   INR   --   --   --   --  1.49*   APTT seconds  --   --   --   --  31.8   PLATELETS 10*3/mm3 281 275 215 189 186     No results found for: COVID19  Lab Results   Component Value Date    HGBA1C 5.60 01/29/2023          Medications            Scheduled Medications amLODIPine, 5 mg, Oral, Q24H  diatrizoate meglumine-sodium, , ,   famotidine, 20 mg, Intravenous, BID AC  gabapentin, 125 mg, Oral, Q8H  metoclopramide, 10 mg, Intravenous, Q6H  piperacillin-tazobactam, 3.375 g, Intravenous, Q8H        Infusions dextrose 5 % and sodium chloride 0.9 %, 50 mL/hr, Last Rate: 50 mL/hr (02/07/23 0926)        PRN Medications •  hydrALAZINE  •  HYDROmorphone **AND** naloxone  •  nitroglycerin  •  ondansetron  •  potassium chloride  •  potassium phosphate infusion greater than 15 mMoles  **OR** potassium phosphate infusion greater than 15 mMoles **OR** potassium phosphate **OR** sodium phosphate IVPB **OR** sodium phosphate IVPB     Physical Findings          Physical Appearance alert, oriented   Oral/Mouth Cavity WNL   Edema  no edema   Gastrointestinal nausea, last bowel movement: 2/8   Skin  surgical incision abdomen    Tubes/Drains NG tube   NFPE Pending, due to: pt off floor    --  NUTRITION INTERVENTION / PLAN OF CARE  Intervention Goal         Intervention Goal(s) Meet estimated needs, Initiate TF/PN, Maintain weight and No significant weight loss     Nutrition Intervention         RD Action Follow Tx Progress, Care plan reviewed and Recommend/ordered:      Nutrition Prescription         Diet Prescription     Supplement Prescription n/a   EN/PN Prescription Recommendations/Plan of Care:  1. TPN goal macronutrients:            1350 Dextrose calories            100 gms Amino Acids            100mL 20% SMOF lipids daily (pending TG level)    New Prescription Ordered? No, recommended   --  Monitor/Evaluation        Monitor Per protocol, Pertinent labs, PN delivery/tolerance, Weight   Discharge Needs Pending clinical course   Education Education not appropriate at this time   --    RD to follow up per protocol.    Electronically signed by:  Marlene Rogel RD  02/08/23 13:28 EST

## 2023-02-08 NOTE — PAYOR COMM NOTE
"Hali Aleman (62 y.o. Female)     PLEASE SEE ATTACHED FOR INPT AUTH.     REF#[id number]    PLEASE CALL   OR  [phone]     THANK YOU    ANDREA CHAVES LPN CCP    Date of Birth   1960    Social Security Number       Address   [address] [facility] [address]    Home Phone   [phone]    MRN   [id number]       Mormonism   None    Marital Status   Single                            Admission Date   1/27/23    Admission Type   Emergency    Admitting Provider   Gideon Hope MD    Attending Provider   Gideon Hope MD    Department, Room/Bed   [address], [address]/1       Discharge Date       Discharge Disposition       Discharge Destination                               Attending Provider: Gideon Hope MD    Allergies: Hydrocodone, Sulfa Antibiotics    Isolation: None   Infection: None   Code Status: CPR    Ht: 154.9 cm (60.98\")   Wt: 83.1 kg (183 lb 3.2 oz)    Admission Cmt: None   Principal Problem: Colitis [K52.9]                 Active Insurance as of 1/27/2023     Primary Coverage     Payor Plan Insurance Group Employer/Plan Group    Formerly [facility], 1928–1983 BLUE CROSS [employer] EMPLOYEE [id number]     Payor Plan Address Payor Plan Phone Number Payor Plan Fax Number Effective Dates    [address] [phone]  1/1/2022 - None Entered    [facility] [id number]       Subscriber Name Subscriber Birth Date Member ID       HALI ALEMAN 1960 [id number]                 Emergency Contacts      (Rel.) Home Phone Work Phone Mobile Phone    SANDRA ALEMAN (Daughter) [phone] -- --    AMAURI ABREU (Mother) [phone] -- [phone]            Chelsea: NPI [id number]  Tax ID [id number]  Oxygen Therapy (last day)     Date/Time SpO2 Device (Oxygen Therapy) Flow (L/min) Oxygen Concentration (%) ETCO2 (mmHg)    02/08/23 1450 97 -- -- -- --    02/08/23 1223 98 room air 1 -- --    02/08/23 0707 97 room air 1 -- --    02/08/23 0122 94 room air -- " -- --    02/07/23 2107 97 room air -- -- --    02/07/23 2002 -- room air -- -- --    02/07/23 1312 -- room air -- -- --    02/07/23 0926 -- room air -- -- --    02/07/23 0714 93 room air -- -- --          Intake & Output (last day)       02/07 0701 02/08 0700 02/08 0701 02/09 0700    P.O. 0     Blood      Total Intake(mL/kg) 0 (0)     Urine (mL/kg/hr) 700 (0.4) 300 (0.4)    Emesis/NG output      Stool 150 60    Total Output 850 360    Net -850 -360          Urine Unmeasured Occurrence 0 x 0 x        Lines, Drains & Airways     Active LDAs     Name Placement date Placement time Site Days    Peripheral IV 02/03/23 1230 Left Antecubital 02/03/23  1230  Antecubital  5    Peripheral IV 02/07/23 1337 Posterior;Right Hand 02/07/23  1337  Hand  1    Peripheral IV 02/08/23 1535 Anterior;Left;Proximal Forearm 02/08/23  1535  Forearm  less than 1    Colostomy LLQ 02/03/23  1944  LLQ  4                Operative/Procedure Notes (last 24 hours)  Notes from 02/07/23 1629 through 02/08/23 1629   No notes of this type exist for this encounter.            Physician Progress Notes (last 24 hours)      Gideon Hope MD at 02/08/23 1447          Daily progress note    Primary care physician  Dr. Delgado    Chief complaint  Doing better with no new complaints and wants to eat     History of present illness  62-year-old female with history of hypertension presented to [facility] emergency room with left lower quadrant abdominal pain for last 1 month.  Patient also have occasional loose stools.  Patient denies any nausea vomiting.  Patient recently diagnosed with colitis and treated with oral antibiotics without any improvement.  Patient has noticed blood in the stools.  Patient evaluated in ER with CT abdominal pelvis which shows worsening colitis and failed outpatient treatment admit for management.  Patient has no fever chills chest pain shortness of breath with sweats weight loss or weight gain.     REVIEW OF  "SYSTEMS  Unremarkable except abdominal pain      PHYSICAL EXAM  Blood pressure (!) 173/101, pulse 92, temperature 98.4 °F (36.9 °C), temperature source Oral, resp. rate 18, height 154.9 cm (60.98\"), weight 83.1 kg (183 lb 3.2 oz), SpO2 98 %.    GENERAL:  Awake and alert in no acute distress  HEENT:  Unremarkable  NECK:  Supple  CV: regular rhythm, normal rate  RESPIRATORY: normal effort, clear to auscultation bilaterally  ABDOMEN: soft, left lower quadrant tenderness bowel sounds positive  MUSCULOSKELETAL: no deformity  NEURO: alert, moves all extremities, follows commands  PSYCH:  calm, cooperative  SKIN: warm, dry     LAB RESULTS  Lab Results (last 24 hours)     Procedure Component Value Units Date/Time    Manual Differential [[id number]]  (Abnormal) Collected: 02/08/23 0405    Specimen: Blood Updated: 02/08/23 0753     Neutrophil % 76.0 %      Lymphocyte % 17.0 %      Monocyte % 6.0 %      Eosinophil % 1.0 %      Neutrophils Absolute 14.13 10*3/mm3      Lymphocytes Absolute 3.16 10*3/mm3      Monocytes Absolute 1.12 10*3/mm3      Eosinophils Absolute 0.19 10*3/mm3      RBC Morphology Normal     WBC Morphology Normal     Platelet Estimate Adequate    CBC & Differential [[id number]]  (Abnormal) Collected: 02/08/23 0405    Specimen: Blood Updated: 02/08/23 0753    Narrative:      The following orders were created for panel order CBC & Differential.  Procedure                               Abnormality         Status                     ---------                               -----------         ------                     CBC Auto Differential[[id number]]        Abnormal            Final result                 Please view results for these tests on the individual orders.    CBC Auto Differential [[id number]]  (Abnormal) Collected: 02/08/23 0405    Specimen: Blood Updated: 02/08/23 0753     WBC 18.59 10*3/mm3      RBC 3.38 10*6/mm3      Hemoglobin 9.7 g/dL      Hematocrit 28.2 %      MCV 83.4 fL      MCH 28.7 pg      MCHC " 34.4 g/dL      RDW 14.1 %      RDW-SD 43.4 fl      MPV 10.7 fL      Platelets 281 10*3/mm3      nRBC 0.3 /100 WBC     Renal Function Panel [[id number]]  (Abnormal) Collected: 02/08/23 0405    Specimen: Blood Updated: 02/08/23 0558     Glucose 79 mg/dL      BUN 9 mg/dL      Creatinine 0.62 mg/dL      Sodium 143 mmol/L      Potassium 3.5 mmol/L      Chloride 116 mmol/L      CO2 21.0 mmol/L      Calcium 8.4 mg/dL      Albumin 2.2 g/dL      Phosphorus 1.9 mg/dL      Anion Gap 6.0 mmol/L      BUN/Creatinine Ratio 14.5     eGFR 100.2 mL/min/1.73     Narrative:      GFR Normal >60  Chronic Kidney Disease <60  Kidney Failure <15      Magnesium [[id number]]  (Normal) Collected: 02/08/23 0405    Specimen: Blood Updated: 02/08/23 0547     Magnesium 2.2 mg/dL     Renal Function Panel [[id number]]  (Abnormal) Collected: 02/07/23 1514    Specimen: Blood Updated: 02/07/23 1604     Glucose 71 mg/dL      BUN 12 mg/dL      Creatinine 0.72 mg/dL      Sodium 146 mmol/L      Potassium 3.1 mmol/L      Chloride 116 mmol/L      CO2 19.8 mmol/L      Calcium 8.6 mg/dL      Albumin 2.4 g/dL      Phosphorus 3.2 mg/dL      Anion Gap 10.2 mmol/L      BUN/Creatinine Ratio 16.7     eGFR 94.1 mL/min/1.73     Narrative:      GFR Normal >60  Chronic Kidney Disease <60  Kidney Failure <15      POC Glucose Once [[id number]]  (Normal) Collected: 02/07/23 1503    Specimen: Blood Updated: 02/07/23 1504     Glucose 70 mg/dL      Comment: Meter: [id number] : [id number] [facility]           Imaging Results (Last 24 Hours)     Procedure Component Value Units Date/Time    CT Abdomen Pelvis With Contrast [[id number]] Resulted: 02/08/23 1439     Updated: 02/08/23 1406        Upper and lower endoscopy results noted and discussed with patient    Current Facility-Administered Medications:   •  amLODIPine (NORVASC) tablet 5 mg, 5 mg, Oral, Q24H, Gideon Hope MD  •  dextrose 5 % and sodium chloride 0.9 % infusion, 50 mL/hr, Intravenous, Continuous, Herminia, Gideon,  MD, Last Rate: 50 mL/hr at 02/07/23 0926, 50 mL/hr at 02/07/23 0926  •  diatrizoate meglumine-sodium (GASTROGRAFIN) 66-10 % oral solution  - ADS Override Pull, , , ,   •  famotidine (PEPCID) injection 20 mg, 20 mg, Intravenous, BID AC, Quick, Lilibeth LOCKWOOD, PA-C, 20 mg at 02/08/23 0821  •  gabapentin (NEURONTIN) 50 mg/mL solution 125 mg, 125 mg, Oral, Q8H, Lino Gaston MD, 125 mg at 02/07/23 1506  •  hydrALAZINE (APRESOLINE) injection 10 mg, 10 mg, Intravenous, Q4H PRN, Gideon Hope MD, 10 mg at 02/06/23 2047  •  HYDROmorphone (DILAUDID) injection 0.25 mg, 0.25 mg, Intravenous, Q2H PRN, 0.25 mg at 02/08/23 1324 **AND** naloxone (NARCAN) injection 0.4 mg, 0.4 mg, Intravenous, Q5 Min PRN, Lino Gaston MD  •  metoclopramide (REGLAN) injection 10 mg, 10 mg, Intravenous, Q6H, Lino Gaston MD, 10 mg at 02/08/23 1139  •  nitroglycerin (NITROSTAT) SL tablet 0.4 mg, 0.4 mg, Sublingual, Q5 Min PRN, Lino Gaston MD  •  ondansetron (ZOFRAN) injection 4 mg, 4 mg, Intravenous, Q6H PRN, Lino Gaston MD, 4 mg at 02/08/23 1335  •  piperacillin-tazobactam (ZOSYN) 3.375 g in iso-osmotic dextrose 50 ml (premix), 3.375 g, Intravenous, Q8H, Gideon Hope MD, 3.375 g at 02/08/23 1435  •  potassium chloride 10 mEq in 100 mL IVPB, 10 mEq, Intravenous, Q1H PRN, Matthew Soliz MD, Last Rate: 100 mL/hr at 02/08/23 0523, 10 mEq at 02/08/23 0523  •  potassium phosphate 45 mmol in sodium chloride 0.9 % 500 mL infusion, 45 mmol, Intravenous, PRN **OR** potassium phosphate 30 mmol in sodium chloride 0.9 % 250 mL infusion, 30 mmol, Intravenous, PRN **OR** potassium phosphate 15 mmol in sodium chloride 0.9 % 100 mL infusion, 15 mmol, Intravenous, PRN, 15 mmol at 02/08/23 1132 **OR** sodium phosphates 40 mmol in sodium chloride 0.9 % 500 mL IVPB, 40 mmol, Intravenous, PRN **OR** sodium phosphates 20 mmol in sodium chloride 0.9 % 250 mL IVPB, 20 mmol, Intravenous, PRN, Matthew Soliz MD     ASSESSMENT  Acute colitis with  sigmoid stricture and microperforation s/p colon resection and colostomy  Acute kidney injury resolved  Hypertension with elevated BP  Gastroesophageal reflux disease    PLAN  CPM  Postop care  IVF  Continue IV antibiotics  Clear liquid diet  Adjust blood pressure medications  Hydralazine as needed  Pain management  Continue home medications  Stress ulcer DVT prophylaxis  Nephrology consult appreciated  Colorectal surgery and GI to follow patient  Supportive care  PT OT  Discussed with family nursing staff   Follow closely and further recommendation according to hospital course    SANG SINGLETARY MD    Copied text in this note has been reviewed and is accurate as of 02/08/23        Electronically signed by Sang Singletary MD at 02/08/23 1451     Lino Gaston MD at 02/08/23 1439        Patient pulled out NG tube  She was up to the potty chair    Abdomen soft  Ostomy pink patent productive  A/p  White count increased today and got CAT scan.  It has not been read yet.  On my view there are some areas of fluid :one in the left mid quadrant, some in the midline but not organized, fluid in the pelvis.  There is a small amount of small bowel dilation.  The stomach is of normal caliber.    Continue antibiotics  Okay to start clears  Discussed again the importance of her walking.    Electronically signed by Lino Gaston MD at 02/08/23 1443     Adwoa Isidro MD at 02/08/23 1223          [facility]  Inpatient Progress Note    Reason for Follow Up: Colitis, sigmoid stricture    Subjective     Interval History:   Hungry, wants to eat.  Getting a CT abdomen and pelvis for elevated white count today.    Current Facility-Administered Medications:   •  amLODIPine (NORVASC) tablet 5 mg, 5 mg, Oral, Q24H, Sang Singletary MD  •  dextrose 5 % and sodium chloride 0.9 % infusion, 50 mL/hr, Intravenous, Continuous, Sang Singletary MD, Last Rate: 50 mL/hr at 02/07/23 0926, 50 mL/hr at 02/07/23 0926  •  diatrizoate  meglumine-sodium (GASTROGRAFIN) 66-10 % oral solution  - ADS Override Pull, , , ,   •  famotidine (PEPCID) injection 20 mg, 20 mg, Intravenous, BID Josiah NICHOLS Brittany A, PA-C, 20 mg at 02/08/23 0821  •  gabapentin (NEURONTIN) 50 mg/mL solution 125 mg, 125 mg, Oral, Q8H, Lino Gaston MD, 125 mg at 02/07/23 1506  •  hydrALAZINE (APRESOLINE) injection 10 mg, 10 mg, Intravenous, Q4H PRN, Gideon Hope MD, 10 mg at 02/06/23 2047  •  HYDROmorphone (DILAUDID) injection 0.25 mg, 0.25 mg, Intravenous, Q2H PRN, 0.25 mg at 02/08/23 0413 **AND** naloxone (NARCAN) injection 0.4 mg, 0.4 mg, Intravenous, Q5 Min PRN, Lino Gaston MD  •  metoclopramide (REGLAN) injection 10 mg, 10 mg, Intravenous, Q6H, Lino Gaston MD, 10 mg at 02/08/23 1139  •  nitroglycerin (NITROSTAT) SL tablet 0.4 mg, 0.4 mg, Sublingual, Q5 Min PRN, Lino Gaston MD  •  ondansetron (ZOFRAN) injection 4 mg, 4 mg, Intravenous, Q6H PRN, Lino Gaston MD, 4 mg at 02/06/23 1248  •  piperacillin-tazobactam (ZOSYN) 3.375 g in iso-osmotic dextrose 50 ml (premix), 3.375 g, Intravenous, Q8H, Gideon Hope MD, 3.375 g at 02/08/23 0414  •  potassium chloride 10 mEq in 100 mL IVPB, 10 mEq, Intravenous, Q1H PRN, Matthew Soliz MD, Last Rate: 100 mL/hr at 02/08/23 0523, 10 mEq at 02/08/23 0523  •  potassium phosphate 45 mmol in sodium chloride 0.9 % 500 mL infusion, 45 mmol, Intravenous, PRN **OR** potassium phosphate 30 mmol in sodium chloride 0.9 % 250 mL infusion, 30 mmol, Intravenous, PRN **OR** potassium phosphate 15 mmol in sodium chloride 0.9 % 100 mL infusion, 15 mmol, Intravenous, PRN, 15 mmol at 02/08/23 1132 **OR** sodium phosphates 40 mmol in sodium chloride 0.9 % 500 mL IVPB, 40 mmol, Intravenous, PRN **OR** sodium phosphates 20 mmol in sodium chloride 0.9 % 250 mL IVPB, 20 mmol, Intravenous, PRN, Mtathew Soliz MD  Review of Systems:   All systems reviewed and negative except for: Constitution: Hunger      Objective     Vital  Signs  Temp:  [98 °F (36.7 °C)-98.9 °F (37.2 °C)] 98 °F (36.7 °C)  Heart Rate:  [] 93  Resp:  [18] 18  BP: (159-173)/(96-99) 159/96  Body mass index is 34.63 kg/m².    Intake/Output Summary (Last 24 hours) at 2/8/2023 1223  Last data filed at 2/8/2023 1103  Gross per 24 hour   Intake 0 ml   Output 560 ml   Net -560 ml     I/O this shift:  In: -   Out: 160 [Urine:100; Stool:60]     Physical Exam:   General: patient awake, alert and cooperative   Eyes: Normal lids and lashes, no scleral icterus   Neck: supple, normal ROM   Skin: warm and dry, not jaundiced   Cardiovascular: regular rhythm and rate, no murmurs auscultated   Pulm: clear to auscultation bilaterally, regular and unlabored   Abdomen: soft, nondistended; ostomy with liquidy brown stool   Rectal: deferred   Extremities: no rash or edema   Psychiatric: Normal mood and behavior; memory intact     Results Review:     I reviewed the patient's new clinical results.    Results from last 7 days   Lab Units 02/08/23  0405 02/07/23  0440 02/06/23  0443   WBC 10*3/mm3 18.59* 8.48 14.19*   HEMOGLOBIN g/dL 9.7* 10.1* 7.7*   HEMATOCRIT % 28.2* 30.0* 24.2*   PLATELETS 10*3/mm3 281 275 215     Results from last 7 days   Lab Units 02/08/23  0405 02/07/23  1514 02/07/23  0440 02/06/23  0443 02/05/23  0334   SODIUM mmol/L 143 146* 145 140 137   POTASSIUM mmol/L 3.5 3.1* 2.7* 3.3* 4.0   CHLORIDE mmol/L 116* 116* 111* 112* 105   CO2 mmol/L 21.0* 19.8* 18.5* 20.5* 22.4   BUN mg/dL 9 12 16 22 19   CREATININE mg/dL 0.62 0.72 0.85 1.84* 2.37*   CALCIUM mg/dL 8.4* 8.6 8.9 8.5* 8.5*   BILIRUBIN mg/dL  --   --   --  0.7 0.7   ALK PHOS U/L  --   --   --  41 38*   ALT (SGPT) U/L  --   --   --  14 11   AST (SGOT) U/L  --   --   --  32 28   GLUCOSE mg/dL 79 71 34* 54* 118*     Results from last 7 days   Lab Units 02/04/23  0430   INR  1.49*     Lab Results   Lab Value Date/Time    LIPASE 20 01/27/2023 1445    LIPASE 16 01/12/2023 1601       Radiology:  XR Abdomen KUB   Final Result       US Renal Bilateral   Final Result   No hydronephrosis seen on either side.       This report was finalized on 2/5/2023 10:43 PM by Dr. Grace Sanchez M.D.          CT Abdomen Pelvis With Contrast   Final Result      CT Abdomen Pelvis Without Contrast   Final Result   1. Colitis associated abnormal colonic dilatation involving the   ascending colon, hepatic flexure, proximal transverse colon where there   is also pericolonic inflammation. There is a second area of more   extensive abnormal wall thickening involving the proximal to mid sigmoid   colon which is decompressed with a greater degree of surrounding   inflammation. There is a potential associated obstructing component at   this location as the colon proximal to the sigmoid colon is dilated.   When compared to the prior exam 15 days ago, the right colitis is new   and what appears to represent colitis of the proximal sigmoid colon is   not significantly changed. The lack of interval change following   treatment and the presence of an obstructive component raises the   likelihood that there could be an associated neoplastic process at this   location though the distinction is difficult to make with CT and there   remains a great deal of inflammation surrounding the proximal sigmoid   colon.   2. Apparently, this exam was performed with intravenous contrast though   there is no contrast demonstrated on this exam. This suggests that there   has been extravasation of contrast into the arm and that could be   confirmed with physical exam or x-ray of the arm.   3. Previous gastric surgery. Small hiatal hernia.       Discussed with Dr. Cruz in the emergency department on 01/27/2023 at   5:50 PM.        Radiation dose reduction techniques were utilized, including automated   exposure control and exposure modulation based on body size.       This report was finalized on 1/27/2023 6:43 PM by Dr. Donovan Valencai M.D.          CT Abdomen Pelvis With  Contrast    (Results Pending)       Assessment & Plan     Patient Active Problem List   Diagnosis   • Left sided colitis with complication (HCC)   • Colitis   • Nausea and vomiting   • Stricture of sigmoid colon (HCC)   • Uterine anomaly   • Hypertension   • Avulsion fracture of distal fibula       Impression  1.  Left-sided colitis: Diverticular disease    2.  Sigmoid stricture: Status post left colonic resection, diverticular disease    3.  EGD appearance of gastritis: Biopsies pending    4.  JUANY: Resolving    Plan  Diet as per colorectal surgery  Waiting EGD and surgical pathology--we will check on these results  Continue supportive care measures    Message sent to my office to arrange for outpatient follow-up    GI will sign off but remain available as needed in this patient's care.  Thank you.      I discussed the patients findings and my recommendations with patient and nursing staff.    All necessary PPE, including face mask and eye protection, were worn during this encounter.  Hand sanitization was performed both before and after the patient interaction.    Over 25 minutes was spent reviewing the patient's chart and records, in discussion with the patient, in examination of the patient, and in discussion with members of the patient's medical team.    Adwoa Isidro MD    Electronically signed by Adwoa Isidro MD at 02/08/23 1227     Matthew Soliz MD at 02/08/23 1137             LOS: 12 days     Chief Complaint/ Reason for encounter: JUANY    Subjective   02/07/23 : Feels about the same today, still some generalized abdominal pain  Anxious to increase her diet, no nausea or vomiting today but did vomit twice yesterday  No shortness of breath or chest pain  No fevers or chills  Minimal edema    2/8: She is upset with the fact that her ostomy bag has stool in it  Remains n.p.o. per surgery recommendations  Denies any shortness of breath or chest pain, no fevers or chills no abdominal pain nausea  vomiting or edema    Medical history reviewed:  History of Present Illness    Subjective    History taken from: Patient and chart    Vital Signs  Temp:  [98 °F (36.7 °C)-98.9 °F (37.2 °C)] 98 °F (36.7 °C)  Heart Rate:  [] 93  Resp:  [18] 18  BP: (159-173)/(96-99) 159/96       Wt Readings from Last 1 Encounters:   02/03/23 2226 83.1 kg (183 lb 3.2 oz)   01/27/23 1955 78.9 kg (174 lb)   01/27/23 1428 78.9 kg (174 lb)       Objective    Objective:  General Appearance:  Comfortable, relatively well-appearing, in no acute distress and not in pain.  Awake, alert, oriented  HEENT: Mucous membranes moist, no injury, oropharynx clear  Lungs:  Normal effort and normal respiratory rate.  Breath sounds clear to auscultation.  No  respiratory distress.  No rales, decreased breath sounds or rhonchi.    Heart: Normal rate.  Regular rhythm.  S1, S2 normal.  No murmur.   Abdomen: Abdomen is soft.  Bowel sounds are normal, no abdominal tenderness.  There is no rebound or guarding  Extremities: Trace ankle edema of bilateral lower extremities  Neurological: No focal motor or sensory deficits, pupils reactive  Skin:  Warm and dry.  No rash or cyanosis.       Results Review:    Intake/Output:     Intake/Output Summary (Last 24 hours) at 2/8/2023 1137  Last data filed at 2/8/2023 1103  Gross per 24 hour   Intake 0 ml   Output 500 ml   Net -500 ml         DATA:  Radiology and Labs:  The following labs independently reviewed by me. Additional labs ordered for tomorrow a.m.  Interval notes, chart personally reviewed by me.   Old records independently reviewed showing normal baseline creatinine    New problems include hypokalemia and hypophosphatemia  Discussed with patient herself at bedside    Risk/ complexity of medical care/ medical decision making moderate complexity, postop renal failure and fluid and electrolyte management    Labs:   Recent Results (from the past 24 hour(s))   POC Glucose Once    Collection Time: 02/07/23  3:03  PM    Specimen: Blood   Result Value Ref Range    Glucose 70 70 - 130 mg/dL   Renal Function Panel    Collection Time: 02/07/23  3:14 PM    Specimen: Blood   Result Value Ref Range    Glucose 71 65 - 99 mg/dL    BUN 12 8 - 23 mg/dL    Creatinine 0.72 0.57 - 1.00 mg/dL    Sodium 146 (H) 136 - 145 mmol/L    Potassium 3.1 (L) 3.5 - 5.2 mmol/L    Chloride 116 (H) 98 - 107 mmol/L    CO2 19.8 (L) 22.0 - 29.0 mmol/L    Calcium 8.6 8.6 - 10.5 mg/dL    Albumin 2.4 (L) 3.5 - 5.2 g/dL    Phosphorus 3.2 2.5 - 4.5 mg/dL    Anion Gap 10.2 5.0 - 15.0 mmol/L    BUN/Creatinine Ratio 16.7 7.0 - 25.0    eGFR 94.1 >60.0 mL/min/1.73   Magnesium    Collection Time: 02/08/23  4:05 AM    Specimen: Blood   Result Value Ref Range    Magnesium 2.2 1.6 - 2.4 mg/dL   Renal Function Panel    Collection Time: 02/08/23  4:05 AM    Specimen: Blood   Result Value Ref Range    Glucose 79 65 - 99 mg/dL    BUN 9 8 - 23 mg/dL    Creatinine 0.62 0.57 - 1.00 mg/dL    Sodium 143 136 - 145 mmol/L    Potassium 3.5 3.5 - 5.2 mmol/L    Chloride 116 (H) 98 - 107 mmol/L    CO2 21.0 (L) 22.0 - 29.0 mmol/L    Calcium 8.4 (L) 8.6 - 10.5 mg/dL    Albumin 2.2 (L) 3.5 - 5.2 g/dL    Phosphorus 1.9 (C) 2.5 - 4.5 mg/dL    Anion Gap 6.0 5.0 - 15.0 mmol/L    BUN/Creatinine Ratio 14.5 7.0 - 25.0    eGFR 100.2 >60.0 mL/min/1.73   CBC Auto Differential    Collection Time: 02/08/23  4:05 AM    Specimen: Blood   Result Value Ref Range    WBC 18.59 (H) 3.40 - 10.80 10*3/mm3    RBC 3.38 (L) 3.77 - 5.28 10*6/mm3    Hemoglobin 9.7 (L) 12.0 - 15.9 g/dL    Hematocrit 28.2 (L) 34.0 - 46.6 %    MCV 83.4 79.0 - 97.0 fL    MCH 28.7 26.6 - 33.0 pg    MCHC 34.4 31.5 - 35.7 g/dL    RDW 14.1 12.3 - 15.4 %    RDW-SD 43.4 37.0 - 54.0 fl    MPV 10.7 6.0 - 12.0 fL    Platelets 281 140 - 450 10*3/mm3    nRBC 0.3 (H) 0.0 - 0.2 /100 WBC   Manual Differential    Collection Time: 02/08/23  4:05 AM    Specimen: Blood   Result Value Ref Range    Neutrophil % 76.0 42.7 - 76.0 %    Lymphocyte % 17.0 (L)  19.6 - 45.3 %    Monocyte % 6.0 5.0 - 12.0 %    Eosinophil % 1.0 0.3 - 6.2 %    Neutrophils Absolute 14.13 (H) 1.70 - 7.00 10*3/mm3    Lymphocytes Absolute 3.16 (H) 0.70 - 3.10 10*3/mm3    Monocytes Absolute 1.12 (H) 0.10 - 0.90 10*3/mm3    Eosinophils Absolute 0.19 0.00 - 0.40 10*3/mm3    RBC Morphology Normal Normal    WBC Morphology Normal Normal    Platelet Estimate Adequate Normal       Radiology:  Pertinent radiology studies were reviewed as described above      Medications have been reviewed separately in chart overview      ASSESSMENT:   acute renal failure, creatinine peaked at 2.3 and is back to baseline today, on IV fluids while n.p.o.    Colitis status post sigmoid colon resection    Nausea and vomiting, better today    Stricture of sigmoid colon, status post colostomy  Hypokalemia, improved but still low  Hypotension, now borderline hypertensive  Anemia, better posttransfusion  Metabolic acidosis, expansion acidosis, stable        DISCUSSION/PLAN:   Renal function excellent today, stable, near baseline.  Good urine output  Continue IV fluids with dextrose until diet is advanced  Replace potassium and phosphorus IV per protocol  IV antibiotics, standard dosing  Add bicarb to IV fluids if acidosis worsens but levels improved today  Renal ultrasound showed no obstruction     Continue to monitor electrolytes and volume closely, avoid IV contrast and nephrotoxic medications     Matthew Soliz MD   [facility]   Office phone number: [phone]  Answering service phone number: [phone]    02/08/23  11:37 EST    Dictation performed using Dragon dictation software      Electronically signed by Matthew Soliz MD at 02/08/23 1136     Lilibteh Rahman PA-C at 02/08/23 0990     Attestation signed by Lino Gaston MD at 02/08/23 1210    I have reviewed this documentation and agree.                  Progress Note    Pod 5    S: Pt pulled NGT out yesterday. Denies any N/V today. She did  not walk much yesterday, but states she sat up in the chair for a little bit.     O:  Temp:  [98 °F (36.7 °C)-98.9 °F (37.2 °C)] 98 °F (36.7 °C)  Heart Rate:  [] 93  Resp:  [18] 18  BP: (159-173)/(96-99) 159/96      Intake/Output Summary (Last 24 hours) at 2/8/2023 0926  Last data filed at 2/8/2023 0505  Gross per 24 hour   Intake 0 ml   Output 850 ml   Net -850 ml     Abd:   Soft, non-distended  Incision: Intact. Dressing placed over midline incision with serosanguinous fluid. Clean dressing placed.  Ostomy: PPP    Results from last 7 days   Lab Units 02/08/23  0405   WBC 10*3/mm3 18.59*   HEMOGLOBIN g/dL 9.7*   HEMATOCRIT % 28.2*   PLATELETS 10*3/mm3 281     Results from last 7 days   Lab Units 02/08/23  0405   SODIUM mmol/L 143   POTASSIUM mmol/L 3.5   CHLORIDE mmol/L 116*   CO2 mmol/L 21.0*   BUN mg/dL 9   CREATININE mg/dL 0.62   EGFR mL/min/1.73 100.2   GLUCOSE mg/dL 79   CALCIUM mg/dL 8.4*   PHOSPHORUS mg/dL 1.9*       Results from last 7 days   Lab Units 02/08/23  0405   MAGNESIUM mg/dL 2.2         A/P: 62 y.o. female S/P robotic assisted laparoscopic left and sigmoid resection with colostomy converted open due to splenic injury 02/03/2023.    - WBC 18.59 today. Will check CT of abdomen/pelvis.  - Post-operative ileus. Pt pulled NGT out yesterday. Reiterated the importance of ambulating frequently throughout the day. Will start clear liquid diet once Pt is ambulating.             Electronically signed by Lino Gaston MD at 02/08/23 1210     Lino Gaston MD at 02/07/23 1818        Patient is lying in bed  Per nursing she dismissed today  She did sit in the chair but did not walk farther than the doorway  Patient pulled out her NG tube  Abdomen soft ostomy productive    Status post left & sigmoid colectomy  Ileus  Explained again why she needs to get up and get moving.  She is will be continued n.p.o. no clear liquids yet until patient starts to ambulate   Electronically signed by Lino Gaston  MD at 02/07/23 1820       Consult Notes (last 24 hours)  Notes from 02/07/23 1629 through 02/08/23 1629   No notes of this type exist for this encounter.

## 2023-02-08 NOTE — PLAN OF CARE
Goal Outcome Evaluation:           Progress: no change     Pt not feeling well today. Walked twice on my shift and sat in chair for 3 hours this morning. Pt refused PT MD aware. After talking with MD pt seemed more understanding of importance of walking and getting up and how that will improve her healing process.

## 2023-02-08 NOTE — NURSING NOTE
CWOCN- follow up on ostomy. Patient chatty about the ostomy today. We reviewed ADLs, changing the pouch, different types of pouches, food, showering, schedule for changing and emptying. 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 Will plan to change the pouch tomorrow.   There is loose stool in pouch. Patient reports it was recently changed by staff.   All of her questions were answered.

## 2023-02-08 NOTE — PROGRESS NOTES
Progress Note    Pod 5    S: Pt pulled NGT out yesterday. Denies any N/V today. She did not walk much yesterday, but states she sat up in the chair for a little bit.     O:  Temp:  [98 °F (36.7 °C)-98.9 °F (37.2 °C)] 98 °F (36.7 °C)  Heart Rate:  [] 93  Resp:  [18] 18  BP: (159-173)/(96-99) 159/96      Intake/Output Summary (Last 24 hours) at 2/8/2023 0926  Last data filed at 2/8/2023 0505  Gross per 24 hour   Intake 0 ml   Output 850 ml   Net -850 ml     Abd:   Soft, non-distended  Incision: Intact. Dressing placed over midline incision with serosanguinous fluid. Clean dressing placed.  Ostomy: PPP    Results from last 7 days   Lab Units 02/08/23  0405   WBC 10*3/mm3 18.59*   HEMOGLOBIN g/dL 9.7*   HEMATOCRIT % 28.2*   PLATELETS 10*3/mm3 281     Results from last 7 days   Lab Units 02/08/23  0405   SODIUM mmol/L 143   POTASSIUM mmol/L 3.5   CHLORIDE mmol/L 116*   CO2 mmol/L 21.0*   BUN mg/dL 9   CREATININE mg/dL 0.62   EGFR mL/min/1.73 100.2   GLUCOSE mg/dL 79   CALCIUM mg/dL 8.4*   PHOSPHORUS mg/dL 1.9*       Results from last 7 days   Lab Units 02/08/23  0405   MAGNESIUM mg/dL 2.2         A/P: 62 y.o. female S/P robotic assisted laparoscopic left and sigmoid resection with colostomy converted open due to splenic injury 02/03/2023.    - WBC 18.59 today. Will check CT of abdomen/pelvis.  - Post-operative ileus. Pt pulled NGT out yesterday. Reiterated the importance of ambulating frequently throughout the day. Will start clear liquid diet once Pt is ambulating.

## 2023-02-09 ENCOUNTER — APPOINTMENT (OUTPATIENT)
Dept: CT IMAGING | Facility: HOSPITAL | Age: 63
DRG: 329 | End: 2023-02-09
Payer: COMMERCIAL

## 2023-02-09 LAB
ALBUMIN SERPL-MCNC: 2.1 G/DL (ref 3.5–5.2)
ANION GAP SERPL CALCULATED.3IONS-SCNC: 9.1 MMOL/L (ref 5–15)
BASOPHILS # BLD AUTO: 0.02 10*3/MM3 (ref 0–0.2)
BASOPHILS NFR BLD AUTO: 0.1 % (ref 0–1.5)
BUN SERPL-MCNC: 5 MG/DL (ref 8–23)
BUN/CREAT SERPL: 10.9 (ref 7–25)
CALCIUM SPEC-SCNC: 8.4 MG/DL (ref 8.6–10.5)
CHLORIDE SERPL-SCNC: 111 MMOL/L (ref 98–107)
CO2 SERPL-SCNC: 22.9 MMOL/L (ref 22–29)
CREAT SERPL-MCNC: 0.46 MG/DL (ref 0.57–1)
DEPRECATED RDW RBC AUTO: 45.7 FL (ref 37–54)
EGFRCR SERPLBLD CKD-EPI 2021: 107.7 ML/MIN/1.73
EOSINOPHIL # BLD AUTO: 0.1 10*3/MM3 (ref 0–0.4)
EOSINOPHIL NFR BLD AUTO: 0.4 % (ref 0.3–6.2)
ERYTHROCYTE [DISTWIDTH] IN BLOOD BY AUTOMATED COUNT: 13.9 % (ref 12.3–15.4)
GLUCOSE SERPL-MCNC: 101 MG/DL (ref 65–99)
HCT VFR BLD AUTO: 29.2 % (ref 34–46.6)
HGB BLD-MCNC: 9.6 G/DL (ref 12–15.9)
INR PPP: 1.36 (ref 0.9–1.1)
LAB AP CASE REPORT: NORMAL
LAB AP SPECIAL STAINS: NORMAL
LYMPHOCYTES # BLD AUTO: 2.48 10*3/MM3 (ref 0.7–3.1)
LYMPHOCYTES NFR BLD AUTO: 9.5 % (ref 19.6–45.3)
MAGNESIUM SERPL-MCNC: 3.4 MG/DL (ref 1.6–2.4)
MCH RBC QN AUTO: 29.5 PG (ref 26.6–33)
MCHC RBC AUTO-ENTMCNC: 32.9 G/DL (ref 31.5–35.7)
MCV RBC AUTO: 89.8 FL (ref 79–97)
MONOCYTES # BLD AUTO: 0.89 10*3/MM3 (ref 0.1–0.9)
MONOCYTES NFR BLD AUTO: 3.4 % (ref 5–12)
NEUTROPHILS NFR BLD AUTO: 20.52 10*3/MM3 (ref 1.7–7)
NEUTROPHILS NFR BLD AUTO: 79 % (ref 42.7–76)
PATH REPORT.FINAL DX SPEC: NORMAL
PATH REPORT.GROSS SPEC: NORMAL
PHOSPHATE SERPL-MCNC: 5 MG/DL (ref 2.5–4.5)
PLATELET # BLD AUTO: 337 10*3/MM3 (ref 140–450)
PMV BLD AUTO: 10.8 FL (ref 6–12)
POTASSIUM SERPL-SCNC: 4.6 MMOL/L (ref 3.5–5.2)
PROTHROMBIN TIME: 16.9 SECONDS (ref 11.7–14.2)
RBC # BLD AUTO: 3.25 10*6/MM3 (ref 3.77–5.28)
SODIUM SERPL-SCNC: 143 MMOL/L (ref 136–145)
WBC NRBC COR # BLD: 25.99 10*3/MM3 (ref 3.4–10.8)
WHOLE BLOOD HOLD SPECIMEN: NORMAL

## 2023-02-09 PROCEDURE — 75989 ABSCESS DRAINAGE UNDER X-RAY: CPT

## 2023-02-09 PROCEDURE — 25010000002 FENTANYL CITRATE (PF) 50 MCG/ML SOLUTION: Performed by: RADIOLOGY

## 2023-02-09 PROCEDURE — 25010000002 MIDAZOLAM PER 1 MG: Performed by: RADIOLOGY

## 2023-02-09 PROCEDURE — C1729 CATH, DRAINAGE: HCPCS

## 2023-02-09 PROCEDURE — 87205 SMEAR GRAM STAIN: CPT | Performed by: PHYSICIAN ASSISTANT

## 2023-02-09 PROCEDURE — 49406 IMAGE CATH FLUID PERI/RETRO: CPT

## 2023-02-09 PROCEDURE — 83735 ASSAY OF MAGNESIUM: CPT | Performed by: INTERNAL MEDICINE

## 2023-02-09 PROCEDURE — 85610 PROTHROMBIN TIME: CPT | Performed by: PHYSICIAN ASSISTANT

## 2023-02-09 PROCEDURE — 0 LIDOCAINE 1 % SOLUTION: Performed by: HOSPITALIST

## 2023-02-09 PROCEDURE — 99233 SBSQ HOSP IP/OBS HIGH 50: CPT | Performed by: PHYSICIAN ASSISTANT

## 2023-02-09 PROCEDURE — 85025 COMPLETE CBC W/AUTO DIFF WBC: CPT | Performed by: PHYSICIAN ASSISTANT

## 2023-02-09 PROCEDURE — 25010000002 PIPERACILLIN SOD-TAZOBACTAM PER 1 G: Performed by: HOSPITALIST

## 2023-02-09 PROCEDURE — 80069 RENAL FUNCTION PANEL: CPT | Performed by: INTERNAL MEDICINE

## 2023-02-09 PROCEDURE — 25010000002 METOCLOPRAMIDE PER 10 MG: Performed by: COLON & RECTAL SURGERY

## 2023-02-09 PROCEDURE — 87186 SC STD MICRODIL/AGAR DIL: CPT | Performed by: PHYSICIAN ASSISTANT

## 2023-02-09 PROCEDURE — 87077 CULTURE AEROBIC IDENTIFY: CPT | Performed by: PHYSICIAN ASSISTANT

## 2023-02-09 PROCEDURE — 0W9G30Z DRAINAGE OF PERITONEAL CAVITY WITH DRAINAGE DEVICE, PERCUTANEOUS APPROACH: ICD-10-PCS | Performed by: RADIOLOGY

## 2023-02-09 PROCEDURE — 87070 CULTURE OTHR SPECIMN AEROBIC: CPT | Performed by: PHYSICIAN ASSISTANT

## 2023-02-09 PROCEDURE — 25010000002 ONDANSETRON PER 1 MG: Performed by: COLON & RECTAL SURGERY

## 2023-02-09 RX ORDER — LIDOCAINE HYDROCHLORIDE 10 MG/ML
20 INJECTION, SOLUTION INFILTRATION; PERINEURAL ONCE
Status: COMPLETED | OUTPATIENT
Start: 2023-02-09 | End: 2023-02-09

## 2023-02-09 RX ORDER — FLUCONAZOLE 2 MG/ML
400 INJECTION, SOLUTION INTRAVENOUS EVERY 24 HOURS
Status: DISPENSED | OUTPATIENT
Start: 2023-02-09 | End: 2023-02-14

## 2023-02-09 RX ORDER — FENTANYL CITRATE 50 UG/ML
INJECTION, SOLUTION INTRAMUSCULAR; INTRAVENOUS AS NEEDED
Status: COMPLETED | OUTPATIENT
Start: 2023-02-09 | End: 2023-02-09

## 2023-02-09 RX ORDER — SCOLOPAMINE TRANSDERMAL SYSTEM 1 MG/1
1 PATCH, EXTENDED RELEASE TRANSDERMAL
Status: DISCONTINUED | OUTPATIENT
Start: 2023-02-09 | End: 2023-02-09

## 2023-02-09 RX ORDER — SODIUM CHLORIDE 9 MG/ML
INJECTION, SOLUTION INTRAVENOUS CONTINUOUS PRN
Status: COMPLETED | OUTPATIENT
Start: 2023-02-09 | End: 2023-02-09

## 2023-02-09 RX ORDER — MIDAZOLAM HYDROCHLORIDE 1 MG/ML
INJECTION INTRAMUSCULAR; INTRAVENOUS AS NEEDED
Status: COMPLETED | OUTPATIENT
Start: 2023-02-09 | End: 2023-02-09

## 2023-02-09 RX ORDER — SCOLOPAMINE TRANSDERMAL SYSTEM 1 MG/1
1 PATCH, EXTENDED RELEASE TRANSDERMAL
Status: DISCONTINUED | OUTPATIENT
Start: 2023-02-09 | End: 2023-02-13

## 2023-02-09 RX ADMIN — MIDAZOLAM 1 MG: 1 INJECTION INTRAMUSCULAR; INTRAVENOUS at 14:50

## 2023-02-09 RX ADMIN — FAMOTIDINE 20 MG: 10 INJECTION INTRAVENOUS at 09:00

## 2023-02-09 RX ADMIN — SCOPALAMINE 1 PATCH: 1 PATCH, EXTENDED RELEASE TRANSDERMAL at 11:56

## 2023-02-09 RX ADMIN — POTASSIUM PHOSPHATE, MONOBASIC AND POTASSIUM PHOSPHATE, DIBASIC 15 MMOL: 224; 236 INJECTION, SOLUTION, CONCENTRATE INTRAVENOUS at 02:06

## 2023-02-09 RX ADMIN — LIDOCAINE HYDROCHLORIDE 20 ML: 10 INJECTION, SOLUTION INFILTRATION; PERINEURAL at 14:50

## 2023-02-09 RX ADMIN — METOCLOPRAMIDE 10 MG: 5 INJECTION, SOLUTION INTRAMUSCULAR; INTRAVENOUS at 01:23

## 2023-02-09 RX ADMIN — SODIUM CHLORIDE 25 ML/HR: 9 INJECTION, SOLUTION INTRAVENOUS at 14:40

## 2023-02-09 RX ADMIN — TAZOBACTAM SODIUM AND PIPERACILLIN SODIUM 3.38 G: 375; 3 INJECTION, SOLUTION INTRAVENOUS at 05:40

## 2023-02-09 RX ADMIN — METOCLOPRAMIDE 10 MG: 5 INJECTION, SOLUTION INTRAMUSCULAR; INTRAVENOUS at 05:40

## 2023-02-09 RX ADMIN — METOCLOPRAMIDE 10 MG: 5 INJECTION, SOLUTION INTRAMUSCULAR; INTRAVENOUS at 23:44

## 2023-02-09 RX ADMIN — FAMOTIDINE 20 MG: 10 INJECTION INTRAVENOUS at 23:45

## 2023-02-09 RX ADMIN — FENTANYL CITRATE 50 MCG: 50 INJECTION, SOLUTION INTRAMUSCULAR; INTRAVENOUS at 14:50

## 2023-02-09 RX ADMIN — ONDANSETRON 4 MG: 2 INJECTION INTRAMUSCULAR; INTRAVENOUS at 22:12

## 2023-02-09 RX ADMIN — ONDANSETRON 4 MG: 2 INJECTION INTRAMUSCULAR; INTRAVENOUS at 06:34

## 2023-02-09 NOTE — SIGNIFICANT NOTE
Spoke w/Chelsey, RN, pt not up to PT, having NG tube placed again and lots of abd issues this am, will F/U 2/10; nsg attempted amb pt earlier and had to pull up chair, pt too weak

## 2023-02-09 NOTE — PLAN OF CARE
Goal Outcome Evaluation:  Plan of Care Reviewed With: patient        Progress: improving  Outcome Evaluation: Pt pain improved with dilaudid and had nausea twice today. Pt says zofran helped earlier today. Pt smelled broth and then got nauseated again. Colostomy with stool and pt reports passing flatus. Amb in mg and no falls or injury. up in chair multiple times today and up to [facility] [place] Mid abd incision dressing changed by Matthew for serosanguionous drainage.

## 2023-02-09 NOTE — PROGRESS NOTES
Patient had episodes of vomiting.  She says is associated with the IV medication.  I discussed with the patient that the IV fluid does not cause vomiting.  Narcotic pain medication could be.  I discussed with patient that being in bed and not participating with physical therapy will make her weaker and continue the ileus that she has.    Patient continues to ask when she can eat.  I discussed with patient that she has to tolerate clear liquids first.  We will start TPN because she has been unable to tolerate the clear liquids.    Patient had IR drain today.    Abdomen is soft. there is some stool in her ostomy bag  She is having some serous drainage from her midline wound.  NEW serosanguineous  Labs reviewed. Wbc 26    Because of her elevated white count , we will check for C. difficile, UA, and await cultures from IR drain  Continue n.p.o.  Ordered PICC line  Ordered TPN

## 2023-02-09 NOTE — NURSING NOTE
"Patient refused the NG tube. We educated as to why, what for, etc. and she states \"I'll just take it out if you put it in\". MD informed.  "

## 2023-02-09 NOTE — NURSING NOTE
Pt report called to ZAC Barbosa.  Pt in NAD noted.  VSS.  NEW bulb placed to left side.  Drain emptied for 35cc bloody red fluid.  20cc fluid was sent to lab and/or discarded.   Pt received 1mg Versed, and 50mcg Fent.  All documented on MAR.  Pt tolerated well

## 2023-02-09 NOTE — CONSULTS
CONSULT NOTE    Infectious Diseases - Galdino Trevino MD  [facility]       Patient Identification:  Name: Hali Tejeda  Age: 62 y.o.  Sex: female  :  1960  MRN: [id number]             Date of Consultation: 2023      Primary Care Physician: Richard Delgado MD                               Requesting Physician: Dr. Hope  Reason for Consultation: Progressive leukocytosis in the setting of acute diverticulitis status post surgery on 2/3/2023.    Impression: Patient is a 62-year-old female with past medical history otherwise remarkable for hypertension presented to the emergency room on 2023 with couple of month-long history of recurrent abdominal pain involving the left lower quadrant area associated with occasional loose stools.  Evaluation in the emergency room revealed worsening colitis despite the fact that she had received outpatient antibiotic treatment for colitis during her prior visit before 2023.  Patient was admitted to the hospital and was started on IV Unasyn and was seen by gastroenterology service and initially the suspicion was raised for possibility of inflammatory bowel disease with superimposed C. difficile infection or enteroinvasive bacterial infection.  GI PCR for enteric pathogen and C. difficile toxins were ordered with plans for endoscopy evaluation while receiving antibiotic therapy.  Her microbiology work-up so far has been negative.  On 2023 colorectal surgery service was consulted by gastroenterology service after having a colonoscopy on 2023 when she was noted to have sigmoid stricture and subsequent traumatic mucosal tear at the splenic flexure requiring endoscopic clip repair.  CT scan of the abdomen and pelvis afterwards reveal air bubbles near the sigmoid colon suggestive of microperforation.  On 2/3/2023 patient underwent laparoscopic robot-assisted left and sigmoid colectomy which was converted to open surgery for mobilization and  splenic flexure.  During the process patient was noted to have a splenic tear that was repaired.  Colostomy was placed.  Patient was continued on IV antibiotics which was changed from Unasyn to Zosyn and patient has been on antibiotics throughout this hospitalization since 1/28/2023.  Patient's white blood cell count is progressively coming down and on 2/6/2023 it was normal.  Patient has been clinically feeling somewhat better.  On 2/8/2023 her white blood cell count jumped up and patient had a repeat CT scan of the abdomen and pelvis on 2/8/2023 I.e on postoperative day #5 which revealed loculated fluid collection along the left paracolic gutter as well as in the right aspect of the pelvis.  She was also noted to have air and fluid level in the midline abdominal wall incision without any discrete fluid collection.  Small bowel ileus was noted.  Today her white blood cell count was 25,000 and patient underwent percutaneous drainage of the left paracolic fluid collection and placement of drain.  Patient was seen after the procedure.  Patient antibiotic has been appropriately adjusted to continuation of Zosyn and Diflucan was added.  Infectious disease service is consulted.  This presentation in the above context is consistent with:  1-intra-abdominal sepsis with intra-abdominal abscess in the setting of prolonged colitis, sigmoid stricture, endoscopic injury to colonic wall with microperforation and subsequent laparotomy partial colectomy and colostomy and intraoperative injury to the spleen with repair while being on antibiotic therapy-likely pathogen to consider in this situation would be enteric amberly along with selection of yeast due to prolonged antibiotic therapy-status post percutaneous drain placement on 2/9/2023  2-hypertension  3-malnourishment  4-possible postop abdominal wall/incisional infection/evolving abscess      Recommendations/Discussions:  At this juncture I agree with the care plan.  Follow-up  on the Gram stain and culture results of the sample taken from the left paracolic fluid collection while monitoring her white blood cell count and clinical course.  Apparently assess her abdominal incision and watch for dehiscence or drainage.  Monitor closely for complications of antibiotics.  Supportive care per primary team.  Thank you Dr. Ramirez for letting me be the part of your patient care please see above impression and recommendations      History of Present Illness:   Patient is a 62-year-old female with past medical history otherwise remarkable for hypertension presented to the emergency room on 1/27/2023 with couple of month-long history of recurrent abdominal pain involving the left lower quadrant area associated with occasional loose stools.  Evaluation in the emergency room revealed worsening colitis despite the fact that she had received outpatient antibiotic treatment for colitis during her prior visit before 1/27/2023.  Patient was admitted to the hospital and was started on IV Unasyn and was seen by gastroenterology service and initially the suspicion was raised for possibility of inflammatory bowel disease with superimposed C. difficile infection or enteroinvasive bacterial infection.  GI PCR for enteric pathogen and C. difficile toxins were ordered with plans for endoscopy evaluation while receiving antibiotic therapy.  Her microbiology work-up so far has been negative.  On 2/2/2023 colorectal surgery service was consulted by gastroenterology service after having a colonoscopy on 2/1/2023 when she was noted to have sigmoid stricture and subsequent traumatic mucosal tear at the splenic flexure requiring endoscopic clip repair.  CT scan of the abdomen and pelvis afterwards reveal air bubbles near the sigmoid colon suggestive of microperforation.  On 2/3/2023 patient underwent laparoscopic robot-assisted left and sigmoid colectomy which was converted to open surgery for mobilization and splenic  flexure.  During the process patient was noted to have a splenic tear that was repaired.  Colostomy was placed.  Patient was continued on IV antibiotics which was changed from Unasyn to Zosyn and patient has been on antibiotics throughout this hospitalization since 2023.  Patient's white blood cell count is progressively coming down and on 2023 it was normal.  Patient has been clinically feeling somewhat better.  On 2023 her white blood cell count jumped up and patient had a repeat CT scan of the abdomen and pelvis on 2023 I.e on postoperative day #5 which revealed loculated fluid collection along the left paracolic gutter as well as in the right aspect of the pelvis.  She was also noted to have air and fluid level in the midline abdominal wall incision without any discrete fluid collection.  Small bowel ileus was noted.  Today her white blood cell count was 25,000 and patient underwent percutaneous drainage of the left paracolic fluid collection and placement of drain.  Patient was seen after the procedure.  Patient antibiotic has been appropriately adjusted to continuation of Zosyn and Diflucan was added.  Infectious disease service is consulted.        Past Medical History:  Past Medical History:   Diagnosis Date   • Abnormal EKG 2020   • Avulsion fracture of distal fibula 2015   • H. pylori infection 2020   • Hepatic steatosis 2020   • Hiatal hernia    • HTN (hypertension)    • Hyperlipidemia    • Insomnia    • Left sided colitis with complication (HCC) 2023    ADMITTED TO [facility]   • LGSIL on Pap smear of cervix     (+) HPV   • LGSIL Pap smear of vagina 2016   • Snoring    • Stricture of sigmoid colon (HCC) 2023   • Uterine fibroid    • Vaginal atrophy      Past Surgical History:  Past Surgical History:   Procedure Laterality Date   • BREAST LUMPECTOMY Left     benign   •  SECTION N/A    • COLON RESECTION N/A 2/3/2023    Procedure: COLON RESECTION  LAPAROSCOPIC CONVERTED TO OPEN SIGMOID AND LEFT MOBILIZATION OF SPLENIC FLEXURE WITH DAVINCI ROBOT;  Surgeon: Lino Gaston MD;  [facility] MA[facility];  Service: Robotics - DaVinci;  Laterality: N/A;   • COLONOSCOPY N/A 12/31/2014    COULD ONLY GET SCOPE TO 70 CM DUE TO SIGNIFICANT STRICTURE SECONDARY TO SEVERE DIVERTICULITIS OR CANCER, ACBE ORDERED, DR. PARAG HUDSON AT [facility]   • COLONOSCOPY N/A 02/01/2023    MODERATE STENOSIS IN SIGMOID-DILATED, MANY DIVERTICULA IN SIGMOID AND DESCENDING, COPIOUS AMTS OF STOOL, MUCOSAL TEAR 55 CM FROM ANAL VERGE, DR. JENNI SMITH AT [facility]   • COLPOSCOPY N/A 03/04/2016   • ENDOSCOPY N/A 02/01/2023    GASTRITIS, SMALL HIATAL HERNIA, DR. JENNI SMITH AT [facility]   • ENDOSCOPY N/A 09/15/2009    DR. PARAG HUDSON AT [facility]   • GASTRIC SLEEVE LAPAROSCOPIC N/A 07/09/2020    WITH REMOVAL OF GASTRIC BAND, DR. OLIMPIA PALACIOS AT [facility]   • HYSTERECTOMY N/A 01/2005    WITH RSO   • LAPAROSCOPIC GASTRIC BANDING N/A 10/20/2019    DR. PARAG HUDSON AT [facility]   • SALPINGO OOPHORECTOMY Left     LSO, IN TEEN YEARS   • WISDOM TOOTH EXTRACTION Bilateral       Home Meds:  Medications Prior to Admission   Medication Sig Dispense Refill Last Dose   • levocetirizine (XYZAL) 5 MG tablet Take 5 mg by mouth As Needed.      • losartan-hydrochlorothiazide (HYZAAR) 50-12.5 MG per tablet Take 1 tablet by mouth Daily.      • Multiple Vitamin (MULTI-VITAMIN PO) Take 1 tablet by mouth Daily.      • ondansetron ODT (ZOFRAN-ODT) 4 MG disintegrating tablet Place 1 tablet on the tongue 4 (Four) Times a Day As Needed for Nausea or Vomiting. 12 tablet 0    • mesalamine (Lialda) 1.2 g EC tablet Take 4 tablets by mouth Daily With Breakfast. 120 tablet 11      Current Meds:     Current Facility-Administered Medications:   •  amLODIPine (NORVASC) tablet 10 mg, 10 mg, Oral, Q24H, Gideon Hope MD  •  dextrose 5 % and sodium chloride 0.9 % infusion, 100 mL/hr, Intravenous, Continuous, Lino Gaston MD, Last Rate: 100  mL/hr at 02/09/23 0845, 100 mL/hr at 02/09/23 0845  •  famotidine (PEPCID) injection 20 mg, 20 mg, Intravenous, BID AC, Lilibeth Rahman PA-C, 20 mg at 02/09/23 0900  •  fluconazole (DIFLUCAN) IVPB 400 mg, 400 mg, Intravenous, Q24H, Gideon Hope MD  •  hydrALAZINE (APRESOLINE) injection 10 mg, 10 mg, Intravenous, Q4H PRN, Gideon Hope MD, 10 mg at 02/08/23 1452  •  HYDROmorphone (DILAUDID) injection 0.25 mg, 0.25 mg, Intravenous, Q2H PRN, 0.25 mg at 02/08/23 1949 **AND** naloxone (NARCAN) injection 0.4 mg, 0.4 mg, Intravenous, Q5 Min PRN, Lino Gaston MD  •  metoclopramide (REGLAN) injection 10 mg, 10 mg, Intravenous, Q6H, Lino Gaston MD, 10 mg at 02/09/23 0540  •  nitroglycerin (NITROSTAT) SL tablet 0.4 mg, 0.4 mg, Sublingual, Q5 Min PRN, Lino Gaston MD  •  ondansetron (ZOFRAN) injection 4 mg, 4 mg, Intravenous, Q6H PRN, Lino Gaston MD, 4 mg at 02/09/23 0634  •  Pharmacy to Dose TPN, , Does not apply, Continuous PRN, Lilibeth Rahman PA-C  •  piperacillin-tazobactam (ZOSYN) 3.375 g in iso-osmotic dextrose 50 ml (premix), 3.375 g, Intravenous, Q8H, Gideon Hope MD, 3.375 g at 02/09/23 0540  •  potassium chloride 10 mEq in 100 mL IVPB, 10 mEq, Intravenous, Q1H PRN, Matthew Soliz MD, Last Rate: 100 mL/hr at 02/08/23 0523, 10 mEq at 02/08/23 0523  •  potassium phosphate 45 mmol in sodium chloride 0.9 % 500 mL infusion, 45 mmol, Intravenous, PRN **OR** potassium phosphate 30 mmol in sodium chloride 0.9 % 250 mL infusion, 30 mmol, Intravenous, PRN **OR** potassium phosphate 15 mmol in sodium chloride 0.9 % 100 mL infusion, 15 mmol, Intravenous, PRN, 15 mmol at 02/09/23 0206 **OR** sodium phosphates 40 mmol in sodium chloride 0.9 % 500 mL IVPB, 40 mmol, Intravenous, PRN **OR** sodium phosphates 20 mmol in sodium chloride 0.9 % 250 mL IVPB, 20 mmol, Intravenous, PRN, Matthew Soliz MD  •  scopolamine patch 1 mg/72 hr, 1 patch, Transdermal, Q72H, Lilibeth Rahman PA-C, 1 patch at  "23 1156  Allergies:  Allergies   Allergen Reactions   • Hydrocodone Unknown - Low Severity   • Sulfa Antibiotics Itching     Social History:   Social History     Tobacco Use   • Smoking status: Former     Types: Cigarettes     Quit date: 2004     Years since quittin.0     Passive exposure: Past   • Smokeless tobacco: Never   • Tobacco comments:     Quit    Substance Use Topics   • Alcohol use: Yes     Alcohol/week: 1.0 standard drink     Types: 1 Glasses of wine per week     Comment: occ wine      Family History:  Family History   Problem Relation Age of Onset   • Diabetes Mother    • Gout Brother    • Colon cancer Paternal Grandmother    • Cancer Paternal Grandmother    • Cancer Paternal Aunt    • Colon cancer Paternal Aunt           Review of Systems  See history of present illness and past medical history.   Feeling better and hungry.  Remainder of ROS is negative.      Vitals:   /87   Pulse 87   Temp 98.4 °F (36.9 °C) (Oral)   Resp 22   Ht 154.9 cm (60.98\")   Wt 83.1 kg (183 lb 3.2 oz)   SpO2 99%   BMI 34.63 kg/m²   I/O:     Intake/Output Summary (Last 24 hours) at 2023 1756  Last data filed at 2023 1500  Gross per 24 hour   Intake --   Output 1255 ml   Net -1255 ml     Exam:  Patient is examined using the personal protective equipment as per guidelines from infection control for this particular patient as enacted.  Hand washing was performed before and after patient interaction.  General Appearance:    Alert, cooperative, no distress, appears stated age   Head:    Normocephalic, without obvious abnormality, atraumatic   Eyes:    PERRL, conjunctivae/corneas clear, EOM's intact, both eyes   Ears:    Normal external ear canals, both ears   Nose:   Nares normal, septum midline, mucosa normal, no drainage    or sinus tenderness   Throat:   Lips, tongue, gums normal; oral mucosa pink and moist   Neck:   Supple, symmetrical, trachea midline, no adenopathy;     thyroid:  no " enlargement/tenderness/nodules; no carotid    bruit or JVD   Back:     Symmetric, no curvature, ROM normal, no CVA tenderness   Lungs:     Clear to auscultation bilaterally, respirations unlabored   Chest Wall:    No tenderness or deformity    Heart:   S1-S2 regular   Abdomen:    Colostomy present and left-sided drain in place   Extremities:   Extremities normal, atraumatic, no cyanosis or edema   Pulses:   Pulses palpable in all extremities; symmetric all extremities   Skin:   Skin color normal, Skin is warm and dry,  no rashes or palpable lesions   Neurologic:  Interactive and grossly nonfocal       Data Review:    I reviewed the patient's new clinical results.  Results from last 7 days   Lab Units 02/09/23  0619 02/08/23  0405 02/07/23  0440 02/06/23  0443 02/05/23  0334 02/04/23  0430 02/03/23  2031 02/03/23  0526   WBC 10*3/mm3 25.99* 18.59* 8.48 14.19* 15.58* 15.55*  --  14.07*   HEMOGLOBIN g/dL 9.6* 9.7* 10.1* 7.7* 8.5* 11.0* 10.8* 12.1   PLATELETS 10*3/mm3 337 281 275 215 189 186  --  251     Results from last 7 days   Lab Units 02/09/23  0619 02/08/23  2132 02/08/23  0405 02/07/23  1514 02/07/23  0440 02/06/23  0443 02/05/23  0334 02/04/23  0430   SODIUM mmol/L 143  --  143 146* 145 140 137 137   POTASSIUM mmol/L 4.6 3.3* 3.5 3.1* 2.7* 3.3* 4.0 4.3   CHLORIDE mmol/L 111*  --  116* 116* 111* 112* 105 110*   CO2 mmol/L 22.9  --  21.0* 19.8* 18.5* 20.5* 22.4 20.0*   BUN mg/dL 5*  --  9 12 16 22 19 8   CREATININE mg/dL 0.46*  --  0.62 0.72 0.85 1.84* 2.37* 0.96   CALCIUM mg/dL 8.4*  --  8.4* 8.6 8.9 8.5* 8.5* 7.3*   GLUCOSE mg/dL 101*  --  79 71 34* 54* 118* 149*     Microbiology Results (last 10 days)     ** No results found for the last 240 hours. **            Assessment:  Active Hospital Problems    Diagnosis  POA   • **Colitis [K52.9]  Yes   • Nausea and vomiting [R11.2]  Yes   • Stricture of sigmoid colon (HCC) [K56.699]  Unknown      Resolved Hospital Problems   No resolved problems to display.          Plan:  See above  Galdino Esteves MD   2/9/2023  17:56 EST    Parts of this note may be an electronic transcription/translation of spoken language to printed text using the Dragon dictation system.

## 2023-02-09 NOTE — NURSING NOTE
"CWOCN- follow up for colostomy teaching. Patient had a rough am with vomiting. She is resting in bed. She did not participate in ostomy care but was listening as I explained the steps. Prior she has been observing and was able to verbalize the steps. She still needs to be hands on. Discussed supplies, pouch change schedule, skin care. Noted a small mucocutaneous separation at 12oclock- will monitor. Barrier ring applied and will manage moisture.   Will continue to follow.      02/09/23 1102   Colostomy LLQ   Placement date: If unknown, DO NOT use \"Add Comment\" note/Placement time: If unknown, DO NOT use \"Add Comment\" note: 02/03/23 1944   Inserted by: DR. GRANADO  Hand Hygiene Completed: Yes  Location: LLQ   Stomal Appliance Intact;1 piece;Drainable   Stoma Appearance round;rosebud appearance;moist;red;protruding above skin level   Peristomal Assessment Intact   Accessories/Skin Care cleansed with water;skin barrier ring   Stoma Function stool   Stool Color brown   Stool Consistency loose   Treatment Bag change   Output (mL) 50 mL       "

## 2023-02-09 NOTE — PAYOR COMM NOTE
"Hali Aleman (62 y.o. Female)     PLEASE SEE ATTACHED FOR CONTINUED STAY AUTH.     REF#[id number]    PLEASE CALL  OR  [phone]    THANK YOU    ANDREA CHAVES LPN CCP    Date of Birth   1960    Social Security Number       Address   [address] [facility] [address]    Home Phone   [phone]    MRN   [id number]       Judaism   None    Marital Status   Single                            Admission Date   1/27/23    Admission Type   Emergency    Admitting Provider   Gideon Hope MD    Attending Provider   Gideon Hope MD    Department, Room/Bed   [address], [address]/1       Discharge Date       Discharge Disposition       Discharge Destination                               Attending Provider: Gideon Hope MD    Allergies: Hydrocodone, Sulfa Antibiotics    Isolation: None   Infection: None   Code Status: CPR    Ht: 154.9 cm (60.98\")   Wt: 83.1 kg (183 lb 3.2 oz)    Admission Cmt: None   Principal Problem: Colitis [K52.9]                 Active Insurance as of 1/27/2023     Primary Coverage     Payor Plan Insurance Group Employer/Plan Group    Replaced by [facility] [employer] EMPL[employer] [id number]     Payor Plan Address Payor Plan Phone Number Payor Plan Fax Number Effective Dates    [address] [phone]  1/1/2022 - None Entered    [facility] [id number]       Subscriber Name Subscriber Birth Date Member ID       HALI ALEMAN 1960 [id number]                 Emergency Contacts      (Rel.) Home Phone Work Phone Mobile Phone    SANDRA ALEMAN (Daughter) [phone] -- --    AMAURI ABREU (Mother) [phone] -- [phone]            [place]: NPI [id number]  Tax ID [id number]  Oxygen Therapy (last day)     Date/Time SpO2 Device (Oxygen Therapy) Flow (L/min) Oxygen Concentration (%) ETCO2 (mmHg)    02/09/23 1509 99 room air -- -- --    02/09/23 15:05:33 100 -- -- -- --    02/09/23 14:59:39 99 -- -- -- --    02/09/23 " 14:56:45 100 -- -- -- --    02/09/23 14:54:38 99 -- -- -- --    02/09/23 14:51:35 100 -- -- -- --    02/09/23 14:47:26 99 -- -- -- --    02/09/23 14:42:51 100 -- -- -- --    02/09/23 14:36:36 100 -- -- -- --    02/09/23 1400 97 -- -- -- --    02/09/23 1316 97 -- -- -- --    02/09/23 0722 97 room air -- -- --    02/09/23 0000 -- room air -- -- --    02/08/23 2319 96 room air -- -- --    02/08/23 2000 94 room air -- -- --    02/08/23 1945 -- room air -- -- --    02/08/23 1450 97 -- -- -- --    02/08/23 1321 -- room air -- -- --    02/08/23 1223 98 room air 1 -- --    02/08/23 0821 -- room air -- -- --    02/08/23 0707 97 room air 1 -- --    02/08/23 0122 94 room air -- -- --          Intake & Output (last day)       02/08 0701 02/09 0700 02/09 0701  02/10 0700    P.O. 240     Total Intake(mL/kg) 240 (2.9)     Urine (mL/kg/hr) 500 (0.3) 750 (1.1)    Stool 310 200    Total Output 810 950    Net -570 -950          Urine Unmeasured Occurrence 2 x         Lines, Drains & Airways     Active LDAs     Name Placement date Placement time Site Days    Peripheral IV 02/07/23 1337 Posterior;Right Hand 02/07/23  1337  Hand  2    Peripheral IV 02/08/23 1535 Anterior;Left;Proximal Forearm 02/08/23  1535  Forearm  less than 1    Closed/Suction Drain Left LLQ Pigtail 10 Fr. 02/09/23  1458  LLQ  less than 1    Colostomy LLQ 02/03/23  1944  LLQ  5                Blood Administration Record (From admission, onward)    Completed transfusions     Ordered     Start    02/06/23 0906  Transfuse RBC Infuse Each Unit Over: 3.5H  Transfusion        Released Time Blood Unit Number Status   02/06/23 1226   23  [id number]  [id number] Completed 02/06/23 1708       02/06/23 0906    02/04/23 0912  Transfuse Fresh Frozen Plasma  Transfusion        Released Time Blood Unit Number Status   02/04/23 1552   22  [id number]  [id number] Completed 02/06/23 1225       02/04/23 0911    02/03/23 2034  Transfuse RBC Infuse Each Unit Over: 3.5H  Transfusion       "  Released Time Blood Unit Number Status   02/03/23 2034   23  [id number]  [id number] Completed 02/03/23 2132 02/03/23 2033          Canceled transfusions     Ordered     Start    02/03/23 1836  Transfuse RBC  Status:  Canceled      Released Time Blood Unit Number Status   02/03/23 1836   23  [id number]  [id number] Completed 02/03/23 2026 02/03/23 1836                Operative/Procedure Notes (last 24 hours)  Notes from 02/08/23 1517 through 02/09/23 1517   No notes of this type exist for this encounter.            Physician Progress Notes (last 24 hours)      Gideon Hope MD at 02/09/23 1509          Daily progress note    Primary care physician  Dr. Delgado    Chief complaint  S/p CT-guided abdominal fluid drainage doing same with no new complaints     History of present illness  62-year-old female with history of hypertension presented to [facility] emergency room with left lower quadrant abdominal pain for last 1 month.  Patient also have occasional loose stools.  Patient denies any nausea vomiting.  Patient recently diagnosed with colitis and treated with oral antibiotics without any improvement.  Patient has noticed blood in the stools.  Patient evaluated in ER with CT abdominal pelvis which shows worsening colitis and failed outpatient treatment admit for management.  Patient has no fever chills chest pain shortness of breath with sweats weight loss or weight gain.     REVIEW OF SYSTEMS  Unremarkable except abdominal pain      PHYSICAL EXAM  Blood pressure 178/95, pulse 86, temperature 98.4 °F (36.9 °C), temperature source Oral, resp. rate 23, height 154.9 cm (60.98\"), weight 83.1 kg (183 lb 3.2 oz), SpO2 100 %.    GENERAL:  Awake and alert in no acute distress  HEENT:  Unremarkable  NECK:  Supple  CV: regular rhythm, normal rate  RESPIRATORY: normal effort, clear to auscultation bilaterally  ABDOMEN: soft, left lower quadrant tenderness bowel sounds positive  MUSCULOSKELETAL: no " deformity  NEURO: alert, moves all extremities, follows commands  PSYCH:  calm, cooperative  SKIN: warm, dry     LAB RESULTS  Lab Results (last 24 hours)     Procedure Component Value Units Date/Time    Tissue Pathology Exam [[id number]] Collected: 02/01/23 1521    Specimen: Tissue from Small Intestine; Tissue from Gastric, Antrum Updated: 02/09/23 1452     Case Report --     Surgical Pathology Report                         Case: [id number]                                  Authorizing Provider:  Seng Guerrero MD          Collected:           02/01/2023 03:21 PM          Ordering Location:     [facility]  Received:            02/01/2023 11:52 PM                                 ENDO SUITES                                                                  Pathologist:           Dayanna Sims MD                                                    Specimens:   1) - Small Intestine, duodenum biopsies                                                             2) - Gastric, Antrum, gastric biopsies                                                      Final Diagnosis --     1. Duodenum, Biopsies:  Benign small bowel mucosa with   A.  Normal intact villous surface.   B.  No significant inflammation, no granulomas.   C.  No viral inclusions or other organisms on routinely stained sections.    2. Stomach, Antrum, Biopsy:    A. Chronic gastritis/gastropathy.   B. Negative for Helicobacter pylori organisms by immunohistochemical stain.   C. No metaplasia, dysplasia nor malignancy identified.    Swm/kds        Gross Description --     1. Small Intestine.  The specimen is received in formalin labeled with the patient's name and further designated 'duodenum  biopsies' are multiple small fragments of gray-tan tissue. The specimen is submitted for embedding as received.      2. Gastric, Antrum.  The specimen is received in formalin labeled with the patient's name and further designated 'gastric  biopsies' are  multiple small fragments of gray-tan tissue. The specimen is submitted for embedding as received.           Special Stains --     utilizing appropriate controls, a Helicobacter immunostain is performed on part 2 and is negative for organisms. Swm/kds       Extra Tubes [[id number]] Collected: 02/09/23 0911    Specimen: Blood, Venous Line Updated: 02/09/23 1145    Narrative:      The following orders were created for panel order Extra Tubes.  Procedure                               Abnormality         Status                     ---------                               -----------         ------                     Lavender Top[[id number]]                                     Final result                 Please view results for these tests on the individual orders.    Lavender Top [[id number]] Collected: 02/09/23 0911    Specimen: Blood Updated: 02/09/23 1145     Extra Tube hold for add-on     Comment: Auto resulted       Protime-INR [[id number]]  (Abnormal) Collected: 02/09/23 0910    Specimen: Blood from Hand, Left Updated: 02/09/23 0950     Protime 16.9 Seconds      INR 1.36    CBC & Differential [[id number]]  (Abnormal) Collected: 02/09/23 0619    Specimen: Blood Updated: 02/09/23 0837    Narrative:      The following orders were created for panel order CBC & Differential.  Procedure                               Abnormality         Status                     ---------                               -----------         ------                     CBC Auto Differential[[id number]]        Abnormal            Final result                 Please view results for these tests on the individual orders.    CBC Auto Differential [[id number]]  (Abnormal) Collected: 02/09/23 0619    Specimen: Blood Updated: 02/09/23 0837     WBC 25.99 10*3/mm3      RBC 3.25 10*6/mm3      Hemoglobin 9.6 g/dL      Hematocrit 29.2 %      MCV 89.8 fL      MCH 29.5 pg      MCHC 32.9 g/dL      RDW 13.9 %      RDW-SD 45.7 fl      MPV 10.8 fL      Platelets 337  10*3/mm3      Neutrophil % 79.0 %      Lymphocyte % 9.5 %      Monocyte % 3.4 %      Eosinophil % 0.4 %      Basophil % 0.1 %      Neutrophils, Absolute 20.52 10*3/mm3      Lymphocytes, Absolute 2.48 10*3/mm3      Monocytes, Absolute 0.89 10*3/mm3      Eosinophils, Absolute 0.10 10*3/mm3      Basophils, Absolute 0.02 10*3/mm3     Renal Function Panel [[id number]]  (Abnormal) Collected: 02/09/23 0619    Specimen: Blood Updated: 02/09/23 0735     Glucose 101 mg/dL      BUN 5 mg/dL      Creatinine 0.46 mg/dL      Sodium 143 mmol/L      Potassium 4.6 mmol/L      Comment: Slight hemolysis detected by analyzer. Results may be affected.        Chloride 111 mmol/L      CO2 22.9 mmol/L      Calcium 8.4 mg/dL      Albumin 2.1 g/dL      Phosphorus 5.0 mg/dL      Anion Gap 9.1 mmol/L      BUN/Creatinine Ratio 10.9     eGFR 107.7 mL/min/1.73     Narrative:      GFR Normal >60  Chronic Kidney Disease <60  Kidney Failure <15      Magnesium [[id number]]  (Abnormal) Collected: 02/09/23 0347    Specimen: Blood Updated: 02/09/23 0518     Magnesium 3.4 mg/dL     Phosphorus [[id number]]  (Abnormal) Collected: 02/08/23 2132    Specimen: Blood Updated: 02/08/23 2311     Phosphorus 1.8 mg/dL     Potassium [[id number]]  (Abnormal) Collected: 02/08/23 2132    Specimen: Blood Updated: 02/08/23 2244     Potassium 3.3 mmol/L      Comment: Slight hemolysis detected by analyzer. Results may be affected.           Imaging Results (Last 24 Hours)     Procedure Component Value Units Date/Time    CT Guided Abscess Drain Peritoneal [[id number]] Resulted: 02/09/23 1501     Updated: 02/09/23 1501    CT Abdomen Pelvis With Contrast [[id number]] Collected: 02/08/23 1516     Updated: 02/09/23 0633    Narrative:      CT ABDOMEN AND PELVIS WITH IV CONTRAST     HISTORY: 62-year-old female with leukocytosis. Status post laparoscopic  converted to open sigmoid resection for a diverticulitis related sigmoid  stricture on 02/03/2023.     TECHNIQUE: Radiation dose  reduction techniques were utilized, including  automated exposure control and exposure modulation based on body size.   3 mm images were obtained through the abdomen and pelvis after the  administration of IV contrast. Compared with preoperative CT 02/01/2023.     FINDINGS: There is a small volume of loculated fluid along the left  paracolic gutter. The craniocaudad span is approximately 15 cm and the  largest transverse dimension is approximately 6 cm, but the majority of  the transverse dimension is on the order of 2 cm. There is also a very  small volume of complex fluid within the right aspect of the pelvis  measuring approximately 3 x 3 cm. There is a tiny amount of perihepatic  and perisplenic fluid as well. There is a small bowel ileus. There is no  colonic obstruction. There are a few bubbles of free air deep to the  abdominal wall incision where there is a small volume of fluid and air.  There is no acute abnormality involving the liver, gallbladder, spleen,  pancreas, adrenals, or kidneys. There are very small bilateral pleural  effusions, but significant atelectasis at both lower lobes and lingula.       Impression:      1. There is loculated fluid along the left paracolic gutter and there is  a small collection in the right aspect of the pelvis. There is also a  small volume of air and fluid along the midline abdominal wall incision,  but no discrete fluid collection.  2. Small bowel ileus.  3. Prominent atelectasis at both lower lobes and lingula, and very small  bilateral pleural effusions.     This report was finalized on 2/9/2023 6:30 AM by Dr. Mariam Julien M.D.           Upper and lower endoscopy results noted and discussed with patient    Current Facility-Administered Medications:   •  amLODIPine (NORVASC) tablet 10 mg, 10 mg, Oral, Q24H, Gideon Hope MD  •  dextrose 5 % and sodium chloride 0.9 % infusion, 100 mL/hr, Intravenous, Continuous, Lino Gaston MD, Last Rate: 100 mL/hr at 02/09/23  0845, 100 mL/hr at 02/09/23 0845  •  famotidine (PEPCID) injection 20 mg, 20 mg, Intravenous, BID Josiah NICHOLS Brittany A, PA-C, 20 mg at 02/09/23 0900  •  gabapentin (NEURONTIN) 50 mg/mL solution 125 mg, 125 mg, Oral, Q8H, Lino Gaston MD, 125 mg at 02/07/23 1506  •  hydrALAZINE (APRESOLINE) injection 10 mg, 10 mg, Intravenous, Q4H PRN, Gideon Hope MD, 10 mg at 02/08/23 1452  •  HYDROmorphone (DILAUDID) injection 0.25 mg, 0.25 mg, Intravenous, Q2H PRN, 0.25 mg at 02/08/23 1949 **AND** naloxone (NARCAN) injection 0.4 mg, 0.4 mg, Intravenous, Q5 Min PRN, Lino Gaston MD  •  metoclopramide (REGLAN) injection 10 mg, 10 mg, Intravenous, Q6H, Lino Gaston MD, 10 mg at 02/09/23 0540  •  nitroglycerin (NITROSTAT) SL tablet 0.4 mg, 0.4 mg, Sublingual, Q5 Min PRN, Lino Gaston MD  •  ondansetron (ZOFRAN) injection 4 mg, 4 mg, Intravenous, Q6H PRN, Lino Gaston MD, 4 mg at 02/09/23 0634  •  piperacillin-tazobactam (ZOSYN) 3.375 g in iso-osmotic dextrose 50 ml (premix), 3.375 g, Intravenous, Q8H, Gideon Hope MD, 3.375 g at 02/09/23 0540  •  potassium chloride 10 mEq in 100 mL IVPB, 10 mEq, Intravenous, Q1H PRN, Matthew Soliz MD, Last Rate: 100 mL/hr at 02/08/23 0523, 10 mEq at 02/08/23 0523  •  potassium phosphate 45 mmol in sodium chloride 0.9 % 500 mL infusion, 45 mmol, Intravenous, PRN **OR** potassium phosphate 30 mmol in sodium chloride 0.9 % 250 mL infusion, 30 mmol, Intravenous, PRN **OR** potassium phosphate 15 mmol in sodium chloride 0.9 % 100 mL infusion, 15 mmol, Intravenous, PRN, 15 mmol at 02/09/23 0206 **OR** sodium phosphates 40 mmol in sodium chloride 0.9 % 500 mL IVPB, 40 mmol, Intravenous, PRN **OR** sodium phosphates 20 mmol in sodium chloride 0.9 % 250 mL IVPB, 20 mmol, Intravenous, PRN, Matthew Soliz MD  •  scopolamine patch 1 mg/72 hr, 1 patch, Transdermal, Q72H, Lilibeth Rahman PA-C, 1 patch at 02/09/23 1156     ASSESSMENT  Acute colitis with sigmoid stricture and  microperforation s/p colon resection and colostomy  Abdominal fluid collection/abscess status post CT-guided drainage  Acute kidney injury resolved  Hypertension   Gastroesophageal reflux disease    PLAN  CPM  Postop care  IVF  Continue Zosyn and add IV Diflucan  Infectious disease consult  Clear liquid diet  Adjust blood pressure medications  Hydralazine as needed  Pain management  Continue home medications  Stress ulcer DVT prophylaxis  Supportive care  PT OT  Discussed with family nursing staff   Follow closely and further recommendation according to hospital course    SANG SINGLETARY MD    Copied text in this note has been reviewed and is accurate as of 02/09/23        Electronically signed by Sang Singletary MD at 02/09/23 1512     Lilibeth Rahman PA-C at 02/09/23 0821     Attestation signed by Lino Gaston MD at 02/09/23 0921    I have reviewed this documentation and agree.                  Progress Note    Pod 6    S: Pt with 3 episodes of emesis this morning.        O:  Temp:  [97.7 °F (36.5 °C)-98.9 °F (37.2 °C)] 97.7 °F (36.5 °C)  Heart Rate:  [] 95  Resp:  [18] 18  BP: (142-185)/() 159/84      Intake/Output Summary (Last 24 hours) at 2/9/2023 0821  Last data filed at 2/9/2023 0722  Gross per 24 hour   Intake 240 ml   Output 1110 ml   Net -870 ml       Abd:   soft, non-distended  Incision: clean, dry  Ostomy: Pink, patent, and productive of loose stool.     Results from last 7 days   Lab Units 02/08/23  0405   WBC 10*3/mm3 18.59*   HEMOGLOBIN g/dL 9.7*   HEMATOCRIT % 28.2*   PLATELETS 10*3/mm3 281     Results from last 7 days   Lab Units 02/09/23  0619   SODIUM mmol/L 143   POTASSIUM mmol/L 4.6   CHLORIDE mmol/L 111*   CO2 mmol/L 22.9   BUN mg/dL 5*   CREATININE mg/dL 0.46*   EGFR mL/min/1.73 107.7   GLUCOSE mg/dL 101*   CALCIUM mg/dL 8.4*   PHOSPHORUS mg/dL 5.0*       Results from last 7 days   Lab Units 02/09/23  0347   MAGNESIUM mg/dL 3.4*         A/P: 62 y.o. female S/P robotic assisted  laparoscopic left and sigmoid resection with colostomy converted open due to splenic injury 02/03/2023.     - Ileus. Start NPO diet and replace NGT. Continue Reglan.   - CT of abdomen/pelvis performed yesterday due to worsening leukocytosis. Revealed fluid collection in the left paracolic gutter. Will ask IR to place drain. Continue Zosyn.   - Encouraged sitting up in chair and ambulating frequently throughout the day.               Electronically signed by Lino Gaston MD at 02/09/23 5199

## 2023-02-09 NOTE — POST-PROCEDURE NOTE
POST PROCEDURE NOTE    Procedure: ct abdominal drain    Pre-Procedure Diagnosis: abdominal fluid collection    Post-procedure Diagnosis: same    Findings: placed 10 Fr drain into left abdominal fluid collection    Complications: no immediate    Blood loss: none    Specimen Removed: 10 mL serous fluid, sent for requested labs    Disposition:   Transfer back to inpatient room

## 2023-02-09 NOTE — PROGRESS NOTES
"Daily progress note    Primary care physician  Dr. Delgado    Chief complaint  S/p CT-guided abdominal fluid drainage doing same with no new complaints     History of present illness  62-year-old female with history of hypertension presented to [facility] emergency room with left lower quadrant abdominal pain for last 1 month.  Patient also have occasional loose stools.  Patient denies any nausea vomiting.  Patient recently diagnosed with colitis and treated with oral antibiotics without any improvement.  Patient has noticed blood in the stools.  Patient evaluated in ER with CT abdominal pelvis which shows worsening colitis and failed outpatient treatment admit for management.  Patient has no fever chills chest pain shortness of breath with sweats weight loss or weight gain.     REVIEW OF SYSTEMS  Unremarkable except abdominal pain      PHYSICAL EXAM  Blood pressure 178/95, pulse 86, temperature 98.4 °F (36.9 °C), temperature source Oral, resp. rate 23, height 154.9 cm (60.98\"), weight 83.1 kg (183 lb 3.2 oz), SpO2 100 %.    GENERAL:  Awake and alert in no acute distress  HEENT:  Unremarkable  NECK:  Supple  CV: regular rhythm, normal rate  RESPIRATORY: normal effort, clear to auscultation bilaterally  ABDOMEN: soft, left lower quadrant tenderness bowel sounds positive  MUSCULOSKELETAL: no deformity  NEURO: alert, moves all extremities, follows commands  PSYCH:  calm, cooperative  SKIN: warm, dry     LAB RESULTS  Lab Results (last 24 hours)     Procedure Component Value Units Date/Time    Tissue Pathology Exam [[id number]] Collected: 02/01/23 1521    Specimen: Tissue from Small Intestine; Tissue from Gastric, Antrum Updated: 02/09/23 1452     Case Report --     Surgical Pathology Report                         Case: [id number]                                  Authorizing Provider:  Seng Guerrero MD          Collected:           02/01/2023 03:21 PM          Ordering Location:     [facility]  " Received:            02/01/2023 11:52 PM                                 ENDO SUITES                                                                  Pathologist:           Dayanna Sims MD                                                    Specimens:   1) - Small Intestine, duodenum biopsies                                                             2) - Gastric, Antrum, gastric biopsies                                                      Final Diagnosis --     1. Duodenum, Biopsies:  Benign small bowel mucosa with   A.  Normal intact villous surface.   B.  No significant inflammation, no granulomas.   C.  No viral inclusions or other organisms on routinely stained sections.    2. Stomach, Antrum, Biopsy:    A. Chronic gastritis/gastropathy.   B. Negative for Helicobacter pylori organisms by immunohistochemical stain.   C. No metaplasia, dysplasia nor malignancy identified.    [facility]        Gross Description --     1. Small Intestine.  The specimen is received in formalin labeled with the patient's name and further designated 'duodenum  biopsies' are multiple small fragments of gray-tan tissue. The specimen is submitted for embedding as received.      2. Gastric, Antrum.  The specimen is received in formalin labeled with the patient's name and further designated 'gastric  biopsies' are multiple small fragments of gray-tan tissue. The specimen is submitted for embedding as received.           Special Stains --     utilizing appropriate controls, a Helicobacter immunostain is performed on part 2 and is negative for organisms. [facility]       Extra Tubes [[id number]] Collected: 02/09/23 0911    Specimen: Blood, Venous Line Updated: 02/09/23 1145    Narrative:      The following orders were created for panel order Extra Tubes.  Procedure                               Abnormality         Status                     ---------                               -----------         ------                     Kimberley  Top[[id number]]                                     Final result                 Please view results for these tests on the individual orders.    Lavender Top [[id number]] Collected: 02/09/23 0911    Specimen: Blood Updated: 02/09/23 1145     Extra Tube hold for add-on     Comment: Auto resulted       Protime-INR [[id number]]  (Abnormal) Collected: 02/09/23 0910    Specimen: Blood from Hand, Left Updated: 02/09/23 0950     Protime 16.9 Seconds      INR 1.36    CBC & Differential [[id number]]  (Abnormal) Collected: 02/09/23 0619    Specimen: Blood Updated: 02/09/23 0837    Narrative:      The following orders were created for panel order CBC & Differential.  Procedure                               Abnormality         Status                     ---------                               -----------         ------                     CBC Auto Differential[[id number]]        Abnormal            Final result                 Please view results for these tests on the individual orders.    CBC Auto Differential [[id number]]  (Abnormal) Collected: 02/09/23 0619    Specimen: Blood Updated: 02/09/23 0837     WBC 25.99 10*3/mm3      RBC 3.25 10*6/mm3      Hemoglobin 9.6 g/dL      Hematocrit 29.2 %      MCV 89.8 fL      MCH 29.5 pg      MCHC 32.9 g/dL      RDW 13.9 %      RDW-SD 45.7 fl      MPV 10.8 fL      Platelets 337 10*3/mm3      Neutrophil % 79.0 %      Lymphocyte % 9.5 %      Monocyte % 3.4 %      Eosinophil % 0.4 %      Basophil % 0.1 %      Neutrophils, Absolute 20.52 10*3/mm3      Lymphocytes, Absolute 2.48 10*3/mm3      Monocytes, Absolute 0.89 10*3/mm3      Eosinophils, Absolute 0.10 10*3/mm3      Basophils, Absolute 0.02 10*3/mm3     Renal Function Panel [[id number]]  (Abnormal) Collected: 02/09/23 0619    Specimen: Blood Updated: 02/09/23 0735     Glucose 101 mg/dL      BUN 5 mg/dL      Creatinine 0.46 mg/dL      Sodium 143 mmol/L      Potassium 4.6 mmol/L      Comment: Slight hemolysis detected by analyzer. Results may be  affected.        Chloride 111 mmol/L      CO2 22.9 mmol/L      Calcium 8.4 mg/dL      Albumin 2.1 g/dL      Phosphorus 5.0 mg/dL      Anion Gap 9.1 mmol/L      BUN/Creatinine Ratio 10.9     eGFR 107.7 mL/min/1.73     Narrative:      GFR Normal >60  Chronic Kidney Disease <60  Kidney Failure <15      Magnesium [[id number]]  (Abnormal) Collected: 02/09/23 0347    Specimen: Blood Updated: 02/09/23 0518     Magnesium 3.4 mg/dL     Phosphorus [[id number]]  (Abnormal) Collected: 02/08/23 2132    Specimen: Blood Updated: 02/08/23 2311     Phosphorus 1.8 mg/dL     Potassium [[id number]]  (Abnormal) Collected: 02/08/23 2132    Specimen: Blood Updated: 02/08/23 2244     Potassium 3.3 mmol/L      Comment: Slight hemolysis detected by analyzer. Results may be affected.           Imaging Results (Last 24 Hours)     Procedure Component Value Units Date/Time    CT Guided Abscess Drain Peritoneal [[id number]] Resulted: 02/09/23 1501     Updated: 02/09/23 1501    CT Abdomen Pelvis With Contrast [[id number]] Collected: 02/08/23 1516     Updated: 02/09/23 0633    Narrative:      CT ABDOMEN AND PELVIS WITH IV CONTRAST     HISTORY: 62-year-old female with leukocytosis. Status post laparoscopic  converted to open sigmoid resection for a diverticulitis related sigmoid  stricture on 02/03/2023.     TECHNIQUE: Radiation dose reduction techniques were utilized, including  automated exposure control and exposure modulation based on body size.   3 mm images were obtained through the abdomen and pelvis after the  administration of IV contrast. Compared with preoperative CT 02/01/2023.     FINDINGS: There is a small volume of loculated fluid along the left  paracolic gutter. The craniocaudad span is approximately 15 cm and the  largest transverse dimension is approximately 6 cm, but the majority of  the transverse dimension is on the order of 2 cm. There is also a very  small volume of complex fluid within the right aspect of the pelvis  measuring  approximately 3 x 3 cm. There is a tiny amount of perihepatic  and perisplenic fluid as well. There is a small bowel ileus. There is no  colonic obstruction. There are a few bubbles of free air deep to the  abdominal wall incision where there is a small volume of fluid and air.  There is no acute abnormality involving the liver, gallbladder, spleen,  pancreas, adrenals, or kidneys. There are very small bilateral pleural  effusions, but significant atelectasis at both lower lobes and lingula.       Impression:      1. There is loculated fluid along the left paracolic gutter and there is  a small collection in the right aspect of the pelvis. There is also a  small volume of air and fluid along the midline abdominal wall incision,  but no discrete fluid collection.  2. Small bowel ileus.  3. Prominent atelectasis at both lower lobes and lingula, and very small  bilateral pleural effusions.     This report was finalized on 2/9/2023 6:30 AM by Dr. Mariam Julien M.D.           Upper and lower endoscopy results noted and discussed with patient    Current Facility-Administered Medications:   •  amLODIPine (NORVASC) tablet 10 mg, 10 mg, Oral, Q24H, Gideon Hoep MD  •  dextrose 5 % and sodium chloride 0.9 % infusion, 100 mL/hr, Intravenous, Continuous, Lino Gaston MD, Last Rate: 100 mL/hr at 02/09/23 0845, 100 mL/hr at 02/09/23 0845  •  famotidine (PEPCID) injection 20 mg, 20 mg, Intravenous, BID Josiah NICHOLS Brittany A, PA-C, 20 mg at 02/09/23 0900  •  gabapentin (NEURONTIN) 50 mg/mL solution 125 mg, 125 mg, Oral, Q8H, Lino Gaston MD, 125 mg at 02/07/23 1506  •  hydrALAZINE (APRESOLINE) injection 10 mg, 10 mg, Intravenous, Q4H PRN, Gideon Hope MD, 10 mg at 02/08/23 1452  •  HYDROmorphone (DILAUDID) injection 0.25 mg, 0.25 mg, Intravenous, Q2H PRN, 0.25 mg at 02/08/23 1949 **AND** naloxone (NARCAN) injection 0.4 mg, 0.4 mg, Intravenous, Q5 Min PRN, Lino Gaston MD  •  metoclopramide (REGLAN) injection 10 mg, 10  mg, Intravenous, Q6H, Lino Gaston MD, 10 mg at 02/09/23 0540  •  nitroglycerin (NITROSTAT) SL tablet 0.4 mg, 0.4 mg, Sublingual, Q5 Min PRN, Lino Gaston MD  •  ondansetron (ZOFRAN) injection 4 mg, 4 mg, Intravenous, Q6H PRN, Lino Gaston MD, 4 mg at 02/09/23 0634  •  piperacillin-tazobactam (ZOSYN) 3.375 g in iso-osmotic dextrose 50 ml (premix), 3.375 g, Intravenous, Q8H, Sang Singletary MD, 3.375 g at 02/09/23 0540  •  potassium chloride 10 mEq in 100 mL IVPB, 10 mEq, Intravenous, Q1H PRN, Matthew Soliz MD, Last Rate: 100 mL/hr at 02/08/23 0523, 10 mEq at 02/08/23 0523  •  potassium phosphate 45 mmol in sodium chloride 0.9 % 500 mL infusion, 45 mmol, Intravenous, PRN **OR** potassium phosphate 30 mmol in sodium chloride 0.9 % 250 mL infusion, 30 mmol, Intravenous, PRN **OR** potassium phosphate 15 mmol in sodium chloride 0.9 % 100 mL infusion, 15 mmol, Intravenous, PRN, 15 mmol at 02/09/23 0206 **OR** sodium phosphates 40 mmol in sodium chloride 0.9 % 500 mL IVPB, 40 mmol, Intravenous, PRN **OR** sodium phosphates 20 mmol in sodium chloride 0.9 % 250 mL IVPB, 20 mmol, Intravenous, PRN, Matthew Soliz MD  •  scopolamine patch 1 mg/72 hr, 1 patch, Transdermal, Q72H, Lilibeth Rahman PA-C, 1 patch at 02/09/23 1156     ASSESSMENT  Acute colitis with sigmoid stricture and microperforation s/p colon resection and colostomy  Abdominal fluid collection/abscess status post CT-guided drainage  Acute kidney injury resolved  Hypertension   Gastroesophageal reflux disease    PLAN  CPM  Postop care  IVF  Continue Zosyn and add IV Diflucan  Infectious disease consult  Clear liquid diet  Adjust blood pressure medications  Hydralazine as needed  Pain management  Continue home medications  Stress ulcer DVT prophylaxis  Supportive care  PT OT  Discussed with family nursing staff   Follow closely and further recommendation according to hospital course    SANG SINGLETARY MD    Copied text in this note has been  reviewed and is accurate as of 02/09/23

## 2023-02-09 NOTE — PROGRESS NOTES
"   LOS: 13 days     Chief Complaint/ Reason for encounter: JUANY    Subjective   02/07/23 : Feels about the same today, still some generalized abdominal pain  Anxious to increase her diet, no nausea or vomiting today but did vomit twice yesterday  No shortness of breath or chest pain  No fevers or chills  Minimal edema    2/8: She is upset with the fact that her ostomy bag has stool in it  Remains n.p.o. per surgery recommendations  Denies any shortness of breath or chest pain, no fevers or chills no abdominal pain nausea vomiting or edema    2/9: Did not tolerate clear liquids, NG tube to be replaced, n.p.o., IR to place drain into the fluid collection seen on CT    Medical history reviewed:  History of Present Illness    Subjective    History taken from: Patient and chart    Vital Signs  Temp:  [97.7 °F (36.5 °C)-98.9 °F (37.2 °C)] 98.4 °F (36.9 °C)  Heart Rate:  [] 87  Resp:  [16-26] 22  BP: (142-178)/() 169/87       Wt Readings from Last 1 Encounters:   02/03/23 2226 83.1 kg (183 lb 3.2 oz)   01/27/23 1955 78.9 kg (174 lb)   01/27/23 1428 78.9 kg (174 lb)       Objective:  Vital signs: (most recent): Blood pressure 169/87, pulse 87, temperature 98.4 °F (36.9 °C), temperature source Oral, resp. rate 22, height 154.9 cm (60.98\"), weight 83.1 kg (183 lb 3.2 oz), SpO2 99 %.              Objective:  General Appearance:  Comfortable, relatively well-appearing, in no acute distress and not in pain.  Awake, alert, oriented  HEENT: Mucous membranes moist, no injury, oropharynx clear  Lungs:  Normal effort and normal respiratory rate.  Breath sounds clear to auscultation.  No  respiratory distress.  No rales, decreased breath sounds or rhonchi.    Heart: Normal rate.  Regular rhythm.  S1, S2 normal.  No murmur.   Abdomen: Abdomen is soft.  Diminished bowel sounds, nontender  Extremities: Trace ankle edema of bilateral lower extremities  Neurological: No focal motor or sensory deficits, pupils reactive  Skin:  " Warm and dry.  No rash or cyanosis.       Results Review:    Intake/Output:     Intake/Output Summary (Last 24 hours) at 2/9/2023 1525  Last data filed at 2/9/2023 1500  Gross per 24 hour   Intake 240 ml   Output 1455 ml   Net -1215 ml         DATA:  Radiology and Labs:  The following labs independently reviewed by me. Additional labs ordered for tomorrow a.m.  Interval notes, chart personally reviewed by me.   Old records independently reviewed showing normal baseline creatinine    New problems include hypokalemia and hypophosphatemia  Discussed with patient herself at bedside    Risk/ complexity of medical care/ medical decision making moderate complexity, postop renal failure and fluid and electrolyte management    Labs:   Recent Results (from the past 24 hour(s))   Phosphorus    Collection Time: 02/08/23  9:32 PM    Specimen: Blood   Result Value Ref Range    Phosphorus 1.8 (C) 2.5 - 4.5 mg/dL   Potassium    Collection Time: 02/08/23  9:32 PM    Specimen: Blood   Result Value Ref Range    Potassium 3.3 (L) 3.5 - 5.2 mmol/L   Magnesium    Collection Time: 02/09/23  3:47 AM    Specimen: Blood   Result Value Ref Range    Magnesium 3.4 (H) 1.6 - 2.4 mg/dL   Renal Function Panel    Collection Time: 02/09/23  6:19 AM    Specimen: Blood   Result Value Ref Range    Glucose 101 (H) 65 - 99 mg/dL    BUN 5 (L) 8 - 23 mg/dL    Creatinine 0.46 (L) 0.57 - 1.00 mg/dL    Sodium 143 136 - 145 mmol/L    Potassium 4.6 3.5 - 5.2 mmol/L    Chloride 111 (H) 98 - 107 mmol/L    CO2 22.9 22.0 - 29.0 mmol/L    Calcium 8.4 (L) 8.6 - 10.5 mg/dL    Albumin 2.1 (L) 3.5 - 5.2 g/dL    Phosphorus 5.0 (H) 2.5 - 4.5 mg/dL    Anion Gap 9.1 5.0 - 15.0 mmol/L    BUN/Creatinine Ratio 10.9 7.0 - 25.0    eGFR 107.7 >60.0 mL/min/1.73   CBC Auto Differential    Collection Time: 02/09/23  6:19 AM    Specimen: Blood   Result Value Ref Range    WBC 25.99 (H) 3.40 - 10.80 10*3/mm3    RBC 3.25 (L) 3.77 - 5.28 10*6/mm3    Hemoglobin 9.6 (L) 12.0 - 15.9 g/dL     Hematocrit 29.2 (L) 34.0 - 46.6 %    MCV 89.8 79.0 - 97.0 fL    MCH 29.5 26.6 - 33.0 pg    MCHC 32.9 31.5 - 35.7 g/dL    RDW 13.9 12.3 - 15.4 %    RDW-SD 45.7 37.0 - 54.0 fl    MPV 10.8 6.0 - 12.0 fL    Platelets 337 140 - 450 10*3/mm3    Neutrophil % 79.0 (H) 42.7 - 76.0 %    Lymphocyte % 9.5 (L) 19.6 - 45.3 %    Monocyte % 3.4 (L) 5.0 - 12.0 %    Eosinophil % 0.4 0.3 - 6.2 %    Basophil % 0.1 0.0 - 1.5 %    Neutrophils, Absolute 20.52 (H) 1.70 - 7.00 10*3/mm3    Lymphocytes, Absolute 2.48 0.70 - 3.10 10*3/mm3    Monocytes, Absolute 0.89 0.10 - 0.90 10*3/mm3    Eosinophils, Absolute 0.10 0.00 - 0.40 10*3/mm3    Basophils, Absolute 0.02 0.00 - 0.20 10*3/mm3   Protime-INR    Collection Time: 02/09/23  9:10 AM    Specimen: Hand, Left; Blood   Result Value Ref Range    Protime 16.9 (H) 11.7 - 14.2 Seconds    INR 1.36 (H) 0.90 - 1.10   Lavender Top    Collection Time: 02/09/23  9:11 AM   Result Value Ref Range    Extra Tube hold for add-on        Radiology:  Pertinent radiology studies were reviewed as described above      Medications have been reviewed separately in chart overview      ASSESSMENT:   acute renal failure, creatinine peaked at 2.3 and is back to baseline today, on IV fluids while n.p.o.    Colitis status post sigmoid colon resection    Nausea and vomiting, better today    Stricture of sigmoid colon, status post colostomy  Hypokalemia, improved but still low  Hypotension, now borderline hypertensive  Anemia, better posttransfusion  Metabolic acidosis, expansion acidosis, stable        DISCUSSION/PLAN:   Renal function excellent today, volume electrolytes at goal  Continue IV fluids while n.p.o.  Noted plans to have a IR drain placed and replace NG tube  Monitor electrolytes closely and replace per protocol    IV antibiotics, standard dosing   Continue to monitor electrolytes and volume closely, avoid IV contrast and nephrotoxic medications   As needed IV hydralazine for systolic above 160      Matthew FRAUSTO  Heydi MICHAELS   [facility]   Office phone number: [phone]  Answering service phone number: [phone]    02/09/23  15:25 EST    Dictation performed using Dragon dictation [employer]

## 2023-02-09 NOTE — PROGRESS NOTES
Progress Note    Pod 6    S: Pt with 3 episodes of emesis this morning.        O:  Temp:  [97.7 °F (36.5 °C)-98.9 °F (37.2 °C)] 97.7 °F (36.5 °C)  Heart Rate:  [] 95  Resp:  [18] 18  BP: (142-185)/() 159/84      Intake/Output Summary (Last 24 hours) at 2/9/2023 0821  Last data filed at 2/9/2023 0722  Gross per 24 hour   Intake 240 ml   Output 1110 ml   Net -870 ml       Abd:   soft, non-distended  Incision: clean, dry  Ostomy: Pink, patent, and productive of loose stool.     Results from last 7 days   Lab Units 02/08/23  0405   WBC 10*3/mm3 18.59*   HEMOGLOBIN g/dL 9.7*   HEMATOCRIT % 28.2*   PLATELETS 10*3/mm3 281     Results from last 7 days   Lab Units 02/09/23  0619   SODIUM mmol/L 143   POTASSIUM mmol/L 4.6   CHLORIDE mmol/L 111*   CO2 mmol/L 22.9   BUN mg/dL 5*   CREATININE mg/dL 0.46*   EGFR mL/min/1.73 107.7   GLUCOSE mg/dL 101*   CALCIUM mg/dL 8.4*   PHOSPHORUS mg/dL 5.0*       Results from last 7 days   Lab Units 02/09/23  0347   MAGNESIUM mg/dL 3.4*         A/P: 62 y.o. female S/P robotic assisted laparoscopic left and sigmoid resection with colostomy converted open due to splenic injury 02/03/2023.     - Ileus. Start NPO diet and replace NGT. Continue Reglan.   - CT of abdomen/pelvis performed yesterday due to worsening leukocytosis. Revealed fluid collection in the left paracolic gutter. Will ask IR to place drain. Continue Zosyn.   - Encouraged sitting up in chair and ambulating frequently throughout the day.

## 2023-02-09 NOTE — PLAN OF CARE
Goal Outcome Evaluation:           Progress: improving  Outcome Evaluation: Dilaudid and zofran given once at shift change. Family at bedside till pt went to bed. Pt calls out to be repositioned often and tonight asked for sleeping pill. This RN asked patient what kind of sleeping pill she wanted and she didn't know. Pt has a lot of interuptions in her sleep from staff given her meds and lab getting labs. Potassium and phosphate low on the 2200 check. Replacing now. redraw around 1200. Dressing changed on midline gauze very wet with serosangiuous drainage. Mepilex on 2 other lap sites with staples. Pt is a little restless at times. Pt is now sitting up in chair. Pt changed position in bed often. Encouraged IS. Up to bsc. CTM, safety maintained.

## 2023-02-10 LAB
ALBUMIN SERPL-MCNC: 2.2 G/DL (ref 3.5–5.2)
ANION GAP SERPL CALCULATED.3IONS-SCNC: 7 MMOL/L (ref 5–15)
BACTERIA UR QL AUTO: ABNORMAL /HPF
BILIRUB UR QL STRIP: NEGATIVE
BUN SERPL-MCNC: 4 MG/DL (ref 8–23)
BUN/CREAT SERPL: 10.3 (ref 7–25)
C DIFF TOX GENS STL QL NAA+PROBE: NEGATIVE
CALCIUM SPEC-SCNC: 8.1 MG/DL (ref 8.6–10.5)
CHLORIDE SERPL-SCNC: 108 MMOL/L (ref 98–107)
CLARITY UR: CLEAR
CO2 SERPL-SCNC: 26 MMOL/L (ref 22–29)
COLOR UR: YELLOW
CREAT SERPL-MCNC: 0.39 MG/DL (ref 0.57–1)
DEPRECATED RDW RBC AUTO: 43.5 FL (ref 37–54)
EGFRCR SERPLBLD CKD-EPI 2021: 112.1 ML/MIN/1.73
ERYTHROCYTE [DISTWIDTH] IN BLOOD BY AUTOMATED COUNT: 14.2 % (ref 12.3–15.4)
GLUCOSE SERPL-MCNC: 94 MG/DL (ref 65–99)
GLUCOSE UR STRIP-MCNC: NEGATIVE MG/DL
HCT VFR BLD AUTO: 27.2 % (ref 34–46.6)
HGB BLD-MCNC: 8.7 G/DL (ref 12–15.9)
HGB UR QL STRIP.AUTO: ABNORMAL
HYALINE CASTS UR QL AUTO: ABNORMAL /LPF
KETONES UR QL STRIP: ABNORMAL
LEUKOCYTE ESTERASE UR QL STRIP.AUTO: NEGATIVE
LYMPHOCYTES # BLD MANUAL: 1.72 10*3/MM3 (ref 0.7–3.1)
LYMPHOCYTES NFR BLD MANUAL: 1 % (ref 5–12)
MAGNESIUM SERPL-MCNC: 1.6 MG/DL (ref 1.6–2.4)
MCH RBC QN AUTO: 27.4 PG (ref 26.6–33)
MCHC RBC AUTO-ENTMCNC: 32 G/DL (ref 31.5–35.7)
MCV RBC AUTO: 85.5 FL (ref 79–97)
METAMYELOCYTES NFR BLD MANUAL: 1 % (ref 0–0)
MONOCYTES # BLD: 0.21 10*3/MM3 (ref 0.1–0.9)
MYELOCYTES NFR BLD MANUAL: 3.1 % (ref 0–0)
NEUTROPHILS # BLD AUTO: 18.14 10*3/MM3 (ref 1.7–7)
NEUTROPHILS NFR BLD MANUAL: 86.6 % (ref 42.7–76)
NITRITE UR QL STRIP: NEGATIVE
PH UR STRIP.AUTO: 6.5 [PH] (ref 5–8)
PHOSPHATE SERPL-MCNC: 2 MG/DL (ref 2.5–4.5)
PLAT MORPH BLD: NORMAL
PLATELET # BLD AUTO: 370 10*3/MM3 (ref 140–450)
PMV BLD AUTO: 9.9 FL (ref 6–12)
POTASSIUM SERPL-SCNC: 2.7 MMOL/L (ref 3.5–5.2)
PROT UR QL STRIP: ABNORMAL
RBC # BLD AUTO: 3.18 10*6/MM3 (ref 3.77–5.28)
RBC # UR STRIP: ABNORMAL /HPF
RBC MORPH BLD: NORMAL
REF LAB TEST METHOD: ABNORMAL
SMUDGE CELLS BLD QL SMEAR: ABNORMAL
SODIUM SERPL-SCNC: 141 MMOL/L (ref 136–145)
SP GR UR STRIP: 1.01 (ref 1–1.03)
SQUAMOUS #/AREA URNS HPF: ABNORMAL /HPF
UROBILINOGEN UR QL STRIP: ABNORMAL
VARIANT LYMPHS NFR BLD MANUAL: 8.2 % (ref 19.6–45.3)
WBC # UR STRIP: ABNORMAL /HPF
WBC NRBC COR # BLD: 20.95 10*3/MM3 (ref 3.4–10.8)

## 2023-02-10 PROCEDURE — 85007 BL SMEAR W/DIFF WBC COUNT: CPT | Performed by: PHYSICIAN ASSISTANT

## 2023-02-10 PROCEDURE — 0W9G30Z DRAINAGE OF PERITONEAL CAVITY WITH DRAINAGE DEVICE, PERCUTANEOUS APPROACH: ICD-10-PCS | Performed by: HOSPITALIST

## 2023-02-10 PROCEDURE — 25010000002 PIPERACILLIN SOD-TAZOBACTAM PER 1 G: Performed by: HOSPITALIST

## 2023-02-10 PROCEDURE — 25010000002 HYDRALAZINE PER 20 MG: Performed by: HOSPITALIST

## 2023-02-10 PROCEDURE — 25010000002 POTASSIUM CHLORIDE PER 2 MEQ OF POTASSIUM: Performed by: COLON & RECTAL SURGERY

## 2023-02-10 PROCEDURE — 25010000002 FLUCONAZOLE PER 200 MG: Performed by: HOSPITALIST

## 2023-02-10 PROCEDURE — 80069 RENAL FUNCTION PANEL: CPT | Performed by: INTERNAL MEDICINE

## 2023-02-10 PROCEDURE — 25010000002 MAGNESIUM SULFATE 2 GM/50ML SOLUTION: Performed by: INTERNAL MEDICINE

## 2023-02-10 PROCEDURE — 25010000002 METOCLOPRAMIDE PER 10 MG: Performed by: COLON & RECTAL SURGERY

## 2023-02-10 PROCEDURE — 99024 POSTOP FOLLOW-UP VISIT: CPT | Performed by: PHYSICIAN ASSISTANT

## 2023-02-10 PROCEDURE — 25010000002 HYDROMORPHONE PER 4 MG: Performed by: COLON & RECTAL SURGERY

## 2023-02-10 PROCEDURE — 25010000002 MAGNESIUM SULFATE PER 500 MG OF MAGNESIUM: Performed by: COLON & RECTAL SURGERY

## 2023-02-10 PROCEDURE — 25010000002 CALCIUM GLUCONATE PER 10 ML: Performed by: COLON & RECTAL SURGERY

## 2023-02-10 PROCEDURE — C1751 CATH, INF, PER/CENT/MIDLINE: HCPCS

## 2023-02-10 PROCEDURE — 85025 COMPLETE CBC W/AUTO DIFF WBC: CPT | Performed by: PHYSICIAN ASSISTANT

## 2023-02-10 PROCEDURE — 81001 URINALYSIS AUTO W/SCOPE: CPT | Performed by: PHYSICIAN ASSISTANT

## 2023-02-10 PROCEDURE — 83735 ASSAY OF MAGNESIUM: CPT | Performed by: INTERNAL MEDICINE

## 2023-02-10 PROCEDURE — 87493 C DIFF AMPLIFIED PROBE: CPT | Performed by: PHYSICIAN ASSISTANT

## 2023-02-10 RX ORDER — SODIUM CHLORIDE 0.9 % (FLUSH) 0.9 %
10 SYRINGE (ML) INJECTION AS NEEDED
Status: DISCONTINUED | OUTPATIENT
Start: 2023-02-10 | End: 2023-02-20

## 2023-02-10 RX ORDER — SODIUM CHLORIDE 9 MG/ML
40 INJECTION, SOLUTION INTRAVENOUS AS NEEDED
Status: DISCONTINUED | OUTPATIENT
Start: 2023-02-10 | End: 2023-02-20

## 2023-02-10 RX ORDER — DEXTROSE AND SODIUM CHLORIDE 5; .9 G/100ML; G/100ML
100 INJECTION, SOLUTION INTRAVENOUS CONTINUOUS
Status: ACTIVE | OUTPATIENT
Start: 2023-02-10 | End: 2023-02-10

## 2023-02-10 RX ORDER — SODIUM CHLORIDE 0.9 % (FLUSH) 0.9 %
10 SYRINGE (ML) INJECTION EVERY 12 HOURS SCHEDULED
Status: DISCONTINUED | OUTPATIENT
Start: 2023-02-10 | End: 2023-02-20

## 2023-02-10 RX ORDER — MAGNESIUM SULFATE HEPTAHYDRATE 40 MG/ML
2 INJECTION, SOLUTION INTRAVENOUS ONCE
Status: COMPLETED | OUTPATIENT
Start: 2023-02-10 | End: 2023-02-10

## 2023-02-10 RX ORDER — SODIUM CHLORIDE 0.9 % (FLUSH) 0.9 %
20 SYRINGE (ML) INJECTION AS NEEDED
Status: DISCONTINUED | OUTPATIENT
Start: 2023-02-10 | End: 2023-02-20

## 2023-02-10 RX ADMIN — TAZOBACTAM SODIUM AND PIPERACILLIN SODIUM 3.38 G: 375; 3 INJECTION, SOLUTION INTRAVENOUS at 10:57

## 2023-02-10 RX ADMIN — FLUCONAZOLE IN SODIUM CHLORIDE 400 MG: 2 INJECTION, SOLUTION INTRAVENOUS at 18:06

## 2023-02-10 RX ADMIN — HYDROMORPHONE HYDROCHLORIDE 0.25 MG: 1 INJECTION, SOLUTION INTRAMUSCULAR; INTRAVENOUS; SUBCUTANEOUS at 07:23

## 2023-02-10 RX ADMIN — HYDROMORPHONE HYDROCHLORIDE 0.25 MG: 1 INJECTION, SOLUTION INTRAMUSCULAR; INTRAVENOUS; SUBCUTANEOUS at 09:41

## 2023-02-10 RX ADMIN — FAMOTIDINE 20 MG: 10 INJECTION INTRAVENOUS at 21:10

## 2023-02-10 RX ADMIN — METOCLOPRAMIDE 10 MG: 5 INJECTION, SOLUTION INTRAMUSCULAR; INTRAVENOUS at 15:36

## 2023-02-10 RX ADMIN — HYDRALAZINE HYDROCHLORIDE 10 MG: 20 INJECTION INTRAMUSCULAR; INTRAVENOUS at 01:59

## 2023-02-10 RX ADMIN — POTASSIUM PHOSPHATE, MONOBASIC POTASSIUM PHOSPHATE, DIBASIC: 224; 236 INJECTION, SOLUTION, CONCENTRATE INTRAVENOUS at 18:05

## 2023-02-10 RX ADMIN — FAMOTIDINE 20 MG: 10 INJECTION INTRAVENOUS at 10:55

## 2023-02-10 RX ADMIN — HYDROMORPHONE HYDROCHLORIDE 0.25 MG: 1 INJECTION, SOLUTION INTRAMUSCULAR; INTRAVENOUS; SUBCUTANEOUS at 21:10

## 2023-02-10 RX ADMIN — DEXTROSE AND SODIUM CHLORIDE 100 ML/HR: 5; 900 INJECTION, SOLUTION INTRAVENOUS at 15:36

## 2023-02-10 RX ADMIN — MAGNESIUM SULFATE HEPTAHYDRATE 2 G: 2 INJECTION, SOLUTION INTRAVENOUS at 10:55

## 2023-02-10 RX ADMIN — I.V. FAT EMULSION 20 G: 20 EMULSION INTRAVENOUS at 18:04

## 2023-02-10 RX ADMIN — DEXTROSE AND SODIUM CHLORIDE 100 ML/HR: 5; 900 INJECTION, SOLUTION INTRAVENOUS at 01:50

## 2023-02-10 RX ADMIN — METOCLOPRAMIDE 10 MG: 5 INJECTION, SOLUTION INTRAMUSCULAR; INTRAVENOUS at 07:17

## 2023-02-10 RX ADMIN — AMLODIPINE BESYLATE 10 MG: 10 TABLET ORAL at 10:55

## 2023-02-10 RX ADMIN — Medication 10 ML: at 21:11

## 2023-02-10 RX ADMIN — TAZOBACTAM SODIUM AND PIPERACILLIN SODIUM 3.38 G: 375; 3 INJECTION, SOLUTION INTRAVENOUS at 01:50

## 2023-02-10 RX ADMIN — TAZOBACTAM SODIUM AND PIPERACILLIN SODIUM 3.38 G: 375; 3 INJECTION, SOLUTION INTRAVENOUS at 21:11

## 2023-02-10 RX ADMIN — SODIUM CHLORIDE 30 MMOL: 900 INJECTION, SOLUTION INTRAVENOUS at 15:35

## 2023-02-10 NOTE — PROGRESS NOTES
Progress Note    POD 7    S: Pt S/P IR drain placement yesterday with increased pain around drain. Pt non-complaint with ambulating/sitting up in chair.     O:  Temp:  [98.2 °F (36.8 °C)-98.7 °F (37.1 °C)] 98.3 °F (36.8 °C)  Heart Rate:  [81-94] 86  Resp:  [16-26] 20  BP: (158-183)/() 166/90      Intake/Output Summary (Last 24 hours) at 2/10/2023 0843  Last data filed at 2/10/2023 0808  Gross per 24 hour   Intake 0 ml   Output 705 ml   Net -705 ml       Abd: soft, non-distended. Staples removed from incisions. Midline abdominal wound opened with moderate amount of purulent drainage expressed. Wound packed with saline soaked gauze.   NEW drain in LLQ with serosanguinous drainage.   Ostomy: PPP with loose stool in bag      Results from last 7 days   Lab Units 02/10/23  0356   SODIUM mmol/L 141   POTASSIUM mmol/L 2.7*   CHLORIDE mmol/L 108*   CO2 mmol/L 26.0   BUN mg/dL 4*   CREATININE mg/dL 0.39*   EGFR mL/min/1.73 112.1   GLUCOSE mg/dL 94   CALCIUM mg/dL 8.1*   PHOSPHORUS mg/dL 2.0*       Results from last 7 days   Lab Units 02/10/23  0356   MAGNESIUM mg/dL 1.6       A/P: 62 y.o. female S/P robotic assisted laparoscopic left and sigmoid resection with colostomy converted open due to splenic injury 02/03/2023.     - Midline abdominal wound opened with moderate amount of purulent drainage. Wound packed with saline soaked gauze. Will ask WOCN to place wound-vac today.   - Leukocytosis. CBC pending. Will check UA and C. Diff toxin. Pt currently receiving Zosyn. ID to see Pt today.   - Prolonged ileus and unable to tolerate PO intake. Will start TPN today. Continue Reglan. Encouraged ambulation and discussed importance of this.

## 2023-02-10 NOTE — PAYOR COMM NOTE
"Hali Aleman (62 y.o. Female)     PLEASE SEE ATTACHED FOR INPT CONTINUED STAY AUTH.     REF#[id number]    PLEASE CALL  OR  [phone]    THANK YOU    ANDREA CHAVES LPN CCP    Date of Birth   1960    Social Security Number       Address   [address] [facility] [address]    Home Phone   [phone]    MRN   [id number]       Zoroastrianism   None    Marital Status   Single                            Admission Date   1/27/23    Admission Type   Emergency    Admitting Provider   Gideon Hope MD    Attending Provider   Gideon Hope MD    Department, Room/Bed   [address], [address]/1       Discharge Date       Discharge Disposition       Discharge Destination                               Attending Provider: Gideon Hope MD    Allergies: Hydrocodone, Sulfa Antibiotics    Isolation: Spore   Infection: C.difficile (rule out) (02/09/23)   Code Status: CPR    Ht: 154.9 cm (60.98\")   Wt: 83.1 kg (183 lb 3.2 oz)    Admission Cmt: None   Principal Problem: Colitis [K52.9]                 Active Insurance as of 1/27/2023     Primary Coverage     Payor Plan Insurance Group Employer/Plan Group    Formerly [facility] BLUE [facility] EMPLOYEE [id number]     Payor Plan Address Payor Plan Phone Number Payor Plan Fax Number Effective Dates    [address] [phone]  1/1/2022 - None Entered    [place] [address]       Subscriber Name Subscriber Birth Date Member ID       HALI ALEMAN 1960 [id number]                 Emergency Contacts      (Rel.) Home Phone Work Phone Mobile Phone    SANDRA ALEMAN (Daughter) [phone] -- --    AMAURI ABREU (Mother) [phone] -- [phone]            [place]: [facility] [id number]  Tax ID [id number]  Oxygen Therapy (last day)     Date/Time SpO2 Device (Oxygen Therapy) Flow (L/min) Oxygen Concentration (%) ETCO2 (mmHg)    02/10/23 0752 94 room air -- -- --    02/09/23 2345 -- room air -- -- --    02/09/23 2305 97 " room air -- -- --    02/09/23 2010 -- room air -- -- --    02/09/23 1956 96 room air -- -- --    02/09/23 1509 99 room air -- -- --    02/09/23 15:05:33 100 -- -- -- --    02/09/23 14:59:39 99 -- -- -- --    02/09/23 14:56:45 100 -- -- -- --    02/09/23 14:54:38 99 -- -- -- --    02/09/23 14:51:35 100 -- -- -- --    02/09/23 14:47:26 99 -- -- -- --    02/09/23 14:42:51 100 -- -- -- --    02/09/23 14:36:36 100 -- -- -- --    02/09/23 1400 97 -- -- -- --    02/09/23 1316 97 -- -- -- --    02/09/23 0722 97 room air -- -- --    02/09/23 0000 -- room air -- -- --          Intake & Output (last day)       02/09 0701  02/10 0700 02/10 0701 02/11 0700    P.O. 0 0    Total Intake(mL/kg) 0 (0) 0 (0)    Urine (mL/kg/hr) 750 (0.4)     Drains 55     Stool 200     Total Output 1005     Net -1005 0          Urine Unmeasured Occurrence 1 x         Lines, Drains & Airways     Active LDAs     Name Placement date Placement time Site Days    Peripheral IV 02/07/23 1337 Posterior;Right Hand 02/07/23  1337  Hand  2    Peripheral IV 02/09/23 2343 Left Hand 02/09/23  2343  Hand  less than 1    Closed/Suction Drain Left LLQ Pigtail 10 Fr. 02/09/23  1458  LLQ  less than 1    Colostomy LLQ 02/03/23  1944  LLQ  6                     Physician Progress Notes (last 24 hours)      Lilibeth Rahman PA-C at 02/10/23 0842          Progress Note    POD 7    S: Pt S/P IR drain placement yesterday with increased pain around drain. Pt non-complaint with ambulating/sitting up in chair.     O:  Temp:  [98.2 °F (36.8 °C)-98.7 °F (37.1 °C)] 98.3 °F (36.8 °C)  Heart Rate:  [81-94] 86  Resp:  [16-26] 20  BP: (158-183)/() 166/90      Intake/Output Summary (Last 24 hours) at 2/10/2023 0843  Last data filed at 2/10/2023 0808  Gross per 24 hour   Intake 0 ml   Output 705 ml   Net -705 ml       Abd: soft, non-distended. Staples removed from incisions. Midline abdominal wound opened with moderate amount of purulent drainage expressed. Wound packed with  saline soaked gauze.   NEW drain in LLQ with serosanguinous drainage.   Ostomy: PPP with loose stool in bag      Results from last 7 days   Lab Units 02/10/23  0356   SODIUM mmol/L 141   POTASSIUM mmol/L 2.7*   CHLORIDE mmol/L 108*   CO2 mmol/L 26.0   BUN mg/dL 4*   CREATININE mg/dL 0.39*   EGFR mL/min/1.73 112.1   GLUCOSE mg/dL 94   CALCIUM mg/dL 8.1*   PHOSPHORUS mg/dL 2.0*       Results from last 7 days   Lab Units 02/10/23  0356   MAGNESIUM mg/dL 1.6       A/P: 62 y.o. female S/P robotic assisted laparoscopic left and sigmoid resection with colostomy converted open due to splenic injury 02/03/2023.     - Midline abdominal wound opened with moderate amount of purulent drainage. Wound packed with saline soaked gauze. Will ask WOCN to place wound-vac today.   - Leukocytosis. CBC pending. Will check UA and C. Diff toxin. Pt currently receiving Zosyn. ID to see Pt today.   - Prolonged ileus and unable to tolerate PO intake. Will start TPN today. Continue Reglan. Encouraged ambulation and discussed importance of this.          Electronically signed by Lilibeth Rahman PA-C at 02/10/23 1004     Lino Gaston MD at 02/09/23 7686        Patient had episodes of vomiting.  She says is associated with the IV medication.  I discussed with the patient that the IV fluid does not cause vomiting.  Narcotic pain medication could be.  I discussed with patient that being in bed and not participating with physical therapy will make her weaker and continue the ileus that she has.    Patient continues to ask when she can eat.  I discussed with patient that she has to tolerate clear liquids first.  We will start TPN because she has been unable to tolerate the clear liquids.    Patient had IR drain today.    Abdomen is soft. there is some stool in her ostomy bag  She is having some serous drainage from her midline wound.  NEW serosanguineous  Labs reviewed. Wbc 26    Because of her elevated white count , we will check for C.  difficile, UA, and await cultures from IR drain  Continue n.p.o.  Ordered PICC line  Ordered TPN        Electronically signed by Lino Gaston MD at 23 1711            Consult Notes (last 24 hours)      Galdino Esteves MD at 23 1756      Consult Orders    1. Inpatient Infectious Diseases Consult [[id number]] ordered by Gideon Hope MD at 23 1512               CONSULT NOTE    Infectious Diseases - Galdino Trevino MD  [facility]       Patient Identification:  Name: Hali Tejeda  Age: 62 y.o.  Sex: female  :  1960  MRN: [id number]             Date of Consultation: 2023      Primary Care Physician: Richard Delgado MD                               Requesting Physician: Dr. Hope  Reason for Consultation: Progressive leukocytosis in the setting of acute diverticulitis status post surgery on 2/3/2023.    Impression: Patient is a 62-year-old female with past medical history otherwise remarkable for hypertension presented to the emergency room on 2023 with couple of month-long history of recurrent abdominal pain involving the left lower quadrant area associated with occasional loose stools.  Evaluation in the emergency room revealed worsening colitis despite the fact that she had received outpatient antibiotic treatment for colitis during her prior visit before 2023.  Patient was admitted to the hospital and was started on IV Unasyn and was seen by gastroenterology service and initially the suspicion was raised for possibility of inflammatory bowel disease with superimposed C. difficile infection or enteroinvasive bacterial infection.  GI PCR for enteric pathogen and C. difficile toxins were ordered with plans for endoscopy evaluation while receiving antibiotic therapy.  Her microbiology work-up so far has been negative.  On 2023 colorectal surgery service was consulted by gastroenterology service after having a colonoscopy on 2023 when she was noted to have  sigmoid stricture and subsequent traumatic mucosal tear at the splenic flexure requiring endoscopic clip repair.  CT scan of the abdomen and pelvis afterwards reveal air bubbles near the sigmoid colon suggestive of microperforation.  On 2/3/2023 patient underwent laparoscopic robot-assisted left and sigmoid colectomy which was converted to open surgery for mobilization and splenic flexure.  During the process patient was noted to have a splenic tear that was repaired.  Colostomy was placed.  Patient was continued on IV antibiotics which was changed from Unasyn to Zosyn and patient has been on antibiotics throughout this hospitalization since 1/28/2023.  Patient's white blood cell count is progressively coming down and on 2/6/2023 it was normal.  Patient has been clinically feeling somewhat better.  On 2/8/2023 her white blood cell count jumped up and patient had a repeat CT scan of the abdomen and pelvis on 2/8/2023 I.e on postoperative day #5 which revealed loculated fluid collection along the left paracolic gutter as well as in the right aspect of the pelvis.  She was also noted to have air and fluid level in the midline abdominal wall incision without any discrete fluid collection.  Small bowel ileus was noted.  Today her white blood cell count was 25,000 and patient underwent percutaneous drainage of the left paracolic fluid collection and placement of drain.  Patient was seen after the procedure.  Patient antibiotic has been appropriately adjusted to continuation of Zosyn and Diflucan was added.  Infectious disease service is consulted.  This presentation in the above context is consistent with:  1-intra-abdominal sepsis with intra-abdominal abscess in the setting of prolonged colitis, sigmoid stricture, endoscopic injury to colonic wall with microperforation and subsequent laparotomy partial colectomy and colostomy and intraoperative injury to the spleen with repair while being on antibiotic therapy-likely  pathogen to consider in this situation would be enteric amberly along with selection of yeast due to prolonged antibiotic therapy-status post percutaneous drain placement on 2/9/2023  2-hypertension  3-malnourishment  4-possible postop abdominal wall/incisional infection/evolving abscess      Recommendations/Discussions:  At this juncture I agree with the care plan.  Follow-up on the Gram stain and culture results of the sample taken from the left paracolic fluid collection while monitoring her white blood cell count and clinical course.  Apparently assess her abdominal incision and watch for dehiscence or drainage.  Monitor closely for complications of antibiotics.  Supportive care per primary team.  Thank you Dr. Ramirez for letting me be the part of your patient care please see above impression and recommendations      History of Present Illness:   Patient is a 62-year-old female with past medical history otherwise remarkable for hypertension presented to the emergency room on 1/27/2023 with couple of month-long history of recurrent abdominal pain involving the left lower quadrant area associated with occasional loose stools.  Evaluation in the emergency room revealed worsening colitis despite the fact that she had received outpatient antibiotic treatment for colitis during her prior visit before 1/27/2023.  Patient was admitted to the hospital and was started on IV Unasyn and was seen by gastroenterology service and initially the suspicion was raised for possibility of inflammatory bowel disease with superimposed C. difficile infection or enteroinvasive bacterial infection.  GI PCR for enteric pathogen and C. difficile toxins were ordered with plans for endoscopy evaluation while receiving antibiotic therapy.  Her microbiology work-up so far has been negative.  On 2/2/2023 colorectal surgery service was consulted by gastroenterology service after having a colonoscopy on 2/1/2023 when she was noted to have sigmoid  stricture and subsequent traumatic mucosal tear at the splenic flexure requiring endoscopic clip repair.  CT scan of the abdomen and pelvis afterwards reveal air bubbles near the sigmoid colon suggestive of microperforation.  On 2/3/2023 patient underwent laparoscopic robot-assisted left and sigmoid colectomy which was converted to open surgery for mobilization and splenic flexure.  During the process patient was noted to have a splenic tear that was repaired.  Colostomy was placed.  Patient was continued on IV antibiotics which was changed from Unasyn to Zosyn and patient has been on antibiotics throughout this hospitalization since 1/28/2023.  Patient's white blood cell count is progressively coming down and on 2/6/2023 it was normal.  Patient has been clinically feeling somewhat better.  On 2/8/2023 her white blood cell count jumped up and patient had a repeat CT scan of the abdomen and pelvis on 2/8/2023 I.e on postoperative day #5 which revealed loculated fluid collection along the left paracolic gutter as well as in the right aspect of the pelvis.  She was also noted to have air and fluid level in the midline abdominal wall incision without any discrete fluid collection.  Small bowel ileus was noted.  Today her white blood cell count was 25,000 and patient underwent percutaneous drainage of the left paracolic fluid collection and placement of drain.  Patient was seen after the procedure.  Patient antibiotic has been appropriately adjusted to continuation of Zosyn and Diflucan was added.  Infectious disease service is consulted.        Past Medical History:  Past Medical History:   Diagnosis Date   • Abnormal EKG 06/2020   • Avulsion fracture of distal fibula 02/18/2015   • H. pylori infection 07/2020   • Hepatic steatosis 07/2020   • Hiatal hernia    • HTN (hypertension)    • Hyperlipidemia    • Insomnia    • Left sided colitis with complication (HCC) 01/24/2023    ADMITTED TO [facility]   • LGSIL on Pap smear of  cervix 2015    (+) HPV   • LGSIL Pap smear of vagina 2016   • Snoring    • Stricture of sigmoid colon (HCC) 2023   • Uterine fibroid    • Vaginal atrophy      Past Surgical History:  Past Surgical History:   Procedure Laterality Date   • BREAST LUMPECTOMY Left     benign   •  SECTION N/A    • COLON RESECTION N/A 2/3/2023    Procedure: COLON RESECTION LAPAROSCOPIC CONVERTED TO OPEN SIGMOID AND LEFT MOBILIZATION OF SPLENIC FLEXURE WITH DAVINCI ROBOT;  Surgeon: Lino Gaston MD;  Location: [facility];  Service: Robotics - DaVinci;  Laterality: N/A;   • COLONOSCOPY N/A 2014    COULD ONLY GET SCOPE TO 70 CM DUE TO SIGNIFICANT STRICTURE SECONDARY TO SEVERE DIVERTICULITIS OR CANCER, ACBE ORDERED, DR. PARAG HUDSON AT [facility]   • COLONOSCOPY N/A 2023    MODERATE STENOSIS IN SIGMOID-DILATED, MANY DIVERTICULA IN SIGMOID AND DESCENDING, COPIOUS AMTS OF STOOL, MUCOSAL TEAR 55 CM FROM ANAL VERGE, DR. JENNI SMITH AT [facility]   • COLPOSCOPY N/A 2016   • ENDOSCOPY N/A 2023    GASTRITIS, SMALL HIATAL HERNIA, DR. JENNI SMITH AT [facility]   • ENDOSCOPY N/A 09/15/2009    DR. PARAG HUDSON AT [facility]   • GASTRIC SLEEVE LAPAROSCOPIC N/A 2020    WITH REMOVAL OF GASTRIC BAND, DR. OLIMPIA PALACIOS AT [facility]   • HYSTERECTOMY N/A 2005    WITH RSO   • LAPAROSCOPIC GASTRIC BANDING N/A 10/20/2019    DR. PARAG HUDSON AT [facility]   • SALPINGO OOPHORECTOMY Left     LSO, IN TEEN YEARS   • WISDOM TOOTH EXTRACTION Bilateral       Home Meds:  Medications Prior to Admission   Medication Sig Dispense Refill Last Dose   • levocetirizine (XYZAL) 5 MG tablet Take 5 mg by mouth As Needed.      • losartan-hydrochlorothiazide (HYZAAR) 50-12.5 MG per tablet Take 1 tablet by mouth Daily.      • Multiple Vitamin (MULTI-VITAMIN PO) Take 1 tablet by mouth Daily.      • ondansetron ODT (ZOFRAN-ODT) 4 MG disintegrating tablet Place 1 tablet on the tongue 4 (Four) Times a Day As Needed for Nausea or Vomiting. 12  tablet 0    • mesalamine (Lialda) 1.2 g EC tablet Take 4 tablets by mouth Daily With Breakfast. 120 tablet 11      Current Meds:     Current Facility-Administered Medications:   •  amLODIPine (NORVASC) tablet 10 mg, 10 mg, Oral, Q24H, Gideon Hope MD  •  dextrose 5 % and sodium chloride 0.9 % infusion, 100 mL/hr, Intravenous, Continuous, Lino Gaston MD, Last Rate: 100 mL/hr at 02/09/23 0845, 100 mL/hr at 02/09/23 0845  •  famotidine (PEPCID) injection 20 mg, 20 mg, Intravenous, BID AC, Lilibeth Rahman PA-C, 20 mg at 02/09/23 0900  •  fluconazole (DIFLUCAN) IVPB 400 mg, 400 mg, Intravenous, Q24H, Gideon Hope MD  •  hydrALAZINE (APRESOLINE) injection 10 mg, 10 mg, Intravenous, Q4H PRN, Gideon Hope MD, 10 mg at 02/08/23 1452  •  HYDROmorphone (DILAUDID) injection 0.25 mg, 0.25 mg, Intravenous, Q2H PRN, 0.25 mg at 02/08/23 1949 **AND** naloxone (NARCAN) injection 0.4 mg, 0.4 mg, Intravenous, Q5 Min PRN, Lino Gaston MD  •  metoclopramide (REGLAN) injection 10 mg, 10 mg, Intravenous, Q6H, Lino Gaston MD, 10 mg at 02/09/23 0540  •  nitroglycerin (NITROSTAT) SL tablet 0.4 mg, 0.4 mg, Sublingual, Q5 Min PRN, Lino Gaston MD  •  ondansetron (ZOFRAN) injection 4 mg, 4 mg, Intravenous, Q6H PRN, Lino Gaston MD, 4 mg at 02/09/23 0634  •  Pharmacy to Dose TPN, , Does not apply, Continuous PRN, Lilibeth Rahman PA-C  •  piperacillin-tazobactam (ZOSYN) 3.375 g in iso-osmotic dextrose 50 ml (premix), 3.375 g, Intravenous, Q8H, Gideon Hope MD, 3.375 g at 02/09/23 0540  •  potassium chloride 10 mEq in 100 mL IVPB, 10 mEq, Intravenous, Q1H PRN, Matthew Soliz MD, Last Rate: 100 mL/hr at 02/08/23 0523, 10 mEq at 02/08/23 0523  •  potassium phosphate 45 mmol in sodium chloride 0.9 % 500 mL infusion, 45 mmol, Intravenous, PRN **OR** potassium phosphate 30 mmol in sodium chloride 0.9 % 250 mL infusion, 30 mmol, Intravenous, PRN **OR** potassium phosphate 15 mmol in sodium chloride 0.9 % 100 mL  "infusion, 15 mmol, Intravenous, PRN, 15 mmol at 23 0206 **OR** sodium phosphates 40 mmol in sodium chloride 0.9 % 500 mL IVPB, 40 mmol, Intravenous, PRN **OR** sodium phosphates 20 mmol in sodium chloride 0.9 % 250 mL IVPB, 20 mmol, Intravenous, PRN, Matthew Soliz MD  •  scopolamine patch 1 mg/72 hr, 1 patch, Transdermal, Q72H, Lilibeth Rahman PA-C, 1 patch at 23 1156  Allergies:  Allergies   Allergen Reactions   • Hydrocodone Unknown - Low Severity   • Sulfa Antibiotics Itching     Social History:   Social History     Tobacco Use   • Smoking status: Former     Types: Cigarettes     Quit date: 2004     Years since quittin.0     Passive exposure: Past   • Smokeless tobacco: Never   • Tobacco comments:     Quit    Substance Use Topics   • Alcohol use: Yes     Alcohol/week: 1.0 standard drink     Types: 1 Glasses of wine per week     Comment: occ wine      Family History:  Family History   Problem Relation Age of Onset   • Diabetes Mother    • Gout Brother    • Colon cancer Paternal Grandmother    • Cancer Paternal Grandmother    • Cancer Paternal Aunt    • Colon cancer Paternal Aunt           Review of Systems  See history of present illness and past medical history.   Feeling better and hungry.  Remainder of ROS is negative.      Vitals:   /87   Pulse 87   Temp 98.4 °F (36.9 °C) (Oral)   Resp 22   Ht 154.9 cm (60.98\")   Wt 83.1 kg (183 lb 3.2 oz)   SpO2 99%   BMI 34.63 kg/m²   I/O:     Intake/Output Summary (Last 24 hours) at 2023 1756  Last data filed at 2023 1500  Gross per 24 hour   Intake --   Output 1255 ml   Net -1255 ml     Exam:  Patient is examined using the personal protective equipment as per guidelines from infection control for this particular patient as enacted.  Hand washing was performed before and after patient interaction.  General Appearance:    Alert, cooperative, no distress, appears stated age   Head:    Normocephalic, without obvious " abnormality, atraumatic   Eyes:    PERRL, conjunctivae/corneas clear, EOM's intact, both eyes   Ears:    Normal external ear canals, both ears   Nose:   Nares normal, septum midline, mucosa normal, no drainage    or sinus tenderness   Throat:   Lips, tongue, gums normal; oral mucosa pink and moist   Neck:   Supple, symmetrical, trachea midline, no adenopathy;     thyroid:  no enlargement/tenderness/nodules; no carotid    bruit or JVD   Back:     Symmetric, no curvature, ROM normal, no CVA tenderness   Lungs:     Clear to auscultation bilaterally, respirations unlabored   Chest Wall:    No tenderness or deformity    Heart:   S1-S2 regular   Abdomen:    Colostomy present and left-sided drain in place   Extremities:   Extremities normal, atraumatic, no cyanosis or edema   Pulses:   Pulses palpable in all extremities; symmetric all extremities   Skin:   Skin color normal, Skin is warm and dry,  no rashes or palpable lesions   Neurologic:  Interactive and grossly nonfocal       Data Review:    I reviewed the patient's new clinical results.  Results from last 7 days   Lab Units 02/09/23  0619 02/08/23  0405 02/07/23  0440 02/06/23  0443 02/05/23  0334 02/04/23  0430 02/03/23  2031 02/03/23  0526   WBC 10*3/mm3 25.99* 18.59* 8.48 14.19* 15.58* 15.55*  --  14.07*   HEMOGLOBIN g/dL 9.6* 9.7* 10.1* 7.7* 8.5* 11.0* 10.8* 12.1   PLATELETS 10*3/mm3 337 281 275 215 189 186  --  251     Results from last 7 days   Lab Units 02/09/23  0619 02/08/23  2132 02/08/23  0405 02/07/23  1514 02/07/23  0440 02/06/23  0443 02/05/23  0334 02/04/23  0430   SODIUM mmol/L 143  --  143 146* 145 140 137 137   POTASSIUM mmol/L 4.6 3.3* 3.5 3.1* 2.7* 3.3* 4.0 4.3   CHLORIDE mmol/L 111*  --  116* 116* 111* 112* 105 110*   CO2 mmol/L 22.9  --  21.0* 19.8* 18.5* 20.5* 22.4 20.0*   BUN mg/dL 5*  --  9 12 16 22 19 8   CREATININE mg/dL 0.46*  --  0.62 0.72 0.85 1.84* 2.37* 0.96   CALCIUM mg/dL 8.4*  --  8.4* 8.6 8.9 8.5* 8.5* 7.3*   GLUCOSE mg/dL 101*  --   79 71 34* 54* 118* 149*     Microbiology Results (last 10 days)     ** No results found for the last 240 hours. **            Assessment:  Active Hospital Problems    Diagnosis  POA   • **Colitis [K52.9]  Yes   • Nausea and vomiting [R11.2]  Yes   • Stricture of sigmoid colon (HCC) [K56.699]  Unknown      Resolved Hospital Problems   No resolved problems to display.         Plan:  See above  Galdino Esteves MD   2/9/2023  17:56 EST    Parts of this note may be an electronic transcription/translation of spoken language to printed text using the Dragon dictation system.      Electronically signed by Galdino Esteves MD at 02/10/23 0639

## 2023-02-10 NOTE — CONSULTS
"Nutrition Services    Patient Name:  Hali Tejeda  YOB: 1960  MRN: [id number]  Admit Date:  1/27/2023    FOLLOW UP - CLINICAL NUTRITION    Assessment Date:  02/10/23    Encounter Information         Reason for Encounter NPO/clear liquid/Full liquid x 13 days     Current Issues Little to no intake x 13 days. Pt switching from liquid diet to NPO since admission. Visited pt at bedside, reports wanting to eat however has not been able to tolerate diet. Discussed plans for nutrition via TPN.  Bedscale showing wt gain which is likely inaccurate. Did not observe physical signs of muscle wasting or fat loss. Pt is at high risk of malnutrition based on poor nutritional status. Will continue to follow closely.     Recommendations/Plan of Care:  1. TPN goal macronutrients:            1350 Dextrose calories            100 gms Amino Acids            100mL 20% SMOF lipids daily    *Provides 1950 kcal, 100 gm protein.   2. Slow increase to goal rate, monitor K+, Mg, and phos for signs of refeeding syndrome- replace as needed.  3. Discontinue 5% dextrose.    RD to follow per protocol.      Current Nutrition Orders & Evaluation of Intake       Oral Nutrition     Current PO Diet NPO Diet NPO Type: Sips with Meds  Adult Central 2-in-1 TPN   Supplement n/a   PO Evaluation     % PO Intake 0%    # of Days Evaluated 13 days    Factors Affecting Intake  altered GI function   --  Anthropometrics          Height    Weight Height: 154.9 cm (60.98\")  Weight: 83.1 kg (183 lb 3.2 oz) (02/03/23 2226)    BMI kg/m2 Body mass index is 34.63 kg/m².    Weight trend Gain Bedscale reading 193# (likely inaccurate)      Estimated/Assessed Needs       Energy Requirements    Height for Calculation  Height: 154.9 cm (60.98\")   Weight for Calculation 83 kg     Method for Estimation  22 kcal/kg, 25 kcal/kg   EST Needs (kcal/day) 6199-0686 kcal/day        Protein Requirements    Weight for Calculation 83 kg    EST Protein Needs (g/kg) 1.0 - " 1.2 gm/kg   EST Daily Needs (g/day)  gm/day        Fluid Requirements     Method for Estimation 25 mL/kg    Estimated Needs (mL/day) 2075       Fluid Deficit    Current Na Level (mEq/L) 143   Desired Na Level (mEq/L)    Estimated Fluid Deficit (L)       Labs        Pertinent Labs Reviewed, listed below     Results from last 7 days   Lab Units 02/10/23  0356 02/09/23 0619 02/08/23 2132 02/08/23 0405 02/07/23 0440 02/06/23 0443 02/05/23  0334   SODIUM mmol/L 141 143  --  143   < > 140 137   POTASSIUM mmol/L 2.7* 4.6 3.3* 3.5   < > 3.3* 4.0   CHLORIDE mmol/L 108* 111*  --  116*   < > 112* 105   CO2 mmol/L 26.0 22.9  --  21.0*   < > 20.5* 22.4   BUN mg/dL 4* 5*  --  9   < > 22 19   CREATININE mg/dL 0.39* 0.46*  --  0.62   < > 1.84* 2.37*   CALCIUM mg/dL 8.1* 8.4*  --  8.4*   < > 8.5* 8.5*   BILIRUBIN mg/dL  --   --   --   --   --  0.7 0.7   ALK PHOS U/L  --   --   --   --   --  41 38*   ALT (SGPT) U/L  --   --   --   --   --  14 11   AST (SGOT) U/L  --   --   --   --   --  32 28   GLUCOSE mg/dL 94 101*  --  79   < > 54* 118*    < > = values in this interval not displayed.     Results from last 7 days   Lab Units 02/10/23  0917 02/10/23  0356 02/09/23  0619 02/09/23  0347 02/08/23  2132 02/08/23  0405   MAGNESIUM mg/dL  --  1.6  --  3.4*  --  2.2   PHOSPHORUS mg/dL  --  2.0*   < >  --    < > 1.9*   HEMOGLOBIN g/dL 8.7*  --    < >  --   --  9.7*   HEMATOCRIT % 27.2*  --    < >  --   --  28.2*   WBC 10*3/mm3 20.95*  --    < >  --   --  18.59*   ALBUMIN g/dL  --  2.2*   < >  --   --  2.2*    < > = values in this interval not displayed.     Results from last 7 days   Lab Units 02/10/23  0917 02/09/23  0910 02/09/23  0619 02/08/23  0405 02/07/23  0440 02/06/23  0443 02/05/23  0334 02/04/23  0430   INR   --  1.36*  --   --   --   --   --  1.49*   APTT seconds  --   --   --   --   --   --   --  31.8   PLATELETS 10*3/mm3 370  --  337 281 275 215   < > 186    < > = values in this interval not displayed.     No results  found for: COVID19  Lab Results   Component Value Date    HGBA1C 5.60 01/29/2023          Medications            Scheduled Medications amLODIPine, 10 mg, Oral, Q24H  famotidine, 20 mg, Intravenous, BID AC  Fat Emulsion Plant Based, 100 mL, Intravenous, Q24H (TPN)  fluconazole, 400 mg, Intravenous, Q24H  metoclopramide, 10 mg, Intravenous, Q6H  piperacillin-tazobactam, 3.375 g, Intravenous, Q8H  potassium phosphate, 30 mmol, Intravenous, Once  Scopolamine, 1 patch, Transdermal, Q72H        Infusions Adult Central 2-in-1 TPN,   dextrose 5 % and sodium chloride 0.9 %, 100 mL/hr  Pharmacy to Dose TPN,         PRN Medications •  hydrALAZINE  •  HYDROmorphone **AND** naloxone  •  nitroglycerin  •  ondansetron  •  Pharmacy to Dose TPN  •  potassium chloride  •  potassium phosphate infusion greater than 15 mMoles **OR** potassium phosphate infusion greater than 15 mMoles **OR** potassium phosphate **OR** sodium phosphate IVPB **OR** sodium phosphate IVPB     Physical Findings          Physical Appearance alert, oriented   Oral/Mouth Cavity WNL   Edema  no edema   Gastrointestinal nausea, last bowel movement: 2/9   Skin  surgical incision abdomen    Tubes/Drains NG tube   NFPE Consented to exam    --  NUTRITION INTERVENTION / PLAN OF CARE  Intervention Goal         Intervention Goal(s) Meet estimated needs, Initiate TF/PN, Maintain weight and No significant weight loss     Nutrition Intervention         RD Action Follow Tx Progress, Care plan reviewed and Recommend/ordered:      Nutrition Prescription         Diet Prescription     Supplement Prescription n/a   EN/PN Prescription Recommendations/Plan of Care:  1. TPN goal macronutrients:            1350 Dextrose calories            100 gms Amino Acids            100mL 20% SMOF lipids daily    New Prescription Ordered? No, recommended   --  Monitor/Evaluation        Monitor Per protocol, Pertinent labs, PN delivery/tolerance, Weight   Discharge Needs Pending clinical course    Education Education not appropriate at this time   --    RD to follow up per protocol.    Electronically signed by:  Marlene Rogel RD  02/10/23 14:03 EST

## 2023-02-10 NOTE — PROGRESS NOTES
"   LOS: 14 days     Chief Complaint/ Reason for encounter: JUANY    Subjective   02/07/23 : Feels about the same today, still some generalized abdominal pain  Anxious to increase her diet, no nausea or vomiting today but did vomit twice yesterday  No shortness of breath or chest pain  No fevers or chills  Minimal edema    2/8: She is upset with the fact that her ostomy bag has stool in it  Remains n.p.o. per surgery recommendations  Denies any shortness of breath or chest pain, no fevers or chills no abdominal pain nausea vomiting or edema    2/9: Did not tolerate clear liquids, NG tube to be replaced, n.p.o., IR to place drain into the fluid collection seen on CT    2/10: Abdominal drain in place, still with some nausea, remains n.p.o. with plans to start TPN  Remains on IV fluids, no edema, good urine output    Medical history reviewed:  Abdominal Pain        Subjective    History taken from: Patient and chart    Vital Signs  Temp:  [98.2 °F (36.8 °C)-98.7 °F (37.1 °C)] 98.3 °F (36.8 °C)  Heart Rate:  [81-94] 86  Resp:  [16-26] 20  BP: (158-183)/() 166/90       Wt Readings from Last 1 Encounters:   02/03/23 2226 83.1 kg (183 lb 3.2 oz)   01/27/23 1955 78.9 kg (174 lb)   01/27/23 1428 78.9 kg (174 lb)       Objective:  Vital signs: (most recent): Blood pressure 166/90, pulse 86, temperature 98.3 °F (36.8 °C), temperature source Oral, resp. rate 20, height 154.9 cm (60.98\"), weight 83.1 kg (183 lb 3.2 oz), SpO2 94 %.              Objective:  General Appearance:  Comfortable, relatively well-appearing, in no acute distress and not in pain.  Awake, alert, oriented  HEENT: Mucous membranes moist, no injury, oropharynx clear  Lungs:  Normal effort and normal respiratory rate.  Breath sounds clear to auscultation.  No  respiratory distress.  No rales, decreased breath sounds or rhonchi.    Heart: Normal rate.  Regular rhythm.  S1, S2 normal.  No murmur.   Abdomen: Abdomen is soft.  Diminished bowel sounds, " nontender  Extremities: Trace ankle edema of bilateral lower extremities  Neurological: No focal motor or sensory deficits, pupils reactive  Skin:  Warm and dry.  No rash or cyanosis.       Results Review:    Intake/Output:     Intake/Output Summary (Last 24 hours) at 2/10/2023 1230  Last data filed at 2/10/2023 1205  Gross per 24 hour   Intake 0 ml   Output 405 ml   Net -405 ml         DATA:  Radiology and Labs:  The following labs independently reviewed by me. Additional labs ordered for tomorrow a.m.  Interval notes, chart personally reviewed by me.   Old records independently reviewed showing normal baseline creatinine    New problems include hypokalemia and hypophosphatemia  Discussed with patient herself at bedside    Risk/ complexity of medical care/ medical decision making moderate complexity, postop renal failure and fluid and electrolyte management    Labs:   Recent Results (from the past 24 hour(s))   Body Fluid Culture - Drainage, Peritoneum    Collection Time: 02/09/23  2:58 PM    Specimen: Peritoneum; Drainage   Result Value Ref Range    Body Fluid Culture Scant growth (1+) Gram Negative Bacilli (A)     Gram Stain Few (2+) WBCs per low power field     Gram Stain No organisms seen    Magnesium    Collection Time: 02/10/23  3:56 AM    Specimen: Blood   Result Value Ref Range    Magnesium 1.6 1.6 - 2.4 mg/dL   Renal Function Panel    Collection Time: 02/10/23  3:56 AM    Specimen: Blood   Result Value Ref Range    Glucose 94 65 - 99 mg/dL    BUN 4 (L) 8 - 23 mg/dL    Creatinine 0.39 (L) 0.57 - 1.00 mg/dL    Sodium 141 136 - 145 mmol/L    Potassium 2.7 (L) 3.5 - 5.2 mmol/L    Chloride 108 (H) 98 - 107 mmol/L    CO2 26.0 22.0 - 29.0 mmol/L    Calcium 8.1 (L) 8.6 - 10.5 mg/dL    Albumin 2.2 (L) 3.5 - 5.2 g/dL    Phosphorus 2.0 (L) 2.5 - 4.5 mg/dL    Anion Gap 7.0 5.0 - 15.0 mmol/L    BUN/Creatinine Ratio 10.3 7.0 - 25.0    eGFR 112.1 >60.0 mL/min/1.73   CBC Auto Differential    Collection Time: 02/10/23   9:17 AM    Specimen: Blood   Result Value Ref Range    WBC 20.95 (H) 3.40 - 10.80 10*3/mm3    RBC 3.18 (L) 3.77 - 5.28 10*6/mm3    Hemoglobin 8.7 (L) 12.0 - 15.9 g/dL    Hematocrit 27.2 (L) 34.0 - 46.6 %    MCV 85.5 79.0 - 97.0 fL    MCH 27.4 26.6 - 33.0 pg    MCHC 32.0 31.5 - 35.7 g/dL    RDW 14.2 12.3 - 15.4 %    RDW-SD 43.5 37.0 - 54.0 fl    MPV 9.9 6.0 - 12.0 fL    Platelets 370 140 - 450 10*3/mm3   Manual Differential    Collection Time: 02/10/23  9:17 AM    Specimen: Blood   Result Value Ref Range    Neutrophil % 86.6 (H) 42.7 - 76.0 %    Lymphocyte % 8.2 (L) 19.6 - 45.3 %    Monocyte % 1.0 (L) 5.0 - 12.0 %    Metamyelocyte % 1.0 (H) 0.0 - 0.0 %    Myelocyte % 3.1 (H) 0.0 - 0.0 %    Neutrophils Absolute 18.14 (H) 1.70 - 7.00 10*3/mm3    Lymphocytes Absolute 1.72 0.70 - 3.10 10*3/mm3    Monocytes Absolute 0.21 0.10 - 0.90 10*3/mm3    RBC Morphology Normal Normal    Smudge Cells Slight/1+ None Seen    Platelet Morphology Normal Normal       Radiology:  Pertinent radiology studies were reviewed as described above      Medications have been reviewed separately in chart overview      ASSESSMENT:   acute renal failure, creatinine peaked at 2.3 and is below baseline today, on IV fluids    Colitis status post sigmoid colon resection    Nausea and vomiting, better today  Postop ileus, remains n.p.o.  Abdominal fluid collection status post drain placement  Hypophosphatemia    Stricture of sigmoid colon, status post colostomy  Hypokalemia, worse today  Hypertension, receiving as needed IV hydralazine  Anemia  Hypomagnesemia  Metabolic acidosis, expansion acidosis, improved        DISCUSSION/PLAN:   Renal function remains excellent today, euvolemic  Continue IV fluids while n.p.o.  Noted plans to stop TPN  Will request pharmacy to stop IV fluids once the TPN starts today  Replace potassium and phosphorus IV, magnesium IV  Status post IR drain and NG tube replacement  Monitor electrolytes closely and replace per  protocol    Continue to monitor electrolytes and volume closely      Matthew Soliz MD   [facility]   Office phone number: [phone]  Answering service phone number: [phone]    02/10/23  12:30 EST    Dictation performed using Dragon dictation software

## 2023-02-10 NOTE — SIGNIFICANT NOTE
"   02/10/23 1432   PICC Double Lumen 02/10/23 Right Basilic   Placement date: If unknown, DO NOT use \"Add Comment\" note/Placement time: If unknown, DO NOT use \"Add Comment\" note: 02/10/23 1431   Hand Hygiene Completed: Yes  Size (Fr): 4  Description (optional): Lot#[id number]  EXP:09-  Length (cm): 32 cm  O...   Site Assessment Clean;Dry;Intact   #1 Lumen Status Blood return noted;Capped;Flushed;Normal saline locked   #2 Lumen Status Blood return noted;Capped;Flushed;Normal saline locked   Length nely (cm) 32 cm   Extremity Circumference (cm) 27 cm   Dressing Type Securing device;Antimicrobial dressing/disc   Dressing Status Clean;Dry;Intact   Dressing Intervention New dressing   Dressing Change Due 02/17/23   Indication/Daily Review of Necessity intravenous fluid therapy;intravenous medication therapy;blood sampling;total parenteral nutrition     Sharp Count Correct 3 needles, 2 guidewires, 1 scalpel    "

## 2023-02-10 NOTE — PROGRESS NOTES
"Daily progress note    Primary care physician  Dr. Delgado    Chief complaint  Doing same with no new complaints and looked depressed.    History of present illness  62-year-old female with history of hypertension presented to [facility] emergency room with left lower quadrant abdominal pain for last 1 month.  Patient also have occasional loose stools.  Patient denies any nausea vomiting.  Patient recently diagnosed with colitis and treated with oral antibiotics without any improvement.  Patient has noticed blood in the stools.  Patient evaluated in ER with CT abdominal pelvis which shows worsening colitis and failed outpatient treatment admit for management.  Patient has no fever chills chest pain shortness of breath with sweats weight loss or weight gain.     REVIEW OF SYSTEMS  Unremarkable except abdominal pain      PHYSICAL EXAM  Blood pressure 165/88, pulse 85, temperature 98.4 °F (36.9 °C), temperature source Oral, resp. rate 20, height 154.9 cm (60.98\"), weight 83.1 kg (183 lb 3.2 oz), SpO2 96 %.    GENERAL:  Awake and alert in no acute distress  HEENT:  Unremarkable  NECK:  Supple  CV: regular rhythm, normal rate  RESPIRATORY: normal effort, clear to auscultation bilaterally  ABDOMEN: soft, left lower quadrant tenderness colostomy in place  MUSCULOSKELETAL: no deformity  NEURO: alert, moves all extremities, follows commands  PSYCH:  calm, cooperative  SKIN: warm, dry     LAB RESULTS  Lab Results (last 24 hours)     Procedure Component Value Units Date/Time    Clostridioides difficile Toxin - Stool, Per Stoma [[id number]]  (Normal) Collected: 02/10/23 1243    Specimen: Stool from Per Stoma Updated: 02/10/23 1341    Narrative:      The following orders were created for panel order Clostridioides difficile Toxin - Stool, Per Stoma.  Procedure                               Abnormality         Status                     ---------                               -----------         ------                   "   Clostridioides difficile...[[id number]]  Normal              Final result                 Please view results for these tests on the individual orders.    Clostridioides difficile Toxin, PCR - Stool, Per Stoma [[id number]]  (Normal) Collected: 02/10/23 1243    Specimen: Stool from Per Stoma Updated: 02/10/23 1341     C. Difficile Toxins by PCR Negative    Narrative:      The result indicates the absence of toxigenic C. difficile from stool specimen.     Urinalysis, Microscopic Only - Urine, Clean Catch [[id number]]  (Abnormal) Collected: 02/10/23 1243    Specimen: Urine, Clean Catch Updated: 02/10/23 1303     RBC, UA 6-12 /HPF      WBC, UA 0-2 /HPF      Comment: Urine culture not indicated.        Bacteria, UA None Seen /HPF      Squamous Epithelial Cells, UA 3-6 /HPF      Hyaline Casts, UA 3-6 /LPF      Methodology Automated Microscopy    Urinalysis With Culture If Indicated - Urine, Clean Catch [[id number]]  (Abnormal) Collected: 02/10/23 1243    Specimen: Urine, Clean Catch Updated: 02/10/23 1303     Color, UA Yellow     Appearance, UA Clear     pH, UA 6.5     Specific Gravity, UA 1.015     Glucose, UA Negative     Ketones, UA Trace     Bilirubin, UA Negative     Blood, UA Trace     Protein, UA 30 mg/dL (1+)     Leuk Esterase, UA Negative     Nitrite, UA Negative     Urobilinogen, UA 1.0 E.U./dL    Narrative:      In absence of clinical symptoms, the presence of pyuria, bacteria, and/or nitrites on the urinalysis result does not correlate with infection.    Body Fluid Culture - Drainage, Peritoneum [[id number]]  (Abnormal) Collected: 02/09/23 1458    Specimen: Drainage from Peritoneum Updated: 02/10/23 1150     Body Fluid Culture Scant growth (1+) Gram Negative Bacilli     Gram Stain Few (2+) WBCs per low power field      No organisms seen    Manual Differential [[id number]]  (Abnormal) Collected: 02/10/23 0917    Specimen: Blood Updated: 02/10/23 1012     Neutrophil % 86.6 %      Lymphocyte % 8.2 %      Monocyte %  1.0 %      Metamyelocyte % 1.0 %      Myelocyte % 3.1 %      Neutrophils Absolute 18.14 10*3/mm3      Lymphocytes Absolute 1.72 10*3/mm3      Monocytes Absolute 0.21 10*3/mm3      RBC Morphology Normal     Smudge Cells Slight/1+     Platelet Morphology Normal    CBC & Differential [[id number]]  (Abnormal) Collected: 02/10/23 0917    Specimen: Blood Updated: 02/10/23 1012    Narrative:      The following orders were created for panel order CBC & Differential.  Procedure                               Abnormality         Status                     ---------                               -----------         ------                     CBC Auto Differential[[id number]]        Abnormal            Final result                 Please view results for these tests on the individual orders.    CBC Auto Differential [[id number]]  (Abnormal) Collected: 02/10/23 0917    Specimen: Blood Updated: 02/10/23 1012     WBC 20.95 10*3/mm3      RBC 3.18 10*6/mm3      Hemoglobin 8.7 g/dL      Hematocrit 27.2 %      MCV 85.5 fL      MCH 27.4 pg      MCHC 32.0 g/dL      RDW 14.2 %      RDW-SD 43.5 fl      MPV 9.9 fL      Platelets 370 10*3/mm3     Magnesium [[id number]]  (Normal) Collected: 02/10/23 0356    Specimen: Blood Updated: 02/10/23 0546     Magnesium 1.6 mg/dL     Renal Function Panel [[id number]]  (Abnormal) Collected: 02/10/23 0356    Specimen: Blood Updated: 02/10/23 0546     Glucose 94 mg/dL      BUN 4 mg/dL      Creatinine 0.39 mg/dL      Sodium 141 mmol/L      Potassium 2.7 mmol/L      Chloride 108 mmol/L      CO2 26.0 mmol/L      Calcium 8.1 mg/dL      Albumin 2.2 g/dL      Phosphorus 2.0 mg/dL      Anion Gap 7.0 mmol/L      BUN/Creatinine Ratio 10.3     eGFR 112.1 mL/min/1.73     Narrative:      GFR Normal >60  Chronic Kidney Disease <60  Kidney Failure <15          Imaging Results (Last 24 Hours)     Procedure Component Value Units Date/Time    CT Guided Abscess Drain Peritoneal [[id number]] Collected: 02/09/23 1511     Updated:  02/09/23 1515    Narrative:      PROCEDURE: CT guided abdominal drain placement     HISTORY: Left paracolic fluid collection; K52.9-Noninfective  gastroenteritis and colitis, unspecified; R11.2-Nausea with vomiting,  unspecified; K56.699-Other intestinal obstruction unspecified as to  partial versus complete obstruction     TECHNIQUE:  Radiation dose reduction techniques were utilized, including automated  exposure control and exposure modulation based on body size.     The procedural risks, benefits, and alternatives were discussed with the  patient. Informed consent was obtained.        The patient was placed in the supine position on the CT procedure table.  Axial CT scan was performed to localize the left abdominal fluid  collection. The overlying skin was prepped and draped in the usual  sterile fashion. 1% buffered lidocaine was used for local anesthesia.     Next, a 5 Indonesian TSCAeh needle was advanced into the collection under CT  guidance. There was return of serous fluid. A sample was taken and sent  for culture. A superstiff guidewire was advanced through the needle. The  tract was serially dilated. Next a 10 Indonesian pigtail catheter was  advanced over the wire into the fluid collection. The catheter was  sutured in place and placed to bulb suction. No immediate complications.          Impression:      Technically successful CT guided abdominal drain placement.     Moderate sedation was provided under my direct supervision using 1 mg IV  Versed and 50 mcg IV Fentanyl. The patient was independently monitored  by a trained Department of Radiology RN using automated blood pressure,  EKG, and pulse oximetry. My total intra-service time was 9 minutes.            Radiation dose reduction techniques were utilized, including automated  exposure control and exposure modulation based on body size.     This report was finalized on 2/9/2023 3:12 PM by Dr. Matthew Palmer M.D.           Upper and lower endoscopy results  noted and discussed with patient    Current Facility-Administered Medications:   •  Adult Central 2-in-1 TPN, , Intravenous, Continuous, Lino Gaston MD  •  amLODIPine (NORVASC) tablet 10 mg, 10 mg, Oral, Q24H, Gideon Hope MD, 10 mg at 02/10/23 1055  •  dextrose 5 % and sodium chloride 0.9 % infusion, 100 mL/hr, Intravenous, Continuous, Lino Gaston MD  •  famotidine (PEPCID) injection 20 mg, 20 mg, Intravenous, BID AC, Lilibeth Rahman PA-C, 20 mg at 02/10/23 1055  •  Fat Emulsion Plant Based (INTRALIPID,LIPOSYN) 20 % infusion 20 g, 100 mL, Intravenous, Q24H (TPN), Lino Gaston MD  •  fluconazole (DIFLUCAN) IVPB 400 mg, 400 mg, Intravenous, Q24H, Gideon Hope MD  •  hydrALAZINE (APRESOLINE) injection 10 mg, 10 mg, Intravenous, Q4H PRN, Gideon Hope MD, 10 mg at 02/10/23 0159  •  HYDROmorphone (DILAUDID) injection 0.25 mg, 0.25 mg, Intravenous, Q2H PRN, 0.25 mg at 02/10/23 0941 **AND** naloxone (NARCAN) injection 0.4 mg, 0.4 mg, Intravenous, Q5 Min PRN, Lino Gaston MD  •  metoclopramide (REGLAN) injection 10 mg, 10 mg, Intravenous, Q6H, Lino Gaston MD, 10 mg at 02/10/23 0717  •  nitroglycerin (NITROSTAT) SL tablet 0.4 mg, 0.4 mg, Sublingual, Q5 Min PRN, Lino Gaston MD  •  ondansetron (ZOFRAN) injection 4 mg, 4 mg, Intravenous, Q6H PRN, Lino Gaston MD, 4 mg at 02/09/23 2212  •  Pharmacy to Dose TPN, , Does not apply, Continuous PRN, Lilibeth Rahman PA-C  •  piperacillin-tazobactam (ZOSYN) 3.375 g in iso-osmotic dextrose 50 ml (premix), 3.375 g, Intravenous, Q8H, Gideon Hope MD, 3.375 g at 02/10/23 1057  •  potassium chloride 10 mEq in 100 mL IVPB, 10 mEq, Intravenous, Q1H PRN, Matthew Soliz MD, Last Rate: 100 mL/hr at 02/08/23 0523, 10 mEq at 02/08/23 0523  •  potassium phosphate 45 mmol in sodium chloride 0.9 % 500 mL infusion, 45 mmol, Intravenous, PRN **OR** potassium phosphate 30 mmol in sodium chloride 0.9 % 250 mL infusion, 30 mmol, Intravenous, PRN **OR**  potassium phosphate 15 mmol in sodium chloride 0.9 % 100 mL infusion, 15 mmol, Intravenous, PRN, 15 mmol at 02/09/23 0206 **OR** sodium phosphates 40 mmol in sodium chloride 0.9 % 500 mL IVPB, 40 mmol, Intravenous, PRN **OR** sodium phosphates 20 mmol in sodium chloride 0.9 % 250 mL IVPB, 20 mmol, Intravenous, PRN, Matthew Soliz MD  •  potassium phosphate 30 mmol in sodium chloride 0.9 % 500 mL infusion, 30 mmol, Intravenous, Once, Matthew Soliz MD  •  scopolamine patch 1 mg/72 hr, 1 patch, Transdermal, Q72H, Quick, Lilibeth A, PA-C, 1 patch at 02/09/23 1156  •  sodium chloride 0.9 % flush 10 mL, 10 mL, Intravenous, Q12H, Quick, Lilibeth A, PA-C  •  sodium chloride 0.9 % flush 10 mL, 10 mL, Intravenous, Q12H, Quick, Lilibeth A, PA-C  •  sodium chloride 0.9 % flush 10 mL, 10 mL, Intravenous, PRN, Quick, Lilibeth A, PA-C  •  sodium chloride 0.9 % flush 20 mL, 20 mL, Intravenous, PRN, Quick, Lilibeth A, PA-C  •  sodium chloride 0.9 % infusion 40 mL, 40 mL, Intravenous, PRN, Quick, Lilibeth A, PA-C     ASSESSMENT  Acute colitis with sigmoid stricture and microperforation s/p colon resection and colostomy  Abdominal fluid collection/abscess status post CT-guided drainage  Postop ileus  Acute kidney injury resolved  Hypertension   Gastroesophageal reflux disease    PLAN  CPM  Postop care  IVF  TPN  Replace potassium  Antibiotics per infectious disease  Infectious disease consult appreciated  Adjust blood pressure medications  Pain management  Continue home medications  Stress ulcer DVT prophylaxis  Supportive care  PT OT  Discussed with family nursing staff   Follow closely and further recommendation according to hospital course    SANG SINGLETARY MD    Copied text in this note has been reviewed and is accurate as of 02/10/23

## 2023-02-10 NOTE — SIGNIFICANT NOTE
Spoke w/ Chelsey, RN-pt getting ready for wound vac change and scheduled PICC , she feels pt may not be up to PT today

## 2023-02-10 NOTE — PROGRESS NOTES
"[facility] Clinical Pharmacy Services: Total Parental Nutrition Initial Consult    Indication: Prolonged Paralytic Ileus  Route: central  Type: standard    Relevant clinical data and objective history reviewed:  62 y.o. female 154.9 cm (60.98\") 83.1 kg (183 lb 3.2 oz)    Results from last 7 days   Lab Units 02/10/23  0356 02/06/23  1045 02/06/23  0443   SODIUM mmol/L 141   < > 140   POTASSIUM mmol/L 2.7*   < > 3.3*   CHLORIDE mmol/L 108*   < > 112*   CO2 mmol/L 26.0   < > 20.5*   BUN mg/dL 4*   < > 22   CREATININE mg/dL 0.39*   < > 1.84*   CALCIUM mg/dL 8.1*   < > 8.5*   ALBUMIN g/dL 2.2*   < > 2.4*   BILIRUBIN mg/dL  --   --  0.7   ALK PHOS U/L  --   --  41   ALT (SGPT) U/L  --   --  14   AST (SGOT) U/L  --   --  32   GLUCOSE mg/dL 94   < > 54*   MAGNESIUM mg/dL 1.6   < >  --    PHOSPHORUS mg/dL 2.0*   < >  --     < > = values in this interval not displayed.        Estimated Creatinine Clearance: 144.3 mL/min (A) (by C-G formula based on SCr of 0.39 mg/dL (L)).    Active fluid orders: D5NS @ 100/hr - to stop once TPN starts    Dietary Orders (From admission, onward)       Start     Ordered    02/09/23 0825  NPO Diet NPO Type: Sips with Meds  Diet Effective Now        Question:  NPO Type  Answer:  Sips with Meds    02/09/23 0824                  Assessment  Ideal Body Weight: 48 kg  Body Weight Used for Calculations: 68 kg  Daily Est. Michele: 1990 kCal/Day  Estimated Fluid Requirements: 2000 mL/day    Goal   Protein: 1.5 grams/kg/day (100 grams/day)   Dextrose: 1350 Kcal/day   Lipids: 100 ml of 20% lipid infusion 7 days/week    Plan  Will start TPN at half goal and will taper up as appropriate. Will initiate TPN with the following macros:   Protein/Dextrose/Lipids: 50gm/600KCal/100mL    Volume: 1800 ml (75 mL/hr over 24 hrs daily)    Based on the above labs, will add the following electrolytes/additives to the TPN.    Sodium Chloride: 70 mEq   Sodium Acetate: 0 mEq   Sodium Phosphate: 0 mEq   Potassium Chloride: 50 " mEq   Potassium Acetate: 0 mEq   Potassium Phosphate: 22 mEq   Calcium Gluconate: 9 mEq   Magnesium Sulfate: 10 mEq   MVI for TPN    Trace Elements              Other Additives: none    Labs to be ordered: BMP, Mg, Phos    TPN per above, TG on 1/29 level ok will go ahead and stat lipids with TPN.  Labs ordered for am, K phos replacement to be given today.  Pharmacy will continue to follow.     Nyla Neville, PharmD  Clinical Pharmacist

## 2023-02-10 NOTE — PROGRESS NOTES
"  Infectious Diseases Progress Note    Galdino Esteves MD     [facility]  [place]: 14 days  Patient Identification:  Name: Hali Tejeda  Age: 62 y.o.  Sex: female  :  1960  MRN: [id number]         Primary Care Physician: Richard Delgado MD        Subjective: Feeling better than yesterday with improvement in nausea and vomiting.  Interval History: Underwent placement of wound VAC on her abdominal wound/incision.  TPN is started.    Objective:    Scheduled Meds:amLODIPine, 10 mg, Oral, Q24H  famotidine, 20 mg, Intravenous, BID AC  fluconazole, 400 mg, Intravenous, Q24H  metoclopramide, 10 mg, Intravenous, Q6H  piperacillin-tazobactam, 3.375 g, Intravenous, Q8H  Scopolamine, 1 patch, Transdermal, Q72H      Continuous Infusions:dextrose 5 % and sodium chloride 0.9 %, 100 mL/hr, Last Rate: 100 mL/hr (02/10/23 0150)  Pharmacy to Dose TPN,         Vital signs in last 24 hours:  Temp:  [98.2 °F (36.8 °C)-98.7 °F (37.1 °C)] 98.3 °F (36.8 °C)  Heart Rate:  [81-94] 86  Resp:  [16-26] 20  BP: (158-183)/() 166/90    Intake/Output:    Intake/Output Summary (Last 24 hours) at 2/10/2023 0828  Last data filed at 2/10/2023 0808  Gross per 24 hour   Intake 0 ml   Output 705 ml   Net -705 ml       Exam:  /90 (BP Location: Left arm, Patient Position: Lying)   Pulse 86   Temp 98.3 °F (36.8 °C) (Oral)   Resp 20   Ht 154.9 cm (60.98\")   Wt 83.1 kg (183 lb 3.2 oz)   SpO2 94%   BMI 34.63 kg/m²   Patient is examined using the personal protective equipment as per guidelines from infection control for this particular patient as enacted.  Hand washing was performed before and after patient interaction.  General Appearance:    Alert, cooperative, no distress, AAOx3                          Head:    Normocephalic, without obvious abnormality, atraumatic                           Eyes:    PERRL, conjunctivae/corneas clear, EOM's intact, both eyes                         Throat:   Lips, tongue, gums normal; oral " mucosa pink and moist                           Neck:   Supple, symmetrical, trachea midline, no JVD                         Lungs:    Clear to auscultation bilaterally, respirations unlabored                 Chest Wall:    No tenderness or deformity                          Heart:  S1-S2 regular                  Abdomen:   Ostomy present with midline incision has wound VAC in place with no surrounding cellulitis.                 Extremities:   Extremities normal, atraumatic, no cyanosis or edema                        Pulses:   Pulses palpable in all extremities                            Skin:   Skin is warm and dry,  no rashes or palpable lesions                  Neurologic: Grossly nonfocal       Data Review:    I reviewed the patient's new clinical results.  Results from last 7 days   Lab Units 02/09/23  0619 02/08/23  0405 02/07/23  0440 02/06/23  0443 02/05/23  0334 02/04/23  0430 02/03/23  2031   WBC 10*3/mm3 25.99* 18.59* 8.48 14.19* 15.58* 15.55*  --    HEMOGLOBIN g/dL 9.6* 9.7* 10.1* 7.7* 8.5* 11.0* 10.8*   PLATELETS 10*3/mm3 337 281 275 215 189 186  --      Results from last 7 days   Lab Units 02/10/23  0356 02/09/23  0619 02/08/23  2132 02/08/23  0405 02/07/23  1514 02/07/23  0440 02/06/23  0443 02/05/23  0334   SODIUM mmol/L 141 143  --  143 146* 145 140 137   POTASSIUM mmol/L 2.7* 4.6 3.3* 3.5 3.1* 2.7* 3.3* 4.0   CHLORIDE mmol/L 108* 111*  --  116* 116* 111* 112* 105   CO2 mmol/L 26.0 22.9  --  21.0* 19.8* 18.5* 20.5* 22.4   BUN mg/dL 4* 5*  --  9 12 16 22 19   CREATININE mg/dL 0.39* 0.46*  --  0.62 0.72 0.85 1.84* 2.37*   CALCIUM mg/dL 8.1* 8.4*  --  8.4* 8.6 8.9 8.5* 8.5*   GLUCOSE mg/dL 94 101*  --  79 71 34* 54* 118*     Microbiology Results (last 10 days)     Procedure Component Value - Date/Time    Body Fluid Culture - Drainage, Peritoneum [[id number]] Collected: 02/09/23 1458    Lab Status: Preliminary result Specimen: Drainage from Peritoneum Updated: 02/09/23 4147     Gram Stain Few (2+) WBCs  per low power field      No organisms seen            Assessment:    Colitis    Nausea and vomiting    Stricture of sigmoid colon (HCC)  1-intra-abdominal sepsis with intra-abdominal abscess in the setting of prolonged colitis, sigmoid stricture, endoscopic injury to colonic wall with microperforation and subsequent laparotomy partial colectomy and colostomy and intraoperative injury to the spleen with repair while being on antibiotic therapy-likely pathogen to consider in this situation would be enteric amberly along with selection of yeast due to prolonged antibiotic therapy-status post percutaneous drain placement on 2/9/2023  2-hypertension  3-malnourishment  4-possible postop abdominal wall/incisional infection/evolving abscess        Recommendations/Discussions:  Continue supportive care and Zosyn and Diflucan while following up on the culture results from the abdominal wound as well on the fluid obtained from the percutaneous drain.  Monitor closely for side effects of antibiotics  De-escalate antibiotic therapy based on the culture data and response to treatment.  Galdino Esteves MD  2/10/2023  08:28 EST    Parts of this note may be an electronic transcription/translation of spoken language to printed text using the Dragon dictation system.

## 2023-02-10 NOTE — CASE MANAGEMENT/SOCIAL WORK
Continued Stay Note  [facility]     Patient Name: Hali Tejeda  MRN: [id number]  Today's Date: 2/10/2023    Admit Date: 1/27/2023    Plan: Referral pending to [facility]   Discharge Plan     Row Name 02/10/23 0958       Plan    Plan Referral pending to [facility]    Plan Comments CCP noted IR drain placement, plans to start TPN and place wound vac, and pending ID consult. CCP following clinical course to assist with transition home with HH or SNF if indicated and to assist with discharge planning needs. Shameka BECK RN/CCP               Discharge Codes    No documentation.               Expected Discharge Date and Time     Expected Discharge Date Expected Discharge Time    Feb 13, 2023             Adwoa Segura

## 2023-02-10 NOTE — PROGRESS NOTES
Patient has stool in the bag.  Midline wound was opened up with some drainage noted wound VAC is on  C. difficile stool sample has not been collected yet  Abdomen is soft  Ostomy is pink with stool in bag    Discussed with nursing that C. difficile stool sample needs to be sent there is stool in the bag  Okay for sips but will not do a tray  ID has been consulted  Discussed with patient she needs to be out of bed to the  chair for the majority part of the day and she needs to walk.  This will help resolve her ileus.

## 2023-02-10 NOTE — PLAN OF CARE
Goal Outcome Evaluation:              Outcome Evaluation: Patient sleeping off an on throughout shift. Turned qnd assisted to bedside commode. D5/NS @100cc. Maintain safety and continue to monitor.

## 2023-02-10 NOTE — NURSING NOTE
"CWOCN- received order to place VAC from [facility]. Placed VAC- patient tolerated well. Will plan to change VAC and ostomy pouch on Monday. Explained VAC to patient- she verbalized understanding.      02/10/23 0946   Wound 02/03/23 1405 abdomen Incision   Placement Date/Time: 02/03/23 1405   Location: abdomen  Primary Wound Type: Incision   Dressing Appearance moist drainage   Base moist;pink;subcutaneous   Periwound intact   Periwound Temperature warm   Periwound Skin Turgor soft   Edges open   Wound Length (cm) 10 cm   Wound Width (cm) 4 cm   Wound Depth (cm) 4.5 cm   Wound Surface Area (cm^2) 40 cm^2   Wound Volume (cm^3) 180 cm^3   Tunneling [Depth (cm)/Location] 5.5cm @ 12oclock   Drainage Characteristics/Odor serosanguineous;tan   Drainage Amount moderate   Care, Wound cleansed with;sterile normal saline;negative pressure wound therapy   Dressing Care dressing changed   Periwound Care dry periwound area maintained   NPWT (Negative Pressure Wound Therapy) 02/10/23 abdomen   Placement Date: 02/10/23   Location: abdomen   Therapy Setting continuous therapy   Dressing foam, black;transparent dressing   Pressure Setting 125 mmHg   Sponges Inserted 1   Finger sweep complete Yes   Colostomy LLQ   Placement date: If unknown, DO NOT use \"Add Comment\" note/Placement time: If unknown, DO NOT use \"Add Comment\" note: 02/03/23 1944   Inserted by: DR. GRANADO  Hand Hygiene Completed: Yes  Location: LLQ   Stomal Appliance 1 piece   Peristomal Assessment Intact       "

## 2023-02-11 LAB
ALBUMIN SERPL-MCNC: 2.1 G/DL (ref 3.5–5.2)
ANION GAP SERPL CALCULATED.3IONS-SCNC: 5 MMOL/L (ref 5–15)
BACTERIA FLD CULT: ABNORMAL
BUN SERPL-MCNC: 3 MG/DL (ref 8–23)
BUN/CREAT SERPL: 8.1 (ref 7–25)
CALCIUM SPEC-SCNC: 7.8 MG/DL (ref 8.6–10.5)
CHLORIDE SERPL-SCNC: 106 MMOL/L (ref 98–107)
CO2 SERPL-SCNC: 30 MMOL/L (ref 22–29)
CREAT SERPL-MCNC: 0.37 MG/DL (ref 0.57–1)
DEPRECATED RDW RBC AUTO: 46.5 FL (ref 37–54)
EGFRCR SERPLBLD CKD-EPI 2021: 113.5 ML/MIN/1.73
ERYTHROCYTE [DISTWIDTH] IN BLOOD BY AUTOMATED COUNT: 14.5 % (ref 12.3–15.4)
GLUCOSE BLDC GLUCOMTR-MCNC: 148 MG/DL (ref 70–130)
GLUCOSE BLDC GLUCOMTR-MCNC: 155 MG/DL (ref 70–130)
GLUCOSE BLDC GLUCOMTR-MCNC: 156 MG/DL (ref 70–130)
GLUCOSE BLDC GLUCOMTR-MCNC: 168 MG/DL (ref 70–130)
GLUCOSE BLDC GLUCOMTR-MCNC: 170 MG/DL (ref 70–130)
GLUCOSE SERPL-MCNC: 158 MG/DL (ref 65–99)
GRAM STN SPEC: ABNORMAL
GRAM STN SPEC: ABNORMAL
HCT VFR BLD AUTO: 27.8 % (ref 34–46.6)
HGB BLD-MCNC: 8.7 G/DL (ref 12–15.9)
HYPOCHROMIA BLD QL: ABNORMAL
LYMPHOCYTES # BLD MANUAL: 2.54 10*3/MM3 (ref 0.7–3.1)
LYMPHOCYTES NFR BLD MANUAL: 4 % (ref 5–12)
MAGNESIUM SERPL-MCNC: 1.8 MG/DL (ref 1.6–2.4)
MCH RBC QN AUTO: 27.8 PG (ref 26.6–33)
MCHC RBC AUTO-ENTMCNC: 31.3 G/DL (ref 31.5–35.7)
MCV RBC AUTO: 88.8 FL (ref 79–97)
MONOCYTES # BLD: 1.01 10*3/MM3 (ref 0.1–0.9)
NEUTROPHILS # BLD AUTO: 21.63 10*3/MM3 (ref 1.7–7)
NEUTROPHILS NFR BLD MANUAL: 85.9 % (ref 42.7–76)
PHOSPHATE SERPL-MCNC: 2.1 MG/DL (ref 2.5–4.5)
PLAT MORPH BLD: NORMAL
PLATELET # BLD AUTO: 411 10*3/MM3 (ref 140–450)
PMV BLD AUTO: 9.9 FL (ref 6–12)
POIKILOCYTOSIS BLD QL SMEAR: ABNORMAL
POLYCHROMASIA BLD QL SMEAR: ABNORMAL
POTASSIUM SERPL-SCNC: 2.8 MMOL/L (ref 3.5–5.2)
RBC # BLD AUTO: 3.13 10*6/MM3 (ref 3.77–5.28)
SODIUM SERPL-SCNC: 141 MMOL/L (ref 136–145)
TARGETS BLD QL SMEAR: ABNORMAL
VARIANT LYMPHS NFR BLD MANUAL: 10.1 % (ref 19.6–45.3)
WBC MORPH BLD: NORMAL
WBC NRBC COR # BLD: 25.18 10*3/MM3 (ref 3.4–10.8)

## 2023-02-11 PROCEDURE — 25010000002 MAGNESIUM SULFATE PER 500 MG OF MAGNESIUM: Performed by: PHYSICIAN ASSISTANT

## 2023-02-11 PROCEDURE — 63710000001 INSULIN REGULAR HUMAN PER 5 UNITS: Performed by: SURGERY

## 2023-02-11 PROCEDURE — 97530 THERAPEUTIC ACTIVITIES: CPT

## 2023-02-11 PROCEDURE — 25010000002 HYDROMORPHONE PER 4 MG: Performed by: COLON & RECTAL SURGERY

## 2023-02-11 PROCEDURE — 0 POTASSIUM CHLORIDE 10 MEQ/100ML SOLUTION: Performed by: INTERNAL MEDICINE

## 2023-02-11 PROCEDURE — 25010000002 CALCIUM GLUCONATE PER 10 ML: Performed by: PHYSICIAN ASSISTANT

## 2023-02-11 PROCEDURE — 25010000002 CEFTRIAXONE PER 250 MG: Performed by: SURGERY

## 2023-02-11 PROCEDURE — 25010000002 PIPERACILLIN SOD-TAZOBACTAM PER 1 G: Performed by: HOSPITALIST

## 2023-02-11 PROCEDURE — 85025 COMPLETE CBC W/AUTO DIFF WBC: CPT | Performed by: HOSPITALIST

## 2023-02-11 PROCEDURE — 25010000002 HYDRALAZINE PER 20 MG: Performed by: HOSPITALIST

## 2023-02-11 PROCEDURE — 25010000002 FLUCONAZOLE PER 200 MG: Performed by: HOSPITALIST

## 2023-02-11 PROCEDURE — 99024 POSTOP FOLLOW-UP VISIT: CPT | Performed by: SURGERY

## 2023-02-11 PROCEDURE — 25010000002 METOCLOPRAMIDE PER 10 MG: Performed by: COLON & RECTAL SURGERY

## 2023-02-11 PROCEDURE — 85007 BL SMEAR W/DIFF WBC COUNT: CPT | Performed by: HOSPITALIST

## 2023-02-11 PROCEDURE — 97116 GAIT TRAINING THERAPY: CPT

## 2023-02-11 PROCEDURE — 80069 RENAL FUNCTION PANEL: CPT | Performed by: INTERNAL MEDICINE

## 2023-02-11 PROCEDURE — 25010000002 POTASSIUM CHLORIDE PER 2 MEQ OF POTASSIUM: Performed by: PHYSICIAN ASSISTANT

## 2023-02-11 PROCEDURE — 82962 GLUCOSE BLOOD TEST: CPT

## 2023-02-11 PROCEDURE — 25010000002 MAGNESIUM SULFATE 2 GM/50ML SOLUTION: Performed by: INTERNAL MEDICINE

## 2023-02-11 PROCEDURE — 83735 ASSAY OF MAGNESIUM: CPT | Performed by: INTERNAL MEDICINE

## 2023-02-11 RX ORDER — MAGNESIUM SULFATE HEPTAHYDRATE 40 MG/ML
2 INJECTION, SOLUTION INTRAVENOUS ONCE
Status: COMPLETED | OUTPATIENT
Start: 2023-02-11 | End: 2023-02-11

## 2023-02-11 RX ORDER — IBUPROFEN 600 MG/1
1 TABLET ORAL
Status: DISCONTINUED | OUTPATIENT
Start: 2023-02-11 | End: 2023-02-20

## 2023-02-11 RX ORDER — NICOTINE POLACRILEX 4 MG
15 LOZENGE BUCCAL
Status: DISCONTINUED | OUTPATIENT
Start: 2023-02-11 | End: 2023-02-20

## 2023-02-11 RX ORDER — DEXTROSE MONOHYDRATE 25 G/50ML
25 INJECTION, SOLUTION INTRAVENOUS
Status: DISCONTINUED | OUTPATIENT
Start: 2023-02-11 | End: 2023-02-20

## 2023-02-11 RX ADMIN — POTASSIUM CHLORIDE 10 MEQ: 7.46 INJECTION, SOLUTION INTRAVENOUS at 14:08

## 2023-02-11 RX ADMIN — POTASSIUM CHLORIDE 10 MEQ: 7.46 INJECTION, SOLUTION INTRAVENOUS at 08:26

## 2023-02-11 RX ADMIN — METOCLOPRAMIDE 10 MG: 5 INJECTION, SOLUTION INTRAMUSCULAR; INTRAVENOUS at 00:49

## 2023-02-11 RX ADMIN — Medication 10 ML: at 20:10

## 2023-02-11 RX ADMIN — FAMOTIDINE 20 MG: 10 INJECTION INTRAVENOUS at 05:56

## 2023-02-11 RX ADMIN — FAMOTIDINE 20 MG: 10 INJECTION INTRAVENOUS at 17:02

## 2023-02-11 RX ADMIN — POTASSIUM CHLORIDE 10 MEQ: 7.46 INJECTION, SOLUTION INTRAVENOUS at 16:19

## 2023-02-11 RX ADMIN — POTASSIUM PHOSPHATE, MONOBASIC AND POTASSIUM PHOSPHATE, DIBASIC 15 MMOL: 224; 236 INJECTION, SOLUTION, CONCENTRATE INTRAVENOUS at 12:20

## 2023-02-11 RX ADMIN — Medication 10 ML: at 20:09

## 2023-02-11 RX ADMIN — HYDROMORPHONE HYDROCHLORIDE 0.25 MG: 1 INJECTION, SOLUTION INTRAMUSCULAR; INTRAVENOUS; SUBCUTANEOUS at 17:06

## 2023-02-11 RX ADMIN — CEFTRIAXONE SODIUM 1 G: 1 INJECTION, POWDER, FOR SOLUTION INTRAMUSCULAR; INTRAVENOUS at 11:35

## 2023-02-11 RX ADMIN — POTASSIUM CHLORIDE 10 MEQ: 7.46 INJECTION, SOLUTION INTRAVENOUS at 09:52

## 2023-02-11 RX ADMIN — METOCLOPRAMIDE 10 MG: 5 INJECTION, SOLUTION INTRAMUSCULAR; INTRAVENOUS at 12:15

## 2023-02-11 RX ADMIN — INSULIN HUMAN 2 UNITS: 100 INJECTION, SOLUTION PARENTERAL at 14:48

## 2023-02-11 RX ADMIN — MAGNESIUM SULFATE HEPTAHYDRATE 2 G: 2 INJECTION, SOLUTION INTRAVENOUS at 14:16

## 2023-02-11 RX ADMIN — HYDROMORPHONE HYDROCHLORIDE 0.25 MG: 1 INJECTION, SOLUTION INTRAMUSCULAR; INTRAVENOUS; SUBCUTANEOUS at 00:49

## 2023-02-11 RX ADMIN — AMLODIPINE BESYLATE 10 MG: 10 TABLET ORAL at 08:35

## 2023-02-11 RX ADMIN — Medication 10 ML: at 08:33

## 2023-02-11 RX ADMIN — POTASSIUM CHLORIDE 10 MEQ: 7.46 INJECTION, SOLUTION INTRAVENOUS at 11:24

## 2023-02-11 RX ADMIN — METOCLOPRAMIDE 10 MG: 5 INJECTION, SOLUTION INTRAMUSCULAR; INTRAVENOUS at 05:56

## 2023-02-11 RX ADMIN — TAZOBACTAM SODIUM AND PIPERACILLIN SODIUM 3.38 G: 375; 3 INJECTION, SOLUTION INTRAVENOUS at 04:53

## 2023-02-11 RX ADMIN — METOCLOPRAMIDE 10 MG: 5 INJECTION, SOLUTION INTRAMUSCULAR; INTRAVENOUS at 18:43

## 2023-02-11 RX ADMIN — FLUCONAZOLE IN SODIUM CHLORIDE 400 MG: 2 INJECTION, SOLUTION INTRAVENOUS at 17:01

## 2023-02-11 RX ADMIN — POTASSIUM CHLORIDE 10 MEQ: 7.46 INJECTION, SOLUTION INTRAVENOUS at 20:09

## 2023-02-11 RX ADMIN — POTASSIUM PHOSPHATE, MONOBASIC POTASSIUM PHOSPHATE, DIBASIC: 224; 236 INJECTION, SOLUTION, CONCENTRATE INTRAVENOUS at 17:25

## 2023-02-11 RX ADMIN — HYDRALAZINE HYDROCHLORIDE 10 MG: 20 INJECTION INTRAMUSCULAR; INTRAVENOUS at 14:54

## 2023-02-11 RX ADMIN — HYDROMORPHONE HYDROCHLORIDE 0.25 MG: 1 INJECTION, SOLUTION INTRAMUSCULAR; INTRAVENOUS; SUBCUTANEOUS at 14:48

## 2023-02-11 RX ADMIN — Medication 10 ML: at 08:32

## 2023-02-11 RX ADMIN — I.V. FAT EMULSION 20 G: 20 EMULSION INTRAVENOUS at 17:25

## 2023-02-11 NOTE — PROGRESS NOTES
[facility] Clinical Pharmacy Services: TPN Daily Progress Note    TPN Day # 2   Indication: Prolonged Paralytic Ileus  Route: central  Type: standard    Subjective/Objective  Results from last 7 days   Lab Units 23  0506 23  1045 23  0443   SODIUM mmol/L 141   < > 140   POTASSIUM mmol/L 2.8*   < > 3.3*   CHLORIDE mmol/L 106   < > 112*   CO2 mmol/L 30.0*   < > 20.5*   BUN mg/dL 3*   < > 22   CREATININE mg/dL 0.37*   < > 1.84*   CALCIUM mg/dL 7.8*   < > 8.5*   ALBUMIN g/dL 2.1*   < > 2.4*   BILIRUBIN mg/dL  --   --  0.7   ALK PHOS U/L  --   --  41   ALT (SGPT) U/L  --   --  14   AST (SGOT) U/L  --   --  32   GLUCOSE mg/dL 158*   < > 54*   MAGNESIUM mg/dL 1.8   < >  --    PHOSPHORUS mg/dL 2.1*   < >  --     < > = values in this interval not displayed.        Diet Orders (active) (From admission, onward)       Start     Ordered    23 1008  Diet: Liquid Diets; Clear Liquid; Texture: Regular Texture (IDDSI 7); Fluid Consistency: Thin (IDDSI 0)  Diet Effective Now         23 1007                  Additional insulin administration while previous TPN infusing: none currently ordered  Additional electrolyte administration while previous TPN infusin mEq IV KCl, Kphos 30 mmol IV x1    Acid suppression: Famotidine 20 mg IV BID    Goal TPN Formula Recommendations: 100/1350/100 mL 20% AA/Dex/Lipids; 1950 kcal/day  Current TPN Formula: 50/600/100 mL 20% AA/Dex/Lipids    Assessment/Plan    Macronutrients: Will slowly increase macros to goal to avoid refeeding syndrome. 65/700/100 mL 20% (lipids continued)  Electrolytes/Additives: Will increase KCl from 50 to 60 mEq and Kphos from 22 to 32 mEq. All others to remain same.   MVI for TPN   Labs: CMP, Phos, Mg with AM labs  Comments: Glucoses in last 24 hours 155,158,168,156 - will recommend adding sliding scale insulin.    Carlos Echevarria, [facility]  Clinical Pharmacist

## 2023-02-11 NOTE — PLAN OF CARE
Goal Outcome Evaluation:  Plan of Care Reviewed With: patient           Outcome Evaluation: VSS, Dilaudid given twice for pain with relief. Pt up to BSC and moving well in the bed. Labs drawn and sent. IV atbx given. Pt walked around the nurses station once. Good output. Will CTM, safety maintained.

## 2023-02-11 NOTE — PLAN OF CARE
"Goal Outcome Evaluation:  Plan of Care Reviewed With: patient, daughter        Progress: improving  Outcome Evaluation: Pt seen for PT this PM and tolerated mobility well. Pt reports she has been up in the hallway with nsg 2x today. Pt transferred to EOB with min A x1 and stood with CGA. Pt urgently needing to use the bathroom, declines walking to the bathroom and ended up sitting on the BSC but going in her brief d/t being unable to get her brief down in time. Pt encouraged to call out to get up to the bathroom more regularly if it is that urgent when she gets up that she's unable to hold it - pt reports she hasn't been to to BSC in \"a while.\" Pt ambulated 150ft in the hallway with rwx and SBA-CGA. Demo's overall independence with ambulation, but very slow pace and fatigues quickly. Noted mild SOA upon return to room but satting in 90s. Pt adamantly declines sitting UIC following session, even just for dinner time. Required min A x1 for return to supine and left with needs met, daughter present. PT will continue to follow to progress mobility as tolerated. Anticipate DC home with HHPT.  "

## 2023-02-11 NOTE — PLAN OF CARE
Goal Outcome Evaluation:  Plan of Care Reviewed With: patient        Progress: no change  Outcome Evaluation: A/Ox4. VSS. Wound vac placed this am. Pt tolerated well. PICC line placed this afternoon. TPN started. IVF stopped when TPN started. Scopolamine patch behind left ear. NPO. NEW drain continues to drain serosanguinous fluid. Ambulated around nurses station with assist. Family at bedside later this afternoon with multiple questions re: TPN and going home. Deferred to MD's as this will be decided by them and not nursing staff. Safety measures in place.

## 2023-02-11 NOTE — PROGRESS NOTES
"   LOS: 15 days     Chief Complaint/ Reason for encounter: JUANY    Subjective   02/07/23 : Feels about the same today, still some generalized abdominal pain  Anxious to increase her diet, no nausea or vomiting today but did vomit twice yesterday  No shortness of breath or chest pain  No fevers or chills  Minimal edema    2/8: She is upset with the fact that her ostomy bag has stool in it  Remains n.p.o. per surgery recommendations  Denies any shortness of breath or chest pain, no fevers or chills no abdominal pain nausea vomiting or edema    2/9: Did not tolerate clear liquids, NG tube to be replaced, n.p.o., IR to place drain into the fluid collection seen on CT    2/10: Abdominal drain in place, still with some nausea, remains n.p.o. with plans to start TPN  Remains on IV fluids, no edema, good urine output    2/11 on TPN, states she wants a stark as she doesn't like getting up to urinate, bp above  goal     Medical history reviewed:  Abdominal Pain        Subjective    History taken from: Patient and chart    Vital Signs  Temp:  [97.8 °F (36.6 °C)-98.5 °F (36.9 °C)] 98.5 °F (36.9 °C)  Heart Rate:  [84-96] 88  Resp:  [18] 18  BP: (151-182)/(92-98) 160/97       Wt Readings from Last 1 Encounters:   02/11/23 0558 83.8 kg (184 lb 11.9 oz)   02/03/23 2226 83.1 kg (183 lb 3.2 oz)   01/27/23 1955 78.9 kg (174 lb)   01/27/23 1428 78.9 kg (174 lb)       Objective:  Vital signs: (most recent): Blood pressure 160/97, pulse 88, temperature 98.5 °F (36.9 °C), temperature source Oral, resp. rate 18, height 154.9 cm (60.98\"), weight 83.8 kg (184 lb 11.9 oz), SpO2 96 %.              Objective:  General Appearance:  Comfortable, nad   Awake, alert, oriented  HEENT: Mucous membranes moist, no injury, oropharynx clear  Lungs:  Normal effort and normal respiratory rate.  Breath sounds clear to auscultation.  No  respiratory distress.  No rales, decreased breath sounds or rhonchi.    Heart: Normal rate.  Regular rhythm.  S1, S2 " normal.  No murmur.   Abdomen: Abdomen is soft.  Diminished bowel sounds, nontender  Extremities: Trace ankle edema of bilateral lower extremities  Neurological: No focal motor or sensory deficits, pupils reactive  Skin:  Warm and dry.  No rash or cyanosis.       Results Review:    Intake/Output:     Intake/Output Summary (Last 24 hours) at 2/11/2023 1343  Last data filed at 2/11/2023 0558  Gross per 24 hour   Intake 0 ml   Output 5160 ml   Net -5160 ml         DATA:  Radiology and Labs:  The following labs independently reviewed by me. Additional labs ordered for tomorrow a.m.  Interval notes, chart personally reviewed by me.   Old records independently reviewed showing normal baseline creatinine    New problems include hypokalemia and hypophosphatemia  Discussed with patient herself at bedside    Risk/ complexity of medical care/ medical decision making moderate complexity, postop renal failure and fluid and electrolyte management    Labs:   Recent Results (from the past 24 hour(s))   POC Glucose Once    Collection Time: 02/11/23 12:28 AM    Specimen: Blood   Result Value Ref Range    Glucose 155 (H) 70 - 130 mg/dL   Magnesium    Collection Time: 02/11/23  5:06 AM    Specimen: Blood   Result Value Ref Range    Magnesium 1.8 1.6 - 2.4 mg/dL   Renal Function Panel    Collection Time: 02/11/23  5:06 AM    Specimen: Blood   Result Value Ref Range    Glucose 158 (H) 65 - 99 mg/dL    BUN 3 (L) 8 - 23 mg/dL    Creatinine 0.37 (L) 0.57 - 1.00 mg/dL    Sodium 141 136 - 145 mmol/L    Potassium 2.8 (L) 3.5 - 5.2 mmol/L    Chloride 106 98 - 107 mmol/L    CO2 30.0 (H) 22.0 - 29.0 mmol/L    Calcium 7.8 (L) 8.6 - 10.5 mg/dL    Albumin 2.1 (L) 3.5 - 5.2 g/dL    Phosphorus 2.1 (L) 2.5 - 4.5 mg/dL    Anion Gap 5.0 5.0 - 15.0 mmol/L    BUN/Creatinine Ratio 8.1 7.0 - 25.0    eGFR 113.5 >60.0 mL/min/1.73   CBC Auto Differential    Collection Time: 02/11/23  5:06 AM    Specimen: Blood   Result Value Ref Range    WBC 25.18 (H) 3.40 -  10.80 10*3/mm3    RBC 3.13 (L) 3.77 - 5.28 10*6/mm3    Hemoglobin 8.7 (L) 12.0 - 15.9 g/dL    Hematocrit 27.8 (L) 34.0 - 46.6 %    MCV 88.8 79.0 - 97.0 fL    MCH 27.8 26.6 - 33.0 pg    MCHC 31.3 (L) 31.5 - 35.7 g/dL    RDW 14.5 12.3 - 15.4 %    RDW-SD 46.5 37.0 - 54.0 fl    MPV 9.9 6.0 - 12.0 fL    Platelets 411 140 - 450 10*3/mm3   Manual Differential    Collection Time: 02/11/23  5:06 AM    Specimen: Blood   Result Value Ref Range    Neutrophil % 85.9 (H) 42.7 - 76.0 %    Lymphocyte % 10.1 (L) 19.6 - 45.3 %    Monocyte % 4.0 (L) 5.0 - 12.0 %    Neutrophils Absolute 21.63 (H) 1.70 - 7.00 10*3/mm3    Lymphocytes Absolute 2.54 0.70 - 3.10 10*3/mm3    Monocytes Absolute 1.01 (H) 0.10 - 0.90 10*3/mm3    Hypochromia Mod/2+ None Seen    Poikilocytes Slight/1+ None Seen    Polychromasia Slight/1+ None Seen    Target Cells Slight/1+ None Seen    WBC Morphology Normal Normal    Platelet Morphology Normal Normal   POC Glucose Once    Collection Time: 02/11/23  6:02 AM    Specimen: Blood   Result Value Ref Range    Glucose 168 (H) 70 - 130 mg/dL   POC Glucose Once    Collection Time: 02/11/23 11:18 AM    Specimen: Blood   Result Value Ref Range    Glucose 156 (H) 70 - 130 mg/dL       Radiology:  Pertinent radiology studies were reviewed as described above      Medications have been reviewed separately in chart overview      ASSESSMENT:   acute renal failure, creatinine peaked at 2.3 and is below baseline today,   Hypokalemia   Metabolic alkalosis   Hypoalbuminemia   Hypophosphatemia       Colitis status post sigmoid colon resection    Nausea and vomiting,  Postop ileus, remains n.p.o.  Abdominal fluid collection status post drain placement   Stricture of sigmoid colon, status post colostomy  Hypokalemia, worse today  Hypertension, receiving as needed IV hydralazine  Anemia        DISCUSSION/PLAN:   Cr stable   Continue on TPN   Give additional K, Mg and Po4 IV today   Prn iv hydralazine for HTN   Nursing to put pt on thelma        Continue to monitor electrolytes and volume closely      Gilda Soliz MD   [facility]   Office phone number: [phone]  Answering service phone number: [phone]    02/11/23  13:43 EST    Dictation performed using Dragon dictation [employer]

## 2023-02-11 NOTE — PROGRESS NOTES
Chief Complaint:    S/P Resection of left colon and sigmoid colon with colostomy, POD 8    Subjective:    The patient is generally feeling well with only expected postop abdominal pain.  She is not having any nausea or vomiting and tolerated sips of water well.  She is having colostomy output.  She was up in a chair yesterday and this morning.  She has a wound VAC on her incision.    Objective:    Temp:  [97.8 °F (36.6 °C)-98.4 °F (36.9 °C)] 98.3 °F (36.8 °C)  Heart Rate:  [84-96] 84  Resp:  [18-20] 18  BP: (151-182)/(88-98) 151/92    Physical Exam  Pulmonary:      Effort: Pulmonary effort is normal. No respiratory distress.   Abdominal:      Palpations: Abdomen is soft.      Tenderness: There is generalized abdominal tenderness (Appropriate postop tenderness).      Comments: The abdomen is soft and mildly distended.  It is mildly tender with appropriate postop tenderness.  There are bowel sounds.  Wound VAC is in place.  The NEW drain has serous appearing output.  The colostomy has output.   Neurological:      Mental Status: She is alert.   Psychiatric:         Behavior: Behavior is cooperative.         Results:    WBC is 25.18.  H/H is 8.7/27.8.  C. difficile is negative.  Cultures from abdominal fluid shows scant growth of E. coli that is resistant to Zosyn.    Impression/Plan:    The patient is POD 8 from a resection of the left colon and sigmoid colon with colostomy.  Her expected postop ileus is slowly resolving and her diet will be carefully advanced.    Her WBC has increased but her cultures show E. coli that is resistant to Zosyn.  Zosyn will be stopped and she will be started on Rocephin.    We will continue TPN while her diet is slowly advanced.    Beck Wei Jr., M.D.

## 2023-02-11 NOTE — PROGRESS NOTES
"  Infectious Diseases Progress Note    Galdino Esteves MD     [facility]  Los: 15 days  Patient Identification:  Name: Hali Tejeda  Age: 62 y.o.  Sex: female  :  1960  MRN: [id number]         Primary Care Physician: Richard Delgado MD        Subjective: Denies any new complaints.  Interval History: Underwent placement of wound VAC on her abdominal wound/incision.  TPN is started.    Objective:    Scheduled Meds:amLODIPine, 10 mg, Oral, Q24H  cefTRIAXone, 1 g, Intravenous, Q24H  famotidine, 20 mg, Intravenous, BID AC  Fat Emulsion Plant Based, 100 mL, Intravenous, Q24H (TPN)  fluconazole, 400 mg, Intravenous, Q24H  metoclopramide, 10 mg, Intravenous, Q6H  Scopolamine, 1 patch, Transdermal, Q72H  sodium chloride, 10 mL, Intravenous, Q12H  sodium chloride, 10 mL, Intravenous, Q12H      Continuous Infusions:Adult Central 2-in-1 TPN, , Last Rate: 75 mL/hr at 02/10/23 1805  Pharmacy to Dose TPN,         Vital signs in last 24 hours:  Temp:  [97.8 °F (36.6 °C)-98.4 °F (36.9 °C)] 98.3 °F (36.8 °C)  Heart Rate:  [84-96] 84  Resp:  [18-20] 18  BP: (151-182)/(88-98) 151/92    Intake/Output:    Intake/Output Summary (Last 24 hours) at 2023 1142  Last data filed at 2023 0558  Gross per 24 hour   Intake 0 ml   Output 5510 ml   Net -5510 ml       Exam:  /92 (BP Location: Right arm, Patient Position: Lying)   Pulse 84   Temp 98.3 °F (36.8 °C) (Oral)   Resp 18   Ht 154.9 cm (60.98\")   Wt 83.8 kg (184 lb 11.9 oz)   SpO2 96%   BMI 34.93 kg/m²   Patient is examined using the personal protective equipment as per guidelines from infection control for this particular patient as enacted.  Hand washing was performed before and after patient interaction.  General Appearance:    Alert, cooperative, no distress, AAOx3                          Head:    Normocephalic, without obvious abnormality, atraumatic                           Eyes:    PERRL, conjunctivae/corneas clear, EOM's intact, both " eyes                         Throat:   Lips, tongue, gums normal; oral mucosa pink and moist                           Neck:   Supple, symmetrical, trachea midline, no JVD                         Lungs:    Clear to auscultation bilaterally, respirations unlabored                 Chest Wall:    No tenderness or deformity                          Heart:  S1-S2 regular                  Abdomen:   Ostomy present with midline incision has wound VAC in place with no surrounding cellulitis.                 Extremities:   Extremities normal, atraumatic, no cyanosis or edema                        Pulses:   Pulses palpable in all extremities                            Skin:   Skin is warm and dry,  no rashes or palpable lesions                  Neurologic: Grossly nonfocal       Data Review:    I reviewed the patient's new clinical results.  Results from last 7 days   Lab Units 02/11/23  0506 02/10/23  0917 02/09/23  0619 02/08/23  0405 02/07/23  0440 02/06/23  0443 02/05/23  0334   WBC 10*3/mm3 25.18* 20.95* 25.99* 18.59* 8.48 14.19* 15.58*   HEMOGLOBIN g/dL 8.7* 8.7* 9.6* 9.7* 10.1* 7.7* 8.5*   PLATELETS 10*3/mm3 411 370 337 281 275 215 189     Results from last 7 days   Lab Units 02/11/23  0506 02/10/23  0356 02/09/23  0619 02/08/23  2132 02/08/23  0405 02/07/23  1514 02/07/23  0440 02/06/23  0443   SODIUM mmol/L 141 141 143  --  143 146* 145 140   POTASSIUM mmol/L 2.8* 2.7* 4.6 3.3* 3.5 3.1* 2.7* 3.3*   CHLORIDE mmol/L 106 108* 111*  --  116* 116* 111* 112*   CO2 mmol/L 30.0* 26.0 22.9  --  21.0* 19.8* 18.5* 20.5*   BUN mg/dL 3* 4* 5*  --  9 12 16 22   CREATININE mg/dL 0.37* 0.39* 0.46*  --  0.62 0.72 0.85 1.84*   CALCIUM mg/dL 7.8* 8.1* 8.4*  --  8.4* 8.6 8.9 8.5*   GLUCOSE mg/dL 158* 94 101*  --  79 71 34* 54*     Microbiology Results (last 10 days)     Procedure Component Value - Date/Time    Clostridioides difficile Toxin - Stool, Per Stoma [[id number]]  (Normal) Collected: 02/10/23 1243    Lab Status: Final result  Specimen: Stool from Per Stoma Updated: 02/10/23 1341    Narrative:      The following orders were created for panel order Clostridioides difficile Toxin - Stool, Per Stoma.  Procedure                               Abnormality         Status                     ---------                               -----------         ------                     Clostridioides difficile...[[id number]]  Normal              Final result                 Please view results for these tests on the individual orders.    Clostridioides difficile Toxin, PCR - Stool, Per Stoma [[id number]]  (Normal) Collected: 02/10/23 1243    Lab Status: Final result Specimen: Stool from Per Stoma Updated: 02/10/23 1341     C. Difficile Toxins by PCR Negative    Narrative:      The result indicates the absence of toxigenic C. difficile from stool specimen.     Body Fluid Culture - Drainage, Peritoneum [[id number]]  (Abnormal)  (Susceptibility) Collected: 02/09/23 1458    Lab Status: Final result Specimen: Drainage from Peritoneum Updated: 02/11/23 0851     Body Fluid Culture Scant growth (1+) Escherichia coli     Gram Stain Few (2+) WBCs per low power field      No organisms seen    Susceptibility      Escherichia coli      RENETTA      Amikacin Susceptible      Ampicillin Resistant     Ampicillin + Sulbactam Resistant     Cefepime Susceptible      Ceftazidime Susceptible      Ceftriaxone Susceptible      Gentamicin Resistant     Levofloxacin Susceptible      Piperacillin + Tazobactam Resistant     Tobramycin Intermediate      Trimethoprim + Sulfamethoxazole Resistant                      Susceptibility Comments     Escherichia coli    Cefazolin sensitivity will not be reported for Enterobacteriaceae in non-urine isolates. If cefazolin is preferred, please call the microbiology lab to request an E-test.  With the exception of urinary-sourced infections, aminoglycosides should not be used as monotherapy.                     Assessment:    Colitis    Nausea and  vomiting    Stricture of sigmoid colon (HCC)  1-intra-abdominal sepsis with intra-abdominal abscess in the setting of prolonged colitis, sigmoid stricture, endoscopic injury to colonic wall with microperforation and subsequent laparotomy partial colectomy and colostomy and intraoperative injury to the spleen with repair while being on antibiotic therapy-likely pathogen to consider in this situation would be enteric amberly along with selection of yeast due to prolonged antibiotic therapy-status post percutaneous drain placement on 2/9/2023  2-hypertension  3-malnourishment  4-possible postop abdominal wall/incisional infection/evolving abscess        Recommendations/Discussions:  Agree with DC Zosyn and initiating ceftriaxone.  Continue with fluconazole.  May require addition of Flagyl to cover for the spectrum of pathogens likely  possible in this situation  Monitor her clinical course and may require 7 to 10 days of antibiotic treatment from final indefinite intervention for the intra-abdominal abscess in the setting.  Galdino Esteves MD  2/11/2023  11:42 EST    Parts of this note may be an electronic transcription/translation of spoken language to printed text using the Dragon dictation system.

## 2023-02-11 NOTE — PROGRESS NOTES
"Daily progress note    Primary care physician  Dr. Delgado    Chief complaint  Doing same with no new complaints     History of present illness  62-year-old female with history of hypertension presented to [facility] emergency room with left lower quadrant abdominal pain for last 1 month.  Patient also have occasional loose stools.  Patient denies any nausea vomiting.  Patient recently diagnosed with colitis and treated with oral antibiotics without any improvement.  Patient has noticed blood in the stools.  Patient evaluated in ER with CT abdominal pelvis which shows worsening colitis and failed outpatient treatment admit for management.  Patient has no fever chills chest pain shortness of breath with sweats weight loss or weight gain.     REVIEW OF SYSTEMS  Unremarkable except abdominal pain      PHYSICAL EXAM  Blood pressure 160/97, pulse 88, temperature 98.5 °F (36.9 °C), temperature source Oral, resp. rate 18, height 154.9 cm (60.98\"), weight 83.8 kg (184 lb 11.9 oz), SpO2 96 %.    GENERAL:  Awake and alert in no acute distress  HEENT:  Unremarkable  NECK:  Supple  CV: regular rhythm, normal rate  RESPIRATORY: normal effort, clear to auscultation bilaterally  ABDOMEN: soft, left lower quadrant tenderness colostomy in place  MUSCULOSKELETAL: no deformity  NEURO: alert, moves all extremities, follows commands  PSYCH:  calm, cooperative  SKIN: warm, dry     LAB RESULTS  Lab Results (last 24 hours)     Procedure Component Value Units Date/Time    POC Glucose Once [[id number]]  (Abnormal) Collected: 02/11/23 1118    Specimen: Blood Updated: 02/11/23 1119     Glucose 156 mg/dL      Comment: Meter: [id number] : [id number] Yonatan EUCEDA       Body Fluid Culture - Drainage, Peritoneum [[id number]]  (Abnormal)  (Susceptibility) Collected: 02/09/23 1458    Specimen: Drainage from Peritoneum Updated: 02/11/23 0851     Body Fluid Culture Scant growth (1+) Escherichia coli     Gram Stain Few (2+) WBCs per low power " field      No organisms seen    Susceptibility      Escherichia coli      RENETTA      Amikacin Susceptible      Ampicillin Resistant     Ampicillin + Sulbactam Resistant     Cefepime Susceptible      Ceftazidime Susceptible      Ceftriaxone Susceptible      Gentamicin Resistant     Levofloxacin Susceptible      Piperacillin + Tazobactam Resistant     Tobramycin Intermediate      Trimethoprim + Sulfamethoxazole Resistant                      Susceptibility Comments     Escherichia coli    Cefazolin sensitivity will not be reported for Enterobacteriaceae in non-urine isolates. If cefazolin is preferred, please call the microbiology lab to request an E-test.  With the exception of urinary-sourced infections, aminoglycosides should not be used as monotherapy.             Manual Differential [[id number]]  (Abnormal) Collected: 02/11/23 0506    Specimen: Blood Updated: 02/11/23 0613     Neutrophil % 85.9 %      Lymphocyte % 10.1 %      Monocyte % 4.0 %      Neutrophils Absolute 21.63 10*3/mm3      Lymphocytes Absolute 2.54 10*3/mm3      Monocytes Absolute 1.01 10*3/mm3      Hypochromia Mod/2+     Poikilocytes Slight/1+     Polychromasia Slight/1+     Target Cells Slight/1+     WBC Morphology Normal     Platelet Morphology Normal    CBC & Differential [[id number]]  (Abnormal) Collected: 02/11/23 0506    Specimen: Blood Updated: 02/11/23 0613    Narrative:      The following orders were created for panel order CBC & Differential.  Procedure                               Abnormality         Status                     ---------                               -----------         ------                     CBC Auto Differential[[id number]]        Abnormal            Final result                 Please view results for these tests on the individual orders.    CBC Auto Differential [[id number]]  (Abnormal) Collected: 02/11/23 0506    Specimen: Blood Updated: 02/11/23 0613     WBC 25.18 10*3/mm3      RBC 3.13 10*6/mm3      Hemoglobin 8.7  g/dL      Hematocrit 27.8 %      MCV 88.8 fL      MCH 27.8 pg      MCHC 31.3 g/dL      RDW 14.5 %      RDW-SD 46.5 fl      MPV 9.9 fL      Platelets 411 10*3/mm3     POC Glucose Once [[id number]]  (Abnormal) Collected: 02/11/23 0602    Specimen: Blood Updated: 02/11/23 0603     Glucose 168 mg/dL      Comment: Meter: [id number] : [id number] Short ActiveTrakkeya CNA       Renal Function Panel [[id number]]  (Abnormal) Collected: 02/11/23 0506    Specimen: Blood Updated: 02/11/23 0550     Glucose 158 mg/dL      BUN 3 mg/dL      Creatinine 0.37 mg/dL      Sodium 141 mmol/L      Potassium 2.8 mmol/L      Chloride 106 mmol/L      CO2 30.0 mmol/L      Calcium 7.8 mg/dL      Albumin 2.1 g/dL      Phosphorus 2.1 mg/dL      Anion Gap 5.0 mmol/L      BUN/Creatinine Ratio 8.1     eGFR 113.5 mL/min/1.73     Narrative:      GFR Normal >60  Chronic Kidney Disease <60  Kidney Failure <15      Magnesium [[id number]]  (Normal) Collected: 02/11/23 0506    Specimen: Blood Updated: 02/11/23 0550     Magnesium 1.8 mg/dL     POC Glucose Once [[id number]]  (Abnormal) Collected: 02/11/23 0028    Specimen: Blood Updated: 02/11/23 0030     Glucose 155 mg/dL      Comment: Meter: [id number] : [id number] Short Array Storma CNA           Imaging Results (Last 24 Hours)     ** No results found for the last 24 hours. **        Upper and lower endoscopy results noted and discussed with patient    Current Facility-Administered Medications:   •  Adult Central 2-in-1 TPN, , Intravenous, Continuous, Lino Gaston MD, Last Rate: 75 mL/hr at 02/10/23 1805, New Bag at 02/10/23 1805  •  Adult Central 2-in-1 TPN, , Intravenous, Continuous, QuickLilibeth PA-C  •  amLODIPine (NORVASC) tablet 10 mg, 10 mg, Oral, Q24H, Gideon Hope MD, 10 mg at 02/11/23 0835  •  cefTRIAXone (ROCEPHIN) 1 g in sodium chloride 0.9 % 100 mL IVPB-VTB, 1 g, Intravenous, Q24H, Beck Wei Jr., MD, Last Rate: 200 mL/hr at 02/11/23 1135, 1 g at 02/11/23 1135  •  dextrose (D50W) (25  g/50 mL) IV injection 25 g, 25 g, Intravenous, Q15 Min PRN, Beck Wei Jr., MD  •  dextrose (GLUTOSE) oral gel 15 g, 15 g, Oral, Q15 Min PRN, Beck Wei Jr., MD  •  famotidine (PEPCID) injection 20 mg, 20 mg, Intravenous, BID AC, Lilibeth Rahman PA-C, 20 mg at 02/11/23 0556  •  Fat Emulsion Plant Based (INTRALIPID,LIPOSYN) 20 % infusion 20 g, 100 mL, Intravenous, Q24H (TPN), Lino Gaston MD, Last Rate: 8.33 mL/hr at 02/10/23 1804, 20 g at 02/10/23 1804  •  fluconazole (DIFLUCAN) IVPB 400 mg, 400 mg, Intravenous, Q24H, Gideon Hope MD, Last Rate: 100 mL/hr at 02/10/23 1806, 400 mg at 02/10/23 1806  •  glucagon (GLUCAGEN) injection 1 mg, 1 mg, Intramuscular, Q15 Min PRN, Bekc Wei Jr., MD  •  hydrALAZINE (APRESOLINE) injection 10 mg, 10 mg, Intravenous, Q4H PRN, Gideon Hope MD, 10 mg at 02/11/23 1454  •  HYDROmorphone (DILAUDID) injection 0.25 mg, 0.25 mg, Intravenous, Q2H PRN, 0.25 mg at 02/11/23 1448 **AND** naloxone (NARCAN) injection 0.4 mg, 0.4 mg, Intravenous, Q5 Min PRN, Lino Gaston MD  •  insulin regular (humuLIN R,novoLIN R) injection 0-7 Units, 0-7 Units, Subcutaneous, Q6H, Beck Wei Jr., MD, 2 Units at 02/11/23 1448  •  metoclopramide (REGLAN) injection 10 mg, 10 mg, Intravenous, Q6H, Lino Gaston MD, 10 mg at 02/11/23 1215  •  nitroglycerin (NITROSTAT) SL tablet 0.4 mg, 0.4 mg, Sublingual, Q5 Min PRN, Lino Gaston MD  •  ondansetron (ZOFRAN) injection 4 mg, 4 mg, Intravenous, Q6H PRN, Lino Gaston MD, 4 mg at 02/09/23 2212  •  Pharmacy to Dose TPN, , Does not apply, Continuous PRN, Lilibeth Rahman PA-C  •  potassium chloride 10 mEq in 100 mL IVPB, 10 mEq, Intravenous, Q1H PRN, Matthew Soliz MD, Last Rate: 100 mL/hr at 02/11/23 1408, 10 mEq at 02/11/23 1408  •  potassium phosphate 45 mmol in sodium chloride 0.9 % 500 mL infusion, 45 mmol, Intravenous, PRN **OR** potassium phosphate 30 mmol in sodium chloride 0.9 % 250 mL infusion, 30 mmol,  Intravenous, PRN **OR** potassium phosphate 15 mmol in sodium chloride 0.9 % 100 mL infusion, 15 mmol, Intravenous, PRN, 15 mmol at 02/11/23 1220 **OR** sodium phosphates 40 mmol in sodium chloride 0.9 % 500 mL IVPB, 40 mmol, Intravenous, PRN **OR** sodium phosphates 20 mmol in sodium chloride 0.9 % 250 mL IVPB, 20 mmol, Intravenous, PRN, Matthew Soliz MD  •  scopolamine patch 1 mg/72 hr, 1 patch, Transdermal, Q72H, Quick, Lilibeth A, PA-C, 1 patch at 02/09/23 1156  •  sodium chloride 0.9 % flush 10 mL, 10 mL, Intravenous, Q12H, Quick, Lilibeth A, PA-C, 10 mL at 02/11/23 0833  •  sodium chloride 0.9 % flush 10 mL, 10 mL, Intravenous, Q12H, Quick, Lilibeth A, PA-C, 10 mL at 02/11/23 0832  •  sodium chloride 0.9 % flush 10 mL, 10 mL, Intravenous, PRN, Quick, Lilibeth A, PA-C  •  sodium chloride 0.9 % flush 20 mL, 20 mL, Intravenous, PRN, Quick, Lilibeth A, PA-C  •  sodium chloride 0.9 % infusion 40 mL, 40 mL, Intravenous, PRN, Quick, Lilibeth A, PA-C     ASSESSMENT  Acute colitis with sigmoid stricture and microperforation s/p colon resection and colostomy  Abdominal fluid collection/abscess status post CT-guided drainage  Postop ileus  Acute kidney injury resolved  Hypertension   Gastroesophageal reflux disease    PLAN  CPM  Postop care  IVF  TPN  Accu-Chek with low-dose sliding scale insulin  Replace potassium  Antibiotics per infectious disease  Infectious disease consult appreciated  Adjust blood pressure medications  Pain management  Continue home medications  Stress ulcer DVT prophylaxis  Supportive care  PT OT  Discussed with family nursing staff   Follow closely and further recommendation according to hospital course    SANG SINGLETARY MD    Copied text in this note has been reviewed and is accurate as of 02/11/23

## 2023-02-11 NOTE — CONSULTS
Nutrition Services    Patient Name:  Hali Tejeda  YOB: 1960  MRN: [id number]  Admit Date:  1/27/2023    Nutrition Consult for TPN Assessment:  Please see full assessment in Nutrition note on 2/10. Will follow closely.    Electronically signed by:  Taryn Lagunas RD  02/11/23 12:00 EST

## 2023-02-11 NOTE — THERAPY TREATMENT NOTE
Patient Name: Hali Tejeda  : 1960    MRN: [id number]                              Today's Date: 2023       Admit Date: 2023    Visit Dx:     ICD-10-CM ICD-9-CM   1. Colitis  K52.9 558.9   2. Nausea and vomiting, unspecified vomiting type  R11.2 787.01   3. Stricture of sigmoid colon (HCC)  K56.699 560.9     Patient Active Problem List   Diagnosis   • Left sided colitis with complication (HCC)   • Colitis   • Nausea and vomiting   • Stricture of sigmoid colon (HCC)   • Uterine anomaly   • Hypertension   • Avulsion fracture of distal fibula     Past Medical History:   Diagnosis Date   • Abnormal EKG 2020   • Avulsion fracture of distal fibula 2015   • H. pylori infection 2020   • Hepatic steatosis 2020   • Hiatal hernia    • HTN (hypertension)    • Hyperlipidemia    • Insomnia    • Left sided colitis with complication (HCC) 2023    ADMITTED TO [facility]   • LGSIL on Pap smear of cervix     (+) HPV   • LGSIL Pap smear of vagina 2016   • Snoring    • Stricture of sigmoid colon (HCC) 2023   • Uterine fibroid    • Vaginal atrophy      Past Surgical History:   Procedure Laterality Date   • BREAST LUMPECTOMY Left     benign   •  SECTION N/A    • COLON RESECTION N/A 2/3/2023    Procedure: COLON RESECTION LAPAROSCOPIC CONVERTED TO OPEN SIGMOID AND LEFT MOBILIZATION OF SPLENIC FLEXURE WITH DAVINCI ROBOT;  Surgeon: Lino Gaston MD;  Location: [facility];  Service: Robotics - DaVinci;  Laterality: N/A;   • COLONOSCOPY N/A 2014    COULD ONLY GET SCOPE TO 70 CM DUE TO SIGNIFICANT STRICTURE SECONDARY TO SEVERE DIVERTICULITIS OR CANCER, ACBE ORDERED, DR. PARAG HUDSON AT [place]   • COLONOSCOPY N/A 2023    MODERATE STENOSIS IN SIGMOID-DILATED, MANY DIVERTICULA IN SIGMOID AND DESCENDING, COPIOUS AMTS OF STOOL, MUCOSAL TEAR 55 CM FROM ANAL VERGE, DR. JENNI SMITH AT [facility]   • COLPOSCOPY N/A 2016   • ENDOSCOPY N/A 2023    GASTRITIS, SMALL  HIATAL HERNIA, DR. JENNI SMITH AT [facility]   • ENDOSCOPY N/A 09/15/2009    DR. PARAG HUDSON AT [place]   • GASTRIC SLEEVE LAPAROSCOPIC N/A 07/09/2020    WITH REMOVAL OF GASTRIC BAND, DR. OLIMPIA PALACIOS AT [facility]   • HYSTERECTOMY N/A 01/2005    WITH RSO   • LAPAROSCOPIC GASTRIC BANDING N/A 10/20/2019    DR. PARAG HUDSON AT [place]   • SALPINGO OOPHORECTOMY Left     LSO, IN TEEN YEARS   • WISDOM TOOTH EXTRACTION Bilateral       General Information     [facility] Name 02/11/23 1655          Physical Therapy Time and Intention    Document Type therapy note (daily note)  -     Mode of Treatment individual therapy;physical therapy  -     Row Name 02/11/23 1655          General Information    Patient Profile Reviewed yes  -     Existing Precautions/Restrictions fall  -     Row Name 02/11/23 1655          Cognition    Orientation Status (Cognition) oriented x 4  -[facility] Name 02/11/23 1655          Safety Issues, Functional Mobility    Impairments Affecting Function (Mobility) strength;pain;endurance/activity tolerance  -           User Key  (r) = Recorded By, (t) = Taken By, (c) = Cosigned By    Initials Name Provider Type     Lilibeth Herrera PT Physical Therapist               Mobility     Row Name 02/11/23 1655          Bed Mobility    Bed Mobility supine-sit;sit-supine  -     Supine-Sit [place] (Bed Mobility) minimum assist (75% patient effort);1 person assist;verbal cues  -     Sit-Supine [place] (Bed Mobility) minimum assist (75% patient effort);1 person assist;verbal cues  -     Assistive Device (Bed Mobility) bed rails;head of bed elevated  -     Row Name 02/11/23 1655          Sit-Stand Transfer    Sit-Stand [place] (Transfers) contact guard;verbal cues;minimum assist (75% patient effort)  -     Assistive Device (Sit-Stand Transfers) walker, front-wheeled  -     Comment, (Sit-Stand Transfer) CGA for STS from bed, min A x1 for STS from bed  -     Row Name 02/11/23 1655           Gait/Stairs (Locomotion)    [place] Level (Gait) contact guard;standby assist;verbal cues  -     Assistive Device (Gait) walker, front-wheeled  -     Distance in Feet (Gait) 150ft  -     Deviations/Abnormal Patterns (Gait) gait speed decreased;jasmin decreased;stride length decreased  -     Bilateral Gait Deviations forward flexed posture  -     Comment, (Gait/Stairs) Very slow pace  -           User Key  (r) = Recorded By, (t) = Taken By, (c) = Cosigned By    Initials Name Provider Type     Lilibeth Herrera PT Physical Therapist               Obj/Interventions    No documentation.                Goals/Plan     Row Name 02/11/23 1701          Bed Mobility Goal 1 (PT)    Activity/Assistive Device (Bed Mobility Goal 1, PT) bed mobility activities, all  -     [place] Level/Cues Needed (Bed Mobility Goal 1, PT) modified independence  -     Time Frame (Bed Mobility Goal 1, PT) 1 week  -     Progress/Outcomes (Bed Mobility Goal 1, PT) goal ongoing  -     Row Name 02/11/23 1701          Transfer Goal 1 (PT)    Activity/Assistive Device (Transfer Goal 1, PT) sit-to-stand/stand-to-sit;bed-to-chair/chair-to-bed  -     [place] Level/Cues Needed (Transfer Goal 1, PT) modified independence  -     Time Frame (Transfer Goal 1, PT) 1 week  -     Progress/Outcome (Transfer Goal 1, PT) goal ongoing  -     Row Name 02/11/23 1701          Gait Training Goal 1 (PT)    Activity/Assistive Device (Gait Training Goal 1, PT) assistive device use;gait (walking locomotion)  -     [place] Level (Gait Training Goal 1, PT) modified independence  -     Distance (Gait Training Goal 1, PT) 300  -     Time Frame (Gait Training Goal 1, PT) 1 week  -     Progress/Outcome (Gait Training Goal 1, PT) goal ongoing  -           User Key  (r) = Recorded By, (t) = Taken By, (c) = Cosigned By    Initials Name Provider Type     Lilibeth Herrera PT Physical Therapist               Clinical  "Impression     Row Name 02/11/23 1657          Pain    Pretreatment Pain Rating 8/10  [facility]     Pain Location - abdomen  -     Pain Intervention(s) Repositioned;Ambulation/increased activity;Rest  -     Row Name 02/11/23 1657          Plan of Care Review    Plan of Care Reviewed With patient;daughter  -     Progress improving  -     Outcome Evaluation Pt seen for PT this PM and tolerated mobility well. Pt reports she has been up in the hallway with nsg 2x today. Pt transferred to EOB with min A x1 and stood with CGA. Pt urgently needing to use the bathroom, declines walking to the bathroom and ended up sitting on the BSC but going in her brief d/t being unable to get her brief down in time. Pt encouraged to call out to get up to the bathroom more regularly if it is that urgent when she gets up that she's unable to hold it - pt reports she hasn't been to to BSC in \"a while.\" Pt ambulated 150ft in the hallway with rwx and SBA-CGA. Demo's overall independence with ambulation, but very slow pace and fatigues quickly. Noted mild SOA upon return to room but satting in 90s. Pt adamantly declines sitting UIC following session, even just for dinner time. Required min A x1 for return to supine and left with needs met, daughter present. PT will continue to follow to progress mobility as tolerated. Anticipate DC home with HHPT.  -     Row Name 02/11/23 1657          Vital Signs    O2 Delivery Pre Treatment room air  -     O2 Delivery Intra Treatment room air  -     O2 Delivery Post Treatment room air  [facility] Name 02/11/23 1657          Positioning and Restraints    Pre-Treatment Position in bed  -     Post Treatment Position bed  -     In Bed notified nsg;supine;call light within reach;encouraged to call for assist;exit alarm on;with family/caregiver  -           User Key  (r) = Recorded By, (t) = Taken By, (c) = Cosigned By    Initials Name Provider Type     Lilibeth Herrera, PT Physical Therapist    "            Outcome Measures     Row Name 02/11/23 1702 02/11/23 0835       How much help from another person do you currently need...    Turning from your back to your side while in flat bed without using bedrails? 3  - 4  -LD    Moving from lying on back to sitting on the side of a flat bed without bedrails? 3  - 3  -LD    Moving to and from a bed to a chair (including a wheelchair)? 3  - 3  -LD    Standing up from a chair using your arms (e.g., wheelchair, bedside chair)? 3  - 3  -LD    Climbing 3-5 steps with a railing? 2  - 2  -LD    To walk in hospital room? 3  - 3  -LD    AM-PAC 6 Clicks Score (PT) 17  - 18  -LD    Highest level of mobility 5 --> Static standing  - 6 --> Walked 10 steps or more  -    Row Name 02/11/23 1702          Functional Assessment    Outcome Measure Options AM-PAC 6 Clicks Basic Mobility (PT)  -           User Key  (r) = Recorded By, (t) = Taken By, (c) = Cosigned By    Initials Name Provider Type     Serenity Serrano, RN Registered Nurse     Lilibeth Herrera, PT Physical Therapist                             Physical Therapy Education     Title: PT OT SLP Therapies (Done)     Topic: Physical Therapy (Done)     Point: Mobility training (Done)     Learning Progress Summary           Patient Acceptance, E,TB,D, VU,NR by  at 2/11/2023 1702    Acceptance, E,TB, VU,NR by  at 2/5/2023 0935    Acceptance, E,TB, VU,NR by  at 2/4/2023 0911    Acceptance, E, VU by  at 1/30/2023 1441                   Point: Home exercise program (Done)     Learning Progress Summary           Patient Acceptance, E,TB,D, VU,NR by  at 2/11/2023 1702    Acceptance, E,TB, VU,NR by  at 2/5/2023 0935    Acceptance, E,TB, VU,NR by  at 2/4/2023 0911                   Point: Body mechanics (Done)     Learning Progress Summary           Patient Acceptance, E,TB,D, VU,NR by  at 2/11/2023 1702    Acceptance, E,TB, VU,NR by  at 2/5/2023 0935    Acceptance, E,TB, VU,NR by CS at 2/4/2023  "0911                   Point: Precautions (Done)     Learning Progress Summary           Patient Acceptance, E,TB,D, VU,NR by  at 2/11/2023 1702    Acceptance, E,TB, VU,NR by  at 2/5/2023 0935    Acceptance, E,TB, VU,NR by  at 2/4/2023 0911                               User Key     Initials Effective Dates Name Provider Type Discipline     06/16/21 -  Gian Guillen, PT Physical Therapist PT     12/13/22 -  Sunni Wolfe PT Physical Therapist PT     04/08/22 -  Lilibeth Herrera PT Physical Therapist PT              PT Recommendation and Plan     Plan of Care Reviewed With: patient, daughter  Progress: improving  Outcome Evaluation: Pt seen for PT this PM and tolerated mobility well. Pt reports she has been up in the hallway with nsg 2x today. Pt transferred to EOB with min A x1 and stood with CGA. Pt urgently needing to use the bathroom, declines walking to the bathroom and ended up sitting on the BSC but going in her brief d/t being unable to get her brief down in time. Pt encouraged to call out to get up to the bathroom more regularly if it is that urgent when she gets up that she's unable to hold it - pt reports she hasn't been to to BSC in \"a while.\" Pt ambulated 150ft in the hallway with rwx and SBA-CGA. Demo's overall independence with ambulation, but very slow pace and fatigues quickly. Noted mild SOA upon return to room but satting in 90s. Pt adamantly declines sitting UIC following session, even just for dinner time. Required min A x1 for return to supine and left with needs met, daughter present. PT will continue to follow to progress mobility as tolerated. Anticipate DC home with HHPT.     Time Calculation:    PT Charges     Row Name 02/11/23 1702             Time Calculation    Start Time 1623  -      Stop Time 1650  -      Time Calculation (min) 27 min  -      PT Received On 02/11/23  -      PT - Next Appointment 02/13/23  -      PT Goal Re-Cert Due Date 02/18/23  -      "    Time Calculation- PT    Total Timed Code Minutes- PT 27 minute(s)  -         Timed Charges    81886 - Gait Training Minutes  15  -BH      45474 - PT Therapeutic Activity Minutes 12  -BH         Total Minutes    Timed Charges Total Minutes 27  -       Total Minutes 27  -BH            User Key  (r) = Recorded By, (t) = Taken By, (c) = Cosigned By    Initials Name Provider Type     Lilibeth Herrera, PT Physical Therapist              Therapy Charges for Today     Code Description Service Date Service Provider Modifiers Qty    74563869216 HC GAIT TRAINING EA 15 MIN 2/11/2023 Lilibeth Herrera, PT GP 1    [id number]  PT THERAPEUTIC ACT EA 15 MIN 2/11/2023 Lilibeth Herrera, PT GP 1          PT G-Codes  Outcome Measure Options: AM-PAC 6 Clicks Basic Mobility (PT)  AM-PAC 6 Clicks Score (PT): 17  PT Discharge Summary  Anticipated Discharge Disposition (PT): home with assist, home with home health    Lilibeth Herrera, PT  2/11/2023

## 2023-02-12 LAB
ALBUMIN SERPL-MCNC: 2.2 G/DL (ref 3.5–5.2)
ALBUMIN/GLOB SERPL: 0.7 G/DL
ALP SERPL-CCNC: 60 U/L (ref 39–117)
ALT SERPL W P-5'-P-CCNC: 9 U/L (ref 1–33)
ANION GAP SERPL CALCULATED.3IONS-SCNC: 4 MMOL/L (ref 5–15)
ANISOCYTOSIS BLD QL: ABNORMAL
AST SERPL-CCNC: 12 U/L (ref 1–32)
BASOPHILS # BLD MANUAL: 0.29 10*3/MM3 (ref 0–0.2)
BASOPHILS NFR BLD MANUAL: 1.1 % (ref 0–1.5)
BILIRUB SERPL-MCNC: 0.4 MG/DL (ref 0–1.2)
BUN SERPL-MCNC: 4 MG/DL (ref 8–23)
BUN/CREAT SERPL: 9.8 (ref 7–25)
CALCIUM SPEC-SCNC: 8.4 MG/DL (ref 8.6–10.5)
CHLORIDE SERPL-SCNC: 96 MMOL/L (ref 98–107)
CO2 SERPL-SCNC: 26 MMOL/L (ref 22–29)
CREAT SERPL-MCNC: 0.41 MG/DL (ref 0.57–1)
DACRYOCYTES BLD QL SMEAR: ABNORMAL
DEPRECATED RDW RBC AUTO: 46.8 FL (ref 37–54)
EGFRCR SERPLBLD CKD-EPI 2021: 110.7 ML/MIN/1.73
ERYTHROCYTE [DISTWIDTH] IN BLOOD BY AUTOMATED COUNT: 13.9 % (ref 12.3–15.4)
GEN 5 2HR TROPONIN T REFLEX: 9 NG/L
GLOBULIN UR ELPH-MCNC: 3 GM/DL
GLUCOSE BLDC GLUCOMTR-MCNC: 129 MG/DL (ref 70–130)
GLUCOSE BLDC GLUCOMTR-MCNC: 142 MG/DL (ref 70–130)
GLUCOSE BLDC GLUCOMTR-MCNC: 145 MG/DL (ref 70–130)
GLUCOSE BLDC GLUCOMTR-MCNC: 159 MG/DL (ref 70–130)
GLUCOSE BLDC GLUCOMTR-MCNC: 170 MG/DL (ref 70–130)
GLUCOSE SERPL-MCNC: 763 MG/DL (ref 65–99)
HCT VFR BLD AUTO: 27.5 % (ref 34–46.6)
HGB BLD-MCNC: 8.5 G/DL (ref 12–15.9)
LYMPHOCYTES # BLD MANUAL: 2.1 10*3/MM3 (ref 0.7–3.1)
LYMPHOCYTES NFR BLD MANUAL: 1.1 % (ref 5–12)
MAGNESIUM SERPL-MCNC: 1.9 MG/DL (ref 1.6–2.4)
MAGNESIUM SERPL-MCNC: 2 MG/DL (ref 1.6–2.4)
MAGNESIUM SERPL-MCNC: 2.8 MG/DL (ref 1.6–2.4)
MCH RBC QN AUTO: 29.1 PG (ref 26.6–33)
MCHC RBC AUTO-ENTMCNC: 30.9 G/DL (ref 31.5–35.7)
MCV RBC AUTO: 94.2 FL (ref 79–97)
MONOCYTES # BLD: 0.29 10*3/MM3 (ref 0.1–0.9)
NEUTROPHILS # BLD AUTO: 23.89 10*3/MM3 (ref 1.7–7)
NEUTROPHILS NFR BLD MANUAL: 89.9 % (ref 42.7–76)
PHOSPHATE SERPL-MCNC: 2.1 MG/DL (ref 2.5–4.5)
PHOSPHATE SERPL-MCNC: 5 MG/DL (ref 2.5–4.5)
PLAT MORPH BLD: NORMAL
PLATELET # BLD AUTO: 416 10*3/MM3 (ref 140–450)
PMV BLD AUTO: 9.8 FL (ref 6–12)
POTASSIUM SERPL-SCNC: 3.7 MMOL/L (ref 3.5–5.2)
POTASSIUM SERPL-SCNC: 3.7 MMOL/L (ref 3.5–5.2)
POTASSIUM SERPL-SCNC: 7.5 MMOL/L (ref 3.5–5.2)
PROT SERPL-MCNC: 5.2 G/DL (ref 6–8.5)
RBC # BLD AUTO: 2.92 10*6/MM3 (ref 3.77–5.28)
SMUDGE CELLS BLD QL SMEAR: ABNORMAL
SODIUM SERPL-SCNC: 126 MMOL/L (ref 136–145)
SODIUM SERPL-SCNC: 135 MMOL/L (ref 136–145)
TROPONIN T DELTA: NORMAL
TROPONIN T SERPL HS-MCNC: <6 NG/L
VARIANT LYMPHS NFR BLD MANUAL: 7.9 % (ref 19.6–45.3)
WBC NRBC COR # BLD: 26.57 10*3/MM3 (ref 3.4–10.8)

## 2023-02-12 PROCEDURE — 93010 ELECTROCARDIOGRAM REPORT: CPT | Performed by: INTERNAL MEDICINE

## 2023-02-12 PROCEDURE — 84484 ASSAY OF TROPONIN QUANT: CPT | Performed by: HOSPITALIST

## 2023-02-12 PROCEDURE — 83735 ASSAY OF MAGNESIUM: CPT | Performed by: INTERNAL MEDICINE

## 2023-02-12 PROCEDURE — 25010000002 CALCIUM GLUCONATE PER 10 ML: Performed by: PHYSICIAN ASSISTANT

## 2023-02-12 PROCEDURE — 82962 GLUCOSE BLOOD TEST: CPT

## 2023-02-12 PROCEDURE — 84132 ASSAY OF SERUM POTASSIUM: CPT | Performed by: COLON & RECTAL SURGERY

## 2023-02-12 PROCEDURE — 25010000002 CEFTRIAXONE PER 250 MG: Performed by: SURGERY

## 2023-02-12 PROCEDURE — 85007 BL SMEAR W/DIFF WBC COUNT: CPT | Performed by: SURGERY

## 2023-02-12 PROCEDURE — 25010000002 METOCLOPRAMIDE PER 10 MG: Performed by: COLON & RECTAL SURGERY

## 2023-02-12 PROCEDURE — 25010000002 FLUCONAZOLE PER 200 MG: Performed by: HOSPITALIST

## 2023-02-12 PROCEDURE — 84100 ASSAY OF PHOSPHORUS: CPT | Performed by: INTERNAL MEDICINE

## 2023-02-12 PROCEDURE — 25010000002 ONDANSETRON PER 1 MG: Performed by: COLON & RECTAL SURGERY

## 2023-02-12 PROCEDURE — 84295 ASSAY OF SERUM SODIUM: CPT | Performed by: INTERNAL MEDICINE

## 2023-02-12 PROCEDURE — 99024 POSTOP FOLLOW-UP VISIT: CPT | Performed by: SURGERY

## 2023-02-12 PROCEDURE — 63710000001 INSULIN REGULAR HUMAN PER 5 UNITS: Performed by: SURGERY

## 2023-02-12 PROCEDURE — 25010000002 POTASSIUM CHLORIDE PER 2 MEQ OF POTASSIUM: Performed by: PHYSICIAN ASSISTANT

## 2023-02-12 PROCEDURE — 80053 COMPREHEN METABOLIC PANEL: CPT | Performed by: SURGERY

## 2023-02-12 PROCEDURE — 85027 COMPLETE CBC AUTOMATED: CPT | Performed by: SURGERY

## 2023-02-12 PROCEDURE — 25010000002 HYDROMORPHONE PER 4 MG: Performed by: COLON & RECTAL SURGERY

## 2023-02-12 PROCEDURE — 25010000002 MAGNESIUM SULFATE PER 500 MG OF MAGNESIUM: Performed by: PHYSICIAN ASSISTANT

## 2023-02-12 PROCEDURE — 84132 ASSAY OF SERUM POTASSIUM: CPT | Performed by: INTERNAL MEDICINE

## 2023-02-12 PROCEDURE — 93005 ELECTROCARDIOGRAM TRACING: CPT | Performed by: HOSPITALIST

## 2023-02-12 RX ADMIN — POTASSIUM PHOSPHATE, MONOBASIC POTASSIUM PHOSPHATE, DIBASIC: 224; 236 INJECTION, SOLUTION, CONCENTRATE INTRAVENOUS at 18:23

## 2023-02-12 RX ADMIN — HYDROMORPHONE HYDROCHLORIDE 0.25 MG: 1 INJECTION, SOLUTION INTRAMUSCULAR; INTRAVENOUS; SUBCUTANEOUS at 00:31

## 2023-02-12 RX ADMIN — INSULIN HUMAN 2 UNITS: 100 INJECTION, SOLUTION PARENTERAL at 23:26

## 2023-02-12 RX ADMIN — FLUCONAZOLE IN SODIUM CHLORIDE 400 MG: 2 INJECTION, SOLUTION INTRAVENOUS at 17:01

## 2023-02-12 RX ADMIN — Medication 10 ML: at 08:41

## 2023-02-12 RX ADMIN — POTASSIUM PHOSPHATE, MONOBASIC AND POTASSIUM PHOSPHATE, DIBASIC 15 MMOL: 224; 236 INJECTION, SOLUTION, CONCENTRATE INTRAVENOUS at 14:40

## 2023-02-12 RX ADMIN — METOCLOPRAMIDE 10 MG: 5 INJECTION, SOLUTION INTRAMUSCULAR; INTRAVENOUS at 00:31

## 2023-02-12 RX ADMIN — Medication 10 ML: at 22:40

## 2023-02-12 RX ADMIN — AMLODIPINE BESYLATE 10 MG: 10 TABLET ORAL at 08:40

## 2023-02-12 RX ADMIN — CEFTRIAXONE SODIUM 1 G: 1 INJECTION, POWDER, FOR SOLUTION INTRAMUSCULAR; INTRAVENOUS at 12:08

## 2023-02-12 RX ADMIN — HYDROMORPHONE HYDROCHLORIDE 0.25 MG: 1 INJECTION, SOLUTION INTRAMUSCULAR; INTRAVENOUS; SUBCUTANEOUS at 14:40

## 2023-02-12 RX ADMIN — METOCLOPRAMIDE 10 MG: 5 INJECTION, SOLUTION INTRAMUSCULAR; INTRAVENOUS at 05:50

## 2023-02-12 RX ADMIN — SCOPALAMINE 1 PATCH: 1 PATCH, EXTENDED RELEASE TRANSDERMAL at 12:18

## 2023-02-12 RX ADMIN — ONDANSETRON 4 MG: 2 INJECTION INTRAMUSCULAR; INTRAVENOUS at 23:27

## 2023-02-12 RX ADMIN — FAMOTIDINE 20 MG: 10 INJECTION INTRAVENOUS at 19:00

## 2023-02-12 RX ADMIN — METOCLOPRAMIDE 10 MG: 5 INJECTION, SOLUTION INTRAMUSCULAR; INTRAVENOUS at 19:00

## 2023-02-12 RX ADMIN — INSULIN HUMAN 2 UNITS: 100 INJECTION, SOLUTION PARENTERAL at 06:10

## 2023-02-12 RX ADMIN — HYDROMORPHONE HYDROCHLORIDE 0.25 MG: 1 INJECTION, SOLUTION INTRAMUSCULAR; INTRAVENOUS; SUBCUTANEOUS at 06:11

## 2023-02-12 RX ADMIN — ONDANSETRON 4 MG: 2 INJECTION INTRAMUSCULAR; INTRAVENOUS at 14:59

## 2023-02-12 RX ADMIN — FAMOTIDINE 20 MG: 10 INJECTION INTRAVENOUS at 05:50

## 2023-02-12 RX ADMIN — METOCLOPRAMIDE 10 MG: 5 INJECTION, SOLUTION INTRAMUSCULAR; INTRAVENOUS at 12:08

## 2023-02-12 RX ADMIN — Medication 10 ML: at 22:41

## 2023-02-12 RX ADMIN — I.V. FAT EMULSION 20 G: 20 EMULSION INTRAVENOUS at 18:23

## 2023-02-12 NOTE — PROGRESS NOTES
"   LOS: 16 days     Chief Complaint/ Reason for encounter: JUANY    Subjective   02/07/23 : Feels about the same today, still some generalized abdominal pain  Anxious to increase her diet, no nausea or vomiting today but did vomit twice yesterday  No shortness of breath or chest pain  No fevers or chills  Minimal edema    2/8: She is upset with the fact that her ostomy bag has stool in it  Remains n.p.o. per surgery recommendations  Denies any shortness of breath or chest pain, no fevers or chills no abdominal pain nausea vomiting or edema    2/9: Did not tolerate clear liquids, NG tube to be replaced, n.p.o., IR to place drain into the fluid collection seen on CT    2/10: Abdominal drain in place, still with some nausea, remains n.p.o. with plans to start TPN  Remains on IV fluids, no edema, good urine output    2/11 on TPN, states she wants a stark as she doesn't like getting up to urinate, bp above  goal     2/12 some abd pain, give dilaudid this am with improvement, bp runnign on high side, remains on TPN     Medical history reviewed:  Abdominal Pain        Subjective    History taken from: Patient and chart    Vital Signs  Temp:  [97.6 °F (36.4 °C)-98.5 °F (36.9 °C)] 97.6 °F (36.4 °C)  Heart Rate:  [84-93] 84  Resp:  [18] 18  BP: (137-160)/(87-97) 137/87       Wt Readings from Last 1 Encounters:   02/12/23 0628 82.4 kg (181 lb 10.5 oz)   02/11/23 0558 83.8 kg (184 lb 11.9 oz)   02/03/23 2226 83.1 kg (183 lb 3.2 oz)   01/27/23 1955 78.9 kg (174 lb)   01/27/23 1428 78.9 kg (174 lb)       Objective:  Vital signs: (most recent): Blood pressure 137/87, pulse 84, temperature 97.6 °F (36.4 °C), temperature source Oral, resp. rate 18, height 154.9 cm (60.98\"), weight 82.4 kg (181 lb 10.5 oz), SpO2 94 %.              Objective:  General Appearance:  Comfortable, nad   Awake, alert, oriented  HEENT: Mucous membranes moist, no injury, oropharynx clear  Lungs:  Normal effort and normal respiratory rate.  Breath sounds clear " to auscultation.  No  respiratory distress.  No rales, decreased breath sounds or rhonchi.    Heart: Normal rate.  Regular rhythm.  S1, S2 normal.  No murmur.   Abdomen: Abdomen is soft.  Diminished bowel sounds, nontender  Extremities: Trace ankle edema of bilateral lower extremities  Neurological: No focal motor or sensory deficits, pupils reactive  Skin:  Warm and dry.  No rash or cyanosis.       Results Review:    Intake/Output:     Intake/Output Summary (Last 24 hours) at 2/12/2023 1144  Last data filed at 2/12/2023 0628  Gross per 24 hour   Intake 0 ml   Output 3050 ml   Net -3050 ml         DATA:  Radiology and Labs:  The following labs independently reviewed by me. Additional labs ordered for tomorrow a.m.  Interval notes, chart personally reviewed by me.   Old records independently reviewed showing normal baseline creatinine    New problems include hypokalemia and hypophosphatemia  Discussed with patient herself at bedside    Risk/ complexity of medical care/ medical decision making moderate complexity, postop renal failure and fluid and electrolyte management    Labs:   Recent Results (from the past 24 hour(s))   POC Glucose Once    Collection Time: 02/11/23  6:36 PM    Specimen: Blood   Result Value Ref Range    Glucose 148 (H) 70 - 130 mg/dL   POC Glucose Once    Collection Time: 02/11/23  9:43 PM    Specimen: Blood   Result Value Ref Range    Glucose 170 (H) 70 - 130 mg/dL   POC Glucose Once    Collection Time: 02/12/23 12:28 AM    Specimen: Blood   Result Value Ref Range    Glucose 142 (H) 70 - 130 mg/dL   POC Glucose Once    Collection Time: 02/12/23  5:54 AM    Specimen: Blood   Result Value Ref Range    Glucose 170 (H) 70 - 130 mg/dL   Magnesium    Collection Time: 02/12/23  5:58 AM    Specimen: Blood   Result Value Ref Range    Magnesium 2.8 (H) 1.6 - 2.4 mg/dL   Comprehensive Metabolic Panel    Collection Time: 02/12/23  5:58 AM    Specimen: Blood   Result Value Ref Range    Glucose 763 (C) 65 -  99 mg/dL    BUN 4 (L) 8 - 23 mg/dL    Creatinine 0.41 (L) 0.57 - 1.00 mg/dL    Sodium 126 (L) 136 - 145 mmol/L    Potassium 7.5 (C) 3.5 - 5.2 mmol/L    Chloride 96 (L) 98 - 107 mmol/L    CO2 26.0 22.0 - 29.0 mmol/L    Calcium 8.4 (L) 8.6 - 10.5 mg/dL    Total Protein 5.2 (L) 6.0 - 8.5 g/dL    Albumin 2.2 (L) 3.5 - 5.2 g/dL    ALT (SGPT) 9 1 - 33 U/L    AST (SGOT) 12 1 - 32 U/L    Alkaline Phosphatase 60 39 - 117 U/L    Total Bilirubin 0.4 0.0 - 1.2 mg/dL    Globulin 3.0 gm/dL    A/G Ratio 0.7 g/dL    BUN/Creatinine Ratio 9.8 7.0 - 25.0    Anion Gap 4.0 (L) 5.0 - 15.0 mmol/L    eGFR 110.7 >60.0 mL/min/1.73   Manual Differential    Collection Time: 02/12/23  5:58 AM    Specimen: Blood   Result Value Ref Range    Neutrophil % 89.9 (H) 42.7 - 76.0 %    Lymphocyte % 7.9 (L) 19.6 - 45.3 %    Monocyte % 1.1 (L) 5.0 - 12.0 %    Basophil % 1.1 0.0 - 1.5 %    Neutrophils Absolute 23.89 (H) 1.70 - 7.00 10*3/mm3    Lymphocytes Absolute 2.10 0.70 - 3.10 10*3/mm3    Monocytes Absolute 0.29 0.10 - 0.90 10*3/mm3    Basophils Absolute 0.29 (H) 0.00 - 0.20 10*3/mm3    Anisocytosis Mod/2+ None Seen    Dacrocytes Slight/1+ None Seen    Smudge Cells Slight/1+ None Seen    Platelet Morphology Normal Normal   CBC Auto Differential    Collection Time: 02/12/23  5:58 AM    Specimen: Blood   Result Value Ref Range    WBC 26.57 (H) 3.40 - 10.80 10*3/mm3    RBC 2.92 (L) 3.77 - 5.28 10*6/mm3    Hemoglobin 8.5 (L) 12.0 - 15.9 g/dL    Hematocrit 27.5 (L) 34.0 - 46.6 %    MCV 94.2 79.0 - 97.0 fL    MCH 29.1 26.6 - 33.0 pg    MCHC 30.9 (L) 31.5 - 35.7 g/dL    RDW 13.9 12.3 - 15.4 %    RDW-SD 46.8 37.0 - 54.0 fl    MPV 9.8 6.0 - 12.0 fL    Platelets 416 140 - 450 10*3/mm3   Phosphorus    Collection Time: 02/12/23  5:58 AM    Specimen: Blood   Result Value Ref Range    Phosphorus 5.0 (H) 2.5 - 4.5 mg/dL   High Sensitivity Troponin T    Collection Time: 02/12/23  5:58 AM    Specimen: Blood   Result Value Ref Range    HS Troponin T <6 <10 ng/L   ECG 12  Lead Chest Pain    Collection Time: 02/12/23  6:17 AM   Result Value Ref Range    QT Interval 388 ms   High Sensitivity Troponin T 2Hr    Collection Time: 02/12/23  7:48 AM    Specimen: Blood   Result Value Ref Range    HS Troponin T 9 <10 ng/L    Troponin T Delta     Potassium    Collection Time: 02/12/23  7:48 AM    Specimen: Blood   Result Value Ref Range    Potassium 3.7 3.5 - 5.2 mmol/L   Magnesium    Collection Time: 02/12/23  7:48 AM    Specimen: Blood   Result Value Ref Range    Magnesium 1.9 1.6 - 2.4 mg/dL   Phosphorus    Collection Time: 02/12/23  7:48 AM    Specimen: Blood   Result Value Ref Range    Phosphorus 2.1 (L) 2.5 - 4.5 mg/dL   Potassium    Collection Time: 02/12/23  8:17 AM    Specimen: Blood   Result Value Ref Range    Potassium 3.7 3.5 - 5.2 mmol/L   Magnesium    Collection Time: 02/12/23  8:17 AM    Specimen: Blood   Result Value Ref Range    Magnesium 2.0 1.6 - 2.4 mg/dL   POC Glucose Once    Collection Time: 02/12/23 11:26 AM    Specimen: Blood   Result Value Ref Range    Glucose 129 70 - 130 mg/dL       Radiology:  Pertinent radiology studies were reviewed as described above      Medications have been reviewed separately in chart overview      ASSESSMENT:   acute renal failure, creatinine peaked at 2.3 and is below baseline today,   Hypokalemia   Metabolic alkalosis   Hypoalbuminemia   Hypophosphatemia       Colitis status post sigmoid colon resection    Nausea and vomiting,  Postop ileus, remains n.p.o.  Abdominal fluid collection status post drain placement   Stricture of sigmoid colon, status post colostomy  Hypokalemia, worse today  Hypertension, receiving as needed IV hydralazine  Anemia        DISCUSSION/PLAN:   Cr stable   Continue on TPN     Repeat Na now, suspect lab error as she was 141 yesterday     Prn iv hydralazine for HTN   Oral norvasc started today     Continue to monitor electrolytes and volume closely      Gilda Soliz MD   [facility]   Office  phone number: [phone]  Answering service phone number: [phone]    02/12/23  11:44 EST

## 2023-02-12 NOTE — PLAN OF CARE
Goal Outcome Evaluation:  Plan of Care Reviewed With: patient        Progress: no change  Outcome Evaluation: Pt continues to c/o abd pain, medicated with PRN dilaudid. Wound vac remains. TPN and lipids continue. Electrolytes replaced. Pt up several times throughout the night to urinate. Continue to monitor.

## 2023-02-12 NOTE — PLAN OF CARE
Goal Outcome Evaluation:  Plan of Care Reviewed With: patient        Progress: improving  Outcome Evaluation: Pain in abd improved with dilaudid. Pt tolerated some clears without n/v. Pt with some bowel sounds and brown liquid stool in colostomy. Electrolytes replaced per iv all day. Pt walked in mg twice but refused chair.

## 2023-02-12 NOTE — PROGRESS NOTES
[facility] Clinical Pharmacy Services: TPN Daily Progress Note    TPN Day # 3   Indication: Prolonged Paralytic Ileus  Route: central  Type: standard    Subjective/Objective  Results from last 7 days   Lab Units 23  0817 23  0748 23  0558   SODIUM mmol/L 135*  --  126*   POTASSIUM mmol/L 3.7 3.7 7.5*   CHLORIDE mmol/L  --   --  96*   CO2 mmol/L  --   --  26.0   BUN mg/dL  --   --  4*   CREATININE mg/dL  --   --  0.41*   CALCIUM mg/dL  --   --  8.4*   ALBUMIN g/dL  --   --  2.2*   BILIRUBIN mg/dL  --   --  0.4   ALK PHOS U/L  --   --  60   ALT (SGPT) U/L  --   --  9   AST (SGOT) U/L  --   --  12   GLUCOSE mg/dL  --   --  763*   MAGNESIUM mg/dL 2.0 1.9 2.8*   PHOSPHORUS mg/dL  --  2.1* 5.0*        Diet Orders (active) (From admission, onward)       Start     Ordered    23 1008  Diet: Liquid Diets; Clear Liquid; Texture: Regular Texture (IDDSI 7); Fluid Consistency: Thin (IDDSI 0)  Diet Effective Now         23 1007                  Additional insulin administration while previous TPN infusin units  Additional electrolyte administration while previous TPN infusin mEq IV KCl, Kphos 15 mmol IV x1    Acid suppression: Famotidine 20 mg IV BID    Goal TPN Formula Recommendations: 100/1350/100 mL 20% AA/Dex/Lipids; 1950 kcal/day  Current TPN Formula: 65/700/100 mL 20% AA/Dex/Lipids    Assessment/Plan    Macronutrients: Will slowly increase macros to goal to avoid refeeding syndrome. 80/800/100 mL 20% (lipids continued)  Electrolytes/Additives: Will increase Kphos from 32 to 42 mEq. All others to remain same.   MVI for TPN   Labs: CMP, Phos, Mg with AM labs  Comments: AM lab sample contaminated. Repeated K, PO4 and Mg draw. SSI added yesterday afternoon.     Carlos Echevarria [facility]  Clinical Pharmacist

## 2023-02-12 NOTE — PROGRESS NOTES
"Daily progress note    Primary care physician  Dr. Delgado    Chief complaint  Doing same with no new complaints     History of present illness  62-year-old female with history of hypertension presented to [facility], [place], operated by [facility] emergency room with left lower quadrant abdominal pain for last 1 month.  Patient also have occasional loose stools.  Patient denies any nausea vomiting.  Patient recently diagnosed with colitis and treated with oral antibiotics without any improvement.  Patient has noticed blood in the stools.  Patient evaluated in ER with CT abdominal pelvis which shows worsening colitis and failed outpatient treatment admit for management.  Patient has no fever chills chest pain shortness of breath with sweats weight loss or weight gain.     REVIEW OF SYSTEMS  Unremarkable except abdominal pain      PHYSICAL EXAM  Blood pressure 142/90, pulse 96, temperature 98 °F (36.7 °C), temperature source Oral, resp. rate 17, height 154.9 cm (60.98\"), weight 82.4 kg (181 lb 10.5 oz), SpO2 96 %.    GENERAL:  Awake and alert in no acute distress  HEENT:  Unremarkable  NECK:  Supple  CV: regular rhythm, normal rate  RESPIRATORY: normal effort, clear to auscultation bilaterally  ABDOMEN: soft, left lower quadrant tenderness colostomy in place  MUSCULOSKELETAL: no deformity  NEURO: alert, moves all extremities, follows commands  PSYCH:  calm, cooperative  SKIN: warm, dry     LAB RESULTS  Lab Results (last 24 hours)     Procedure Component Value Units Date/Time    Sodium [[id number]]  (Abnormal) Collected: 02/12/23 0817    Specimen: Blood Updated: 02/12/23 1233     Sodium 135 mmol/L     POC Glucose Once [[id number]]  (Normal) Collected: 02/12/23 1126    Specimen: Blood Updated: 02/12/23 1127     Glucose 129 mg/dL      Comment: Meter: [id number] : [id number] Justin EUCEDA       Magnesium [[id number]]  (Normal) Collected: 02/12/23 0817    Specimen: Blood Updated: 02/12/23 0847     Magnesium 2.0 mg/dL     Potassium " [[id number]]  (Normal) Collected: 02/12/23 0817    Specimen: Blood Updated: 02/12/23 0847     Potassium 3.7 mmol/L     Phosphorus [[id number]]  (Abnormal) Collected: 02/12/23 0748    Specimen: Blood Updated: 02/12/23 0836     Phosphorus 2.1 mg/dL     High Sensitivity Troponin T 2Hr [[id number]] Collected: 02/12/23 0748    Specimen: Blood Updated: 02/12/23 0821     HS Troponin T 9 ng/L      Troponin T Delta --     Comment: Unable to calculate.       Narrative:      High Sensitive Troponin T Reference Range:  <10.0 ng/L- Negative Female for AMI  <15.0 ng/L- Negative Male for AMI  >=10 - Abnormal Female indicating possible myocardial injury.  >=15 - Abnormal Male indicating possible myocardial injury.   Clinicians would have to utilize clinical acumen, EKG, Troponin, and serial changes to determine if it is an Acute Myocardial Infarction or myocardial injury due to an underlying chronic condition.         Magnesium [[id number]]  (Normal) Collected: 02/12/23 0748    Specimen: Blood Updated: 02/12/23 0818     Magnesium 1.9 mg/dL     Potassium [[id number]]  (Normal) Collected: 02/12/23 0748    Specimen: Blood Updated: 02/12/23 0818     Potassium 3.7 mmol/L     CBC & Differential [[id number]]  (Abnormal) Collected: 02/12/23 0558    Specimen: Blood Updated: 02/12/23 0729    Narrative:      The following orders were created for panel order CBC & Differential.  Procedure                               Abnormality         Status                     ---------                               -----------         ------                     Manual Differential[[id number]]          Abnormal            Final result               CBC Auto Differential[[id number]]        Abnormal            Final result                 Please view results for these tests on the individual orders.    CBC Auto Differential [[id number]]  (Abnormal) Collected: 02/12/23 0558    Specimen: Blood Updated: 02/12/23 0729     WBC 26.57 10*3/mm3      RBC 2.92 10*6/mm3       Hemoglobin 8.5 g/dL      Hematocrit 27.5 %      MCV 94.2 fL      MCH 29.1 pg      MCHC 30.9 g/dL      RDW 13.9 %      RDW-SD 46.8 fl      MPV 9.8 fL      Platelets 416 10*3/mm3     Manual Differential [[id number]]  (Abnormal) Collected: 02/12/23 0558    Specimen: Blood Updated: 02/12/23 0729     Neutrophil % 89.9 %      Lymphocyte % 7.9 %      Monocyte % 1.1 %      Basophil % 1.1 %      Neutrophils Absolute 23.89 10*3/mm3      Lymphocytes Absolute 2.10 10*3/mm3      Monocytes Absolute 0.29 10*3/mm3      Basophils Absolute 0.29 10*3/mm3      Anisocytosis Mod/2+     Dacrocytes Slight/1+     Smudge Cells Slight/1+     Platelet Morphology Normal    Comprehensive Metabolic Panel [[id number]]  (Abnormal) Collected: 02/12/23 0558    Specimen: Blood Updated: 02/12/23 0708     Glucose 763 mg/dL      BUN 4 mg/dL      Creatinine 0.41 mg/dL      Sodium 126 mmol/L      Potassium 7.5 mmol/L      Chloride 96 mmol/L      CO2 26.0 mmol/L      Calcium 8.4 mg/dL      Total Protein 5.2 g/dL      Albumin 2.2 g/dL      ALT (SGPT) 9 U/L      AST (SGOT) 12 U/L      Alkaline Phosphatase 60 U/L      Total Bilirubin 0.4 mg/dL      Globulin 3.0 gm/dL      A/G Ratio 0.7 g/dL      BUN/Creatinine Ratio 9.8     Anion Gap 4.0 mmol/L      eGFR 110.7 mL/min/1.73     Narrative:      GFR Normal >60  Chronic Kidney Disease <60  Kidney Failure <15      Magnesium [[id number]]  (Abnormal) Collected: 02/12/23 0558    Specimen: Blood Updated: 02/12/23 0650     Magnesium 2.8 mg/dL     Phosphorus [[id number]]  (Abnormal) Collected: 02/12/23 0558    Specimen: Blood Updated: 02/12/23 0650     Phosphorus 5.0 mg/dL     High Sensitivity Troponin T [[id number]]  (Normal) Collected: 02/12/23 0558    Specimen: Blood Updated: 02/12/23 0648     HS Troponin T <6 ng/L     Narrative:      High Sensitive Troponin T Reference Range:  <10.0 ng/L- Negative Female for AMI  <15.0 ng/L- Negative Male for AMI  >=10 - Abnormal Female indicating possible myocardial injury.  >=15 -  Abnormal Male indicating possible myocardial injury.   Clinicians would have to utilize clinical acumen, EKG, Troponin, and serial changes to determine if it is an Acute Myocardial Infarction or myocardial injury due to an underlying chronic condition.         POC Glucose Once [[id number]]  (Abnormal) Collected: 02/12/23 0554    Specimen: Blood Updated: 02/12/23 0555     Glucose 170 mg/dL      Comment: Meter: [id number] : [id number] Short Nikkeya CNA       POC Glucose Once [[id number]]  (Abnormal) Collected: 02/12/23 0028    Specimen: Blood Updated: 02/12/23 0029     Glucose 142 mg/dL      Comment: Meter: [id number] : [id number] Jamshid Manuel RN       POC Glucose Once [[id number]]  (Abnormal) Collected: 02/11/23 2143    Specimen: Blood Updated: 02/11/23 2144     Glucose 170 mg/dL      Comment: Meter: [id number] : [id number] Short Nikkeya CNA       POC Glucose Once [[id number]]  (Abnormal) Collected: 02/11/23 1836    Specimen: Blood Updated: 02/11/23 1837     Glucose 148 mg/dL      Comment: Meter: [id number] : [id number] Yonatan EUCEDA           Imaging Results (Last 24 Hours)     ** No results found for the last 24 hours. **        Upper and lower endoscopy results noted and discussed with patient    Current Facility-Administered Medications:   •  Adult Central 2-in-1 TPN, , Intravenous, Continuous, Quick, Lilibeth A, PA-C, Last Rate: 75 mL/hr at 02/11/23 1725, New Bag at 02/11/23 1725  •  Adult Central 2-in-1 TPN, , Intravenous, Continuous, Quick, Lilibeth A, PA-C  •  amLODIPine (NORVASC) tablet 10 mg, 10 mg, Oral, Q24H, Gideon Hope MD, 10 mg at 02/12/23 0840  •  cefTRIAXone (ROCEPHIN) 1 g in sodium chloride 0.9 % 100 mL IVPB-VTB, 1 g, Intravenous, Q24H, Beck Wei Jr., MD, Last Rate: 200 mL/hr at 02/12/23 1208, 1 g at 02/12/23 1208  •  dextrose (D50W) (25 g/50 mL) IV injection 25 g, 25 g, Intravenous, Q15 Min PRN, Bcek Wei Jr., MD  •  dextrose (GLUTOSE) oral gel 15 g, 15 g, Oral, Q15  Min PRN, Beck Wei Jr., MD  •  famotidine (PEPCID) injection 20 mg, 20 mg, Intravenous, BID Josiah NICHOLS Brittany A, PA-C, 20 mg at 02/12/23 0550  •  Fat Emulsion Plant Based (INTRALIPID,LIPOSYN) 20 % infusion 20 g, 100 mL, Intravenous, Q24H (TPN), Lino Gaston MD, Last Rate: 8.33 mL/hr at 02/11/23 1725, 20 g at 02/11/23 1725  •  fluconazole (DIFLUCAN) IVPB 400 mg, 400 mg, Intravenous, Q24H, Gideon Hope MD, Last Rate: 100 mL/hr at 02/11/23 1701, 400 mg at 02/11/23 1701  •  glucagon (GLUCAGEN) injection 1 mg, 1 mg, Intramuscular, Q15 Min PRN, Beck Wei Jr., MD  •  hydrALAZINE (APRESOLINE) injection 10 mg, 10 mg, Intravenous, Q4H PRN, Gideon Hope MD, 10 mg at 02/11/23 1454  •  HYDROmorphone (DILAUDID) injection 0.25 mg, 0.25 mg, Intravenous, Q2H PRN, 0.25 mg at 02/12/23 0611 **AND** naloxone (NARCAN) injection 0.4 mg, 0.4 mg, Intravenous, Q5 Min PRN, Lino Gaston MD  •  insulin regular (humuLIN R,novoLIN R) injection 0-7 Units, 0-7 Units, Subcutaneous, Q6H, Beck Wei Jr., MD, 2 Units at 02/12/23 0610  •  metoclopramide (REGLAN) injection 10 mg, 10 mg, Intravenous, Q6H, Lino Gaston MD, 10 mg at 02/12/23 1208  •  nitroglycerin (NITROSTAT) SL tablet 0.4 mg, 0.4 mg, Sublingual, Q5 Min PRN, Lino Gaston MD  •  ondansetron (ZOFRAN) injection 4 mg, 4 mg, Intravenous, Q6H PRN, Lino Gaston MD, 4 mg at 02/09/23 2212  •  Pharmacy to Dose TPN, , Does not apply, Continuous PRN, Lilibeth Rahman PA-C  •  potassium chloride 10 mEq in 100 mL IVPB, 10 mEq, Intravenous, Q1H PRN, Matthew Soliz MD, Last Rate: 100 mL/hr at 02/11/23 2009, 10 mEq at 02/11/23 2009  •  potassium phosphate 45 mmol in sodium chloride 0.9 % 500 mL infusion, 45 mmol, Intravenous, PRN **OR** potassium phosphate 30 mmol in sodium chloride 0.9 % 250 mL infusion, 30 mmol, Intravenous, PRN **OR** potassium phosphate 15 mmol in sodium chloride 0.9 % 100 mL infusion, 15 mmol, Intravenous, PRN, 15 mmol at 02/11/23 1220  **OR** sodium phosphates 40 mmol in sodium chloride 0.9 % 500 mL IVPB, 40 mmol, Intravenous, PRN **OR** sodium phosphates 20 mmol in sodium chloride 0.9 % 250 mL IVPB, 20 mmol, Intravenous, PRN, Matthew Soliz MD  •  scopolamine patch 1 mg/72 hr, 1 patch, Transdermal, Q72H, Quick, Lilibeth A, PA-C, 1 patch at 02/12/23 1218  •  sodium chloride 0.9 % flush 10 mL, 10 mL, Intravenous, Q12H, Quick, Lilibeth A, PA-C, 10 mL at 02/12/23 0841  •  sodium chloride 0.9 % flush 10 mL, 10 mL, Intravenous, Q12H, Quick, Lilibeth A, PA-C, 10 mL at 02/12/23 0841  •  sodium chloride 0.9 % flush 10 mL, 10 mL, Intravenous, PRN, Quick, Lilibeth A, PA-C  •  sodium chloride 0.9 % flush 20 mL, 20 mL, Intravenous, PRN, Quick, Lilibeth A, PA-C  •  sodium chloride 0.9 % infusion 40 mL, 40 mL, Intravenous, PRN, Quick, Lilibeth A, PA-C     ASSESSMENT  Acute colitis with sigmoid stricture and microperforation s/p colon resection and colostomy  Abdominal fluid collection/abscess status post CT-guided drainage  Postop ileus  Acute kidney injury resolved  Hypertension   Gastroesophageal reflux disease    PLAN  CPM  Postop care  IVF  TPN  Accu-Chek with low-dose sliding scale insulin  Antibiotics per infectious disease  Infectious disease consult appreciated  Adjust blood pressure medications  Pain management  Continue home medications  Stress ulcer DVT prophylaxis  Supportive care  PT OT  Discussed with family nursing staff   Follow closely and further recommendation according to hospital course    SANG SINGLETARY MD    Copied text in this note has been reviewed and is accurate as of 02/12/23

## 2023-02-12 NOTE — PROGRESS NOTES
Chief Complaint:    S/P Resection of left colon and sigmoid colon with colostomy, POD 9    Subjective:    The patient is feeling well with no complaints except for expected postop abdominal pain.  She had clear liquid diet yesterday but did not take in very much.  She does not have much of an appetite.    Objective:    Temp:  [97.6 °F (36.4 °C)-98.4 °F (36.9 °C)] 97.6 °F (36.4 °C)  Heart Rate:  [84-93] 84  Resp:  [18] 18  BP: (137-158)/(87-90) 137/87    Physical Exam  Abdominal:      Palpations: Abdomen is soft.      Tenderness: There is no abdominal tenderness.      Comments: Colostomy: Stool output.   Neurological:      Mental Status: She is alert.   Psychiatric:         Behavior: Behavior is cooperative.         Results:    WBC has increased to 26.57.  H/H is 8.5/27.5.  Glucose was 763 this morning    Impression/Plan:    The patient is POD 9 from the resection of the left colon and sigmoid colon with colostomy.  She is clinically improving but continues to have some degree of an expected postop ileus and is not ready to have her diet advanced.    Her WBC remains elevated but she was switched to appropriate antibiotics yesterday.  I agree with infectious disease that she will likely need the addition of Flagyl.  If her WBC remains elevated, she will need a repeat CT scan of the abdomen and pelvis.    Beck Wei Jr., M.D.

## 2023-02-13 ENCOUNTER — APPOINTMENT (OUTPATIENT)
Dept: INTERVENTIONAL RADIOLOGY/VASCULAR | Facility: HOSPITAL | Age: 63
DRG: 329 | End: 2023-02-13
Payer: COMMERCIAL

## 2023-02-13 ENCOUNTER — APPOINTMENT (OUTPATIENT)
Dept: CT IMAGING | Facility: HOSPITAL | Age: 63
DRG: 329 | End: 2023-02-13
Payer: COMMERCIAL

## 2023-02-13 ENCOUNTER — TELEPHONE (OUTPATIENT)
Dept: SURGERY | Facility: CLINIC | Age: 63
End: 2023-02-13

## 2023-02-13 LAB
ALBUMIN SERPL-MCNC: 2.4 G/DL (ref 3.5–5.2)
ALBUMIN/GLOB SERPL: 0.7 G/DL
ALP SERPL-CCNC: 65 U/L (ref 39–117)
ALT SERPL W P-5'-P-CCNC: 9 U/L (ref 1–33)
ANION GAP SERPL CALCULATED.3IONS-SCNC: 4.6 MMOL/L (ref 5–15)
AST SERPL-CCNC: 13 U/L (ref 1–32)
BASOPHILS # BLD AUTO: 0.09 10*3/MM3 (ref 0–0.2)
BASOPHILS NFR BLD AUTO: 0.3 % (ref 0–1.5)
BILIRUB SERPL-MCNC: 0.5 MG/DL (ref 0–1.2)
BUN SERPL-MCNC: 7 MG/DL (ref 8–23)
BUN/CREAT SERPL: 18.9 (ref 7–25)
CALCIUM SPEC-SCNC: 8.5 MG/DL (ref 8.6–10.5)
CHLORIDE SERPL-SCNC: 99 MMOL/L (ref 98–107)
CO2 SERPL-SCNC: 28.4 MMOL/L (ref 22–29)
CREAT SERPL-MCNC: 0.37 MG/DL (ref 0.57–1)
DEPRECATED RDW RBC AUTO: 44.9 FL (ref 37–54)
EGFRCR SERPLBLD CKD-EPI 2021: 113.5 ML/MIN/1.73
EOSINOPHIL # BLD AUTO: 0.08 10*3/MM3 (ref 0–0.4)
EOSINOPHIL NFR BLD AUTO: 0.3 % (ref 0.3–6.2)
ERYTHROCYTE [DISTWIDTH] IN BLOOD BY AUTOMATED COUNT: 14.4 % (ref 12.3–15.4)
GLOBULIN UR ELPH-MCNC: 3.4 GM/DL
GLUCOSE BLDC GLUCOMTR-MCNC: 127 MG/DL (ref 70–130)
GLUCOSE BLDC GLUCOMTR-MCNC: 136 MG/DL (ref 70–130)
GLUCOSE BLDC GLUCOMTR-MCNC: 153 MG/DL (ref 70–130)
GLUCOSE BLDC GLUCOMTR-MCNC: 156 MG/DL (ref 70–130)
GLUCOSE SERPL-MCNC: 138 MG/DL (ref 65–99)
HCT VFR BLD AUTO: 29.3 % (ref 34–46.6)
HGB BLD-MCNC: 9.7 G/DL (ref 12–15.9)
IMM GRANULOCYTES # BLD AUTO: 1.21 10*3/MM3 (ref 0–0.05)
IMM GRANULOCYTES NFR BLD AUTO: 4.3 % (ref 0–0.5)
LYMPHOCYTES # BLD AUTO: 2.58 10*3/MM3 (ref 0.7–3.1)
LYMPHOCYTES NFR BLD AUTO: 9.1 % (ref 19.6–45.3)
MAGNESIUM SERPL-MCNC: 1.9 MG/DL (ref 1.6–2.4)
MCH RBC QN AUTO: 28.4 PG (ref 26.6–33)
MCHC RBC AUTO-ENTMCNC: 33.1 G/DL (ref 31.5–35.7)
MCV RBC AUTO: 85.9 FL (ref 79–97)
MONOCYTES # BLD AUTO: 1.92 10*3/MM3 (ref 0.1–0.9)
MONOCYTES NFR BLD AUTO: 6.8 % (ref 5–12)
NEUTROPHILS NFR BLD AUTO: 22.46 10*3/MM3 (ref 1.7–7)
NEUTROPHILS NFR BLD AUTO: 79.2 % (ref 42.7–76)
NRBC BLD AUTO-RTO: 0 /100 WBC (ref 0–0.2)
PHOSPHATE SERPL-MCNC: 2.6 MG/DL (ref 2.5–4.5)
PLATELET # BLD AUTO: 479 10*3/MM3 (ref 140–450)
PMV BLD AUTO: 9.4 FL (ref 6–12)
POTASSIUM SERPL-SCNC: 4 MMOL/L (ref 3.5–5.2)
PROT SERPL-MCNC: 5.8 G/DL (ref 6–8.5)
QT INTERVAL: 388 MS
RBC # BLD AUTO: 3.41 10*6/MM3 (ref 3.77–5.28)
SODIUM SERPL-SCNC: 132 MMOL/L (ref 136–145)
TRIGL SERPL-MCNC: 126 MG/DL (ref 0–150)
WBC NRBC COR # BLD: 28.34 10*3/MM3 (ref 3.4–10.8)

## 2023-02-13 PROCEDURE — 97110 THERAPEUTIC EXERCISES: CPT

## 2023-02-13 PROCEDURE — 25010000002 POTASSIUM CHLORIDE PER 2 MEQ OF POTASSIUM: Performed by: COLON & RECTAL SURGERY

## 2023-02-13 PROCEDURE — 25010000002 IOPAMIDOL 61 % SOLUTION: Performed by: HOSPITALIST

## 2023-02-13 PROCEDURE — 25010000002 CALCIUM GLUCONATE PER 10 ML: Performed by: COLON & RECTAL SURGERY

## 2023-02-13 PROCEDURE — 25010000002 FLUCONAZOLE PER 200 MG: Performed by: HOSPITALIST

## 2023-02-13 PROCEDURE — 99024 POSTOP FOLLOW-UP VISIT: CPT | Performed by: PHYSICIAN ASSISTANT

## 2023-02-13 PROCEDURE — 82962 GLUCOSE BLOOD TEST: CPT

## 2023-02-13 PROCEDURE — 25010000002 HYDROMORPHONE PER 4 MG: Performed by: COLON & RECTAL SURGERY

## 2023-02-13 PROCEDURE — 63710000001 INSULIN REGULAR HUMAN PER 5 UNITS: Performed by: SURGERY

## 2023-02-13 PROCEDURE — 84100 ASSAY OF PHOSPHORUS: CPT | Performed by: COLON & RECTAL SURGERY

## 2023-02-13 PROCEDURE — 25010000002 MAGNESIUM SULFATE PER 500 MG OF MAGNESIUM: Performed by: COLON & RECTAL SURGERY

## 2023-02-13 PROCEDURE — 74177 CT ABD & PELVIS W/CONTRAST: CPT

## 2023-02-13 PROCEDURE — 84478 ASSAY OF TRIGLYCERIDES: CPT | Performed by: COLON & RECTAL SURGERY

## 2023-02-13 PROCEDURE — 80053 COMPREHEN METABOLIC PANEL: CPT | Performed by: SURGERY

## 2023-02-13 PROCEDURE — 85025 COMPLETE CBC W/AUTO DIFF WBC: CPT | Performed by: HOSPITALIST

## 2023-02-13 PROCEDURE — 25010000002 CEFTRIAXONE PER 250 MG: Performed by: SURGERY

## 2023-02-13 PROCEDURE — 25010000002 METOCLOPRAMIDE PER 10 MG: Performed by: COLON & RECTAL SURGERY

## 2023-02-13 PROCEDURE — 83735 ASSAY OF MAGNESIUM: CPT | Performed by: INTERNAL MEDICINE

## 2023-02-13 RX ORDER — METRONIDAZOLE 500 MG/100ML
500 INJECTION, SOLUTION INTRAVENOUS EVERY 8 HOURS
Status: DISCONTINUED | OUTPATIENT
Start: 2023-02-13 | End: 2023-02-21

## 2023-02-13 RX ADMIN — I.V. FAT EMULSION 20 G: 20 EMULSION INTRAVENOUS at 18:35

## 2023-02-13 RX ADMIN — POTASSIUM PHOSPHATE, MONOBASIC POTASSIUM PHOSPHATE, DIBASIC: 224; 236 INJECTION, SOLUTION, CONCENTRATE INTRAVENOUS at 18:35

## 2023-02-13 RX ADMIN — CEFTRIAXONE SODIUM 1 G: 1 INJECTION, POWDER, FOR SOLUTION INTRAMUSCULAR; INTRAVENOUS at 12:58

## 2023-02-13 RX ADMIN — METOCLOPRAMIDE 10 MG: 5 INJECTION, SOLUTION INTRAMUSCULAR; INTRAVENOUS at 18:34

## 2023-02-13 RX ADMIN — AMLODIPINE BESYLATE 10 MG: 10 TABLET ORAL at 09:17

## 2023-02-13 RX ADMIN — IOPAMIDOL 85 ML: 612 INJECTION, SOLUTION INTRAVENOUS at 10:26

## 2023-02-13 RX ADMIN — METOCLOPRAMIDE 10 MG: 5 INJECTION, SOLUTION INTRAMUSCULAR; INTRAVENOUS at 23:39

## 2023-02-13 RX ADMIN — Medication 10 ML: at 21:08

## 2023-02-13 RX ADMIN — METOCLOPRAMIDE 10 MG: 5 INJECTION, SOLUTION INTRAMUSCULAR; INTRAVENOUS at 12:58

## 2023-02-13 RX ADMIN — FAMOTIDINE 20 MG: 10 INJECTION INTRAVENOUS at 06:30

## 2023-02-13 RX ADMIN — FAMOTIDINE 20 MG: 10 INJECTION INTRAVENOUS at 18:34

## 2023-02-13 RX ADMIN — METRONIDAZOLE 500 MG: 500 INJECTION, SOLUTION INTRAVENOUS at 09:37

## 2023-02-13 RX ADMIN — HYDROMORPHONE HYDROCHLORIDE 0.25 MG: 1 INJECTION, SOLUTION INTRAMUSCULAR; INTRAVENOUS; SUBCUTANEOUS at 00:15

## 2023-02-13 RX ADMIN — Medication 10 ML: at 09:38

## 2023-02-13 RX ADMIN — METRONIDAZOLE 500 MG: 500 INJECTION, SOLUTION INTRAVENOUS at 18:36

## 2023-02-13 RX ADMIN — METOCLOPRAMIDE 10 MG: 5 INJECTION, SOLUTION INTRAMUSCULAR; INTRAVENOUS at 06:27

## 2023-02-13 RX ADMIN — FLUCONAZOLE IN SODIUM CHLORIDE 400 MG: 2 INJECTION, SOLUTION INTRAVENOUS at 15:51

## 2023-02-13 RX ADMIN — METOCLOPRAMIDE 10 MG: 5 INJECTION, SOLUTION INTRAMUSCULAR; INTRAVENOUS at 00:15

## 2023-02-13 RX ADMIN — INSULIN HUMAN 2 UNITS: 100 INJECTION, SOLUTION PARENTERAL at 18:35

## 2023-02-13 NOTE — CASE MANAGEMENT/SOCIAL WORK
Continued Stay Note  [facility]     Patient Name: Hali Tejeda  MRN: [id number]  Today's Date: 2/13/2023    Admit Date: 1/27/2023    Plan: Return home with family assist. Referral to [facility]   Discharge Plan     Row Name 02/13/23 1630       Plan    Plan Return home with family assist. Referral to [facility]    Patient/Family in Agreement with Plan yes    Plan Comments CCP met with gitatent at bedside to discuss discharge planning. Patient confirms plans to return home with family assist and [facility]. Referral to [facility] if wound vac is needed. CCP also discussed SNF with patient if indicated and patient is open to this option as well. CCP following clinical course for wound vac, TPN, and any IV antibiotic needs. Shameka BECK RN/CCP               Discharge Codes    No documentation.               Expected Discharge Date and Time     Expected Discharge Date Expected Discharge Time    Feb 17, 2023             Adwoa Segura

## 2023-02-13 NOTE — DISCHARGE PLACEMENT REQUEST
"Hali Aleman (62 y.o. Female)     Date of Birth   1960    Social Security Number       Address   [address] [place] [address]    Home Phone   [phone]    MRN   [id number]       Episcopal   None    Marital Status   Single                            Admission Date   1/27/23    Admission Type   Emergency    Admitting Provider   Gideon Hope MD    Attending Provider   Gideon Hope MD    Department, Room/Bed   [address], [address]/1       Discharge Date       Discharge Disposition       Discharge Destination                               Attending Provider: Gideon Hope MD    Allergies: Hydrocodone, Sulfa Antibiotics    Isolation: None   Infection: None   Code Status: CPR    Ht: 154.9 cm (60.98\")   Wt: 87.7 kg (193 lb 6.4 oz)    Admission Cmt: None   Principal Problem: Colitis [K52.9]                 Active Insurance as of 1/27/2023     Primary Coverage     Payor Plan Insurance Group Employer/Plan Group    [facility] BLUE [employer] EMPLOYEE [id number]     Payor Plan Address Payor Plan Phone Number Payor Plan Fax Number Effective Dates    [address] [phone]  1/1/2022 - None Entered    [facility] [id number]       Subscriber Name Subscriber Birth Date Member ID       HALI ALEMAN 1960 [id number]                 Emergency Contacts      (Rel.) Home Phone Work Phone Mobile Phone    SANDRA ALEMAN (Daughter) [phone] -- --    AMAURI ABREU (Mother) [phone] -- [phone]          "

## 2023-02-13 NOTE — NURSING NOTE
"   02/13/23 1050   Wound 02/03/23 1405 abdomen Incision   Placement Date/Time: 02/03/23 1405   Location: abdomen  Primary Wound Type: Incision   Dressing Appearance intact  (vac, good seal)   Base yellow;moist;pink   Periwound intact;dry   Periwound Temperature warm   Periwound Skin Turgor soft   Edges open   Drainage Characteristics/Odor serosanguineous   Drainage Amount large  (full canister was changed this AM)   Care, Wound cleansed with;irrigated with;sterile normal saline   Dressing Care dressing changed   Periwound Care dry periwound area maintained  (spray prep)   NPWT (Negative Pressure Wound Therapy) 02/10/23 abdomen   Placement Date: 02/10/23   Location: abdomen   Therapy Setting continuous therapy   Dressing foam, black;transparent dressing   Pressure Setting 125 mmHg   Sponges Inserted 1   Sponges Removed 1   Finger sweep complete Yes   Colostomy LLQ   Placement date: If unknown, DO NOT use \"Add Comment\" note/Placement time: If unknown, DO NOT use \"Add Comment\" note: 02/03/23 1944   Inserted by: DR. GRANADO  Hand Hygiene Completed: Yes  Location: LLQ   Stomal Appliance Leaking;Changed;1 piece  (leaking at 9 oclock; placed Coloplast pouch cut opening at 45 cm)   Stoma Appearance round;flush with skin;moist;red   Peristomal Assessment Clean;Intact   Accessories/Skin Care cleansed with water;skin barrier ring  (extra parrier strip piece at 9 oclock)   Stoma Function stool   Stool Color brown   Stool Consistency liquid   Treatment Bag change;Site care     Wound/ostomy - ostomy appliance was loose to medial aspect of pouch barrier, barrier was moist indicating leaking at 9 oclock aspect. Vac dressing with great seal. Ostomy and vac dressing changed, painful wound bed at times but overall tolerated. Patient was assisted out of bed to [facility] to void. Will plan on seeing M-W-F to change vac dressing. Patient awake but not really engaged with care. It is difficult for patient to be able to observe pouch change from " lying position and HOB needs to be fairly flat to smooth out loose skin to assure adhesive placement is adequate.

## 2023-02-13 NOTE — THERAPY TREATMENT NOTE
Patient Name: Hali Tejeda  : 1960    MRN: [id number]                              Today's Date: 2023       Admit Date: 2023    Visit Dx:     ICD-10-CM ICD-9-CM   1. Colitis  K52.9 558.9   2. Nausea and vomiting, unspecified vomiting type  R11.2 787.01   3. Stricture of sigmoid colon (HCC)  K56.699 560.9     Patient Active Problem List   Diagnosis   • Left sided colitis with complication (HCC)   • Colitis   • Nausea and vomiting   • Stricture of sigmoid colon (HCC)   • Uterine anomaly   • Hypertension   • Avulsion fracture of distal fibula     Past Medical History:   Diagnosis Date   • Abnormal EKG 2020   • Avulsion fracture of distal fibula 2015   • H. pylori infection 2020   • Hepatic steatosis 2020   • Hiatal hernia    • HTN (hypertension)    • Hyperlipidemia    • Insomnia    • Left sided colitis with complication (HCC) 2023    ADMITTED TO [facility]   • LGSIL on Pap smear of cervix     (+) HPV   • LGSIL Pap smear of vagina 2016   • Snoring    • Stricture of sigmoid colon (HCC) 2023   • Uterine fibroid    • Vaginal atrophy      Past Surgical History:   Procedure Laterality Date   • BREAST LUMPECTOMY Left     benign   •  SECTION N/A    • COLON RESECTION N/A 2/3/2023    Procedure: COLON RESECTION LAPAROSCOPIC CONVERTED TO OPEN SIGMOID AND LEFT MOBILIZATION OF SPLENIC FLEXURE WITH DAVINCI ROBOT;  Surgeon: Lino Gaston MD;  Location: [facility];  Service: Robotics - DaVinci;  Laterality: N/A;   • COLONOSCOPY N/A 2014    COULD ONLY GET SCOPE TO 70 CM DUE TO SIGNIFICANT STRICTURE SECONDARY TO SEVERE DIVERTICULITIS OR CANCER, ACBE ORDERED, DR. PARAG HUDSON AT [place]   • COLONOSCOPY N/A 2023    MODERATE STENOSIS IN SIGMOID-DILATED, MANY DIVERTICULA IN SIGMOID AND DESCENDING, COPIOUS AMTS OF STOOL, MUCOSAL TEAR 55 CM FROM ANAL VERGE, DR. JENNI SMITH AT [facility]   • COLPOSCOPY N/A 2016   • ENDOSCOPY N/A 2023    GASTRITIS, SMALL  HIATAL HERNIA, DR. JENNI SMITH AT [facility]   • ENDOSCOPY N/A 09/15/2009    DR. PARAG HUDSON AT [place]   • GASTRIC SLEEVE LAPAROSCOPIC N/A 07/09/2020    WITH REMOVAL OF GASTRIC BAND, DR. OLIMPIA PALACIOS AT [facility]   • HYSTERECTOMY N/A 01/2005    WITH RSO   • LAPAROSCOPIC GASTRIC BANDING N/A 10/20/2019    DR. PARAG HUDSON AT [place]   • SALPINGO OOPHORECTOMY Left     LSO, IN TEEN YEARS   • WISDOM TOOTH EXTRACTION Bilateral       General Information     Row Name 02/13/23 1559          Physical Therapy Time and Intention    Document Type therapy note (daily note)  -     Mode of Treatment individual therapy;physical therapy  -     Row Name 02/13/23 1559          General Information    Patient Profile Reviewed yes  -     Existing Precautions/Restrictions fall  multiple lines , drains, wound vac  -     Row Name 02/13/23 1559          Cognition    Orientation Status (Cognition) oriented x 4  -     Row Name 02/13/23 1559          Safety Issues, Functional Mobility    Impairments Affecting Function (Mobility) endurance/activity tolerance;pain;strength  -           User Key  (r) = Recorded By, (t) = Taken By, (c) = Cosigned By    Initials Name Provider Type     Mila Tam PTA Physical Therapist Assistant               Mobility     Row Name 02/13/23 1600          Bed Mobility    Supine-Sit [place] (Bed Mobility) not tested  -     Sit-Supine [place] (Bed Mobility) minimum assist (75% patient effort)  for LEs due to abd pain  -     Row Name 02/13/23 1600          Sit-Stand Transfer    Sit-Stand [place] (Transfers) minimum assist (75% patient effort);verbal cues  -     Assistive Device (Sit-Stand Transfers) walker, front-wheeled  -     Row Name 02/13/23 1600          Gait/Stairs (Locomotion)    Knox Level (Gait) contact guard;verbal cues  -     Assistive Device (Gait) walker, front-wheeled  -     Distance in Feet (Gait) 85ft, pt fatigued, just up on bsc w/nsg, but agreeable, c/o  incr LE wkness  -     Deviations/Abnormal Patterns (Gait) jasmin decreased;antalgic;stride length decreased  -           User Key  (r) = Recorded By, (t) = Taken By, (c) = Cosigned By    Initials Name Provider Type    Mila Diallo PTA Physical Therapist Assistant               Obj/Interventions    No documentation.                Goals/Plan    No documentation.                Clinical Impression     [facility] Name 02/13/23 1605          Pain    Pretreatment Pain Rating 5/10  -     Pain Location - abdomen  -     Pain Intervention(s) Repositioned;Rest  -[facility] Name 02/13/23 1605          Plan of Care Review    Plan of Care Reviewed With patient  -     Outcome Evaluation Pt agreed to amb even though just finished on bsc w/nsg, pt tolerated amb `85ft, CGA , pt req min A to perf STS; required seated rest at EOB after bsc and prior to amb; pt c/o fatigue and wkness in LEs, also abd pain, but eager to get moving, unsure of DC plan at present  -JM     Row Name 02/13/23 1605          Therapy Assessment/Plan (PT)    Rehab Potential (PT) good, to achieve stated therapy goals  -     Criteria for Skilled Interventions Met (PT) yes  -[facility] Name 02/13/23 1605          Vital Signs    O2 Delivery Pre Treatment room air  -[facility] Name 02/13/23 1605          Positioning and Restraints    Pre-Treatment Position bedside commode  -     Post Treatment Position bed  -     In Bed supine;call light within reach;encouraged to call for assist;exit alarm on;notified nsg  -           User Key  (r) = Recorded By, (t) = Taken By, (c) = Cosigned By    Initials Name Provider Type    Mila Diallo PTA Physical Therapist Assistant               Outcome Measures     Row Name 02/13/23 1610 02/13/23 0840       How much help from another person do you currently need...    Turning from your back to your side while in flat bed without using bedrails? 3  -JM 3  -PW    Moving from lying on back to sitting on the side of  a flat bed without bedrails? 3  -JM 3  -PW    Moving to and from a bed to a chair (including a wheelchair)? 3  -JM 3  -PW    Standing up from a chair using your arms (e.g., wheelchair, bedside chair)? 3  -JM 3  -PW    Climbing 3-5 steps with a railing? 1  -JM 2  -PW    To walk in hospital room? 3  -JM 3  -PW    AM-PAC 6 Clicks Score (PT) 16  -JM 17  -PW    Highest level of mobility 5 --> Static standing  -JM 5 --> Static standing  -PW          User Key  (r) = Recorded By, (t) = Taken By, (c) = Cosigned By    Initials Name Provider Type    Mila Diallo PTA Physical Therapist Assistant    Sarai Ladd RN Registered Nurse                             Physical Therapy Education     Title: PT OT SLP Therapies (Done)     Topic: Physical Therapy (Done)     Point: Mobility training (Done)     Learning Progress Summary           Patient Eager, E,TB,D, VU by  at 2/13/2023 1610    Acceptance, E,TB,D, VU,NR by  at 2/11/2023 1702    Acceptance, E,TB, VU,NR by  at 2/5/2023 0935    Acceptance, E,TB, VU,NR by  at 2/4/2023 0911    Acceptance, E, VU by  at 1/30/2023 1441                   Point: Home exercise program (Done)     Learning Progress Summary           Patient Eager, E,TB,D, VU by SUSY at 2/13/2023 1610    Acceptance, E,TB,D, VU,NR by  at 2/11/2023 1702    Acceptance, E,TB, VU,NR by  at 2/5/2023 0935    Acceptance, E,TB, VU,NR by  at 2/4/2023 0911                   Point: Body mechanics (Done)     Learning Progress Summary           Patient Eager, E,TB,D, VU by SUSY at 2/13/2023 1610    Acceptance, E,TB,D, VU,NR by  at 2/11/2023 1702    Acceptance, E,TB, VU,NR by  at 2/5/2023 0935    Acceptance, E,TB, VU,NR by  at 2/4/2023 0911                   Point: Precautions (Done)     Learning Progress Summary           Patient Eager, E,TB,D, VU by SUSY at 2/13/2023 1610    Acceptance, E,TB,D, VU,NR by BH at 2/11/2023 1702    Acceptance, E,TB, VU,NR by CS at 2/5/2023 0935    Acceptance, E,TB, VU,NR by   at 2/4/2023 0911                               User Key     Initials Effective Dates Name Provider Type [facility] 03/07/18 -  Mila Tam PTA Physical Therapist Assistant PT    CS 06/16/21 -  Gian Guillen, PT Physical Therapist PT     12/13/22 -  Sunni Wolfe, PT Physical Therapist PT     04/08/22 -  Lilibeth Herrera, PT Physical Therapist PT              PT Recommendation and Plan     Plan of Care Reviewed With: patient  Outcome Evaluation: Pt agreed to amb even though just finished on bsc w/nsg, pt tolerated amb `85ft, CGA , pt req min A to perf STS; required seated rest at EOB after bsc and prior to amb; pt c/o fatigue and wkness in LEs, also abd pain, but eager to get moving, unsure of DC plan at present     Time Calculation:    PT Charges     Row Name 02/13/23 1557             Time Calculation    Start Time 1515  -      Stop Time 1539  -      Time Calculation (min) 24 min  -      PT Received On 02/13/23  -      PT - Next Appointment 02/14/23  -            User Key  (r) = Recorded By, (t) = Taken By, (c) = Cosigned By    Initials Name Provider Type     Mila Tam PTA Physical Therapist Assistant              Therapy Charges for Today     Code Description Service Date Service Provider Modifiers Qty    37071468131 HC PT THER PROC EA 15 MIN 2/13/2023 Mila Tam PTA GP 2          PT G-Codes  Outcome Measure Options: AM-PAC 6 Clicks Basic Mobility (PT)  AM-PAC 6 Clicks Score (PT): 16  PT Discharge Summary  Anticipated Discharge Disposition (PT):  (unsure, another procedure planned for fluid removal)    Mila Tam PTA  2/13/2023

## 2023-02-13 NOTE — PROGRESS NOTES
Progress Note    Pod 10    S: Pt experiencing intermittent abdominal pain. Started on clear liquids with 2 episodes of emesis last night.     O:  Temp:  [98 °F (36.7 °C)-98.3 °F (36.8 °C)] 98.3 °F (36.8 °C)  Heart Rate:  [87-96] 90  Resp:  [17-18] 18  BP: (142-156)/(84-90) 145/84      Intake/Output Summary (Last 24 hours) at 2/13/2023 0830  Last data filed at 2/13/2023 0721  Gross per 24 hour   Intake 0 ml   Output 4360 ml   Net -4360 ml       Abd: soft, non-distended  Wound-vac in place over midline abdominal wounds.   Ostomy: PPP with liquid stool in bag  LLQ NEW with serous drainage.    Results from last 7 days   Lab Units 02/13/23  0321   WBC 10*3/mm3 28.34*   HEMOGLOBIN g/dL 9.7*   HEMATOCRIT % 29.3*   PLATELETS 10*3/mm3 479*     Results from last 7 days   Lab Units 02/13/23  0321   SODIUM mmol/L 132*   POTASSIUM mmol/L 4.0   CHLORIDE mmol/L 99   CO2 mmol/L 28.4   BUN mg/dL 7*   CREATININE mg/dL 0.37*   EGFR mL/min/1.73 113.5   GLUCOSE mg/dL 138*   CALCIUM mg/dL 8.5*   PHOSPHORUS mg/dL 2.6       Results from last 7 days   Lab Units 02/13/23  0321   MAGNESIUM mg/dL 1.9       A/P: 62 y.o. female S/P robotic assisted laparoscopic left and sigmoid resection with colostomy converted open due to splenic injury 02/03/2023.    - S/P LLQ IR Drain placement 02/09/2023. Pt treated empirically with Zosyn. Cultures with scant growth of E. Coli with resistance to Zosyn. ID following. ABx switched to Rocephin with consideration of adding Flagyl.  - Will check stat CT of abdomen/pelvis due to worsening leukocytosis   - Pt with prolonged ileus. Decrease diet back to NPO. Continue Reglan and TPN.   - Continue wound-vac to midline abdominal wound.

## 2023-02-13 NOTE — PROGRESS NOTES
"  Infectious Diseases Progress Note    Galdino Esteves MD     [facility]  [place]: 17 days  Patient Identification:  Name: Hali Tejeda  Age: 62 y.o.  Sex: female  :  1960  MRN: [id number]         Primary Care Physician: Richard Delgado MD        Subjective: Does not feel good.  Still have abdominal pain.  Interval History: Underwent placement of wound VAC on her abdominal wound/incision.  TPN is started.    Objective:    Scheduled Meds:amLODIPine, 10 mg, Oral, Q24H  cefTRIAXone, 1 g, Intravenous, Q24H  famotidine, 20 mg, Intravenous, BID AC  Fat Emulsion Plant Based, 100 mL, Intravenous, Q24H (TPN)  fluconazole, 400 mg, Intravenous, Q24H  insulin regular, 0-7 Units, Subcutaneous, Q6H  metoclopramide, 10 mg, Intravenous, Q6H  metroNIDAZOLE, 500 mg, Intravenous, Q8H  Scopolamine, 1 patch, Transdermal, Q72H  sodium chloride, 10 mL, Intravenous, Q12H  sodium chloride, 10 mL, Intravenous, Q12H      Continuous Infusions:Adult Central 2-in-1 TPN, , Last Rate: 75 mL/hr at 23 1823  Pharmacy to Dose TPN,         Vital signs in last 24 hours:  Temp:  [98 °F (36.7 °C)-98.3 °F (36.8 °C)] 98.3 °F (36.8 °C)  Heart Rate:  [87-96] 90  Resp:  [17-18] 18  BP: (142-156)/(84-90) 145/84    Intake/Output:    Intake/Output Summary (Last 24 hours) at 2023 0846  Last data filed at 2023 0721  Gross per 24 hour   Intake 0 ml   Output 4360 ml   Net -4360 ml       Exam:  /84 (BP Location: Right arm, Patient Position: Lying)   Pulse 90   Temp 98.3 °F (36.8 °C) (Oral)   Resp 18   Ht 154.9 cm (60.98\")   Wt 87.7 kg (193 lb 6.4 oz)   SpO2 92%   BMI 36.56 kg/m²   Patient is examined using the personal protective equipment as per guidelines from infection control for this particular patient as enacted.  Hand washing was performed before and after patient interaction.  General Appearance:  Ill-appearing uncomfortable female                          Head:    Normocephalic, without obvious abnormality, " atraumatic                           Eyes:    PERRL, conjunctivae/corneas clear, EOM's intact, both eyes                         Throat:   Lips, tongue, gums normal; oral mucosa pink and moist                           Neck:   Supple, symmetrical, trachea midline, no JVD                         Lungs:    Clear to auscultation bilaterally, respirations unlabored                 Chest Wall:    No tenderness or deformity                          Heart:  S1-S2 regular                  Abdomen:   Ostomy present with midline incision has wound VAC in place with no surrounding cellulitis.                 Extremities:   Extremities normal, atraumatic, no cyanosis or edema                        Pulses:   Pulses palpable in all extremities                            Skin:   Skin is warm and dry,  no rashes or palpable lesions                  Neurologic: Grossly nonfocal       Data Review:    I reviewed the patient's new clinical results.  Results from last 7 days   Lab Units 02/13/23  0321 02/12/23  0558 02/11/23  0506 02/10/23  0917 02/09/23  0619 02/08/23  0405 02/07/23  0440   WBC 10*3/mm3 28.34* 26.57* 25.18* 20.95* 25.99* 18.59* 8.48   HEMOGLOBIN g/dL 9.7* 8.5* 8.7* 8.7* 9.6* 9.7* 10.1*   PLATELETS 10*3/mm3 479* 416 411 370 337 281 275     Results from last 7 days   Lab Units 02/13/23  0321 02/12/23  0817 02/12/23  0748 02/12/23  0558 02/11/23  0506 02/10/23  0356 02/09/23  0619 02/08/23  2132 02/08/23  0405 02/07/23  1514   SODIUM mmol/L 132* 135*  --  126* 141 141 143  --  143 146*   POTASSIUM mmol/L 4.0 3.7 3.7 7.5* 2.8* 2.7* 4.6   < > 3.5 3.1*   CHLORIDE mmol/L 99  --   --  96* 106 108* 111*  --  116* 116*   CO2 mmol/L 28.4  --   --  26.0 30.0* 26.0 22.9  --  21.0* 19.8*   BUN mg/dL 7*  --   --  4* 3* 4* 5*  --  9 12   CREATININE mg/dL 0.37*  --   --  0.41* 0.37* 0.39* 0.46*  --  0.62 0.72   CALCIUM mg/dL 8.5*  --   --  8.4* 7.8* 8.1* 8.4*  --  8.4* 8.6   GLUCOSE mg/dL 138*  --   --  763* 158* 94 101*  --  79 71     < > = values in this interval not displayed.     Microbiology Results (last 10 days)     Procedure Component Value - Date/Time    Clostridioides difficile Toxin - Stool, Per Stoma [[id number]]  (Normal) Collected: 02/10/23 1243    Lab Status: Final result Specimen: Stool from Per Stoma Updated: 02/10/23 1341    Narrative:      The following orders were created for panel order Clostridioides difficile Toxin - Stool, Per Stoma.  Procedure                               Abnormality         Status                     ---------                               -----------         ------                     Clostridioides difficile...[[id number]]  Normal              Final result                 Please view results for these tests on the individual orders.    Clostridioides difficile Toxin, PCR - Stool, Per Stoma [[id number]]  (Normal) Collected: 02/10/23 1243    Lab Status: Final result Specimen: Stool from Per Stoma Updated: 02/10/23 1341     C. Difficile Toxins by PCR Negative    Narrative:      The result indicates the absence of toxigenic C. difficile from stool specimen.     Body Fluid Culture - Drainage, Peritoneum [[id number]]  (Abnormal)  (Susceptibility) Collected: 02/09/23 1458    Lab Status: Final result Specimen: Drainage from Peritoneum Updated: 02/11/23 0851     Body Fluid Culture Scant growth (1+) Escherichia coli     Gram Stain Few (2+) WBCs per low power field      No organisms seen    Susceptibility      Escherichia coli      RENETTA      Amikacin Susceptible      Ampicillin Resistant     Ampicillin + Sulbactam Resistant     Cefepime Susceptible      Ceftazidime Susceptible      Ceftriaxone Susceptible      Gentamicin Resistant     Levofloxacin Susceptible      Piperacillin + Tazobactam Resistant     Tobramycin Intermediate      Trimethoprim + Sulfamethoxazole Resistant                      Susceptibility Comments     Escherichia coli    Cefazolin sensitivity will not be reported for Enterobacteriaceae in non-urine  isolates. If cefazolin is preferred, please call the microbiology lab to request an E-test.  With the exception of urinary-sourced infections, aminoglycosides should not be used as monotherapy.                     Assessment:    Colitis    Nausea and vomiting    Stricture of sigmoid colon (HCC)  1-intra-abdominal sepsis with intra-abdominal abscess in the setting of prolonged colitis, sigmoid stricture, endoscopic injury to colonic wall with microperforation and subsequent laparotomy partial colectomy and colostomy and intraoperative injury to the spleen with repair while being on antibiotic therapy-likely pathogen to consider in this situation would be enteric amberly along with selection of yeast due to prolonged antibiotic therapy-status post percutaneous drain placement on 2/9/2023  2-hypertension  3-malnourishment  4-possible postop abdominal wall/incisional infection/evolving abscess        Recommendations/Discussions:  · Continue with fluconazole ceftriaxone and Flagyl  · Follow-up on the repeat CT scan of the abdomen and pelvis  · Monitor closely for complications of antibiotics including C. difficile infection but low threshold to repeat colostomy content for C. difficile toxin assay as her last testing on 2/10/2023 was negative.  Galdino Esteves MD  2/13/2023  08:46 EST    Parts of this note may be an electronic transcription/translation of spoken language to printed text using the Dragon dictation system.

## 2023-02-13 NOTE — PLAN OF CARE
Goal Outcome Evaluation:  Plan of Care Reviewed With: patient        Progress: no change  Outcome Evaluation: VSS, not taking nothing by mouth due to nausea, x1 episode of emesis this shift, IV Zofran x1, prn Dilaudid x1 for pain, cont TPN and lipids, wound vac drsg intact, wound vac cannister full and changed, colostomy emptied of flatus x2, small amt liquid brown stool in ostomy, NEW drain intact with scant amt of serosang drng, pt ambulated in mg x1 with much encouragement, pt refused SCDs, pt reports using IS not seen using by this RN, purewick in place per pt request, safety maintained

## 2023-02-13 NOTE — PROGRESS NOTES
Patient lying in bed  Patient had episode of vomiting yesterday with clear liquids  Patient had her wound VAC changed today and complains of discomfort with it  Patient having ostomy output  Abdomen is soft  NEW serosanguineous    Assessment and plan  Discussed with radiology to try and reposition drain to go cephalad where there is a fluid collection.  This will be done tomorrow.  Discussed with patient that she that the  more patient is in bed, the the lazier the intestines become.  I E the ileus will continue.  She requested a periwick because she does not want to walk to use the restroom.  I reminded patient that she walked into the hospital.  I also told her that she will likely have to go to a SNF because of her debilitated state..     Continue TPN  Continue antibiotics

## 2023-02-13 NOTE — PROGRESS NOTES
"Daily progress note    Primary care physician  Dr. Delgado    Chief complaint  Doing same with no new complaints except abdominal discomfort    History of present illness  62-year-old female with history of hypertension presented to [facility] emergency room with left lower quadrant abdominal pain for last 1 month.  Patient also have occasional loose stools.  Patient denies any nausea vomiting.  Patient recently diagnosed with colitis and treated with oral antibiotics without any improvement.  Patient has noticed blood in the stools.  Patient evaluated in ER with CT abdominal pelvis which shows worsening colitis and failed outpatient treatment admit for management.  Patient has no fever chills chest pain shortness of breath with sweats weight loss or weight gain.     REVIEW OF SYSTEMS  Unremarkable except abdominal pain      PHYSICAL EXAM  Blood pressure 126/75, pulse 96, temperature 98.6 °F (37 °C), temperature source Oral, resp. rate 18, height 154.9 cm (60.98\"), weight 87.7 kg (193 lb 6.4 oz), SpO2 96 %.    GENERAL:  Awake and alert in no acute distress  HEENT:  Unremarkable  NECK:  Supple  CV: regular rhythm, normal rate  RESPIRATORY: normal effort, clear to auscultation bilaterally  ABDOMEN: soft, left lower quadrant tenderness colostomy in place  MUSCULOSKELETAL: no deformity  NEURO: alert, moves all extremities, follows commands  PSYCH:  calm, cooperative  SKIN: warm, dry     LAB RESULTS  Lab Results (last 24 hours)     Procedure Component Value Units Date/Time    POC Glucose Once [[id number]]  (Abnormal) Collected: 02/13/23 1325    Specimen: Blood Updated: 02/13/23 1326     Glucose 156 mg/dL      Comment: Meter: [id number] : [id number] Yonatan EUCEDA       Triglycerides [[id number]]  (Normal) Collected: 02/13/23 0321    Specimen: Blood Updated: 02/13/23 0937     Triglycerides 126 mg/dL     POC Glucose Once [[id number]]  (Abnormal) Collected: 02/13/23 0603    Specimen: Blood Updated: 02/13/23 0604     " Glucose 136 mg/dL      Comment: Meter: [id number] : [id number] Sonia EUCEDA       Comprehensive Metabolic Panel [[id number]]  (Abnormal) Collected: 02/13/23 0321    Specimen: Blood Updated: 02/13/23 0428     Glucose 138 mg/dL      BUN 7 mg/dL      Creatinine 0.37 mg/dL      Sodium 132 mmol/L      Potassium 4.0 mmol/L      Chloride 99 mmol/L      CO2 28.4 mmol/L      Calcium 8.5 mg/dL      Total Protein 5.8 g/dL      Albumin 2.4 g/dL      ALT (SGPT) 9 U/L      AST (SGOT) 13 U/L      Alkaline Phosphatase 65 U/L      Total Bilirubin 0.5 mg/dL      Globulin 3.4 gm/dL      A/G Ratio 0.7 g/dL      BUN/Creatinine Ratio 18.9     Anion Gap 4.6 mmol/L      eGFR 113.5 mL/min/1.73     Narrative:      GFR Normal >60  Chronic Kidney Disease <60  Kidney Failure <15      Phosphorus [[id number]]  (Normal) Collected: 02/13/23 0321    Specimen: Blood Updated: 02/13/23 0420     Phosphorus 2.6 mg/dL     Magnesium [[id number]]  (Normal) Collected: 02/13/23 0321    Specimen: Blood Updated: 02/13/23 0420     Magnesium 1.9 mg/dL     CBC & Differential [[id number]]  (Abnormal) Collected: 02/13/23 0321    Specimen: Blood Updated: 02/13/23 0405    Narrative:      The following orders were created for panel order CBC & Differential.  Procedure                               Abnormality         Status                     ---------                               -----------         ------                     CBC Auto Differential[[id number]]        Abnormal            Final result                 Please view results for these tests on the individual orders.    CBC Auto Differential [[id number]]  (Abnormal) Collected: 02/13/23 0321    Specimen: Blood Updated: 02/13/23 0405     WBC 28.34 10*3/mm3      RBC 3.41 10*6/mm3      Hemoglobin 9.7 g/dL      Hematocrit 29.3 %      MCV 85.9 fL      MCH 28.4 pg      MCHC 33.1 g/dL      RDW 14.4 %      RDW-SD 44.9 fl      MPV 9.4 fL      Platelets 479 10*3/mm3      Neutrophil % 79.2 %      Lymphocyte % 9.1 %       Monocyte % 6.8 %      Eosinophil % 0.3 %      Basophil % 0.3 %      Immature Grans % 4.3 %      Neutrophils, Absolute 22.46 10*3/mm3      Lymphocytes, Absolute 2.58 10*3/mm3      Monocytes, Absolute 1.92 10*3/mm3      Eosinophils, Absolute 0.08 10*3/mm3      Basophils, Absolute 0.09 10*3/mm3      Immature Grans, Absolute 1.21 10*3/mm3      nRBC 0.0 /100 WBC     POC Glucose Once [[id number]]  (Abnormal) Collected: 02/12/23 2020    Specimen: Blood Updated: 02/12/23 2020     Glucose 159 mg/dL      Comment: Meter: [id number] : [id number] Godfreyservando Joseph [place]       POC Glucose Once [[id number]]  (Abnormal) Collected: 02/12/23 1743    Specimen: Blood Updated: 02/12/23 1744     Glucose 145 mg/dL      Comment: Meter: [id number] : [id number] Justin EUCEDA           Imaging Results (Last 24 Hours)     Procedure Component Value Units Date/Time    CT Abdomen Pelvis With Contrast [[id number]] Collected: 02/13/23 1119     Updated: 02/13/23 1355    Narrative:      CT ABDOMEN AND PELVIS WITH IV CONTRAST     HISTORY: Status post laparoscopic and open sigmoid resection for  diverticulitis/stricture. Status post percutaneous drain placement  02/09/2023. Increasing white count. Wound V.A.C. placement.     TECHNIQUE: Radiation dose reduction techniques were utilized, including  automated exposure control and exposure modulation based on body size.   3 mm images were obtained through the abdomen and pelvis after the  administration of IV contrast.      COMPARISON: 02/09/2023, 02/08/2023     FINDINGS:  Lower chest: Increasing small bilateral pleural effusions with bibasilar  airspace disease favoring atelectasis. Developing pneumonia not  excluded. Recommend follow-up to resolution after treatment. The heart  size is stable. Scattered calcific atherosclerosis.     Liver: Within normal limits.     Biliary tract: Within normal limits.     Spleen: Low attenuation along the inferior margin of the spleen is  better visualized after  IV contrast and slightly more conspicuous from  prior imaging allowing for differences in technique which may represent  evolving infarction or scarring from recent intervention. Continued  attention on follow-up recommended.     Pancreas: Within normal limits.     Adrenals: Within normal limits.     Kidneys:  Too small to characterize hypodensity in the left upper pole.  The right kidney is within normal limits. No hydronephrosis.     Bowel:  Small hiatal hernia with thickening of the distal esophagus  similar to the prior. The stomach is incompletely distended. Mildly  prominent fluid and air-filled loops of small bowel in the left upper  abdomen measuring up to 3.9 cm with scattered air-fluid levels slightly  less distended from prior imaging favoring evolving postoperative ileus.  Thickening of the colon proximal to the left lower quadrant colostomy.  The distal colon is incompletely distended. No extraluminal enteric  contrast. Percutaneous drainage of a smaller fluid collection in the  left paracolic gutter with the largest collection along the dependent  margin approximating 4.7 x 1.2 cm (previously 3.9 x 6.2 cm). Ill-defined  fluid in the right paracolic gutter is slightly more conspicuous with  tiny loculated collections in the right ventral pelvis. An index  collection measures 1.3 x 3 cm at the level of the iliac crest (image  110) with an additional smaller collection more anteriorly. A collection  in the central pelvis extending to the right of midline approximates 2.5  x 4.5 cm (previously 3.2 x 3.3 cm) with a small amount of fluid  extending into the pelvis. Mesenteric edema. No free intraperitoneal  air.     Vasculature:    Within normal limits.     Lymph Nodes:  No new adenopathy.                                                            Pelvic organs: Hysterectomy. Bladder incompletely distended..     Abdominal/Pelvic Wall: Postsurgical changes ventral abdominal wall with  a wound V.A.C. in situ.  Ill-defined subcutaneous soft tissue stranding  and fluid slightly less conspicuous. There is subcutaneous foci of gas  subjacent to the left lower quadrant ostomy with an ill-defined tract  abutting the lateral margin of the colostomy. No extraluminal enteric  contrast (series 2 images 87 and 105).     Bones: Multilevel degenerative changes..       Impression:      1.  New small bilateral pleural effusions with bibasilar airspace  disease favoring atelectasis or pneumonia.  2.  Interval decrease in size of a fluid collection in the left  paracolic gutter status post percutaneous drain placement. Please  correlate with drain output.  3.  Mesenteric edema with small volume ascites with ill-defined small  collections in the right upper quadrant and right central pelvis the  largest approximating 2.5 x 4.5 cm (previously 3.2 x 3.3 cm). Given size  and location of these collections continued conservative follow-up  recommended.  4.  Left lower quadrant colostomy with ill-defined subjacent stranding  and tiny foci of gas slightly more conspicuous from prior imaging. No  drainable fluid collections or definitive extraluminal enteric contrast.  Recommend continued conservative follow-up.  5.  Improving ileus with mild thickening of the colon just proximal to  the ostomy.  6.   Please see above for additional findings.     This report was finalized on 2/13/2023 1:52 PM by Dr. Garrett Ramirez M.D.           Upper and lower endoscopy results noted and discussed with patient    Current Facility-Administered Medications:   •  Adult Central 2-in-1 TPN, , Intravenous, Continuous, QuickLilibeth PA-C, Last Rate: 75 mL/hr at 02/12/23 1823, New Bag at 02/12/23 1823  •  Adult Central 2-in-1 TPN, , Intravenous, Continuous, Lino Gaston MD  •  amLODIPine (NORVASC) tablet 10 mg, 10 mg, Oral, Q24H, Gideon Hope MD, 10 mg at 02/13/23 0917  •  cefTRIAXone (ROCEPHIN) 1 g in sodium chloride 0.9 % 100 mL IVPB-VTB, 1 g, Intravenous, Q24H,  Beck Wei Jr., MD, Last Rate: 200 mL/hr at 02/13/23 1258, 1 g at 02/13/23 1258  •  dextrose (D50W) (25 g/50 mL) IV injection 25 g, 25 g, Intravenous, Q15 Min PRN, Beck Wei Jr., MD  •  dextrose (GLUTOSE) oral gel 15 g, 15 g, Oral, Q15 Min PRN, Beck Wei Jr., MD  •  famotidine (PEPCID) injection 20 mg, 20 mg, Intravenous, BID Josiah NICHOLS Brittany A, PA-C, 20 mg at 02/13/23 0630  •  Fat Emulsion Plant Based (INTRALIPID,LIPOSYN) 20 % infusion 20 g, 100 mL, Intravenous, Q24H (TPN), Lino Gaston MD, Last Rate: 8.33 mL/hr at 02/12/23 1823, 20 g at 02/12/23 1823  •  fluconazole (DIFLUCAN) IVPB 400 mg, 400 mg, Intravenous, Q24H, Gideon Hope MD, Last Rate: 100 mL/hr at 02/12/23 1701, 400 mg at 02/12/23 1701  •  glucagon (GLUCAGEN) injection 1 mg, 1 mg, Intramuscular, Q15 Min PRN, Beck Wei Jr., MD  •  hydrALAZINE (APRESOLINE) injection 10 mg, 10 mg, Intravenous, Q4H PRN, Gideon Hope MD, 10 mg at 02/11/23 1454  •  HYDROmorphone (DILAUDID) injection 0.25 mg, 0.25 mg, Intravenous, Q2H PRN, 0.25 mg at 02/13/23 0015 **AND** naloxone (NARCAN) injection 0.4 mg, 0.4 mg, Intravenous, Q5 Min PRN, Lino Gaston MD  •  insulin regular (humuLIN R,novoLIN R) injection 0-7 Units, 0-7 Units, Subcutaneous, Q6H, Beck Wei Jr., MD, 2 Units at 02/12/23 2326  •  metoclopramide (REGLAN) injection 10 mg, 10 mg, Intravenous, Q6H, Lino Gaston MD, 10 mg at 02/13/23 1258  •  metroNIDAZOLE (FLAGYL) IVPB 500 mg, 500 mg, Intravenous, Q8H, Galdino Esteves MD, 500 mg at 02/13/23 0937  •  nitroglycerin (NITROSTAT) SL tablet 0.4 mg, 0.4 mg, Sublingual, Q5 Min PRN, Lino Gaston MD  •  ondansetron (ZOFRAN) injection 4 mg, 4 mg, Intravenous, Q6H PRN, Lino Gaston MD, 4 mg at 02/12/23 6507  •  Pharmacy to Dose TPN, , Does not apply, Continuous PRN, Lilibeth Rahman PA-C  •  potassium chloride 10 mEq in 100 mL IVPB, 10 mEq, Intravenous, Q1H PRN, Matthew Soliz MD, Last Rate: 100 mL/hr at 02/11/23 2009,  10 mEq at 02/11/23 2009  •  potassium phosphate 45 mmol in sodium chloride 0.9 % 500 mL infusion, 45 mmol, Intravenous, PRN **OR** potassium phosphate 30 mmol in sodium chloride 0.9 % 250 mL infusion, 30 mmol, Intravenous, PRN **OR** potassium phosphate 15 mmol in sodium chloride 0.9 % 100 mL infusion, 15 mmol, Intravenous, PRN, 15 mmol at 02/12/23 1440 **OR** sodium phosphates 40 mmol in sodium chloride 0.9 % 500 mL IVPB, 40 mmol, Intravenous, PRN **OR** sodium phosphates 20 mmol in sodium chloride 0.9 % 250 mL IVPB, 20 mmol, Intravenous, PRN, Matthew Soliz MD  •  scopolamine patch 1 mg/72 hr, 1 patch, Transdermal, Q72H, Quick, Lilibeth A, PA-C, 1 patch at 02/12/23 1218  •  sodium chloride 0.9 % flush 10 mL, 10 mL, Intravenous, Q12H, Quick, Lilibeth A, PA-C, 10 mL at 02/13/23 0938  •  sodium chloride 0.9 % flush 10 mL, 10 mL, Intravenous, Q12H, Quick, Lilibeth A, PA-C, 10 mL at 02/13/23 0938  •  sodium chloride 0.9 % flush 10 mL, 10 mL, Intravenous, PRN, Quick, Lilibeth A, PA-C  •  sodium chloride 0.9 % flush 20 mL, 20 mL, Intravenous, PRN, Quick, Lilibeth A, PA-C  •  sodium chloride 0.9 % infusion 40 mL, 40 mL, Intravenous, PRN, Quick, Lilibeth A, PA-C     ASSESSMENT  Acute colitis with sigmoid stricture and microperforation s/p colon resection and colostomy  Abdominal fluid collection/abscess status post CT-guided drainage  Postop ileus  Acute kidney injury resolved  Hypertension   Gastroesophageal reflux disease    PLAN  CPM  Postop care  IVF  TPN  Continue antibiotics  Accu-Chek with low-dose sliding scale insulin  Infectious disease consult appreciated  Adjust blood pressure medications  Pain management  Continue home medications  Stress ulcer DVT prophylaxis  Supportive care  PT OT  Discussed with family nursing staff   Follow closely and further recommendation according to hospital course    SANG SINGLETARY MD    Copied text in this note has been reviewed and is accurate as of 02/13/23

## 2023-02-13 NOTE — PROGRESS NOTES
"   LOS: 17 days     Chief Complaint/ Reason for encounter: JUANY    Subjective   02/07/23 : Feels about the same today, still some generalized abdominal pain  Anxious to increase her diet, no nausea or vomiting today but did vomit twice yesterday  No shortness of breath or chest pain  No fevers or chills  Minimal edema    2/8: She is upset with the fact that her ostomy bag has stool in it  Remains n.p.o. per surgery recommendations  Denies any shortness of breath or chest pain, no fevers or chills no abdominal pain nausea vomiting or edema    2/9: Did not tolerate clear liquids, NG tube to be replaced, n.p.o., IR to place drain into the fluid collection seen on CT    2/10: Abdominal drain in place, still with some nausea, remains n.p.o. with plans to start TPN  Remains on IV fluids, no edema, good urine output    2/11 on TPN, states she wants a stark as she doesn't like getting up to urinate, bp above  goal     2/12 some abd pain, give dilaudid this am with improvement, bp runnign on high side, remains on TPN    2/13: Remains on TPN, n.p.o., no bowel movements, no flatus    Medical history reviewed:  Abdominal Pain        Subjective    History taken from: Patient and chart    Vital Signs  Temp:  [98 °F (36.7 °C)-98.3 °F (36.8 °C)] 98.3 °F (36.8 °C)  Heart Rate:  [] 101  Resp:  [17-18] 18  BP: (142-156)/(84-90) 145/84       Wt Readings from Last 1 Encounters:   02/13/23 0502 87.7 kg (193 lb 6.4 oz)   02/12/23 0628 82.4 kg (181 lb 10.5 oz)   02/11/23 0558 83.8 kg (184 lb 11.9 oz)   02/03/23 2226 83.1 kg (183 lb 3.2 oz)   01/27/23 1955 78.9 kg (174 lb)   01/27/23 1428 78.9 kg (174 lb)       Objective:  Vital signs: (most recent): Blood pressure 145/84, pulse 101, temperature 98.3 °F (36.8 °C), temperature source Oral, resp. rate 18, height 154.9 cm (60.98\"), weight 87.7 kg (193 lb 6.4 oz), SpO2 92 %.              Objective:  General Appearance:  Comfortable, nad   Awake, alert, oriented  HEENT: Mucous membranes " moist, no injury, oropharynx clear  Lungs:  Normal effort and normal respiratory rate.  Breath sounds clear to auscultation.  No  respiratory distress.  No rales, decreased breath sounds or rhonchi.    Heart: Normal rate.  Regular rhythm.  S1, S2 normal.  No murmur.   Abdomen: Abdomen is soft.  Diminished bowel sounds, nontender  Extremities: Trace ankle edema of bilateral lower extremities  Neurological: No focal motor or sensory deficits, pupils reactive  Skin:  Warm and dry.  No rash or cyanosis.       Results Review:    Intake/Output:     Intake/Output Summary (Last 24 hours) at 2/13/2023 1323  Last data filed at 2/13/2023 1050  Gross per 24 hour   Intake 0 ml   Output 4460 ml   Net -4460 ml         DATA:  Radiology and Labs:  The following labs independently reviewed by me. Additional labs ordered for tomorrow a.m.  Interval notes, chart personally reviewed by me.   Old records independently reviewed showing normal baseline creatinine    New problems include hypokalemia and hypophosphatemia  Discussed with patient herself at bedside    Risk/ complexity of medical care/ medical decision making moderate complexity, postop renal failure and fluid and electrolyte management    Labs:   Recent Results (from the past 24 hour(s))   POC Glucose Once    Collection Time: 02/12/23  5:43 PM    Specimen: Blood   Result Value Ref Range    Glucose 145 (H) 70 - 130 mg/dL   POC Glucose Once    Collection Time: 02/12/23  8:20 PM    Specimen: Blood   Result Value Ref Range    Glucose 159 (H) 70 - 130 mg/dL   Magnesium    Collection Time: 02/13/23  3:21 AM    Specimen: Blood   Result Value Ref Range    Magnesium 1.9 1.6 - 2.4 mg/dL   Phosphorus    Collection Time: 02/13/23  3:21 AM    Specimen: Blood   Result Value Ref Range    Phosphorus 2.6 2.5 - 4.5 mg/dL   Comprehensive Metabolic Panel    Collection Time: 02/13/23  3:21 AM    Specimen: Blood   Result Value Ref Range    Glucose 138 (H) 65 - 99 mg/dL    BUN 7 (L) 8 - 23 mg/dL     Creatinine 0.37 (L) 0.57 - 1.00 mg/dL    Sodium 132 (L) 136 - 145 mmol/L    Potassium 4.0 3.5 - 5.2 mmol/L    Chloride 99 98 - 107 mmol/L    CO2 28.4 22.0 - 29.0 mmol/L    Calcium 8.5 (L) 8.6 - 10.5 mg/dL    Total Protein 5.8 (L) 6.0 - 8.5 g/dL    Albumin 2.4 (L) 3.5 - 5.2 g/dL    ALT (SGPT) 9 1 - 33 U/L    AST (SGOT) 13 1 - 32 U/L    Alkaline Phosphatase 65 39 - 117 U/L    Total Bilirubin 0.5 0.0 - 1.2 mg/dL    Globulin 3.4 gm/dL    A/G Ratio 0.7 g/dL    BUN/Creatinine Ratio 18.9 7.0 - 25.0    Anion Gap 4.6 (L) 5.0 - 15.0 mmol/L    eGFR 113.5 >60.0 mL/min/1.73   CBC Auto Differential    Collection Time: 02/13/23  3:21 AM    Specimen: Blood   Result Value Ref Range    WBC 28.34 (H) 3.40 - 10.80 10*3/mm3    RBC 3.41 (L) 3.77 - 5.28 10*6/mm3    Hemoglobin 9.7 (L) 12.0 - 15.9 g/dL    Hematocrit 29.3 (L) 34.0 - 46.6 %    MCV 85.9 79.0 - 97.0 fL    MCH 28.4 26.6 - 33.0 pg    MCHC 33.1 31.5 - 35.7 g/dL    RDW 14.4 12.3 - 15.4 %    RDW-SD 44.9 37.0 - 54.0 fl    MPV 9.4 6.0 - 12.0 fL    Platelets 479 (H) 140 - 450 10*3/mm3    Neutrophil % 79.2 (H) 42.7 - 76.0 %    Lymphocyte % 9.1 (L) 19.6 - 45.3 %    Monocyte % 6.8 5.0 - 12.0 %    Eosinophil % 0.3 0.3 - 6.2 %    Basophil % 0.3 0.0 - 1.5 %    Immature Grans % 4.3 (H) 0.0 - 0.5 %    Neutrophils, Absolute 22.46 (H) 1.70 - 7.00 10*3/mm3    Lymphocytes, Absolute 2.58 0.70 - 3.10 10*3/mm3    Monocytes, Absolute 1.92 (H) 0.10 - 0.90 10*3/mm3    Eosinophils, Absolute 0.08 0.00 - 0.40 10*3/mm3    Basophils, Absolute 0.09 0.00 - 0.20 10*3/mm3    Immature Grans, Absolute 1.21 (H) 0.00 - 0.05 10*3/mm3    nRBC 0.0 0.0 - 0.2 /100 WBC   Triglycerides    Collection Time: 02/13/23  3:21 AM    Specimen: Blood   Result Value Ref Range    Triglycerides 126 0 - 150 mg/dL   POC Glucose Once    Collection Time: 02/13/23  6:03 AM    Specimen: Blood   Result Value Ref Range    Glucose 136 (H) 70 - 130 mg/dL       Radiology:  Pertinent radiology studies were reviewed as described  above      Medications have been reviewed separately in chart overview      ASSESSMENT:   acute renal failure, creatinine peaked at 2.3 and is below baseline today,   Hypokalemia   Metabolic alkalosis   Hypoalbuminemia   Hypophosphatemia     Colitis status post sigmoid colon resection    Nausea and vomiting,  Postop ileus, remains n.p.o.  Abdominal fluid collection status post drain placement   Stricture of sigmoid colon, status post colostomy  Hypokalemia, worse today  Hypertension, receiving as needed IV hydralazine  Anemia        DISCUSSION/PLAN:   Renal function remains excellent, stable and at baseline  Sodium trending down, recommend decrease free water a bit with TPN  Continue on TPN at current formulation otherwise  Potassium and phosphorus stable on TPN today    Prn iv hydralazine for HTN plus oral amlodipine    Continue to monitor electrolytes and volume closely.  We will continue to follow      Matthew Soliz MD   [facility]   Office phone number: [phone]  Answering service phone number: [phone]    02/13/23  13:23 EST

## 2023-02-13 NOTE — PLAN OF CARE
Goal Outcome Evaluation:  Plan of Care Reviewed With: patient        Progress: improving  Outcome Evaluation: Pt AOx4. Able to ambulate with sba to recliner and around nurse's station. NEW drain with scant output. Wound vac in place. IV dilaudid given x1, IV zofran x1. Potassium phosphate given, TPN continued. VSS. Safety maintained.

## 2023-02-13 NOTE — PLAN OF CARE
Goal Outcome Evaluation:  Plan of Care Reviewed With: patient           Outcome Evaluation: Pt agreed to amb even though just finished on bsc w/nsg, pt tolerated amb `85ft, CGA , pt req min A to perf STS; required seated rest at EOB after bsc and prior to amb; pt c/o fatigue and wkness in LEs, also abd pain, but eager to get moving, unsure of DC plan at present    Patient was intermittently wearing a face mask during this therapy encounter. Therapist used appropriate personal protective equipment including eye protection, mask, and gloves.  Mask used was standard procedure mask. Appropriate PPE was worn during the entire therapy session. Hand hygiene was completed before and after therapy session. Patient is not in enhanced droplet precautions.

## 2023-02-13 NOTE — TELEPHONE ENCOUNTER
Caller: AMAURI ABREU    Relationship to patient: Mother    Best call back number: [phone]    Patient is needing: PT'S MOM WOULD LIKE TO SPEAK WITH DR. GRANADO ABOUT HER DAUGHTER. SHE'S BEEN IN THE HOSPITAL AND DR. GRANADO PERFORMED SX ON HER 2/3/23. SHE STATES THAT HER DAUGHTER DOESN'T SEEM TO BE GETTING ANY BETTER AND SHE CAN'T SEEM TO CATCH DR. GRANADO WHENEVER SHE COMES BY HOSPITAL ROOM. SHE WOULD LIKE A CALL BACK ASAP.

## 2023-02-13 NOTE — PAYOR COMM NOTE
"Hali Aleman (62 y.o. Female)     PLEASE SEE ATTACHED FOR INPT CONTINUED STAY.     REF##[id number]    PLEASE CALL   OR  [phone]    THANK YOU    ANDREA CHAVES LPN CCP    Date of Birth   1960    Social Security Number       Address   [address] [facility] [address]    Home Phone   [phone]    MRN   [id number]       Denominational   None    Marital Status   Single                            Admission Date   1/27/23    Admission Type   Emergency    Admitting Provider   Gideon Hope MD    Attending Provider   Gideon Hope MD    Department, Room/Bed   [address], N427/1       Discharge Date       Discharge Disposition       Discharge Destination                               Attending Provider: Gideon Hope MD    Allergies: Hydrocodone, Sulfa Antibiotics    Isolation: None   Infection: None   Code Status: CPR    Ht: 154.9 cm (60.98\")   Wt: 87.7 kg (193 lb 6.4 oz)    Admission Cmt: None   Principal Problem: Colitis [K52.9]                 Active Insurance as of 1/27/2023     Primary Coverage     Payor Plan Insurance Group Employer/Plan Group    [facility] [employer] EM[employer] [id number]     Payor Plan Address Payor Plan Phone Number Payor Plan Fax Number Effective Dates    [address] [phone]  1/1/2022 - None Entered    [place] [address]       Subscriber Name Subscriber Birth Date Member ID       HALI ALEMAN 1960 [id number]                 Emergency Contacts      (Rel.) Home Phone Work Phone Mobile Phone    SANDRA ALEMAN (Daughter) [phone] -- --    AMAURI ABREU (Mother) [phone] -- [phone]            [place]: NPI [id number]  Tax ID [id number]  Oxygen Therapy (last 3 days)     Date/Time SpO2 Device (Oxygen Therapy) Flow (L/min) Oxygen Concentration (%) ETCO2 (mmHg)    02/13/23 0016 -- room air -- -- --    02/12/23 2242 -- room air -- -- --    02/12/23 2055 -- room air -- -- --    02/12/23 " 1915 96 room air -- -- --    02/12/23 1450 -- room air -- -- --    02/12/23 1352 96 room air -- -- --    02/12/23 0845 -- room air -- -- --    02/12/23 0700 94 room air -- -- --    02/12/23 0031 -- room air -- -- --    02/11/23 2347 95 -- -- -- --    02/11/23 2009 -- room air -- -- --    02/11/23 1916 97 -- -- -- --    02/11/23 1408 -- room air -- -- --    02/11/23 1316 96 room air -- -- --    02/11/23 0835 -- room air -- -- --    02/11/23 0711 96 room air -- -- --    02/11/23 0049 -- room air -- -- --    02/10/23 2308 96 -- -- -- --    02/10/23 2044 -- room air -- -- --    02/10/23 1932 100 -- -- -- --    02/10/23 1442 -- room air -- -- --    02/10/23 1323 96 room air -- -- --    02/10/23 1019 -- room air -- -- --    02/10/23 0752 94 room air -- -- --        Intake & Output (last 3 days)       02/10 0701  02/11 0700 02/11 0701 02/12 0700 02/12 0701 02/13 0700 02/13 0701 02/14 0700    P.O. 0 0 0     Total Intake(mL/kg) 0 (0) 0 (0) 0 (0)     Urine (mL/kg/hr) 5375 (2.7) 3050 (1.5) 3600 (1.7)     Emesis/NG output   100     Drains 60  10     Stool 100 0      Wound   450     Total Output 6835 3050 4166     Net -8454 -2838 -9820             Urine Unmeasured Occurrence 1 x 5 x      Stool Unmeasured Occurrence 0 x 1 x           Lines, Drains & Airways     Active LDAs     Name Placement date Placement time Site Days    PICC Double Lumen 02/10/23 Right Basilic 02/10/23  1431  Basilic  2    Peripheral IV 02/09/23 2343 Left Hand 02/09/23  2343  Hand  3    Closed/Suction Drain Left LLQ Pigtail 10 Fr. 02/09/23  1458  LLQ  3    Colostomy LLQ 02/03/23  1944  LLQ  9    External Urinary Catheter 02/12/23  1245  --  less than 1                Blood Administration Record (From admission, onward)    Completed transfusions     Ordered     Start    02/06/23 0906  Transfuse RBC Infuse Each Unit Over: 3.5H  Transfusion        Released Time Blood Unit Number Status   02/06/23 1226   23  [id number]  [id number] Completed 02/06/23 1708     "   02/06/23 0906    02/04/23 0912  Transfuse Fresh Frozen Plasma  Transfusion        Released Time Blood Unit Number Status   02/04/23 1552   22  [id number]  [id number] Completed 02/06/23 1225       02/04/23 0911    02/03/23 2034  Transfuse RBC Infuse Each Unit Over: 3.5H  Transfusion        Released Time Blood Unit Number Status   02/03/23 2034   23  [id number]  [id number] Completed 02/03/23 2132 02/03/23 2033          Canceled transfusions     Ordered     Start    02/03/23 1836  Transfuse RBC  Status:  Canceled      Released Time Blood Unit Number Status   02/03/23 1836   23  [id number]  [id number] Completed 02/03/23 2026 02/03/23 1836                Operative/Procedure Notes (last 72 hours)  Notes from 02/10/23 0717 through 02/13/23 0717   No notes of this type exist for this encounter.            Physician Progress Notes (last 72 hours)      Gideon Hope MD at 02/12/23 1433          Daily progress note    Primary care physician  Dr. Delgado    Chief complaint  Doing same with no new complaints     History of present illness  62-year-old female with history of hypertension presented to [facility] emergency room with left lower quadrant abdominal pain for last 1 month.  Patient also have occasional loose stools.  Patient denies any nausea vomiting.  Patient recently diagnosed with colitis and treated with oral antibiotics without any improvement.  Patient has noticed blood in the stools.  Patient evaluated in ER with CT abdominal pelvis which shows worsening colitis and failed outpatient treatment admit for management.  Patient has no fever chills chest pain shortness of breath with sweats weight loss or weight gain.     REVIEW OF SYSTEMS  Unremarkable except abdominal pain      PHYSICAL EXAM  Blood pressure 142/90, pulse 96, temperature 98 °F (36.7 °C), temperature source Oral, resp. rate 17, height 154.9 cm (60.98\"), weight 82.4 kg (181 lb 10.5 oz), SpO2 96 %.    GENERAL:  Awake and alert " in no acute distress  HEENT:  Unremarkable  NECK:  Supple  CV: regular rhythm, normal rate  RESPIRATORY: normal effort, clear to auscultation bilaterally  ABDOMEN: soft, left lower quadrant tenderness colostomy in place  MUSCULOSKELETAL: no deformity  NEURO: alert, moves all extremities, follows commands  PSYCH:  calm, cooperative  SKIN: warm, dry     LAB RESULTS  Lab Results (last 24 hours)     Procedure Component Value Units Date/Time    Sodium [[id number]]  (Abnormal) Collected: 02/12/23 0817    Specimen: Blood Updated: 02/12/23 1233     Sodium 135 mmol/L     POC Glucose Once [[id number]]  (Normal) Collected: 02/12/23 1126    Specimen: Blood Updated: 02/12/23 1127     Glucose 129 mg/dL      Comment: Meter: [id number] : [id number] Justin Ricci [place]       Magnesium [[id number]]  (Normal) Collected: 02/12/23 0817    Specimen: Blood Updated: 02/12/23 0847     Magnesium 2.0 mg/dL     Potassium [[id number]]  (Normal) Collected: 02/12/23 0817    Specimen: Blood Updated: 02/12/23 0847     Potassium 3.7 mmol/L     Phosphorus [[id number]]  (Abnormal) Collected: 02/12/23 0748    Specimen: Blood Updated: 02/12/23 0836     Phosphorus 2.1 mg/dL     High Sensitivity Troponin T 2Hr [[id number]] Collected: 02/12/23 0748    Specimen: Blood Updated: 02/12/23 0821     HS Troponin T 9 ng/L      Troponin T Delta --     Comment: Unable to calculate.       Narrative:      High Sensitive Troponin T Reference Range:  <10.0 ng/L- Negative Female for AMI  <15.0 ng/L- Negative Male for AMI  >=10 - Abnormal Female indicating possible myocardial injury.  >=15 - Abnormal Male indicating possible myocardial injury.   Clinicians would have to utilize clinical acumen, EKG, Troponin, and serial changes to determine if it is an Acute Myocardial Infarction or myocardial injury due to an underlying chronic condition.         Magnesium [[id number]]  (Normal) Collected: 02/12/23 0748    Specimen: Blood Updated: 02/12/23 0818     Magnesium 1.9 mg/dL      Potassium [[id number]]  (Normal) Collected: 02/12/23 0748    Specimen: Blood Updated: 02/12/23 0818     Potassium 3.7 mmol/L     CBC & Differential [[id number]]  (Abnormal) Collected: 02/12/23 0558    Specimen: Blood Updated: 02/12/23 0729    Narrative:      The following orders were created for panel order CBC & Differential.  Procedure                               Abnormality         Status                     ---------                               -----------         ------                     Manual Differential[[id number]]          Abnormal            Final result               CBC Auto Differential[[id number]]        Abnormal            Final result                 Please view results for these tests on the individual orders.    CBC Auto Differential [[id number]]  (Abnormal) Collected: 02/12/23 0558    Specimen: Blood Updated: 02/12/23 0729     WBC 26.57 10*3/mm3      RBC 2.92 10*6/mm3      Hemoglobin 8.5 g/dL      Hematocrit 27.5 %      MCV 94.2 fL      MCH 29.1 pg      MCHC 30.9 g/dL      RDW 13.9 %      RDW-SD 46.8 fl      MPV 9.8 fL      Platelets 416 10*3/mm3     Manual Differential [[id number]]  (Abnormal) Collected: 02/12/23 0558    Specimen: Blood Updated: 02/12/23 0729     Neutrophil % 89.9 %      Lymphocyte % 7.9 %      Monocyte % 1.1 %      Basophil % 1.1 %      Neutrophils Absolute 23.89 10*3/mm3      Lymphocytes Absolute 2.10 10*3/mm3      Monocytes Absolute 0.29 10*3/mm3      Basophils Absolute 0.29 10*3/mm3      Anisocytosis Mod/2+     Dacrocytes Slight/1+     Smudge Cells Slight/1+     Platelet Morphology Normal    Comprehensive Metabolic Panel [[id number]]  (Abnormal) Collected: 02/12/23 0558    Specimen: Blood Updated: 02/12/23 0708     Glucose 763 mg/dL      BUN 4 mg/dL      Creatinine 0.41 mg/dL      Sodium 126 mmol/L      Potassium 7.5 mmol/L      Chloride 96 mmol/L      CO2 26.0 mmol/L      Calcium 8.4 mg/dL      Total Protein 5.2 g/dL      Albumin 2.2 g/dL      ALT (SGPT) 9 U/L      AST  (SGOT) 12 U/L      Alkaline Phosphatase 60 U/L      Total Bilirubin 0.4 mg/dL      Globulin 3.0 gm/dL      A/G Ratio 0.7 g/dL      BUN/Creatinine Ratio 9.8     Anion Gap 4.0 mmol/L      eGFR 110.7 mL/min/1.73     Narrative:      GFR Normal >60  Chronic Kidney Disease <60  Kidney Failure <15      Magnesium [[id number]]  (Abnormal) Collected: 02/12/23 0558    Specimen: Blood Updated: 02/12/23 0650     Magnesium 2.8 mg/dL     Phosphorus [[id number]]  (Abnormal) Collected: 02/12/23 0558    Specimen: Blood Updated: 02/12/23 0650     Phosphorus 5.0 mg/dL     High Sensitivity Troponin T [[id number]]  (Normal) Collected: 02/12/23 0558    Specimen: Blood Updated: 02/12/23 0648     HS Troponin T <6 ng/L     Narrative:      High Sensitive Troponin T Reference Range:  <10.0 ng/L- Negative Female for AMI  <15.0 ng/L- Negative Male for AMI  >=10 - Abnormal Female indicating possible myocardial injury.  >=15 - Abnormal Male indicating possible myocardial injury.   Clinicians would have to utilize clinical acumen, EKG, Troponin, and serial changes to determine if it is an Acute Myocardial Infarction or myocardial injury due to an underlying chronic condition.         POC Glucose Once [[id number]]  (Abnormal) Collected: 02/12/23 0554    Specimen: Blood Updated: 02/12/23 0555     Glucose 170 mg/dL      Comment: Meter: [id number] : [id number] Han Tagooa CNA       POC Glucose Once [[id number]]  (Abnormal) Collected: 02/12/23 0028    Specimen: Blood Updated: 02/12/23 0029     Glucose 142 mg/dL      Comment: Meter: [id number] : [id number] Jamshid Manuel RN       POC Glucose Once [[id number]]  (Abnormal) Collected: 02/11/23 2143    Specimen: Blood Updated: 02/11/23 2144     Glucose 170 mg/dL      Comment: Meter: [id number] : [id number] Short [place] Kitchenskeya CNA       POC Glucose Once [[id number]]  (Abnormal) Collected: 02/11/23 1836    Specimen: Blood Updated: 02/11/23 1837     Glucose 148 mg/dL      Comment: Meter: [id number]  : [id number] Yonatan Barroso NA           Imaging Results (Last 24 Hours)     ** No results found for the last 24 hours. **        Upper and lower endoscopy results noted and discussed with patient    Current Facility-Administered Medications:   •  Adult Central 2-in-1 TPN, , Intravenous, Continuous, Lilibeth Rahman PA-C, Last Rate: 75 mL/hr at 02/11/23 1725, New Bag at 02/11/23 1725  •  Adult Central 2-in-1 TPN, , Intravenous, Continuous, Lilibeth Rahman PA-C  •  amLODIPine (NORVASC) tablet 10 mg, 10 mg, Oral, Q24H, Gideon Hope MD, 10 mg at 02/12/23 0840  •  cefTRIAXone (ROCEPHIN) 1 g in sodium chloride 0.9 % 100 mL IVPB-VTB, 1 g, Intravenous, Q24H, Beck Wei Jr., MD, Last Rate: 200 mL/hr at 02/12/23 1208, 1 g at 02/12/23 1208  •  dextrose (D50W) (25 g/50 mL) IV injection 25 g, 25 g, Intravenous, Q15 Min PRN, Beck Wei Jr., MD  •  dextrose (GLUTOSE) oral gel 15 g, 15 g, Oral, Q15 Min PRN, Beck Wei Jr., MD  •  famotidine (PEPCID) injection 20 mg, 20 mg, Intravenous, BID AC, Lilibeth Rahman PA-C, 20 mg at 02/12/23 0550  •  Fat Emulsion Plant Based (INTRALIPID,LIPOSYN) 20 % infusion 20 g, 100 mL, Intravenous, Q24H (TPN), Lino Gaston MD, Last Rate: 8.33 mL/hr at 02/11/23 1725, 20 g at 02/11/23 1725  •  fluconazole (DIFLUCAN) IVPB 400 mg, 400 mg, Intravenous, Q24H, Giedon Hope MD, Last Rate: 100 mL/hr at 02/11/23 1701, 400 mg at 02/11/23 1701  •  glucagon (GLUCAGEN) injection 1 mg, 1 mg, Intramuscular, Q15 Min PRN, Beck Wei Jr., MD  •  hydrALAZINE (APRESOLINE) injection 10 mg, 10 mg, Intravenous, Q4H PRN, Gideon Hope MD, 10 mg at 02/11/23 1454  •  HYDROmorphone (DILAUDID) injection 0.25 mg, 0.25 mg, Intravenous, Q2H PRN, 0.25 mg at 02/12/23 0611 **AND** naloxone (NARCAN) injection 0.4 mg, 0.4 mg, Intravenous, Q5 Min PRN, Lino Gaston MD  •  insulin regular (humuLIN R,novoLIN R) injection 0-7 Units, 0-7 Units, Subcutaneous, Q6H, Beck Wei Jr., MD, 2 Units at 02/12/23  0610  •  metoclopramide (REGLAN) injection 10 mg, 10 mg, Intravenous, Q6H, Lino Gaston MD, 10 mg at 02/12/23 1208  •  nitroglycerin (NITROSTAT) SL tablet 0.4 mg, 0.4 mg, Sublingual, Q5 Min PRN, Lino Gaston MD  •  ondansetron (ZOFRAN) injection 4 mg, 4 mg, Intravenous, Q6H PRN, Lino Gaston MD, 4 mg at 02/09/23 2212  •  Pharmacy to Dose TPN, , Does not apply, Continuous PRN, Lilibeth Rahman PA-C  •  potassium chloride 10 mEq in 100 mL IVPB, 10 mEq, Intravenous, Q1H PRN, Matthew Soliz MD, Last Rate: 100 mL/hr at 02/11/23 2009, 10 mEq at 02/11/23 2009  •  potassium phosphate 45 mmol in sodium chloride 0.9 % 500 mL infusion, 45 mmol, Intravenous, PRN **OR** potassium phosphate 30 mmol in sodium chloride 0.9 % 250 mL infusion, 30 mmol, Intravenous, PRN **OR** potassium phosphate 15 mmol in sodium chloride 0.9 % 100 mL infusion, 15 mmol, Intravenous, PRN, 15 mmol at 02/11/23 1220 **OR** sodium phosphates 40 mmol in sodium chloride 0.9 % 500 mL IVPB, 40 mmol, Intravenous, PRN **OR** sodium phosphates 20 mmol in sodium chloride 0.9 % 250 mL IVPB, 20 mmol, Intravenous, PRN, Matthwe Soliz MD  •  scopolamine patch 1 mg/72 hr, 1 patch, Transdermal, Q72H, Lilibeth Rahman PA-C, 1 patch at 02/12/23 1218  •  sodium chloride 0.9 % flush 10 mL, 10 mL, Intravenous, Q12H, Lilibeth Rahman PA-C, 10 mL at 02/12/23 0841  •  sodium chloride 0.9 % flush 10 mL, 10 mL, Intravenous, Q12H, Lilibeth Rahman PA-C, 10 mL at 02/12/23 0841  •  sodium chloride 0.9 % flush 10 mL, 10 mL, Intravenous, PRN, Lilibeth Rahman PA-C  •  sodium chloride 0.9 % flush 20 mL, 20 mL, Intravenous, PRN, Lilibeth Rahman PA-C  •  sodium chloride 0.9 % infusion 40 mL, 40 mL, Intravenous, PRN, Lilibeth Rahman PA-C     ASSESSMENT  Acute colitis with sigmoid stricture and microperforation s/p colon resection and colostomy  Abdominal fluid collection/abscess status post CT-guided drainage  Postop ileus  Acute kidney injury  resolved  Hypertension   Gastroesophageal reflux disease    PLAN  CPM  Postop care  IVF  TPN  Accu-Chek with low-dose sliding scale insulin  Antibiotics per infectious disease  Infectious disease consult appreciated  Adjust blood pressure medications  Pain management  Continue home medications  Stress ulcer DVT prophylaxis  Supportive care  PT OT  Discussed with family nursing staff   Follow closely and further recommendation according to hospital course    SANG SINGLETARY MD    Copied text in this note has been reviewed and is accurate as of 02/12/23        Electronically signed by Sang Singletary MD at 02/12/23 1434     Beck Wei Jr., MD at 02/12/23 1320        Chief Complaint:    S/P Resection of left colon and sigmoid colon with colostomy, POD 9    Subjective:    The patient is feeling well with no complaints except for expected postop abdominal pain.  She had clear liquid diet yesterday but did not take in very much.  She does not have much of an appetite.    Objective:    Temp:  [97.6 °F (36.4 °C)-98.4 °F (36.9 °C)] 97.6 °F (36.4 °C)  Heart Rate:  [84-93] 84  Resp:  [18] 18  BP: (137-158)/(87-90) 137/87    Physical Exam  Abdominal:      Palpations: Abdomen is soft.      Tenderness: There is no abdominal tenderness.      Comments: Colostomy: Stool output.   Neurological:      Mental Status: She is alert.   Psychiatric:         Behavior: Behavior is cooperative.         Results:    WBC has increased to 26.57.  H/H is 8.5/27.5.  Glucose was 763 this morning    Impression/Plan:    The patient is POD 9 from the resection of the left colon and sigmoid colon with colostomy.  She is clinically improving but continues to have some degree of an expected postop ileus and is not ready to have her diet advanced.    Her WBC remains elevated but she was switched to appropriate antibiotics yesterday.  I agree with infectious disease that she will likely need the addition of Flagyl.  If her WBC remains elevated, she will need  "a repeat CT scan of the abdomen and pelvis.    Beck Wei Jr., M.D.    Electronically signed by Beck Wei Jr., MD at 02/12/23 1324     Gilda Soliz MD at 02/12/23 1144             LOS: 16 days     Chief Complaint/ Reason for encounter: JUANY    Subjective   02/07/23 : Feels about the same today, still some generalized abdominal pain  Anxious to increase her diet, no nausea or vomiting today but did vomit twice yesterday  No shortness of breath or chest pain  No fevers or chills  Minimal edema    2/8: She is upset with the fact that her ostomy bag has stool in it  Remains n.p.o. per surgery recommendations  Denies any shortness of breath or chest pain, no fevers or chills no abdominal pain nausea vomiting or edema    2/9: Did not tolerate clear liquids, NG tube to be replaced, n.p.o., IR to place drain into the fluid collection seen on CT    2/10: Abdominal drain in place, still with some nausea, remains n.p.o. with plans to start TPN  Remains on IV fluids, no edema, good urine output    2/11 on TPN, states she wants a stark as she doesn't like getting up to urinate, bp above  goal     2/12 some abd pain, give dilaudid this am with improvement, bp runnign on high side, remains on TPN     Medical history reviewed:  Abdominal Pain        Subjective    History taken from: Patient and chart    Vital Signs  Temp:  [97.6 °F (36.4 °C)-98.5 °F (36.9 °C)] 97.6 °F (36.4 °C)  Heart Rate:  [84-93] 84  Resp:  [18] 18  BP: (137-160)/(87-97) 137/87       Wt Readings from Last 1 Encounters:   02/12/23 0628 82.4 kg (181 lb 10.5 oz)   02/11/23 0558 83.8 kg (184 lb 11.9 oz)   02/03/23 2226 83.1 kg (183 lb 3.2 oz)   01/27/23 1955 78.9 kg (174 lb)   01/27/23 1428 78.9 kg (174 lb)       Objective:  Vital signs: (most recent): Blood pressure 137/87, pulse 84, temperature 97.6 °F (36.4 °C), temperature source Oral, resp. rate 18, height 154.9 cm (60.98\"), weight 82.4 kg (181 lb 10.5 oz), SpO2 94 %.  "             Objective:  General Appearance:  Comfortable, nad   Awake, alert, oriented  HEENT: Mucous membranes moist, no injury, oropharynx clear  Lungs:  Normal effort and normal respiratory rate.  Breath sounds clear to auscultation.  No  respiratory distress.  No rales, decreased breath sounds or rhonchi.    Heart: Normal rate.  Regular rhythm.  S1, S2 normal.  No murmur.   Abdomen: Abdomen is soft.  Diminished bowel sounds, nontender  Extremities: Trace ankle edema of bilateral lower extremities  Neurological: No focal motor or sensory deficits, pupils reactive  Skin:  Warm and dry.  No rash or cyanosis.       Results Review:    Intake/Output:     Intake/Output Summary (Last 24 hours) at 2/12/2023 1144  Last data filed at 2/12/2023 0628  Gross per 24 hour   Intake 0 ml   Output 3050 ml   Net -3050 ml         DATA:  Radiology and Labs:  The following labs independently reviewed by me. Additional labs ordered for tomorrow a.m.  Interval notes, chart personally reviewed by me.   Old records independently reviewed showing normal baseline creatinine    New problems include hypokalemia and hypophosphatemia  Discussed with patient herself at bedside    Risk/ complexity of medical care/ medical decision making moderate complexity, postop renal failure and fluid and electrolyte management    Labs:   Recent Results (from the past 24 hour(s))   POC Glucose Once    Collection Time: 02/11/23  6:36 PM    Specimen: Blood   Result Value Ref Range    Glucose 148 (H) 70 - 130 mg/dL   POC Glucose Once    Collection Time: 02/11/23  9:43 PM    Specimen: Blood   Result Value Ref Range    Glucose 170 (H) 70 - 130 mg/dL   POC Glucose Once    Collection Time: 02/12/23 12:28 AM    Specimen: Blood   Result Value Ref Range    Glucose 142 (H) 70 - 130 mg/dL   POC Glucose Once    Collection Time: 02/12/23  5:54 AM    Specimen: Blood   Result Value Ref Range    Glucose 170 (H) 70 - 130 mg/dL   Magnesium    Collection Time: 02/12/23  5:58 AM     Specimen: Blood   Result Value Ref Range    Magnesium 2.8 (H) 1.6 - 2.4 mg/dL   Comprehensive Metabolic Panel    Collection Time: 02/12/23  5:58 AM    Specimen: Blood   Result Value Ref Range    Glucose 763 (C) 65 - 99 mg/dL    BUN 4 (L) 8 - 23 mg/dL    Creatinine 0.41 (L) 0.57 - 1.00 mg/dL    Sodium 126 (L) 136 - 145 mmol/L    Potassium 7.5 (C) 3.5 - 5.2 mmol/L    Chloride 96 (L) 98 - 107 mmol/L    CO2 26.0 22.0 - 29.0 mmol/L    Calcium 8.4 (L) 8.6 - 10.5 mg/dL    Total Protein 5.2 (L) 6.0 - 8.5 g/dL    Albumin 2.2 (L) 3.5 - 5.2 g/dL    ALT (SGPT) 9 1 - 33 U/L    AST (SGOT) 12 1 - 32 U/L    Alkaline Phosphatase 60 39 - 117 U/L    Total Bilirubin 0.4 0.0 - 1.2 mg/dL    Globulin 3.0 gm/dL    A/G Ratio 0.7 g/dL    BUN/Creatinine Ratio 9.8 7.0 - 25.0    Anion Gap 4.0 (L) 5.0 - 15.0 mmol/L    eGFR 110.7 >60.0 mL/min/1.73   Manual Differential    Collection Time: 02/12/23  5:58 AM    Specimen: Blood   Result Value Ref Range    Neutrophil % 89.9 (H) 42.7 - 76.0 %    Lymphocyte % 7.9 (L) 19.6 - 45.3 %    Monocyte % 1.1 (L) 5.0 - 12.0 %    Basophil % 1.1 0.0 - 1.5 %    Neutrophils Absolute 23.89 (H) 1.70 - 7.00 10*3/mm3    Lymphocytes Absolute 2.10 0.70 - 3.10 10*3/mm3    Monocytes Absolute 0.29 0.10 - 0.90 10*3/mm3    Basophils Absolute 0.29 (H) 0.00 - 0.20 10*3/mm3    Anisocytosis Mod/2+ None Seen    Dacrocytes Slight/1+ None Seen    Smudge Cells Slight/1+ None Seen    Platelet Morphology Normal Normal   CBC Auto Differential    Collection Time: 02/12/23  5:58 AM    Specimen: Blood   Result Value Ref Range    WBC 26.57 (H) 3.40 - 10.80 10*3/mm3    RBC 2.92 (L) 3.77 - 5.28 10*6/mm3    Hemoglobin 8.5 (L) 12.0 - 15.9 g/dL    Hematocrit 27.5 (L) 34.0 - 46.6 %    MCV 94.2 79.0 - 97.0 fL    MCH 29.1 26.6 - 33.0 pg    MCHC 30.9 (L) 31.5 - 35.7 g/dL    RDW 13.9 12.3 - 15.4 %    RDW-SD 46.8 37.0 - 54.0 fl    MPV 9.8 6.0 - 12.0 fL    Platelets 416 140 - 450 10*3/mm3   Phosphorus    Collection Time: 02/12/23  5:58 AM    Specimen:  Blood   Result Value Ref Range    Phosphorus 5.0 (H) 2.5 - 4.5 mg/dL   High Sensitivity Troponin T    Collection Time: 02/12/23  5:58 AM    Specimen: Blood   Result Value Ref Range    HS Troponin T <6 <10 ng/L   ECG 12 Lead Chest Pain    Collection Time: 02/12/23  6:17 AM   Result Value Ref Range    QT Interval 388 ms   High Sensitivity Troponin T 2Hr    Collection Time: 02/12/23  7:48 AM    Specimen: Blood   Result Value Ref Range    HS Troponin T 9 <10 ng/L    Troponin T Delta     Potassium    Collection Time: 02/12/23  7:48 AM    Specimen: Blood   Result Value Ref Range    Potassium 3.7 3.5 - 5.2 mmol/L   Magnesium    Collection Time: 02/12/23  7:48 AM    Specimen: Blood   Result Value Ref Range    Magnesium 1.9 1.6 - 2.4 mg/dL   Phosphorus    Collection Time: 02/12/23  7:48 AM    Specimen: Blood   Result Value Ref Range    Phosphorus 2.1 (L) 2.5 - 4.5 mg/dL   Potassium    Collection Time: 02/12/23  8:17 AM    Specimen: Blood   Result Value Ref Range    Potassium 3.7 3.5 - 5.2 mmol/L   Magnesium    Collection Time: 02/12/23  8:17 AM    Specimen: Blood   Result Value Ref Range    Magnesium 2.0 1.6 - 2.4 mg/dL   POC Glucose Once    Collection Time: 02/12/23 11:26 AM    Specimen: Blood   Result Value Ref Range    Glucose 129 70 - 130 mg/dL       Radiology:  Pertinent radiology studies were reviewed as described above      Medications have been reviewed separately in chart overview      ASSESSMENT:   acute renal failure, creatinine peaked at 2.3 and is below baseline today,   Hypokalemia   Metabolic alkalosis   Hypoalbuminemia   Hypophosphatemia       Colitis status post sigmoid colon resection    Nausea and vomiting,  Postop ileus, remains n.p.o.  Abdominal fluid collection status post drain placement   Stricture of sigmoid colon, status post colostomy  Hypokalemia, worse today  Hypertension, receiving as needed IV hydralazine  Anemia        DISCUSSION/PLAN:   Cr stable   Continue on TPN     Repeat Na now, suspect  "lab error as she was 141 yesterday     Prn iv hydralazine for HTN   Oral norvasc started today     Continue to monitor electrolytes and volume closely      Gilda Soliz MD   [facility]   Office phone number: [phone]  Answering service phone number: [phone]    02/12/23  11:44 EST          Electronically signed by Gilda Soliz MD at 02/12/23 5174     Gideon Hope MD at 02/11/23 8518          Daily progress note    Primary care physician  Dr. Delgado    Chief complaint  Doing same with no new complaints     History of present illness  62-year-old female with history of hypertension presented to [facility] emergency room with left lower quadrant abdominal pain for last 1 month.  Patient also have occasional loose stools.  Patient denies any nausea vomiting.  Patient recently diagnosed with colitis and treated with oral antibiotics without any improvement.  Patient has noticed blood in the stools.  Patient evaluated in ER with CT abdominal pelvis which shows worsening colitis and failed outpatient treatment admit for management.  Patient has no fever chills chest pain shortness of breath with sweats weight loss or weight gain.     REVIEW OF SYSTEMS  Unremarkable except abdominal pain      PHYSICAL EXAM  Blood pressure 160/97, pulse 88, temperature 98.5 °F (36.9 °C), temperature source Oral, resp. rate 18, height 154.9 cm (60.98\"), weight 83.8 kg (184 lb 11.9 oz), SpO2 96 %.    GENERAL:  Awake and alert in no acute distress  HEENT:  Unremarkable  NECK:  Supple  CV: regular rhythm, normal rate  RESPIRATORY: normal effort, clear to auscultation bilaterally  ABDOMEN: soft, left lower quadrant tenderness colostomy in place  MUSCULOSKELETAL: no deformity  NEURO: alert, moves all extremities, follows commands  PSYCH:  calm, cooperative  SKIN: warm, dry     LAB RESULTS  Lab Results (last 24 hours)     Procedure Component Value Units Date/Time    POC Glucose Once [[id number]]  " (Abnormal) Collected: 02/11/23 1118    Specimen: Blood Updated: 02/11/23 1119     Glucose 156 mg/dL      Comment: Meter: [id number] : [id number] Yonatan EUCEDA       Body Fluid Culture - Drainage, Peritoneum [[id number]]  (Abnormal)  (Susceptibility) Collected: 02/09/23 1458    Specimen: Drainage from Peritoneum Updated: 02/11/23 0851     Body Fluid Culture Scant growth (1+) Escherichia coli     Gram Stain Few (2+) WBCs per low power field      No organisms seen    Susceptibility      Escherichia coli      RENETTA      Amikacin Susceptible      Ampicillin Resistant     Ampicillin + Sulbactam Resistant     Cefepime Susceptible      Ceftazidime Susceptible      Ceftriaxone Susceptible      Gentamicin Resistant     Levofloxacin Susceptible      Piperacillin + Tazobactam Resistant     Tobramycin Intermediate      Trimethoprim + Sulfamethoxazole Resistant                      Susceptibility Comments     Escherichia coli    Cefazolin sensitivity will not be reported for Enterobacteriaceae in non-urine isolates. If cefazolin is preferred, please call the microbiology lab to request an E-test.  With the exception of urinary-sourced infections, aminoglycosides should not be used as monotherapy.             Manual Differential [[id number]]  (Abnormal) Collected: 02/11/23 0506    Specimen: Blood Updated: 02/11/23 0613     Neutrophil % 85.9 %      Lymphocyte % 10.1 %      Monocyte % 4.0 %      Neutrophils Absolute 21.63 10*3/mm3      Lymphocytes Absolute 2.54 10*3/mm3      Monocytes Absolute 1.01 10*3/mm3      Hypochromia Mod/2+     Poikilocytes Slight/1+     Polychromasia Slight/1+     Target Cells Slight/1+     WBC Morphology Normal     Platelet Morphology Normal    CBC & Differential [[id number]]  (Abnormal) Collected: 02/11/23 0506    Specimen: Blood Updated: 02/11/23 0613    Narrative:      The following orders were created for panel order CBC & Differential.  Procedure                               Abnormality         Status                      ---------                               -----------         ------                     CBC Auto Differential[[id number]]        Abnormal            Final result                 Please view results for these tests on the individual orders.    CBC Auto Differential [[id number]]  (Abnormal) Collected: 02/11/23 0506    Specimen: Blood Updated: 02/11/23 0613     WBC 25.18 10*3/mm3      RBC 3.13 10*6/mm3      Hemoglobin 8.7 g/dL      Hematocrit 27.8 %      MCV 88.8 fL      MCH 27.8 pg      MCHC 31.3 g/dL      RDW 14.5 %      RDW-SD 46.5 fl      MPV 9.9 fL      Platelets 411 10*3/mm3     POC Glucose Once [[id number]]  (Abnormal) Collected: 02/11/23 0602    Specimen: Blood Updated: 02/11/23 0603     Glucose 168 mg/dL      Comment: Meter: [id number] : [id number] [employer] CNA       Renal Function Panel [[id number]]  (Abnormal) Collected: 02/11/23 0506    Specimen: Blood Updated: 02/11/23 0550     Glucose 158 mg/dL      BUN 3 mg/dL      Creatinine 0.37 mg/dL      Sodium 141 mmol/L      Potassium 2.8 mmol/L      Chloride 106 mmol/L      CO2 30.0 mmol/L      Calcium 7.8 mg/dL      Albumin 2.1 g/dL      Phosphorus 2.1 mg/dL      Anion Gap 5.0 mmol/L      BUN/Creatinine Ratio 8.1     eGFR 113.5 mL/min/1.73     Narrative:      GFR Normal >60  Chronic Kidney Disease <60  Kidney Failure <15      Magnesium [[id number]]  (Normal) Collected: 02/11/23 0506    Specimen: Blood Updated: 02/11/23 0550     Magnesium 1.8 mg/dL     POC Glucose Once [[id number]]  (Abnormal) Collected: 02/11/23 0028    Specimen: Blood Updated: 02/11/23 0030     Glucose 155 mg/dL      Comment: Meter: [id number] : [id number] CNA           Imaging Results (Last 24 Hours)     ** No results found for the last 24 hours. **        Upper and lower endoscopy results noted and discussed with patient    Current Facility-Administered Medications:   •  Adult Central 2-in-1 TPN, , Intravenous, Continuous, Alek, Nechol L, MD, Last  Rate: 75 mL/hr at 02/10/23 1805, New Bag at 02/10/23 1805  •  Adult Central 2-in-1 TPN, , Intravenous, Continuous, Lilibeth Rahman PA-C  •  amLODIPine (NORVASC) tablet 10 mg, 10 mg, Oral, Q24H, Gideon Hope MD, 10 mg at 02/11/23 0835  •  cefTRIAXone (ROCEPHIN) 1 g in sodium chloride 0.9 % 100 mL IVPB-VTB, 1 g, Intravenous, Q24H, Beck Wei Jr., MD, Last Rate: 200 mL/hr at 02/11/23 1135, 1 g at 02/11/23 1135  •  dextrose (D50W) (25 g/50 mL) IV injection 25 g, 25 g, Intravenous, Q15 Min PRN, Beck Wei Jr., MD  •  dextrose (GLUTOSE) oral gel 15 g, 15 g, Oral, Q15 Min PRN, Beck Wei Jr., MD  •  famotidine (PEPCID) injection 20 mg, 20 mg, Intravenous, BID AC, Lilibeth Rahman PA-C, 20 mg at 02/11/23 0556  •  Fat Emulsion Plant Based (INTRALIPID,LIPOSYN) 20 % infusion 20 g, 100 mL, Intravenous, Q24H (TPN), Lino Gaston MD, Last Rate: 8.33 mL/hr at 02/10/23 1804, 20 g at 02/10/23 1804  •  fluconazole (DIFLUCAN) IVPB 400 mg, 400 mg, Intravenous, Q24H, Gideon Hope MD, Last Rate: 100 mL/hr at 02/10/23 1806, 400 mg at 02/10/23 1806  •  glucagon (GLUCAGEN) injection 1 mg, 1 mg, Intramuscular, Q15 Min PRN, Beck Wei Jr., MD  •  hydrALAZINE (APRESOLINE) injection 10 mg, 10 mg, Intravenous, Q4H PRN, Gideon Hope MD, 10 mg at 02/11/23 1454  •  HYDROmorphone (DILAUDID) injection 0.25 mg, 0.25 mg, Intravenous, Q2H PRN, 0.25 mg at 02/11/23 1448 **AND** naloxone (NARCAN) injection 0.4 mg, 0.4 mg, Intravenous, Q5 Min PRN, Lino Gaston MD  •  insulin regular (humuLIN R,novoLIN R) injection 0-7 Units, 0-7 Units, Subcutaneous, Q6H, Beck Wei Jr., MD, 2 Units at 02/11/23 1448  •  metoclopramide (REGLAN) injection 10 mg, 10 mg, Intravenous, Q6H, Lino Gaston MD, 10 mg at 02/11/23 1215  •  nitroglycerin (NITROSTAT) SL tablet 0.4 mg, 0.4 mg, Sublingual, Q5 Min PRN, Lino Gaston MD  •  ondansetron (ZOFRAN) injection 4 mg, 4 mg, Intravenous, Q6H PRN, Lino Gaston MD, 4 mg at 02/09/23  2212  •  Pharmacy to Dose TPN, , Does not apply, Continuous PRN, Quick, Lilibeth A, PA-C  •  potassium chloride 10 mEq in 100 mL IVPB, 10 mEq, Intravenous, Q1H PRN, Matthew Soliz MD, Last Rate: 100 mL/hr at 02/11/23 1408, 10 mEq at 02/11/23 1408  •  potassium phosphate 45 mmol in sodium chloride 0.9 % 500 mL infusion, 45 mmol, Intravenous, PRN **OR** potassium phosphate 30 mmol in sodium chloride 0.9 % 250 mL infusion, 30 mmol, Intravenous, PRN **OR** potassium phosphate 15 mmol in sodium chloride 0.9 % 100 mL infusion, 15 mmol, Intravenous, PRN, 15 mmol at 02/11/23 1220 **OR** sodium phosphates 40 mmol in sodium chloride 0.9 % 500 mL IVPB, 40 mmol, Intravenous, PRN **OR** sodium phosphates 20 mmol in sodium chloride 0.9 % 250 mL IVPB, 20 mmol, Intravenous, PRN, Matthew Soliz MD  •  scopolamine patch 1 mg/72 hr, 1 patch, Transdermal, Q72H, Quick, Lilibeth A, PA-C, 1 patch at 02/09/23 1156  •  sodium chloride 0.9 % flush 10 mL, 10 mL, Intravenous, Q12H, Quick, Lilibeth A, PA-C, 10 mL at 02/11/23 0833  •  sodium chloride 0.9 % flush 10 mL, 10 mL, Intravenous, Q12H, Quick, Lilibeth A, PA-C, 10 mL at 02/11/23 0832  •  sodium chloride 0.9 % flush 10 mL, 10 mL, Intravenous, PRN, Quick, Lilibeth A, PA-C  •  sodium chloride 0.9 % flush 20 mL, 20 mL, Intravenous, PRN, Quick, Lilibeth A, PA-C  •  sodium chloride 0.9 % infusion 40 mL, 40 mL, Intravenous, PRN, Quick, Lilibeth A, PA-C     ASSESSMENT  Acute colitis with sigmoid stricture and microperforation s/p colon resection and colostomy  Abdominal fluid collection/abscess status post CT-guided drainage  Postop ileus  Acute kidney injury resolved  Hypertension   Gastroesophageal reflux disease    PLAN  CPM  Postop care  IVF  TPN  Accu-Chek with low-dose sliding scale insulin  Replace potassium  Antibiotics per infectious disease  Infectious disease consult appreciated  Adjust blood pressure medications  Pain management  Continue home medications  Stress ulcer  "DVT prophylaxis  Supportive care  PT OT  Discussed with family nursing staff   Follow closely and further recommendation according to hospital course    SANG SINGLETARY MD    Copied text in this note has been reviewed and is accurate as of 02/11/23        Electronically signed by Sang Singletary MD at 02/11/23 1546     Gilda Soliz MD at 02/11/23 1343             LOS: 15 days     Chief Complaint/ Reason for encounter: JUANY    Subjective   02/07/23 : Feels about the same today, still some generalized abdominal pain  Anxious to increase her diet, no nausea or vomiting today but did vomit twice yesterday  No shortness of breath or chest pain  No fevers or chills  Minimal edema    2/8: She is upset with the fact that her ostomy bag has stool in it  Remains n.p.o. per surgery recommendations  Denies any shortness of breath or chest pain, no fevers or chills no abdominal pain nausea vomiting or edema    2/9: Did not tolerate clear liquids, NG tube to be replaced, n.p.o., IR to place drain into the fluid collection seen on CT    2/10: Abdominal drain in place, still with some nausea, remains n.p.o. with plans to start TPN  Remains on IV fluids, no edema, good urine output    2/11 on TPN, states she wants a stark as she doesn't like getting up to urinate, bp above  goal     Medical history reviewed:  Abdominal Pain        Subjective    History taken from: Patient and chart    Vital Signs  Temp:  [97.8 °F (36.6 °C)-98.5 °F (36.9 °C)] 98.5 °F (36.9 °C)  Heart Rate:  [84-96] 88  Resp:  [18] 18  BP: (151-182)/(92-98) 160/97       Wt Readings from Last 1 Encounters:   02/11/23 0558 83.8 kg (184 lb 11.9 oz)   02/03/23 2226 83.1 kg (183 lb 3.2 oz)   01/27/23 1955 78.9 kg (174 lb)   01/27/23 1428 78.9 kg (174 lb)       Objective:  Vital signs: (most recent): Blood pressure 160/97, pulse 88, temperature 98.5 °F (36.9 °C), temperature source Oral, resp. rate 18, height 154.9 cm (60.98\"), weight 83.8 kg (184 lb 11.9 oz), SpO2 96 %.  "             Objective:  General Appearance:  Comfortable, nad   Awake, alert, oriented  HEENT: Mucous membranes moist, no injury, oropharynx clear  Lungs:  Normal effort and normal respiratory rate.  Breath sounds clear to auscultation.  No  respiratory distress.  No rales, decreased breath sounds or rhonchi.    Heart: Normal rate.  Regular rhythm.  S1, S2 normal.  No murmur.   Abdomen: Abdomen is soft.  Diminished bowel sounds, nontender  Extremities: Trace ankle edema of bilateral lower extremities  Neurological: No focal motor or sensory deficits, pupils reactive  Skin:  Warm and dry.  No rash or cyanosis.       Results Review:    Intake/Output:     Intake/Output Summary (Last 24 hours) at 2/11/2023 1343  Last data filed at 2/11/2023 0558  Gross per 24 hour   Intake 0 ml   Output 5160 ml   Net -5160 ml         DATA:  Radiology and Labs:  The following labs independently reviewed by me. Additional labs ordered for tomorrow a.m.  Interval notes, chart personally reviewed by me.   Old records independently reviewed showing normal baseline creatinine    New problems include hypokalemia and hypophosphatemia  Discussed with patient herself at bedside    Risk/ complexity of medical care/ medical decision making moderate complexity, postop renal failure and fluid and electrolyte management    Labs:   Recent Results (from the past 24 hour(s))   POC Glucose Once    Collection Time: 02/11/23 12:28 AM    Specimen: Blood   Result Value Ref Range    Glucose 155 (H) 70 - 130 mg/dL   Magnesium    Collection Time: 02/11/23  5:06 AM    Specimen: Blood   Result Value Ref Range    Magnesium 1.8 1.6 - 2.4 mg/dL   Renal Function Panel    Collection Time: 02/11/23  5:06 AM    Specimen: Blood   Result Value Ref Range    Glucose 158 (H) 65 - 99 mg/dL    BUN 3 (L) 8 - 23 mg/dL    Creatinine 0.37 (L) 0.57 - 1.00 mg/dL    Sodium 141 136 - 145 mmol/L    Potassium 2.8 (L) 3.5 - 5.2 mmol/L    Chloride 106 98 - 107 mmol/L    CO2 30.0 (H) 22.0 -  29.0 mmol/L    Calcium 7.8 (L) 8.6 - 10.5 mg/dL    Albumin 2.1 (L) 3.5 - 5.2 g/dL    Phosphorus 2.1 (L) 2.5 - 4.5 mg/dL    Anion Gap 5.0 5.0 - 15.0 mmol/L    BUN/Creatinine Ratio 8.1 7.0 - 25.0    eGFR 113.5 >60.0 mL/min/1.73   CBC Auto Differential    Collection Time: 02/11/23  5:06 AM    Specimen: Blood   Result Value Ref Range    WBC 25.18 (H) 3.40 - 10.80 10*3/mm3    RBC 3.13 (L) 3.77 - 5.28 10*6/mm3    Hemoglobin 8.7 (L) 12.0 - 15.9 g/dL    Hematocrit 27.8 (L) 34.0 - 46.6 %    MCV 88.8 79.0 - 97.0 fL    MCH 27.8 26.6 - 33.0 pg    MCHC 31.3 (L) 31.5 - 35.7 g/dL    RDW 14.5 12.3 - 15.4 %    RDW-SD 46.5 37.0 - 54.0 fl    MPV 9.9 6.0 - 12.0 fL    Platelets 411 140 - 450 10*3/mm3   Manual Differential    Collection Time: 02/11/23  5:06 AM    Specimen: Blood   Result Value Ref Range    Neutrophil % 85.9 (H) 42.7 - 76.0 %    Lymphocyte % 10.1 (L) 19.6 - 45.3 %    Monocyte % 4.0 (L) 5.0 - 12.0 %    Neutrophils Absolute 21.63 (H) 1.70 - 7.00 10*3/mm3    Lymphocytes Absolute 2.54 0.70 - 3.10 10*3/mm3    Monocytes Absolute 1.01 (H) 0.10 - 0.90 10*3/mm3    Hypochromia Mod/2+ None Seen    Poikilocytes Slight/1+ None Seen    Polychromasia Slight/1+ None Seen    Target Cells Slight/1+ None Seen    WBC Morphology Normal Normal    Platelet Morphology Normal Normal   POC Glucose Once    Collection Time: 02/11/23  6:02 AM    Specimen: Blood   Result Value Ref Range    Glucose 168 (H) 70 - 130 mg/dL   POC Glucose Once    Collection Time: 02/11/23 11:18 AM    Specimen: Blood   Result Value Ref Range    Glucose 156 (H) 70 - 130 mg/dL       Radiology:  Pertinent radiology studies were reviewed as described above      Medications have been reviewed separately in chart overview      ASSESSMENT:   acute renal failure, creatinine peaked at 2.3 and is below baseline today,   Hypokalemia   Metabolic alkalosis   Hypoalbuminemia   Hypophosphatemia       Colitis status post sigmoid colon resection    Nausea and vomiting,  Postop ileus,  remains n.p.o.  Abdominal fluid collection status post drain placement   Stricture of sigmoid colon, status post colostomy  Hypokalemia, worse today  Hypertension, receiving as needed IV hydralazine  Anemia        DISCUSSION/PLAN:   Cr stable   Continue on TPN   Give additional K, Mg and Po4 IV today   Prn iv hydralazine for HTN   Nursing to put pt on purwick       Continue to monitor electrolytes and volume closely      Gilda Soliz MD   [facility]   Office phone number: [phone]  Answering service phone number: [phone]    23  13:43 EST    Dictation performed using Dragon dictation software      Electronically signed by Gilda Soliz MD at 23 1348     Galdino Esteves MD at 23 1142            Infectious Diseases Progress Note    Galdino Esteves MD     Psychiatric  Los: 15 days  Patient Identification:  Name: Hali Tejeda  Age: 62 y.o.  Sex: female  :  1960  MRN: [id number]         Primary Care Physician: Richard Delgado MD        Subjective: Denies any new complaints.  Interval History: Underwent placement of wound VAC on her abdominal wound/incision.  TPN is started.    Objective:    Scheduled Meds:amLODIPine, 10 mg, Oral, Q24H  cefTRIAXone, 1 g, Intravenous, Q24H  famotidine, 20 mg, Intravenous, BID AC  Fat Emulsion Plant Based, 100 mL, Intravenous, Q24H (TPN)  fluconazole, 400 mg, Intravenous, Q24H  metoclopramide, 10 mg, Intravenous, Q6H  Scopolamine, 1 patch, Transdermal, Q72H  sodium chloride, 10 mL, Intravenous, Q12H  sodium chloride, 10 mL, Intravenous, Q12H      Continuous Infusions:Adult Central 2-in-1 TPN, , Last Rate: 75 mL/hr at 02/10/23 1805  Pharmacy to Dose TPN,         Vital signs in last 24 hours:  Temp:  [97.8 °F (36.6 °C)-98.4 °F (36.9 °C)] 98.3 °F (36.8 °C)  Heart Rate:  [84-96] 84  Resp:  [18-20] 18  BP: (151-182)/(88-98) 151/92    Intake/Output:    Intake/Output Summary (Last 24 hours) at 2023 1142  Last data filed  "at 2/11/2023 0558  Gross per 24 hour   Intake 0 ml   Output 5510 ml   Net -5510 ml       Exam:  /92 (BP Location: Right arm, Patient Position: Lying)   Pulse 84   Temp 98.3 °F (36.8 °C) (Oral)   Resp 18   Ht 154.9 cm (60.98\")   Wt 83.8 kg (184 lb 11.9 oz)   SpO2 96%   BMI 34.93 kg/m²   Patient is examined using the personal protective equipment as per guidelines from infection control for this particular patient as enacted.  Hand washing was performed before and after patient interaction.  General Appearance:    Alert, cooperative, no distress, AAOx3                          Head:    Normocephalic, without obvious abnormality, atraumatic                           Eyes:    PERRL, conjunctivae/corneas clear, EOM's intact, both eyes                         Throat:   Lips, tongue, gums normal; oral mucosa pink and moist                           Neck:   Supple, symmetrical, trachea midline, no JVD                         Lungs:    Clear to auscultation bilaterally, respirations unlabored                 Chest Wall:    No tenderness or deformity                          Heart:  S1-S2 regular                  Abdomen:   Ostomy present with midline incision has wound VAC in place with no surrounding cellulitis.                 Extremities:   Extremities normal, atraumatic, no cyanosis or edema                        Pulses:   Pulses palpable in all extremities                            Skin:   Skin is warm and dry,  no rashes or palpable lesions                  Neurologic: Grossly nonfocal       Data Review:    I reviewed the patient's new clinical results.  Results from last 7 days   Lab Units 02/11/23  0506 02/10/23  0917 02/09/23  0619 02/08/23  0405 02/07/23  0440 02/06/23  0443 02/05/23  0334   WBC 10*3/mm3 25.18* 20.95* 25.99* 18.59* 8.48 14.19* 15.58*   HEMOGLOBIN g/dL 8.7* 8.7* 9.6* 9.7* 10.1* 7.7* 8.5*   PLATELETS 10*3/mm3 411 370 337 281 275 215 189     Results from last 7 days   Lab Units " 02/11/23  0506 02/10/23  0356 02/09/23  0619 02/08/23  2132 02/08/23  0405 02/07/23  1514 02/07/23  0440 02/06/23  0443   SODIUM mmol/L 141 141 143  --  143 146* 145 140   POTASSIUM mmol/L 2.8* 2.7* 4.6 3.3* 3.5 3.1* 2.7* 3.3*   CHLORIDE mmol/L 106 108* 111*  --  116* 116* 111* 112*   CO2 mmol/L 30.0* 26.0 22.9  --  21.0* 19.8* 18.5* 20.5*   BUN mg/dL 3* 4* 5*  --  9 12 16 22   CREATININE mg/dL 0.37* 0.39* 0.46*  --  0.62 0.72 0.85 1.84*   CALCIUM mg/dL 7.8* 8.1* 8.4*  --  8.4* 8.6 8.9 8.5*   GLUCOSE mg/dL 158* 94 101*  --  79 71 34* 54*     Microbiology Results (last 10 days)     Procedure Component Value - Date/Time    Clostridioides difficile Toxin - Stool, Per Stoma [[id number]]  (Normal) Collected: 02/10/23 1243    Lab Status: Final result Specimen: Stool from Per Stoma Updated: 02/10/23 1341    Narrative:      The following orders were created for panel order Clostridioides difficile Toxin - Stool, Per Stoma.  Procedure                               Abnormality         Status                     ---------                               -----------         ------                     Clostridioides difficile...[[id number]]  Normal              Final result                 Please view results for these tests on the individual orders.    Clostridioides difficile Toxin, PCR - Stool, Per Stoma [[id number]]  (Normal) Collected: 02/10/23 1243    Lab Status: Final result Specimen: Stool from Per Stoma Updated: 02/10/23 1341     C. Difficile Toxins by PCR Negative    Narrative:      The result indicates the absence of toxigenic C. difficile from stool specimen.     Body Fluid Culture - Drainage, Peritoneum [[id number]]  (Abnormal)  (Susceptibility) Collected: 02/09/23 1458    Lab Status: Final result Specimen: Drainage from Peritoneum Updated: 02/11/23 0851     Body Fluid Culture Scant growth (1+) Escherichia coli     Gram Stain Few (2+) WBCs per low power field      No organisms seen    Susceptibility      Escherichia coli       RENETTA      Amikacin Susceptible      Ampicillin Resistant     Ampicillin + Sulbactam Resistant     Cefepime Susceptible      Ceftazidime Susceptible      Ceftriaxone Susceptible      Gentamicin Resistant     Levofloxacin Susceptible      Piperacillin + Tazobactam Resistant     Tobramycin Intermediate      Trimethoprim + Sulfamethoxazole Resistant                      Susceptibility Comments     Escherichia coli    Cefazolin sensitivity will not be reported for Enterobacteriaceae in non-urine isolates. If cefazolin is preferred, please call the microbiology lab to request an E-test.  With the exception of urinary-sourced infections, aminoglycosides should not be used as monotherapy.                     Assessment:    Colitis    Nausea and vomiting    Stricture of sigmoid colon (HCC)  1-intra-abdominal sepsis with intra-abdominal abscess in the setting of prolonged colitis, sigmoid stricture, endoscopic injury to colonic wall with microperforation and subsequent laparotomy partial colectomy and colostomy and intraoperative injury to the spleen with repair while being on antibiotic therapy-likely pathogen to consider in this situation would be enteric amberly along with selection of yeast due to prolonged antibiotic therapy-status post percutaneous drain placement on 2/9/2023  2-hypertension  3-malnourishment  4-possible postop abdominal wall/incisional infection/evolving abscess        Recommendations/Discussions:  Agree with DC Zosyn and initiating ceftriaxone.  Continue with fluconazole.  May require addition of Flagyl to cover for the spectrum of pathogens likely  possible in this situation  Monitor her clinical course and may require 7 to 10 days of antibiotic treatment from final indefinite intervention for the intra-abdominal abscess in the setting.  Galdino Esteves MD  2/11/2023  11:42 EST    Parts of this note may be an electronic transcription/translation of spoken language to printed text using the Dragon  dictation system.      Electronically signed by Galdino Esteves MD at 02/12/23 0632     Beck Wei Jr., MD at 02/11/23 1001        Chief Complaint:    S/P Resection of left colon and sigmoid colon with colostomy, POD 8    Subjective:    The patient is generally feeling well with only expected postop abdominal pain.  She is not having any nausea or vomiting and tolerated sips of water well.  She is having colostomy output.  She was up in a chair yesterday and this morning.  She has a wound VAC on her incision.    Objective:    Temp:  [97.8 °F (36.6 °C)-98.4 °F (36.9 °C)] 98.3 °F (36.8 °C)  Heart Rate:  [84-96] 84  Resp:  [18-20] 18  BP: (151-182)/(88-98) 151/92    Physical Exam  Pulmonary:      Effort: Pulmonary effort is normal. No respiratory distress.   Abdominal:      Palpations: Abdomen is soft.      Tenderness: There is generalized abdominal tenderness (Appropriate postop tenderness).      Comments: The abdomen is soft and mildly distended.  It is mildly tender with appropriate postop tenderness.  There are bowel sounds.  Wound VAC is in place.  The NEW drain has serous appearing output.  The colostomy has output.   Neurological:      Mental Status: She is alert.   Psychiatric:         Behavior: Behavior is cooperative.         Results:    WBC is 25.18.  H/H is 8.7/27.8.  C. difficile is negative.  Cultures from abdominal fluid shows scant growth of E. coli that is resistant to Zosyn.    Impression/Plan:    The patient is POD 8 from a resection of the left colon and sigmoid colon with colostomy.  Her expected postop ileus is slowly resolving and her diet will be carefully advanced.    Her WBC has increased but her cultures show E. coli that is resistant to Zosyn.  Zosyn will be stopped and she will be started on Rocephin.    We will continue TPN while her diet is slowly advanced.    Beck Wei Jr., M.D.    Electronically signed by Beck Wei Jr., MD at 02/11/23 1007     Gideon Hope MD at 02/10/23  "[id number]          Daily progress note    Primary care physician  Dr. Delgado    Chief complaint  Doing same with no new complaints and looked depressed.    History of present illness  62-year-old female with history of hypertension presented to [facility] emergency room with left lower quadrant abdominal pain for last 1 month.  Patient also have occasional loose stools.  Patient denies any nausea vomiting.  Patient recently diagnosed with colitis and treated with oral antibiotics without any improvement.  Patient has noticed blood in the stools.  Patient evaluated in ER with CT abdominal pelvis which shows worsening colitis and failed outpatient treatment admit for management.  Patient has no fever chills chest pain shortness of breath with sweats weight loss or weight gain.     REVIEW OF SYSTEMS  Unremarkable except abdominal pain      PHYSICAL EXAM  Blood pressure 165/88, pulse 85, temperature 98.4 °F (36.9 °C), temperature source Oral, resp. rate 20, height 154.9 cm (60.98\"), weight 83.1 kg (183 lb 3.2 oz), SpO2 96 %.    GENERAL:  Awake and alert in no acute distress  HEENT:  Unremarkable  NECK:  Supple  CV: regular rhythm, normal rate  RESPIRATORY: normal effort, clear to auscultation bilaterally  ABDOMEN: soft, left lower quadrant tenderness colostomy in place  MUSCULOSKELETAL: no deformity  NEURO: alert, moves all extremities, follows commands  PSYCH:  calm, cooperative  SKIN: warm, dry     LAB RESULTS  Lab Results (last 24 hours)     Procedure Component Value Units Date/Time    Clostridioides difficile Toxin - Stool, Per Stoma [[id number]]  (Normal) Collected: 02/10/23 1243    Specimen: Stool from Per Stoma Updated: 02/10/23 1341    Narrative:      The following orders were created for panel order Clostridioides difficile Toxin - Stool, Per Stoma.  Procedure                               Abnormality         Status                     ---------                               -----------         ------        "              Clostridioides difficile...[[id number]]  Normal              Final result                 Please view results for these tests on the individual orders.    Clostridioides difficile Toxin, PCR - Stool, Per Stoma [[id number]]  (Normal) Collected: 02/10/23 1243    Specimen: Stool from Per Stoma Updated: 02/10/23 1341     C. Difficile Toxins by PCR Negative    Narrative:      The result indicates the absence of toxigenic C. difficile from stool specimen.     Urinalysis, Microscopic Only - Urine, Clean Catch [[id number]]  (Abnormal) Collected: 02/10/23 1243    Specimen: Urine, Clean Catch Updated: 02/10/23 1303     RBC, UA 6-12 /HPF      WBC, UA 0-2 /HPF      Comment: Urine culture not indicated.        Bacteria, UA None Seen /HPF      Squamous Epithelial Cells, UA 3-6 /HPF      Hyaline Casts, UA 3-6 /LPF      Methodology Automated Microscopy    Urinalysis With Culture If Indicated - Urine, Clean Catch [[id number]]  (Abnormal) Collected: 02/10/23 1243    Specimen: Urine, Clean Catch Updated: 02/10/23 1303     Color, UA Yellow     Appearance, UA Clear     pH, UA 6.5     Specific Gravity, UA 1.015     Glucose, UA Negative     Ketones, UA Trace     Bilirubin, UA Negative     Blood, UA Trace     Protein, UA 30 mg/dL (1+)     Leuk Esterase, UA Negative     Nitrite, UA Negative     Urobilinogen, UA 1.0 E.U./dL    Narrative:      In absence of clinical symptoms, the presence of pyuria, bacteria, and/or nitrites on the urinalysis result does not correlate with infection.    Body Fluid Culture - Drainage, Peritoneum [[id number]]  (Abnormal) Collected: 02/09/23 1458    Specimen: Drainage from Peritoneum Updated: 02/10/23 1150     Body Fluid Culture Scant growth (1+) Gram Negative Bacilli     Gram Stain Few (2+) WBCs per low power field      No organisms seen    Manual Differential [[id number]]  (Abnormal) Collected: 02/10/23 0917    Specimen: Blood Updated: 02/10/23 1012     Neutrophil % 86.6 %      Lymphocyte % 8.2 %       Monocyte % 1.0 %      Metamyelocyte % 1.0 %      Myelocyte % 3.1 %      Neutrophils Absolute 18.14 10*3/mm3      Lymphocytes Absolute 1.72 10*3/mm3      Monocytes Absolute 0.21 10*3/mm3      RBC Morphology Normal     Smudge Cells Slight/1+     Platelet Morphology Normal    CBC & Differential [[id number]]  (Abnormal) Collected: 02/10/23 0917    Specimen: Blood Updated: 02/10/23 1012    Narrative:      The following orders were created for panel order CBC & Differential.  Procedure                               Abnormality         Status                     ---------                               -----------         ------                     CBC Auto Differential[[id number]]        Abnormal            Final result                 Please view results for these tests on the individual orders.    CBC Auto Differential [[id number]]  (Abnormal) Collected: 02/10/23 0917    Specimen: Blood Updated: 02/10/23 1012     WBC 20.95 10*3/mm3      RBC 3.18 10*6/mm3      Hemoglobin 8.7 g/dL      Hematocrit 27.2 %      MCV 85.5 fL      MCH 27.4 pg      MCHC 32.0 g/dL      RDW 14.2 %      RDW-SD 43.5 fl      MPV 9.9 fL      Platelets 370 10*3/mm3     Magnesium [[id number]]  (Normal) Collected: 02/10/23 0356    Specimen: Blood Updated: 02/10/23 0546     Magnesium 1.6 mg/dL     Renal Function Panel [[id number]]  (Abnormal) Collected: 02/10/23 0356    Specimen: Blood Updated: 02/10/23 0546     Glucose 94 mg/dL      BUN 4 mg/dL      Creatinine 0.39 mg/dL      Sodium 141 mmol/L      Potassium 2.7 mmol/L      Chloride 108 mmol/L      CO2 26.0 mmol/L      Calcium 8.1 mg/dL      Albumin 2.2 g/dL      Phosphorus 2.0 mg/dL      Anion Gap 7.0 mmol/L      BUN/Creatinine Ratio 10.3     eGFR 112.1 mL/min/1.73     Narrative:      GFR Normal >60  Chronic Kidney Disease <60  Kidney Failure <15          Imaging Results (Last 24 Hours)     Procedure Component Value Units Date/Time    CT Guided Abscess Drain Peritoneal [[id number]] Collected: 02/09/23 1519      Updated: 02/09/23 7894    Narrative:      PROCEDURE: CT guided abdominal drain placement     HISTORY: Left paracolic fluid collection; K52.9-Noninfective  gastroenteritis and colitis, unspecified; R11.2-Nausea with vomiting,  unspecified; K56.699-Other intestinal obstruction unspecified as to  partial versus complete obstruction     TECHNIQUE:  Radiation dose reduction techniques were utilized, including automated  exposure control and exposure modulation based on body size.     The procedural risks, benefits, and alternatives were discussed with the  patient. Informed consent was obtained.        The patient was placed in the supine position on the CT procedure table.  Axial CT scan was performed to localize the left abdominal fluid  collection. The overlying skin was prepped and draped in the usual  sterile fashion. 1% buffered lidocaine was used for local anesthesia.     Next, a 5 Turkish 31Dovereh needle was advanced into the collection under CT  guidance. There was return of serous fluid. A sample was taken and sent  for culture. A superstiff guidewire was advanced through the needle. The  tract was serially dilated. Next a 10 Turkish pigtail catheter was  advanced over the wire into the fluid collection. The catheter was  sutured in place and placed to bulb suction. No immediate complications.          Impression:      Technically successful CT guided abdominal drain placement.     Moderate sedation was provided under my direct supervision using 1 mg IV  Versed and 50 mcg IV Fentanyl. The patient was independently monitored  by a trained Department of Radiology RN using automated blood pressure,  EKG, and pulse oximetry. My total intra-service time was 9 minutes.            Radiation dose reduction techniques were utilized, including automated  exposure control and exposure modulation based on body size.     This report was finalized on 2/9/2023 3:12 PM by Dr. Matthew Palmer M.D.           Upper and lower endoscopy  results noted and discussed with patient    Current Facility-Administered Medications:   •  Adult Central 2-in-1 TPN, , Intravenous, Continuous, Lino Gaston MD  •  amLODIPine (NORVASC) tablet 10 mg, 10 mg, Oral, Q24H, Gideon Hope MD, 10 mg at 02/10/23 1055  •  dextrose 5 % and sodium chloride 0.9 % infusion, 100 mL/hr, Intravenous, Continuous, Lino Gaston MD  •  famotidine (PEPCID) injection 20 mg, 20 mg, Intravenous, BID AC, Lilibeth Rahman PA-C, 20 mg at 02/10/23 1055  •  Fat Emulsion Plant Based (INTRALIPID,LIPOSYN) 20 % infusion 20 g, 100 mL, Intravenous, Q24H (TPN), Lino Gaston MD  •  fluconazole (DIFLUCAN) IVPB 400 mg, 400 mg, Intravenous, Q24H, Gideon Hope MD  •  hydrALAZINE (APRESOLINE) injection 10 mg, 10 mg, Intravenous, Q4H PRN, Gideon Hope MD, 10 mg at 02/10/23 0159  •  HYDROmorphone (DILAUDID) injection 0.25 mg, 0.25 mg, Intravenous, Q2H PRN, 0.25 mg at 02/10/23 0941 **AND** naloxone (NARCAN) injection 0.4 mg, 0.4 mg, Intravenous, Q5 Min PRN, Lino Gaston MD  •  metoclopramide (REGLAN) injection 10 mg, 10 mg, Intravenous, Q6H, Lino Gaston MD, 10 mg at 02/10/23 0717  •  nitroglycerin (NITROSTAT) SL tablet 0.4 mg, 0.4 mg, Sublingual, Q5 Min PRN, Lino Gaston MD  •  ondansetron (ZOFRAN) injection 4 mg, 4 mg, Intravenous, Q6H PRN, Lino Gaston MD, 4 mg at 02/09/23 2212  •  Pharmacy to Dose TPN, , Does not apply, Continuous PRN, Lilibeth Rahman PA-C  •  piperacillin-tazobactam (ZOSYN) 3.375 g in iso-osmotic dextrose 50 ml (premix), 3.375 g, Intravenous, Q8H, Gideon Hope MD, 3.375 g at 02/10/23 1057  •  potassium chloride 10 mEq in 100 mL IVPB, 10 mEq, Intravenous, Q1H PRN, Matthew Soliz MD, Last Rate: 100 mL/hr at 02/08/23 0523, 10 mEq at 02/08/23 0523  •  potassium phosphate 45 mmol in sodium chloride 0.9 % 500 mL infusion, 45 mmol, Intravenous, PRN **OR** potassium phosphate 30 mmol in sodium chloride 0.9 % 250 mL infusion, 30 mmol, Intravenous, PRN  **OR** potassium phosphate 15 mmol in sodium chloride 0.9 % 100 mL infusion, 15 mmol, Intravenous, PRN, 15 mmol at 02/09/23 0206 **OR** sodium phosphates 40 mmol in sodium chloride 0.9 % 500 mL IVPB, 40 mmol, Intravenous, PRN **OR** sodium phosphates 20 mmol in sodium chloride 0.9 % 250 mL IVPB, 20 mmol, Intravenous, PRN, Matthew Soliz MD  •  potassium phosphate 30 mmol in sodium chloride 0.9 % 500 mL infusion, 30 mmol, Intravenous, Once, Matthew Soliz MD  •  scopolamine patch 1 mg/72 hr, 1 patch, Transdermal, Q72H, Quick Lilibeth A, PA-C, 1 patch at 02/09/23 1156  •  sodium chloride 0.9 % flush 10 mL, 10 mL, Intravenous, Q12H, Quick, Lilibeth A, PA-C  •  sodium chloride 0.9 % flush 10 mL, 10 mL, Intravenous, Q12H, Quick, Lilibeth A, PA-C  •  sodium chloride 0.9 % flush 10 mL, 10 mL, Intravenous, PRN, Quick, Lilibeth A, PA-C  •  sodium chloride 0.9 % flush 20 mL, 20 mL, Intravenous, PRN, Quick, Lilibeth A, PA-C  •  sodium chloride 0.9 % infusion 40 mL, 40 mL, Intravenous, PRN, Quick, Lilibeth A, PA-C     ASSESSMENT  Acute colitis with sigmoid stricture and microperforation s/p colon resection and colostomy  Abdominal fluid collection/abscess status post CT-guided drainage  Postop ileus  Acute kidney injury resolved  Hypertension   Gastroesophageal reflux disease    PLAN  CPM  Postop care  IVF  TPN  Replace potassium  Antibiotics per infectious disease  Infectious disease consult appreciated  Adjust blood pressure medications  Pain management  Continue home medications  Stress ulcer DVT prophylaxis  Supportive care  PT OT  Discussed with family nursing staff   Follow closely and further recommendation according to hospital course    SANG HOPE MD    Copied text in this note has been reviewed and is accurate as of 02/10/23        Electronically signed by Sang Hope MD at 02/10/23 1510     Lino Gaston MD at 02/10/23 1240        Patient has stool in the bag.  Midline wound was opened up with  some drainage noted wound VAC is on  C. difficile stool sample has not been collected yet  Abdomen is soft  Ostomy is pink with stool in bag    Discussed with nursing that C. difficile stool sample needs to be sent there is stool in the bag  Okay for sips but will not do a tray  ID has been consulted  Discussed with patient she needs to be out of bed to the  chair for the majority part of the day and she needs to walk.  This will help resolve her ileus.   Electronically signed by Lino Gaston MD at 02/10/23 1243     Matthew Soliz MD at 02/10/23 1230             LOS: 14 days     Chief Complaint/ Reason for encounter: JUANY    Subjective   02/07/23 : Feels about the same today, still some generalized abdominal pain  Anxious to increase her diet, no nausea or vomiting today but did vomit twice yesterday  No shortness of breath or chest pain  No fevers or chills  Minimal edema    2/8: She is upset with the fact that her ostomy bag has stool in it  Remains n.p.o. per surgery recommendations  Denies any shortness of breath or chest pain, no fevers or chills no abdominal pain nausea vomiting or edema    2/9: Did not tolerate clear liquids, NG tube to be replaced, n.p.o., IR to place drain into the fluid collection seen on CT    2/10: Abdominal drain in place, still with some nausea, remains n.p.o. with plans to start TPN  Remains on IV fluids, no edema, good urine output    Medical history reviewed:  Abdominal Pain        Subjective    History taken from: Patient and chart    Vital Signs  Temp:  [98.2 °F (36.8 °C)-98.7 °F (37.1 °C)] 98.3 °F (36.8 °C)  Heart Rate:  [81-94] 86  Resp:  [16-26] 20  BP: (158-183)/() 166/90       Wt Readings from Last 1 Encounters:   02/03/23 2226 83.1 kg (183 lb 3.2 oz)   01/27/23 1955 78.9 kg (174 lb)   01/27/23 1428 78.9 kg (174 lb)       Objective:  Vital signs: (most recent): Blood pressure 166/90, pulse 86, temperature 98.3 °F (36.8 °C), temperature source Oral, resp. rate  "20, height 154.9 cm (60.98\"), weight 83.1 kg (183 lb 3.2 oz), SpO2 94 %.              Objective:  General Appearance:  Comfortable, relatively well-appearing, in no acute distress and not in pain.  Awake, alert, oriented  HEENT: Mucous membranes moist, no injury, oropharynx clear  Lungs:  Normal effort and normal respiratory rate.  Breath sounds clear to auscultation.  No  respiratory distress.  No rales, decreased breath sounds or rhonchi.    Heart: Normal rate.  Regular rhythm.  S1, S2 normal.  No murmur.   Abdomen: Abdomen is soft.  Diminished bowel sounds, nontender  Extremities: Trace ankle edema of bilateral lower extremities  Neurological: No focal motor or sensory deficits, pupils reactive  Skin:  Warm and dry.  No rash or cyanosis.       Results Review:    Intake/Output:     Intake/Output Summary (Last 24 hours) at 2/10/2023 1230  Last data filed at 2/10/2023 1205  Gross per 24 hour   Intake 0 ml   Output 405 ml   Net -405 ml         DATA:  Radiology and Labs:  The following labs independently reviewed by me. Additional labs ordered for tomorrow a.m.  Interval notes, chart personally reviewed by me.   Old records independently reviewed showing normal baseline creatinine    New problems include hypokalemia and hypophosphatemia  Discussed with patient herself at bedside    Risk/ complexity of medical care/ medical decision making moderate complexity, postop renal failure and fluid and electrolyte management    Labs:   Recent Results (from the past 24 hour(s))   Body Fluid Culture - Drainage, Peritoneum    Collection Time: 02/09/23  2:58 PM    Specimen: Peritoneum; Drainage   Result Value Ref Range    Body Fluid Culture Scant growth (1+) Gram Negative Bacilli (A)     Gram Stain Few (2+) WBCs per low power field     Gram Stain No organisms seen    Magnesium    Collection Time: 02/10/23  3:56 AM    Specimen: Blood   Result Value Ref Range    Magnesium 1.6 1.6 - 2.4 mg/dL   Renal Function Panel    Collection " Time: 02/10/23  3:56 AM    Specimen: Blood   Result Value Ref Range    Glucose 94 65 - 99 mg/dL    BUN 4 (L) 8 - 23 mg/dL    Creatinine 0.39 (L) 0.57 - 1.00 mg/dL    Sodium 141 136 - 145 mmol/L    Potassium 2.7 (L) 3.5 - 5.2 mmol/L    Chloride 108 (H) 98 - 107 mmol/L    CO2 26.0 22.0 - 29.0 mmol/L    Calcium 8.1 (L) 8.6 - 10.5 mg/dL    Albumin 2.2 (L) 3.5 - 5.2 g/dL    Phosphorus 2.0 (L) 2.5 - 4.5 mg/dL    Anion Gap 7.0 5.0 - 15.0 mmol/L    BUN/Creatinine Ratio 10.3 7.0 - 25.0    eGFR 112.1 >60.0 mL/min/1.73   CBC Auto Differential    Collection Time: 02/10/23  9:17 AM    Specimen: Blood   Result Value Ref Range    WBC 20.95 (H) 3.40 - 10.80 10*3/mm3    RBC 3.18 (L) 3.77 - 5.28 10*6/mm3    Hemoglobin 8.7 (L) 12.0 - 15.9 g/dL    Hematocrit 27.2 (L) 34.0 - 46.6 %    MCV 85.5 79.0 - 97.0 fL    MCH 27.4 26.6 - 33.0 pg    MCHC 32.0 31.5 - 35.7 g/dL    RDW 14.2 12.3 - 15.4 %    RDW-SD 43.5 37.0 - 54.0 fl    MPV 9.9 6.0 - 12.0 fL    Platelets 370 140 - 450 10*3/mm3   Manual Differential    Collection Time: 02/10/23  9:17 AM    Specimen: Blood   Result Value Ref Range    Neutrophil % 86.6 (H) 42.7 - 76.0 %    Lymphocyte % 8.2 (L) 19.6 - 45.3 %    Monocyte % 1.0 (L) 5.0 - 12.0 %    Metamyelocyte % 1.0 (H) 0.0 - 0.0 %    Myelocyte % 3.1 (H) 0.0 - 0.0 %    Neutrophils Absolute 18.14 (H) 1.70 - 7.00 10*3/mm3    Lymphocytes Absolute 1.72 0.70 - 3.10 10*3/mm3    Monocytes Absolute 0.21 0.10 - 0.90 10*3/mm3    RBC Morphology Normal Normal    Smudge Cells Slight/1+ None Seen    Platelet Morphology Normal Normal       Radiology:  Pertinent radiology studies were reviewed as described above      Medications have been reviewed separately in chart overview      ASSESSMENT:   acute renal failure, creatinine peaked at 2.3 and is below baseline today, on IV fluids    Colitis status post sigmoid colon resection    Nausea and vomiting, better today  Postop ileus, remains n.p.o.  Abdominal fluid collection status post drain  placement  Hypophosphatemia    Stricture of sigmoid colon, status post colostomy  Hypokalemia, worse today  Hypertension, receiving as needed IV hydralazine  Anemia  Hypomagnesemia  Metabolic acidosis, expansion acidosis, improved        DISCUSSION/PLAN:   Renal function remains excellent today, euvolemic  Continue IV fluids while n.p.o.  Noted plans to stop TPN  Will request pharmacy to stop IV fluids once the TPN starts today  Replace potassium and phosphorus IV, magnesium IV  Status post IR drain and NG tube replacement  Monitor electrolytes closely and replace per protocol    Continue to monitor electrolytes and volume closely      Matthew Soliz MD   [facility]   Office phone number: [phone]  Answering service phone number: [phone]    02/10/23  12:30 EST    Dictation performed using Dragon dictation software      Electronically signed by Matthew Soliz MD at 02/10/23 1232     Lilibeth Rahman PA-C at 02/10/23 0842     Attestation signed by Lino Gaston MD at 02/10/23 3291    I have reviewed this documentation and agree.                  Progress Note    POD 7    S: Pt S/P IR drain placement yesterday with increased pain around drain. Pt non-complaint with ambulating/sitting up in chair.     O:  Temp:  [98.2 °F (36.8 °C)-98.7 °F (37.1 °C)] 98.3 °F (36.8 °C)  Heart Rate:  [81-94] 86  Resp:  [16-26] 20  BP: (158-183)/() 166/90      Intake/Output Summary (Last 24 hours) at 2/10/2023 0843  Last data filed at 2/10/2023 0808  Gross per 24 hour   Intake 0 ml   Output 705 ml   Net -705 ml       Abd: soft, non-distended. Staples removed from incisions. Midline abdominal wound opened with moderate amount of purulent drainage expressed. Wound packed with saline soaked gauze.   NEW drain in LLQ with serosanguinous drainage.   Ostomy: PPP with loose stool in bag      Results from last 7 days   Lab Units 02/10/23  0356   SODIUM mmol/L 141   POTASSIUM mmol/L 2.7*   CHLORIDE mmol/L  108*   CO2 mmol/L 26.0   BUN mg/dL 4*   CREATININE mg/dL 0.39*   EGFR mL/min/1.73 112.1   GLUCOSE mg/dL 94   CALCIUM mg/dL 8.1*   PHOSPHORUS mg/dL 2.0*       Results from last 7 days   Lab Units 02/10/23  0356   MAGNESIUM mg/dL 1.6       A/P: 62 y.o. female S/P robotic assisted laparoscopic left and sigmoid resection with colostomy converted open due to splenic injury 2023.     - Midline abdominal wound opened with moderate amount of purulent drainage. Wound packed with saline soaked gauze. Will ask WOCN to place wound-vac today.   - Leukocytosis. CBC pending. Will check UA and C. Diff toxin. Pt currently receiving Zosyn. ID to see Pt today.   - Prolonged ileus and unable to tolerate PO intake. Will start TPN today. Continue Reglan. Encouraged ambulation and discussed importance of this.          Electronically signed by Lino Gaston MD at 02/10/23 4627     Galdino Esteves MD at 02/10/23 0806            Infectious Diseases Progress Note    Galdino Esteves MD     [facility]  [place]: 14 days  Patient Identification:  Name: Hali Tejeda  Age: 62 y.o.  Sex: female  :  1960  MRN: [id number]         Primary Care Physician: Richard Delgado MD        Subjective: Feeling better than yesterday with improvement in nausea and vomiting.  Interval History: Underwent placement of wound VAC on her abdominal wound/incision.  TPN is started.    Objective:    Scheduled Meds:amLODIPine, 10 mg, Oral, Q24H  famotidine, 20 mg, Intravenous, BID AC  fluconazole, 400 mg, Intravenous, Q24H  metoclopramide, 10 mg, Intravenous, Q6H  piperacillin-tazobactam, 3.375 g, Intravenous, Q8H  Scopolamine, 1 patch, Transdermal, Q72H      Continuous Infusions:dextrose 5 % and sodium chloride 0.9 %, 100 mL/hr, Last Rate: 100 mL/hr (02/10/23 0150)  Pharmacy to Dose TPN,         Vital signs in last 24 hours:  Temp:  [98.2 °F (36.8 °C)-98.7 °F (37.1 °C)] 98.3 °F (36.8 °C)  Heart Rate:  [81-94] 86  Resp:  [16-26] 20  BP:  "(158-183)/() 166/90    Intake/Output:    Intake/Output Summary (Last 24 hours) at 2/10/2023 0828  Last data filed at 2/10/2023 0808  Gross per 24 hour   Intake 0 ml   Output 705 ml   Net -705 ml       Exam:  /90 (BP Location: Left arm, Patient Position: Lying)   Pulse 86   Temp 98.3 °F (36.8 °C) (Oral)   Resp 20   Ht 154.9 cm (60.98\")   Wt 83.1 kg (183 lb 3.2 oz)   SpO2 94%   BMI 34.63 kg/m²   Patient is examined using the personal protective equipment as per guidelines from infection control for this particular patient as enacted.  Hand washing was performed before and after patient interaction.  General Appearance:    Alert, cooperative, no distress, AAOx3                          Head:    Normocephalic, without obvious abnormality, atraumatic                           Eyes:    PERRL, conjunctivae/corneas clear, EOM's intact, both eyes                         Throat:   Lips, tongue, gums normal; oral mucosa pink and moist                           Neck:   Supple, symmetrical, trachea midline, no JVD                         Lungs:    Clear to auscultation bilaterally, respirations unlabored                 Chest Wall:    No tenderness or deformity                          Heart:  S1-S2 regular                  Abdomen:   Ostomy present with midline incision has wound VAC in place with no surrounding cellulitis.                 Extremities:   Extremities normal, atraumatic, no cyanosis or edema                        Pulses:   Pulses palpable in all extremities                            Skin:   Skin is warm and dry,  no rashes or palpable lesions                  Neurologic: Grossly nonfocal       Data Review:    I reviewed the patient's new clinical results.  Results from last 7 days   Lab Units 02/09/23  0619 02/08/23  0405 02/07/23  0440 02/06/23  0443 02/05/23  0334 02/04/23  0430 02/03/23 2031   WBC 10*3/mm3 25.99* 18.59* 8.48 14.19* 15.58* 15.55*  --    HEMOGLOBIN g/dL 9.6* 9.7* 10.1* 7.7* " 8.5* 11.0* 10.8*   PLATELETS 10*3/mm3 337 281 275 215 189 186  --      Results from last 7 days   Lab Units 02/10/23  0356 02/09/23  0619 02/08/23  2132 02/08/23  0405 02/07/23  1514 02/07/23  0440 02/06/23  0443 02/05/23  0334   SODIUM mmol/L 141 143  --  143 146* 145 140 137   POTASSIUM mmol/L 2.7* 4.6 3.3* 3.5 3.1* 2.7* 3.3* 4.0   CHLORIDE mmol/L 108* 111*  --  116* 116* 111* 112* 105   CO2 mmol/L 26.0 22.9  --  21.0* 19.8* 18.5* 20.5* 22.4   BUN mg/dL 4* 5*  --  9 12 16 22 19   CREATININE mg/dL 0.39* 0.46*  --  0.62 0.72 0.85 1.84* 2.37*   CALCIUM mg/dL 8.1* 8.4*  --  8.4* 8.6 8.9 8.5* 8.5*   GLUCOSE mg/dL 94 101*  --  79 71 34* 54* 118*     Microbiology Results (last 10 days)     Procedure Component Value - Date/Time    Body Fluid Culture - Drainage, Peritoneum [[id number]] Collected: 02/09/23 1458    Lab Status: Preliminary result Specimen: Drainage from Peritoneum Updated: 02/09/23 1806     Gram Stain Few (2+) WBCs per low power field      No organisms seen            Assessment:    Colitis    Nausea and vomiting    Stricture of sigmoid colon (HCC)  1-intra-abdominal sepsis with intra-abdominal abscess in the setting of prolonged colitis, sigmoid stricture, endoscopic injury to colonic wall with microperforation and subsequent laparotomy partial colectomy and colostomy and intraoperative injury to the spleen with repair while being on antibiotic therapy-likely pathogen to consider in this situation would be enteric amberly along with selection of yeast due to prolonged antibiotic therapy-status post percutaneous drain placement on 2/9/2023  2-hypertension  3-malnourishment  4-possible postop abdominal wall/incisional infection/evolving abscess        Recommendations/Discussions:  Continue supportive care and Zosyn and Diflucan while following up on the culture results from the abdominal wound as well on the fluid obtained from the percutaneous drain.  Monitor closely for side effects of antibiotics  De-escalate  antibiotic therapy based on the culture data and response to treatment.  Galdino Esteves MD  2/10/2023  08:28 EST    Parts of this note may be an electronic transcription/translation of spoken language to printed text using the Dragon dictation system.      Electronically signed by Galdino Esteves MD at 02/11/23 0540       Consult Notes (last 72 hours)  Notes from 02/10/23 0717 through 02/13/23 0717   No notes of this type exist for this encounter.

## 2023-02-13 NOTE — PROGRESS NOTES
"Nutrition Services    Patient Name:  Hali Tejeda  YOB: 1960  MRN: [id number]  Admit Date:  1/27/2023    PROGRESS NOTE - CLINICAL NUTRITION    Assessment Date:  02/13/23    Comments: Follow up for TPN    Patient's current TPN regimen: 80/800/100 mL 20% AA/Dex/Lipids   Goal: 100 AA/1350 Dex/100mL 20% SMOF  Labs: Glucose-138/136/156, Na-132, BUN 7 , Crea 0.37 , Alb 2.4, K+ (WNL), Phos (WNL)  Tolerance: reports abdominal discomfort     Plan/Recommendations: Advance to goal rate per PharmD    RD to continue to monitor per protocol.     Encounter Information         Reason For Encounter Follow up for TPN    Current Issues POD 10 S/P Resection of left colon and sigmoid colon with colostomy. Wound VAC to midline abdominal wound.      Estimated Requirements         Nutrient Needs:  Estimated kcal: 9902-5355 kcal/day   Estimated protein:  gm/day   Estimated fluid: 2075 mL/day      Current Nutrition Orders & Evaluation of Intake       Oral Nutrition     Current PO Diet Adult Central 2-in-1 TPN  NPO Diet NPO Type: Sips with Meds  Adult Central 2-in-1 TPN   Supplement n/a   PO Evaluation     Trending % PO Intake     Factors Affecting Intake  altered mental status, swallow impairment      Parenteral Nutrition      TPN Route Central   TPN Rate (mL/hr) 75   Current TPN Order        Dextrose (kcal) 800       Amino Acid (gm) 80       Lipid Concentration 20%       Lipid Volume/Frequency  100 mL, daily   MVI Frequency  10mL   Trace Element Frequency  1mL   Total # Days on TPN 3   Propofol Rate/Kcal    TPN Current Provision          Calories 72 % of needs met        Protein (gm) 80 % of needs met        Fluid (mL) 1800 mL   TPN Needs Evaluation not meeting needs for calories or protein (not at goal yet)   TPN Changes none     Anthropometrics          Height    Weight Height: 154.9 cm (60.98\")  Weight: 87.7 kg (193 lb 6.4 oz) (02/13/23 0502)    BMI kg/m2 Body mass index is 36.56 kg/m².    Weight trend Gain "     Labs        Pertinent Labs Reviewed, listed below     Results from last 7 days   Lab Units 02/13/23 0321 02/12/23  0817 02/12/23  0748 02/12/23  0558 02/11/23  0506   SODIUM mmol/L 132* 135*  --  126* 141   POTASSIUM mmol/L 4.0 3.7 3.7 7.5* 2.8*   CHLORIDE mmol/L 99  --   --  96* 106   CO2 mmol/L 28.4  --   --  26.0 30.0*   BUN mg/dL 7*  --   --  4* 3*   CREATININE mg/dL 0.37*  --   --  0.41* 0.37*   CALCIUM mg/dL 8.5*  --   --  8.4* 7.8*   BILIRUBIN mg/dL 0.5  --   --  0.4  --    ALK PHOS U/L 65  --   --  60  --    ALT (SGPT) U/L 9  --   --  9  --    AST (SGOT) U/L 13  --   --  12  --    GLUCOSE mg/dL 138*  --   --  763* 158*     Results from last 7 days   Lab Units 02/13/23 0321 02/12/23 0817 02/12/23  0748   MAGNESIUM mg/dL 1.9 2.0 1.9   PHOSPHORUS mg/dL 2.6  --  2.1*   HEMOGLOBIN g/dL 9.7*  --   --    HEMATOCRIT % 29.3*  --   --    WBC 10*3/mm3 28.34*  --   --    TRIGLYCERIDES mg/dL 126  --   --    ALBUMIN g/dL 2.4*  --   --      Results from last 7 days   Lab Units 02/13/23 0321 02/12/23  0558 02/11/23  0506 02/10/23  0917 02/09/23  0910 02/09/23  0619   INR   --   --   --   --  1.36*  --    PLATELETS 10*3/mm3 479* 416 411 370  --  337     No results found for: COVID19  Lab Results   Component Value Date    HGBA1C 5.60 01/29/2023          Medications            Scheduled Medications amLODIPine, 10 mg, Oral, Q24H  cefTRIAXone, 1 g, Intravenous, Q24H  famotidine, 20 mg, Intravenous, BID AC  Fat Emulsion Plant Based, 100 mL, Intravenous, Q24H (TPN)  fluconazole, 400 mg, Intravenous, Q24H  insulin regular, 0-7 Units, Subcutaneous, Q6H  metoclopramide, 10 mg, Intravenous, Q6H  metroNIDAZOLE, 500 mg, Intravenous, Q8H  Scopolamine, 1 patch, Transdermal, Q72H  sodium chloride, 10 mL, Intravenous, Q12H  sodium chloride, 10 mL, Intravenous, Q12H        Infusions Adult Central 2-in-1 TPN, , Last Rate: 75 mL/hr at 02/12/23 1981  Adult Central 2-in-1 TPN,   Pharmacy to Dose TPN,         PRN Medications •   dextrose  •  dextrose  •  glucagon (human recombinant)  •  hydrALAZINE  •  HYDROmorphone **AND** naloxone  •  nitroglycerin  •  ondansetron  •  Pharmacy to Dose TPN  •  potassium chloride  •  potassium phosphate infusion greater than 15 mMoles **OR** potassium phosphate infusion greater than 15 mMoles **OR** potassium phosphate **OR** sodium phosphate IVPB **OR** sodium phosphate IVPB  •  sodium chloride  •  sodium chloride  •  sodium chloride     Physical Findings          Physical Appearance alert, oriented   Oral/Mouth Cavity WNL   Edema  no edema   Gastrointestinal hypoactive bowel sounds, nausea, non-distended , last bowel movement: 2/12   Skin  surgical incision abdomen, NPWT   Tubes/Drains other: colostomy, PICC line    NFPE Consented to exam       NUTRITION INTERVENTION / PLAN OF CARE  Intervention Goal         Intervention Goal(s) Improved nutrition related labs, Meet estimated needs, Maintain weight and No significant weight loss     Nutrition Intervention         RD Action Follow Tx Progress and Care plan reviewed     Prescription         Diet Prescription     Supplement Prescription    EN/PN Prescription    New Prescription Ordered? Continue same per protocol   --  Monitor/Evaluation        Monitor Per protocol, I&O, Pertinent labs, PN delivery/tolerance, Weight, Skin status   Discharge Needs Pending clinical course   Education Will instruct as appropriate       Electronically signed by:  Marlene Rogel RD  02/13/23 10:55 EST

## 2023-02-13 NOTE — PROGRESS NOTES
[facility] Clinical Pharmacy Services: TPN Daily Progress Note    TPN Day # 4   Indication: Prolonged Paralytic Ileus  Route: central  Type: standard    Subjective/Objective  Results from last 7 days   Lab Units 23  0321   SODIUM mmol/L 132*   POTASSIUM mmol/L 4.0   CHLORIDE mmol/L 99   CO2 mmol/L 28.4   BUN mg/dL 7*   CREATININE mg/dL 0.37*   CALCIUM mg/dL 8.5*   ALBUMIN g/dL 2.4*   BILIRUBIN mg/dL 0.5   ALK PHOS U/L 65   ALT (SGPT) U/L 9   AST (SGOT) U/L 13   GLUCOSE mg/dL 138*   MAGNESIUM mg/dL 1.9   PHOSPHORUS mg/dL 2.6        Diet Orders (active) (From admission, onward)       Start     Ordered    23 1008  Diet: Liquid Diets; Clear Liquid; Texture: Regular Texture (IDDSI 7); Fluid Consistency: Thin (IDDSI 0)  Diet Effective Now         23 1007                  Additional insulin administration while previous TPN infusin units  Additional electrolyte administration while previous TPN infusin  Acid suppression: Famotidine 20 mg IV BID    Goal TPN Formula Recommendations: 100/1350/100 mL 20% AA/Dex/Lipids; 1950 kcal/day  Current TPN Formula: 80/800/100 mL 20% AA/Dex/Lipids    Assessment/Plan    Macronutrients: Will slowly increase macros to goal to avoid refeeding syndrome. 90/950/100 mL 20% (lipids continued)  Electrolytes/Additives: Will increase NaCl to 85 meq. All others to remain same.   MVI for TPN   Labs: CMP, Phos, Mg with AM labs. Will order triglyceride   Comments: AM lab sample contaminated. Repeated K, PO4 and Mg draw. SSI added yesterday afternoon.     Claudio Chawla [facility]  Clinical Pharmacist

## 2023-02-14 ENCOUNTER — APPOINTMENT (OUTPATIENT)
Dept: INTERVENTIONAL RADIOLOGY/VASCULAR | Facility: HOSPITAL | Age: 63
DRG: 329 | End: 2023-02-14
Payer: COMMERCIAL

## 2023-02-14 LAB
ALBUMIN SERPL-MCNC: 2.1 G/DL (ref 3.5–5.2)
ALBUMIN/GLOB SERPL: 0.6 G/DL
ALP SERPL-CCNC: 67 U/L (ref 39–117)
ALT SERPL W P-5'-P-CCNC: 9 U/L (ref 1–33)
ANION GAP SERPL CALCULATED.3IONS-SCNC: 9.2 MMOL/L (ref 5–15)
AST SERPL-CCNC: 13 U/L (ref 1–32)
BASOPHILS # BLD AUTO: 0.06 10*3/MM3 (ref 0–0.2)
BASOPHILS NFR BLD AUTO: 0.2 % (ref 0–1.5)
BILIRUB SERPL-MCNC: 0.4 MG/DL (ref 0–1.2)
BUN SERPL-MCNC: 8 MG/DL (ref 8–23)
BUN/CREAT SERPL: 21.6 (ref 7–25)
CALCIUM SPEC-SCNC: 8.2 MG/DL (ref 8.6–10.5)
CHLORIDE SERPL-SCNC: 100 MMOL/L (ref 98–107)
CO2 SERPL-SCNC: 24.8 MMOL/L (ref 22–29)
CREAT SERPL-MCNC: 0.37 MG/DL (ref 0.57–1)
DEPRECATED RDW RBC AUTO: 45.6 FL (ref 37–54)
EGFRCR SERPLBLD CKD-EPI 2021: 113.5 ML/MIN/1.73
EOSINOPHIL # BLD AUTO: 0.13 10*3/MM3 (ref 0–0.4)
EOSINOPHIL NFR BLD AUTO: 0.5 % (ref 0.3–6.2)
ERYTHROCYTE [DISTWIDTH] IN BLOOD BY AUTOMATED COUNT: 14.4 % (ref 12.3–15.4)
GLOBULIN UR ELPH-MCNC: 3.7 GM/DL
GLUCOSE BLDC GLUCOMTR-MCNC: 133 MG/DL (ref 70–130)
GLUCOSE BLDC GLUCOMTR-MCNC: 137 MG/DL (ref 70–130)
GLUCOSE BLDC GLUCOMTR-MCNC: 142 MG/DL (ref 70–130)
GLUCOSE BLDC GLUCOMTR-MCNC: 172 MG/DL (ref 70–130)
GLUCOSE SERPL-MCNC: 150 MG/DL (ref 65–99)
HCT VFR BLD AUTO: 28.9 % (ref 34–46.6)
HGB BLD-MCNC: 9.2 G/DL (ref 12–15.9)
IMM GRANULOCYTES # BLD AUTO: 1.21 10*3/MM3 (ref 0–0.05)
IMM GRANULOCYTES NFR BLD AUTO: 4.7 % (ref 0–0.5)
LYMPHOCYTES # BLD AUTO: 2.6 10*3/MM3 (ref 0.7–3.1)
LYMPHOCYTES NFR BLD AUTO: 10.1 % (ref 19.6–45.3)
MAGNESIUM SERPL-MCNC: 1.9 MG/DL (ref 1.6–2.4)
MCH RBC QN AUTO: 27.9 PG (ref 26.6–33)
MCHC RBC AUTO-ENTMCNC: 31.8 G/DL (ref 31.5–35.7)
MCV RBC AUTO: 87.6 FL (ref 79–97)
MONOCYTES # BLD AUTO: 2.1 10*3/MM3 (ref 0.1–0.9)
MONOCYTES NFR BLD AUTO: 8.2 % (ref 5–12)
NEUTROPHILS NFR BLD AUTO: 19.56 10*3/MM3 (ref 1.7–7)
NEUTROPHILS NFR BLD AUTO: 76.3 % (ref 42.7–76)
NRBC BLD AUTO-RTO: 0 /100 WBC (ref 0–0.2)
PHOSPHATE SERPL-MCNC: 2.7 MG/DL (ref 2.5–4.5)
PLATELET # BLD AUTO: 500 10*3/MM3 (ref 140–450)
PMV BLD AUTO: 9.5 FL (ref 6–12)
POTASSIUM SERPL-SCNC: 4 MMOL/L (ref 3.5–5.2)
PROT SERPL-MCNC: 5.8 G/DL (ref 6–8.5)
RBC # BLD AUTO: 3.3 10*6/MM3 (ref 3.77–5.28)
SODIUM SERPL-SCNC: 134 MMOL/L (ref 136–145)
WBC NRBC COR # BLD: 25.66 10*3/MM3 (ref 3.4–10.8)

## 2023-02-14 PROCEDURE — 99024 POSTOP FOLLOW-UP VISIT: CPT | Performed by: PHYSICIAN ASSISTANT

## 2023-02-14 PROCEDURE — 25010000002 POTASSIUM CHLORIDE PER 2 MEQ OF POTASSIUM: Performed by: COLON & RECTAL SURGERY

## 2023-02-14 PROCEDURE — 83735 ASSAY OF MAGNESIUM: CPT | Performed by: INTERNAL MEDICINE

## 2023-02-14 PROCEDURE — C1729 CATH, DRAINAGE: HCPCS

## 2023-02-14 PROCEDURE — 25010000002 FENTANYL CITRATE (PF) 50 MCG/ML SOLUTION: Performed by: RADIOLOGY

## 2023-02-14 PROCEDURE — 75984 XRAY CONTROL CATHETER CHANGE: CPT

## 2023-02-14 PROCEDURE — 82962 GLUCOSE BLOOD TEST: CPT

## 2023-02-14 PROCEDURE — 25010000002 MIDAZOLAM PER 1 MG: Performed by: RADIOLOGY

## 2023-02-14 PROCEDURE — 25010000002 CALCIUM GLUCONATE PER 10 ML: Performed by: COLON & RECTAL SURGERY

## 2023-02-14 PROCEDURE — 02HV33Z INSERTION OF INFUSION DEVICE INTO SUPERIOR VENA CAVA, PERCUTANEOUS APPROACH: ICD-10-PCS | Performed by: RADIOLOGY

## 2023-02-14 PROCEDURE — 25010000002 CEFTRIAXONE PER 250 MG: Performed by: SURGERY

## 2023-02-14 PROCEDURE — 87070 CULTURE OTHR SPECIMN AEROBIC: CPT | Performed by: COLON & RECTAL SURGERY

## 2023-02-14 PROCEDURE — 84100 ASSAY OF PHOSPHORUS: CPT | Performed by: COLON & RECTAL SURGERY

## 2023-02-14 PROCEDURE — 87205 SMEAR GRAM STAIN: CPT | Performed by: COLON & RECTAL SURGERY

## 2023-02-14 PROCEDURE — 0 IODIXANOL PER 1 ML: Performed by: HOSPITALIST

## 2023-02-14 PROCEDURE — 85025 COMPLETE CBC W/AUTO DIFF WBC: CPT | Performed by: HOSPITALIST

## 2023-02-14 PROCEDURE — 80053 COMPREHEN METABOLIC PANEL: CPT | Performed by: SURGERY

## 2023-02-14 PROCEDURE — 99152 MOD SED SAME PHYS/QHP 5/>YRS: CPT

## 2023-02-14 PROCEDURE — 87075 CULTR BACTERIA EXCEPT BLOOD: CPT | Performed by: COLON & RECTAL SURGERY

## 2023-02-14 PROCEDURE — C1769 GUIDE WIRE: HCPCS

## 2023-02-14 PROCEDURE — 25010000002 MAGNESIUM SULFATE PER 500 MG OF MAGNESIUM: Performed by: COLON & RECTAL SURGERY

## 2023-02-14 PROCEDURE — 25010000002 METOCLOPRAMIDE PER 10 MG: Performed by: COLON & RECTAL SURGERY

## 2023-02-14 PROCEDURE — 99153 MOD SED SAME PHYS/QHP EA: CPT

## 2023-02-14 RX ORDER — ACETAMINOPHEN 325 MG/1
650 TABLET ORAL EVERY 6 HOURS PRN
Status: DISCONTINUED | OUTPATIENT
Start: 2023-02-14 | End: 2023-02-21

## 2023-02-14 RX ORDER — LIDOCAINE HYDROCHLORIDE 10 MG/ML
INJECTION, SOLUTION EPIDURAL; INFILTRATION; INTRACAUDAL; PERINEURAL AS NEEDED
Status: COMPLETED | OUTPATIENT
Start: 2023-02-14 | End: 2023-02-14

## 2023-02-14 RX ORDER — MIDAZOLAM HYDROCHLORIDE 1 MG/ML
INJECTION INTRAMUSCULAR; INTRAVENOUS AS NEEDED
Status: COMPLETED | OUTPATIENT
Start: 2023-02-14 | End: 2023-02-14

## 2023-02-14 RX ORDER — FENTANYL CITRATE 50 UG/ML
INJECTION, SOLUTION INTRAMUSCULAR; INTRAVENOUS AS NEEDED
Status: COMPLETED | OUTPATIENT
Start: 2023-02-14 | End: 2023-02-14

## 2023-02-14 RX ORDER — IODIXANOL 320 MG/ML
50 INJECTION, SOLUTION INTRAVASCULAR
Status: COMPLETED | OUTPATIENT
Start: 2023-02-14 | End: 2023-02-14

## 2023-02-14 RX ADMIN — Medication 4 ML: at 11:44

## 2023-02-14 RX ADMIN — METOCLOPRAMIDE 10 MG: 5 INJECTION, SOLUTION INTRAMUSCULAR; INTRAVENOUS at 13:43

## 2023-02-14 RX ADMIN — LIDOCAINE HYDROCHLORIDE 7 ML: 10 INJECTION, SOLUTION EPIDURAL; INFILTRATION; INTRACAUDAL; PERINEURAL at 11:44

## 2023-02-14 RX ADMIN — FENTANYL CITRATE 50 MCG: 50 INJECTION, SOLUTION INTRAMUSCULAR; INTRAVENOUS at 11:41

## 2023-02-14 RX ADMIN — MIDAZOLAM 1 MG: 1 INJECTION INTRAMUSCULAR; INTRAVENOUS at 11:41

## 2023-02-14 RX ADMIN — Medication 10 ML: at 21:38

## 2023-02-14 RX ADMIN — METRONIDAZOLE 500 MG: 500 INJECTION, SOLUTION INTRAVENOUS at 01:28

## 2023-02-14 RX ADMIN — FAMOTIDINE 20 MG: 10 INJECTION INTRAVENOUS at 17:57

## 2023-02-14 RX ADMIN — Medication 10 ML: at 14:40

## 2023-02-14 RX ADMIN — METRONIDAZOLE 500 MG: 500 INJECTION, SOLUTION INTRAVENOUS at 21:55

## 2023-02-14 RX ADMIN — FAMOTIDINE 20 MG: 10 INJECTION INTRAVENOUS at 05:45

## 2023-02-14 RX ADMIN — POTASSIUM PHOSPHATE, MONOBASIC POTASSIUM PHOSPHATE, DIBASIC: 224; 236 INJECTION, SOLUTION, CONCENTRATE INTRAVENOUS at 18:05

## 2023-02-14 RX ADMIN — CEFTRIAXONE SODIUM 2 G: 2 INJECTION, POWDER, FOR SOLUTION INTRAMUSCULAR; INTRAVENOUS at 14:39

## 2023-02-14 RX ADMIN — Medication 10 ML: at 08:46

## 2023-02-14 RX ADMIN — METOCLOPRAMIDE 10 MG: 5 INJECTION, SOLUTION INTRAMUSCULAR; INTRAVENOUS at 18:17

## 2023-02-14 RX ADMIN — METOCLOPRAMIDE 10 MG: 5 INJECTION, SOLUTION INTRAMUSCULAR; INTRAVENOUS at 05:45

## 2023-02-14 RX ADMIN — IODIXANOL 18 ML: 320 INJECTION, SOLUTION INTRAVASCULAR at 12:21

## 2023-02-14 RX ADMIN — Medication 10 ML: at 21:37

## 2023-02-14 RX ADMIN — FENTANYL CITRATE 25 MCG: 50 INJECTION, SOLUTION INTRAMUSCULAR; INTRAVENOUS at 11:46

## 2023-02-14 RX ADMIN — I.V. FAT EMULSION 20 G: 20 EMULSION INTRAVENOUS at 18:06

## 2023-02-14 RX ADMIN — METRONIDAZOLE 500 MG: 500 INJECTION, SOLUTION INTRAVENOUS at 13:46

## 2023-02-14 RX ADMIN — Medication 10 ML: at 08:45

## 2023-02-14 NOTE — PROGRESS NOTES
"Daily progress note    Primary care physician  Dr. Delgado    Chief complaint  Doing same with no new complaints     History of present illness  62-year-old female with history of hypertension presented to [facility] emergency room with left lower quadrant abdominal pain for last 1 month.  Patient also have occasional loose stools.  Patient denies any nausea vomiting.  Patient recently diagnosed with colitis and treated with oral antibiotics without any improvement.  Patient has noticed blood in the stools.  Patient evaluated in ER with CT abdominal pelvis which shows worsening colitis and failed outpatient treatment admit for management.  Patient has no fever chills chest pain shortness of breath with sweats weight loss or weight gain.     REVIEW OF SYSTEMS  Unremarkable except abdominal pain      PHYSICAL EXAM  Blood pressure 142/88, pulse 94, temperature 97.8 °F (36.6 °C), temperature source Oral, resp. rate 18, height 154.9 cm (61\"), weight 76.7 kg (169 lb), SpO2 97 %.    GENERAL:  Awake and alert in no acute distress  HEENT:  Unremarkable  NECK:  Supple  CV: regular rhythm, normal rate  RESPIRATORY: normal effort, clear to auscultation bilaterally  ABDOMEN: soft, left lower quadrant tenderness colostomy in place  MUSCULOSKELETAL: no deformity  NEURO: alert, moves all extremities, follows commands  PSYCH:  calm, cooperative  SKIN: warm, dry     LAB RESULTS  Lab Results (last 24 hours)     Procedure Component Value Units Date/Time    POC Glucose Once [[id number]]  (Abnormal) Collected: 02/14/23 1338    Specimen: Blood Updated: 02/14/23 1339     Glucose 137 mg/dL      Comment: Meter: [id number] : [id number] Lev EUCEDA       Body Fluid Culture - Drainage, Peritoneum [[id number]] Collected: 02/14/23 1154    Specimen: Drainage from Peritoneum Updated: 02/14/23 1202    Anaerobic Culture - Drainage, Peritoneum [[id number]] Collected: 02/14/23 1154    Specimen: Drainage from Peritoneum Updated: 02/14/23 " 1202    POC Glucose Once [[id number]]  (Abnormal) Collected: 02/14/23 0632    Specimen: Blood Updated: 02/14/23 0634     Glucose 142 mg/dL      Comment: Meter: [id number] : [id number] Han Kuo CNA       Comprehensive Metabolic Panel [[id number]]  (Abnormal) Collected: 02/14/23 0238    Specimen: Blood Updated: 02/14/23 0335     Glucose 150 mg/dL      BUN 8 mg/dL      Creatinine 0.37 mg/dL      Sodium 134 mmol/L      Potassium 4.0 mmol/L      Chloride 100 mmol/L      CO2 24.8 mmol/L      Calcium 8.2 mg/dL      Total Protein 5.8 g/dL      Albumin 2.1 g/dL      ALT (SGPT) 9 U/L      AST (SGOT) 13 U/L      Alkaline Phosphatase 67 U/L      Total Bilirubin 0.4 mg/dL      Globulin 3.7 gm/dL      A/G Ratio 0.6 g/dL      BUN/Creatinine Ratio 21.6     Anion Gap 9.2 mmol/L      eGFR 113.5 mL/min/1.73     Narrative:      GFR Normal >60  Chronic Kidney Disease <60  Kidney Failure <15      Phosphorus [[id number]]  (Normal) Collected: 02/14/23 0238    Specimen: Blood Updated: 02/14/23 0335     Phosphorus 2.7 mg/dL     Magnesium [[id number]]  (Normal) Collected: 02/14/23 0238    Specimen: Blood Updated: 02/14/23 0335     Magnesium 1.9 mg/dL     CBC & Differential [[id number]]  (Abnormal) Collected: 02/14/23 0238    Specimen: Blood Updated: 02/14/23 0321    Narrative:      The following orders were created for panel order CBC & Differential.  Procedure                               Abnormality         Status                     ---------                               -----------         ------                     CBC Auto Differential[[id number]]        Abnormal            Final result                 Please view results for these tests on the individual orders.    CBC Auto Differential [[id number]]  (Abnormal) Collected: 02/14/23 0238    Specimen: Blood Updated: 02/14/23 0321     WBC 25.66 10*3/mm3      RBC 3.30 10*6/mm3      Hemoglobin 9.2 g/dL      Hematocrit 28.9 %      MCV 87.6 fL      MCH 27.9 pg      MCHC 31.8 g/dL      RDW  14.4 %      RDW-SD 45.6 fl      MPV 9.5 fL      Platelets 500 10*3/mm3      Neutrophil % 76.3 %      Lymphocyte % 10.1 %      Monocyte % 8.2 %      Eosinophil % 0.5 %      Basophil % 0.2 %      Immature Grans % 4.7 %      Neutrophils, Absolute 19.56 10*3/mm3      Lymphocytes, Absolute 2.60 10*3/mm3      Monocytes, Absolute 2.10 10*3/mm3      Eosinophils, Absolute 0.13 10*3/mm3      Basophils, Absolute 0.06 10*3/mm3      Immature Grans, Absolute 1.21 10*3/mm3      nRBC 0.0 /100 WBC     POC Glucose Once [[id number]]  (Normal) Collected: 02/13/23 2124    Specimen: Blood Updated: 02/13/23 2125     Glucose 127 mg/dL      Comment: Meter: [id number] : [id number] Han Kuo CNA       POC Glucose Once [[id number]]  (Abnormal) Collected: 02/13/23 1637    Specimen: Blood Updated: 02/13/23 1638     Glucose 153 mg/dL      Comment: Meter: [id number] : [id number] Yonatan EUCEDA           Imaging Results (Last 24 Hours)     Procedure Component Value Units Date/Time    IR Abscess drain - simple [[id number]] Resulted: 02/14/23 1220     Updated: 02/14/23 1224        Upper and lower endoscopy results noted and discussed with patient    Current Facility-Administered Medications:   •  acetaminophen (TYLENOL) tablet 650 mg, 650 mg, Oral, Q6H PRN, Adwoa Rogel PA-C  •  Adult Central 2-in-1 TPN, , Intravenous, Continuous, Lino Gaston MD, Last Rate: 75 mL/hr at 02/13/23 1835, New Bag at 02/13/23 1835  •  Adult Central 2-in-1 TPN, , Intravenous, Continuous, Lino Gaston MD  •  amLODIPine (NORVASC) tablet 10 mg, 10 mg, Oral, Q24H, Gideon Hope MD, 10 mg at 02/13/23 0917  •  cefTRIAXone (ROCEPHIN) 2 g in sodium chloride 0.9 % 100 mL IVPB-VTB, 2 g, Intravenous, Q24H, Beck Wei Jr., MD  •  dextrose (D50W) (25 g/50 mL) IV injection 25 g, 25 g, Intravenous, Q15 Min PRN, Beck Wei Jr., MD  •  dextrose (GLUTOSE) oral gel 15 g, 15 g, Oral, Q15 Min PRN, Beck Wei Jr., MD  •  famotidine (PEPCID) injection 20 mg,  20 mg, Intravenous, BID Josiah NICHOLS Brittany A, PA-C, 20 mg at 23 0545  •  Fat Emulsion Plant Based (INTRALIPID,LIPOSYN) 20 % infusion 20 g, 100 mL, Intravenous, Q24H (TPN), Lino Gaston MD, Last Rate: 8.33 mL/hr at 23 1835, 20 g at 23 1835  •  fluconazole (DIFLUCAN) IVPB 400 mg, 400 mg, Intravenous, Q24H, Gideon Hope MD, Last Rate: 100 mL/hr at 23 1551, 400 mg at 23 1551  •  glucagon (GLUCAGEN) injection 1 mg, 1 mg, Intramuscular, Q15 Min PRN, Beck Wei Jr., MD  •  hydrALAZINE (APRESOLINE) injection 10 mg, 10 mg, Intravenous, Q4H PRN, Gideon Hope MD, 10 mg at 23 1454  •  insulin regular (humuLIN R,novoLIN R) injection 0-7 Units, 0-7 Units, Subcutaneous, Q6H, Beck Wei Jr., MD, 2 Units at 23 1835  •  metoclopramide (REGLAN) injection 10 mg, 10 mg, Intravenous, Q6H, Lino Gaston MD, 10 mg at 23 1343  •  metroNIDAZOLE (FLAGYL) IVPB 500 mg, 500 mg, Intravenous, Q8H, Galdino Esteves MD, 500 mg at 23 1346  •  [] HYDROmorphone (DILAUDID) injection 0.25 mg, 0.25 mg, Intravenous, Q2H PRN, 0.25 mg at 23 0015 **AND** naloxone (NARCAN) injection 0.4 mg, 0.4 mg, Intravenous, Q5 Min PRN, Lino Gaston MD  •  nitroglycerin (NITROSTAT) SL tablet 0.4 mg, 0.4 mg, Sublingual, Q5 Min PRN, Lino Gaston MD  •  ondansetron (ZOFRAN) injection 4 mg, 4 mg, Intravenous, Q6H PRN, Lino Gaston MD, 4 mg at 23 6210  •  Pharmacy to Dose TPN, , Does not apply, Continuous PRN, Lilibeth Rahman PA-C  •  potassium chloride 10 mEq in 100 mL IVPB, 10 mEq, Intravenous, Q1H PRN, Matthew Soliz MD, Last Rate: 100 mL/hr at 23, 10 mEq at 23  •  potassium phosphate 45 mmol in sodium chloride 0.9 % 500 mL infusion, 45 mmol, Intravenous, PRN **OR** potassium phosphate 30 mmol in sodium chloride 0.9 % 250 mL infusion, 30 mmol, Intravenous, PRN **OR** potassium phosphate 15 mmol in sodium chloride 0.9 % 100 mL infusion, 15 mmol,  Intravenous, PRN, 15 mmol at 02/12/23 1440 **OR** sodium phosphates 40 mmol in sodium chloride 0.9 % 500 mL IVPB, 40 mmol, Intravenous, PRN **OR** sodium phosphates 20 mmol in sodium chloride 0.9 % 250 mL IVPB, 20 mmol, Intravenous, PRN, Matthew Soliz MD  •  sodium chloride 0.9 % flush 10 mL, 10 mL, Intravenous, Q12H, Quick, Lilibeth A, PA-C, 10 mL at 02/13/23 2108  •  sodium chloride 0.9 % flush 10 mL, 10 mL, Intravenous, Q12H, Quick, Lilibeth A, PA-C, 10 mL at 02/14/23 0846  •  sodium chloride 0.9 % flush 10 mL, 10 mL, Intravenous, PRN, Quick, Lilibeth A, PA-C  •  sodium chloride 0.9 % flush 20 mL, 20 mL, Intravenous, PRN, Quick, Lilibeth A, PA-C  •  sodium chloride 0.9 % infusion 40 mL, 40 mL, Intravenous, PRN, Quick, Lilibeth A, PA-C     ASSESSMENT  Acute colitis with sigmoid stricture and microperforation s/p colon resection and colostomy  Abdominal fluid collection/abscess status post CT-guided drainage  Postop ileus  Acute kidney injury resolved  Hypertension   Gastroesophageal reflux disease    PLAN  CPM  Postop care  IVF  TPN  Continue IV antibiotics  Accu-Chek with low-dose sliding scale insulin  Infectious disease consult appreciated  Adjust blood pressure medications  Pain management  Continue home medications  Stress ulcer DVT prophylaxis  Supportive care  PT OT  Discussed with family nursing staff   Follow closely and further recommendation according to hospital course    SANG SINGLETARY MD    Copied text in this note has been reviewed and is accurate as of 02/14/23

## 2023-02-14 NOTE — NURSING NOTE
"   02/14/23 1510   Colostomy LLQ   Placement date: If unknown, DO NOT use \"Add Comment\" note/Placement time: If unknown, DO NOT use \"Add Comment\" note: 02/03/23 1944   Inserted by: DR. GRANADO  Hand Hygiene Completed: Yes  Location: LLQ   Stomal Appliance 1 piece;Intact   Stoma Function stool   Stool Color brown   Stool Consistency loose   Wound/ostomy - patient has had a busy day. Off the floor to IR, is resting in recliner at this time. Vac dressing and ostomy in place, no leaking, + stool to pouch. Very tired, not up to any discussion or education today. Will plan on seeing patient tomorrow and change wound vac and ostomy pouch and provide ostomy education. Patient is not ready for d/c, continues on TPN, not eating, drain in place for intraabdominal abscess.   "

## 2023-02-14 NOTE — PLAN OF CARE
Goal Outcome Evaluation:  Plan of Care Reviewed With: patient        Progress: no change  Outcome Evaluation: VSS, cont IV TPN and Lipids, remains NPO, no emesis this shift, ostomy in place, NEW to self sxn with sm amt serosang drng, wound vac in place drsg d/i, up to BSC during evening hours, Purewick in place during night per pt request, safety maintained

## 2023-02-14 NOTE — PROGRESS NOTES
Colorectal Surgery Progress Note    POD 11    S: Positive colostomy output. positive ambulating. Pain stable at 8-9/10. No nausea or vomiting.     O:  Temp:  [98.3 °F (36.8 °C)-98.8 °F (37.1 °C)] 98.8 °F (37.1 °C)  Heart Rate:  [] 86  Resp:  [18] 18  BP: (126-145)/(75-90) 132/90    Intake/Output Summary (Last 24 hours) at 2/14/2023 0646  Last data filed at 2/14/2023 0412  Gross per 24 hour   Intake 100 ml   Output 2625 ml   Net -2525 ml     Abd:   soft, non distended. IR drain serosanguinous. Wound vac in place. Colostomy productive of brown stool.  Incisions: clean, dry, intact, no erythema    Results from last 7 days   Lab Units 02/14/23  0238   WBC 10*3/mm3 25.66*   HEMOGLOBIN g/dL 9.2*   HEMATOCRIT % 28.9*   PLATELETS 10*3/mm3 500*     Results from last 7 days   Lab Units 02/14/23  0238   SODIUM mmol/L 134*   POTASSIUM mmol/L 4.0   CHLORIDE mmol/L 100   CO2 mmol/L 24.8   BUN mg/dL 8   CREATININE mg/dL 0.37*   EGFR mL/min/1.73 113.5   GLUCOSE mg/dL 150*   CALCIUM mg/dL 8.2*   PHOSPHORUS mg/dL 2.7     Results from last 7 days   Lab Units 02/14/23  0238   MAGNESIUM mg/dL 1.9   -  A/P: 62 y.o. female s/p robotic assisted laparoscopic left and sigmoid resection with colostomy converted open due to splenic injury 02/03/2023  • Pt to have peritoneal drain repositioned in IR today  • Encouraged ambulation and OOBTC  • Continue antibiotics  • Continue NPO/sips with meds  • Continue TPN, Reglan  • Continue wound vac

## 2023-02-14 NOTE — PROGRESS NOTES
Infectious Diseases Progress Note    Galdino Esteves MD     [facility]  [place]: 18 days  Patient Identification:  Name: Hali Tejeda  Age: 62 y.o.  Sex: female  :  1960  MRN: [id number]         Primary Care Physician: Richard Delgado MD        Subjective: Sitting in the recliner out of bed but feels about the same.  Still has nausea but no vomiting.  Denies any much abdominal discomfort.  Interval History: Underwent placement of wound VAC on her abdominal wound/incision.  TPN is started.  CT scan of the abdomen and pelvis performed on 2022 showed interval decrease in the size of the fluid collection in the left paracolic gutter as well as an area in the left lower quadrant with ill-defined subjacent stranding and tiny foci of gas slightly more conspicuous compared to prior imaging studies with no drainable focal fluid collection or definitive extraluminal enteric contrast noted.  Objective:    Scheduled Meds:amLODIPine, 10 mg, Oral, Q24H  cefTRIAXone, 1 g, Intravenous, Q24H  famotidine, 20 mg, Intravenous, BID AC  Fat Emulsion Plant Based, 100 mL, Intravenous, Q24H (TPN)  fluconazole, 400 mg, Intravenous, Q24H  insulin regular, 0-7 Units, Subcutaneous, Q6H  metoclopramide, 10 mg, Intravenous, Q6H  metroNIDAZOLE, 500 mg, Intravenous, Q8H  sodium chloride, 10 mL, Intravenous, Q12H  sodium chloride, 10 mL, Intravenous, Q12H      Continuous Infusions:Adult Central 2-in-1 TPN, , Last Rate: 75 mL/hr at 23 1835  Pharmacy to Dose TPN,         Vital signs in last 24 hours:  Temp:  [98.3 °F (36.8 °C)-98.8 °F (37.1 °C)] 98.5 °F (36.9 °C)  Heart Rate:  [] 92  Resp:  [18] 18  BP: (126-150)/(75-90) 150/87    Intake/Output:    Intake/Output Summary (Last 24 hours) at 2023 0807  Last data filed at 2023 0717  Gross per 24 hour   Intake 100 ml   Output 2625 ml   Net -2525 ml       Exam:  /87 (BP Location: Left arm, Patient Position: Sitting)   Pulse 92   Temp 98.5 °F (36.9 °C)  "(Oral)   Resp 18   Ht 154.9 cm (60.98\")   Wt 76.8 kg (169 lb 5 oz)   SpO2 98%   BMI 32.01 kg/m²   Patient is examined using the personal protective equipment as per guidelines from infection control for this particular patient as enacted.  Hand washing was performed before and after patient interaction.  General Appearance: Nontoxic ill-appearing female who interacts.                          Head:    Normocephalic, without obvious abnormality, atraumatic                           Eyes:    PERRL, conjunctivae/corneas clear, EOM's intact, both eyes                         Throat:   Lips, tongue, gums normal; oral mucosa pink and moist                           Neck:   Supple, symmetrical, trachea midline, no JVD                         Lungs:    Clear to auscultation bilaterally, respirations unlabored                 Chest Wall:    No tenderness or deformity                          Heart:  S1-S2 regular                  Abdomen:   Ostomy present with midline incision has wound VAC in place with no surrounding cellulitis.  Drain in place.                 Extremities:   Extremities normal, atraumatic, no cyanosis or edema                        Pulses:   Pulses palpable in all extremities                            Skin:   Skin is warm and dry,  no rashes or palpable lesions                  Neurologic: Grossly nonfocal       Data Review:    I reviewed the patient's new clinical results.  Results from last 7 days   Lab Units 02/14/23  0238 02/13/23  0321 02/12/23  0558 02/11/23  0506 02/10/23  0917 02/09/23  0619 02/08/23  0405   WBC 10*3/mm3 25.66* 28.34* 26.57* 25.18* 20.95* 25.99* 18.59*   HEMOGLOBIN g/dL 9.2* 9.7* 8.5* 8.7* 8.7* 9.6* 9.7*   PLATELETS 10*3/mm3 500* 479* 416 411 370 337 281     Results from last 7 days   Lab Units 02/14/23  0238 02/13/23  0321 02/12/23  0817 02/12/23  0748 02/12/23  0558 02/11/23  0506 02/10/23  0356 02/09/23  0619 02/08/23  2132 02/08/23  0405   SODIUM mmol/L 134* 132* 135*  " --  126* 141 141 143  --  143   POTASSIUM mmol/L 4.0 4.0 3.7 3.7 7.5* 2.8* 2.7* 4.6   < > 3.5   CHLORIDE mmol/L 100 99  --   --  96* 106 108* 111*  --  116*   CO2 mmol/L 24.8 28.4  --   --  26.0 30.0* 26.0 22.9  --  21.0*   BUN mg/dL 8 7*  --   --  4* 3* 4* 5*  --  9   CREATININE mg/dL 0.37* 0.37*  --   --  0.41* 0.37* 0.39* 0.46*  --  0.62   CALCIUM mg/dL 8.2* 8.5*  --   --  8.4* 7.8* 8.1* 8.4*  --  8.4*   GLUCOSE mg/dL 150* 138*  --   --  763* 158* 94 101*  --  79    < > = values in this interval not displayed.     Microbiology Results (last 10 days)     Procedure Component Value - Date/Time    Clostridioides difficile Toxin - Stool, Per Stoma [[id number]]  (Normal) Collected: 02/10/23 1243    Lab Status: Final result Specimen: Stool from Per Stoma Updated: 02/10/23 1341    Narrative:      The following orders were created for panel order Clostridioides difficile Toxin - Stool, Per Stoma.  Procedure                               Abnormality         Status                     ---------                               -----------         ------                     Clostridioides difficile...[[id number]]  Normal              Final result                 Please view results for these tests on the individual orders.    Clostridioides difficile Toxin, PCR - Stool, Per Stoma [[id number]]  (Normal) Collected: 02/10/23 1243    Lab Status: Final result Specimen: Stool from Per Stoma Updated: 02/10/23 1341     C. Difficile Toxins by PCR Negative    Narrative:      The result indicates the absence of toxigenic C. difficile from stool specimen.     Body Fluid Culture - Drainage, Peritoneum [[id number]]  (Abnormal)  (Susceptibility) Collected: 02/09/23 1248    Lab Status: Final result Specimen: Drainage from Peritoneum Updated: 02/11/23 0841     Body Fluid Culture Scant growth (1+) Escherichia coli     Gram Stain Few (2+) WBCs per low power field      No organisms seen    Susceptibility      Escherichia coli      RENETTA      Amikacin  Susceptible      Ampicillin Resistant     Ampicillin + Sulbactam Resistant     Cefepime Susceptible      Ceftazidime Susceptible      Ceftriaxone Susceptible      Gentamicin Resistant     Levofloxacin Susceptible      Piperacillin + Tazobactam Resistant     Tobramycin Intermediate      Trimethoprim + Sulfamethoxazole Resistant                      Susceptibility Comments     Escherichia coli    Cefazolin sensitivity will not be reported for Enterobacteriaceae in non-urine isolates. If cefazolin is preferred, please call the microbiology lab to request an E-test.  With the exception of urinary-sourced infections, aminoglycosides should not be used as monotherapy.                     Assessment:    Colitis    Left sided colitis with complication (HCC)    Nausea and vomiting    Stricture of sigmoid colon (HCC)  1-intra-abdominal sepsis with intra-abdominal abscess in the setting of prolonged colitis, sigmoid stricture, endoscopic injury to colonic wall with microperforation and subsequent laparotomy partial colectomy and colostomy and intraoperative injury to the spleen with repair while being on antibiotic therapy-likely pathogen to consider in this situation would be enteric amberly along with selection of yeast due to prolonged antibiotic therapy-status post percutaneous drain placement on 2/9/2023  2-hypertension  3-malnourishment  4-possible postop abdominal wall/incisional infection/evolving abscess        Recommendations/Discussions:  · Continue with fluconazole ceftriaxone and Flagyl  · Follow-up on the repeat CT scan of the abdomen and pelvis with intervention plans per colorectal surgery service.  · Monitor closely for complications of antibiotics including C. difficile infection but low threshold to repeat colostomy content for C. difficile toxin assay as her last testing on 2/10/2023 was negative.  Galdino Esteves MD  2/14/2023  08:07 EST    Parts of this note may be an electronic transcription/translation of  spoken language to printed text using the Dragon dictation system.

## 2023-02-14 NOTE — PAYOR COMM NOTE
"Hali Aleman (62 y.o. Female)     PLEASE SEE ATTACHED FOR CONTINUED STAY REVIEW.     REF#[id number]    PLEASE CALL   OR  [phone]    THANK YOU    ANDREA CHAVES LPN CCP          Date of Birth   1960    Social Security Number       Address   [address] [place] [address]    Home Phone   [phone]    MRN   [id number]       Jehovah's witness   None    Marital Status   Single                            Admission Date   1/27/23    Admission Type   Emergency    Admitting Provider   Gideon Hope MD    Attending Provider   Gideon Hope MD    Department, Room/Bed   [address], [address]/1       Discharge Date       Discharge Disposition       Discharge Destination                               Attending Provider: Gideon Hope MD    Allergies: Hydrocodone, Sulfa Antibiotics    Isolation: None   Infection: None   Code Status: CPR    Ht: 154.9 cm (60.98\")   Wt: 76.8 kg (169 lb 5 oz)    Admission Cmt: None   Principal Problem: Colitis [K52.9]                 Active Insurance as of 1/27/2023     Primary Coverage     Payor Plan Insurance Group Employer/Plan Group    [facility] BLUE CROSS [employer] [facility] [employer] [id number]     Payor Plan Address Payor Plan Phone Number Payor Plan Fax Number Effective Dates    [address] [phone]  1/1/2022 - None Entered    [facility] [address]       Subscriber Name Subscriber Birth Date Member ID       HALI ALEMAN 1960 [id number]                 Emergency Contacts      (Rel.) Home Phone Work Phone Mobile Phone    SANDRA ALEMAN (Daughter) [phone] -- --    AMAURI ABREU (Mother) [phone] -- [phone]            [place]: [facility] [id number]  Tax ID [id number]  Oxygen Therapy (last day)     Date/Time SpO2 Device (Oxygen Therapy) Flow (L/min) Oxygen Concentration (%) ETCO2 (mmHg)    02/14/23 0717 98 room air -- -- --    02/14/23 0158 94 -- -- -- --    02/13/23 2111 96 -- -- -- --    02/13/23 2055 -- " room air -- -- --    02/13/23 1400 -- room air -- -- --    02/13/23 1300 96 room air -- -- --    02/13/23 0840 -- room air -- -- --    02/13/23 0721 92 room air -- -- --    02/13/23 0016 -- room air -- -- --          Intake & Output (last day)       02/13 0701 02/14 0700 02/14 0701  02/15 0700    P.O. 0     IV Piggyback 100     Total Intake(mL/kg) 100 (1.3)     Urine (mL/kg/hr) 2600 (1.4) 200 (3.9)    Emesis/NG output      Drains      Stool 25     Wound      Total Output 2625 200    Net -2525 -200          Urine Unmeasured Occurrence 2 x     Stool Unmeasured Occurrence 0 x         Lines, Drains & Airways     Active LDAs     Name Placement date Placement time Site Days    PICC Double Lumen 02/10/23 Right Basilic 02/10/23  1431  Basilic  3    Closed/Suction Drain Left LLQ Pigtail 10 Fr. 02/09/23  1458  LLQ  4    Colostomy LLQ 02/03/23  1944  LLQ  10                Operative/Procedure Notes (last 24 hours)  Notes from 02/13/23 0741 through 02/14/23 0741   No notes of this type exist for this encounter.            Physician Progress Notes (last 24 hours)      Lino Gaston MD at 02/13/23 1850        Patient lying in bed  Patient had episode of vomiting yesterday with clear liquids  Patient had her wound VAC changed today and complains of discomfort with it  Patient having ostomy output  Abdomen is soft  NEW serosanguineous    Assessment and plan  Discussed with radiology to try and reposition drain to go cephalad where there is a fluid collection.  This will be done tomorrow.  Discussed with patient that she that the  more patient is in bed, the the lazier the intestines become.  I E the ileus will continue.  She requested a periwick because she does not want to walk to use the restroom.  I reminded patient that she walked into the hospital.  I also told her that she will likely have to go to a SNF because of her debilitated state..     Continue TPN  Continue antibiotics    Electronically signed by Lino Gaston  "MD at 02/13/23 7072     Gideon Hope MD at 02/13/23 1421          Daily progress note    Primary care physician  Dr. Delgado    Chief complaint  Doing same with no new complaints except abdominal discomfort    History of present illness  62-year-old female with history of hypertension presented to [facility] emergency room with left lower quadrant abdominal pain for last 1 month.  Patient also have occasional loose stools.  Patient denies any nausea vomiting.  Patient recently diagnosed with colitis and treated with oral antibiotics without any improvement.  Patient has noticed blood in the stools.  Patient evaluated in ER with CT abdominal pelvis which shows worsening colitis and failed outpatient treatment admit for management.  Patient has no fever chills chest pain shortness of breath with sweats weight loss or weight gain.     REVIEW OF SYSTEMS  Unremarkable except abdominal pain      PHYSICAL EXAM  Blood pressure 126/75, pulse 96, temperature 98.6 °F (37 °C), temperature source Oral, resp. rate 18, height 154.9 cm (60.98\"), weight 87.7 kg (193 lb 6.4 oz), SpO2 96 %.    GENERAL:  Awake and alert in no acute distress  HEENT:  Unremarkable  NECK:  Supple  CV: regular rhythm, normal rate  RESPIRATORY: normal effort, clear to auscultation bilaterally  ABDOMEN: soft, left lower quadrant tenderness colostomy in place  MUSCULOSKELETAL: no deformity  NEURO: alert, moves all extremities, follows commands  PSYCH:  calm, cooperative  SKIN: warm, dry     LAB RESULTS  Lab Results (last 24 hours)     Procedure Component Value Units Date/Time    POC Glucose Once [[id number]]  (Abnormal) Collected: 02/13/23 1325    Specimen: Blood Updated: 02/13/23 1326     Glucose 156 mg/dL      Comment: Meter: [id number] : [id number] Yonatan EUCEDA       Triglycerides [[id number]]  (Normal) Collected: 02/13/23 0321    Specimen: Blood Updated: 02/13/23 0937     Triglycerides 126 mg/dL     POC Glucose Once [[id number]]  (Abnormal) " Collected: 02/13/23 0603    Specimen: Blood Updated: 02/13/23 0604     Glucose 136 mg/dL      Comment: Meter: [id number] : [id number] Sonia Joseph [place]       Comprehensive Metabolic Panel [[id number]]  (Abnormal) Collected: 02/13/23 0321    Specimen: Blood Updated: 02/13/23 0428     Glucose 138 mg/dL      BUN 7 mg/dL      Creatinine 0.37 mg/dL      Sodium 132 mmol/L      Potassium 4.0 mmol/L      Chloride 99 mmol/L      CO2 28.4 mmol/L      Calcium 8.5 mg/dL      Total Protein 5.8 g/dL      Albumin 2.4 g/dL      ALT (SGPT) 9 U/L      AST (SGOT) 13 U/L      Alkaline Phosphatase 65 U/L      Total Bilirubin 0.5 mg/dL      Globulin 3.4 gm/dL      A/G Ratio 0.7 g/dL      BUN/Creatinine Ratio 18.9     Anion Gap 4.6 mmol/L      eGFR 113.5 mL/min/1.73     Narrative:      GFR Normal >60  Chronic Kidney Disease <60  Kidney Failure <15      Phosphorus [[id number]]  (Normal) Collected: 02/13/23 0321    Specimen: Blood Updated: 02/13/23 0420     Phosphorus 2.6 mg/dL     Magnesium [[id number]]  (Normal) Collected: 02/13/23 0321    Specimen: Blood Updated: 02/13/23 0420     Magnesium 1.9 mg/dL     CBC & Differential [[id number]]  (Abnormal) Collected: 02/13/23 0321    Specimen: Blood Updated: 02/13/23 0405    Narrative:      The following orders were created for panel order CBC & Differential.  Procedure                               Abnormality         Status                     ---------                               -----------         ------                     CBC Auto Differential[[id number]]        Abnormal            Final result                 Please view results for these tests on the individual orders.    CBC Auto Differential [[id number]]  (Abnormal) Collected: 02/13/23 0321    Specimen: Blood Updated: 02/13/23 0405     WBC 28.34 10*3/mm3      RBC 3.41 10*6/mm3      Hemoglobin 9.7 g/dL      Hematocrit 29.3 %      MCV 85.9 fL      MCH 28.4 pg      MCHC 33.1 g/dL      RDW 14.4 %      RDW-SD 44.9 fl      MPV 9.4 fL       Platelets 479 10*3/mm3      Neutrophil % 79.2 %      Lymphocyte % 9.1 %      Monocyte % 6.8 %      Eosinophil % 0.3 %      Basophil % 0.3 %      Immature Grans % 4.3 %      Neutrophils, Absolute 22.46 10*3/mm3      Lymphocytes, Absolute 2.58 10*3/mm3      Monocytes, Absolute 1.92 10*3/mm3      Eosinophils, Absolute 0.08 10*3/mm3      Basophils, Absolute 0.09 10*3/mm3      Immature Grans, Absolute 1.21 10*3/mm3      nRBC 0.0 /100 WBC     POC Glucose Once [[id number]]  (Abnormal) Collected: 02/12/23 2020    Specimen: Blood Updated: 02/12/23 2020     Glucose 159 mg/dL      Comment: Meter: [id number] : [id number] Sonia EUCEDA       POC Glucose Once [[id number]]  (Abnormal) Collected: 02/12/23 1743    Specimen: Blood Updated: 02/12/23 1744     Glucose 145 mg/dL      Comment: Meter: [id number] : [id number] Justin EUCEDA           Imaging Results (Last 24 Hours)     Procedure Component Value Units Date/Time    CT Abdomen Pelvis With Contrast [[id number]] Collected: 02/13/23 1119     Updated: 02/13/23 1355    Narrative:      CT ABDOMEN AND PELVIS WITH IV CONTRAST     HISTORY: Status post laparoscopic and open sigmoid resection for  diverticulitis/stricture. Status post percutaneous drain placement  02/09/2023. Increasing white count. Wound V.A.C. placement.     TECHNIQUE: Radiation dose reduction techniques were utilized, including  automated exposure control and exposure modulation based on body size.   3 mm images were obtained through the abdomen and pelvis after the  administration of IV contrast.      COMPARISON: 02/09/2023, 02/08/2023     FINDINGS:  Lower chest: Increasing small bilateral pleural effusions with bibasilar  airspace disease favoring atelectasis. Developing pneumonia not  excluded. Recommend follow-up to resolution after treatment. The heart  size is stable. Scattered calcific atherosclerosis.     Liver: Within normal limits.     Biliary tract: Within normal limits.     Spleen: Low  attenuation along the inferior margin of the spleen is  better visualized after IV contrast and slightly more conspicuous from  prior imaging allowing for differences in technique which may represent  evolving infarction or scarring from recent intervention. Continued  attention on follow-up recommended.     Pancreas: Within normal limits.     Adrenals: Within normal limits.     Kidneys:  Too small to characterize hypodensity in the left upper pole.  The right kidney is within normal limits. No hydronephrosis.     Bowel:  Small hiatal hernia with thickening of the distal esophagus  similar to the prior. The stomach is incompletely distended. Mildly  prominent fluid and air-filled loops of small bowel in the left upper  abdomen measuring up to 3.9 cm with scattered air-fluid levels slightly  less distended from prior imaging favoring evolving postoperative ileus.  Thickening of the colon proximal to the left lower quadrant colostomy.  The distal colon is incompletely distended. No extraluminal enteric  contrast. Percutaneous drainage of a smaller fluid collection in the  left paracolic gutter with the largest collection along the dependent  margin approximating 4.7 x 1.2 cm (previously 3.9 x 6.2 cm). Ill-defined  fluid in the right paracolic gutter is slightly more conspicuous with  tiny loculated collections in the right ventral pelvis. An index  collection measures 1.3 x 3 cm at the level of the iliac crest (image  110) with an additional smaller collection more anteriorly. A collection  in the central pelvis extending to the right of midline approximates 2.5  x 4.5 cm (previously 3.2 x 3.3 cm) with a small amount of fluid  extending into the pelvis. Mesenteric edema. No free intraperitoneal  air.     Vasculature:    Within normal limits.     Lymph Nodes:  No new adenopathy.                                                            Pelvic organs: Hysterectomy. Bladder incompletely distended..     Abdominal/Pelvic  Wall: Postsurgical changes ventral abdominal wall with  a wound V.A.C. in situ. Ill-defined subcutaneous soft tissue stranding  and fluid slightly less conspicuous. There is subcutaneous foci of gas  subjacent to the left lower quadrant ostomy with an ill-defined tract  abutting the lateral margin of the colostomy. No extraluminal enteric  contrast (series 2 images 87 and 105).     Bones: Multilevel degenerative changes..       Impression:      1.  New small bilateral pleural effusions with bibasilar airspace  disease favoring atelectasis or pneumonia.  2.  Interval decrease in size of a fluid collection in the left  paracolic gutter status post percutaneous drain placement. Please  correlate with drain output.  3.  Mesenteric edema with small volume ascites with ill-defined small  collections in the right upper quadrant and right central pelvis the  largest approximating 2.5 x 4.5 cm (previously 3.2 x 3.3 cm). Given size  and location of these collections continued conservative follow-up  recommended.  4.  Left lower quadrant colostomy with ill-defined subjacent stranding  and tiny foci of gas slightly more conspicuous from prior imaging. No  drainable fluid collections or definitive extraluminal enteric contrast.  Recommend continued conservative follow-up.  5.  Improving ileus with mild thickening of the colon just proximal to  the ostomy.  6.   Please see above for additional findings.     This report was finalized on 2/13/2023 1:52 PM by Dr. Garrett Ramirez M.D.           Upper and lower endoscopy results noted and discussed with patient    Current Facility-Administered Medications:   •  Adult Central 2-in-1 TPN, , Intravenous, Continuous, QuickLilibeth PA-C, Last Rate: 75 mL/hr at 02/12/23 1823, New Bag at 02/12/23 1823  •  Adult Central 2-in-1 TPN, , Intravenous, Continuous, Lino Gaston MD  •  amLODIPine (NORVASC) tablet 10 mg, 10 mg, Oral, Q24H, Gideon Hope MD, 10 mg at 02/13/23 0917  •  cefTRIAXone  (ROCEPHIN) 1 g in sodium chloride 0.9 % 100 mL IVPB-VTB, 1 g, Intravenous, Q24H, Beck Wei Jr., MD, Last Rate: 200 mL/hr at 02/13/23 1258, 1 g at 02/13/23 1258  •  dextrose (D50W) (25 g/50 mL) IV injection 25 g, 25 g, Intravenous, Q15 Min PRN, Beck Wei Jr., MD  •  dextrose (GLUTOSE) oral gel 15 g, 15 g, Oral, Q15 Min PRN, Beck Wei Jr., MD  •  famotidine (PEPCID) injection 20 mg, 20 mg, Intravenous, BID SIMONE, Lilibeth Rahman PA-C, 20 mg at 02/13/23 0630  •  Fat Emulsion Plant Based (INTRALIPID,LIPOSYN) 20 % infusion 20 g, 100 mL, Intravenous, Q24H (TPN), Lino Gaston MD, Last Rate: 8.33 mL/hr at 02/12/23 1823, 20 g at 02/12/23 1823  •  fluconazole (DIFLUCAN) IVPB 400 mg, 400 mg, Intravenous, Q24H, Gideon Hope MD, Last Rate: 100 mL/hr at 02/12/23 1701, 400 mg at 02/12/23 1701  •  glucagon (GLUCAGEN) injection 1 mg, 1 mg, Intramuscular, Q15 Min PRN, Beck Wei Jr., MD  •  hydrALAZINE (APRESOLINE) injection 10 mg, 10 mg, Intravenous, Q4H PRN, Gideon Hope MD, 10 mg at 02/11/23 1454  •  HYDROmorphone (DILAUDID) injection 0.25 mg, 0.25 mg, Intravenous, Q2H PRN, 0.25 mg at 02/13/23 0015 **AND** naloxone (NARCAN) injection 0.4 mg, 0.4 mg, Intravenous, Q5 Min PRN, Lino Gaston MD  •  insulin regular (humuLIN R,novoLIN R) injection 0-7 Units, 0-7 Units, Subcutaneous, Q6H, Beck Wei Jr., MD, 2 Units at 02/12/23 5946  •  metoclopramide (REGLAN) injection 10 mg, 10 mg, Intravenous, Q6H, Lino Gaston MD, 10 mg at 02/13/23 1258  •  metroNIDAZOLE (FLAGYL) IVPB 500 mg, 500 mg, Intravenous, Q8H, DanielitoGaldino MD, 500 mg at 02/13/23 0937  •  nitroglycerin (NITROSTAT) SL tablet 0.4 mg, 0.4 mg, Sublingual, Q5 Min PRN, Lino Gaston MD  •  ondansetron (ZOFRAN) injection 4 mg, 4 mg, Intravenous, Q6H PRN, Lino Gaston MD, 4 mg at 02/12/23 0464  •  Pharmacy to Dose TPN, , Does not apply, Continuous PRN, Lilibeth Rahman PA-C  •  potassium chloride 10 mEq in 100 mL IVPB, 10 mEq,  Intravenous, Q1H PRN, Matthew Soliz MD, Last Rate: 100 mL/hr at 02/11/23 2009, 10 mEq at 02/11/23 2009  •  potassium phosphate 45 mmol in sodium chloride 0.9 % 500 mL infusion, 45 mmol, Intravenous, PRN **OR** potassium phosphate 30 mmol in sodium chloride 0.9 % 250 mL infusion, 30 mmol, Intravenous, PRN **OR** potassium phosphate 15 mmol in sodium chloride 0.9 % 100 mL infusion, 15 mmol, Intravenous, PRN, 15 mmol at 02/12/23 1440 **OR** sodium phosphates 40 mmol in sodium chloride 0.9 % 500 mL IVPB, 40 mmol, Intravenous, PRN **OR** sodium phosphates 20 mmol in sodium chloride 0.9 % 250 mL IVPB, 20 mmol, Intravenous, PRN, Matthew Soliz MD  •  scopolamine patch 1 mg/72 hr, 1 patch, Transdermal, Q72H, Quick, Lilibeth A, PA-C, 1 patch at 02/12/23 1218  •  sodium chloride 0.9 % flush 10 mL, 10 mL, Intravenous, Q12H, Quick, Lilibeth A, PA-C, 10 mL at 02/13/23 0938  •  sodium chloride 0.9 % flush 10 mL, 10 mL, Intravenous, Q12H, Quick, Lilibeth A, PA-C, 10 mL at 02/13/23 0938  •  sodium chloride 0.9 % flush 10 mL, 10 mL, Intravenous, PRN, Quick, Lilibeth A, PA-C  •  sodium chloride 0.9 % flush 20 mL, 20 mL, Intravenous, PRN, Quick, Lilibeth A, PA-C  •  sodium chloride 0.9 % infusion 40 mL, 40 mL, Intravenous, PRN, Quick, Lilibeth A, PA-C     ASSESSMENT  Acute colitis with sigmoid stricture and microperforation s/p colon resection and colostomy  Abdominal fluid collection/abscess status post CT-guided drainage  Postop ileus  Acute kidney injury resolved  Hypertension   Gastroesophageal reflux disease    PLAN  CPM  Postop care  IVF  TPN  Continue antibiotics  Accu-Chek with low-dose sliding scale insulin  Infectious disease consult appreciated  Adjust blood pressure medications  Pain management  Continue home medications  Stress ulcer DVT prophylaxis  Supportive care  PT OT  Discussed with family nursing staff   Follow closely and further recommendation according to hospital course    SANG SINGLETARY  MD    Copied text in this note has been reviewed and is accurate as of 02/13/23        Electronically signed by Gideon Hope MD at 02/13/23 1422     Matthew Soliz MD at 02/13/23 1323             LOS: 17 days     Chief Complaint/ Reason for encounter: JUANY    Subjective   02/07/23 : Feels about the same today, still some generalized abdominal pain  Anxious to increase her diet, no nausea or vomiting today but did vomit twice yesterday  No shortness of breath or chest pain  No fevers or chills  Minimal edema    2/8: She is upset with the fact that her ostomy bag has stool in it  Remains n.p.o. per surgery recommendations  Denies any shortness of breath or chest pain, no fevers or chills no abdominal pain nausea vomiting or edema    2/9: Did not tolerate clear liquids, NG tube to be replaced, n.p.o., IR to place drain into the fluid collection seen on CT    2/10: Abdominal drain in place, still with some nausea, remains n.p.o. with plans to start TPN  Remains on IV fluids, no edema, good urine output    2/11 on TPN, states she wants a stark as she doesn't like getting up to urinate, bp above  goal     2/12 some abd pain, give dilaudid this am with improvement, bp runnign on high side, remains on TPN    2/13: Remains on TPN, n.p.o., no bowel movements, no flatus    Medical history reviewed:  Abdominal Pain        Subjective    History taken from: Patient and chart    Vital Signs  Temp:  [98 °F (36.7 °C)-98.3 °F (36.8 °C)] 98.3 °F (36.8 °C)  Heart Rate:  [] 101  Resp:  [17-18] 18  BP: (142-156)/(84-90) 145/84       Wt Readings from Last 1 Encounters:   02/13/23 0502 87.7 kg (193 lb 6.4 oz)   02/12/23 0628 82.4 kg (181 lb 10.5 oz)   02/11/23 0558 83.8 kg (184 lb 11.9 oz)   02/03/23 2226 83.1 kg (183 lb 3.2 oz)   01/27/23 1955 78.9 kg (174 lb)   01/27/23 1428 78.9 kg (174 lb)       Objective:  Vital signs: (most recent): Blood pressure 145/84, pulse 101, temperature 98.3 °F (36.8 °C), temperature source Oral,  "resp. rate 18, height 154.9 cm (60.98\"), weight 87.7 kg (193 lb 6.4 oz), SpO2 92 %.              Objective:  General Appearance:  Comfortable, nad   Awake, alert, oriented  HEENT: Mucous membranes moist, no injury, oropharynx clear  Lungs:  Normal effort and normal respiratory rate.  Breath sounds clear to auscultation.  No  respiratory distress.  No rales, decreased breath sounds or rhonchi.    Heart: Normal rate.  Regular rhythm.  S1, S2 normal.  No murmur.   Abdomen: Abdomen is soft.  Diminished bowel sounds, nontender  Extremities: Trace ankle edema of bilateral lower extremities  Neurological: No focal motor or sensory deficits, pupils reactive  Skin:  Warm and dry.  No rash or cyanosis.       Results Review:    Intake/Output:     Intake/Output Summary (Last 24 hours) at 2/13/2023 1323  Last data filed at 2/13/2023 1050  Gross per 24 hour   Intake 0 ml   Output 4460 ml   Net -4460 ml         DATA:  Radiology and Labs:  The following labs independently reviewed by me. Additional labs ordered for tomorrow a.m.  Interval notes, chart personally reviewed by me.   Old records independently reviewed showing normal baseline creatinine    New problems include hypokalemia and hypophosphatemia  Discussed with patient herself at bedside    Risk/ complexity of medical care/ medical decision making moderate complexity, postop renal failure and fluid and electrolyte management    Labs:   Recent Results (from the past 24 hour(s))   POC Glucose Once    Collection Time: 02/12/23  5:43 PM    Specimen: Blood   Result Value Ref Range    Glucose 145 (H) 70 - 130 mg/dL   POC Glucose Once    Collection Time: 02/12/23  8:20 PM    Specimen: Blood   Result Value Ref Range    Glucose 159 (H) 70 - 130 mg/dL   Magnesium    Collection Time: 02/13/23  3:21 AM    Specimen: Blood   Result Value Ref Range    Magnesium 1.9 1.6 - 2.4 mg/dL   Phosphorus    Collection Time: 02/13/23  3:21 AM    Specimen: Blood   Result Value Ref Range    Phosphorus " 2.6 2.5 - 4.5 mg/dL   Comprehensive Metabolic Panel    Collection Time: 02/13/23  3:21 AM    Specimen: Blood   Result Value Ref Range    Glucose 138 (H) 65 - 99 mg/dL    BUN 7 (L) 8 - 23 mg/dL    Creatinine 0.37 (L) 0.57 - 1.00 mg/dL    Sodium 132 (L) 136 - 145 mmol/L    Potassium 4.0 3.5 - 5.2 mmol/L    Chloride 99 98 - 107 mmol/L    CO2 28.4 22.0 - 29.0 mmol/L    Calcium 8.5 (L) 8.6 - 10.5 mg/dL    Total Protein 5.8 (L) 6.0 - 8.5 g/dL    Albumin 2.4 (L) 3.5 - 5.2 g/dL    ALT (SGPT) 9 1 - 33 U/L    AST (SGOT) 13 1 - 32 U/L    Alkaline Phosphatase 65 39 - 117 U/L    Total Bilirubin 0.5 0.0 - 1.2 mg/dL    Globulin 3.4 gm/dL    A/G Ratio 0.7 g/dL    BUN/Creatinine Ratio 18.9 7.0 - 25.0    Anion Gap 4.6 (L) 5.0 - 15.0 mmol/L    eGFR 113.5 >60.0 mL/min/1.73   CBC Auto Differential    Collection Time: 02/13/23  3:21 AM    Specimen: Blood   Result Value Ref Range    WBC 28.34 (H) 3.40 - 10.80 10*3/mm3    RBC 3.41 (L) 3.77 - 5.28 10*6/mm3    Hemoglobin 9.7 (L) 12.0 - 15.9 g/dL    Hematocrit 29.3 (L) 34.0 - 46.6 %    MCV 85.9 79.0 - 97.0 fL    MCH 28.4 26.6 - 33.0 pg    MCHC 33.1 31.5 - 35.7 g/dL    RDW 14.4 12.3 - 15.4 %    RDW-SD 44.9 37.0 - 54.0 fl    MPV 9.4 6.0 - 12.0 fL    Platelets 479 (H) 140 - 450 10*3/mm3    Neutrophil % 79.2 (H) 42.7 - 76.0 %    Lymphocyte % 9.1 (L) 19.6 - 45.3 %    Monocyte % 6.8 5.0 - 12.0 %    Eosinophil % 0.3 0.3 - 6.2 %    Basophil % 0.3 0.0 - 1.5 %    Immature Grans % 4.3 (H) 0.0 - 0.5 %    Neutrophils, Absolute 22.46 (H) 1.70 - 7.00 10*3/mm3    Lymphocytes, Absolute 2.58 0.70 - 3.10 10*3/mm3    Monocytes, Absolute 1.92 (H) 0.10 - 0.90 10*3/mm3    Eosinophils, Absolute 0.08 0.00 - 0.40 10*3/mm3    Basophils, Absolute 0.09 0.00 - 0.20 10*3/mm3    Immature Grans, Absolute 1.21 (H) 0.00 - 0.05 10*3/mm3    nRBC 0.0 0.0 - 0.2 /100 WBC   Triglycerides    Collection Time: 02/13/23  3:21 AM    Specimen: Blood   Result Value Ref Range    Triglycerides 126 0 - 150 mg/dL   POC Glucose Once     Collection Time: 23  6:03 AM    Specimen: Blood   Result Value Ref Range    Glucose 136 (H) 70 - 130 mg/dL       Radiology:  Pertinent radiology studies were reviewed as described above      Medications have been reviewed separately in chart overview      ASSESSMENT:   acute renal failure, creatinine peaked at 2.3 and is below baseline today,   Hypokalemia   Metabolic alkalosis   Hypoalbuminemia   Hypophosphatemia     Colitis status post sigmoid colon resection    Nausea and vomiting,  Postop ileus, remains n.p.o.  Abdominal fluid collection status post drain placement   Stricture of sigmoid colon, status post colostomy  Hypokalemia, worse today  Hypertension, receiving as needed IV hydralazine  Anemia        DISCUSSION/PLAN:   Renal function remains excellent, stable and at baseline  Sodium trending down, recommend decrease free water a bit with TPN  Continue on TPN at current formulation otherwise  Potassium and phosphorus stable on TPN today    Prn iv hydralazine for HTN plus oral amlodipine    Continue to monitor electrolytes and volume closely.  We will continue to follow      Matthew Soliz MD   [facility]   Office phone number: [phone]  Answering service phone number: [phone]    23  13:23 EST          Electronically signed by Matthew Soliz MD at 23 1324     Galdino Esteves MD at 23 0846            Infectious Diseases Progress Note    Galdino Esteves MD     [facility]  [place]: 17 days  Patient Identification:  Name: Hali Tejeda  Age: 62 y.o.  Sex: female  :  1960  MRN: [id number]         Primary Care Physician: Richard Delgado MD        Subjective: Does not feel good.  Still have abdominal pain.  Interval History: Underwent placement of wound VAC on her abdominal wound/incision.  TPN is started.    Objective:    Scheduled Meds:amLODIPine, 10 mg, Oral, Q24H  cefTRIAXone, 1 g, Intravenous, Q24H  famotidine, 20 mg, Intravenous, BID AC  Fat  "Emulsion Plant Based, 100 mL, Intravenous, Q24H (TPN)  fluconazole, 400 mg, Intravenous, Q24H  insulin regular, 0-7 Units, Subcutaneous, Q6H  metoclopramide, 10 mg, Intravenous, Q6H  metroNIDAZOLE, 500 mg, Intravenous, Q8H  Scopolamine, 1 patch, Transdermal, Q72H  sodium chloride, 10 mL, Intravenous, Q12H  sodium chloride, 10 mL, Intravenous, Q12H      Continuous Infusions:Adult Central 2-in-1 TPN, , Last Rate: 75 mL/hr at 02/12/23 1823  Pharmacy to Dose TPN,         Vital signs in last 24 hours:  Temp:  [98 °F (36.7 °C)-98.3 °F (36.8 °C)] 98.3 °F (36.8 °C)  Heart Rate:  [87-96] 90  Resp:  [17-18] 18  BP: (142-156)/(84-90) 145/84    Intake/Output:    Intake/Output Summary (Last 24 hours) at 2/13/2023 0846  Last data filed at 2/13/2023 0721  Gross per 24 hour   Intake 0 ml   Output 4360 ml   Net -4360 ml       Exam:  /84 (BP Location: Right arm, Patient Position: Lying)   Pulse 90   Temp 98.3 °F (36.8 °C) (Oral)   Resp 18   Ht 154.9 cm (60.98\")   Wt 87.7 kg (193 lb 6.4 oz)   SpO2 92%   BMI 36.56 kg/m²   Patient is examined using the personal protective equipment as per guidelines from infection control for this particular patient as enacted.  Hand washing was performed before and after patient interaction.  General Appearance:  Ill-appearing uncomfortable female                          Head:    Normocephalic, without obvious abnormality, atraumatic                           Eyes:    PERRL, conjunctivae/corneas clear, EOM's intact, both eyes                         Throat:   Lips, tongue, gums normal; oral mucosa pink and moist                           Neck:   Supple, symmetrical, trachea midline, no JVD                         Lungs:    Clear to auscultation bilaterally, respirations unlabored                 Chest Wall:    No tenderness or deformity                          Heart:  S1-S2 regular                  Abdomen:   Ostomy present with midline incision has wound VAC in place with no surrounding " cellulitis.                 Extremities:   Extremities normal, atraumatic, no cyanosis or edema                        Pulses:   Pulses palpable in all extremities                            Skin:   Skin is warm and dry,  no rashes or palpable lesions                  Neurologic: Grossly nonfocal       Data Review:    I reviewed the patient's new clinical results.  Results from last 7 days   Lab Units 02/13/23  0321 02/12/23  0558 02/11/23  0506 02/10/23  0917 02/09/23  0619 02/08/23  0405 02/07/23  0440   WBC 10*3/mm3 28.34* 26.57* 25.18* 20.95* 25.99* 18.59* 8.48   HEMOGLOBIN g/dL 9.7* 8.5* 8.7* 8.7* 9.6* 9.7* 10.1*   PLATELETS 10*3/mm3 479* 416 411 370 337 281 275     Results from last 7 days   Lab Units 02/13/23  0321 02/12/23  0817 02/12/23  0748 02/12/23  0558 02/11/23  0506 02/10/23  0356 02/09/23  0619 02/08/23  2132 02/08/23  0405 02/07/23  1514   SODIUM mmol/L 132* 135*  --  126* 141 141 143  --  143 146*   POTASSIUM mmol/L 4.0 3.7 3.7 7.5* 2.8* 2.7* 4.6   < > 3.5 3.1*   CHLORIDE mmol/L 99  --   --  96* 106 108* 111*  --  116* 116*   CO2 mmol/L 28.4  --   --  26.0 30.0* 26.0 22.9  --  21.0* 19.8*   BUN mg/dL 7*  --   --  4* 3* 4* 5*  --  9 12   CREATININE mg/dL 0.37*  --   --  0.41* 0.37* 0.39* 0.46*  --  0.62 0.72   CALCIUM mg/dL 8.5*  --   --  8.4* 7.8* 8.1* 8.4*  --  8.4* 8.6   GLUCOSE mg/dL 138*  --   --  763* 158* 94 101*  --  79 71    < > = values in this interval not displayed.     Microbiology Results (last 10 days)     Procedure Component Value - Date/Time    Clostridioides difficile Toxin - Stool, Per Stoma [[id number]]  (Normal) Collected: 02/10/23 1243    Lab Status: Final result Specimen: Stool from Per Stoma Updated: 02/10/23 1341    Narrative:      The following orders were created for panel order Clostridioides difficile Toxin - Stool, Per Stoma.  Procedure                               Abnormality         Status                     ---------                               -----------          ------                     Clostridioides difficile...[[id number]]  Normal              Final result                 Please view results for these tests on the individual orders.    Clostridioides difficile Toxin, PCR - Stool, Per Stoma [[id number]]  (Normal) Collected: 02/10/23 1243    Lab Status: Final result Specimen: Stool from Per Stoma Updated: 02/10/23 1341     C. Difficile Toxins by PCR Negative    Narrative:      The result indicates the absence of toxigenic C. difficile from stool specimen.     Body Fluid Culture - Drainage, Peritoneum [[id number]]  (Abnormal)  (Susceptibility) Collected: 02/09/23 1458    Lab Status: Final result Specimen: Drainage from Peritoneum Updated: 02/11/23 0851     Body Fluid Culture Scant growth (1+) Escherichia coli     Gram Stain Few (2+) WBCs per low power field      No organisms seen    Susceptibility      Escherichia coli      RENETTA      Amikacin Susceptible      Ampicillin Resistant     Ampicillin + Sulbactam Resistant     Cefepime Susceptible      Ceftazidime Susceptible      Ceftriaxone Susceptible      Gentamicin Resistant     Levofloxacin Susceptible      Piperacillin + Tazobactam Resistant     Tobramycin Intermediate      Trimethoprim + Sulfamethoxazole Resistant                      Susceptibility Comments     Escherichia coli    Cefazolin sensitivity will not be reported for Enterobacteriaceae in non-urine isolates. If cefazolin is preferred, please call the microbiology lab to request an E-test.  With the exception of urinary-sourced infections, aminoglycosides should not be used as monotherapy.                     Assessment:    Colitis    Nausea and vomiting    Stricture of sigmoid colon (HCC)  1-intra-abdominal sepsis with intra-abdominal abscess in the setting of prolonged colitis, sigmoid stricture, endoscopic injury to colonic wall with microperforation and subsequent laparotomy partial colectomy and colostomy and intraoperative injury to the spleen with repair  while being on antibiotic therapy-likely pathogen to consider in this situation would be enteric amberly along with selection of yeast due to prolonged antibiotic therapy-status post percutaneous drain placement on 2/9/2023  2-hypertension  3-malnourishment  4-possible postop abdominal wall/incisional infection/evolving abscess        Recommendations/Discussions:  · Continue with fluconazole ceftriaxone and Flagyl  · Follow-up on the repeat CT scan of the abdomen and pelvis  · Monitor closely for complications of antibiotics including C. difficile infection but low threshold to repeat colostomy content for C. difficile toxin assay as her last testing on 2/10/2023 was negative.  Galdino Esteves MD  2/13/2023  08:46 EST    Parts of this note may be an electronic transcription/translation of spoken language to printed text using the Dragon dictation system.      Electronically signed by Galdino Esteves MD at 02/13/23 1007     Lilibeth Rahman PA-C at 02/13/23 0829     Attestation signed by Lino Gaston MD at 02/13/23 1756    I have reviewed this documentation and agree.                  Progress Note    Pod 10    S: Pt experiencing intermittent abdominal pain. Started on clear liquids with 2 episodes of emesis last night.     O:  Temp:  [98 °F (36.7 °C)-98.3 °F (36.8 °C)] 98.3 °F (36.8 °C)  Heart Rate:  [87-96] 90  Resp:  [17-18] 18  BP: (142-156)/(84-90) 145/84      Intake/Output Summary (Last 24 hours) at 2/13/2023 0830  Last data filed at 2/13/2023 0721  Gross per 24 hour   Intake 0 ml   Output 4360 ml   Net -4360 ml       Abd: soft, non-distended  Wound-vac in place over midline abdominal wounds.   Ostomy: PPP with liquid stool in bag  LLQ NEW with serous drainage.    Results from last 7 days   Lab Units 02/13/23  0321   WBC 10*3/mm3 28.34*   HEMOGLOBIN g/dL 9.7*   HEMATOCRIT % 29.3*   PLATELETS 10*3/mm3 479*     Results from last 7 days   Lab Units 02/13/23  0321   SODIUM mmol/L 132*   POTASSIUM mmol/L 4.0    CHLORIDE mmol/L 99   CO2 mmol/L 28.4   BUN mg/dL 7*   CREATININE mg/dL 0.37*   EGFR mL/min/1.73 113.5   GLUCOSE mg/dL 138*   CALCIUM mg/dL 8.5*   PHOSPHORUS mg/dL 2.6       Results from last 7 days   Lab Units 02/13/23  0321   MAGNESIUM mg/dL 1.9       A/P: 62 y.o. female S/P robotic assisted laparoscopic left and sigmoid resection with colostomy converted open due to splenic injury 02/03/2023.    - S/P LLQ IR Drain placement 02/09/2023. Pt treated empirically with Zosyn. Cultures with scant growth of E. Coli with resistance to Zosyn. ID following. ABx switched to Rocephin with consideration of adding Flagyl.  - Will check stat CT of abdomen/pelvis due to worsening leukocytosis   - Pt with prolonged ileus. Decrease diet back to NPO. Continue Reglan and TPN.   - Continue wound-vac to midline abdominal wound.     Electronically signed by Lino Gaston MD at 02/13/23 8539            Caryn Foss, RN   Registered Nurse  Plan of Care      Signed  Date of Service:  02/14/23 0329  Creation Time:  02/14/23 0329     Signed          Goal Outcome Evaluation:  Plan of Care Reviewed With: patient  Progress: no change  Outcome Evaluation: VSS, cont IV TPN and Lipids, remains NPO, no emesis this shift, ostomy in place, NEW to self sxn with sm amt serosang drng, wound vac in place drsg d/i, up to BSC during evening hours, Purewick in place during night per pt request, safety maintained

## 2023-02-14 NOTE — PROGRESS NOTES
"   LOS: 18 days     Chief Complaint/ Reason for encounter: JUANY    Subjective   02/07/23 : Feels about the same today, still some generalized abdominal pain  Anxious to increase her diet, no nausea or vomiting today but did vomit twice yesterday  No shortness of breath or chest pain  No fevers or chills  Minimal edema    2/8: She is upset with the fact that her ostomy bag has stool in it  Remains n.p.o. per surgery recommendations  Denies any shortness of breath or chest pain, no fevers or chills no abdominal pain nausea vomiting or edema    2/9: Did not tolerate clear liquids, NG tube to be replaced, n.p.o., IR to place drain into the fluid collection seen on CT    2/10: Abdominal drain in place, still with some nausea, remains n.p.o. with plans to start TPN  Remains on IV fluids, no edema, good urine output    2/11 on TPN, states she wants a stark as she doesn't like getting up to urinate, bp above  goal     2/12 some abd pain, give dilaudid this am with improvement, bp runnign on high side, remains on TPN    2/13: Remains on TPN, n.p.o., no bowel movements, no flatus    2/14: Peritoneal drain repositioning in IR today, otherwise remains fairly stable on TPN    Medical history reviewed:  Abdominal Pain        Subjective    History taken from: Patient and chart    Vital Signs  Temp:  [97.8 °F (36.6 °C)-98.8 °F (37.1 °C)] 97.8 °F (36.6 °C)  Heart Rate:  [80-96] 96  Resp:  [12-20] 17  BP: (122-150)/(75-90) 132/81       Wt Readings from Last 1 Encounters:   02/14/23 0920 76.7 kg (169 lb)   02/14/23 0644 76.8 kg (169 lb 5 oz)   02/13/23 0502 87.7 kg (193 lb 6.4 oz)   02/12/23 0628 82.4 kg (181 lb 10.5 oz)   02/11/23 0558 83.8 kg (184 lb 11.9 oz)   02/03/23 2226 83.1 kg (183 lb 3.2 oz)   01/27/23 1955 78.9 kg (174 lb)   01/27/23 1428 78.9 kg (174 lb)       Objective:  Vital signs: (most recent): Blood pressure 132/81, pulse 96, temperature 97.8 °F (36.6 °C), temperature source Oral, resp. rate 17, height 154.9 cm (61\"), " weight 76.7 kg (169 lb), SpO2 96 %.              Objective:  General Appearance:  Comfortable, nad   Awake, alert, oriented  HEENT: Mucous membranes moist, no injury, oropharynx clear  Lungs:  Normal effort and normal respiratory rate.  Breath sounds clear to auscultation.  No  respiratory distress.  No rales, decreased breath sounds or rhonchi.    Heart: Normal rate.  Regular rhythm.  S1, S2 normal.  No murmur.   Abdomen: Abdomen is soft.  Diminished bowel sounds, nontender  Extremities: Trace ankle edema of bilateral lower extremities  Neurological: No focal motor or sensory deficits, pupils reactive  Skin:  Warm and dry.  No rash or cyanosis.       Results Review:    Intake/Output:     Intake/Output Summary (Last 24 hours) at 2/14/2023 1221  Last data filed at 2/14/2023 1100  Gross per 24 hour   Intake 100 ml   Output 1920 ml   Net -1820 ml         DATA:  Radiology and Labs:  The following labs independently reviewed by me. Additional labs ordered for tomorrow a.m.  Interval notes, chart personally reviewed by me.   Old records independently reviewed showing normal baseline creatinine    New problems include hypokalemia and hypophosphatemia  Discussed with patient herself at bedside    Risk/ complexity of medical care/ medical decision making moderate complexity, postop renal failure and fluid and electrolyte management    Labs:   Recent Results (from the past 24 hour(s))   POC Glucose Once    Collection Time: 02/13/23  1:25 PM    Specimen: Blood   Result Value Ref Range    Glucose 156 (H) 70 - 130 mg/dL   POC Glucose Once    Collection Time: 02/13/23  4:37 PM    Specimen: Blood   Result Value Ref Range    Glucose 153 (H) 70 - 130 mg/dL   POC Glucose Once    Collection Time: 02/13/23  9:24 PM    Specimen: Blood   Result Value Ref Range    Glucose 127 70 - 130 mg/dL   Magnesium    Collection Time: 02/14/23  2:38 AM    Specimen: Blood   Result Value Ref Range    Magnesium 1.9 1.6 - 2.4 mg/dL   Phosphorus     Collection Time: 02/14/23  2:38 AM    Specimen: Blood   Result Value Ref Range    Phosphorus 2.7 2.5 - 4.5 mg/dL   Comprehensive Metabolic Panel    Collection Time: 02/14/23  2:38 AM    Specimen: Blood   Result Value Ref Range    Glucose 150 (H) 65 - 99 mg/dL    BUN 8 8 - 23 mg/dL    Creatinine 0.37 (L) 0.57 - 1.00 mg/dL    Sodium 134 (L) 136 - 145 mmol/L    Potassium 4.0 3.5 - 5.2 mmol/L    Chloride 100 98 - 107 mmol/L    CO2 24.8 22.0 - 29.0 mmol/L    Calcium 8.2 (L) 8.6 - 10.5 mg/dL    Total Protein 5.8 (L) 6.0 - 8.5 g/dL    Albumin 2.1 (L) 3.5 - 5.2 g/dL    ALT (SGPT) 9 1 - 33 U/L    AST (SGOT) 13 1 - 32 U/L    Alkaline Phosphatase 67 39 - 117 U/L    Total Bilirubin 0.4 0.0 - 1.2 mg/dL    Globulin 3.7 gm/dL    A/G Ratio 0.6 g/dL    BUN/Creatinine Ratio 21.6 7.0 - 25.0    Anion Gap 9.2 5.0 - 15.0 mmol/L    eGFR 113.5 >60.0 mL/min/1.73   CBC Auto Differential    Collection Time: 02/14/23  2:38 AM    Specimen: Blood   Result Value Ref Range    WBC 25.66 (H) 3.40 - 10.80 10*3/mm3    RBC 3.30 (L) 3.77 - 5.28 10*6/mm3    Hemoglobin 9.2 (L) 12.0 - 15.9 g/dL    Hematocrit 28.9 (L) 34.0 - 46.6 %    MCV 87.6 79.0 - 97.0 fL    MCH 27.9 26.6 - 33.0 pg    MCHC 31.8 31.5 - 35.7 g/dL    RDW 14.4 12.3 - 15.4 %    RDW-SD 45.6 37.0 - 54.0 fl    MPV 9.5 6.0 - 12.0 fL    Platelets 500 (H) 140 - 450 10*3/mm3    Neutrophil % 76.3 (H) 42.7 - 76.0 %    Lymphocyte % 10.1 (L) 19.6 - 45.3 %    Monocyte % 8.2 5.0 - 12.0 %    Eosinophil % 0.5 0.3 - 6.2 %    Basophil % 0.2 0.0 - 1.5 %    Immature Grans % 4.7 (H) 0.0 - 0.5 %    Neutrophils, Absolute 19.56 (H) 1.70 - 7.00 10*3/mm3    Lymphocytes, Absolute 2.60 0.70 - 3.10 10*3/mm3    Monocytes, Absolute 2.10 (H) 0.10 - 0.90 10*3/mm3    Eosinophils, Absolute 0.13 0.00 - 0.40 10*3/mm3    Basophils, Absolute 0.06 0.00 - 0.20 10*3/mm3    Immature Grans, Absolute 1.21 (H) 0.00 - 0.05 10*3/mm3    nRBC 0.0 0.0 - 0.2 /100 WBC   POC Glucose Once    Collection Time: 02/14/23  6:32 AM    Specimen: Blood    Result Value Ref Range    Glucose 142 (H) 70 - 130 mg/dL       Radiology:  Pertinent radiology studies were reviewed as described above      Medications have been reviewed separately in chart overview      ASSESSMENT:   acute renal failure, creatinine peaked at 2.3 and is below baseline today,   Hypokalemia   Metabolic alkalosis   Hypoalbuminemia   Hypophosphatemia     Colitis status post sigmoid colon resection    Nausea and vomiting,  Postop ileus, remains n.p.o.  Abdominal fluid collection status post drain placement   Stricture of sigmoid colon, status post colostomy  Hypokalemia, worse today  Hypertension, receiving as needed IV hydralazine  Anemia        DISCUSSION/PLAN:   Renal function remains stable and at baseline.  Euvolemic on exam  Sodium a little better today at 135, otherwise electrolytes stable  Continue on TPN at current formulation otherwise  Potassium and phosphorus stable on TPN today, no need for additional replacement  Prn iv hydralazine for HTN plus oral amlodipine.  BP at goal  Continue to monitor electrolytes and volume closely      Matthew Soliz MD   [facility]   Office phone number: [phone]  Answering service phone number: [phone]    02/14/23  12:21 EST

## 2023-02-14 NOTE — PROGRESS NOTES
[facility] Clinical Pharmacy Services: TPN Daily Progress Note    TPN Day # 5   Indication: Prolonged Paralytic Ileus  Route: central  Type: standard    Subjective/Objective  Results from last 7 days   Lab Units 23  0238 23  0321   SODIUM mmol/L 134* 132*   POTASSIUM mmol/L 4.0 4.0   CHLORIDE mmol/L 100 99   CO2 mmol/L 24.8 28.4   BUN mg/dL 8 7*   CREATININE mg/dL 0.37* 0.37*   CALCIUM mg/dL 8.2* 8.5*   ALBUMIN g/dL 2.1* 2.4*   BILIRUBIN mg/dL 0.4 0.5   ALK PHOS U/L 67 65   ALT (SGPT) U/L 9 9   AST (SGOT) U/L 13 13   GLUCOSE mg/dL 150* 138*   MAGNESIUM mg/dL 1.9 1.9   PHOSPHORUS mg/dL 2.7 2.6   TRIGLYCERIDES mg/dL  --  126        Diet Orders (active) (From admission, onward)       Start     Ordered    23 0831  NPO Diet NPO Type: Sips with Meds  Diet Effective Now         23 0830                  Additional insulin administration while previous TPN infusin units  Additional electrolyte administration while previous TPN infusing: none  Acid suppression: pepcid IV    Current TPN Formula: 90g/950KCal/100mL AA/Dex/Lipids    Assessment/Plan    1. Macronutrients: increase towards goal (100g/1200KCal/100mL)  2. Electrolytes/Additives: repeat  3. MVI for TPN   4. Labs: CMP, Mg, Phos  5. Comments: Will adjust TPN towards goal and continue to monitor.  Will review labs again in am.     Nyla Neville, PharmD  Clinical Pharmacist

## 2023-02-14 NOTE — PROGRESS NOTES
Patient said she was in the chair most the day and walked around patient abdomen is softer stool in the bag  Went for drain exchange today  Await cultures  Continue TPN  Continue antibiotics  Patient does not like clear liquids so we will try a milkshake

## 2023-02-14 NOTE — H&P (VIEW-ONLY)
Infectious Diseases Progress Note    Galdino Esteves MD     [facility]  Los: 18 days  Patient Identification:  Name: Hali Tejeda  Age: 62 y.o.  Sex: female  :  1960  MRN: [id number]         Primary Care Physician: Richard Delgado MD        Subjective: Sitting in the recliner out of bed but feels about the same.  Still has nausea but no vomiting.  Denies any much abdominal discomfort.  Interval History: Underwent placement of wound VAC on her abdominal wound/incision.  TPN is started.  CT scan of the abdomen and pelvis performed on 2022 showed interval decrease in the size of the fluid collection in the left paracolic gutter as well as an area in the left lower quadrant with ill-defined subjacent stranding and tiny foci of gas slightly more conspicuous compared to prior imaging studies with no drainable focal fluid collection or definitive extraluminal enteric contrast noted.  Objective:    Scheduled Meds:amLODIPine, 10 mg, Oral, Q24H  cefTRIAXone, 1 g, Intravenous, Q24H  famotidine, 20 mg, Intravenous, BID AC  Fat Emulsion Plant Based, 100 mL, Intravenous, Q24H (TPN)  fluconazole, 400 mg, Intravenous, Q24H  insulin regular, 0-7 Units, Subcutaneous, Q6H  metoclopramide, 10 mg, Intravenous, Q6H  metroNIDAZOLE, 500 mg, Intravenous, Q8H  sodium chloride, 10 mL, Intravenous, Q12H  sodium chloride, 10 mL, Intravenous, Q12H      Continuous Infusions:Adult Central 2-in-1 TPN, , Last Rate: 75 mL/hr at 23 1835  Pharmacy to Dose TPN,         Vital signs in last 24 hours:  Temp:  [98.3 °F (36.8 °C)-98.8 °F (37.1 °C)] 98.5 °F (36.9 °C)  Heart Rate:  [] 92  Resp:  [18] 18  BP: (126-150)/(75-90) 150/87    Intake/Output:    Intake/Output Summary (Last 24 hours) at 2023 0807  Last data filed at 2023 0717  Gross per 24 hour   Intake 100 ml   Output 2625 ml   Net -2525 ml       Exam:  /87 (BP Location: Left arm, Patient Position: Sitting)   Pulse 92   Temp 98.5 °F (36.9 °C)  "(Oral)   Resp 18   Ht 154.9 cm (60.98\")   Wt 76.8 kg (169 lb 5 oz)   SpO2 98%   BMI 32.01 kg/m²   Patient is examined using the personal protective equipment as per guidelines from infection control for this particular patient as enacted.  Hand washing was performed before and after patient interaction.  General Appearance: Nontoxic ill-appearing female who interacts.                          Head:    Normocephalic, without obvious abnormality, atraumatic                           Eyes:    PERRL, conjunctivae/corneas clear, EOM's intact, both eyes                         Throat:   Lips, tongue, gums normal; oral mucosa pink and moist                           Neck:   Supple, symmetrical, trachea midline, no JVD                         Lungs:    Clear to auscultation bilaterally, respirations unlabored                 Chest Wall:    No tenderness or deformity                          Heart:  S1-S2 regular                  Abdomen:   Ostomy present with midline incision has wound VAC in place with no surrounding cellulitis.  Drain in place.                 Extremities:   Extremities normal, atraumatic, no cyanosis or edema                        Pulses:   Pulses palpable in all extremities                            Skin:   Skin is warm and dry,  no rashes or palpable lesions                  Neurologic: Grossly nonfocal       Data Review:    I reviewed the patient's new clinical results.  Results from last 7 days   Lab Units 02/14/23  0238 02/13/23  0321 02/12/23  0558 02/11/23  0506 02/10/23  0917 02/09/23  0619 02/08/23  0405   WBC 10*3/mm3 25.66* 28.34* 26.57* 25.18* 20.95* 25.99* 18.59*   HEMOGLOBIN g/dL 9.2* 9.7* 8.5* 8.7* 8.7* 9.6* 9.7*   PLATELETS 10*3/mm3 500* 479* 416 411 370 337 281     Results from last 7 days   Lab Units 02/14/23  0238 02/13/23  0321 02/12/23  0817 02/12/23  0748 02/12/23  0558 02/11/23  0506 02/10/23  0356 02/09/23  0619 02/08/23  2132 02/08/23  0405   SODIUM mmol/L 134* 132* 135*  " --  126* 141 141 143  --  143   POTASSIUM mmol/L 4.0 4.0 3.7 3.7 7.5* 2.8* 2.7* 4.6   < > 3.5   CHLORIDE mmol/L 100 99  --   --  96* 106 108* 111*  --  116*   CO2 mmol/L 24.8 28.4  --   --  26.0 30.0* 26.0 22.9  --  21.0*   BUN mg/dL 8 7*  --   --  4* 3* 4* 5*  --  9   CREATININE mg/dL 0.37* 0.37*  --   --  0.41* 0.37* 0.39* 0.46*  --  0.62   CALCIUM mg/dL 8.2* 8.5*  --   --  8.4* 7.8* 8.1* 8.4*  --  8.4*   GLUCOSE mg/dL 150* 138*  --   --  763* 158* 94 101*  --  79    < > = values in this interval not displayed.     Microbiology Results (last 10 days)     Procedure Component Value - Date/Time    Clostridioides difficile Toxin - Stool, Per Stoma [[id number]]  (Normal) Collected: 02/10/23 1243    Lab Status: Final result Specimen: Stool from Per Stoma Updated: 02/10/23 1341    Narrative:      The following orders were created for panel order Clostridioides difficile Toxin - Stool, Per Stoma.  Procedure                               Abnormality         Status                     ---------                               -----------         ------                     Clostridioides difficile...[[id number]]  Normal              Final result                 Please view results for these tests on the individual orders.    Clostridioides difficile Toxin, PCR - Stool, Per Stoma [[id number]]  (Normal) Collected: 02/10/23 1243    Lab Status: Final result Specimen: Stool from Per Stoma Updated: 02/10/23 1341     C. Difficile Toxins by PCR Negative    Narrative:      The result indicates the absence of toxigenic C. difficile from stool specimen.     Body Fluid Culture - Drainage, Peritoneum [[id number]]  (Abnormal)  (Susceptibility) Collected: 02/09/23 9038    Lab Status: Final result Specimen: Drainage from Peritoneum Updated: 02/11/23 0874     Body Fluid Culture Scant growth (1+) Escherichia coli     Gram Stain Few (2+) WBCs per low power field      No organisms seen    Susceptibility      Escherichia coli      RENETTA      Amikacin  Susceptible      Ampicillin Resistant     Ampicillin + Sulbactam Resistant     Cefepime Susceptible      Ceftazidime Susceptible      Ceftriaxone Susceptible      Gentamicin Resistant     Levofloxacin Susceptible      Piperacillin + Tazobactam Resistant     Tobramycin Intermediate      Trimethoprim + Sulfamethoxazole Resistant                      Susceptibility Comments     Escherichia coli    Cefazolin sensitivity will not be reported for Enterobacteriaceae in non-urine isolates. If cefazolin is preferred, please call the microbiology lab to request an E-test.  With the exception of urinary-sourced infections, aminoglycosides should not be used as monotherapy.                     Assessment:    Colitis    Left sided colitis with complication (HCC)    Nausea and vomiting    Stricture of sigmoid colon (HCC)  1-intra-abdominal sepsis with intra-abdominal abscess in the setting of prolonged colitis, sigmoid stricture, endoscopic injury to colonic wall with microperforation and subsequent laparotomy partial colectomy and colostomy and intraoperative injury to the spleen with repair while being on antibiotic therapy-likely pathogen to consider in this situation would be enteric amberly along with selection of yeast due to prolonged antibiotic therapy-status post percutaneous drain placement on 2/9/2023  2-hypertension  3-malnourishment  4-possible postop abdominal wall/incisional infection/evolving abscess        Recommendations/Discussions:  · Continue with fluconazole ceftriaxone and Flagyl  · Follow-up on the repeat CT scan of the abdomen and pelvis with intervention plans per colorectal surgery service.  · Monitor closely for complications of antibiotics including C. difficile infection but low threshold to repeat colostomy content for C. difficile toxin assay as her last testing on 2/10/2023 was negative.  Galdino Esteves MD  2/14/2023  08:07 EST    Parts of this note may be an electronic transcription/translation of  spoken language to printed text using the Dragon dictation system.

## 2023-02-15 LAB
ALBUMIN SERPL-MCNC: 2.4 G/DL (ref 3.5–5.2)
ALBUMIN/GLOB SERPL: 0.7 G/DL
ALP SERPL-CCNC: 77 U/L (ref 39–117)
ALT SERPL W P-5'-P-CCNC: 11 U/L (ref 1–33)
ANION GAP SERPL CALCULATED.3IONS-SCNC: 3.3 MMOL/L (ref 5–15)
AST SERPL-CCNC: 9 U/L (ref 1–32)
BASOPHILS # BLD AUTO: 0.07 10*3/MM3 (ref 0–0.2)
BASOPHILS NFR BLD AUTO: 0.3 % (ref 0–1.5)
BILIRUB SERPL-MCNC: 0.4 MG/DL (ref 0–1.2)
BUN SERPL-MCNC: 13 MG/DL (ref 8–23)
BUN/CREAT SERPL: 31 (ref 7–25)
CALCIUM SPEC-SCNC: 8.3 MG/DL (ref 8.6–10.5)
CHLORIDE SERPL-SCNC: 104 MMOL/L (ref 98–107)
CO2 SERPL-SCNC: 24.7 MMOL/L (ref 22–29)
CREAT SERPL-MCNC: 0.42 MG/DL (ref 0.57–1)
DEPRECATED RDW RBC AUTO: 46 FL (ref 37–54)
EGFRCR SERPLBLD CKD-EPI 2021: 110.1 ML/MIN/1.73
EOSINOPHIL # BLD AUTO: 0.17 10*3/MM3 (ref 0–0.4)
EOSINOPHIL NFR BLD AUTO: 0.7 % (ref 0.3–6.2)
ERYTHROCYTE [DISTWIDTH] IN BLOOD BY AUTOMATED COUNT: 14.7 % (ref 12.3–15.4)
GLOBULIN UR ELPH-MCNC: 3.5 GM/DL
GLUCOSE BLDC GLUCOMTR-MCNC: 142 MG/DL (ref 70–130)
GLUCOSE BLDC GLUCOMTR-MCNC: 149 MG/DL (ref 70–130)
GLUCOSE BLDC GLUCOMTR-MCNC: 155 MG/DL (ref 70–130)
GLUCOSE BLDC GLUCOMTR-MCNC: 216 MG/DL (ref 70–130)
GLUCOSE SERPL-MCNC: 173 MG/DL (ref 65–99)
HCT VFR BLD AUTO: 27.2 % (ref 34–46.6)
HGB BLD-MCNC: 9 G/DL (ref 12–15.9)
IMM GRANULOCYTES # BLD AUTO: 1.16 10*3/MM3 (ref 0–0.05)
IMM GRANULOCYTES NFR BLD AUTO: 4.9 % (ref 0–0.5)
LYMPHOCYTES # BLD AUTO: 2.49 10*3/MM3 (ref 0.7–3.1)
LYMPHOCYTES NFR BLD AUTO: 10.5 % (ref 19.6–45.3)
MAGNESIUM SERPL-MCNC: 1.8 MG/DL (ref 1.6–2.4)
MCH RBC QN AUTO: 28.6 PG (ref 26.6–33)
MCHC RBC AUTO-ENTMCNC: 33.1 G/DL (ref 31.5–35.7)
MCV RBC AUTO: 86.3 FL (ref 79–97)
MONOCYTES # BLD AUTO: 2.2 10*3/MM3 (ref 0.1–0.9)
MONOCYTES NFR BLD AUTO: 9.3 % (ref 5–12)
NEUTROPHILS NFR BLD AUTO: 17.61 10*3/MM3 (ref 1.7–7)
NEUTROPHILS NFR BLD AUTO: 74.3 % (ref 42.7–76)
NRBC BLD AUTO-RTO: 0 /100 WBC (ref 0–0.2)
PHOSPHATE SERPL-MCNC: 2.7 MG/DL (ref 2.5–4.5)
PLATELET # BLD AUTO: 502 10*3/MM3 (ref 140–450)
PMV BLD AUTO: 9.5 FL (ref 6–12)
POTASSIUM SERPL-SCNC: 4.4 MMOL/L (ref 3.5–5.2)
PROT SERPL-MCNC: 5.9 G/DL (ref 6–8.5)
RBC # BLD AUTO: 3.15 10*6/MM3 (ref 3.77–5.28)
SODIUM SERPL-SCNC: 132 MMOL/L (ref 136–145)
WBC NRBC COR # BLD: 23.7 10*3/MM3 (ref 3.4–10.8)

## 2023-02-15 PROCEDURE — 63710000001 INSULIN REGULAR HUMAN PER 5 UNITS: Performed by: SURGERY

## 2023-02-15 PROCEDURE — 97110 THERAPEUTIC EXERCISES: CPT

## 2023-02-15 PROCEDURE — 99024 POSTOP FOLLOW-UP VISIT: CPT | Performed by: PHYSICIAN ASSISTANT

## 2023-02-15 PROCEDURE — 25010000002 POTASSIUM CHLORIDE PER 2 MEQ OF POTASSIUM: Performed by: COLON & RECTAL SURGERY

## 2023-02-15 PROCEDURE — 25010000002 MAGNESIUM SULFATE PER 500 MG OF MAGNESIUM: Performed by: COLON & RECTAL SURGERY

## 2023-02-15 PROCEDURE — 85025 COMPLETE CBC W/AUTO DIFF WBC: CPT | Performed by: HOSPITALIST

## 2023-02-15 PROCEDURE — 83735 ASSAY OF MAGNESIUM: CPT | Performed by: INTERNAL MEDICINE

## 2023-02-15 PROCEDURE — 25010000002 CALCIUM GLUCONATE PER 10 ML: Performed by: COLON & RECTAL SURGERY

## 2023-02-15 PROCEDURE — 82962 GLUCOSE BLOOD TEST: CPT

## 2023-02-15 PROCEDURE — 84100 ASSAY OF PHOSPHORUS: CPT | Performed by: COLON & RECTAL SURGERY

## 2023-02-15 PROCEDURE — 80053 COMPREHEN METABOLIC PANEL: CPT | Performed by: SURGERY

## 2023-02-15 PROCEDURE — 25010000002 METOCLOPRAMIDE PER 10 MG: Performed by: COLON & RECTAL SURGERY

## 2023-02-15 PROCEDURE — 25010000002 CEFTRIAXONE PER 250 MG: Performed by: SURGERY

## 2023-02-15 RX ADMIN — POTASSIUM CHLORIDE: 2 INJECTION, SOLUTION, CONCENTRATE INTRAVENOUS at 18:47

## 2023-02-15 RX ADMIN — METRONIDAZOLE 500 MG: 500 INJECTION, SOLUTION INTRAVENOUS at 22:42

## 2023-02-15 RX ADMIN — I.V. FAT EMULSION 20 G: 20 EMULSION INTRAVENOUS at 18:47

## 2023-02-15 RX ADMIN — METOCLOPRAMIDE 10 MG: 5 INJECTION, SOLUTION INTRAMUSCULAR; INTRAVENOUS at 01:07

## 2023-02-15 RX ADMIN — FAMOTIDINE 20 MG: 10 INJECTION INTRAVENOUS at 09:33

## 2023-02-15 RX ADMIN — Medication 10 ML: at 09:35

## 2023-02-15 RX ADMIN — Medication 10 ML: at 09:33

## 2023-02-15 RX ADMIN — Medication 10 ML: at 09:34

## 2023-02-15 RX ADMIN — Medication 10 ML: at 22:45

## 2023-02-15 RX ADMIN — AMLODIPINE BESYLATE 10 MG: 10 TABLET ORAL at 09:30

## 2023-02-15 RX ADMIN — INSULIN HUMAN 3 UNITS: 100 INJECTION, SOLUTION PARENTERAL at 12:28

## 2023-02-15 RX ADMIN — FAMOTIDINE 20 MG: 10 INJECTION INTRAVENOUS at 18:43

## 2023-02-15 RX ADMIN — METRONIDAZOLE 500 MG: 500 INJECTION, SOLUTION INTRAVENOUS at 05:46

## 2023-02-15 RX ADMIN — METOCLOPRAMIDE 10 MG: 5 INJECTION, SOLUTION INTRAMUSCULAR; INTRAVENOUS at 18:44

## 2023-02-15 RX ADMIN — METOCLOPRAMIDE 10 MG: 5 INJECTION, SOLUTION INTRAMUSCULAR; INTRAVENOUS at 05:45

## 2023-02-15 RX ADMIN — CEFTRIAXONE SODIUM 2 G: 2 INJECTION, POWDER, FOR SOLUTION INTRAMUSCULAR; INTRAVENOUS at 11:33

## 2023-02-15 RX ADMIN — METRONIDAZOLE 500 MG: 500 INJECTION, SOLUTION INTRAVENOUS at 15:27

## 2023-02-15 RX ADMIN — Medication 10 ML: at 22:44

## 2023-02-15 RX ADMIN — INSULIN HUMAN 2 UNITS: 100 INJECTION, SOLUTION PARENTERAL at 07:46

## 2023-02-15 RX ADMIN — METOCLOPRAMIDE 10 MG: 5 INJECTION, SOLUTION INTRAMUSCULAR; INTRAVENOUS at 12:28

## 2023-02-15 NOTE — PLAN OF CARE
Goal Outcome Evaluation:   Vital signs stable.  Patient went to Interventional Radiology this shift to reposition NEW drain in left lower quadrant.  From what RN can gather, pt also had fluid collected for lab tests.  Patient on room air throughout shift.  Did not have any oral intake, stating nausea. Walked in mg around nurses station with CNA after returning from procedure, spent several hours in chair.  On TPN, lipids, both bags changed this shift.  Discharge disposition : unknown at this time.

## 2023-02-15 NOTE — PLAN OF CARE
[facility] Progress Note (SOAP)


Subjective: 





Chills, anxious


Objective: 





03/11/18 12:52


 Last Vital Signs











Temp Pulse Resp BP Pulse Ox


 


 98.7 F   70   18   131/84    


 


 03/11/18 09:42  03/11/18 09:42  03/11/18 09:42  03/11/18 09:42   








 Laboratory Tests











  03/07/18 03/08/18 03/08/18





  19:45 11:30 11:30


 


WBC   6.9 


 


RBC   4.50 


 


Hgb   12.6 


 


Hct   37.8 


 


MCV   84.0 


 


MCH   28.0 


 


MCHC   33.3 


 


RDW   16.0 H 


 


Plt Count   300 


 


MPV   8.1 


 


Sodium    146 H


 


Potassium    3.8


 


Chloride    109 H


 


Carbon Dioxide    29


 


Anion Gap    8


 


BUN    12


 


Creatinine    0.8


 


Creat Clearance w eGFR    > 60


 


Random Glucose    93


 


Calcium    8.6


 


Total Bilirubin    0.6


 


AST    12 L


 


ALT    12  D


 


Alkaline Phosphatase    90


 


Total Protein    6.0 L


 


Albumin    2.9 L


 


Urine Color  Dkyellow  


 


Urine Appearance  Slcloudy  


 


Urine pH  5.0  


 


Ur Specific Gravity  1.019  


 


Urine Protein  Negative  


 


Urine Glucose (UA)  Negative  


 


Urine Ketones  Negative  


 


Urine Blood  1+ H  


 


Urine Nitrite  Negative  


 


Urine Bilirubin  Negative  


 


Urine Urobilinogen  Negative  


 


Ur Leukocyte Esterase  Negative  


 


Urine WBC (Auto)  <1  


 


Urine RBC (Auto)  13  


 


Ur Epithelial Cells  Rare  


 


Urine Mucus  Many  


 


RPR Titer   














  03/08/18 03/10/18





  11:30 08:10


 


WBC  


 


RBC  


 


Hgb  


 


Hct  


 


MCV  


 


MCH  


 


MCHC  


 


RDW  


 


Plt Count  


 


MPV  


 


Sodium  


 


Potassium  


 


Chloride  


 


Carbon Dioxide  


 


Anion Gap  


 


BUN  


 


Creatinine  


 


Creat Clearance w eGFR  


 


Random Glucose  


 


Calcium  


 


Total Bilirubin  


 


AST  


 


ALT  


 


Alkaline Phosphatase  


 


Total Protein  


 


Albumin  


 


Urine Color   Straw


 


Urine Appearance   Clear


 


Urine pH   7.0  D


 


Ur Specific Gravity   1.010


 


Urine Protein   Negative


 


Urine Glucose (UA)   Negative


 


Urine Ketones   Negative


 


Urine Blood   Negative


 


Urine Nitrite   Negative


 


Urine Bilirubin   Negative


 


Urine Urobilinogen   Negative


 


Ur Leukocyte Esterase   Negative


 


Urine WBC (Auto)  


 


Urine RBC (Auto)  


 


Ur Epithelial Cells  


 


Urine Mucus  


 


RPR Titer  Nonreactive 








Labs noted


Assessment: 





03/11/18 12:52


Withdrawal symptoms


Plan: 





Continue detox


Encouraged to drink more water for hydration Goal Outcome Evaluation:           Progress: no change  Outcome Evaluation: Pt having trouble sleeping this shift. Pigtail drain flushed, sm amount of serosagious out in bulb.  No change on wound vac or ostomy. Pt did drink a might shake at shift change and tolerated well. Pt had no c/o pain this shift. Little spit up occationally no emises. CTM, safety maintained.

## 2023-02-15 NOTE — PROGRESS NOTES
"Daily progress note    Primary care physician  Dr. Delgado    Chief complaint  Doing same with no new complaints     History of present illness  62-year-old female with history of hypertension presented to [facility] emergency room with left lower quadrant abdominal pain for last 1 month.  Patient also have occasional loose stools.  Patient denies any nausea vomiting.  Patient recently diagnosed with colitis and treated with oral antibiotics without any improvement.  Patient has noticed blood in the stools.  Patient evaluated in ER with CT abdominal pelvis which shows worsening colitis and failed outpatient treatment admit for management.  Patient has no fever chills chest pain shortness of breath with sweats weight loss or weight gain.     REVIEW OF SYSTEMS  Unremarkable except abdominal pain      PHYSICAL EXAM  Blood pressure 149/94, pulse 92, temperature 98.8 °F (37.1 °C), temperature source Oral, resp. rate 16, height 154.9 cm (61\"), weight 79.6 kg (175 lb 7.8 oz), SpO2 99 %.    GENERAL:  Awake and alert in no acute distress  HEENT:  Unremarkable  NECK:  Supple  CV: regular rhythm, normal rate  RESPIRATORY: normal effort, clear to auscultation bilaterally  ABDOMEN: soft, left lower quadrant tenderness colostomy in place  MUSCULOSKELETAL: no deformity  NEURO: alert, moves all extremities, follows commands  PSYCH:  calm, cooperative  SKIN: warm, dry     LAB RESULTS  Lab Results (last 24 hours)     Procedure Component Value Units Date/Time    POC Glucose Once [[id number]]  (Abnormal) Collected: 02/15/23 1118    Specimen: Blood Updated: 02/15/23 1124     Glucose 216 mg/dL      Comment: Meter: [id number] : [id number] Luis Manuel EUCEDA       Body Fluid Culture - Drainage, Peritoneum [[id number]] Collected: 02/14/23 1154    Specimen: Drainage from Peritoneum Updated: 02/15/23 1041     Body Fluid Culture No growth     Gram Stain Moderate (3+) WBCs per low power field      No organisms seen    POC Glucose Once " [[id number]]  (Abnormal) Collected: 02/15/23 0654    Specimen: Blood Updated: 02/15/23 0655     Glucose 155 mg/dL      Comment: Meter: [id number] : [id number] Han Kuo CNA       Comprehensive Metabolic Panel [[id number]]  (Abnormal) Collected: 02/15/23 0422    Specimen: Blood Updated: 02/15/23 0549     Glucose 173 mg/dL      BUN 13 mg/dL      Creatinine 0.42 mg/dL      Sodium 132 mmol/L      Potassium 4.4 mmol/L      Chloride 104 mmol/L      CO2 24.7 mmol/L      Calcium 8.3 mg/dL      Total Protein 5.9 g/dL      Albumin 2.4 g/dL      ALT (SGPT) 11 U/L      AST (SGOT) 9 U/L      Alkaline Phosphatase 77 U/L      Total Bilirubin 0.4 mg/dL      Globulin 3.5 gm/dL      A/G Ratio 0.7 g/dL      BUN/Creatinine Ratio 31.0     Anion Gap 3.3 mmol/L      eGFR 110.1 mL/min/1.73     Narrative:      GFR Normal >60  Chronic Kidney Disease <60  Kidney Failure <15      Phosphorus [[id number]]  (Normal) Collected: 02/15/23 0422    Specimen: Blood Updated: 02/15/23 0547     Phosphorus 2.7 mg/dL     Magnesium [[id number]]  (Normal) Collected: 02/15/23 0422    Specimen: Blood Updated: 02/15/23 0547     Magnesium 1.8 mg/dL     CBC & Differential [[id number]]  (Abnormal) Collected: 02/15/23 0422    Specimen: Blood Updated: 02/15/23 0531    Narrative:      The following orders were created for panel order CBC & Differential.  Procedure                               Abnormality         Status                     ---------                               -----------         ------                     CBC Auto Differential[[id number]]        Abnormal            Final result                 Please view results for these tests on the individual orders.    CBC Auto Differential [[id number]]  (Abnormal) Collected: 02/15/23 0422    Specimen: Blood Updated: 02/15/23 0531     WBC 23.70 10*3/mm3      RBC 3.15 10*6/mm3      Hemoglobin 9.0 g/dL      Hematocrit 27.2 %      MCV 86.3 fL      MCH 28.6 pg      MCHC 33.1 g/dL      RDW 14.7 %      RDW-SD 46.0  fl      MPV 9.5 fL      Platelets 502 10*3/mm3      Neutrophil % 74.3 %      Lymphocyte % 10.5 %      Monocyte % 9.3 %      Eosinophil % 0.7 %      Basophil % 0.3 %      Immature Grans % 4.9 %      Neutrophils, Absolute 17.61 10*3/mm3      Lymphocytes, Absolute 2.49 10*3/mm3      Monocytes, Absolute 2.20 10*3/mm3      Eosinophils, Absolute 0.17 10*3/mm3      Basophils, Absolute 0.07 10*3/mm3      Immature Grans, Absolute 1.16 10*3/mm3      nRBC 0.0 /100 WBC     POC Glucose Once [[id number]]  (Abnormal) Collected: 02/15/23 0053    Specimen: Blood Updated: 02/15/23 0054     Glucose 149 mg/dL      Comment: Meter: [id number] : [id number] Short Nikkeya CNA       POC Glucose Once [[id number]]  (Abnormal) Collected: 02/14/23 2122    Specimen: Blood Updated: 02/14/23 2123     Glucose 172 mg/dL      Comment: Meter: [id number] : [id number] Short Nikkeya CNA       POC Glucose Once [[id number]]  (Abnormal) Collected: 02/14/23 1623    Specimen: Blood Updated: 02/14/23 1625     Glucose 133 mg/dL      Comment: Meter: [id number] : [id number] Radha EUCEDA           Imaging Results (Last 24 Hours)     Procedure Component Value Units Date/Time    IR Abscess drain - simple [[id number]] Collected: 02/14/23 1819     Updated: 02/14/23 1915    Narrative:      FLUOROSCOPIC GUIDED DRAIN REPLACEMENT     HISTORY:  abscess.     COMPARISON: CT 02/13/2023.     PROCEDURE DETAILS: After informed consent was obtained from the patient,  the patient was placed on the angiography table in supine position. A  nurse was continuously present and moderate sedation was performed under  physician supervision from 1141 to 1207 hours with a total of 75 mcg  fentanyl and 1 mg versed administered. The left abdomen and indwelling  drainage catheter were prepped and draped in usual sterile fashion.  Dilute contrast injection demonstrated partially occluded indwelling  drain. The drain was exchanged out over wire for a short angled catheter  which  was directed into the inferior portion of the fluid collection  (representing the largest pocket on comparison CT). A new 10 [place]  drainage catheter was placed over wire so as to extend through mid and  inferior portions of the collection, locked, secured with Dermabond  reinforced suture and stay-fix dressing, and attached to bulb suction. A  specimen of dark red fluid was sent to the laboratory. Repeat  irrigation/re-aspiration was performed. The patient tolerated the  procedure well and there were no immediate complications. Fluoroscopy  time 5.9 minutes, 18 mGy, 4 exposures, 18 ml Visipaque 320.          Impression:      Successful left abdominal drain reposition and exchange as described  above.     This report was finalized on 2/14/2023 7:12 PM by Dr. Austin Garcia M.D.           Upper and lower endoscopy results noted and discussed with patient    Current Facility-Administered Medications:   •  acetaminophen (TYLENOL) tablet 650 mg, 650 mg, Oral, Q6H PRN, Adwoa Rogel PA-C  •  Adult Central 2-in-1 TPN, , Intravenous, Continuous, Lino Gaston MD, Last Rate: 75 mL/hr at 02/14/23 1805, New Bag at 02/14/23 1805  •  Adult Central 2-in-1 TPN, , Intravenous, Continuous, Lino Gaston MD  •  amLODIPine (NORVASC) tablet 10 mg, 10 mg, Oral, Q24H, Gideon Hope MD, 10 mg at 02/15/23 0930  •  cefTRIAXone (ROCEPHIN) 2 g in sodium chloride 0.9 % 100 mL IVPB-VTB, 2 g, Intravenous, Q24H, Beck Wei Jr., MD, Last Rate: 200 mL/hr at 02/15/23 1133, 2 g at 02/15/23 1133  •  dextrose (D50W) (25 g/50 mL) IV injection 25 g, 25 g, Intravenous, Q15 Min PRN, Beck Wei Jr., MD  •  dextrose (GLUTOSE) oral gel 15 g, 15 g, Oral, Q15 Min PRN, Beck Wei Jr., MD  •  famotidine (PEPCID) injection 20 mg, 20 mg, Intravenous, BID SIMONE, Lilibeth Rahman PA-C, 20 mg at 02/15/23 0933  •  Fat Emulsion Plant Based (INTRALIPID,LIPOSYN) 20 % infusion 20 g, 100 mL, Intravenous, Q24H (TPN), Lino Gaston MD, Last Rate:  8.33 mL/hr at 23 1806, 20 g at 23 1806  •  glucagon (GLUCAGEN) injection 1 mg, 1 mg, Intramuscular, Q15 Min PRN, Beck Wei Jr., MD  •  hydrALAZINE (APRESOLINE) injection 10 mg, 10 mg, Intravenous, Q4H PRN, Gideon Hope MD, 10 mg at 23 1454  •  insulin regular (humuLIN R,novoLIN R) injection 0-7 Units, 0-7 Units, Subcutaneous, Q6H, Beck Wei Jr., MD, 3 Units at 02/15/23 1228  •  metoclopramide (REGLAN) injection 10 mg, 10 mg, Intravenous, Q6H, Lino Gaston MD, 10 mg at 02/15/23 1228  •  metroNIDAZOLE (FLAGYL) IVPB 500 mg, 500 mg, Intravenous, Q8H, Galdino Esteves MD, 500 mg at 02/15/23 0546  •  [] HYDROmorphone (DILAUDID) injection 0.25 mg, 0.25 mg, Intravenous, Q2H PRN, 0.25 mg at 23 0015 **AND** naloxone (NARCAN) injection 0.4 mg, 0.4 mg, Intravenous, Q5 Min PRN, Lino Gaston MD  •  nitroglycerin (NITROSTAT) SL tablet 0.4 mg, 0.4 mg, Sublingual, Q5 Min PRN, Lino Gaston MD  •  ondansetron (ZOFRAN) injection 4 mg, 4 mg, Intravenous, Q6H PRN, Lino Gaston MD, 4 mg at 23 2327  •  Pharmacy to Dose TPN, , Does not apply, Continuous PRN, Lilibeth Rahman PA-C  •  potassium chloride 10 mEq in 100 mL IVPB, 10 mEq, Intravenous, Q1H PRN, Matthew Soliz MD, Last Rate: 100 mL/hr at 23, 10 mEq at 23  •  potassium phosphate 45 mmol in sodium chloride 0.9 % 500 mL infusion, 45 mmol, Intravenous, PRN **OR** potassium phosphate 30 mmol in sodium chloride 0.9 % 250 mL infusion, 30 mmol, Intravenous, PRN **OR** potassium phosphate 15 mmol in sodium chloride 0.9 % 100 mL infusion, 15 mmol, Intravenous, PRN, 15 mmol at 23 1440 **OR** sodium phosphates 40 mmol in sodium chloride 0.9 % 500 mL IVPB, 40 mmol, Intravenous, PRN **OR** sodium phosphates 20 mmol in sodium chloride 0.9 % 250 mL IVPB, 20 mmol, Intravenous, PRN, Matthew Soliz MD  •  sodium chloride 0.9 % flush 10 mL, 10 mL, Intravenous, Q12H, Lilibeth Rahman PA-C, 10 mL  at 02/15/23 0935  •  sodium chloride 0.9 % flush 10 mL, 10 mL, Intravenous, Q12H, Quick, Lilibeth A, PA-C, 10 mL at 02/15/23 0934  •  sodium chloride 0.9 % flush 10 mL, 10 mL, Intravenous, PRN, Quick, Lilibeth A, PA-C  •  sodium chloride 0.9 % flush 20 mL, 20 mL, Intravenous, PRN, Quick, Lilibeth A, PA-C  •  sodium chloride 0.9 % infusion 40 mL, 40 mL, Intravenous, PRN, Quick, Lilibeth A, PA-C     ASSESSMENT  Acute colitis with sigmoid stricture and microperforation s/p colon resection and colostomy  Abdominal fluid collection/abscess status post CT-guided drainage  Postop ileus  Acute kidney injury resolved  Hypertension   Gastroesophageal reflux disease    PLAN  CPM  Postop care  TPN  Continue IV antibiotics  Accu-Chek with low-dose sliding scale insulin  Infectious disease consult appreciated  Adjust blood pressure medications  Pain management  Continue home medications  Stress ulcer DVT prophylaxis  Supportive care  PT OT  Discussed with family nursing staff   Follow closely and further recommendation according to hospital course    SANG SINGLETARY MD    Copied text in this note has been reviewed and is accurate as of 02/15/23

## 2023-02-15 NOTE — PROGRESS NOTES
[facility] Services: TPN Daily Progress Note    TPN Day # 6   Indication: Prolonged Paralytic Ileus  Route: central  Type: standard    Subjective/Objective  Results from last 7 days   Lab Units 02/15/23  0422 23  0238 23  0321   SODIUM mmol/L 132*   < > 132*   POTASSIUM mmol/L 4.4   < > 4.0   CHLORIDE mmol/L 104   < > 99   CO2 mmol/L 24.7   < > 28.4   BUN mg/dL 13   < > 7*   CREATININE mg/dL 0.42*   < > 0.37*   CALCIUM mg/dL 8.3*   < > 8.5*   ALBUMIN g/dL 2.4*   < > 2.4*   BILIRUBIN mg/dL 0.4   < > 0.5   ALK PHOS U/L 77   < > 65   ALT (SGPT) U/L 11   < > 9   AST (SGOT) U/L 9   < > 13   GLUCOSE mg/dL 173*   < > 138*   MAGNESIUM mg/dL 1.8   < > 1.9   PHOSPHORUS mg/dL 2.7   < > 2.6   TRIGLYCERIDES mg/dL  --   --  126    < > = values in this interval not displayed.        Diet Orders (active) (From admission, onward)       Start     Ordered    23 0831  NPO Diet NPO Type: Sips with Meds  Diet Effective Now         23 0830                  Additional insulin administration while previous TPN infusin units  Additional electrolyte administration while previous TPN infusing: none  Acid suppression: pepcid IV    Current TPN Formula: 100gm/1200KCal/100mL AA/Dex/Lipids    Assessment/Plan    Macronutrients: increase to goal today  Electrolytes/Additives: repeat  MVI for TPN   Labs: CMP, Mg, Phos  Comments: labs reviewed will increase macro's to goal today, repeat electrolytes and review labs again in am.     Nyla Neville, PharmD  Clinical Pharmacist

## 2023-02-15 NOTE — PROGRESS NOTES
[facility]     Progress Note    Patient Name: Hali Tejeda  : 1960  MRN: [id number]  Primary Care Physician:  Richard Delgado MD  Date of admission: 2023    Subjective   Subjective     Chief Complaint: racheal    History of Present Illness  Patient with racheal and hyponatremia    Review of Systems    Objective   Objective     Vitals:   Temp:  [98.1 °F (36.7 °C)-99.1 °F (37.3 °C)] 98.6 °F (37 °C)  Heart Rate:  [85-97] 90  Resp:  [15-20] 16  BP: (120-142)/(70-91) 139/82    Physical Exam   General Appearance:  In no acute distress.    HEENT: Normal HEENT exam.     Extremities: .  There is no deformity, effusion or dependent edema.    Neurological: Patient is alert    Result Review    Result Review:  I have personally reviewed the results from the time of this admission to 2/15/2023 12:14 EST and agree with these findings:  []  Laboratory list / accordion  []  Microbiology  []  Radiology  []  EKG/Telemetry   []  Cardiology/Vascular   []  Pathology  []  Old records  []  Other:      Assessment & Plan   Assessment / Plan     Brief Patient Summary:  Hali Tejeda is a 62 y.o. female who has racheal and hyponatremia    Active Hospital Problems:  Active Hospital Problems    Diagnosis    • **Colitis    • Nausea and vomiting    • Stricture of sigmoid colon (HCC)    • Left sided colitis with complication (HCC)      Plan:   acute renal failure, creatinine peaked at 2.3 and is below baseline today,   Hypokalemia   Metabolic alkalosis   Hypoalbuminemia   Hypophosphatemia     Colitis status post sigmoid colon resection    Nausea and vomiting,  Postop ileus, remains n.p.o.  Abdominal fluid collection status post drain placement   Stricture of sigmoid colon, status post colostomy  Hypokalemia, worse today  Hypertension, receiving as needed IV hydralazine  Anemia    Patient seen and examined. Labs and chart reviewed. Renal function stable. Sodium dropping with current TPN. Will follow    Dionne Rojo MD

## 2023-02-15 NOTE — THERAPY TREATMENT NOTE
Patient Name: Hali Tejeda  : 1960    MRN: [id number]                              Today's Date: 2/15/2023       Admit Date: 2023    Visit Dx:     ICD-10-CM ICD-9-CM   1. Colitis  K52.9 558.9   2. Nausea and vomiting, unspecified vomiting type  R11.2 787.01   3. Stricture of sigmoid colon (HCC)  K56.699 560.9     Patient Active Problem List   Diagnosis   • Left sided colitis with complication (HCC)   • Colitis   • Nausea and vomiting   • Stricture of sigmoid colon (HCC)   • Uterine anomaly   • Hypertension   • Avulsion fracture of distal fibula     Past Medical History:   Diagnosis Date   • Abnormal EKG 2020   • Avulsion fracture of distal fibula 2015   • H. pylori infection 2020   • Hepatic steatosis 2020   • Hiatal hernia    • HTN (hypertension)    • Hyperlipidemia    • Insomnia    • Left sided colitis with complication (HCC) 2023    ADMITTED TO [facility]   • LGSIL on Pap smear of cervix     (+) HPV   • LGSIL Pap smear of vagina 2016   • Snoring    • Stricture of sigmoid colon (HCC) 2023   • Uterine fibroid    • Vaginal atrophy      Past Surgical History:   Procedure Laterality Date   • BREAST LUMPECTOMY Left     benign   •  SECTION N/A    • COLON RESECTION N/A 2/3/2023    Procedure: COLON RESECTION LAPAROSCOPIC CONVERTED TO OPEN SIGMOID AND LEFT MOBILIZATION OF SPLENIC FLEXURE WITH DAVINCI ROBOT;  Surgeon: Lino Gaston MD;  Location: [facility];  Service: Robotics - DaVinci;  Laterality: N/A;   • COLONOSCOPY N/A 2014    COULD ONLY GET SCOPE TO 70 CM DUE TO SIGNIFICANT STRICTURE SECONDARY TO SEVERE DIVERTICULITIS OR CANCER, ACBE ORDERED, DR. PARAG HUDSON AT [place]   • COLONOSCOPY N/A 2023    MODERATE STENOSIS IN SIGMOID-DILATED, MANY DIVERTICULA IN SIGMOID AND DESCENDING, COPIOUS AMTS OF STOOL, MUCOSAL TEAR 55 CM FROM ANAL VERGE, DR. JENNI SMITH AT [facility]   • COLPOSCOPY N/A 2016   • ENDOSCOPY N/A 2023    GASTRITIS, SMALL  HIATAL HERNIA, DR. JENNI SMITH AT [facility]   • ENDOSCOPY N/A 09/15/2009    DR. PARAG HUDSON AT [place]   • GASTRIC SLEEVE LAPAROSCOPIC N/A 07/09/2020    WITH REMOVAL OF GASTRIC BAND, DR. OLIMPIA PALACIOS AT [facility]   • HYSTERECTOMY N/A 01/2005    WITH RSO   • LAPAROSCOPIC GASTRIC BANDING N/A 10/20/2019    DR. PARAG HUDSON AT [place]   • SALPINGO OOPHORECTOMY Left     LSO, IN TEEN YEARS   • WISDOM TOOTH EXTRACTION Bilateral       General Information     Row Name 02/15/23 1526          Physical Therapy Time and Intention    Document Type therapy note (daily note)  -     Mode of Treatment individual therapy;physical therapy  -     Row Name 02/15/23 1526          General Information    Patient Profile Reviewed yes  -     Existing Precautions/Restrictions fall  multiple lines, tubes, drains, wound vac  -     Row Name 02/15/23 1526          Cognition    Orientation Status (Cognition) oriented x 4  -     Row Name 02/15/23 1526          Safety Issues, Functional Mobility    Impairments Affecting Function (Mobility) balance;endurance/activity tolerance;pain;strength  -     Comment, Safety Issues/Impairments (Mobility) needs extra time, rest breaks  -           User Key  (r) = Recorded By, (t) = Taken By, (c) = Cosigned By    Initials Name Provider Type     Mila Tam PTA Physical Therapist Assistant               Mobility     Row Name 02/15/23 1527          Bed Mobility    Supine-Sit Bern (Bed Mobility) minimum assist (75% patient effort);verbal cues  -     Sit-Supine Bern (Bed Mobility) minimum assist (75% patient effort);verbal cues;nonverbal cues (demo/gesture)  -     Assistive Device (Bed Mobility) bed rails  -     Row Name 02/15/23 1527          Sit-Stand Transfer    Sit-Stand Bern (Transfers) minimum assist (75% patient effort)  -     Assistive Device (Sit-Stand Transfers) walker, front-wheeled  -     Comment, (Sit-Stand Transfer) bed to bsc , then amb  -     Rosi  Name 02/15/23 1527          Gait/Stairs (Locomotion)    [place] Level (Gait) minimum assist (75% patient effort);verbal cues;nonverbal cues (demo/gesture)  -     Assistive Device (Gait) walker, front-wheeled  -     Distance in Feet (Gait) 120ft, last 50ft pt incr shuffling c/o feeling weak, some assist to guide rwx around furniture  -     Deviations/Abnormal Patterns (Gait) jasmin decreased;festinating/shuffling;antalgic;stride length decreased  -     Bilateral Gait Deviations forward flexed posture  -           User Key  (r) = Recorded By, (t) = Taken By, (c) = Cosigned By    Initials Name Provider Type    Mila Diallo, VEL Physical Therapist Assistant               Obj/Interventions    No documentation.                Goals/Plan    No documentation.                Clinical Impression     [facility] Name 02/15/23 1529          Pain    Posttreatment Pain Rating 8/10  -     Pain Location incisional  -     Pain Location - abdomen  -     Pain Intervention(s) Repositioned;Rest  -     Row Name 02/15/23 1529          Plan of Care Review    Plan of Care Reviewed With patient  -     Progress improving  -     Outcome Evaluation Pt incr amb distance from last session, but pt w/incr shuffling , difficulty completing last 40ft, pt req more assist for bed mob and tfers due to incr wkness and pain this session, will need SNU/Acute RHB possibly at [place] , pending progress  -[facility] Name 02/15/23 1529          Therapy Assessment/Plan (PT)    Rehab Potential (PT) good, to achieve stated therapy goals  -     Criteria for Skilled Interventions Met (PT) yes  -     Row Name 02/15/23 1529          Vital Signs    O2 Delivery Pre Treatment room air  -[facility] Name 02/15/23 1529          Positioning and Restraints    Pre-Treatment Position in bed  -     Post Treatment Position bed  -     In Bed fowlers;call light within reach;encouraged to call for assist;exit alarm on;notified nsg;side rails up x3  -            User Key  (r) = Recorded By, (t) = Taken By, (c) = Cosigned By    Initials Name Provider Type    Mila Diallo PTA Physical Therapist Assistant               Outcome Measures     Row Name 02/15/23 1535          How much help from another person do you currently need...    Turning from your back to your side while in flat bed without using bedrails? 3  -JM     Moving from lying on back to sitting on the side of a flat bed without bedrails? 3  -JM     Moving to and from a bed to a chair (including a wheelchair)? 3  -JM     Standing up from a chair using your arms (e.g., wheelchair, bedside chair)? 2  -JM     Climbing 3-5 steps with a railing? 1  -JM     To walk in hospital room? 3  -JM     AM-PAC 6 Clicks Score (PT) 15  -JM     Highest level of mobility 4 --> Transferred to chair/commode  -           User Key  (r) = Recorded By, (t) = Taken By, (c) = Cosigned By    Initials Name Provider Type    Mila Diallo PTA Physical Therapist Assistant                             Physical Therapy Education     Title: PT OT SLP Therapies (Done)     Topic: Physical Therapy (Done)     Point: Mobility training (Done)     Learning Progress Summary           Patient Acceptance, E,TB,D, VU,NR by SUSY at 2/15/2023 1536    Eager, E,TB,D, VU by SUSY at 2/13/2023 1610    Acceptance, E,TB,D, VU,NR by  at 2/11/2023 1702    Acceptance, E,TB, VU,NR by  at 2/5/2023 0935    Acceptance, E,TB, VU,NR by  at 2/4/2023 0911    Acceptance, E, VU by  at 1/30/2023 1441                   Point: Home exercise program (Done)     Learning Progress Summary           Patient Acceptance, E,TB,D, VU,NR by SUSY at 2/15/2023 1536    Eager, E,TB,D, VU by SUSY at 2/13/2023 1610    Acceptance, E,TB,D, VU,NR by  at 2/11/2023 1702    Acceptance, E,TB, VU,NR by  at 2/5/2023 0935    Acceptance, E,TB, VU,NR by  at 2/4/2023 0911                   Point: Body mechanics (Done)     Learning Progress Summary           Patient Acceptance, E,TB,D,  VU,NR by  at 2/15/2023 1536    Eager, E,TB,D, VU by  at 2/13/2023 1610    Acceptance, E,TB,D, VU,NR by  at 2/11/2023 1702    Acceptance, E,TB, VU,NR by  at 2/5/2023 0935    Acceptance, E,TB, VU,NR by  at 2/4/2023 0911                   Point: Precautions (Done)     Learning Progress Summary           Patient Acceptance, E,TB,D, VU,NR by  at 2/15/2023 1536    Eager, E,TB,D, VU by  at 2/13/2023 1610    Acceptance, E,TB,D, VU,NR by  at 2/11/2023 1702    Acceptance, E,TB, VU,NR by  at 2/5/2023 0935    Acceptance, E,TB, VU,NR by  at 2/4/2023 0911                               User Key     Initials Effective Dates Name Provider Type Discipline     03/07/18 -  Mila Tam PTA Physical Therapist Assistant PT     06/16/21 -  Gian Guillen, PT Physical Therapist PT     12/13/22 -  Sunni Wolfe, PT Physical Therapist PT     04/08/22 -  Lilibeth Herrera, PT Physical Therapist PT              PT Recommendation and Plan     Plan of Care Reviewed With: patient  Progress: improving  Outcome Evaluation: Pt incr amb distance from last session, but pt w/incr shuffling , difficulty completing last 40ft, pt req more assist for bed mob and tfers due to incr wkness and pain this session, will need SNU/Acute RHB possibly at [place] , pending progress     Time Calculation:    PT Charges     Row Name 02/15/23 1537             Time Calculation    Start Time 1141  -      Stop Time 1220  -      Time Calculation (min) 39 min  -      PT Received On 02/15/23  -      PT - Next Appointment 02/16/23  -            User Key  (r) = Recorded By, (t) = Taken By, (c) = Cosigned By    Initials Name Provider Type    Mila Diallo PTA Physical Therapist Assistant              Therapy Charges for Today     Code Description Service Date Service Provider Modifiers Qty    [id number] HC PT THER PROC EA 15 MIN 2/15/2023 Mila Tam PTA GP 3          PT G-Codes  Outcome Measure Options: AM-PAC 6 Clicks  Basic Mobility (PT)  AM-PAC 6 Clicks Score (PT): 15  PT Discharge Summary  Anticipated Discharge Disposition (PT): skilled nursing facility, inpatient rehabilitation facility (pt wants home but having incr wkness)    iMla Tam, PTA  2/15/2023

## 2023-02-15 NOTE — PROGRESS NOTES
"  Infectious Diseases Progress Note    Galdino Esteves MD     [facility]  [place]: 19 days  Patient Identification:  Name: Hali Tejeda  Age: 62 y.o.  Sex: female  :  1960  MRN: [id number]         Primary Care Physician: Richard Delgado MD        Subjective: Feeling somewhat better with decreased discomfort.  Nausea is better able to tolerate oral intake.  Interval History: Underwent placement of wound VAC on her abdominal wound/incision.  TPN is started.  CT scan of the abdomen and pelvis performed on 2022 showed interval decrease in the size of the fluid collection in the left paracolic gutter as well as an area in the left lower quadrant with ill-defined subjacent stranding and tiny foci of gas slightly more conspicuous compared to prior imaging studies with no drainable focal fluid collection or definitive extraluminal enteric contrast noted.  Objective:    Scheduled Meds:amLODIPine, 10 mg, Oral, Q24H  cefTRIAXone, 2 g, Intravenous, Q24H  famotidine, 20 mg, Intravenous, BID AC  Fat Emulsion Plant Based, 100 mL, Intravenous, Q24H (TPN)  insulin regular, 0-7 Units, Subcutaneous, Q6H  metoclopramide, 10 mg, Intravenous, Q6H  metroNIDAZOLE, 500 mg, Intravenous, Q8H  sodium chloride, 10 mL, Intravenous, Q12H  sodium chloride, 10 mL, Intravenous, Q12H      Continuous Infusions:Adult Central 2-in-1 TPN, , Last Rate: 75 mL/hr at 23 1805  Pharmacy to Dose TPN,         Vital signs in last 24 hours:  Temp:  [98.1 °F (36.7 °C)-99.1 °F (37.3 °C)] 98.6 °F (37 °C)  Heart Rate:  [80-97] 90  Resp:  [12-20] 16  BP: (120-144)/(70-91) 139/82    Intake/Output:    Intake/Output Summary (Last 24 hours) at 2/15/2023 1005  Last data filed at 2/15/2023 0620  Gross per 24 hour   Intake 118 ml   Output 2020 ml   Net -1902 ml       Exam:  /82 (BP Location: Right arm, Patient Position: Sitting)   Pulse 90   Temp 98.6 °F (37 °C) (Oral)   Resp 16   Ht 154.9 cm (61\")   Wt 79.6 kg (175 lb 7.8 oz)   SpO2 " 97%   BMI 33.16 kg/m²   Patient is examined using the personal protective equipment as per guidelines from infection control for this particular patient as enacted.  Hand washing was performed before and after patient interaction.  General Appearance: Nontoxic ill-appearing female who interacts.                          Head:    Normocephalic, without obvious abnormality, atraumatic                           Eyes:    PERRL, conjunctivae/corneas clear, EOM's intact, both eyes                         Throat:   Lips, tongue, gums normal; oral mucosa pink and moist                           Neck:   Supple, symmetrical, trachea midline, no JVD                         Lungs:    Clear to auscultation bilaterally, respirations unlabored                 Chest Wall:    No tenderness or deformity                          Heart:  S1-S2 regular                  Abdomen:   Ostomy present with midline incision has wound VAC in place with no surrounding cellulitis.  Drain in place.                 Extremities:   Extremities normal, atraumatic, no cyanosis or edema                        Pulses:   Pulses palpable in all extremities                            Skin:   Skin is warm and dry,  no rashes or palpable lesions                  Neurologic: Grossly nonfocal       Data Review:    I reviewed the patient's new clinical results.  Results from last 7 days   Lab Units 02/15/23  0422 02/14/23  0238 02/13/23  0321 02/12/23  0558 02/11/23  0506 02/10/23  0917 02/09/23  0619   WBC 10*3/mm3 23.70* 25.66* 28.34* 26.57* 25.18* 20.95* 25.99*   HEMOGLOBIN g/dL 9.0* 9.2* 9.7* 8.5* 8.7* 8.7* 9.6*   PLATELETS 10*3/mm3 502* 500* 479* 416 411 370 337     Results from last 7 days   Lab Units 02/15/23  0422 02/14/23  0238 02/13/23  0321 02/12/23  0817 02/12/23  0748 02/12/23  0558 02/11/23  0506 02/10/23  0356 02/09/23  0619   SODIUM mmol/L 132* 134* 132* 135*  --  126* 141 141 143   POTASSIUM mmol/L 4.4 4.0 4.0 3.7 3.7 7.5* 2.8* 2.7* 4.6    CHLORIDE mmol/L 104 100 99  --   --  96* 106 108* 111*   CO2 mmol/L 24.7 24.8 28.4  --   --  26.0 30.0* 26.0 22.9   BUN mg/dL 13 8 7*  --   --  4* 3* 4* 5*   CREATININE mg/dL 0.42* 0.37* 0.37*  --   --  0.41* 0.37* 0.39* 0.46*   CALCIUM mg/dL 8.3* 8.2* 8.5*  --   --  8.4* 7.8* 8.1* 8.4*   GLUCOSE mg/dL 173* 150* 138*  --   --  763* 158* 94 101*     Microbiology Results (last 10 days)     Procedure Component Value - Date/Time    Body Fluid Culture - Drainage, Peritoneum [[id number]] Collected: 02/14/23 1154    Lab Status: Preliminary result Specimen: Drainage from Peritoneum Updated: 02/14/23 1706     Gram Stain Moderate (3+) WBCs per low power field      No organisms seen    Clostridioides difficile Toxin - Stool, Per Stoma [[id number]]  (Normal) Collected: 02/10/23 1243    Lab Status: Final result Specimen: Stool from Per Stoma Updated: 02/10/23 1341    Narrative:      The following orders were created for panel order Clostridioides difficile Toxin - Stool, Per Stoma.  Procedure                               Abnormality         Status                     ---------                               -----------         ------                     Clostridioides difficile...[[id number]]  Normal              Final result                 Please view results for these tests on the individual orders.    Clostridioides difficile Toxin, PCR - Stool, Per Stoma [[id number]]  (Normal) Collected: 02/10/23 1243    Lab Status: Final result Specimen: Stool from Per Stoma Updated: 02/10/23 1341     C. Difficile Toxins by PCR Negative    Narrative:      The result indicates the absence of toxigenic C. difficile from stool specimen.     Body Fluid Culture - Drainage, Peritoneum [[id number]]  (Abnormal)  (Susceptibility) Collected: 02/09/23 1458    Lab Status: Final result Specimen: Drainage from Peritoneum Updated: 02/11/23 0851     Body Fluid Culture Scant growth (1+) Escherichia coli     Gram Stain Few (2+) WBCs per low power field      No  organisms seen    Susceptibility      Escherichia coli      RENETTA      Amikacin Susceptible      Ampicillin Resistant     Ampicillin + Sulbactam Resistant     Cefepime Susceptible      Ceftazidime Susceptible      Ceftriaxone Susceptible      Gentamicin Resistant     Levofloxacin Susceptible      Piperacillin + Tazobactam Resistant     Tobramycin Intermediate      Trimethoprim + Sulfamethoxazole Resistant                      Susceptibility Comments     Escherichia coli    Cefazolin sensitivity will not be reported for Enterobacteriaceae in non-urine isolates. If cefazolin is preferred, please call the microbiology lab to request an E-test.  With the exception of urinary-sourced infections, aminoglycosides should not be used as monotherapy.                     Assessment:    Colitis    Left sided colitis with complication (HCC)    Nausea and vomiting    Stricture of sigmoid colon (HCC)  1-intra-abdominal sepsis with intra-abdominal abscess in the setting of prolonged colitis, sigmoid stricture, endoscopic injury to colonic wall with microperforation and subsequent laparotomy partial colectomy and colostomy and intraoperative injury to the spleen with repair while being on antibiotic therapy-likely pathogen to consider in this situation would be enteric amberly along with selection of yeast due to prolonged antibiotic therapy-status post percutaneous drain placement on 2/9/2023  2-hypertension  3-malnourishment  4-possible postop abdominal wall/incisional infection/evolving abscess        Recommendations/Discussions:  · Continue with fluconazole ceftriaxone and Flagyl  · Follow-up on the repeat CT scan of the abdomen and pelvis with intervention plans per colorectal surgery service.  · Monitor closely for complications of antibiotics including C. difficile infection but low threshold to repeat colostomy content for C. difficile toxin assay as her last testing on 2/10/2023 was negative.  Galdino Esteves MD  2/15/2023  10:05  EST    Parts of this note may be an electronic transcription/translation of spoken language to printed text using the Dragon dictation system.

## 2023-02-15 NOTE — POST-PROCEDURE NOTE
POST PROCEDURE NOTE    Procedure: drain reposition    Pre-Procedure Diagnosis: fluid collection    Post-procedure Diagnosis: same    Findings: successful repositioning & exchange of drain, see report    Complications: none    Blood loss: min    Specimen Removed: mult.20G cores    Disposition:   Transfer back to inpatient room

## 2023-02-15 NOTE — NURSING NOTE
"CWOCN- follow up for wound vac change. Edges of wound bed are beefy, granulating. There is creamy yellow tissue in the wound base. Ostomy appliance intact- will change Thurs or Fri. Next vac change Friday. Encouraged patient to increase protein when diet advances per MD.     02/15/23 1102   Wound 02/03/23 1405 abdomen Incision   Placement Date/Time: 02/03/23 1405   Location: abdomen  Primary Wound Type: Incision   Dressing Appearance intact   Base granulating;moist;slough   Periwound intact   Periwound Temperature warm   Periwound Skin Turgor soft   Edges open   Drainage Characteristics/Odor creamy;serosanguineous   Drainage Amount moderate   Care, Wound cleansed with;sterile normal saline;negative pressure wound therapy   Dressing Care dressing changed   Periwound Care barrier film applied;dry periwound area maintained   Wound Output (mL)   (canister changed- 300 total in canister)   NPWT (Negative Pressure Wound Therapy) 02/10/23 abdomen   Placement Date: 02/10/23   Location: abdomen   Therapy Setting continuous therapy   Dressing foam, black;transparent dressing   Pressure Setting 125 mmHg   Sponges Inserted 1   Sponges Removed 1   Finger sweep complete Yes   Colostomy LLQ   Placement date: If unknown, DO NOT use \"Add Comment\" note/Placement time: If unknown, DO NOT use \"Add Comment\" note: 02/03/23 1944   Inserted by: DR. GRANADO  Hand Hygiene Completed: Yes  Location: LLQ   Stomal Appliance 1 piece;Intact;Drainable   Stool Color brown   Stool Consistency loose   Treatment Placement checked     "

## 2023-02-15 NOTE — PROGRESS NOTES
Progress Note    Pod 12    S: Pt sitting up in chair. Pt drank shake yesterday and tolerated it. No nausea or vomiting.     O:  Temp:  [97.8 °F (36.6 °C)-99.1 °F (37.3 °C)] 98.6 °F (37 °C)  Heart Rate:  [80-97] 90  Resp:  [12-20] 16  BP: (120-144)/(70-91) 139/82      Intake/Output Summary (Last 24 hours) at 2/15/2023 0847  Last data filed at 2/15/2023 0620  Gross per 24 hour   Intake 118 ml   Output 2020 ml   Net -1902 ml       Abd: soft, non-distended  Wound-vac in place over midline abdominal incision. All other incisions C/D/I.  NEW with serosanguinous drainage.   Colostomy: PPP with loose stool in bag.     Results from last 7 days   Lab Units 02/15/23  0422   WBC 10*3/mm3 23.70*   HEMOGLOBIN g/dL 9.0*   HEMATOCRIT % 27.2*   PLATELETS 10*3/mm3 502*     Results from last 7 days   Lab Units 02/15/23  0422   SODIUM mmol/L 132*   POTASSIUM mmol/L 4.4   CHLORIDE mmol/L 104   CO2 mmol/L 24.7   BUN mg/dL 13   CREATININE mg/dL 0.42*   EGFR mL/min/1.73 110.1   GLUCOSE mg/dL 173*   CALCIUM mg/dL 8.3*   PHOSPHORUS mg/dL 2.7       Results from last 7 days   Lab Units 02/15/23  0422   MAGNESIUM mg/dL 1.8         A/P: 62 y.o. female s/p robotic assisted laparoscopic left and sigmoid resection with colostomy converted open due to splenic injury 02/03/2023    - S/P LLQ IR Drain placement 02/09/2023. Pt treated empirically with Zosyn. Cultures with scant growth of E. Coli with resistance to Zosyn. ID following. ABx switched to Rocephin and Flagyl. Drain repositioned yesterday (02/14/2023). Repeat fluid cultures pending. Gram stain negative so far. Leukocytosis trending down.   - Okay to have another shake this morning. If she tolerates it, will start on full liquid diet. Continue TPN and Reglan.   - Continue NEW drain due to high output.   - Continue wound-vac to midline abdominal wound.   - Pt agreeable to ambulate at least 3x today.

## 2023-02-15 NOTE — PLAN OF CARE
Goal Outcome Evaluation:  Plan of Care Reviewed With: patient        Progress: improving  Outcome Evaluation: Pt incr amb distance from last session, but pt w/incr shuffling , difficulty completing last 40ft, pt req more assist for bed mob and tfers due to incr wkness and pain this session, will need SNU/Acute RHB possibly at [place] , pending progress    Patient was intermittently wearing a face mask during this therapy encounter. Therapist used appropriate personal protective equipment including eye protection, mask, and gloves.  Mask used was standard procedure mask. Appropriate PPE was worn during the entire therapy session. Hand hygiene was completed before and after therapy session. Patient is not in enhanced droplet precautions.

## 2023-02-15 NOTE — PROGRESS NOTES
Patient is improving.  She tolerated a milkshake.  She said the chocolate was a little too heavy for her.  We will try a tray with cream soups.  And advance her diet as tolerated.  On physical exam the abdomen is soft ostomy is pink with stool in the bag wound VAC is in place.  I discussed with patient that I will be out of town for a few days and that the general surgery group will be rounding on her for me.  I would anticipate that  she would be able to discharge hopefully by Monday.  We will have her follow-up in my office in 2 to 3 weeks from discharge.

## 2023-02-16 LAB
ALBUMIN SERPL-MCNC: 2.2 G/DL (ref 3.5–5.2)
ALBUMIN/GLOB SERPL: 0.5 G/DL
ALP SERPL-CCNC: 66 U/L (ref 39–117)
ALT SERPL W P-5'-P-CCNC: 9 U/L (ref 1–33)
ANION GAP SERPL CALCULATED.3IONS-SCNC: 9 MMOL/L (ref 5–15)
AST SERPL-CCNC: 11 U/L (ref 1–32)
BASOPHILS # BLD AUTO: 0.06 10*3/MM3 (ref 0–0.2)
BASOPHILS NFR BLD AUTO: 0.3 % (ref 0–1.5)
BILIRUB SERPL-MCNC: 0.3 MG/DL (ref 0–1.2)
BUN SERPL-MCNC: 11 MG/DL (ref 8–23)
BUN/CREAT SERPL: 22.9 (ref 7–25)
CALCIUM SPEC-SCNC: 8.1 MG/DL (ref 8.6–10.5)
CHLORIDE SERPL-SCNC: 101 MMOL/L (ref 98–107)
CO2 SERPL-SCNC: 23 MMOL/L (ref 22–29)
CREAT SERPL-MCNC: 0.48 MG/DL (ref 0.57–1)
DEPRECATED RDW RBC AUTO: 44.7 FL (ref 37–54)
EGFRCR SERPLBLD CKD-EPI 2021: 106.6 ML/MIN/1.73
EOSINOPHIL # BLD AUTO: 0.16 10*3/MM3 (ref 0–0.4)
EOSINOPHIL NFR BLD AUTO: 0.8 % (ref 0.3–6.2)
ERYTHROCYTE [DISTWIDTH] IN BLOOD BY AUTOMATED COUNT: 14.4 % (ref 12.3–15.4)
GLOBULIN UR ELPH-MCNC: 4.1 GM/DL
GLUCOSE BLDC GLUCOMTR-MCNC: 171 MG/DL (ref 70–130)
GLUCOSE BLDC GLUCOMTR-MCNC: 173 MG/DL (ref 70–130)
GLUCOSE BLDC GLUCOMTR-MCNC: 191 MG/DL (ref 70–130)
GLUCOSE BLDC GLUCOMTR-MCNC: 208 MG/DL (ref 70–130)
GLUCOSE SERPL-MCNC: 184 MG/DL (ref 65–99)
HCT VFR BLD AUTO: 28.8 % (ref 34–46.6)
HGB BLD-MCNC: 9.5 G/DL (ref 12–15.9)
IMM GRANULOCYTES # BLD AUTO: 1.01 10*3/MM3 (ref 0–0.05)
IMM GRANULOCYTES NFR BLD AUTO: 4.8 % (ref 0–0.5)
LYMPHOCYTES # BLD AUTO: 2.4 10*3/MM3 (ref 0.7–3.1)
LYMPHOCYTES NFR BLD AUTO: 11.5 % (ref 19.6–45.3)
MAGNESIUM SERPL-MCNC: 1.9 MG/DL (ref 1.6–2.4)
MCH RBC QN AUTO: 28.4 PG (ref 26.6–33)
MCHC RBC AUTO-ENTMCNC: 33 G/DL (ref 31.5–35.7)
MCV RBC AUTO: 86 FL (ref 79–97)
MONOCYTES # BLD AUTO: 1.99 10*3/MM3 (ref 0.1–0.9)
MONOCYTES NFR BLD AUTO: 9.5 % (ref 5–12)
NEUTROPHILS NFR BLD AUTO: 15.28 10*3/MM3 (ref 1.7–7)
NEUTROPHILS NFR BLD AUTO: 73.1 % (ref 42.7–76)
NRBC BLD AUTO-RTO: 0 /100 WBC (ref 0–0.2)
PHOSPHATE SERPL-MCNC: 2.4 MG/DL (ref 2.5–4.5)
PLATELET # BLD AUTO: 514 10*3/MM3 (ref 140–450)
PMV BLD AUTO: 9.4 FL (ref 6–12)
POTASSIUM SERPL-SCNC: 4.3 MMOL/L (ref 3.5–5.2)
PROT SERPL-MCNC: 6.3 G/DL (ref 6–8.5)
RBC # BLD AUTO: 3.35 10*6/MM3 (ref 3.77–5.28)
SODIUM SERPL-SCNC: 133 MMOL/L (ref 136–145)
WBC NRBC COR # BLD: 20.9 10*3/MM3 (ref 3.4–10.8)

## 2023-02-16 PROCEDURE — 25010000002 METOCLOPRAMIDE PER 10 MG: Performed by: COLON & RECTAL SURGERY

## 2023-02-16 PROCEDURE — 63710000001 INSULIN REGULAR HUMAN PER 5 UNITS: Performed by: SURGERY

## 2023-02-16 PROCEDURE — 84100 ASSAY OF PHOSPHORUS: CPT | Performed by: COLON & RECTAL SURGERY

## 2023-02-16 PROCEDURE — 83735 ASSAY OF MAGNESIUM: CPT | Performed by: INTERNAL MEDICINE

## 2023-02-16 PROCEDURE — 25010000002 POTASSIUM CHLORIDE PER 2 MEQ OF POTASSIUM: Performed by: COLON & RECTAL SURGERY

## 2023-02-16 PROCEDURE — 85025 COMPLETE CBC W/AUTO DIFF WBC: CPT | Performed by: HOSPITALIST

## 2023-02-16 PROCEDURE — 80053 COMPREHEN METABOLIC PANEL: CPT | Performed by: SURGERY

## 2023-02-16 PROCEDURE — 25010000002 CEFTRIAXONE PER 250 MG: Performed by: SURGERY

## 2023-02-16 PROCEDURE — 25010000002 MAGNESIUM SULFATE PER 500 MG OF MAGNESIUM: Performed by: COLON & RECTAL SURGERY

## 2023-02-16 PROCEDURE — 82962 GLUCOSE BLOOD TEST: CPT

## 2023-02-16 PROCEDURE — 99024 POSTOP FOLLOW-UP VISIT: CPT | Performed by: PHYSICIAN ASSISTANT

## 2023-02-16 PROCEDURE — 25010000002 FLUCONAZOLE PER 200 MG: Performed by: INTERNAL MEDICINE

## 2023-02-16 PROCEDURE — 25010000002 ONDANSETRON PER 1 MG: Performed by: COLON & RECTAL SURGERY

## 2023-02-16 PROCEDURE — 25010000002 CALCIUM GLUCONATE PER 10 ML: Performed by: COLON & RECTAL SURGERY

## 2023-02-16 PROCEDURE — 97110 THERAPEUTIC EXERCISES: CPT

## 2023-02-16 RX ORDER — FLUCONAZOLE 2 MG/ML
400 INJECTION, SOLUTION INTRAVENOUS EVERY 24 HOURS
Status: DISCONTINUED | OUTPATIENT
Start: 2023-02-16 | End: 2023-02-21

## 2023-02-16 RX ADMIN — METOCLOPRAMIDE 10 MG: 5 INJECTION, SOLUTION INTRAMUSCULAR; INTRAVENOUS at 18:49

## 2023-02-16 RX ADMIN — FAMOTIDINE 20 MG: 10 INJECTION INTRAVENOUS at 09:35

## 2023-02-16 RX ADMIN — INSULIN GLARGINE-YFGN 10 UNITS: 100 INJECTION, SOLUTION SUBCUTANEOUS at 00:02

## 2023-02-16 RX ADMIN — METRONIDAZOLE 500 MG: 500 INJECTION, SOLUTION INTRAVENOUS at 06:57

## 2023-02-16 RX ADMIN — I.V. FAT EMULSION 20 G: 20 EMULSION INTRAVENOUS at 18:29

## 2023-02-16 RX ADMIN — INSULIN HUMAN 2 UNITS: 100 INJECTION, SOLUTION PARENTERAL at 12:19

## 2023-02-16 RX ADMIN — CEFTRIAXONE SODIUM 2 G: 2 INJECTION, POWDER, FOR SOLUTION INTRAMUSCULAR; INTRAVENOUS at 14:00

## 2023-02-16 RX ADMIN — FLUCONAZOLE IN SODIUM CHLORIDE 400 MG: 2 INJECTION, SOLUTION INTRAVENOUS at 15:43

## 2023-02-16 RX ADMIN — INSULIN HUMAN 2 UNITS: 100 INJECTION, SOLUTION PARENTERAL at 00:44

## 2023-02-16 RX ADMIN — Medication 10 ML: at 09:00

## 2023-02-16 RX ADMIN — Medication 10 ML: at 22:08

## 2023-02-16 RX ADMIN — METOCLOPRAMIDE 10 MG: 5 INJECTION, SOLUTION INTRAMUSCULAR; INTRAVENOUS at 09:21

## 2023-02-16 RX ADMIN — ONDANSETRON 4 MG: 2 INJECTION INTRAMUSCULAR; INTRAVENOUS at 10:00

## 2023-02-16 RX ADMIN — POTASSIUM PHOSPHATE, MONOBASIC POTASSIUM PHOSPHATE, DIBASIC: 224; 236 INJECTION, SOLUTION, CONCENTRATE INTRAVENOUS at 18:30

## 2023-02-16 RX ADMIN — INSULIN HUMAN 3 UNITS: 100 INJECTION, SOLUTION PARENTERAL at 07:00

## 2023-02-16 RX ADMIN — INSULIN HUMAN 2 UNITS: 100 INJECTION, SOLUTION PARENTERAL at 18:28

## 2023-02-16 RX ADMIN — METOCLOPRAMIDE 10 MG: 5 INJECTION, SOLUTION INTRAMUSCULAR; INTRAVENOUS at 15:04

## 2023-02-16 RX ADMIN — METOCLOPRAMIDE 10 MG: 5 INJECTION, SOLUTION INTRAMUSCULAR; INTRAVENOUS at 00:02

## 2023-02-16 RX ADMIN — INSULIN GLARGINE-YFGN 10 UNITS: 100 INJECTION, SOLUTION SUBCUTANEOUS at 20:20

## 2023-02-16 RX ADMIN — METOCLOPRAMIDE 10 MG: 5 INJECTION, SOLUTION INTRAMUSCULAR; INTRAVENOUS at 06:57

## 2023-02-16 RX ADMIN — FAMOTIDINE 20 MG: 10 INJECTION INTRAVENOUS at 18:29

## 2023-02-16 RX ADMIN — METRONIDAZOLE 500 MG: 500 INJECTION, SOLUTION INTRAVENOUS at 14:58

## 2023-02-16 NOTE — CASE MANAGEMENT/SOCIAL WORK
Continued Stay Note  [facility]     Patient Name: Hali Tejeda  MRN: [id number]  Today's Date: 2/16/2023    Admit Date: 1/27/2023    Plan: HH vs SNF pending progress - may need wound vac   Discharge Plan     Row Name 02/16/23 1020       Plan    Plan HH vs SNF pending progress - may need wound vac    Plan Comments CCP spoke with patient at bedside regarding dc plan. CCP explained PT is reccomending a short rehab stay at dc. Patient walked 40ft with PT. Patient is agreeable to broad referrals to SNF's and to check back in regarding plan of HH vs SNF. Patient will need Alverda precert if SNF is plan. HUNTER, SILASW               Discharge Codes    No documentation.               Expected Discharge Date and Time     Expected Discharge Date Expected Discharge Time    Feb 20, 2023             BUSTER Watson

## 2023-02-16 NOTE — PROGRESS NOTES
"Daily progress note    Primary care physician  Dr. Delgado    Chief complaint  Doing same with no new complaints and tolerating liquid diet    History of present illness  62-year-old female with history of hypertension presented to [facility] emergency room with left lower quadrant abdominal pain for last 1 month.  Patient also have occasional loose stools.  Patient denies any nausea vomiting.  Patient recently diagnosed with colitis and treated with oral antibiotics without any improvement.  Patient has noticed blood in the stools.  Patient evaluated in ER with CT abdominal pelvis which shows worsening colitis and failed outpatient treatment admit for management.  Patient has no fever chills chest pain shortness of breath with sweats weight loss or weight gain.     REVIEW OF SYSTEMS  Unremarkable except weakness     PHYSICAL EXAM  Blood pressure 123/73, pulse 90, temperature 99.4 °F (37.4 °C), temperature source Oral, resp. rate 18, height 154.9 cm (61\"), weight 75.7 kg (166 lb 14.2 oz), SpO2 96 %.    GENERAL:  Awake and alert in no acute distress  HEENT:  Unremarkable  NECK:  Supple  CV: regular rhythm, normal rate  RESPIRATORY: normal effort, clear to auscultation bilaterally  ABDOMEN: soft, left lower quadrant tenderness colostomy in place  MUSCULOSKELETAL: no deformity  NEURO: alert, moves all extremities, follows commands  PSYCH:  calm, cooperative  SKIN: warm, dry     LAB RESULTS  Lab Results (last 24 hours)     Procedure Component Value Units Date/Time    POC Glucose Once [[id number]]  (Abnormal) Collected: 02/16/23 1105    Specimen: Blood Updated: 02/16/23 1107     Glucose 191 mg/dL      Comment: Meter: [id number] : [id number] Lev EUCEDA       Body Fluid Culture - Drainage, Peritoneum [[id number]] Collected: 02/14/23 1154    Specimen: Drainage from Peritoneum Updated: 02/16/23 1027     Body Fluid Culture No growth at 2 days     Gram Stain Moderate (3+) WBCs per low power field      No " organisms seen    POC Glucose Once [[id number]]  (Abnormal) Collected: 02/16/23 0616    Specimen: Blood Updated: 02/16/23 0617     Glucose 208 mg/dL      Comment: Meter: [id number] : [id number] Han Kuo CNA       Comprehensive Metabolic Panel [[id number]]  (Abnormal) Collected: 02/16/23 0341    Specimen: Blood Updated: 02/16/23 0434     Glucose 184 mg/dL      BUN 11 mg/dL      Creatinine 0.48 mg/dL      Sodium 133 mmol/L      Potassium 4.3 mmol/L      Chloride 101 mmol/L      CO2 23.0 mmol/L      Calcium 8.1 mg/dL      Total Protein 6.3 g/dL      Albumin 2.2 g/dL      ALT (SGPT) 9 U/L      AST (SGOT) 11 U/L      Alkaline Phosphatase 66 U/L      Total Bilirubin 0.3 mg/dL      Globulin 4.1 gm/dL      A/G Ratio 0.5 g/dL      BUN/Creatinine Ratio 22.9     Anion Gap 9.0 mmol/L      eGFR 106.6 mL/min/1.73     Narrative:      GFR Normal >60  Chronic Kidney Disease <60  Kidney Failure <15      Phosphorus [[id number]]  (Abnormal) Collected: 02/16/23 0341    Specimen: Blood Updated: 02/16/23 0434     Phosphorus 2.4 mg/dL     Magnesium [[id number]]  (Normal) Collected: 02/16/23 0341    Specimen: Blood Updated: 02/16/23 0434     Magnesium 1.9 mg/dL     CBC & Differential [[id number]]  (Abnormal) Collected: 02/16/23 0341    Specimen: Blood Updated: 02/16/23 0416    Narrative:      The following orders were created for panel order CBC & Differential.  Procedure                               Abnormality         Status                     ---------                               -----------         ------                     CBC Auto Differential[[id number]]        Abnormal            Final result                 Please view results for these tests on the individual orders.    CBC Auto Differential [[id number]]  (Abnormal) Collected: 02/16/23 0341    Specimen: Blood Updated: 02/16/23 0416     WBC 20.90 10*3/mm3      RBC 3.35 10*6/mm3      Hemoglobin 9.5 g/dL      Hematocrit 28.8 %      MCV 86.0 fL      MCH 28.4 pg      MCHC 33.0  g/dL      RDW 14.4 %      RDW-SD 44.7 fl      MPV 9.4 fL      Platelets 514 10*3/mm3      Neutrophil % 73.1 %      Lymphocyte % 11.5 %      Monocyte % 9.5 %      Eosinophil % 0.8 %      Basophil % 0.3 %      Immature Grans % 4.8 %      Neutrophils, Absolute 15.28 10*3/mm3      Lymphocytes, Absolute 2.40 10*3/mm3      Monocytes, Absolute 1.99 10*3/mm3      Eosinophils, Absolute 0.16 10*3/mm3      Basophils, Absolute 0.06 10*3/mm3      Immature Grans, Absolute 1.01 10*3/mm3      nRBC 0.0 /100 WBC     POC Glucose Once [[id number]]  (Abnormal) Collected: 02/16/23 0016    Specimen: Blood Updated: 02/16/23 0017     Glucose 173 mg/dL      Comment: Meter: [id number] : [id number] Short Shiela SALASA       POC Glucose Once [[id number]]  (Abnormal) Collected: 02/15/23 1642    Specimen: Blood Updated: 02/15/23 1644     Glucose 142 mg/dL      Comment: Meter: [id number] : [id number] Luis Manuel EUCEDA           Imaging Results (Last 24 Hours)     ** No results found for the last 24 hours. **        Upper and lower endoscopy results noted and discussed with patient    Current Facility-Administered Medications:   •  acetaminophen (TYLENOL) tablet 650 mg, 650 mg, Oral, Q6H PRN, Adwoa Rogel PA-C  •  Adult Central 2-in-1 TPN, , Intravenous, Continuous, Lino Gaston MD, Last Rate: 75 mL/hr at 02/15/23 1847, New Bag at 02/15/23 1847  •  Adult Central 2-in-1 TPN, , Intravenous, Continuous, Lino Gaston MD  •  amLODIPine (NORVASC) tablet 10 mg, 10 mg, Oral, Q24H, Gideon Hope MD, 10 mg at 02/15/23 0930  •  cefTRIAXone (ROCEPHIN) 2 g in sodium chloride 0.9 % 100 mL IVPB-VTB, 2 g, Intravenous, Q24H, Beck Wei Jr., MD, Last Rate: 200 mL/hr at 02/15/23 1133, 2 g at 02/15/23 1133  •  dextrose (D50W) (25 g/50 mL) IV injection 25 g, 25 g, Intravenous, Q15 Min PRN, Beck Wei Jr., MD  •  dextrose (GLUTOSE) oral gel 15 g, 15 g, Oral, Q15 Min PRN, Beck Wei Jr., MD  •  famotidine (PEPCID) injection 20 mg, 20 mg,  Intravenous, BID AC, Lilibeth Rahman PA-C, 20 mg at 23 0935  •  Fat Emulsion Plant Based (INTRALIPID,LIPOSYN) 20 % infusion 20 g, 100 mL, Intravenous, Q24H (TPN), Lino Gaston MD, Last Rate: 8.33 mL/hr at 02/15/23 1847, 20 g at 02/15/23 1847  •  glucagon (GLUCAGEN) injection 1 mg, 1 mg, Intramuscular, Q15 Min PRN, Beck Wei Jr., MD  •  hydrALAZINE (APRESOLINE) injection 10 mg, 10 mg, Intravenous, Q4H PRN, Gideon Hope MD, 10 mg at 23 1454  •  insulin glargine (LANTUS, SEMGLEE) injection 10 Units, 10 Units, Subcutaneous, Nightly, Gideon Hope MD, 10 Units at 23 0002  •  insulin regular (humuLIN R,novoLIN R) injection 0-7 Units, 0-7 Units, Subcutaneous, Q6H, Beck Wei Jr., MD, 3 Units at 23 0700  •  metoclopramide (REGLAN) injection 10 mg, 10 mg, Intravenous, Q6H, Lino Gaston MD, 10 mg at 23 0921  •  metroNIDAZOLE (FLAGYL) IVPB 500 mg, 500 mg, Intravenous, Q8H, Galdino Esteves MD, 500 mg at 23 0657  •  [] HYDROmorphone (DILAUDID) injection 0.25 mg, 0.25 mg, Intravenous, Q2H PRN, 0.25 mg at 23 0015 **AND** naloxone (NARCAN) injection 0.4 mg, 0.4 mg, Intravenous, Q5 Min PRN, Lino Gaston MD  •  nitroglycerin (NITROSTAT) SL tablet 0.4 mg, 0.4 mg, Sublingual, Q5 Min PRN, Lino Gaston MD  •  ondansetron (ZOFRAN) injection 4 mg, 4 mg, Intravenous, Q6H PRN, Lino Gaston MD, 4 mg at 23 1000  •  Pharmacy to Dose TPN, , Does not apply, Continuous PRN, Lilibeth Rahman PA-C  •  potassium chloride 10 mEq in 100 mL IVPB, 10 mEq, Intravenous, Q1H PRN, Matthew Soliz MD, Last Rate: 100 mL/hr at 23, 10 mEq at 23  •  potassium phosphate 45 mmol in sodium chloride 0.9 % 500 mL infusion, 45 mmol, Intravenous, PRN **OR** potassium phosphate 30 mmol in sodium chloride 0.9 % 250 mL infusion, 30 mmol, Intravenous, PRN **OR** potassium phosphate 15 mmol in sodium chloride 0.9 % 100 mL infusion, 15 mmol, Intravenous, PRN,  15 mmol at 02/12/23 1440 **OR** sodium phosphates 40 mmol in sodium chloride 0.9 % 500 mL IVPB, 40 mmol, Intravenous, PRN **OR** sodium phosphates 20 mmol in sodium chloride 0.9 % 250 mL IVPB, 20 mmol, Intravenous, PRN, Matthew Soliz MD  •  sodium chloride 0.9 % flush 10 mL, 10 mL, Intravenous, Q12H, Quick, Lilibeth A, PA-C, 10 mL at 02/15/23 2245  •  sodium chloride 0.9 % flush 10 mL, 10 mL, Intravenous, Q12H, Quick, Lilibeth A, PA-C, 10 mL at 02/15/23 2244  •  sodium chloride 0.9 % flush 10 mL, 10 mL, Intravenous, PRN, Quick, Lilibeth A, PA-C  •  sodium chloride 0.9 % flush 20 mL, 20 mL, Intravenous, PRN, Quick, Lilibeth A, PA-C  •  sodium chloride 0.9 % infusion 40 mL, 40 mL, Intravenous, PRN, Quick, Lilibeth A, PA-C     ASSESSMENT  Acute colitis with sigmoid stricture and microperforation s/p colon resection and colostomy  Abdominal fluid collection/abscess status post CT-guided drainage  Postop ileus  Acute kidney injury resolved  Hypertension   Gastroesophageal reflux disease    PLAN  CPM  Postop care  TPN  Clear liquid diet  IV antibiotics per infectious disease  Accu-Chek with low-dose sliding scale insulin  Blood pressure medications adjusted  Pain management  Continue home medications  Stress ulcer DVT prophylaxis  Supportive care  PT OT  Discussed with family nursing staff   Follow closely and further recommendation according to hospital course    SANG SINGLETARY MD    Copied text in this note has been reviewed and is accurate as of 02/16/23

## 2023-02-16 NOTE — DISCHARGE PLACEMENT REQUEST
"Hali Tejeda (62 y.o. Female)     Date of Birth   1960    Social Security Number       Address   [address] [place] [address]    Home Phone   [phone]    MRN   [id number]       Mandaen   None    Marital Status   Single                            Admission Date   1/27/23    Admission Type   Emergency    Admitting Provider   Gideon Hope MD    Attending Provider   Gideon Hope MD    Department, Room/Bed   [address], [address]       Discharge Date       Discharge Disposition       Discharge Destination                               Attending Provider: Gideon Hope MD    Allergies: Hydrocodone, Sulfa Antibiotics    Isolation: None   Infection: None   Code Status: CPR    Ht: 154.9 cm (61\")   Wt: 75.7 kg (166 lb 14.2 oz)    Admission Cmt: None   Principal Problem: Colitis [K52.9]                 Active Insurance as of 1/27/2023     Primary Coverage     Payor Plan Insurance Group Employer/Plan Group    [facility] [employer] [facility] [employer] [id number]     Payor Plan Address Payor Plan Phone Number Payor Plan Fax Number Effective Dates    [phone]  1/1/2022 - None Entered    [facility] [id number]       Subscriber Name Subscriber Birth Date Member ID       HALI TEJEDA 1960 [id number]                 Emergency Contacts      (Rel.) Home Phone Work Phone Mobile Phone    SANDRA TEJEDA (Daughter) [phone] -- --    BRADYAMAURI (Mother) [phone] -- [phone]              "

## 2023-02-16 NOTE — PROGRESS NOTES
"  Infectious Diseases Progress Note    Galdino Estevse MD     [facility]  [place]: 20 days  Patient Identification:  Name: Hali Tejeda  Age: 62 y.o.  Sex: female  :  1960  MRN: [id number]         Primary Care Physician: Richard Delgado MD        Subjective: Resting well nausea vomiting is better.  Interval History: Underwent placement of wound VAC on her abdominal wound/incision.  TPN is started.  CT scan of the abdomen and pelvis performed on 2022 showed interval decrease in the size of the fluid collection in the left paracolic gutter as well as an area in the left lower quadrant with ill-defined subjacent stranding and tiny foci of gas slightly more conspicuous compared to prior imaging studies with no drainable focal fluid collection or definitive extraluminal enteric contrast noted.  Objective:    Scheduled Meds:amLODIPine, 10 mg, Oral, Q24H  cefTRIAXone, 2 g, Intravenous, Q24H  famotidine, 20 mg, Intravenous, BID AC  Fat Emulsion Plant Based, 100 mL, Intravenous, Q24H (TPN)  insulin glargine, 10 Units, Subcutaneous, Nightly  insulin regular, 0-7 Units, Subcutaneous, Q6H  metoclopramide, 10 mg, Intravenous, Q6H  metroNIDAZOLE, 500 mg, Intravenous, Q8H  sodium chloride, 10 mL, Intravenous, Q12H  sodium chloride, 10 mL, Intravenous, Q12H      Continuous Infusions:Adult Central 2-in-1 TPN, , Last Rate: 75 mL/hr at 02/15/23 1847  Adult Central 2-in-1 TPN,   Pharmacy to Dose TPN,         Vital signs in last 24 hours:  Temp:  [98.4 °F (36.9 °C)-99.4 °F (37.4 °C)] 99.4 °F (37.4 °C)  Heart Rate:  [90-99] 90  Resp:  [18] 18  BP: (123-145)/(73-86) 123/73    Intake/Output:    Intake/Output Summary (Last 24 hours) at 2023 1313  Last data filed at 2023 0914  Gross per 24 hour   Intake 0 ml   Output 75 ml   Net -75 ml       Exam:  /73 (BP Location: Left arm, Patient Position: Lying)   Pulse 90   Temp 99.4 °F (37.4 °C) (Oral)   Resp 18   Ht 154.9 cm (61\")   Wt 75.7 kg (166 lb 14.2 " oz)   SpO2 96%   BMI 31.53 kg/m²   Patient is examined using the personal protective equipment as per guidelines from infection control for this particular patient as enacted.  Hand washing was performed before and after patient interaction.  General Appearance: Comfortable appearing female who does not appear toxic at this time.                          Head:    Normocephalic, without obvious abnormality, atraumatic                           Eyes:    PERRL, conjunctivae/corneas clear, EOM's intact, both eyes                         Throat:   Lips, tongue, gums normal; oral mucosa pink and moist                           Neck:   Supple, symmetrical, trachea midline, no JVD                         Lungs:    Clear to auscultation bilaterally, respirations unlabored                 Chest Wall:    No tenderness or deformity                          Heart:  S1-S2 regular                  Abdomen:   Ostomy present with midline incision has wound VAC in place with no surrounding cellulitis.  Drain in place.                 Extremities:   Extremities normal, atraumatic, no cyanosis or edema                        Pulses:   Pulses palpable in all extremities                            Skin:   Skin is warm and dry,  no rashes or palpable lesions                  Neurologic: Grossly nonfocal       Data Review:    I reviewed the patient's new clinical results.  Results from last 7 days   Lab Units 02/16/23  0341 02/15/23  0422 02/14/23  0238 02/13/23  0321 02/12/23  0558 02/11/23  0506 02/10/23  0917   WBC 10*3/mm3 20.90* 23.70* 25.66* 28.34* 26.57* 25.18* 20.95*   HEMOGLOBIN g/dL 9.5* 9.0* 9.2* 9.7* 8.5* 8.7* 8.7*   PLATELETS 10*3/mm3 514* 502* 500* 479* 416 411 370     Results from last 7 days   Lab Units 02/16/23  0341 02/15/23  0422 02/14/23  0238 02/13/23  0321 02/12/23  0817 02/12/23  0748 02/12/23  0558 02/11/23  0506 02/10/23  0356   SODIUM mmol/L 133* 132* 134* 132* 135*  --  126* 141 141   POTASSIUM mmol/L 4.3 4.4  4.0 4.0 3.7 3.7 7.5* 2.8* 2.7*   CHLORIDE mmol/L 101 104 100 99  --   --  96* 106 108*   CO2 mmol/L 23.0 24.7 24.8 28.4  --   --  26.0 30.0* 26.0   BUN mg/dL 11 13 8 7*  --   --  4* 3* 4*   CREATININE mg/dL 0.48* 0.42* 0.37* 0.37*  --   --  0.41* 0.37* 0.39*   CALCIUM mg/dL 8.1* 8.3* 8.2* 8.5*  --   --  8.4* 7.8* 8.1*   GLUCOSE mg/dL 184* 173* 150* 138*  --   --  763* 158* 94     Microbiology Results (last 10 days)     Procedure Component Value - Date/Time    Body Fluid Culture - Drainage, Peritoneum [[id number]] Collected: 02/14/23 1154    Lab Status: Preliminary result Specimen: Drainage from Peritoneum Updated: 02/16/23 1027     Body Fluid Culture No growth at 2 days     Gram Stain Moderate (3+) WBCs per low power field      No organisms seen    Clostridioides difficile Toxin - Stool, Per Stoma [[id number]]  (Normal) Collected: 02/10/23 1243    Lab Status: Final result Specimen: Stool from Per Stoma Updated: 02/10/23 1341    Narrative:      The following orders were created for panel order Clostridioides difficile Toxin - Stool, Per Stoma.  Procedure                               Abnormality         Status                     ---------                               -----------         ------                     Clostridioides difficile...[[id number]]  Normal              Final result                 Please view results for these tests on the individual orders.    Clostridioides difficile Toxin, PCR - Stool, Per Stoma [[id number]]  (Normal) Collected: 02/10/23 1243    Lab Status: Final result Specimen: Stool from Per Stoma Updated: 02/10/23 1341     C. Difficile Toxins by PCR Negative    Narrative:      The result indicates the absence of toxigenic C. difficile from stool specimen.     Body Fluid Culture - Drainage, Peritoneum [[id number]]  (Abnormal)  (Susceptibility) Collected: 02/09/23 9708    Lab Status: Final result Specimen: Drainage from Peritoneum Updated: 02/11/23 0851     Body Fluid Culture Scant growth (1+)  Escherichia coli     Gram Stain Few (2+) WBCs per low power field      No organisms seen    Susceptibility      Escherichia coli      RENETTA      Amikacin Susceptible      Ampicillin Resistant     Ampicillin + Sulbactam Resistant     Cefepime Susceptible      Ceftazidime Susceptible      Ceftriaxone Susceptible      Gentamicin Resistant     Levofloxacin Susceptible      Piperacillin + Tazobactam Resistant     Tobramycin Intermediate      Trimethoprim + Sulfamethoxazole Resistant                      Susceptibility Comments     Escherichia coli    Cefazolin sensitivity will not be reported for Enterobacteriaceae in non-urine isolates. If cefazolin is preferred, please call the microbiology lab to request an E-test.  With the exception of urinary-sourced infections, aminoglycosides should not be used as monotherapy.                     Assessment:    Colitis    Left sided colitis with complication (HCC)    Nausea and vomiting    Stricture of sigmoid colon (HCC)  1-intra-abdominal sepsis with intra-abdominal abscess in the setting of prolonged colitis, sigmoid stricture, endoscopic injury to colonic wall with microperforation and subsequent laparotomy partial colectomy and colostomy and intraoperative injury to the spleen with repair while being on antibiotic therapy-likely pathogen to consider in this situation would be enteric amberly along with selection of yeast due to prolonged antibiotic therapy-status post percutaneous drain placement on 2/9/2023  2-hypertension  3-malnourishment  4-possible postop abdominal wall/incisional infection/evolving abscess        Recommendations/Discussions:  Continue with fluconazole ceftriaxone and Flagyl  · Follow-up on the repeat CT scan of the abdomen and pelvis with intervention plans per colorectal surgery service.  · Monitor closely for complications of antibiotics including C. difficile infection but low threshold to repeat colostomy content for C. difficile toxin assay as her  last testing on 2/10/2023 was negative.  · Fluconazole restarted as it was also stopped on 2/13/2023.  Galdino Esteves MD  2/16/2023  13:13 EST    Parts of this note may be an electronic transcription/translation of spoken language to printed text using the Dragon dictation system.

## 2023-02-16 NOTE — NURSING NOTE
CWOCN- follow up with patient. She is eating soup and tolerating it. She plans to get out of bed again after she eats. She feels ready for more ostomy teaching- will plan to change vac and ostomy pouch tomorrow with her being hands on with pouch change. She agrees with plan. Both are intact today with serosanguinous drainage in vac canister.

## 2023-02-16 NOTE — THERAPY TREATMENT NOTE
Patient Name: Hali Tejeda  : 1960    MRN: [id number]                              Today's Date: 2023       Admit Date: 2023    Visit Dx:     ICD-10-CM ICD-9-CM   1. Colitis  K52.9 558.9   2. Nausea and vomiting, unspecified vomiting type  R11.2 787.01   3. Stricture of sigmoid colon (HCC)  K56.699 560.9     Patient Active Problem List   Diagnosis   • Left sided colitis with complication (HCC)   • Colitis   • Nausea and vomiting   • Stricture of sigmoid colon (HCC)   • Uterine anomaly   • Hypertension   • Avulsion fracture of distal fibula     Past Medical History:   Diagnosis Date   • Abnormal EKG 2020   • Avulsion fracture of distal fibula 2015   • H. pylori infection 2020   • Hepatic steatosis 2020   • Hiatal hernia    • HTN (hypertension)    • Hyperlipidemia    • Insomnia    • Left sided colitis with complication (HCC) 2023    ADMITTED TO [facility]   • LGSIL on Pap smear of cervix     (+) HPV   • LGSIL Pap smear of vagina 2016   • Snoring    • Stricture of sigmoid colon (HCC) 2023   • Uterine fibroid    • Vaginal atrophy      Past Surgical History:   Procedure Laterality Date   • BREAST LUMPECTOMY Left     benign   •  SECTION N/A    • COLON RESECTION N/A 2/3/2023    Procedure: COLON RESECTION LAPAROSCOPIC CONVERTED TO OPEN SIGMOID AND LEFT MOBILIZATION OF SPLENIC FLEXURE WITH DAVINCI ROBOT;  Surgeon: Lino Gaston MD;  Location: [facility];  Service: Robotics - DaVinci;  Laterality: N/A;   • COLONOSCOPY N/A 2014    COULD ONLY GET SCOPE TO 70 CM DUE TO SIGNIFICANT STRICTURE SECONDARY TO SEVERE DIVERTICULITIS OR CANCER, ACBE ORDERED, DR. PARAG HUDSON AT [place]   • COLONOSCOPY N/A 2023    MODERATE STENOSIS IN SIGMOID-DILATED, MANY DIVERTICULA IN SIGMOID AND DESCENDING, COPIOUS AMTS OF STOOL, MUCOSAL TEAR 55 CM FROM ANAL VERGE, DR. JENNI SMITH AT [facility]   • COLPOSCOPY N/A 2016   • ENDOSCOPY N/A 2023    GASTRITIS, SMALL  HIATAL HERNIA, DR. JENNI SMITH AT [facility]   • ENDOSCOPY N/A 09/15/2009    DR. PARAG HUDSON AT [place]   • GASTRIC SLEEVE LAPAROSCOPIC N/A 07/09/2020    WITH REMOVAL OF GASTRIC BAND, DR. OLIMPIA PALACIOS AT [facility]   • HYSTERECTOMY N/A 01/2005    WITH RSO   • LAPAROSCOPIC GASTRIC BANDING N/A 10/20/2019    DR. PARAG HUDSON AT [place]   • SALPINGO OOPHORECTOMY Left     LSO, IN TEEN YEARS   • WISDOM TOOTH EXTRACTION Bilateral       General Information     Row Name 02/16/23 1018          Physical Therapy Time and Intention    Document Type therapy note (daily note)  -     Mode of Treatment individual therapy;physical therapy  -     Row Name 02/16/23 1018          General Information    Patient Profile Reviewed yes  -     Existing Precautions/Restrictions fall  multiple lines, tubes, drains, wound vac  -     Row Name 02/16/23 1018          Cognition    Orientation Status (Cognition) oriented x 4  -[facility] Name 02/16/23 1018          Safety Issues, Functional Mobility    Impairments Affecting Function (Mobility) balance;endurance/activity tolerance;pain;strength  -           User Key  (r) = Recorded By, (t) = Taken By, (c) = Cosigned By    Initials Name Provider Type     Mila Tam PTA Physical Therapist Assistant               Mobility     Row Name 02/16/23 1019          Bed Mobility    Supine-Sit [place] (Bed Mobility) minimum assist (75% patient effort);verbal cues  -     Sit-Supine [place] (Bed Mobility) minimum assist (75% patient effort);verbal cues;nonverbal cues (demo/gesture)  -     Assistive Device (Bed Mobility) bed rails  -     Row Name 02/16/23 1019          Sit-Stand Transfer    Sit-Stand [place] (Transfers) contact guard;verbal cues  -     Assistive Device (Sit-Stand Transfers) walker, front-wheeled  -     Row Name 02/16/23 1019          Gait/Stairs (Locomotion)    [place] Level (Gait) minimum assist (75% patient effort);verbal cues;nonverbal cues  (demo/gesture)  -     Assistive Device (Gait) walker, front-wheeled  -     Distance in Feet (Gait) 150ft, slow paced , but no rest required, LEs much stronger today  -     Deviations/Abnormal Patterns (Gait) jasmin decreased;festinating/shuffling;antalgic;stride length decreased  -     Bilateral Gait Deviations forward flexed posture  -           User Key  (r) = Recorded By, (t) = Taken By, (c) = Cosigned By    Initials Name Provider Type    Mila Diallo PTA Physical Therapist Assistant               Obj/Interventions    No documentation.                Goals/Plan    No documentation.                Clinical Impression     Row Name 02/16/23 1020          Pain    Pre/Posttreatment Pain Comment slow paced during tfers but did not c/o pain during activity, nsg in room w/meds  -[facility] Name 02/16/23 1020          Plan of Care Review    Plan of Care Reviewed With patient  -     Progress improving  -     Outcome Evaluation Pt agreed to PT session, pt wanted to try and incr distance today and she was able to , amb ~150ft , CGA, pt encouraged to sit in chair today x2, pt declined at present due to busy morning already , but agreed to get in chair w/nsg later, plans home at [place] , but will have [facility] support and will benefit from  initially, pt will need BSC, RWX at [place]  -[facility] Name 02/16/23 1020          Therapy Assessment/Plan (PT)    Rehab Potential (PT) good, to achieve stated therapy goals  -     Criteria for Skilled Interventions Met (PT) yes  -[facility] Name 02/16/23 1020          Vital Signs    Pre SpO2 (%) 98  -     O2 Delivery Pre Treatment room air  -     O2 Delivery Intra Treatment room air  -     O2 Delivery Post Treatment room air  [facility] Name 02/16/23 1020          Positioning and Restraints    Pre-Treatment Position in bed  -     Post Treatment Position bed  -     In Bed fowlers;call light within reach;encouraged to call for assist;exit alarm on;notified nsg  -            User Key  (r) = Recorded By, (t) = Taken By, (c) = Cosigned By    Initials Name Provider Type    Mila Diallo PTA Physical Therapist Assistant               Outcome Measures     Row Name 02/16/23 1029          How much help from another person do you currently need...    Turning from your back to your side while in flat bed without using bedrails? 3  -JM     Moving from lying on back to sitting on the side of a flat bed without bedrails? 3  -JM     Moving to and from a bed to a chair (including a wheelchair)? 3  -JM     Standing up from a chair using your arms (e.g., wheelchair, bedside chair)? 3  -JM     Climbing 3-5 steps with a railing? 2  -JM     To walk in hospital room? 3  -JM     AM-PAC 6 Clicks Score (PT) 17  -JM     Highest level of mobility 5 --> Static standing  -JM           User Key  (r) = Recorded By, (t) = Taken By, (c) = Cosigned By    Initials Name Provider Type    Mila Diallo PTA Physical Therapist Assistant                             Physical Therapy Education     Title: PT OT SLP Therapies (Done)     Topic: Physical Therapy (Done)     Point: Mobility training (Done)     Learning Progress Summary           Patient Acceptance, E,TB,D, VU by SUSY at 2/16/2023 1030    Acceptance, E,TB,D, VU,NR by SUSY at 2/15/2023 1536    Eager, E,TB,D, VU by  at 2/13/2023 1610    Acceptance, E,TB,D, VU,NR by  at 2/11/2023 1702    Acceptance, E,TB, VU,NR by  at 2/5/2023 0935    Acceptance, E,TB, VU,NR by  at 2/4/2023 0911    Acceptance, E, VU by  at 1/30/2023 1441                   Point: Home exercise program (Done)     Learning Progress Summary           Patient Acceptance, E,TB,D, VU by  at 2/16/2023 1030    Acceptance, E,TB,D, VU,NR by  at 2/15/2023 1536    Eager, E,TB,D, VU by  at 2/13/2023 1610    Acceptance, E,TB,D, VU,NR by  at 2/11/2023 1702    Acceptance, E,TB, VU,NR by CS at 2/5/2023 0935    Acceptance, E,TB, VU,NR by CS at 2/4/2023 0911                   Point:  Body mechanics (Done)     Learning Progress Summary           Patient Acceptance, E,TB,D, VU by  at 2/16/2023 1030    Acceptance, E,TB,D, VU,NR by  at 2/15/2023 1536    Eager, E,TB,D, VU by  at 2/13/2023 1610    Acceptance, E,TB,D, VU,NR by  at 2/11/2023 1702    Acceptance, E,TB, VU,NR by  at 2/5/2023 0935    Acceptance, E,TB, VU,NR by  at 2/4/2023 0911                   Point: Precautions (Done)     Learning Progress Summary           Patient Acceptance, E,TB,D, VU by  at 2/16/2023 1030    Acceptance, E,TB,D, VU,NR by  at 2/15/2023 1536    Eager, E,TB,D, VU by  at 2/13/2023 1610    Acceptance, E,TB,D, VU,NR by  at 2/11/2023 1702    Acceptance, E,TB, VU,NR by  at 2/5/2023 0935    Acceptance, E,TB, VU,NR by  at 2/4/2023 0911                               User Key     Initials Effective Dates Name Provider Type Discipline     03/07/18 -  Mila Tam, VEL Physical Therapist Assistant PT     06/16/21 -  Gian Guillen, PT Physical Therapist PT     12/13/22 -  Sunni Wolfe, PT Physical Therapist PT     04/08/22 -  Lilibeth Herrera, PT Physical Therapist PT              PT Recommendation and Plan     Plan of Care Reviewed With: patient  Progress: improving  Outcome Evaluation: Pt agreed to PT session, pt wanted to try and incr distance today and she was able to , amb ~150ft , CGA, pt encouraged to sit in chair today x2, pt declined at present due to busy morning already , but agreed to get in chair w/nsg later, plans home at [place] , but will have [facility] support and will benefit from HH initially, pt will need BSC, RWX at [place]     Time Calculation:    PT Charges     Row Name 02/16/23 1030             Time Calculation    Start Time 0851  -      Stop Time 0922  -      Time Calculation (min) 31 min  -      PT Received On 02/16/23  -SUSY      PT - Next Appointment 02/17/23  -SUSY            User Key  (r) = Recorded By, (t) = Taken By, (c) = Cosigned By    Initials Name Provider Type    SUSY  Mila Tam PTA Physical Therapist Assistant              Therapy Charges for Today     Code Description Service Date Service Provider Modifiers Qty    89331910058 HC PT THER PROC EA 15 MIN 2/15/2023 Mila Tam PTA GP 3    92796910145 HC PT THER PROC EA 15 MIN 2/16/2023 Mila Tam PTA GP 2          PT G-Codes  Outcome Measure Options: AM-PAC 6 Clicks Basic Mobility (PT)  AM-PAC 6 Clicks Score (PT): 17  PT Discharge Summary  Anticipated Discharge Disposition (PT): home with assist, home with home health (fam going to stay w/pt)    Mila Tam PTA  2/16/2023

## 2023-02-16 NOTE — PLAN OF CARE
Goal Outcome Evaluation:  Plan of Care Reviewed With: (P) patient   Patient continued to complain of nausea.   Zofran given at 1000.   Patient refused amlodipine.   Patient ambulated up to chair.   IV antibiotics given.   VSS.   Safety maintained.

## 2023-02-16 NOTE — PLAN OF CARE
Goal Outcome Evaluation:  Plan of Care Reviewed With: patient        Progress: improving  Outcome Evaluation: Pt agreed to PT session, pt wanted to try and incr distance today and she was able to , amb ~150ft , CGA, pt encouraged to sit in chair today x2, pt declined at present due to busy morning already , but agreed to get in chair w/nsg later, plans home at [place] , but will have fam support and will benefit from HH initially, pt will need BSC, RWX at [place]    Patient was intermittently wearing a face mask during this therapy encounter. Therapist used appropriate personal protective equipment including eye protection, mask, and gloves.  Mask used was standard procedure mask. Appropriate PPE was worn during the entire therapy session. Hand hygiene was completed before and after therapy session. Patient is not in enhanced droplet precautions.

## 2023-02-16 NOTE — DISCHARGE PLACEMENT REQUEST
"Hali Tejeda (62 y.o. Female)     Date of Birth   1960    Social Security Number       Address   [address] [place] [address]    Home Phone   [phone]    MRN   [id number]       Jain   None    Marital Status   Single                            Admission Date   1/27/23    Admission Type   Emergency    Admitting Provider   Gideon Hope MD    Attending Provider   Gideon Hope MD    Department, Room/Bed   [address], [address]       Discharge Date       Discharge Disposition       Discharge Destination                               Attending Provider: Gideon Hope MD    Allergies: Hydrocodone, Sulfa Antibiotics    Isolation: None   Infection: None   Code Status: CPR    Ht: 154.9 cm (61\")   Wt: 75.7 kg (166 lb 14.2 oz)    Admission Cmt: None   Principal Problem: Colitis [K52.9]                 Active Insurance as of 1/27/2023     Primary Coverage     Payor Plan Insurance Group Employer/Plan Group    [facility] [employer]PL[employer] [id number]     Payor Plan Address Payor Plan Phone Number Payor Plan Fax Number Effective Dates    [phone]  1/1/2022 - None Entered    [facility] [address]       Subscriber Name Subscriber Birth Date Member ID       HALI TEJEDA 1960 [id number]                 Emergency Contacts      (Rel.) Home Phone Work Phone Mobile Phone    SANDRA TEJEDA (Daughter) [phone] -- --    BRADYAMAURI (Mother) [phone] -- [phone]              "

## 2023-02-16 NOTE — PROGRESS NOTES
[facility]     Progress Note    Patient Name: Hali Tejeda  : 1960  MRN: [id number]  Primary Care Physician:  Richard Delgado MD  Date of admission: 2023    Subjective   Subjective     Chief Complaint: racheal    History of Present Illness    Patient with racheal and hyponatremia    Review of Systems      Objective   Objective     Vitals:   Temp:  [98.4 °F (36.9 °C)-98.8 °F (37.1 °C)] 98.5 °F (36.9 °C)  Heart Rate:  [92-99] 99  Resp:  [16-18] 18  BP: (125-149)/(78-94) 138/86    Physical Exam      Result Review    Result Review:  I have personally reviewed the results from the time of this admission to 2023 07:53 EST and agree with these findings:  []  Laboratory list / accordion  []  Microbiology  []  Radiology  []  EKG/Telemetry   []  Cardiology/Vascular   []  Pathology  []  Old records  []  Other:      Assessment & Plan   Assessment / Plan     Brief Patient Summary:  Hali Tejeda is a 62 y.o. female who has racheal and hyponatremia    Active Hospital Problems:  Active Hospital Problems    Diagnosis    • **Colitis    • Nausea and vomiting    • Stricture of sigmoid colon (HCC)    • Left sided colitis with complication (HCC)      Plan:   acute renal failure, creatinine peaked at 2.3 and is below baseline today,   Hypokalemia   Metabolic alkalosis   Hypoalbuminemia   Hypophosphatemia     Colitis status post sigmoid colon resection    Nausea and vomiting,  Postop ileus, remains n.p.o.  Abdominal fluid collection status post drain placement   Stricture of sigmoid colon, status post colostomy  Hypokalemia, worse today  Hypertension, receiving as needed IV hydralazine  Anemia    Patient chart reviewed. Renal function stable. Electrolytes reviewed. TPN per pharm. Will follow as needed    Dionne Rojo MD

## 2023-02-16 NOTE — PLAN OF CARE
Goal Outcome Evaluation:              Outcome Evaluation: VSS. DENIES NEED FOR PAIN MEDICATION AND DENIES NEED FOR NAUSEA MEDICATION. RESTED WELL IN BETWEEN CARE.

## 2023-02-16 NOTE — PROGRESS NOTES
[facility] Clinical Pharmacy Services: TPN Daily Progress Note    TPN Day # 7   Indication: Prolonged Paralytic Ileus  Route: central  Type: standard    Subjective/Objective  Results from last 7 days   Lab Units 23  0341 23  0238 23  0321   SODIUM mmol/L 133*   < > 132*   POTASSIUM mmol/L 4.3   < > 4.0   CHLORIDE mmol/L 101   < > 99   CO2 mmol/L 23.0   < > 28.4   BUN mg/dL 11   < > 7*   CREATININE mg/dL 0.48*   < > 0.37*   CALCIUM mg/dL 8.1*   < > 8.5*   ALBUMIN g/dL 2.2*   < > 2.4*   BILIRUBIN mg/dL 0.3   < > 0.5   ALK PHOS U/L 66   < > 65   ALT (SGPT) U/L 9   < > 9   AST (SGOT) U/L 11   < > 13   GLUCOSE mg/dL 184*   < > 138*   MAGNESIUM mg/dL 1.9   < > 1.9   PHOSPHORUS mg/dL 2.4*   < > 2.6   TRIGLYCERIDES mg/dL  --   --  126    < > = values in this interval not displayed.        Diet Orders (active) (From admission, onward)       Start     Ordered    23 1000  Diet: Liquid Diets, Diabetic Diets; Clear Liquid; Consistent Carbohydrate; Texture: Regular Texture (IDDSI 7); Fluid Consistency: Thin (IDDSI 0)  Diet Effective Now         23 1000                  Additional insulin administration while previous TPN infusin units  Additional electrolyte administration while previous TPN infusing: none  Acid suppression: pepcid IV    Current TPN Formula: 100gm/1350KCal/100mL AA/Dex/Lipids    Assessment/Plan    Macronutrients: at goal - repeat  Electrolytes/Additives: will add NaPhos into TPN today based on labs  MVI for TPN   Labs: BMP, Mg, Phos  Comments: Added NaPhos for low Na and Phos today - will review again in am.     Nyla Neville, PharmD  Clinical Pharmacist

## 2023-02-16 NOTE — CASE MANAGEMENT/SOCIAL WORK
Continued Stay Note  [facility]     Patient Name: Hali Tejeda  MRN: [id number]  Today's Date: 2/16/2023    Admit Date: 1/27/2023    Plan: Home w/ BHH, rolling walker, and 3 in 1 commode   Discharge Plan     Row Name 02/16/23 1316       Plan    Plan Home w/ BHH, rolling walker, and 3 in 1 commode. Needs wound vac (pending - order sent)    Patient/Family in Agreement with Plan yes    Plan Comments Patient was able to walk 150ft with PT today. CCP spoke with patient at bedside who is agreeable to plan home w/ BHH. Patient is requesting a rolling walker & a 3 in 1 commode. Referral sent to [facility] & order requested. CCP spoke with [facility] and notified patient will likely be ready for dc in a few days. CCP faxed [facility] wound vac order form. CCP following to make sure wound vac is delivered at bedside prior to dc. BUSTER HARRISON               Discharge Codes    No documentation.               Expected Discharge Date and Time     Expected Discharge Date Expected Discharge Time    Feb 20, 2023             BUSTER Watson

## 2023-02-16 NOTE — PROGRESS NOTES
Colorectal Surgery Progress Note    POD 13    S: positive ostomy output. positive ambulating. Pain controlled. Complaining of nausea.     O:  Temp:  [98.4 °F (36.9 °C)-98.8 °F (37.1 °C)] 98.5 °F (36.9 °C)  Heart Rate:  [92-99] 99  Resp:  [16-18] 18  BP: (125-149)/(78-94) 138/86    Intake/Output Summary (Last 24 hours) at 2/16/2023 0957  Last data filed at 2/16/2023 0914  Gross per 24 hour   Intake 0 ml   Output 75 ml   Net -75 ml     Abd:   soft, non distended. Wound vac in place. Ostomy productive of brown stool. NEW serosanguinous.  Incisions: clean, dry, intact, no erythema    Results from last 7 days   Lab Units 02/16/23  0341   WBC 10*3/mm3 20.90*   HEMOGLOBIN g/dL 9.5*   HEMATOCRIT % 28.8*   PLATELETS 10*3/mm3 514*     Results from last 7 days   Lab Units 02/16/23  0341   SODIUM mmol/L 133*   POTASSIUM mmol/L 4.3   CHLORIDE mmol/L 101   CO2 mmol/L 23.0   BUN mg/dL 11   CREATININE mg/dL 0.48*   EGFR mL/min/1.73 106.6   GLUCOSE mg/dL 184*   CALCIUM mg/dL 8.1*   PHOSPHORUS mg/dL 2.4*     Results from last 7 days   Lab Units 02/16/23  0341   MAGNESIUM mg/dL 1.9     A/P: 62 y.o. female s/p robotic assisted laparoscopic left and sigmoid resection with colostomy converted open due to splenic injury 02/03/2023    - Advised pt to back off PO intake if nausea continues. Back to clear liquid diet for now.  - continue ambulation  - continue to monitor  - Dr. Wei to see this pt in Dr. Gaston's absence

## 2023-02-17 ENCOUNTER — APPOINTMENT (OUTPATIENT)
Dept: CT IMAGING | Facility: HOSPITAL | Age: 63
DRG: 329 | End: 2023-02-17
Payer: COMMERCIAL

## 2023-02-17 LAB
ALBUMIN SERPL-MCNC: 2.2 G/DL (ref 3.5–5.2)
ALBUMIN/GLOB SERPL: 0.6 G/DL
ALP SERPL-CCNC: 59 U/L (ref 39–117)
ALT SERPL W P-5'-P-CCNC: 6 U/L (ref 1–33)
ANION GAP SERPL CALCULATED.3IONS-SCNC: 5.9 MMOL/L (ref 5–15)
AST SERPL-CCNC: 12 U/L (ref 1–32)
BACTERIA FLD CULT: NORMAL
BASOPHILS # BLD MANUAL: 0.2 10*3/MM3 (ref 0–0.2)
BASOPHILS NFR BLD MANUAL: 1 % (ref 0–1.5)
BILIRUB SERPL-MCNC: 0.3 MG/DL (ref 0–1.2)
BUN SERPL-MCNC: 11 MG/DL (ref 8–23)
BUN/CREAT SERPL: 24.4 (ref 7–25)
CALCIUM SPEC-SCNC: 8.1 MG/DL (ref 8.6–10.5)
CHLORIDE SERPL-SCNC: 104 MMOL/L (ref 98–107)
CO2 SERPL-SCNC: 23.1 MMOL/L (ref 22–29)
CREAT SERPL-MCNC: 0.45 MG/DL (ref 0.57–1)
DEPRECATED RDW RBC AUTO: 43.8 FL (ref 37–54)
EGFRCR SERPLBLD CKD-EPI 2021: 108.3 ML/MIN/1.73
ERYTHROCYTE [DISTWIDTH] IN BLOOD BY AUTOMATED COUNT: 14.2 % (ref 12.3–15.4)
GLOBULIN UR ELPH-MCNC: 4 GM/DL
GLUCOSE BLDC GLUCOMTR-MCNC: 154 MG/DL (ref 70–130)
GLUCOSE BLDC GLUCOMTR-MCNC: 189 MG/DL (ref 70–130)
GLUCOSE BLDC GLUCOMTR-MCNC: 203 MG/DL (ref 70–130)
GLUCOSE BLDC GLUCOMTR-MCNC: 203 MG/DL (ref 70–130)
GLUCOSE SERPL-MCNC: 177 MG/DL (ref 65–99)
GRAM STN SPEC: NORMAL
GRAM STN SPEC: NORMAL
HCT VFR BLD AUTO: 27 % (ref 34–46.6)
HGB BLD-MCNC: 9 G/DL (ref 12–15.9)
HYPOCHROMIA BLD QL: ABNORMAL
LYMPHOCYTES # BLD MANUAL: 1.58 10*3/MM3 (ref 0.7–3.1)
LYMPHOCYTES NFR BLD MANUAL: 9 % (ref 5–12)
MAGNESIUM SERPL-MCNC: 1.6 MG/DL (ref 1.6–2.4)
MCH RBC QN AUTO: 28.3 PG (ref 26.6–33)
MCHC RBC AUTO-ENTMCNC: 33.3 G/DL (ref 31.5–35.7)
MCV RBC AUTO: 84.9 FL (ref 79–97)
MICROCYTES BLD QL: ABNORMAL
MONOCYTES # BLD: 1.77 10*3/MM3 (ref 0.1–0.9)
MYELOCYTES NFR BLD MANUAL: 3 % (ref 0–0)
NEUTROPHILS # BLD AUTO: 15.57 10*3/MM3 (ref 1.7–7)
NEUTROPHILS NFR BLD MANUAL: 79 % (ref 42.7–76)
NRBC BLD AUTO-RTO: 0 /100 WBC (ref 0–0.2)
PHOSPHATE SERPL-MCNC: 2.7 MG/DL (ref 2.5–4.5)
PLAT MORPH BLD: NORMAL
PLATELET # BLD AUTO: 493 10*3/MM3 (ref 140–450)
PMV BLD AUTO: 9.5 FL (ref 6–12)
POLYCHROMASIA BLD QL SMEAR: ABNORMAL
POTASSIUM SERPL-SCNC: 4.3 MMOL/L (ref 3.5–5.2)
PROT SERPL-MCNC: 6.2 G/DL (ref 6–8.5)
RBC # BLD AUTO: 3.18 10*6/MM3 (ref 3.77–5.28)
SODIUM SERPL-SCNC: 133 MMOL/L (ref 136–145)
VARIANT LYMPHS NFR BLD MANUAL: 8 % (ref 19.6–45.3)
WBC MORPH BLD: NORMAL
WBC NRBC COR # BLD: 19.71 10*3/MM3 (ref 3.4–10.8)

## 2023-02-17 PROCEDURE — 25010000002 ONDANSETRON PER 1 MG: Performed by: COLON & RECTAL SURGERY

## 2023-02-17 PROCEDURE — 80053 COMPREHEN METABOLIC PANEL: CPT | Performed by: SURGERY

## 2023-02-17 PROCEDURE — 25010000002 MAGNESIUM SULFATE PER 500 MG OF MAGNESIUM: Performed by: COLON & RECTAL SURGERY

## 2023-02-17 PROCEDURE — 99024 POSTOP FOLLOW-UP VISIT: CPT | Performed by: PHYSICIAN ASSISTANT

## 2023-02-17 PROCEDURE — 25010000002 METOCLOPRAMIDE PER 10 MG: Performed by: COLON & RECTAL SURGERY

## 2023-02-17 PROCEDURE — 25010000002 IOPAMIDOL 61 % SOLUTION: Performed by: HOSPITALIST

## 2023-02-17 PROCEDURE — 85007 BL SMEAR W/DIFF WBC COUNT: CPT | Performed by: HOSPITALIST

## 2023-02-17 PROCEDURE — 82962 GLUCOSE BLOOD TEST: CPT

## 2023-02-17 PROCEDURE — 63710000001 INSULIN REGULAR HUMAN PER 5 UNITS: Performed by: SURGERY

## 2023-02-17 PROCEDURE — 74177 CT ABD & PELVIS W/CONTRAST: CPT

## 2023-02-17 PROCEDURE — 25010000002 FLUCONAZOLE PER 200 MG: Performed by: INTERNAL MEDICINE

## 2023-02-17 PROCEDURE — 25010000002 CALCIUM GLUCONATE PER 10 ML: Performed by: COLON & RECTAL SURGERY

## 2023-02-17 PROCEDURE — 0 DIATRIZOATE MEGLUMINE & SODIUM PER 1 ML: Performed by: INTERNAL MEDICINE

## 2023-02-17 PROCEDURE — 84100 ASSAY OF PHOSPHORUS: CPT | Performed by: COLON & RECTAL SURGERY

## 2023-02-17 PROCEDURE — 85025 COMPLETE CBC W/AUTO DIFF WBC: CPT | Performed by: HOSPITALIST

## 2023-02-17 PROCEDURE — 97110 THERAPEUTIC EXERCISES: CPT

## 2023-02-17 PROCEDURE — 25010000002 POTASSIUM CHLORIDE PER 2 MEQ OF POTASSIUM: Performed by: COLON & RECTAL SURGERY

## 2023-02-17 PROCEDURE — 25010000002 CEFTRIAXONE PER 250 MG: Performed by: SURGERY

## 2023-02-17 PROCEDURE — 83735 ASSAY OF MAGNESIUM: CPT | Performed by: INTERNAL MEDICINE

## 2023-02-17 RX ADMIN — METOCLOPRAMIDE 10 MG: 5 INJECTION, SOLUTION INTRAMUSCULAR; INTRAVENOUS at 00:25

## 2023-02-17 RX ADMIN — METRONIDAZOLE 500 MG: 500 INJECTION, SOLUTION INTRAVENOUS at 00:26

## 2023-02-17 RX ADMIN — ONDANSETRON 4 MG: 2 INJECTION INTRAMUSCULAR; INTRAVENOUS at 14:43

## 2023-02-17 RX ADMIN — FLUCONAZOLE IN SODIUM CHLORIDE 400 MG: 2 INJECTION, SOLUTION INTRAVENOUS at 17:09

## 2023-02-17 RX ADMIN — INSULIN HUMAN 3 UNITS: 100 INJECTION, SOLUTION PARENTERAL at 00:25

## 2023-02-17 RX ADMIN — METRONIDAZOLE 500 MG: 500 INJECTION, SOLUTION INTRAVENOUS at 14:43

## 2023-02-17 RX ADMIN — FAMOTIDINE 20 MG: 10 INJECTION INTRAVENOUS at 10:30

## 2023-02-17 RX ADMIN — POTASSIUM PHOSPHATE, MONOBASIC POTASSIUM PHOSPHATE, DIBASIC: 224; 236 INJECTION, SOLUTION, CONCENTRATE INTRAVENOUS at 18:33

## 2023-02-17 RX ADMIN — INSULIN HUMAN 2 UNITS: 100 INJECTION, SOLUTION PARENTERAL at 06:04

## 2023-02-17 RX ADMIN — METRONIDAZOLE 500 MG: 500 INJECTION, SOLUTION INTRAVENOUS at 06:04

## 2023-02-17 RX ADMIN — INSULIN GLARGINE-YFGN 15 UNITS: 100 INJECTION, SOLUTION SUBCUTANEOUS at 21:58

## 2023-02-17 RX ADMIN — Medication 10 ML: at 21:59

## 2023-02-17 RX ADMIN — Medication 10 ML: at 10:29

## 2023-02-17 RX ADMIN — ACETAMINOPHEN 650 MG: 325 TABLET, FILM COATED ORAL at 06:38

## 2023-02-17 RX ADMIN — DIATRIZOATE MEGLUMINE AND DIATRIZOATE SODIUM 30 ML: 660; 100 LIQUID ORAL; RECTAL at 12:12

## 2023-02-17 RX ADMIN — INSULIN HUMAN 3 UNITS: 100 INJECTION, SOLUTION PARENTERAL at 23:42

## 2023-02-17 RX ADMIN — METOCLOPRAMIDE 10 MG: 5 INJECTION, SOLUTION INTRAMUSCULAR; INTRAVENOUS at 11:48

## 2023-02-17 RX ADMIN — FAMOTIDINE 20 MG: 10 INJECTION INTRAVENOUS at 18:31

## 2023-02-17 RX ADMIN — Medication 10 ML: at 10:36

## 2023-02-17 RX ADMIN — METOCLOPRAMIDE 10 MG: 5 INJECTION, SOLUTION INTRAMUSCULAR; INTRAVENOUS at 18:46

## 2023-02-17 RX ADMIN — ACETAMINOPHEN 650 MG: 325 TABLET, FILM COATED ORAL at 23:21

## 2023-02-17 RX ADMIN — ONDANSETRON 4 MG: 2 INJECTION INTRAMUSCULAR; INTRAVENOUS at 00:41

## 2023-02-17 RX ADMIN — CEFTRIAXONE SODIUM 2 G: 2 INJECTION, POWDER, FOR SOLUTION INTRAMUSCULAR; INTRAVENOUS at 11:51

## 2023-02-17 RX ADMIN — IOPAMIDOL 85 ML: 612 INJECTION, SOLUTION INTRAVENOUS at 16:32

## 2023-02-17 RX ADMIN — METOCLOPRAMIDE 10 MG: 5 INJECTION, SOLUTION INTRAMUSCULAR; INTRAVENOUS at 23:42

## 2023-02-17 RX ADMIN — INSULIN GLARGINE-YFGN 10 UNITS: 100 INJECTION, SOLUTION SUBCUTANEOUS at 08:46

## 2023-02-17 RX ADMIN — METRONIDAZOLE 500 MG: 500 INJECTION, SOLUTION INTRAVENOUS at 21:58

## 2023-02-17 RX ADMIN — I.V. FAT EMULSION 20 G: 20 EMULSION INTRAVENOUS at 18:33

## 2023-02-17 RX ADMIN — Medication 10 ML: at 10:30

## 2023-02-17 RX ADMIN — INSULIN HUMAN 2 UNITS: 100 INJECTION, SOLUTION PARENTERAL at 18:30

## 2023-02-17 RX ADMIN — METOCLOPRAMIDE 10 MG: 5 INJECTION, SOLUTION INTRAMUSCULAR; INTRAVENOUS at 06:04

## 2023-02-17 NOTE — PROGRESS NOTES
[facility] Clinical Pharmacy Services: TPN Daily Progress Note    TPN Day # 8   Indication: Prolonged Paralytic Ileus  Route: central  Type: standard    Subjective/Objective  Results from last 7 days   Lab Units 23  0308 23  0238 23  0321   SODIUM mmol/L 133*   < > 132*   POTASSIUM mmol/L 4.3   < > 4.0   CHLORIDE mmol/L 104   < > 99   CO2 mmol/L 23.1   < > 28.4   BUN mg/dL 11   < > 7*   CREATININE mg/dL 0.45*   < > 0.37*   CALCIUM mg/dL 8.1*   < > 8.5*   ALBUMIN g/dL 2.2*   < > 2.4*   BILIRUBIN mg/dL 0.3   < > 0.5   ALK PHOS U/L 59   < > 65   ALT (SGPT) U/L 6   < > 9   AST (SGOT) U/L 12   < > 13   GLUCOSE mg/dL 177*   < > 138*   MAGNESIUM mg/dL 1.6   < > 1.9   PHOSPHORUS mg/dL 2.7   < > 2.6   TRIGLYCERIDES mg/dL  --   --  126    < > = values in this interval not displayed.        Diet Orders (active) (From admission, onward)       Start     Ordered    23 1028  Diet: Diabetic Diets, Gastrointestinal Diets, Regular/House Diet; Consistent Carbohydrate; Low Irritant; Texture: Regular Texture (IDDSI 7); Fluid Consistency: Thin (IDDSI 0)  Diet Effective Now         23 1029                  Additional insulin administration while previous TPN infusin units  Additional electrolyte administration while previous TPN infusing: none  Acid suppression: pepcid IV    Current TPN Formula: 100gm/1350KCal/100mL AA/Dex/Lipids    Assessment/Plan    Macronutrients: repeat - at goal  Electrolytes/Additives: repeat  MVI for TPN   Labs: CMP, Mg, Phos  Comments: Patient labs reviewed, will repeat TPN today and review again in am.      Nyla Neville, PharmD  Clinical Pharmacist

## 2023-02-17 NOTE — PROGRESS NOTES
I met FRANNY Spear, to take down the wound vac and inspect the midline abdominal wound. There was a moderate amount of sloughing tissue in the wound, which I debrided with sterile scissors and a #15 blade. A significant a significant amount of slough was removed. A loose suture remained but was not causing patient discomfort. The wound was noted to track significantly in the cranial direction as well as to the patient's right, about 6cm each way. There was a small amount of sloughing tissue at the superior and inferior aspects of the wounds, which we will continue to monitor. Patient tolerated procedure well.

## 2023-02-17 NOTE — PROGRESS NOTES
"Nutrition Services    Patient Name:  Hali Tejeda  YOB: 1960  MRN: [id number]  Admit Date:  1/27/2023    PROGRESS NOTE - CLINICAL NUTRITION    Assessment Date:  02/17/23    Comments: Follow up for TPN    Patient's current TPN regimen: 100/1350/100 mL 20% AA/Dex/Lipids   Goal: 100 AA/1350 Dex/100mL 20% SMOF   Labs: Glucose-203/177, Na-133, Crea 0.45 , Alb 2.2  LBM-2/17    Plan/Recommendations: PharmD to monitor and adjust lytes as needed.     RD to continue to monitor per protocol.     Encounter Information         Reason For Encounter Follow up for TPN    Current Issues Pt reports still experiencing nausea but is improving. Tolerating liquid diet, just advanced to low irritant diet. Pt reports she trying to increase her intake a little more each meal.      Estimated Requirements         Nutrient Needs:  Estimated kcal: 0135-2730 kcal/day   Estimated protein:  gm/day   Estimated fluid: 2075 mL/day      Current Nutrition Orders & Evaluation of Intake       Oral Nutrition     Current PO Diet Adult Central 2-in-1 TPN  Diet: Liquid Diets, Diabetic Diets; Full Liquid; Consistent Carbohydrate; Texture: Regular Texture (IDDSI 7); Fluid Consistency: Thin (IDDSI 0)   Supplement n/a   PO Evaluation     Trending % PO Intake     Factors Affecting Intake  altered mental status, swallow impairment      Parenteral Nutrition      TPN Route Central   TPN Rate (mL/hr) 75   Current TPN Order        Dextrose (kcal) 1350       Amino Acid (gm) 100       Lipid Concentration 20%       Lipid Volume/Frequency  100 mL, daily   MVI Frequency  10mL   Trace Element Frequency  1mL   Total # Days on TPN 7   Propofol Rate/Kcal    TPN Current Provision          Calories 100 % of needs met        Protein (gm) 100 % of needs met        Fluid (mL) 1800 mL   TPN Needs Evaluation meeting needs for calories & protein    TPN Changes none     Anthropometrics          Height    Weight Height: 154.9 cm (61\")  Weight: 75.7 kg (166 lb " 14.2 oz) (02/17/23 0431)    BMI kg/m2 Body mass index is 31.53 kg/m².    Weight trend Loss      Labs        Pertinent Labs Reviewed, listed below     Results from last 7 days   Lab Units 02/17/23  0308 02/16/23  0341 02/15/23  0422   SODIUM mmol/L 133* 133* 132*   POTASSIUM mmol/L 4.3 4.3 4.4   CHLORIDE mmol/L 104 101 104   CO2 mmol/L 23.1 23.0 24.7   BUN mg/dL 11 11 13   CREATININE mg/dL 0.45* 0.48* 0.42*   CALCIUM mg/dL 8.1* 8.1* 8.3*   BILIRUBIN mg/dL 0.3 0.3 0.4   ALK PHOS U/L 59 66 77   ALT (SGPT) U/L 6 9 11   AST (SGOT) U/L 12 11 9   GLUCOSE mg/dL 177* 184* 173*     Results from last 7 days   Lab Units 02/17/23  0308 02/16/23  0341 02/15/23  0422 02/14/23  0238 02/13/23  0321   MAGNESIUM mg/dL 1.6 1.9 1.8   < > 1.9   PHOSPHORUS mg/dL 2.7 2.4* 2.7   < > 2.6   HEMOGLOBIN g/dL 9.0* 9.5* 9.0*   < > 9.7*   HEMATOCRIT % 27.0* 28.8* 27.2*   < > 29.3*   WBC 10*3/mm3 19.71* 20.90* 23.70*   < > 28.34*   TRIGLYCERIDES mg/dL  --   --   --   --  126   ALBUMIN g/dL 2.2* 2.2* 2.4*   < > 2.4*    < > = values in this interval not displayed.     Results from last 7 days   Lab Units 02/17/23  0308 02/16/23  0341 02/15/23  0422 02/14/23  0238 02/13/23  0321   PLATELETS 10*3/mm3 493* 514* 502* 500* 479*     No results found for: COVID19  Lab Results   Component Value Date    HGBA1C 5.60 01/29/2023          Medications            Scheduled Medications amLODIPine, 10 mg, Oral, Q24H  cefTRIAXone, 2 g, Intravenous, Q24H  famotidine, 20 mg, Intravenous, BID AC  Fat Emulsion Plant Based, 100 mL, Intravenous, Q24H (TPN)  fluconazole, 400 mg, Intravenous, Q24H  insulin glargine, 10 Units, Subcutaneous, Q12H  insulin regular, 0-7 Units, Subcutaneous, Q6H  metoclopramide, 10 mg, Intravenous, Q6H  metroNIDAZOLE, 500 mg, Intravenous, Q8H  sodium chloride, 10 mL, Intravenous, Q12H  sodium chloride, 10 mL, Intravenous, Q12H        Infusions Adult Central 2-in-1 TPN, , Last Rate: 75 mL/hr at 02/16/23 1830  Pharmacy to Dose TPN,         PRN  Medications •  acetaminophen  •  dextrose  •  dextrose  •  glucagon (human recombinant)  •  hydrALAZINE  •  [] HYDROmorphone **AND** naloxone  •  nitroglycerin  •  ondansetron  •  Pharmacy to Dose TPN  •  potassium chloride  •  potassium phosphate infusion greater than 15 mMoles **OR** potassium phosphate infusion greater than 15 mMoles **OR** potassium phosphate **OR** sodium phosphate IVPB **OR** sodium phosphate IVPB  •  sodium chloride  •  sodium chloride  •  sodium chloride     Physical Findings          Physical Appearance alert, oriented   Oral/Mouth Cavity WNL   Edema  1+ (trace), 2+ (mild) feet and ankles    Gastrointestinal nausea, non-distended , normoactive, last bowel movement:    Skin  surgical incision abdomen, NPWT   Tubes/Drains other: colostomy, PICC line    NFPE Consented to exam, Date Completed: 2/10       NUTRITION INTERVENTION / PLAN OF CARE  Intervention Goal         Intervention Goal(s) Improved nutrition related labs, Meet estimated needs, Maintain weight and No significant weight loss     Nutrition Intervention         RD Action Follow Tx Progress and Care plan reviewed     Prescription         Diet Prescription     Supplement Prescription    EN/PN Prescription    New Prescription Ordered? Continue same per protocol   --  Monitor/Evaluation        Monitor Per protocol, I&O, Pertinent labs, PN delivery/tolerance, Weight, Skin status   Discharge Needs Pending clinical course   Education Will instruct as appropriate       Electronically signed by:  Marlene Rogel RD  23 09:31 EST

## 2023-02-17 NOTE — THERAPY TREATMENT NOTE
Patient Name: Hali Tejeda  : 1960    MRN: [id number]                              Today's Date: 2023       Admit Date: 2023    Visit Dx:     ICD-10-CM ICD-9-CM   1. Colitis  K52.9 558.9   2. Nausea and vomiting, unspecified vomiting type  R11.2 787.01   3. Stricture of sigmoid colon (HCC)  K56.699 560.9     Patient Active Problem List   Diagnosis   • Left sided colitis with complication (HCC)   • Colitis   • Nausea and vomiting   • Stricture of sigmoid colon (HCC)   • Uterine anomaly   • Hypertension   • Avulsion fracture of distal fibula     Past Medical History:   Diagnosis Date   • Abnormal EKG 2020   • Avulsion fracture of distal fibula 2015   • H. pylori infection 2020   • Hepatic steatosis 2020   • Hiatal hernia    • HTN (hypertension)    • Hyperlipidemia    • Insomnia    • Left sided colitis with complication (HCC) 2023    ADMITTED TO [facility]   • LGSIL on Pap smear of cervix     (+) HPV   • LGSIL Pap smear of vagina 2016   • Snoring    • Stricture of sigmoid colon (HCC) 2023   • Uterine fibroid    • Vaginal atrophy      Past Surgical History:   Procedure Laterality Date   • BREAST LUMPECTOMY Left     benign   •  SECTION N/A    • COLON RESECTION N/A 2/3/2023    Procedure: COLON RESECTION LAPAROSCOPIC CONVERTED TO OPEN SIGMOID AND LEFT MOBILIZATION OF SPLENIC FLEXURE WITH DAVINCI ROBOT;  Surgeon: Lino Gaston MD;  Location: [facility];  Service: Robotics - DaVinci;  Laterality: N/A;   • COLONOSCOPY N/A 2014    COULD ONLY GET SCOPE TO 70 CM DUE TO SIGNIFICANT STRICTURE SECONDARY TO SEVERE DIVERTICULITIS OR CANCER, ACBE ORDERED, DR. PARAG HUDSON AT [place]   • COLONOSCOPY N/A 2023    MODERATE STENOSIS IN SIGMOID-DILATED, MANY DIVERTICULA IN SIGMOID AND DESCENDING, COPIOUS AMTS OF STOOL, MUCOSAL TEAR 55 CM FROM ANAL VERGE, DR. JENNI SMITH AT [facility]   • COLPOSCOPY N/A 2016   • ENDOSCOPY N/A 2023    GASTRITIS, SMALL  HIATAL HERNIA, DR. JENNI SMITH AT [facility]   • ENDOSCOPY N/A 09/15/2009    DR. PARAG HUDSON AT [place]   • GASTRIC SLEEVE LAPAROSCOPIC N/A 07/09/2020    WITH REMOVAL OF GASTRIC BAND, DR. OLIMPIA PALACIOS AT [facility]   • HYSTERECTOMY N/A 01/2005    WITH RSO   • LAPAROSCOPIC GASTRIC BANDING N/A 10/20/2019    DR. PARAG HUDSON AT [place]   • SALPINGO OOPHORECTOMY Left     LSO, IN TEEN YEARS   • WISDOM TOOTH EXTRACTION Bilateral       General Information     Row Name 02/17/23 1415          Physical Therapy Time and Intention    Document Type therapy note (daily note)  -     Mode of Treatment individual therapy;physical therapy  -     Row Name 02/17/23 1415          General Information    Patient Profile Reviewed yes  -     Existing Precautions/Restrictions fall  multiple lines, tubes, drains, wound vac  -     Row Name 02/17/23 1415          Cognition    Orientation Status (Cognition) oriented x 4  -[facility] Name 02/17/23 1415          Safety Issues, Functional Mobility    Impairments Affecting Function (Mobility) balance;endurance/activity tolerance;pain;strength  -           User Key  (r) = Recorded By, (t) = Taken By, (c) = Cosigned By    Initials Name Provider Type     Mila Tam PTA Physical Therapist Assistant               Mobility     Row Name 02/17/23 1415          Bed Mobility    Supine-Sit [place] (Bed Mobility) minimum assist (75% patient effort);verbal cues  -     Sit-Supine [place] (Bed Mobility) minimum assist (75% patient effort);verbal cues;nonverbal cues (demo/gesture)  -     Assistive Device (Bed Mobility) bed rails  -     Row Name 02/17/23 1415          Sit-Stand Transfer    Sit-Stand [place] (Transfers) contact guard;verbal cues  -     Assistive Device (Sit-Stand Transfers) walker, front-wheeled  -     Row Name 02/17/23 1415          Gait/Stairs (Locomotion)    [place] Level (Gait) minimum assist (75% patient effort);verbal cues;nonverbal cues  (demo/gesture)  -     Assistive Device (Gait) walker, front-wheeled  -     Deviations/Abnormal Patterns (Gait) jasmin decreased;festinating/shuffling;antalgic;stride length decreased  -     Bilateral Gait Deviations forward flexed posture  -           User Key  (r) = Recorded By, (t) = Taken By, (c) = Cosigned By    Initials Name Provider Type    Mila Diallo PTA Physical Therapist Assistant               Obj/Interventions    No documentation.                Goals/Plan    No documentation.                Clinical Impression     Row Name 02/17/23 1415          Pain    Pretreatment Pain Rating 5/10  -     Posttreatment Pain Rating 6/10  -     Pain Location incisional  -     Pain Location - abdomen  -[facility] Name 02/17/23 1415          Plan of Care Review    Plan of Care Reviewed With patient  -     Progress improving  -     Outcome Evaluation Pt agreed to PT session, tolerated  amb dist to 150 ft, req CGA this session, SBA for tfers, plans home at [place] when medically ready, BSC, rwx delivered for home, will possibly  need HH initially  -[facility] Name 02/17/23 1415          Therapy Assessment/Plan (PT)    Rehab Potential (PT) good, to achieve stated therapy goals  -[facility] Name 02/17/23 1415          Vital Signs    O2 Delivery Pre Treatment room air  -[facility] Name 02/17/23 1415          Positioning and Restraints    In Bed fowlers;call light within reach;encouraged to call for assist;exit alarm on;notified nsg  -           User Key  (r) = Recorded By, (t) = Taken By, (c) = Cosigned By    Initials Name Provider Type    Mila Diallo PTA Physical Therapist Assistant               Outcome Measures     Row Name 02/17/23 1416          How much help from another person do you currently need...    Turning from your back to your side while in flat bed without using bedrails? 3  -JM     Moving from lying on back to sitting on the side of a flat bed without bedrails? 3  -     Moving  to and from a bed to a chair (including a wheelchair)? 3  -JM     Standing up from a chair using your arms (e.g., wheelchair, bedside chair)? 3  -JM     Climbing 3-5 steps with a railing? 2  -JM     To walk in hospital room? 3  -JM     AM-PAC 6 Clicks Score (PT) 17  -JM     Highest level of mobility 5 --> Static standing  -SUYS           User Key  (r) = Recorded By, (t) = Taken By, (c) = Cosigned By    Initials Name Provider Type    Mila Diallo PTA Physical Therapist Assistant                             Physical Therapy Education     Title: PT OT SLP Therapies (Done)     Topic: Physical Therapy (Done)     Point: Mobility training (Done)     Learning Progress Summary           Patient Acceptance, E,TB,D, VU by SUSY at 2/16/2023 1030    Acceptance, E,TB,D, VU,NR by SUSY at 2/15/2023 1536    Eager, E,TB,D, VU by SUSY at 2/13/2023 1610    Acceptance, E,TB,D, VU,NR by  at 2/11/2023 1702    Acceptance, E,TB, VU,NR by  at 2/5/2023 0935    Acceptance, E,TB, VU,NR by CS at 2/4/2023 0911    Acceptance, E, VU by  at 1/30/2023 1441                   Point: Home exercise program (Done)     Learning Progress Summary           Patient Acceptance, E,TB,D, VU by SUSY at 2/16/2023 1030    Acceptance, E,TB,D, VU,NR by SUSY at 2/15/2023 1536    Eager, E,TB,D, VU by SUSY at 2/13/2023 1610    Acceptance, E,TB,D, VU,NR by  at 2/11/2023 1702    Acceptance, E,TB, VU,NR by  at 2/5/2023 0935    Acceptance, E,TB, VU,NR by  at 2/4/2023 0911                   Point: Body mechanics (Done)     Learning Progress Summary           Patient Acceptance, E,TB,D, VU by SUSY at 2/16/2023 1030    Acceptance, E,TB,D, VU,NR by SUSY at 2/15/2023 1536    Eager, E,TB,D, VU by SUSY at 2/13/2023 1610    Acceptance, E,TB,D, VU,NR by  at 2/11/2023 1702    Acceptance, E,TB, VU,NR by CS at 2/5/2023 0935    Acceptance, E,TB, VU,NR by CS at 2/4/2023 0911                   Point: Precautions (Done)     Learning Progress Summary           Patient Acceptance, E,TB,D,  VU by  at 2/16/2023 1030    Acceptance, E,TB,D, VU,NR by  at 2/15/2023 1536    Eager, E,TB,D, VU by  at 2/13/2023 1610    Acceptance, E,TB,D, VU,NR by  at 2/11/2023 1702    Acceptance, E,TB, VU,NR by  at 2/5/2023 0935    Acceptance, E,TB, VU,NR by  at 2/4/2023 0911                               User Key     Initials Effective Dates Name Provider Type Discipline     03/07/18 -  Mila Tam PTA Physical Therapist Assistant PT     06/16/21 -  Wilmer, Gian ARMANDO, PT Physical Therapist PT     12/13/22 -  Sunni Wolfe, PT Physical Therapist PT     04/08/22 -  Lilibeth Herrera, PT Physical Therapist PT              PT Recommendation and Plan     Plan of Care Reviewed With: patient  Progress: improving  Outcome Evaluation: Pt agreed to PT session, tolerated  amb dist to 150 ft, req CGA this session, SBA for tfers, plans home at [place] when medically ready, BSC, rwx delivered for home, will possibly  need HH initially     Time Calculation:    PT Charges     Row Name 02/17/23 1459             Time Calculation    Start Time 1140  -      Stop Time 1211  -      Time Calculation (min) 31 min  -      PT Received On 02/17/23  -      PT - Next Appointment 02/18/23  -            User Key  (r) = Recorded By, (t) = Taken By, (c) = Cosigned By    Initials Name Provider Type     Mila Tam PTA Physical Therapist Assistant              Therapy Charges for Today     Code Description Service Date Service Provider Modifiers Qty    77886273437 HC PT THER PROC EA 15 MIN 2/16/2023 Mila Tam PTA GP 2    41540836170 HC PT THER PROC EA 15 MIN 2/17/2023 Mila Tam PTA GP 2          PT G-Codes  Outcome Measure Options: AM-PAC 6 Clicks Basic Mobility (PT)  AM-PAC 6 Clicks Score (PT): 17  PT Discharge Summary  Anticipated Discharge Disposition (PT): home with assist, home with home health    Mila Tam PTA  2/17/2023

## 2023-02-17 NOTE — PROGRESS NOTES
Infectious Diseases Progress Note    Galdino Esteves MD     [facility]  [place]: 21 days  Patient Identification:  Name: Hali Tejeda  Age: 62 y.o.  Sex: female  :  1960  MRN: [id number]         Primary Care Physician: Richard Delgado MD        Subjective: Feeling slightly better denies any fever and chills.  Nausea is still there but not as bad.  Able to tolerate oral intake.  Interval History: Underwent placement of wound VAC on her abdominal wound/incision.  TPN is started.  CT scan of the abdomen and pelvis performed on 2022 showed interval decrease in the size of the fluid collection in the left paracolic gutter as well as an area in the left lower quadrant with ill-defined subjacent stranding and tiny foci of gas slightly more conspicuous compared to prior imaging studies with no drainable focal fluid collection or definitive extraluminal enteric contrast noted.  Objective:    Scheduled Meds:amLODIPine, 10 mg, Oral, Q24H  cefTRIAXone, 2 g, Intravenous, Q24H  famotidine, 20 mg, Intravenous, BID AC  Fat Emulsion Plant Based, 100 mL, Intravenous, Q24H (TPN)  fluconazole, 400 mg, Intravenous, Q24H  insulin glargine, 10 Units, Subcutaneous, Q12H  insulin regular, 0-7 Units, Subcutaneous, Q6H  metoclopramide, 10 mg, Intravenous, Q6H  metroNIDAZOLE, 500 mg, Intravenous, Q8H  sodium chloride, 10 mL, Intravenous, Q12H  sodium chloride, 10 mL, Intravenous, Q12H      Continuous Infusions:Adult Central 2-in-1 TPN, , Last Rate: 75 mL/hr at 23 1830  Pharmacy to Dose TPN,         Vital signs in last 24 hours:  Temp:  [97.9 °F (36.6 °C)-99.4 °F (37.4 °C)] 98.6 °F (37 °C)  Heart Rate:  [86-95] 95  Resp:  [16-18] 18  BP: (123-153)/(73-87) 138/87    Intake/Output:    Intake/Output Summary (Last 24 hours) at 2023 1054  Last data filed at 2023 1027  Gross per 24 hour   Intake 92812.75 ml   Output 8 ml   Net 23290.75 ml       Exam:  /87 (BP Location: Right arm, Patient Position:  "Lying)   Pulse 95   Temp 98.6 °F (37 °C) (Oral)   Resp 18   Ht 154.9 cm (61\")   Wt 75.7 kg (166 lb 14.2 oz)   SpO2 98%   BMI 31.53 kg/m²   Patient is examined using the personal protective equipment as per guidelines from infection control for this particular patient as enacted.  Hand washing was performed before and after patient interaction.  General Appearance: Comfortable appearing female who does not appear toxic at this time.                          Head:    Normocephalic, without obvious abnormality, atraumatic                           Eyes:    PERRL, conjunctivae/corneas clear, EOM's intact, both eyes                         Throat:   Lips, tongue, gums normal; oral mucosa pink and moist                           Neck:   Supple, symmetrical, trachea midline, no JVD                         Lungs:    Clear to auscultation bilaterally, respirations unlabored                 Chest Wall:    No tenderness or deformity                          Heart:  S1-S2 regular                  Abdomen:   Ostomy present with midline incision has wound VAC in place with no surrounding cellulitis.  Drain in place.                 Extremities:   Extremities normal, atraumatic, no cyanosis or edema                        Pulses:   Pulses palpable in all extremities                            Skin:   Skin is warm and dry,  no rashes or palpable lesions                  Neurologic: Grossly nonfocal       Data Review:    I reviewed the patient's new clinical results.  Results from last 7 days   Lab Units 02/17/23  0308 02/16/23  0341 02/15/23  0422 02/14/23  0238 02/13/23  0321 02/12/23  0558 02/11/23  0506   WBC 10*3/mm3 19.71* 20.90* 23.70* 25.66* 28.34* 26.57* 25.18*   HEMOGLOBIN g/dL 9.0* 9.5* 9.0* 9.2* 9.7* 8.5* 8.7*   PLATELETS 10*3/mm3 493* 514* 502* 500* 479* 416 411     Results from last 7 days   Lab Units 02/17/23  0308 02/16/23  0341 02/15/23  0422 02/14/23  0238 02/13/23  0321 02/12/23  0817 02/12/23  0748 " 02/12/23  0558 02/11/23  0506   SODIUM mmol/L 133* 133* 132* 134* 132* 135*  --  126* 141   POTASSIUM mmol/L 4.3 4.3 4.4 4.0 4.0 3.7 3.7 7.5* 2.8*   CHLORIDE mmol/L 104 101 104 100 99  --   --  96* 106   CO2 mmol/L 23.1 23.0 24.7 24.8 28.4  --   --  26.0 30.0*   BUN mg/dL 11 11 13 8 7*  --   --  4* 3*   CREATININE mg/dL 0.45* 0.48* 0.42* 0.37* 0.37*  --   --  0.41* 0.37*   CALCIUM mg/dL 8.1* 8.1* 8.3* 8.2* 8.5*  --   --  8.4* 7.8*   GLUCOSE mg/dL 177* 184* 173* 150* 138*  --   --  763* 158*     Microbiology Results (last 10 days)     Procedure Component Value - Date/Time    Body Fluid Culture - Drainage, Peritoneum [[id number]] Collected: 02/14/23 1154    Lab Status: Preliminary result Specimen: Drainage from Peritoneum Updated: 02/16/23 1027     Body Fluid Culture No growth at 2 days     Gram Stain Moderate (3+) WBCs per low power field      No organisms seen    Anaerobic Culture - Drainage, Peritoneum [[id number]]  (Normal) Collected: 02/14/23 1154    Lab Status: Preliminary result Specimen: Drainage from Peritoneum Updated: 02/17/23 0703     Anaerobic Culture No anaerobes isolated at 3 days    Clostridioides difficile Toxin - Stool, Per Stoma [[id number]]  (Normal) Collected: 02/10/23 1243    Lab Status: Final result Specimen: Stool from Per Stoma Updated: 02/10/23 1341    Narrative:      The following orders were created for panel order Clostridioides difficile Toxin - Stool, Per Stoma.  Procedure                               Abnormality         Status                     ---------                               -----------         ------                     Clostridioides difficile...[[id number]]  Normal              Final result                 Please view results for these tests on the individual orders.    Clostridioides difficile Toxin, PCR - Stool, Per Stoma [[id number]]  (Normal) Collected: 02/10/23 1243    Lab Status: Final result Specimen: Stool from Per Stoma Updated: 02/10/23 1341     C. Difficile Toxins  by PCR Negative    Narrative:      The result indicates the absence of toxigenic C. difficile from stool specimen.     Body Fluid Culture - Drainage, Peritoneum [[id number]]  (Abnormal)  (Susceptibility) Collected: 02/09/23 1458    Lab Status: Final result Specimen: Drainage from Peritoneum Updated: 02/11/23 0851     Body Fluid Culture Scant growth (1+) Escherichia coli     Gram Stain Few (2+) WBCs per low power field      No organisms seen    Susceptibility      Escherichia coli      RENETTA      Amikacin Susceptible      Ampicillin Resistant     Ampicillin + Sulbactam Resistant     Cefepime Susceptible      Ceftazidime Susceptible      Ceftriaxone Susceptible      Gentamicin Resistant     Levofloxacin Susceptible      Piperacillin + Tazobactam Resistant     Tobramycin Intermediate      Trimethoprim + Sulfamethoxazole Resistant                      Susceptibility Comments     Escherichia coli    Cefazolin sensitivity will not be reported for Enterobacteriaceae in non-urine isolates. If cefazolin is preferred, please call the microbiology lab to request an E-test.  With the exception of urinary-sourced infections, aminoglycosides should not be used as monotherapy.                     Assessment:    Colitis    Left sided colitis with complication (HCC)    Nausea and vomiting    Stricture of sigmoid colon (HCC)  1-intra-abdominal sepsis with intra-abdominal abscess in the setting of prolonged colitis, sigmoid stricture, endoscopic injury to colonic wall with microperforation and subsequent laparotomy partial colectomy and colostomy and intraoperative injury to the spleen with repair while being on antibiotic therapy-likely pathogen to consider in this situation would be enteric amberly along with selection of yeast due to prolonged antibiotic therapy-status post percutaneous drain placement on 2/9/2023  2-hypertension  3-malnourishment  4-possible postop abdominal wall/incisional infection/evolving  abscess        Recommendations/Discussions:  Continue with fluconazole ceftriaxone and Flagyl  · Follow-up on the repeat CT scan of the abdomen and pelvis with intervention plans per colorectal surgery service.  · Monitor closely for complications of antibiotics including C. difficile infection but low threshold to repeat colostomy content for C. difficile toxin assay as her last testing on 2/10/2023 was negative.  · Fluconazole restarted as it was also stopped on 2/13/2023.  Galdino Esteves MD  2/17/2023  10:54 EST    Parts of this note may be an electronic transcription/translation of spoken language to printed text using the Dragon dictation system.

## 2023-02-17 NOTE — PAYOR COMM NOTE
"Hali Aleman (62 y.o. Female)     Please see attached for continued stay auth. LDC WAS 2/13/23 AND THOSE CLNS HAVE ALREADY BEEN FAXED. I AM SENDING 2/14 FORWARD.     REF#[id number]    PLEASE CALL  OR  [phone]     THANK YOU    ANDREA CHAVES LPN CCP    Date of Birth   1960    Social Security Number       Address   [address] [place] [address]    Home Phone   [phone]    MRN   [id number]       Sabianism   None    Marital Status   Single                            Admission Date   1/27/23    Admission Type   Emergency    Admitting Provider   Gideon Hope MD    Attending Provider   Gideon Hope MD    Department, Room/Bed   [address], [address]/1       Discharge Date       Discharge Disposition       Discharge Destination                               Attending Provider: Gideon Hope MD    Allergies: Hydrocodone, Sulfa Antibiotics    Isolation: None   Infection: None   Code Status: CPR    Ht: 154.9 cm (61\")   Wt: 75.7 kg (166 lb 14.2 oz)    Admission Cmt: None   Principal Problem: Colitis [K52.9]                 Active Insurance as of 1/27/2023     Primary Coverage     Payor Plan Insurance Group Employer/Plan Group    [employer] BLUE [facility] [employer] EMPLOYEE [id number]     Payor Plan Address Payor Plan Phone Number Payor Plan Fax Number Effective Dates    [address] [phone]  1/1/2022 - None Entered    [place] [address]       Subscriber Name Subscriber Birth Date Member ID       HALI ALEMAN 1960 [id number]                 Emergency Contacts      (Rel.) Home Phone Work Phone Mobile Phone    SANDRA ALEMAN (Daughter) [phone] -- --    AMAURI ABREU (Mother) [phone] -- [phone]            Oxygen Therapy (last 3 days)     Date/Time SpO2 Device (Oxygen Therapy) Flow (L/min) Oxygen Concentration (%) ETCO2 (mmHg)    02/17/23 0049 -- room air -- -- --    02/17/23 0019 98 -- -- -- --    02/16/23 2025 -- " room air -- -- --    02/16/23 1900 99 room air -- -- --    02/16/23 1400 -- room air -- -- --    02/16/23 1235 96 room air -- -- --    02/16/23 0721 95 room air -- -- --    02/16/23 0020 -- room air -- -- --    02/16/23 0019 96 -- -- -- --    02/15/23 2012 -- room air -- -- --    02/15/23 1921 96 -- -- -- --    02/15/23 1310 99 room air -- -- --    02/15/23 0935 -- room air -- -- --    02/15/23 0710 97 room air -- -- --    02/15/23 0052 -- room air -- -- --    02/14/23 2325 96 -- -- -- --    02/14/23 2036 -- room air -- -- --    02/14/23 1929 98 -- -- -- --    02/14/23 1318 97 room air -- -- --    02/14/23 1300 95 -- -- -- --    02/14/23 1245 94 room air -- -- --    02/14/23 1230 93 room air -- -- --    02/14/23 1215 93 room air -- -- --    02/14/23 1208 96 -- -- -- --    02/14/23 12:03:27 98 -- -- -- --    02/14/23 11:58:02 95 -- -- -- --    02/14/23 11:53:47 98 -- -- -- --    02/14/23 11:48:01 98 -- -- -- --    02/14/23 11:43:01 96 -- -- -- --    02/14/23 11:38:44 97 -- -- -- --    02/14/23 11:20:58 97 -- -- -- --    02/14/23 0920 97 room air -- -- --    02/14/23 0844 -- room air -- -- --    02/14/23 0717 98 room air -- -- --    02/14/23 0158 94 -- -- -- --        Intake & Output (last 3 days)       02/14 0701  02/15 0700 02/15 0701 02/16 0700 02/16 0701 02/17 0700 02/17 0701 02/18 0700    P.O. 118 0 50     I.V. (mL/kg)   40 (0.5)     IV Piggyback   700     TPN   11221.8     Total Intake(mL/kg) 118 (1.5) 0 (0) 04361.8 (154.8)     Urine (mL/kg/hr) 2000 (1) 0 (0) 0 (0)     Drains 70 25      Stool 0 0 50     Wound 150       Total Output 2220 25 50     Net -2102 -25 +86762.8             Urine Unmeasured Occurrence 1 x 10 x 7 x     Stool Unmeasured Occurrence 0 x 0 x 0 x          Lines, Drains & Airways     Active LDAs     Name Placement date Placement time Site Days    PICC Double Lumen 02/10/23 Right Basilic 02/10/23  1431  Basilic  6    Closed/Suction Drain 1 Left LLQ Pigtail 10 Fr. 02/14/23  1158  LLQ  2     Colostomy LLQ 23  1944  LLQ  13                Blood Administration Record (From admission, onward)    Completed transfusions     Ordered     Start    23 0906  Transfuse RBC Infuse Each Unit Over: 3.5H  Transfusion        Released Time Blood Unit Number Status   23 1226   23  [id number]  [id number] Completed 23 1708       23 0906    23 0912  Transfuse Fresh Frozen Plasma  Transfusion        Released Time Blood Unit Number Status   23 1552   22  [id number]  [id number] Completed 23 1225       23 0911    23 2034  Transfuse RBC Infuse Each Unit Over: 3.5H  Transfusion        Released Time Blood Unit Number Status   23 2034   23  [id number]  [id number] Completed 23 2132       23 2033          Canceled transfusions     Ordered     Start    23 1836  Transfuse RBC  Status:  Canceled      Released Time Blood Unit Number Status   23 1836   23  [id number]  [id number] Completed 23 20223 1836                Operative/Procedure Notes (last 72 hours)  Notes from 23 0734 through 23 0734   No notes of this type exist for this encounter.            Physician Progress Notes (last 72 hours)      Galdino Esteves MD at 23 1313            Infectious Diseases Progress Note    Galdino Esteves MD     [facility]  [place]: 20 days  Patient Identification:  Name: Hali Tejeda  Age: 62 y.o.  Sex: female  :  1960  MRN: [id number]         Primary Care Physician: Richard Delgado MD        Subjective: Resting well nausea vomiting is better.  Interval History: Underwent placement of wound VAC on her abdominal wound/incision.  TPN is started.  CT scan of the abdomen and pelvis performed on 2022 showed interval decrease in the size of the fluid collection in the left paracolic gutter as well as an area in the left lower quadrant with ill-defined subjacent stranding and tiny foci of gas slightly more  "conspicuous compared to prior imaging studies with no drainable focal fluid collection or definitive extraluminal enteric contrast noted.  Objective:    Scheduled Meds:amLODIPine, 10 mg, Oral, Q24H  cefTRIAXone, 2 g, Intravenous, Q24H  famotidine, 20 mg, Intravenous, BID AC  Fat Emulsion Plant Based, 100 mL, Intravenous, Q24H (TPN)  insulin glargine, 10 Units, Subcutaneous, Nightly  insulin regular, 0-7 Units, Subcutaneous, Q6H  metoclopramide, 10 mg, Intravenous, Q6H  metroNIDAZOLE, 500 mg, Intravenous, Q8H  sodium chloride, 10 mL, Intravenous, Q12H  sodium chloride, 10 mL, Intravenous, Q12H      Continuous Infusions:Adult Central 2-in-1 TPN, , Last Rate: 75 mL/hr at 02/15/23 1847  Adult Central 2-in-1 TPN,   Pharmacy to Dose TPN,         Vital signs in last 24 hours:  Temp:  [98.4 °F (36.9 °C)-99.4 °F (37.4 °C)] 99.4 °F (37.4 °C)  Heart Rate:  [90-99] 90  Resp:  [18] 18  BP: (123-145)/(73-86) 123/73    Intake/Output:    Intake/Output Summary (Last 24 hours) at 2/16/2023 1313  Last data filed at 2/16/2023 0914  Gross per 24 hour   Intake 0 ml   Output 75 ml   Net -75 ml       Exam:  /73 (BP Location: Left arm, Patient Position: Lying)   Pulse 90   Temp 99.4 °F (37.4 °C) (Oral)   Resp 18   Ht 154.9 cm (61\")   Wt 75.7 kg (166 lb 14.2 oz)   SpO2 96%   BMI 31.53 kg/m²   Patient is examined using the personal protective equipment as per guidelines from infection control for this particular patient as enacted.  Hand washing was performed before and after patient interaction.  General Appearance: Comfortable appearing female who does not appear toxic at this time.                          Head:    Normocephalic, without obvious abnormality, atraumatic                           Eyes:    PERRL, conjunctivae/corneas clear, EOM's intact, both eyes                         Throat:   Lips, tongue, gums normal; oral mucosa pink and moist                           Neck:   Supple, symmetrical, trachea midline, no JVD   "                       Lungs:    Clear to auscultation bilaterally, respirations unlabored                 Chest Wall:    No tenderness or deformity                          Heart:  S1-S2 regular                  Abdomen:   Ostomy present with midline incision has wound VAC in place with no surrounding cellulitis.  Drain in place.                 Extremities:   Extremities normal, atraumatic, no cyanosis or edema                        Pulses:   Pulses palpable in all extremities                            Skin:   Skin is warm and dry,  no rashes or palpable lesions                  Neurologic: Grossly nonfocal       Data Review:    I reviewed the patient's new clinical results.  Results from last 7 days   Lab Units 02/16/23  0341 02/15/23  0422 02/14/23  0238 02/13/23  0321 02/12/23  0558 02/11/23  0506 02/10/23  0917   WBC 10*3/mm3 20.90* 23.70* 25.66* 28.34* 26.57* 25.18* 20.95*   HEMOGLOBIN g/dL 9.5* 9.0* 9.2* 9.7* 8.5* 8.7* 8.7*   PLATELETS 10*3/mm3 514* 502* 500* 479* 416 411 370     Results from last 7 days   Lab Units 02/16/23  0341 02/15/23  0422 02/14/23  0238 02/13/23  0321 02/12/23  0817 02/12/23  0748 02/12/23  0558 02/11/23  0506 02/10/23  0356   SODIUM mmol/L 133* 132* 134* 132* 135*  --  126* 141 141   POTASSIUM mmol/L 4.3 4.4 4.0 4.0 3.7 3.7 7.5* 2.8* 2.7*   CHLORIDE mmol/L 101 104 100 99  --   --  96* 106 108*   CO2 mmol/L 23.0 24.7 24.8 28.4  --   --  26.0 30.0* 26.0   BUN mg/dL 11 13 8 7*  --   --  4* 3* 4*   CREATININE mg/dL 0.48* 0.42* 0.37* 0.37*  --   --  0.41* 0.37* 0.39*   CALCIUM mg/dL 8.1* 8.3* 8.2* 8.5*  --   --  8.4* 7.8* 8.1*   GLUCOSE mg/dL 184* 173* 150* 138*  --   --  763* 158* 94     Microbiology Results (last 10 days)     Procedure Component Value - Date/Time    Body Fluid Culture - Drainage, Peritoneum [[id number]] Collected: 02/14/23 1154    Lab Status: Preliminary result Specimen: Drainage from Peritoneum Updated: 02/16/23 1027     Body Fluid Culture No growth at 2 days     Gram  Stain Moderate (3+) WBCs per low power field      No organisms seen    Clostridioides difficile Toxin - Stool, Per Stoma [[id number]]  (Normal) Collected: 02/10/23 1243    Lab Status: Final result Specimen: Stool from Per Stoma Updated: 02/10/23 1341    Narrative:      The following orders were created for panel order Clostridioides difficile Toxin - Stool, Per Stoma.  Procedure                               Abnormality         Status                     ---------                               -----------         ------                     Clostridioides difficile...[[id number]]  Normal              Final result                 Please view results for these tests on the individual orders.    Clostridioides difficile Toxin, PCR - Stool, Per Stoma [[id number]]  (Normal) Collected: 02/10/23 1243    Lab Status: Final result Specimen: Stool from Per Stoma Updated: 02/10/23 1341     C. Difficile Toxins by PCR Negative    Narrative:      The result indicates the absence of toxigenic C. difficile from stool specimen.     Body Fluid Culture - Drainage, Peritoneum [[id number]]  (Abnormal)  (Susceptibility) Collected: 02/09/23 1458    Lab Status: Final result Specimen: Drainage from Peritoneum Updated: 02/11/23 0851     Body Fluid Culture Scant growth (1+) Escherichia coli     Gram Stain Few (2+) WBCs per low power field      No organisms seen    Susceptibility      Escherichia coli      RENETTA      Amikacin Susceptible      Ampicillin Resistant     Ampicillin + Sulbactam Resistant     Cefepime Susceptible      Ceftazidime Susceptible      Ceftriaxone Susceptible      Gentamicin Resistant     Levofloxacin Susceptible      Piperacillin + Tazobactam Resistant     Tobramycin Intermediate      Trimethoprim + Sulfamethoxazole Resistant                      Susceptibility Comments     Escherichia coli    Cefazolin sensitivity will not be reported for Enterobacteriaceae in non-urine isolates. If cefazolin is preferred, please call the  microbiology lab to request an E-test.  With the exception of urinary-sourced infections, aminoglycosides should not be used as monotherapy.                     Assessment:    Colitis    Left sided colitis with complication (HCC)    Nausea and vomiting    Stricture of sigmoid colon (HCC)  1-intra-abdominal sepsis with intra-abdominal abscess in the setting of prolonged colitis, sigmoid stricture, endoscopic injury to colonic wall with microperforation and subsequent laparotomy partial colectomy and colostomy and intraoperative injury to the spleen with repair while being on antibiotic therapy-likely pathogen to consider in this situation would be enteric amberly along with selection of yeast due to prolonged antibiotic therapy-status post percutaneous drain placement on 2/9/2023  2-hypertension  3-malnourishment  4-possible postop abdominal wall/incisional infection/evolving abscess        Recommendations/Discussions:  Continue with fluconazole ceftriaxone and Flagyl  · Follow-up on the repeat CT scan of the abdomen and pelvis with intervention plans per colorectal surgery service.  · Monitor closely for complications of antibiotics including C. difficile infection but low threshold to repeat colostomy content for C. difficile toxin assay as her last testing on 2/10/2023 was negative.  · Fluconazole restarted as it was also stopped on 2/13/2023.  Galdino Esteves MD  2/16/2023  13:13 EST    Parts of this note may be an electronic transcription/translation of spoken language to printed text using the Dragon dictation system.      Electronically signed by Galdino Esteves MD at 02/16/23 6307     Adwoa Rogel PA-C at 02/16/23 9868     Attestation signed by Beck Wei Jr., MD at 02/16/23 123    I have reviewed this documentation and agree.  The patient is having some nausea but believes some of it is caused by the clear liquid diet.  She was encouraged to be careful with her eating but was advanced to a full liquid  diet from her options.  She is having good colostomy output.                  Colorectal Surgery Progress Note    POD 13    S: positive ostomy output. positive ambulating. Pain controlled. Complaining of nausea.     O:  Temp:  [98.4 °F (36.9 °C)-98.8 °F (37.1 °C)] 98.5 °F (36.9 °C)  Heart Rate:  [92-99] 99  Resp:  [16-18] 18  BP: (125-149)/(78-94) 138/86    Intake/Output Summary (Last 24 hours) at 2/16/2023 0957  Last data filed at 2/16/2023 0914  Gross per 24 hour   Intake 0 ml   Output 75 ml   Net -75 ml     Abd:   soft, non distended. Wound vac in place. Ostomy productive of brown stool. NEW serosanguinous.  Incisions: clean, dry, intact, no erythema    Results from last 7 days   Lab Units 02/16/23  0341   WBC 10*3/mm3 20.90*   HEMOGLOBIN g/dL 9.5*   HEMATOCRIT % 28.8*   PLATELETS 10*3/mm3 514*     Results from last 7 days   Lab Units 02/16/23  0341   SODIUM mmol/L 133*   POTASSIUM mmol/L 4.3   CHLORIDE mmol/L 101   CO2 mmol/L 23.0   BUN mg/dL 11   CREATININE mg/dL 0.48*   EGFR mL/min/1.73 106.6   GLUCOSE mg/dL 184*   CALCIUM mg/dL 8.1*   PHOSPHORUS mg/dL 2.4*     Results from last 7 days   Lab Units 02/16/23  0341   MAGNESIUM mg/dL 1.9     A/P: 62 y.o. female s/p robotic assisted laparoscopic left and sigmoid resection with colostomy converted open due to splenic injury 02/03/2023    - Advised pt to back off PO intake if nausea continues. Back to clear liquid diet for now.  - continue ambulation  - continue to monitor  - Dr. Wei to see this pt in Dr. Gaston's absence        Electronically signed by Beck Wei Jr., MD at 02/16/23 1127     Gideon Hope MD at 02/16/23 0960          Daily progress note    Primary care physician  Dr. Delgado    Chief complaint  Doing same with no new complaints and tolerating liquid diet    History of present illness  62-year-old female with history of hypertension presented to [facility] emergency room with left lower quadrant abdominal pain for last 1 month.  Patient  "also have occasional loose stools.  Patient denies any nausea vomiting.  Patient recently diagnosed with colitis and treated with oral antibiotics without any improvement.  Patient has noticed blood in the stools.  Patient evaluated in ER with CT abdominal pelvis which shows worsening colitis and failed outpatient treatment admit for management.  Patient has no fever chills chest pain shortness of breath with sweats weight loss or weight gain.     REVIEW OF SYSTEMS  Unremarkable except weakness     PHYSICAL EXAM  Blood pressure 123/73, pulse 90, temperature 99.4 °F (37.4 °C), temperature source Oral, resp. rate 18, height 154.9 cm (61\"), weight 75.7 kg (166 lb 14.2 oz), SpO2 96 %.    GENERAL:  Awake and alert in no acute distress  HEENT:  Unremarkable  NECK:  Supple  CV: regular rhythm, normal rate  RESPIRATORY: normal effort, clear to auscultation bilaterally  ABDOMEN: soft, left lower quadrant tenderness colostomy in place  MUSCULOSKELETAL: no deformity  NEURO: alert, moves all extremities, follows commands  PSYCH:  calm, cooperative  SKIN: warm, dry     LAB RESULTS  Lab Results (last 24 hours)     Procedure Component Value Units Date/Time    POC Glucose Once [[id number]]  (Abnormal) Collected: 02/16/23 1105    Specimen: Blood Updated: 02/16/23 1107     Glucose 191 mg/dL      Comment: Meter: [id number] : [id number] Lev EUCEDA       Body Fluid Culture - Drainage, Peritoneum [[id number]] Collected: 02/14/23 1154    Specimen: Drainage from Peritoneum Updated: 02/16/23 1027     Body Fluid Culture No growth at 2 days     Gram Stain Moderate (3+) WBCs per low power field      No organisms seen    POC Glucose Once [[id number]]  (Abnormal) Collected: 02/16/23 0616    Specimen: Blood Updated: 02/16/23 0617     Glucose 208 mg/dL      Comment: Meter: [id number] : [id number] Han Kuo CNA       Comprehensive Metabolic Panel [[id number]]  (Abnormal) Collected: 02/16/23 0341    Specimen: Blood Updated: 02/16/23 " 0434     Glucose 184 mg/dL      BUN 11 mg/dL      Creatinine 0.48 mg/dL      Sodium 133 mmol/L      Potassium 4.3 mmol/L      Chloride 101 mmol/L      CO2 23.0 mmol/L      Calcium 8.1 mg/dL      Total Protein 6.3 g/dL      Albumin 2.2 g/dL      ALT (SGPT) 9 U/L      AST (SGOT) 11 U/L      Alkaline Phosphatase 66 U/L      Total Bilirubin 0.3 mg/dL      Globulin 4.1 gm/dL      A/G Ratio 0.5 g/dL      BUN/Creatinine Ratio 22.9     Anion Gap 9.0 mmol/L      eGFR 106.6 mL/min/1.73     Narrative:      GFR Normal >60  Chronic Kidney Disease <60  Kidney Failure <15      Phosphorus [[id number]]  (Abnormal) Collected: 02/16/23 0341    Specimen: Blood Updated: 02/16/23 0434     Phosphorus 2.4 mg/dL     Magnesium [[id number]]  (Normal) Collected: 02/16/23 0341    Specimen: Blood Updated: 02/16/23 0434     Magnesium 1.9 mg/dL     CBC & Differential [[id number]]  (Abnormal) Collected: 02/16/23 0341    Specimen: Blood Updated: 02/16/23 0416    Narrative:      The following orders were created for panel order CBC & Differential.  Procedure                               Abnormality         Status                     ---------                               -----------         ------                     CBC Auto Differential[[id number]]        Abnormal            Final result                 Please view results for these tests on the individual orders.    CBC Auto Differential [[id number]]  (Abnormal) Collected: 02/16/23 0341    Specimen: Blood Updated: 02/16/23 0416     WBC 20.90 10*3/mm3      RBC 3.35 10*6/mm3      Hemoglobin 9.5 g/dL      Hematocrit 28.8 %      MCV 86.0 fL      MCH 28.4 pg      MCHC 33.0 g/dL      RDW 14.4 %      RDW-SD 44.7 fl      MPV 9.4 fL      Platelets 514 10*3/mm3      Neutrophil % 73.1 %      Lymphocyte % 11.5 %      Monocyte % 9.5 %      Eosinophil % 0.8 %      Basophil % 0.3 %      Immature Grans % 4.8 %      Neutrophils, Absolute 15.28 10*3/mm3      Lymphocytes, Absolute 2.40 10*3/mm3      Monocytes, Absolute  1.99 10*3/mm3      Eosinophils, Absolute 0.16 10*3/mm3      Basophils, Absolute 0.06 10*3/mm3      Immature Grans, Absolute 1.01 10*3/mm3      nRBC 0.0 /100 WBC     POC Glucose Once [[id number]]  (Abnormal) Collected: 02/16/23 0016    Specimen: Blood Updated: 02/16/23 0017     Glucose 173 mg/dL      Comment: Meter: [id number] : [id number] Han Kuo CNA       POC Glucose Once [[id number]]  (Abnormal) Collected: 02/15/23 1642    Specimen: Blood Updated: 02/15/23 1644     Glucose 142 mg/dL      Comment: Meter: [id number] : [id number] Luis Manuel Mehta [place]           Imaging Results (Last 24 Hours)     ** No results found for the last 24 hours. **        Upper and lower endoscopy results noted and discussed with patient    Current Facility-Administered Medications:   •  acetaminophen (TYLENOL) tablet 650 mg, 650 mg, Oral, Q6H PRN, Adwoa Rogel PA-C  •  Adult Central 2-in-1 TPN, , Intravenous, Continuous, Lino Gaston MD, Last Rate: 75 mL/hr at 02/15/23 1847, New Bag at 02/15/23 1847  •  Adult Central 2-in-1 TPN, , Intravenous, Continuous, Lino Gaston MD  •  amLODIPine (NORVASC) tablet 10 mg, 10 mg, Oral, Q24H, Gideon Hope MD, 10 mg at 02/15/23 0930  •  cefTRIAXone (ROCEPHIN) 2 g in sodium chloride 0.9 % 100 mL IVPB-VTB, 2 g, Intravenous, Q24H, Beck Wei Jr., MD, Last Rate: 200 mL/hr at 02/15/23 1133, 2 g at 02/15/23 1133  •  dextrose (D50W) (25 g/50 mL) IV injection 25 g, 25 g, Intravenous, Q15 Min PRN, Beck Wei Jr., MD  •  dextrose (GLUTOSE) oral gel 15 g, 15 g, Oral, Q15 Min PRN, Beck Wei Jr., MD  •  famotidine (PEPCID) injection 20 mg, 20 mg, Intravenous, BID Josiah NICHOLS Brittany A, PA-C, 20 mg at 02/16/23 0935  •  Fat Emulsion Plant Based (INTRALIPID,LIPOSYN) 20 % infusion 20 g, 100 mL, Intravenous, Q24H (TPN), Lino Gaston MD, Last Rate: 8.33 mL/hr at 02/15/23 1847, 20 g at 02/15/23 1847  •  glucagon (GLUCAGEN) injection 1 mg, 1 mg, Intramuscular, Q15 Min PRN, Eriberto  Beck Boyle MD  •  hydrALAZINE (APRESOLINE) injection 10 mg, 10 mg, Intravenous, Q4H PRN, Gideon Hope MD, 10 mg at 23 1454  •  insulin glargine (LANTUS, SEMGLEE) injection 10 Units, 10 Units, Subcutaneous, Nightly, Gideon Hope MD, 10 Units at 23 0002  •  insulin regular (humuLIN R,novoLIN R) injection 0-7 Units, 0-7 Units, Subcutaneous, Q6H, Beck Wei Jr., MD, 3 Units at 23 0700  •  metoclopramide (REGLAN) injection 10 mg, 10 mg, Intravenous, Q6H, Lino Gaston MD, 10 mg at 23 0921  •  metroNIDAZOLE (FLAGYL) IVPB 500 mg, 500 mg, Intravenous, Q8H, Galdino Esteves MD, 500 mg at 23 0657  •  [] HYDROmorphone (DILAUDID) injection 0.25 mg, 0.25 mg, Intravenous, Q2H PRN, 0.25 mg at 23 0015 **AND** naloxone (NARCAN) injection 0.4 mg, 0.4 mg, Intravenous, Q5 Min PRN, Lino Gaston MD  •  nitroglycerin (NITROSTAT) SL tablet 0.4 mg, 0.4 mg, Sublingual, Q5 Min PRN, Lino Gaston MD  •  ondansetron (ZOFRAN) injection 4 mg, 4 mg, Intravenous, Q6H PRN, Lino Gaston MD, 4 mg at 23 1000  •  Pharmacy to Dose TPN, , Does not apply, Continuous PRN, Lilibeth Rahman PA-C  •  potassium chloride 10 mEq in 100 mL IVPB, 10 mEq, Intravenous, Q1H PRN, Matthew Soliz MD, Last Rate: 100 mL/hr at 23, 10 mEq at 23  •  potassium phosphate 45 mmol in sodium chloride 0.9 % 500 mL infusion, 45 mmol, Intravenous, PRN **OR** potassium phosphate 30 mmol in sodium chloride 0.9 % 250 mL infusion, 30 mmol, Intravenous, PRN **OR** potassium phosphate 15 mmol in sodium chloride 0.9 % 100 mL infusion, 15 mmol, Intravenous, PRN, 15 mmol at 23 1440 **OR** sodium phosphates 40 mmol in sodium chloride 0.9 % 500 mL IVPB, 40 mmol, Intravenous, PRN **OR** sodium phosphates 20 mmol in sodium chloride 0.9 % 250 mL IVPB, 20 mmol, Intravenous, PRN, Matthew Soliz MD  •  sodium chloride 0.9 % flush 10 mL, 10 mL, Intravenous, Q12H, Lilibeth Rahman PA-C, 10  mL at 02/15/23 2245  •  sodium chloride 0.9 % flush 10 mL, 10 mL, Intravenous, Q12H, Quick, Lilibeth A, PA-C, 10 mL at 02/15/23 2244  •  sodium chloride 0.9 % flush 10 mL, 10 mL, Intravenous, PRN, Quick, Lilibeth A, PA-C  •  sodium chloride 0.9 % flush 20 mL, 20 mL, Intravenous, PRN, Quick, Lilibeth A, PA-C  •  sodium chloride 0.9 % infusion 40 mL, 40 mL, Intravenous, PRN, Quick, Lilibeth A, PA-C     ASSESSMENT  Acute colitis with sigmoid stricture and microperforation s/p colon resection and colostomy  Abdominal fluid collection/abscess status post CT-guided drainage  Postop ileus  Acute kidney injury resolved  Hypertension   Gastroesophageal reflux disease    PLAN  CPM  Postop care  TPN  Clear liquid diet  IV antibiotics per infectious disease  Accu-Chek with low-dose sliding scale insulin  Blood pressure medications adjusted  Pain management  Continue home medications  Stress ulcer DVT prophylaxis  Supportive care  PT OT  Discussed with family nursing staff   Follow closely and further recommendation according to hospital course    SANG HOPE MD    Copied text in this note has been reviewed and is accurate as of 23        Electronically signed by Sang Hope MD at 23 1318     Dionne Rojo MD at 23 0753           [facility]     Progress Note    Patient Name: Hali Tejeda  : 1960  MRN: [id number]  Primary Care Physician:  Richard Delgado MD  Date of admission: 2023    Subjective   Subjective     Chief Complaint: racheal    History of Present Illness    Patient with racheal and hyponatremia    Review of Systems      Objective   Objective     Vitals:   Temp:  [98.4 °F (36.9 °C)-98.8 °F (37.1 °C)] 98.5 °F (36.9 °C)  Heart Rate:  [92-99] 99  Resp:  [16-18] 18  BP: (125-149)/(78-94) 138/86    Physical Exam      Result Review    Result Review:  I have personally reviewed the results from the time of this admission to 2023 07:53 EST and agree with these findings:  []  Laboratory  list / accordion  []  Microbiology  []  Radiology  []  EKG/Telemetry   []  Cardiology/Vascular   []  Pathology  []  Old records  []  Other:      Assessment & Plan   Assessment / Plan     Brief Patient Summary:  Hali Tejeda is a 62 y.o. female who has racheal and hyponatremia    Active Hospital Problems:  Active Hospital Problems    Diagnosis    • **Colitis    • Nausea and vomiting    • Stricture of sigmoid colon (HCC)    • Left sided colitis with complication (HCC)      Plan:   acute renal failure, creatinine peaked at 2.3 and is below baseline today,   Hypokalemia   Metabolic alkalosis   Hypoalbuminemia   Hypophosphatemia     Colitis status post sigmoid colon resection    Nausea and vomiting,  Postop ileus, remains n.p.o.  Abdominal fluid collection status post drain placement   Stricture of sigmoid colon, status post colostomy  Hypokalemia, worse today  Hypertension, receiving as needed IV hydralazine  Anemia    Patient chart reviewed. Renal function stable. Electrolytes reviewed. TPN per pharm. Will follow as needed    Dionne Rojo MD             Electronically signed by Dionne Rojo MD at 23 [phone]     Lino Gaston MD at 02/15/23 7959        Patient is improving.  She tolerated a milkshake.  She said the chocolate was a little too heavy for her.  We will try a tray with cream soups.  And advance her diet as tolerated.  On physical exam the abdomen is soft ostomy is pink with stool in the bag wound VAC is in place.  I discussed with patient that I will be out of town for a few days and that the general surgery group will be rounding on her for me.  I would anticipate that  she would be able to discharge hopefully by Monday.  We will have her follow-up in my office in 2 to 3 weeks from discharge.   Electronically signed by Lino Gaston MD at 02/15/23 7024     Dionne Rojo MD at 02/15/23 1214           [facility]     Progress Note    Patient Name: Hali Tejeda  : 1960  MRN:  [id number]  Primary Care Physician:  Richard Delgado MD  Date of admission: 1/27/2023    Subjective   Subjective     Chief Complaint: racheal    History of Present Illness  Patient with racheal and hyponatremia    Review of Systems    Objective   Objective     Vitals:   Temp:  [98.1 °F (36.7 °C)-99.1 °F (37.3 °C)] 98.6 °F (37 °C)  Heart Rate:  [85-97] 90  Resp:  [15-20] 16  BP: (120-142)/(70-91) 139/82    Physical Exam   General Appearance:  In no acute distress.    HEENT: Normal HEENT exam.     Extremities: .  There is no deformity, effusion or dependent edema.    Neurological: Patient is alert    Result Review    Result Review:  I have personally reviewed the results from the time of this admission to 2/15/2023 12:14 EST and agree with these findings:  []  Laboratory list / accordion  []  Microbiology  []  Radiology  []  EKG/Telemetry   []  Cardiology/Vascular   []  Pathology  []  Old records  []  Other:      Assessment & Plan   Assessment / Plan     Brief Patient Summary:  Hali Tejeda is a 62 y.o. female who has racheal and hyponatremia    Active Hospital Problems:  Active Hospital Problems    Diagnosis    • **Colitis    • Nausea and vomiting    • Stricture of sigmoid colon (HCC)    • Left sided colitis with complication (HCC)      Plan:   acute renal failure, creatinine peaked at 2.3 and is below baseline today,   Hypokalemia   Metabolic alkalosis   Hypoalbuminemia   Hypophosphatemia     Colitis status post sigmoid colon resection    Nausea and vomiting,  Postop ileus, remains n.p.o.  Abdominal fluid collection status post drain placement   Stricture of sigmoid colon, status post colostomy  Hypokalemia, worse today  Hypertension, receiving as needed IV hydralazine  Anemia    Patient seen and examined. Labs and chart reviewed. Renal function stable. Sodium dropping with current TPN. Will follow    Dionne Rojo MD             Electronically signed by Dionne Rojo MD at 02/15/23 8215     Galdino Esteves MD at 02/15/23  "1005            Infectious Diseases Progress Note    Galdino Esteves MD     [facility]  [place]: 19 days  Patient Identification:  Name: Hali Tejeda  Age: 62 y.o.  Sex: female  :  1960  MRN: [id number]         Primary Care Physician: Richard Delgado MD        Subjective: Feeling somewhat better with decreased discomfort.  Nausea is better able to tolerate oral intake.  Interval History: Underwent placement of wound VAC on her abdominal wound/incision.  TPN is started.  CT scan of the abdomen and pelvis performed on 2022 showed interval decrease in the size of the fluid collection in the left paracolic gutter as well as an area in the left lower quadrant with ill-defined subjacent stranding and tiny foci of gas slightly more conspicuous compared to prior imaging studies with no drainable focal fluid collection or definitive extraluminal enteric contrast noted.  Objective:    Scheduled Meds:amLODIPine, 10 mg, Oral, Q24H  cefTRIAXone, 2 g, Intravenous, Q24H  famotidine, 20 mg, Intravenous, BID AC  Fat Emulsion Plant Based, 100 mL, Intravenous, Q24H (TPN)  insulin regular, 0-7 Units, Subcutaneous, Q6H  metoclopramide, 10 mg, Intravenous, Q6H  metroNIDAZOLE, 500 mg, Intravenous, Q8H  sodium chloride, 10 mL, Intravenous, Q12H  sodium chloride, 10 mL, Intravenous, Q12H      Continuous Infusions:Adult Central 2-in-1 TPN, , Last Rate: 75 mL/hr at 23 1805  Pharmacy to Dose TPN,         Vital signs in last 24 hours:  Temp:  [98.1 °F (36.7 °C)-99.1 °F (37.3 °C)] 98.6 °F (37 °C)  Heart Rate:  [80-97] 90  Resp:  [12-20] 16  BP: (120-144)/(70-91) 139/82    Intake/Output:    Intake/Output Summary (Last 24 hours) at 2/15/2023 1005  Last data filed at 2/15/2023 0620  Gross per 24 hour   Intake 118 ml   Output 2020 ml   Net -1902 ml       Exam:  /82 (BP Location: Right arm, Patient Position: Sitting)   Pulse 90   Temp 98.6 °F (37 °C) (Oral)   Resp 16   Ht 154.9 cm (61\")   Wt 79.6 kg (175 lb " 7.8 oz)   SpO2 97%   BMI 33.16 kg/m²   Patient is examined using the personal protective equipment as per guidelines from infection control for this particular patient as enacted.  Hand washing was performed before and after patient interaction.  General Appearance: Nontoxic ill-appearing female who interacts.                          Head:    Normocephalic, without obvious abnormality, atraumatic                           Eyes:    PERRL, conjunctivae/corneas clear, EOM's intact, both eyes                         Throat:   Lips, tongue, gums normal; oral mucosa pink and moist                           Neck:   Supple, symmetrical, trachea midline, no JVD                         Lungs:    Clear to auscultation bilaterally, respirations unlabored                 Chest Wall:    No tenderness or deformity                          Heart:  S1-S2 regular                  Abdomen:   Ostomy present with midline incision has wound VAC in place with no surrounding cellulitis.  Drain in place.                 Extremities:   Extremities normal, atraumatic, no cyanosis or edema                        Pulses:   Pulses palpable in all extremities                            Skin:   Skin is warm and dry,  no rashes or palpable lesions                  Neurologic: Grossly nonfocal       Data Review:    I reviewed the patient's new clinical results.  Results from last 7 days   Lab Units 02/15/23  0422 02/14/23  0238 02/13/23  0321 02/12/23  0558 02/11/23  0506 02/10/23  0917 02/09/23  0619   WBC 10*3/mm3 23.70* 25.66* 28.34* 26.57* 25.18* 20.95* 25.99*   HEMOGLOBIN g/dL 9.0* 9.2* 9.7* 8.5* 8.7* 8.7* 9.6*   PLATELETS 10*3/mm3 502* 500* 479* 416 411 370 337     Results from last 7 days   Lab Units 02/15/23  0422 02/14/23  0238 02/13/23  0321 02/12/23  0817 02/12/23  0748 02/12/23  0558 02/11/23  0506 02/10/23  0356 02/09/23  0619   SODIUM mmol/L 132* 134* 132* 135*  --  126* 141 141 143   POTASSIUM mmol/L 4.4 4.0 4.0 3.7 3.7 7.5* 2.8*  2.7* 4.6   CHLORIDE mmol/L 104 100 99  --   --  96* 106 108* 111*   CO2 mmol/L 24.7 24.8 28.4  --   --  26.0 30.0* 26.0 22.9   BUN mg/dL 13 8 7*  --   --  4* 3* 4* 5*   CREATININE mg/dL 0.42* 0.37* 0.37*  --   --  0.41* 0.37* 0.39* 0.46*   CALCIUM mg/dL 8.3* 8.2* 8.5*  --   --  8.4* 7.8* 8.1* 8.4*   GLUCOSE mg/dL 173* 150* 138*  --   --  763* 158* 94 101*     Microbiology Results (last 10 days)     Procedure Component Value - Date/Time    Body Fluid Culture - Drainage, Peritoneum [[id number]] Collected: 02/14/23 1154    Lab Status: Preliminary result Specimen: Drainage from Peritoneum Updated: 02/14/23 1706     Gram Stain Moderate (3+) WBCs per low power field      No organisms seen    Clostridioides difficile Toxin - Stool, Per Stoma [[id number]]  (Normal) Collected: 02/10/23 1243    Lab Status: Final result Specimen: Stool from Per Stoma Updated: 02/10/23 1341    Narrative:      The following orders were created for panel order Clostridioides difficile Toxin - Stool, Per Stoma.  Procedure                               Abnormality         Status                     ---------                               -----------         ------                     Clostridioides difficile...[[id number]]  Normal              Final result                 Please view results for these tests on the individual orders.    Clostridioides difficile Toxin, PCR - Stool, Per Stoma [[id number]]  (Normal) Collected: 02/10/23 1243    Lab Status: Final result Specimen: Stool from Per Stoma Updated: 02/10/23 1341     C. Difficile Toxins by PCR Negative    Narrative:      The result indicates the absence of toxigenic C. difficile from stool specimen.     Body Fluid Culture - Drainage, Peritoneum [[id number]]  (Abnormal)  (Susceptibility) Collected: 02/09/23 1458    Lab Status: Final result Specimen: Drainage from Peritoneum Updated: 02/11/23 0851     Body Fluid Culture Scant growth (1+) Escherichia coli     Gram Stain Few (2+) WBCs per low power  field      No organisms seen    Susceptibility      Escherichia coli      RENETTA      Amikacin Susceptible      Ampicillin Resistant     Ampicillin + Sulbactam Resistant     Cefepime Susceptible      Ceftazidime Susceptible      Ceftriaxone Susceptible      Gentamicin Resistant     Levofloxacin Susceptible      Piperacillin + Tazobactam Resistant     Tobramycin Intermediate      Trimethoprim + Sulfamethoxazole Resistant                      Susceptibility Comments     Escherichia coli    Cefazolin sensitivity will not be reported for Enterobacteriaceae in non-urine isolates. If cefazolin is preferred, please call the microbiology lab to request an E-test.  With the exception of urinary-sourced infections, aminoglycosides should not be used as monotherapy.                     Assessment:    Colitis    Left sided colitis with complication (HCC)    Nausea and vomiting    Stricture of sigmoid colon (HCC)  1-intra-abdominal sepsis with intra-abdominal abscess in the setting of prolonged colitis, sigmoid stricture, endoscopic injury to colonic wall with microperforation and subsequent laparotomy partial colectomy and colostomy and intraoperative injury to the spleen with repair while being on antibiotic therapy-likely pathogen to consider in this situation would be enteric amberly along with selection of yeast due to prolonged antibiotic therapy-status post percutaneous drain placement on 2/9/2023  2-hypertension  3-malnourishment  4-possible postop abdominal wall/incisional infection/evolving abscess        Recommendations/Discussions:  · Continue with fluconazole ceftriaxone and Flagyl  · Follow-up on the repeat CT scan of the abdomen and pelvis with intervention plans per colorectal surgery service.  · Monitor closely for complications of antibiotics including C. difficile infection but low threshold to repeat colostomy content for C. difficile toxin assay as her last testing on 2/10/2023 was negative.  Galdino Esteves  "MD  2/15/2023  10:05 EST    Parts of this note may be an electronic transcription/translation of spoken language to printed text using the Dragon dictation system.      Electronically signed by Galdino Esteves MD at 02/16/23 1313     Gideon Hope MD at 02/15/23 0981          Daily progress note    Primary care physician  Dr. Delgado    Chief complaint  Doing same with no new complaints     History of present illness  62-year-old female with history of hypertension presented to [facility] emergency room with left lower quadrant abdominal pain for last 1 month.  Patient also have occasional loose stools.  Patient denies any nausea vomiting.  Patient recently diagnosed with colitis and treated with oral antibiotics without any improvement.  Patient has noticed blood in the stools.  Patient evaluated in ER with CT abdominal pelvis which shows worsening colitis and failed outpatient treatment admit for management.  Patient has no fever chills chest pain shortness of breath with sweats weight loss or weight gain.     REVIEW OF SYSTEMS  Unremarkable except abdominal pain      PHYSICAL EXAM  Blood pressure 149/94, pulse 92, temperature 98.8 °F (37.1 °C), temperature source Oral, resp. rate 16, height 154.9 cm (61\"), weight 79.6 kg (175 lb 7.8 oz), SpO2 99 %.    GENERAL:  Awake and alert in no acute distress  HEENT:  Unremarkable  NECK:  Supple  CV: regular rhythm, normal rate  RESPIRATORY: normal effort, clear to auscultation bilaterally  ABDOMEN: soft, left lower quadrant tenderness colostomy in place  MUSCULOSKELETAL: no deformity  NEURO: alert, moves all extremities, follows commands  PSYCH:  calm, cooperative  SKIN: warm, dry     LAB RESULTS  Lab Results (last 24 hours)     Procedure Component Value Units Date/Time    POC Glucose Once [[id number]]  (Abnormal) Collected: 02/15/23 1118    Specimen: Blood Updated: 02/15/23 1124     Glucose 216 mg/dL      Comment: Meter: [id number] : [id number] Luis Manuel EUCEDA    "    Body Fluid Culture - Drainage, Peritoneum [[id number]] Collected: 02/14/23 1154    Specimen: Drainage from Peritoneum Updated: 02/15/23 1041     Body Fluid Culture No growth     Gram Stain Moderate (3+) WBCs per low power field      No organisms seen    POC Glucose Once [[id number]]  (Abnormal) Collected: 02/15/23 0654    Specimen: Blood Updated: 02/15/23 0655     Glucose 155 mg/dL      Comment: Meter: [id number] : [id number] Han Kuo ANDREW       Comprehensive Metabolic Panel [[id number]]  (Abnormal) Collected: 02/15/23 0422    Specimen: Blood Updated: 02/15/23 0549     Glucose 173 mg/dL      BUN 13 mg/dL      Creatinine 0.42 mg/dL      Sodium 132 mmol/L      Potassium 4.4 mmol/L      Chloride 104 mmol/L      CO2 24.7 mmol/L      Calcium 8.3 mg/dL      Total Protein 5.9 g/dL      Albumin 2.4 g/dL      ALT (SGPT) 11 U/L      AST (SGOT) 9 U/L      Alkaline Phosphatase 77 U/L      Total Bilirubin 0.4 mg/dL      Globulin 3.5 gm/dL      A/G Ratio 0.7 g/dL      BUN/Creatinine Ratio 31.0     Anion Gap 3.3 mmol/L      eGFR 110.1 mL/min/1.73     Narrative:      GFR Normal >60  Chronic Kidney Disease <60  Kidney Failure <15      Phosphorus [[id number]]  (Normal) Collected: 02/15/23 0422    Specimen: Blood Updated: 02/15/23 0547     Phosphorus 2.7 mg/dL     Magnesium [[id number]]  (Normal) Collected: 02/15/23 0422    Specimen: Blood Updated: 02/15/23 0547     Magnesium 1.8 mg/dL     CBC & Differential [[id number]]  (Abnormal) Collected: 02/15/23 0422    Specimen: Blood Updated: 02/15/23 0531    Narrative:      The following orders were created for panel order CBC & Differential.  Procedure                               Abnormality         Status                     ---------                               -----------         ------                     CBC Auto Differential[[id number]]        Abnormal            Final result                 Please view results for these tests on the individual orders.    CBC Auto  Differential [[id number]]  (Abnormal) Collected: 02/15/23 0422    Specimen: Blood Updated: 02/15/23 0531     WBC 23.70 10*3/mm3      RBC 3.15 10*6/mm3      Hemoglobin 9.0 g/dL      Hematocrit 27.2 %      MCV 86.3 fL      MCH 28.6 pg      MCHC 33.1 g/dL      RDW 14.7 %      RDW-SD 46.0 fl      MPV 9.5 fL      Platelets 502 10*3/mm3      Neutrophil % 74.3 %      Lymphocyte % 10.5 %      Monocyte % 9.3 %      Eosinophil % 0.7 %      Basophil % 0.3 %      Immature Grans % 4.9 %      Neutrophils, Absolute 17.61 10*3/mm3      Lymphocytes, Absolute 2.49 10*3/mm3      Monocytes, Absolute 2.20 10*3/mm3      Eosinophils, Absolute 0.17 10*3/mm3      Basophils, Absolute 0.07 10*3/mm3      Immature Grans, Absolute 1.16 10*3/mm3      nRBC 0.0 /100 WBC     POC Glucose Once [[id number]]  (Abnormal) Collected: 02/15/23 0053    Specimen: Blood Updated: 02/15/23 0054     Glucose 149 mg/dL      Comment: Meter: [id number] : [id number] Short Nikkeya CNA       POC Glucose Once [[id number]]  (Abnormal) Collected: 02/14/23 2122    Specimen: Blood Updated: 02/14/23 2123     Glucose 172 mg/dL      Comment: Meter: [id number] : [id number] Short Nikkeya CNA       POC Glucose Once [[id number]]  (Abnormal) Collected: 02/14/23 1623    Specimen: Blood Updated: 02/14/23 1625     Glucose 133 mg/dL      Comment: Meter: [id number] : [id number] Radha EUCEDA           Imaging Results (Last 24 Hours)     Procedure Component Value Units Date/Time    IR Abscess drain - simple [[id number]] Collected: 02/14/23 1819     Updated: 02/14/23 1915    Narrative:      FLUOROSCOPIC GUIDED DRAIN REPLACEMENT     HISTORY:  abscess.     COMPARISON: CT 02/13/2023.     PROCEDURE DETAILS: After informed consent was obtained from the patient,  the patient was placed on the angiography table in supine position. A  nurse was continuously present and moderate sedation was performed under  physician supervision from 1141 to 1207 hours with a total of 75 mcg  fentanyl  and 1 mg versed administered. The left abdomen and indwelling  drainage catheter were prepped and draped in usual sterile fashion.  Dilute contrast injection demonstrated partially occluded indwelling  drain. The drain was exchanged out over wire for a short angled catheter  which was directed into the inferior portion of the fluid collection  (representing the largest pocket on comparison CT). A new 10 Jamaican  drainage catheter was placed over wire so as to extend through mid and  inferior portions of the collection, locked, secured with Dermabond  reinforced suture and stay-fix dressing, and attached to bulb suction. A  specimen of dark red fluid was sent to the laboratory. Repeat  irrigation/re-aspiration was performed. The patient tolerated the  procedure well and there were no immediate complications. Fluoroscopy  time 5.9 minutes, 18 mGy, 4 exposures, 18 ml Visipaque 320.          Impression:      Successful left abdominal drain reposition and exchange as described  above.     This report was finalized on 2/14/2023 7:12 PM by Dr. Austin Garcia M.D.           Upper and lower endoscopy results noted and discussed with patient    Current Facility-Administered Medications:   •  acetaminophen (TYLENOL) tablet 650 mg, 650 mg, Oral, Q6H PRN, Adwoa Rogel PA-C  •  Adult Central 2-in-1 TPN, , Intravenous, Continuous, Lino Gaston MD, Last Rate: 75 mL/hr at 02/14/23 1805, New Bag at 02/14/23 1805  •  Adult Central 2-in-1 TPN, , Intravenous, Continuous, Lino Gaston MD  •  amLODIPine (NORVASC) tablet 10 mg, 10 mg, Oral, Q24H, Gideon Hope MD, 10 mg at 02/15/23 0930  •  cefTRIAXone (ROCEPHIN) 2 g in sodium chloride 0.9 % 100 mL IVPB-VTB, 2 g, Intravenous, Q24H, Beck Wei Jr., MD, Last Rate: 200 mL/hr at 02/15/23 1133, 2 g at 02/15/23 1133  •  dextrose (D50W) (25 g/50 mL) IV injection 25 g, 25 g, Intravenous, Q15 Min PRN, Beck Wei Jr., MD  •  dextrose (GLUTOSE) oral gel 15 g, 15 g, Oral, Q15 Min  PRN, Beck Wei Jr., MD  •  famotidine (PEPCID) injection 20 mg, 20 mg, Intravenous, BID AC, Lilibeth Rahman PA-C, 20 mg at 02/15/23 0933  •  Fat Emulsion Plant Based (INTRALIPID,LIPOSYN) 20 % infusion 20 g, 100 mL, Intravenous, Q24H (TPN), Lino Gaston MD, Last Rate: 8.33 mL/hr at 23 1806, 20 g at 23 1806  •  glucagon (GLUCAGEN) injection 1 mg, 1 mg, Intramuscular, Q15 Min PRN, Beck Wei Jr., MD  •  hydrALAZINE (APRESOLINE) injection 10 mg, 10 mg, Intravenous, Q4H PRN, Gideon Hope MD, 10 mg at 23 1454  •  insulin regular (humuLIN R,novoLIN R) injection 0-7 Units, 0-7 Units, Subcutaneous, Q6H, Beck Wei Jr., MD, 3 Units at 02/15/23 1228  •  metoclopramide (REGLAN) injection 10 mg, 10 mg, Intravenous, Q6H, Lino Gaston MD, 10 mg at 02/15/23 1228  •  metroNIDAZOLE (FLAGYL) IVPB 500 mg, 500 mg, Intravenous, Q8H, Galdino Esteves MD, 500 mg at 02/15/23 0546  •  [] HYDROmorphone (DILAUDID) injection 0.25 mg, 0.25 mg, Intravenous, Q2H PRN, 0.25 mg at 23 0015 **AND** naloxone (NARCAN) injection 0.4 mg, 0.4 mg, Intravenous, Q5 Min PRN, Lino Gaston MD  •  nitroglycerin (NITROSTAT) SL tablet 0.4 mg, 0.4 mg, Sublingual, Q5 Min PRN, Lino Gaston MD  •  ondansetron (ZOFRAN) injection 4 mg, 4 mg, Intravenous, Q6H PRN, Lino Gaston MD, 4 mg at 23 8966  •  Pharmacy to Dose TPN, , Does not apply, Continuous PRN, Lilibeth Rahman PA-C  •  potassium chloride 10 mEq in 100 mL IVPB, 10 mEq, Intravenous, Q1H PRN, Matthew Soliz MD, Last Rate: 100 mL/hr at 23, 10 mEq at 23  •  potassium phosphate 45 mmol in sodium chloride 0.9 % 500 mL infusion, 45 mmol, Intravenous, PRN **OR** potassium phosphate 30 mmol in sodium chloride 0.9 % 250 mL infusion, 30 mmol, Intravenous, PRN **OR** potassium phosphate 15 mmol in sodium chloride 0.9 % 100 mL infusion, 15 mmol, Intravenous, PRN, 15 mmol at 23 1440 **OR** sodium phosphates 40 mmol  in sodium chloride 0.9 % 500 mL IVPB, 40 mmol, Intravenous, PRN **OR** sodium phosphates 20 mmol in sodium chloride 0.9 % 250 mL IVPB, 20 mmol, Intravenous, PRN, Matthew Soilz MD  •  sodium chloride 0.9 % flush 10 mL, 10 mL, Intravenous, Q12H, Quick, Lilibeth A, PA-C, 10 mL at 02/15/23 0935  •  sodium chloride 0.9 % flush 10 mL, 10 mL, Intravenous, Q12H, Quick, Lilibeth A, PA-C, 10 mL at 02/15/23 0934  •  sodium chloride 0.9 % flush 10 mL, 10 mL, Intravenous, PRN, Quick, Lilibeth A, PA-C  •  sodium chloride 0.9 % flush 20 mL, 20 mL, Intravenous, PRN, Quick, Lilibeth A, PA-C  •  sodium chloride 0.9 % infusion 40 mL, 40 mL, Intravenous, PRN, Quick, Lilibeth A, PA-C     ASSESSMENT  Acute colitis with sigmoid stricture and microperforation s/p colon resection and colostomy  Abdominal fluid collection/abscess status post CT-guided drainage  Postop ileus  Acute kidney injury resolved  Hypertension   Gastroesophageal reflux disease    PLAN  CPM  Postop care  TPN  Continue IV antibiotics  Accu-Chek with low-dose sliding scale insulin  Infectious disease consult appreciated  Adjust blood pressure medications  Pain management  Continue home medications  Stress ulcer DVT prophylaxis  Supportive care  PT OT  Discussed with family nursing staff   Follow closely and further recommendation according to hospital course    SNAG HOPE MD    Copied text in this note has been reviewed and is accurate as of 02/15/23        Electronically signed by Sang Hope MD at 02/15/23 1351     Quick, Lilibeth A, PA-C at 02/15/23 0847     Attestation signed by Lino Gaston MD at 02/15/23 0914    I have reviewed this documentation and agree.                  Progress Note    Pod 12    S: Pt sitting up in chair. Pt drank shake yesterday and tolerated it. No nausea or vomiting.     O:  Temp:  [97.8 °F (36.6 °C)-99.1 °F (37.3 °C)] 98.6 °F (37 °C)  Heart Rate:  [80-97] 90  Resp:  [12-20] 16  BP: (120-144)/(70-91) 139/82      Intake/Output  Summary (Last 24 hours) at 2/15/2023 0847  Last data filed at 2/15/2023 0620  Gross per 24 hour   Intake 118 ml   Output 2020 ml   Net -1902 ml       Abd: soft, non-distended  Wound-vac in place over midline abdominal incision. All other incisions C/D/I.  NEW with serosanguinous drainage.   Colostomy: PPP with loose stool in bag.     Results from last 7 days   Lab Units 02/15/23  0422   WBC 10*3/mm3 23.70*   HEMOGLOBIN g/dL 9.0*   HEMATOCRIT % 27.2*   PLATELETS 10*3/mm3 502*     Results from last 7 days   Lab Units 02/15/23  0422   SODIUM mmol/L 132*   POTASSIUM mmol/L 4.4   CHLORIDE mmol/L 104   CO2 mmol/L 24.7   BUN mg/dL 13   CREATININE mg/dL 0.42*   EGFR mL/min/1.73 110.1   GLUCOSE mg/dL 173*   CALCIUM mg/dL 8.3*   PHOSPHORUS mg/dL 2.7       Results from last 7 days   Lab Units 02/15/23  0422   MAGNESIUM mg/dL 1.8         A/P: 62 y.o. female s/p robotic assisted laparoscopic left and sigmoid resection with colostomy converted open due to splenic injury 02/03/2023    - S/P LLQ IR Drain placement 02/09/2023. Pt treated empirically with Zosyn. Cultures with scant growth of E. Coli with resistance to Zosyn. ID following. ABx switched to Rocephin and Flagyl. Drain repositioned yesterday (02/14/2023). Repeat fluid cultures pending. Gram stain negative so far. Leukocytosis trending down.   - Okay to have another shake this morning. If she tolerates it, will start on full liquid diet. Continue TPN and Reglan.   - Continue NEW drain due to high output.   - Continue wound-vac to midline abdominal wound.   - Pt agreeable to ambulate at least 3x today.          Electronically signed by iLno Gaston MD at 02/15/23 6457     Lino Gaston MD at 02/14/23 0064        Patient said she was in the chair most the day and walked around patient abdomen is softer stool in the bag  Went for drain exchange today  Await cultures  Continue TPN  Continue antibiotics  Patient does not like clear liquids so we will try a  "milkshake    Electronically signed by Lino Gaston MD at 02/14/23 3300     Gideon Hope MD at 02/14/23 1426          Daily progress note    Primary care physician  Dr. Delgado    Chief complaint  Doing same with no new complaints     History of present illness  62-year-old female with history of hypertension presented to [facility] emergency room with left lower quadrant abdominal pain for last 1 month.  Patient also have occasional loose stools.  Patient denies any nausea vomiting.  Patient recently diagnosed with colitis and treated with oral antibiotics without any improvement.  Patient has noticed blood in the stools.  Patient evaluated in ER with CT abdominal pelvis which shows worsening colitis and failed outpatient treatment admit for management.  Patient has no fever chills chest pain shortness of breath with sweats weight loss or weight gain.     REVIEW OF SYSTEMS  Unremarkable except abdominal pain      PHYSICAL EXAM  Blood pressure 142/88, pulse 94, temperature 97.8 °F (36.6 °C), temperature source Oral, resp. rate 18, height 154.9 cm (61\"), weight 76.7 kg (169 lb), SpO2 97 %.    GENERAL:  Awake and alert in no acute distress  HEENT:  Unremarkable  NECK:  Supple  CV: regular rhythm, normal rate  RESPIRATORY: normal effort, clear to auscultation bilaterally  ABDOMEN: soft, left lower quadrant tenderness colostomy in place  MUSCULOSKELETAL: no deformity  NEURO: alert, moves all extremities, follows commands  PSYCH:  calm, cooperative  SKIN: warm, dry     LAB RESULTS  Lab Results (last 24 hours)     Procedure Component Value Units Date/Time    POC Glucose Once [[id number]]  (Abnormal) Collected: 02/14/23 1338    Specimen: Blood Updated: 02/14/23 1339     Glucose 137 mg/dL      Comment: Meter: [id number] : [id number] Lev EUCEDA       Body Fluid Culture - Drainage, Peritoneum [[id number]] Collected: 02/14/23 1154    Specimen: Drainage from Peritoneum Updated: 02/14/23 1202    Anaerobic " Culture - Drainage, Peritoneum [[id number]] Collected: 02/14/23 1154    Specimen: Drainage from Peritoneum Updated: 02/14/23 1202    POC Glucose Once [[id number]]  (Abnormal) Collected: 02/14/23 0632    Specimen: Blood Updated: 02/14/23 0634     Glucose 142 mg/dL      Comment: Meter: [id number] : [id number] Han Kuo CNA       Comprehensive Metabolic Panel [[id number]]  (Abnormal) Collected: 02/14/23 0238    Specimen: Blood Updated: 02/14/23 0335     Glucose 150 mg/dL      BUN 8 mg/dL      Creatinine 0.37 mg/dL      Sodium 134 mmol/L      Potassium 4.0 mmol/L      Chloride 100 mmol/L      CO2 24.8 mmol/L      Calcium 8.2 mg/dL      Total Protein 5.8 g/dL      Albumin 2.1 g/dL      ALT (SGPT) 9 U/L      AST (SGOT) 13 U/L      Alkaline Phosphatase 67 U/L      Total Bilirubin 0.4 mg/dL      Globulin 3.7 gm/dL      A/G Ratio 0.6 g/dL      BUN/Creatinine Ratio 21.6     Anion Gap 9.2 mmol/L      eGFR 113.5 mL/min/1.73     Narrative:      GFR Normal >60  Chronic Kidney Disease <60  Kidney Failure <15      Phosphorus [[id number]]  (Normal) Collected: 02/14/23 0238    Specimen: Blood Updated: 02/14/23 0335     Phosphorus 2.7 mg/dL     Magnesium [[id number]]  (Normal) Collected: 02/14/23 0238    Specimen: Blood Updated: 02/14/23 0335     Magnesium 1.9 mg/dL     CBC & Differential [[id number]]  (Abnormal) Collected: 02/14/23 0238    Specimen: Blood Updated: 02/14/23 0321    Narrative:      The following orders were created for panel order CBC & Differential.  Procedure                               Abnormality         Status                     ---------                               -----------         ------                     CBC Auto Differential[[id number]]        Abnormal            Final result                 Please view results for these tests on the individual orders.    CBC Auto Differential [[id number]]  (Abnormal) Collected: 02/14/23 0238    Specimen: Blood Updated: 02/14/23 0321     WBC 25.66 10*3/mm3      RBC  3.30 10*6/mm3      Hemoglobin 9.2 g/dL      Hematocrit 28.9 %      MCV 87.6 fL      MCH 27.9 pg      MCHC 31.8 g/dL      RDW 14.4 %      RDW-SD 45.6 fl      MPV 9.5 fL      Platelets 500 10*3/mm3      Neutrophil % 76.3 %      Lymphocyte % 10.1 %      Monocyte % 8.2 %      Eosinophil % 0.5 %      Basophil % 0.2 %      Immature Grans % 4.7 %      Neutrophils, Absolute 19.56 10*3/mm3      Lymphocytes, Absolute 2.60 10*3/mm3      Monocytes, Absolute 2.10 10*3/mm3      Eosinophils, Absolute 0.13 10*3/mm3      Basophils, Absolute 0.06 10*3/mm3      Immature Grans, Absolute 1.21 10*3/mm3      nRBC 0.0 /100 WBC     POC Glucose Once [[id number]]  (Normal) Collected: 02/13/23 2124    Specimen: Blood Updated: 02/13/23 2125     Glucose 127 mg/dL      Comment: Meter: [id number] : [id number] Han Kuo CNA       POC Glucose Once [[id number]]  (Abnormal) Collected: 02/13/23 1637    Specimen: Blood Updated: 02/13/23 1638     Glucose 153 mg/dL      Comment: Meter: [id number] : [id number] Yonatan EUCEDA           Imaging Results (Last 24 Hours)     Procedure Component Value Units Date/Time    IR Abscess drain - simple [[id number]] Resulted: 02/14/23 1220     Updated: 02/14/23 1224        Upper and lower endoscopy results noted and discussed with patient    Current Facility-Administered Medications:   •  acetaminophen (TYLENOL) tablet 650 mg, 650 mg, Oral, Q6H PRN, Adwoa Rogel PA-C  •  Adult Central 2-in-1 TPN, , Intravenous, Continuous, Lino Gaston MD, Last Rate: 75 mL/hr at 02/13/23 1835, New Bag at 02/13/23 1835  •  Adult Central 2-in-1 TPN, , Intravenous, Continuous, Lino Gaston MD  •  amLODIPine (NORVASC) tablet 10 mg, 10 mg, Oral, Q24H, Gideon Hope MD, 10 mg at 02/13/23 0917  •  cefTRIAXone (ROCEPHIN) 2 g in sodium chloride 0.9 % 100 mL IVPB-VTB, 2 g, Intravenous, Q24H, Beck Wei Jr., MD  •  dextrose (D50W) (25 g/50 mL) IV injection 25 g, 25 g, Intravenous, Q15 Min PRN, Beck Wei Jr., MD  •   dextrose (GLUTOSE) oral gel 15 g, 15 g, Oral, Q15 Min PRN, Beck Wei Jr., MD  •  famotidine (PEPCID) injection 20 mg, 20 mg, Intravenous, BID Josiah NICHOLS Brittany A, PA-C, 20 mg at 23 0545  •  Fat Emulsion Plant Based (INTRALIPID,LIPOSYN) 20 % infusion 20 g, 100 mL, Intravenous, Q24H (TPN), Lino Gaston MD, Last Rate: 8.33 mL/hr at 23 183, 20 g at 23 183  •  fluconazole (DIFLUCAN) IVPB 400 mg, 400 mg, Intravenous, Q24H, Gideon Hope MD, Last Rate: 100 mL/hr at 23 1551, 400 mg at 23 1551  •  glucagon (GLUCAGEN) injection 1 mg, 1 mg, Intramuscular, Q15 Min PRN, Beck Wei Jr., MD  •  hydrALAZINE (APRESOLINE) injection 10 mg, 10 mg, Intravenous, Q4H PRN, Gideon Hope MD, 10 mg at 23 1454  •  insulin regular (humuLIN R,novoLIN R) injection 0-7 Units, 0-7 Units, Subcutaneous, Q6H, Beck Wei Jr., MD, 2 Units at 23 1835  •  metoclopramide (REGLAN) injection 10 mg, 10 mg, Intravenous, Q6H, Lino Gaston MD, 10 mg at 23 1343  •  metroNIDAZOLE (FLAGYL) IVPB 500 mg, 500 mg, Intravenous, Q8H, Galdino Esteves MD, 500 mg at 23 1346  •  [] HYDROmorphone (DILAUDID) injection 0.25 mg, 0.25 mg, Intravenous, Q2H PRN, 0.25 mg at 23 0015 **AND** naloxone (NARCAN) injection 0.4 mg, 0.4 mg, Intravenous, Q5 Min PRN, Lino Gaston MD  •  nitroglycerin (NITROSTAT) SL tablet 0.4 mg, 0.4 mg, Sublingual, Q5 Min PRN, Lino Gaston MD  •  ondansetron (ZOFRAN) injection 4 mg, 4 mg, Intravenous, Q6H PRN, Lino Gaston MD, 4 mg at 23 3877  •  Pharmacy to Dose TPN, , Does not apply, Continuous PRN, Lilibeth Rahman PA-C  •  potassium chloride 10 mEq in 100 mL IVPB, 10 mEq, Intravenous, Q1H PRN, Matthew Soliz MD, Last Rate: 100 mL/hr at 23, 10 mEq at 23  •  potassium phosphate 45 mmol in sodium chloride 0.9 % 500 mL infusion, 45 mmol, Intravenous, PRN **OR** potassium phosphate 30 mmol in sodium chloride 0.9 % 250  mL infusion, 30 mmol, Intravenous, PRN **OR** potassium phosphate 15 mmol in sodium chloride 0.9 % 100 mL infusion, 15 mmol, Intravenous, PRN, 15 mmol at 02/12/23 1440 **OR** sodium phosphates 40 mmol in sodium chloride 0.9 % 500 mL IVPB, 40 mmol, Intravenous, PRN **OR** sodium phosphates 20 mmol in sodium chloride 0.9 % 250 mL IVPB, 20 mmol, Intravenous, PRN, Matthew Soliz MD  •  sodium chloride 0.9 % flush 10 mL, 10 mL, Intravenous, Q12H, Quick, Lilibeth A, PA-C, 10 mL at 02/13/23 2108  •  sodium chloride 0.9 % flush 10 mL, 10 mL, Intravenous, Q12H, Quick, Lilibeth A, PA-C, 10 mL at 02/14/23 0846  •  sodium chloride 0.9 % flush 10 mL, 10 mL, Intravenous, PRN, Quick, Lilibeth A, PA-C  •  sodium chloride 0.9 % flush 20 mL, 20 mL, Intravenous, PRN, Quick, Lilibeth A, PA-C  •  sodium chloride 0.9 % infusion 40 mL, 40 mL, Intravenous, PRN, Quick, Lilibeth A, PA-C     ASSESSMENT  Acute colitis with sigmoid stricture and microperforation s/p colon resection and colostomy  Abdominal fluid collection/abscess status post CT-guided drainage  Postop ileus  Acute kidney injury resolved  Hypertension   Gastroesophageal reflux disease    PLAN  CPM  Postop care  IVF  TPN  Continue IV antibiotics  Accu-Chek with low-dose sliding scale insulin  Infectious disease consult appreciated  Adjust blood pressure medications  Pain management  Continue home medications  Stress ulcer DVT prophylaxis  Supportive care  PT OT  Discussed with family nursing staff   Follow closely and further recommendation according to hospital course    SANG HOPE MD    Copied text in this note has been reviewed and is accurate as of 02/14/23        Electronically signed by Sang Hope MD at 02/14/23 6877     Matthew Soliz MD at 02/14/23 1221             LOS: 18 days     Chief Complaint/ Reason for encounter: JUANY    Subjective   02/07/23 : Feels about the same today, still some generalized abdominal pain  Anxious to increase her diet, no  "nausea or vomiting today but did vomit twice yesterday  No shortness of breath or chest pain  No fevers or chills  Minimal edema    2/8: She is upset with the fact that her ostomy bag has stool in it  Remains n.p.o. per surgery recommendations  Denies any shortness of breath or chest pain, no fevers or chills no abdominal pain nausea vomiting or edema    2/9: Did not tolerate clear liquids, NG tube to be replaced, n.p.o., IR to place drain into the fluid collection seen on CT    2/10: Abdominal drain in place, still with some nausea, remains n.p.o. with plans to start TPN  Remains on IV fluids, no edema, good urine output    2/11 on TPN, states she wants a stark as she doesn't like getting up to urinate, bp above  goal     2/12 some abd pain, give dilaudid this am with improvement, bp runnign on high side, remains on TPN    2/13: Remains on TPN, n.p.o., no bowel movements, no flatus    2/14: Peritoneal drain repositioning in IR today, otherwise remains fairly stable on TPN    Medical history reviewed:  Abdominal Pain        Subjective    History taken from: Patient and chart    Vital Signs  Temp:  [97.8 °F (36.6 °C)-98.8 °F (37.1 °C)] 97.8 °F (36.6 °C)  Heart Rate:  [80-96] 96  Resp:  [12-20] 17  BP: (122-150)/(75-90) 132/81       Wt Readings from Last 1 Encounters:   02/14/23 0920 76.7 kg (169 lb)   02/14/23 0644 76.8 kg (169 lb 5 oz)   02/13/23 0502 87.7 kg (193 lb 6.4 oz)   02/12/23 0628 82.4 kg (181 lb 10.5 oz)   02/11/23 0558 83.8 kg (184 lb 11.9 oz)   02/03/23 2226 83.1 kg (183 lb 3.2 oz)   01/27/23 1955 78.9 kg (174 lb)   01/27/23 1428 78.9 kg (174 lb)       Objective:  Vital signs: (most recent): Blood pressure 132/81, pulse 96, temperature 97.8 °F (36.6 °C), temperature source Oral, resp. rate 17, height 154.9 cm (61\"), weight 76.7 kg (169 lb), SpO2 96 %.              Objective:  General Appearance:  Comfortable, nad   Awake, alert, oriented  HEENT: Mucous membranes moist, no injury, oropharynx " clear  Lungs:  Normal effort and normal respiratory rate.  Breath sounds clear to auscultation.  No  respiratory distress.  No rales, decreased breath sounds or rhonchi.    Heart: Normal rate.  Regular rhythm.  S1, S2 normal.  No murmur.   Abdomen: Abdomen is soft.  Diminished bowel sounds, nontender  Extremities: Trace ankle edema of bilateral lower extremities  Neurological: No focal motor or sensory deficits, pupils reactive  Skin:  Warm and dry.  No rash or cyanosis.       Results Review:    Intake/Output:     Intake/Output Summary (Last 24 hours) at 2/14/2023 1221  Last data filed at 2/14/2023 1100  Gross per 24 hour   Intake 100 ml   Output 1920 ml   Net -1820 ml         DATA:  Radiology and Labs:  The following labs independently reviewed by me. Additional labs ordered for tomorrow a.m.  Interval notes, chart personally reviewed by me.   Old records independently reviewed showing normal baseline creatinine    New problems include hypokalemia and hypophosphatemia  Discussed with patient herself at bedside    Risk/ complexity of medical care/ medical decision making moderate complexity, postop renal failure and fluid and electrolyte management    Labs:   Recent Results (from the past 24 hour(s))   POC Glucose Once    Collection Time: 02/13/23  1:25 PM    Specimen: Blood   Result Value Ref Range    Glucose 156 (H) 70 - 130 mg/dL   POC Glucose Once    Collection Time: 02/13/23  4:37 PM    Specimen: Blood   Result Value Ref Range    Glucose 153 (H) 70 - 130 mg/dL   POC Glucose Once    Collection Time: 02/13/23  9:24 PM    Specimen: Blood   Result Value Ref Range    Glucose 127 70 - 130 mg/dL   Magnesium    Collection Time: 02/14/23  2:38 AM    Specimen: Blood   Result Value Ref Range    Magnesium 1.9 1.6 - 2.4 mg/dL   Phosphorus    Collection Time: 02/14/23  2:38 AM    Specimen: Blood   Result Value Ref Range    Phosphorus 2.7 2.5 - 4.5 mg/dL   Comprehensive Metabolic Panel    Collection Time: 02/14/23  2:38 AM     Specimen: Blood   Result Value Ref Range    Glucose 150 (H) 65 - 99 mg/dL    BUN 8 8 - 23 mg/dL    Creatinine 0.37 (L) 0.57 - 1.00 mg/dL    Sodium 134 (L) 136 - 145 mmol/L    Potassium 4.0 3.5 - 5.2 mmol/L    Chloride 100 98 - 107 mmol/L    CO2 24.8 22.0 - 29.0 mmol/L    Calcium 8.2 (L) 8.6 - 10.5 mg/dL    Total Protein 5.8 (L) 6.0 - 8.5 g/dL    Albumin 2.1 (L) 3.5 - 5.2 g/dL    ALT (SGPT) 9 1 - 33 U/L    AST (SGOT) 13 1 - 32 U/L    Alkaline Phosphatase 67 39 - 117 U/L    Total Bilirubin 0.4 0.0 - 1.2 mg/dL    Globulin 3.7 gm/dL    A/G Ratio 0.6 g/dL    BUN/Creatinine Ratio 21.6 7.0 - 25.0    Anion Gap 9.2 5.0 - 15.0 mmol/L    eGFR 113.5 >60.0 mL/min/1.73   CBC Auto Differential    Collection Time: 02/14/23  2:38 AM    Specimen: Blood   Result Value Ref Range    WBC 25.66 (H) 3.40 - 10.80 10*3/mm3    RBC 3.30 (L) 3.77 - 5.28 10*6/mm3    Hemoglobin 9.2 (L) 12.0 - 15.9 g/dL    Hematocrit 28.9 (L) 34.0 - 46.6 %    MCV 87.6 79.0 - 97.0 fL    MCH 27.9 26.6 - 33.0 pg    MCHC 31.8 31.5 - 35.7 g/dL    RDW 14.4 12.3 - 15.4 %    RDW-SD 45.6 37.0 - 54.0 fl    MPV 9.5 6.0 - 12.0 fL    Platelets 500 (H) 140 - 450 10*3/mm3    Neutrophil % 76.3 (H) 42.7 - 76.0 %    Lymphocyte % 10.1 (L) 19.6 - 45.3 %    Monocyte % 8.2 5.0 - 12.0 %    Eosinophil % 0.5 0.3 - 6.2 %    Basophil % 0.2 0.0 - 1.5 %    Immature Grans % 4.7 (H) 0.0 - 0.5 %    Neutrophils, Absolute 19.56 (H) 1.70 - 7.00 10*3/mm3    Lymphocytes, Absolute 2.60 0.70 - 3.10 10*3/mm3    Monocytes, Absolute 2.10 (H) 0.10 - 0.90 10*3/mm3    Eosinophils, Absolute 0.13 0.00 - 0.40 10*3/mm3    Basophils, Absolute 0.06 0.00 - 0.20 10*3/mm3    Immature Grans, Absolute 1.21 (H) 0.00 - 0.05 10*3/mm3    nRBC 0.0 0.0 - 0.2 /100 WBC   POC Glucose Once    Collection Time: 02/14/23  6:32 AM    Specimen: Blood   Result Value Ref Range    Glucose 142 (H) 70 - 130 mg/dL       Radiology:  Pertinent radiology studies were reviewed as described above      Medications have been reviewed separately  in chart overview      ASSESSMENT:   acute renal failure, creatinine peaked at 2.3 and is below baseline today,   Hypokalemia   Metabolic alkalosis   Hypoalbuminemia   Hypophosphatemia     Colitis status post sigmoid colon resection    Nausea and vomiting,  Postop ileus, remains n.p.o.  Abdominal fluid collection status post drain placement   Stricture of sigmoid colon, status post colostomy  Hypokalemia, worse today  Hypertension, receiving as needed IV hydralazine  Anemia        DISCUSSION/PLAN:   Renal function remains stable and at baseline.  Euvolemic on exam  Sodium a little better today at 135, otherwise electrolytes stable  Continue on TPN at current formulation otherwise  Potassium and phosphorus stable on TPN today, no need for additional replacement  Prn iv hydralazine for HTN plus oral amlodipine.  BP at goal  Continue to monitor electrolytes and volume closely      Matthew Soliz MD   [facility]   Office phone number: [phone]  Answering service phone number: [phone]    23  12:21 EST          Electronically signed by Matthew Soliz MD at 23 1222     Galdino Esteves MD at 23 0807            Infectious Diseases Progress Note    Galdino Esteves MD     [facility]  Los: 18 days  Patient Identification:  Name: Hali Tejeda  Age: 62 y.o.  Sex: female  :  1960  MRN: [id number]         Primary Care Physician: Richard Delgado MD        Subjective: Sitting in the recliner out of bed but feels about the same.  Still has nausea but no vomiting.  Denies any much abdominal discomfort.  Interval History: Underwent placement of wound VAC on her abdominal wound/incision.  TPN is started.  CT scan of the abdomen and pelvis performed on 2022 showed interval decrease in the size of the fluid collection in the left paracolic gutter as well as an area in the left lower quadrant with ill-defined subjacent stranding and tiny foci of gas slightly more  "conspicuous compared to prior imaging studies with no drainable focal fluid collection or definitive extraluminal enteric contrast noted.  Objective:    Scheduled Meds:amLODIPine, 10 mg, Oral, Q24H  cefTRIAXone, 1 g, Intravenous, Q24H  famotidine, 20 mg, Intravenous, BID AC  Fat Emulsion Plant Based, 100 mL, Intravenous, Q24H (TPN)  fluconazole, 400 mg, Intravenous, Q24H  insulin regular, 0-7 Units, Subcutaneous, Q6H  metoclopramide, 10 mg, Intravenous, Q6H  metroNIDAZOLE, 500 mg, Intravenous, Q8H  sodium chloride, 10 mL, Intravenous, Q12H  sodium chloride, 10 mL, Intravenous, Q12H      Continuous Infusions:Adult Central 2-in-1 TPN, , Last Rate: 75 mL/hr at 02/13/23 1835  Pharmacy to Dose TPN,         Vital signs in last 24 hours:  Temp:  [98.3 °F (36.8 °C)-98.8 °F (37.1 °C)] 98.5 °F (36.9 °C)  Heart Rate:  [] 92  Resp:  [18] 18  BP: (126-150)/(75-90) 150/87    Intake/Output:    Intake/Output Summary (Last 24 hours) at 2/14/2023 0807  Last data filed at 2/14/2023 0717  Gross per 24 hour   Intake 100 ml   Output 2625 ml   Net -2525 ml       Exam:  /87 (BP Location: Left arm, Patient Position: Sitting)   Pulse 92   Temp 98.5 °F (36.9 °C) (Oral)   Resp 18   Ht 154.9 cm (60.98\")   Wt 76.8 kg (169 lb 5 oz)   SpO2 98%   BMI 32.01 kg/m²   Patient is examined using the personal protective equipment as per guidelines from infection control for this particular patient as enacted.  Hand washing was performed before and after patient interaction.  General Appearance: Nontoxic ill-appearing female who interacts.                          Head:    Normocephalic, without obvious abnormality, atraumatic                           Eyes:    PERRL, conjunctivae/corneas clear, EOM's intact, both eyes                         Throat:   Lips, tongue, gums normal; oral mucosa pink and moist                           Neck:   Supple, symmetrical, trachea midline, no JVD                         Lungs:    Clear to auscultation " bilaterally, respirations unlabored                 Chest Wall:    No tenderness or deformity                          Heart:  S1-S2 regular                  Abdomen:   Ostomy present with midline incision has wound VAC in place with no surrounding cellulitis.  Drain in place.                 Extremities:   Extremities normal, atraumatic, no cyanosis or edema                        Pulses:   Pulses palpable in all extremities                            Skin:   Skin is warm and dry,  no rashes or palpable lesions                  Neurologic: Grossly nonfocal       Data Review:    I reviewed the patient's new clinical results.  Results from last 7 days   Lab Units 02/14/23 0238 02/13/23  0321 02/12/23  0558 02/11/23  0506 02/10/23  0917 02/09/23  0619 02/08/23  0405   WBC 10*3/mm3 25.66* 28.34* 26.57* 25.18* 20.95* 25.99* 18.59*   HEMOGLOBIN g/dL 9.2* 9.7* 8.5* 8.7* 8.7* 9.6* 9.7*   PLATELETS 10*3/mm3 500* 479* 416 411 370 337 281     Results from last 7 days   Lab Units 02/14/23 0238 02/13/23  0321 02/12/23  0817 02/12/23  0748 02/12/23  0558 02/11/23  0506 02/10/23  0356 02/09/23  0619 02/08/23  2132 02/08/23  0405   SODIUM mmol/L 134* 132* 135*  --  126* 141 141 143  --  143   POTASSIUM mmol/L 4.0 4.0 3.7 3.7 7.5* 2.8* 2.7* 4.6   < > 3.5   CHLORIDE mmol/L 100 99  --   --  96* 106 108* 111*  --  116*   CO2 mmol/L 24.8 28.4  --   --  26.0 30.0* 26.0 22.9  --  21.0*   BUN mg/dL 8 7*  --   --  4* 3* 4* 5*  --  9   CREATININE mg/dL 0.37* 0.37*  --   --  0.41* 0.37* 0.39* 0.46*  --  0.62   CALCIUM mg/dL 8.2* 8.5*  --   --  8.4* 7.8* 8.1* 8.4*  --  8.4*   GLUCOSE mg/dL 150* 138*  --   --  763* 158* 94 101*  --  79    < > = values in this interval not displayed.     Microbiology Results (last 10 days)     Procedure Component Value - Date/Time    Clostridioides difficile Toxin - Stool, Per Stoma [[id number]]  (Normal) Collected: 02/10/23 1243    Lab Status: Final result Specimen: Stool from Per Stoma Updated: 02/10/23 1341     Narrative:      The following orders were created for panel order Clostridioides difficile Toxin - Stool, Per Stoma.  Procedure                               Abnormality         Status                     ---------                               -----------         ------                     Clostridioides difficile...[[id number]]  Normal              Final result                 Please view results for these tests on the individual orders.    Clostridioides difficile Toxin, PCR - Stool, Per Stoma [[id number]]  (Normal) Collected: 02/10/23 1243    Lab Status: Final result Specimen: Stool from Per Stoma Updated: 02/10/23 1341     C. Difficile Toxins by PCR Negative    Narrative:      The result indicates the absence of toxigenic C. difficile from stool specimen.     Body Fluid Culture - Drainage, Peritoneum [[id number]]  (Abnormal)  (Susceptibility) Collected: 02/09/23 1458    Lab Status: Final result Specimen: Drainage from Peritoneum Updated: 02/11/23 0851     Body Fluid Culture Scant growth (1+) Escherichia coli     Gram Stain Few (2+) WBCs per low power field      No organisms seen    Susceptibility      Escherichia coli      RENETTA      Amikacin Susceptible      Ampicillin Resistant     Ampicillin + Sulbactam Resistant     Cefepime Susceptible      Ceftazidime Susceptible      Ceftriaxone Susceptible      Gentamicin Resistant     Levofloxacin Susceptible      Piperacillin + Tazobactam Resistant     Tobramycin Intermediate      Trimethoprim + Sulfamethoxazole Resistant                      Susceptibility Comments     Escherichia coli    Cefazolin sensitivity will not be reported for Enterobacteriaceae in non-urine isolates. If cefazolin is preferred, please call the microbiology lab to request an E-test.  With the exception of urinary-sourced infections, aminoglycosides should not be used as monotherapy.                     Assessment:    Colitis    Left sided colitis with complication (HCC)    Nausea and vomiting     Stricture of sigmoid colon (HCC)  1-intra-abdominal sepsis with intra-abdominal abscess in the setting of prolonged colitis, sigmoid stricture, endoscopic injury to colonic wall with microperforation and subsequent laparotomy partial colectomy and colostomy and intraoperative injury to the spleen with repair while being on antibiotic therapy-likely pathogen to consider in this situation would be enteric amberly along with selection of yeast due to prolonged antibiotic therapy-status post percutaneous drain placement on 2/9/2023  2-hypertension  3-malnourishment  4-possible postop abdominal wall/incisional infection/evolving abscess        Recommendations/Discussions:  · Continue with fluconazole ceftriaxone and Flagyl  · Follow-up on the repeat CT scan of the abdomen and pelvis with intervention plans per colorectal surgery service.  · Monitor closely for complications of antibiotics including C. difficile infection but low threshold to repeat colostomy content for C. difficile toxin assay as her last testing on 2/10/2023 was negative.  Galdino Esteves MD  2/14/2023  08:07 EST    Parts of this note may be an electronic transcription/translation of spoken language to printed text using the Dragon dictation system.      Electronically signed by Galdino Esteves MD at 02/14/23 0952       Consult Notes (last 72 hours)  Notes from 02/14/23 0734 through 02/17/23 0734   No notes of this type exist for this encounter.

## 2023-02-17 NOTE — NURSING NOTE
"CWOCN- follow up for wound, ostomy. Met with Adwoa Rogel PA-C and she was able to clean up the wound base more. Tissue overall looks much better than Wed. There is improving granulation tissue. Patient participated in ostomy pouch change using a mirror to better view the stoma. She worked with the barrier ring and the closure. The 1 piece Coloplast flat with barrier ring is working well- it was on Mon to Fri and still had a great seal.      02/17/23 1312   Wound 02/03/23 1405 abdomen Incision   Placement Date/Time: 02/03/23 1405   Location: abdomen  Primary Wound Type: Incision   Dressing Appearance intact   Base granulating;moist;red;pink;slough  (debrided at bedside by FATIMAH Orona)   Periwound intact   Periwound Temperature warm   Periwound Skin Turgor soft   Edges open   Drainage Characteristics/Odor serosanguineous   Drainage Amount moderate   Care, Wound cleansed with;sterile normal saline;negative pressure wound therapy   Dressing Care dressing changed   Periwound Care barrier film applied   Wound Output (mL) 350  (canister changed)   NPWT (Negative Pressure Wound Therapy) 02/10/23 abdomen   Placement Date: 02/10/23   Location: abdomen   Therapy Setting continuous therapy   Dressing foam, black;transparent dressing   Pressure Setting 125 mmHg   Sponges Inserted 1   Sponges Removed 1   Finger sweep complete Yes   Colostomy LLQ   Placement date: If unknown, DO NOT use \"Add Comment\" note/Placement time: If unknown, DO NOT use \"Add Comment\" note: 02/03/23 1944   Inserted by: DR. GRANADO  Hand Hygiene Completed: Yes  Location: LLQ   Stomal Appliance 1 piece;Intact;Changed;Drainable   Stoma Appearance rosebud appearance;moist;protruding above skin level   Peristomal Assessment Intact   Accessories/Skin Care skin barrier ring   Stoma Function stool;flatus   Stool Color brown   Stool Consistency loose   Treatment Bag change   Output (mL) 50 mL       "

## 2023-02-17 NOTE — CASE MANAGEMENT/SOCIAL WORK
Continued Stay Note  [facility]     Patient Name: Hali Tejeda  MRN: [id number]  Today's Date: 2/17/2023    Admit Date: 1/27/2023    Plan: Home w/ BHH, rolling walker, and 3 in 1 commode. Needs wound vac delivered (order sent)   Discharge Plan     Row Name 02/17/23 0943       Plan    Plan Home w/ BHH, rolling walker, and 3 in 1 commode. Needs wound vac delivered (order sent)    Plan Comments CCP spoke with Shana who confirmed he received wound vac order and should get approval today. Awaiting approval and wound vac delivery at bedside. Per MD, patient will likely not dc over the weekend. MD to put in 3 in 1 commode verbiage in discharge summary. CCP following for any additional dc needs that arise pending treatment course. BUSTER HARRISON               Discharge Codes    No documentation.               Expected Discharge Date and Time     Expected Discharge Date Expected Discharge Time    Feb 20, 2023             BUSTER Watson

## 2023-02-17 NOTE — PLAN OF CARE
Goal Outcome Evaluation:  Plan of Care Reviewed With: patient        Progress: improving  Outcome Evaluation: Pt agreed to PT session, tolerated  amb dist to 150 ft, req CGA this session, SBA for tfers, plans home at DC when medically ready, BSC, rwx delivered for home, will possibly  need HH initially

## 2023-02-17 NOTE — PROGRESS NOTES
Colorectal Surgery Progress Note    POD 14    S: Positive ostomy output.  Positive ambulating.  Pain level unchanged.  Tolerating full liquid diet with intermittent nausea, but no vomiting.    O:  Temp:  [97.9 °F (36.6 °C)-99.4 °F (37.4 °C)] 98.6 °F (37 °C)  Heart Rate:  [86-95] 95  Resp:  [16-18] 18  BP: (123-153)/(73-87) 138/87    Intake/Output Summary (Last 24 hours) at 2/17/2023 0819  Last data filed at 2/17/2023 0431  Gross per 24 hour   Intake 11855.75 ml   Output 50 ml   Net 40185.75 ml     Abd:   soft, non distended. Wound vac in place. Drain serosanguinous, about 10cc in bulb.  Incisions: clean, dry, intact, no erythema    Results from last 7 days   Lab Units 02/17/23  0308   WBC 10*3/mm3 19.71*   HEMOGLOBIN g/dL 9.0*   HEMATOCRIT % 27.0*   PLATELETS 10*3/mm3 493*     Results from last 7 days   Lab Units 02/17/23  0308   SODIUM mmol/L 133*   POTASSIUM mmol/L 4.3   CHLORIDE mmol/L 104   CO2 mmol/L 23.1   BUN mg/dL 11   CREATININE mg/dL 0.45*   EGFR mL/min/1.73 108.3   GLUCOSE mg/dL 177*   CALCIUM mg/dL 8.1*   PHOSPHORUS mg/dL 2.7     Results from last 7 days   Lab Units 02/17/23  0308   MAGNESIUM mg/dL 1.6     A/P: 62 y.o. female s/p robotic assisted laparoscopic left and sigmoid resection with colostomy converted open due to splenic injury 02/03/2023  • Ok to advance to regular diet as tolerated. Can go back to full liquids if nausea increases.  • Repeat CT today to assess abdominal fluid collections.  • Body fluid culture from 2/14 final results pending. Appreciate infectious disease input.  • Continue wound vac. I plan to meet FRANNY Miles, today to examine midline abdominal wound.

## 2023-02-18 LAB
ALBUMIN SERPL-MCNC: 2.2 G/DL (ref 3.5–5.2)
ALBUMIN/GLOB SERPL: 0.5 G/DL
ALP SERPL-CCNC: 69 U/L (ref 39–117)
ALT SERPL W P-5'-P-CCNC: 6 U/L (ref 1–33)
ANION GAP SERPL CALCULATED.3IONS-SCNC: 9 MMOL/L (ref 5–15)
AST SERPL-CCNC: 11 U/L (ref 1–32)
BILIRUB SERPL-MCNC: 0.2 MG/DL (ref 0–1.2)
BUN SERPL-MCNC: 12 MG/DL (ref 8–23)
BUN/CREAT SERPL: 27.9 (ref 7–25)
CALCIUM SPEC-SCNC: 8.6 MG/DL (ref 8.6–10.5)
CHLORIDE SERPL-SCNC: 102 MMOL/L (ref 98–107)
CO2 SERPL-SCNC: 24 MMOL/L (ref 22–29)
CREAT SERPL-MCNC: 0.43 MG/DL (ref 0.57–1)
DEPRECATED RDW RBC AUTO: 44.6 FL (ref 37–54)
EGFRCR SERPLBLD CKD-EPI 2021: 109.4 ML/MIN/1.73
EOSINOPHIL # BLD MANUAL: 0.18 10*3/MM3 (ref 0–0.4)
EOSINOPHIL NFR BLD MANUAL: 1 % (ref 0.3–6.2)
ERYTHROCYTE [DISTWIDTH] IN BLOOD BY AUTOMATED COUNT: 14.6 % (ref 12.3–15.4)
GLOBULIN UR ELPH-MCNC: 4.3 GM/DL
GLUCOSE BLDC GLUCOMTR-MCNC: 155 MG/DL (ref 70–130)
GLUCOSE BLDC GLUCOMTR-MCNC: 180 MG/DL (ref 70–130)
GLUCOSE BLDC GLUCOMTR-MCNC: 66 MG/DL (ref 70–130)
GLUCOSE BLDC GLUCOMTR-MCNC: 72 MG/DL (ref 70–130)
GLUCOSE BLDC GLUCOMTR-MCNC: 93 MG/DL (ref 70–130)
GLUCOSE SERPL-MCNC: 150 MG/DL (ref 65–99)
HCT VFR BLD AUTO: 28.2 % (ref 34–46.6)
HGB BLD-MCNC: 9.1 G/DL (ref 12–15.9)
LYMPHOCYTES # BLD MANUAL: 1.47 10*3/MM3 (ref 0.7–3.1)
LYMPHOCYTES NFR BLD MANUAL: 4 % (ref 5–12)
MAGNESIUM SERPL-MCNC: 1.8 MG/DL (ref 1.6–2.4)
MCH RBC QN AUTO: 27.5 PG (ref 26.6–33)
MCHC RBC AUTO-ENTMCNC: 32.3 G/DL (ref 31.5–35.7)
MCV RBC AUTO: 85.2 FL (ref 79–97)
MONOCYTES # BLD: 0.72 10*3/MM3 (ref 0.1–0.9)
NEUTROPHILS # BLD AUTO: 15.73 10*3/MM3 (ref 1.7–7)
NEUTROPHILS NFR BLD MANUAL: 86.9 % (ref 42.7–76)
NRBC BLD AUTO-RTO: 0 /100 WBC (ref 0–0.2)
PHOSPHATE SERPL-MCNC: 3.1 MG/DL (ref 2.5–4.5)
PLAT MORPH BLD: NORMAL
PLATELET # BLD AUTO: 531 10*3/MM3 (ref 140–450)
PMV BLD AUTO: 9.8 FL (ref 6–12)
POTASSIUM SERPL-SCNC: 4.1 MMOL/L (ref 3.5–5.2)
PROT SERPL-MCNC: 6.5 G/DL (ref 6–8.5)
RBC # BLD AUTO: 3.31 10*6/MM3 (ref 3.77–5.28)
RBC MORPH BLD: NORMAL
SODIUM SERPL-SCNC: 135 MMOL/L (ref 136–145)
VARIANT LYMPHS NFR BLD MANUAL: 8.1 % (ref 19.6–45.3)
WBC MORPH BLD: NORMAL
WBC NRBC COR # BLD: 18.1 10*3/MM3 (ref 3.4–10.8)

## 2023-02-18 PROCEDURE — 25010000002 CEFTRIAXONE PER 250 MG: Performed by: INTERNAL MEDICINE

## 2023-02-18 PROCEDURE — 85025 COMPLETE CBC W/AUTO DIFF WBC: CPT | Performed by: HOSPITALIST

## 2023-02-18 PROCEDURE — 63710000001 INSULIN REGULAR HUMAN PER 5 UNITS: Performed by: SURGERY

## 2023-02-18 PROCEDURE — 25010000002 FLUCONAZOLE PER 200 MG: Performed by: INTERNAL MEDICINE

## 2023-02-18 PROCEDURE — 99024 POSTOP FOLLOW-UP VISIT: CPT | Performed by: SURGERY

## 2023-02-18 PROCEDURE — 85007 BL SMEAR W/DIFF WBC COUNT: CPT | Performed by: HOSPITALIST

## 2023-02-18 PROCEDURE — 84100 ASSAY OF PHOSPHORUS: CPT | Performed by: COLON & RECTAL SURGERY

## 2023-02-18 PROCEDURE — 83735 ASSAY OF MAGNESIUM: CPT | Performed by: INTERNAL MEDICINE

## 2023-02-18 PROCEDURE — 80053 COMPREHEN METABOLIC PANEL: CPT | Performed by: SURGERY

## 2023-02-18 PROCEDURE — 25010000002 METOCLOPRAMIDE PER 10 MG: Performed by: COLON & RECTAL SURGERY

## 2023-02-18 PROCEDURE — 82962 GLUCOSE BLOOD TEST: CPT

## 2023-02-18 RX ADMIN — METOCLOPRAMIDE 10 MG: 5 INJECTION, SOLUTION INTRAMUSCULAR; INTRAVENOUS at 06:47

## 2023-02-18 RX ADMIN — ACETAMINOPHEN 650 MG: 325 TABLET, FILM COATED ORAL at 11:12

## 2023-02-18 RX ADMIN — FAMOTIDINE 20 MG: 10 INJECTION INTRAVENOUS at 08:55

## 2023-02-18 RX ADMIN — INSULIN HUMAN 2 UNITS: 100 INJECTION, SOLUTION PARENTERAL at 12:01

## 2023-02-18 RX ADMIN — METRONIDAZOLE 500 MG: 500 INJECTION, SOLUTION INTRAVENOUS at 06:47

## 2023-02-18 RX ADMIN — INSULIN HUMAN 2 UNITS: 100 INJECTION, SOLUTION PARENTERAL at 06:48

## 2023-02-18 RX ADMIN — Medication 10 ML: at 22:22

## 2023-02-18 RX ADMIN — METRONIDAZOLE 500 MG: 500 INJECTION, SOLUTION INTRAVENOUS at 13:27

## 2023-02-18 RX ADMIN — Medication 10 ML: at 08:56

## 2023-02-18 RX ADMIN — INSULIN GLARGINE-YFGN 15 UNITS: 100 INJECTION, SOLUTION SUBCUTANEOUS at 08:55

## 2023-02-18 RX ADMIN — AMLODIPINE BESYLATE 10 MG: 10 TABLET ORAL at 08:55

## 2023-02-18 RX ADMIN — METOCLOPRAMIDE 10 MG: 5 INJECTION, SOLUTION INTRAMUSCULAR; INTRAVENOUS at 12:30

## 2023-02-18 RX ADMIN — FAMOTIDINE 20 MG: 10 INJECTION INTRAVENOUS at 17:39

## 2023-02-18 RX ADMIN — FLUCONAZOLE IN SODIUM CHLORIDE 400 MG: 2 INJECTION, SOLUTION INTRAVENOUS at 14:20

## 2023-02-18 RX ADMIN — CEFTRIAXONE SODIUM 2 G: 2 INJECTION, POWDER, FOR SOLUTION INTRAMUSCULAR; INTRAVENOUS at 10:20

## 2023-02-18 RX ADMIN — METRONIDAZOLE 500 MG: 500 INJECTION, SOLUTION INTRAVENOUS at 22:21

## 2023-02-18 NOTE — PROGRESS NOTES
"DAILY PROGRESS NOTE  [facility]    Patient Identification:  Name: Hali Tejeda  Age: 62 y.o.  Sex: female  :  1960  MRN: [id number]         Primary Care Physician: Richard Delgado MD    Subjective: patient is doing well; tolerating a diet  Interval History: follow up for sigmoid colectomy, abdominal abscess and drain, diabetes, anemia    Objective:    Scheduled Meds:amLODIPine, 10 mg, Oral, Q24H  cefTRIAXone, 2 g, Intravenous, Q24H  famotidine, 20 mg, Intravenous, BID AC  fluconazole, 400 mg, Intravenous, Q24H  insulin glargine, 15 Units, Subcutaneous, Q12H  insulin regular, 0-7 Units, Subcutaneous, Q6H  metroNIDAZOLE, 500 mg, Intravenous, Q8H  sodium chloride, 10 mL, Intravenous, Q12H  sodium chloride, 10 mL, Intravenous, Q12H      Continuous Infusions:     Vital signs in last 24 hours:  Temp:  [98.1 °F (36.7 °C)-99.1 °F (37.3 °C)] 98.1 °F (36.7 °C)  Heart Rate:  [] 90  Resp:  [18] 18  BP: (113-164)/(68-97) 113/68    Intake/Output:    Intake/Output Summary (Last 24 hours) at 2023 1839  Last data filed at 2023 1824  Gross per 24 hour   Intake 480 ml   Output 120 ml   Net 360 ml       Exam:  /68 (BP Location: Right arm, Patient Position: Lying)   Pulse 90   Temp 98.1 °F (36.7 °C) (Oral)   Resp 18   Ht 154.9 cm (61\")   Wt 74.8 kg (164 lb 14.4 oz)   SpO2 99%   BMI 31.16 kg/m²     General Appearance:    Alert, cooperative, no distress, AAOx3                          Head:    Normocephalic, without obvious abnormality, atraumatic                           Eyes:    PERRL, conjunctivae/corneas clear, EOM's intact, both eyes                         Throat:   Lips, tongue, gums normal; oral mucosa pink and moist                           Neck:   Supple, symmetrical, trachea midline, no JVD                         Lungs:    Decreased breath sounds bilaterally, respirations unlabored                 Chest Wall:    No tenderness or deformity                          Heart: "    Regular rate and rhythm, S1 and S2 normal, no murmur,no  rub or gallop                  Abdomen:     Soft, nontender, bowel sounds active, no masses, no organomegaly                  Extremities:   Extremities normal, atraumatic, no cyanosis or edema                        Pulses:   Pulses palpable in all extremities                            Skin:   Skin is warm and dry,  no rashes or palpable lesions                     Data Review:  Labs in chart were reviewed.  WBC   Date Value Ref Range Status   02/18/2023 18.10 (H) 3.40 - 10.80 10*3/mm3 Final     Hemoglobin   Date Value Ref Range Status   02/18/2023 9.1 (L) 12.0 - 15.9 g/dL Final     Hematocrit   Date Value Ref Range Status   02/18/2023 28.2 (L) 34.0 - 46.6 % Final     Platelets   Date Value Ref Range Status   02/18/2023 531 (H) 140 - 450 10*3/mm3 Final     Sodium   Date Value Ref Range Status   02/18/2023 135 (L) 136 - 145 mmol/L Final     Potassium   Date Value Ref Range Status   02/18/2023 4.1 3.5 - 5.2 mmol/L Final     Chloride   Date Value Ref Range Status   02/18/2023 102 98 - 107 mmol/L Final     CO2   Date Value Ref Range Status   02/18/2023 24.0 22.0 - 29.0 mmol/L Final     BUN   Date Value Ref Range Status   02/18/2023 12 8 - 23 mg/dL Final     Creatinine   Date Value Ref Range Status   02/18/2023 0.43 (L) 0.57 - 1.00 mg/dL Final     Glucose   Date Value Ref Range Status   02/18/2023 150 (H) 65 - 99 mg/dL Final     Calcium   Date Value Ref Range Status   02/18/2023 8.6 8.6 - 10.5 mg/dL Final     Magnesium   Date Value Ref Range Status   02/18/2023 1.8 1.6 - 2.4 mg/dL Final     Phosphorus   Date Value Ref Range Status   02/18/2023 3.1 2.5 - 4.5 mg/dL Final     AST (SGOT)   Date Value Ref Range Status   02/18/2023 11 1 - 32 U/L Final     ALT (SGPT)   Date Value Ref Range Status   02/18/2023 6 1 - 33 U/L Final     Alkaline Phosphatase   Date Value Ref Range Status   02/18/2023 69 39 - 117 U/L Final         Assessment:  Active Hospital Problems     Diagnosis  POA   • **Colitis [K52.9]  Yes   • Nausea and vomiting [R11.2]  Yes   • Stricture of sigmoid colon (HCC) [K56.699]  Unknown   • Left sided colitis with complication (HCC) [K51.519]  Unknown      Resolved Hospital Problems   No resolved problems to display.   obesity complicating all aspects of care  anemia    Plan:  Trend wbc, hgb  Continue antibiotics  Drain placed and working  Has regained bowel function  Monitor on telemetry  Notes and labs reviewed  Appreciate help from all  Medium risk    Valerie Carlos Leigh MD  2/18/2023  18:39 EST

## 2023-02-18 NOTE — PLAN OF CARE
Goal Outcome Evaluation:              Outcome Evaluation: Up to BSC multiple times this shift with staff assist x 1. Able to turn/reposition in the bed independently. C/O generalized abd pain, PRN tylenol admin and was effective. Good output from colostomy noted. TPN discontinued. Pt tolerating PO. Declines SCDs and IS. Safety maintained.

## 2023-02-18 NOTE — PLAN OF CARE
Goal Outcome Evaluation:              Outcome Evaluation: VSS. TYLENOL X 1 FOR DISCOMFORT RIGHT SIDE OF ABDOMEN WITH ADEQUATE RELIEF. RESTED WELL IN BETWEEN CARE.

## 2023-02-18 NOTE — PROGRESS NOTES
Postoperative day 15 from left/sigmoid colectomy with mobilization of splenic flexure and colostomy    Tolerating regular diet, no nausea or vomiting, ostomy functioning.    Temperature 98.1, pulse 90, respiratory 18  Blood pressure 113/68  Abdomen soft, ostomy functioning, wound VAC in place.    Magnesium 1.8  Phosphorus 3.1  Potassium 3.1    WBC 18  Hemoglobin 9.1    · Advance to regular diet, DC TPN  · On Rocephin and Flagyl.  CT-guided abscess drain was performed on 2/9/2023.  Drain was repositioned and exchanged on 2/14/2023.  CT scan yesterday demonstrated interval reduction in fluid in the left lower quadrant.  I did not see anything that would definitively warrant further drainage at this time.  Continue Rocephin and Flagyl for now.

## 2023-02-18 NOTE — CONSULTS
Consult for Spiritual Care. Patient has strong masha, Christian and family support. She welcomed conversation and prayer.

## 2023-02-18 NOTE — PROGRESS NOTES
Infectious Diseases Progress Note    Galdino Esteves MD     [facility]  Los: 22 days  Patient Identification:  Name: Hali Tejeda  Age: 62 y.o.  Sex: female  :  1960  MRN: [id number]         Primary Care Physician: Richard Delgado MD        Subjective: Feeling somewhat better.  Interval History: Underwent placement of wound VAC on her abdominal wound/incision.  TPN is started.  CT scan of the abdomen and pelvis performed on 2022 showed interval decrease in the size of the fluid collection in the left paracolic gutter as well as an area in the left lower quadrant with ill-defined subjacent stranding and tiny foci of gas slightly more conspicuous compared to prior imaging studies with no drainable focal fluid collection or definitive extraluminal enteric contrast noted.  CT scan of the abdomen and pelvis on 2023 reveals an area of fluid collection in the abdominal wall near the stoma.  Objective:    Scheduled Meds:amLODIPine, 10 mg, Oral, Q24H  famotidine, 20 mg, Intravenous, BID AC  Fat Emulsion Plant Based, 100 mL, Intravenous, Q24H (TPN)  fluconazole, 400 mg, Intravenous, Q24H  insulin glargine, 15 Units, Subcutaneous, Q12H  insulin regular, 0-7 Units, Subcutaneous, Q6H  metoclopramide, 10 mg, Intravenous, Q6H  metroNIDAZOLE, 500 mg, Intravenous, Q8H  sodium chloride, 10 mL, Intravenous, Q12H  sodium chloride, 10 mL, Intravenous, Q12H      Continuous Infusions:Adult Central 2-in-1 TPN, , Last Rate: 75 mL/hr at 23 1833  Pharmacy to Dose TPN,         Vital signs in last 24 hours:  Temp:  [98 °F (36.7 °C)-99.1 °F (37.3 °C)] 98.6 °F (37 °C)  Heart Rate:  [] 80  Resp:  [18-20] 18  BP: (129-164)/(74-97) 141/95    Intake/Output:    Intake/Output Summary (Last 24 hours) at 2023 0826  Last data filed at 2023 0616  Gross per 24 hour   Intake 1010 ml   Output 422 ml   Net 588 ml       Exam:  /95 (BP Location: Right arm, Patient Position: Lying)   Pulse 80   Temp  "98.6 °F (37 °C) (Oral)   Resp 18   Ht 154.9 cm (61\")   Wt 74.8 kg (164 lb 14.4 oz)   SpO2 98%   BMI 31.16 kg/m²   Patient is examined using the personal protective equipment as per guidelines from infection control for this particular patient as enacted.  Hand washing was performed before and after patient interaction.  General Appearance: Comfortable appearing female who does not appear toxic at this time.                          Head:    Normocephalic, without obvious abnormality, atraumatic                           Eyes:    PERRL, conjunctivae/corneas clear, EOM's intact, both eyes                         Throat:   Lips, tongue, gums normal; oral mucosa pink and moist                           Neck:   Supple, symmetrical, trachea midline, no JVD                         Lungs:    Clear to auscultation bilaterally, respirations unlabored                 Chest Wall:    No tenderness or deformity                          Heart:  S1-S2 regular                  Abdomen:   Ostomy present with midline incision has wound VAC in place with no surrounding cellulitis.  Drain in place.  No significant tenderness in the peristomal area noted.                 Extremities:   Extremities normal, atraumatic, no cyanosis or edema                        Pulses:   Pulses palpable in all extremities                            Skin:   Skin is warm and dry,  no rashes or palpable lesions                  Neurologic: Grossly nonfocal       Data Review:    I reviewed the patient's new clinical results.  Results from last 7 days   Lab Units 02/18/23  0328 02/17/23  0308 02/16/23  0341 02/15/23  0422 02/14/23  0238 02/13/23  0321 02/12/23  0558   WBC 10*3/mm3 18.10* 19.71* 20.90* 23.70* 25.66* 28.34* 26.57*   HEMOGLOBIN g/dL 9.1* 9.0* 9.5* 9.0* 9.2* 9.7* 8.5*   PLATELETS 10*3/mm3 531* 493* 514* 502* 500* 479* 416     Results from last 7 days   Lab Units 02/18/23  0328 02/17/23  0308 02/16/23  0341 02/15/23  0422 02/14/23  0238 " 02/13/23  0321 02/12/23  0817 02/12/23  0748 02/12/23  0558   SODIUM mmol/L 135* 133* 133* 132* 134* 132* 135*  --  126*   POTASSIUM mmol/L 4.1 4.3 4.3 4.4 4.0 4.0 3.7   < > 7.5*   CHLORIDE mmol/L 102 104 101 104 100 99  --   --  96*   CO2 mmol/L 24.0 23.1 23.0 24.7 24.8 28.4  --   --  26.0   BUN mg/dL 12 11 11 13 8 7*  --   --  4*   CREATININE mg/dL 0.43* 0.45* 0.48* 0.42* 0.37* 0.37*  --   --  0.41*   CALCIUM mg/dL 8.6 8.1* 8.1* 8.3* 8.2* 8.5*  --   --  8.4*   GLUCOSE mg/dL 150* 177* 184* 173* 150* 138*  --   --  763*    < > = values in this interval not displayed.     Microbiology Results (last 10 days)     Procedure Component Value - Date/Time    Body Fluid Culture - Drainage, Peritoneum [[id number]] Collected: 02/14/23 1154    Lab Status: Final result Specimen: Drainage from Peritoneum Updated: 02/17/23 1057     Body Fluid Culture No growth at 3 days     Gram Stain Moderate (3+) WBCs per low power field      No organisms seen    Anaerobic Culture - Drainage, Peritoneum [[id number]]  (Normal) Collected: 02/14/23 1154    Lab Status: Preliminary result Specimen: Drainage from Peritoneum Updated: 02/17/23 0703     Anaerobic Culture No anaerobes isolated at 3 days    Clostridioides difficile Toxin - Stool, Per Stoma [[id number]]  (Normal) Collected: 02/10/23 1243    Lab Status: Final result Specimen: Stool from Per Stoma Updated: 02/10/23 1341    Narrative:      The following orders were created for panel order Clostridioides difficile Toxin - Stool, Per Stoma.  Procedure                               Abnormality         Status                     ---------                               -----------         ------                     Clostridioides difficile...[[id number]]  Normal              Final result                 Please view results for these tests on the individual orders.    Clostridioides difficile Toxin, PCR - Stool, Per Stoma [[id number]]  (Normal) Collected: 02/10/23 1243    Lab Status: Final result  Specimen: Stool from Per Stoma Updated: 02/10/23 1341     C. Difficile Toxins by PCR Negative    Narrative:      The result indicates the absence of toxigenic C. difficile from stool specimen.     Body Fluid Culture - Drainage, Peritoneum [[id number]]  (Abnormal)  (Susceptibility) Collected: 02/09/23 1458    Lab Status: Final result Specimen: Drainage from Peritoneum Updated: 02/11/23 0851     Body Fluid Culture Scant growth (1+) Escherichia coli     Gram Stain Few (2+) WBCs per low power field      No organisms seen    Susceptibility      Escherichia coli      RENETTA      Amikacin Susceptible      Ampicillin Resistant     Ampicillin + Sulbactam Resistant     Cefepime Susceptible      Ceftazidime Susceptible      Ceftriaxone Susceptible      Gentamicin Resistant     Levofloxacin Susceptible      Piperacillin + Tazobactam Resistant     Tobramycin Intermediate      Trimethoprim + Sulfamethoxazole Resistant                      Susceptibility Comments     Escherichia coli    Cefazolin sensitivity will not be reported for Enterobacteriaceae in non-urine isolates. If cefazolin is preferred, please call the microbiology lab to request an E-test.  With the exception of urinary-sourced infections, aminoglycosides should not be used as monotherapy.                     Assessment:    Colitis    Left sided colitis with complication (HCC)    Nausea and vomiting    Stricture of sigmoid colon (HCC)  1-intra-abdominal sepsis with intra-abdominal abscess in the setting of prolonged colitis, sigmoid stricture, endoscopic injury to colonic wall with microperforation and subsequent laparotomy partial colectomy and colostomy and intraoperative injury to the spleen with repair while being on antibiotic therapy-likely pathogen to consider in this situation would be enteric amberly along with selection of yeast due to prolonged antibiotic therapy-status post percutaneous drain placement on 2/9/2023  2-hypertension  3-malnourishment  4-possible  postop abdominal wall/incisional infection/evolving abscess        Recommendations/Discussions:  · Continue with fluconazole ceftriaxone and Flagyl  · Given around abdominal wall fluid collection which may require drainage at that could explain persistent leukocytosis.    Galdino Esteves MD  2/18/2023  08:26 EST    Parts of this note may be an electronic transcription/translation of spoken language to printed text using the Dragon dictation system.

## 2023-02-18 NOTE — PLAN OF CARE
Goal Outcome Evaluation:   Vital signs stable. Up with 1 assist in room, safety maintained. Walked around station with physical therapy.  CT abdomen with contrast done this shift. Colostomy appliance and wound vac changed by wound care.  Picc line dressing changed with assist.  On room air throughout shift.  Needed very much encouragement to use incentive spirometer, claiming she gets Short of air when she does so.

## 2023-02-18 NOTE — PROGRESS NOTES
[facility]: TPN Daily Progress Note    TPN Day # 9   Indication: Prolonged Paralytic Ileus  Route: central  Type: standard    Subjective/Objective  Results from last 7 days   Lab Units 23  0328 23  0238 23  0321   SODIUM mmol/L 135*   < > 132*   POTASSIUM mmol/L 4.1   < > 4.0   CHLORIDE mmol/L 102   < > 99   CO2 mmol/L 24.0   < > 28.4   BUN mg/dL 12   < > 7*   CREATININE mg/dL 0.43*   < > 0.37*   CALCIUM mg/dL 8.6   < > 8.5*   ALBUMIN g/dL 2.2*   < > 2.4*   BILIRUBIN mg/dL 0.2   < > 0.5   ALK PHOS U/L 69   < > 65   ALT (SGPT) U/L 6   < > 9   AST (SGOT) U/L 11   < > 13   GLUCOSE mg/dL 150*   < > 138*   MAGNESIUM mg/dL 1.8   < > 1.9   PHOSPHORUS mg/dL 3.1   < > 2.6   TRIGLYCERIDES mg/dL  --   --  126    < > = values in this interval not displayed.        Diet Orders (active) (From admission, onward)       Start     Ordered    23 1800  Dietary Nutrition Supplements Boost Breeze (Ensure Clear)  Daily With Breakfast & Dinner       23 1450    23 1028  Diet: Diabetic Diets, Gastrointestinal Diets, Regular/House Diet; Consistent Carbohydrate; Low Irritant; Texture: Regular Texture (IDDSI 7); Fluid Consistency: Thin (IDDSI 0)  Diet Effective Now         23 1029                  Additional insulin administration while previous TPN infusin units  Additional electrolyte administration while previous TPN infusing: none  Acid suppression: famotidine 20 mg q12H IV    Goal TPN Formula Recommendations: 100gm/1350KCal/100mL AA/Dex/Lipids;   Current TPN Formula: at goal    Assessment/Plan    Macronutrients: repeat at goal  Electrolytes/Additives: repeat - in range  MVI for TPN   Labs: CMP, Mag, Phos in am  Comments: Patient labs reviewed, will repeat tpn - Glargine insulin increased to 15 units q 12 hours; sliding scale continued Q 6 hours.    Alyssa Velasquez [facility]  Clinical Pharmacist

## 2023-02-19 LAB
ANION GAP SERPL CALCULATED.3IONS-SCNC: 8 MMOL/L (ref 5–15)
BACTERIA SPEC ANAEROBE CULT: NORMAL
BUN SERPL-MCNC: 13 MG/DL (ref 8–23)
BUN/CREAT SERPL: 25.5 (ref 7–25)
CALCIUM SPEC-SCNC: 8.4 MG/DL (ref 8.6–10.5)
CHLORIDE SERPL-SCNC: 101 MMOL/L (ref 98–107)
CO2 SERPL-SCNC: 23 MMOL/L (ref 22–29)
CREAT SERPL-MCNC: 0.51 MG/DL (ref 0.57–1)
DEPRECATED RDW RBC AUTO: 44.7 FL (ref 37–54)
EGFRCR SERPLBLD CKD-EPI 2021: 105 ML/MIN/1.73
ERYTHROCYTE [DISTWIDTH] IN BLOOD BY AUTOMATED COUNT: 14.6 % (ref 12.3–15.4)
GLUCOSE BLDC GLUCOMTR-MCNC: 74 MG/DL (ref 70–130)
GLUCOSE BLDC GLUCOMTR-MCNC: 95 MG/DL (ref 70–130)
GLUCOSE BLDC GLUCOMTR-MCNC: 99 MG/DL (ref 70–130)
GLUCOSE SERPL-MCNC: 95 MG/DL (ref 65–99)
HCT VFR BLD AUTO: 28.7 % (ref 34–46.6)
HGB BLD-MCNC: 9.3 G/DL (ref 12–15.9)
MCH RBC QN AUTO: 27.4 PG (ref 26.6–33)
MCHC RBC AUTO-ENTMCNC: 32.4 G/DL (ref 31.5–35.7)
MCV RBC AUTO: 84.7 FL (ref 79–97)
PLATELET # BLD AUTO: 464 10*3/MM3 (ref 140–450)
PMV BLD AUTO: 9.4 FL (ref 6–12)
POTASSIUM SERPL-SCNC: 3.8 MMOL/L (ref 3.5–5.2)
RBC # BLD AUTO: 3.39 10*6/MM3 (ref 3.77–5.28)
SODIUM SERPL-SCNC: 132 MMOL/L (ref 136–145)
WBC NRBC COR # BLD: 14.36 10*3/MM3 (ref 3.4–10.8)

## 2023-02-19 PROCEDURE — 80048 BASIC METABOLIC PNL TOTAL CA: CPT | Performed by: SURGERY

## 2023-02-19 PROCEDURE — 25010000002 FLUCONAZOLE PER 200 MG: Performed by: INTERNAL MEDICINE

## 2023-02-19 PROCEDURE — 82962 GLUCOSE BLOOD TEST: CPT

## 2023-02-19 PROCEDURE — 85027 COMPLETE CBC AUTOMATED: CPT | Performed by: SURGERY

## 2023-02-19 PROCEDURE — 25010000002 CEFTRIAXONE PER 250 MG: Performed by: INTERNAL MEDICINE

## 2023-02-19 PROCEDURE — 99024 POSTOP FOLLOW-UP VISIT: CPT | Performed by: SURGERY

## 2023-02-19 RX ADMIN — METRONIDAZOLE 500 MG: 500 INJECTION, SOLUTION INTRAVENOUS at 06:44

## 2023-02-19 RX ADMIN — METRONIDAZOLE 500 MG: 500 INJECTION, SOLUTION INTRAVENOUS at 14:08

## 2023-02-19 RX ADMIN — Medication 10 ML: at 20:52

## 2023-02-19 RX ADMIN — Medication 10 ML: at 09:03

## 2023-02-19 RX ADMIN — ACETAMINOPHEN 650 MG: 325 TABLET, FILM COATED ORAL at 18:23

## 2023-02-19 RX ADMIN — ACETAMINOPHEN 650 MG: 325 TABLET, FILM COATED ORAL at 03:20

## 2023-02-19 RX ADMIN — FAMOTIDINE 20 MG: 10 INJECTION INTRAVENOUS at 17:47

## 2023-02-19 RX ADMIN — CEFTRIAXONE SODIUM 2 G: 2 INJECTION, POWDER, FOR SOLUTION INTRAMUSCULAR; INTRAVENOUS at 10:52

## 2023-02-19 RX ADMIN — AMLODIPINE BESYLATE 10 MG: 10 TABLET ORAL at 09:02

## 2023-02-19 RX ADMIN — METRONIDAZOLE 500 MG: 500 INJECTION, SOLUTION INTRAVENOUS at 20:47

## 2023-02-19 RX ADMIN — Medication 10 ML: at 20:53

## 2023-02-19 RX ADMIN — FAMOTIDINE 20 MG: 10 INJECTION INTRAVENOUS at 09:03

## 2023-02-19 RX ADMIN — FLUCONAZOLE IN SODIUM CHLORIDE 400 MG: 2 INJECTION, SOLUTION INTRAVENOUS at 14:45

## 2023-02-19 NOTE — PLAN OF CARE
Goal Outcome Evaluation:              Outcome Evaluation: Tolerating PO intake. Denies pain or discomfort, required no PRN tylenol this shift. Lantus withheld due to TPN being discontinued. Blood glucose remains WNL. Good output from colostomy. Wound vac remains in place with no air leaks, draining moderate serosang drainage. NEW drain flushed a/o. Output has decreased significantly with <5cc serosang drainage this shift. Pt ambulated around the nurses station with SBA of staff and no assistive equipment. Maintaining sats on RA. Safety maintained.

## 2023-02-19 NOTE — PLAN OF CARE
Goal Outcome Evaluation:              Outcome Evaluation: VSS. TYLENOL AT HS FOR LEFT ABDOMINAL PAIN WITH ADEQUATE RELIEF. DID NOT GIVE LANTUS AT HS DUE TO BLOOD SUGARS LOWER SINCE TPN WAS STOPPED.

## 2023-02-19 NOTE — PROGRESS NOTES
Postoperative day 16 from left/sigmoid colectomy with mobilization of splenic flexure and colostomy    Tolerating regular diet, no nausea or vomiting, ostomy functioning    Remains afebrile with normal vital signs  WBC 14, decreased from 18 yesterday  Hemoglobin 9.3    Abdomen soft, ostomy functioning well, wound VAC in place    · On Rocephin and Flagyl.  CT-guided abscess drain performed on 2/9/2023.  Drain repositioned then exchanged 2/14/2023.  CT scan 2 days ago demonstrated interval reduction in fluid in the left lower quadrant.  · Repeat labs in a.m., white blood cell count trending in right direction

## 2023-02-19 NOTE — PROGRESS NOTES
"DAILY PROGRESS NOTE  [facility]    Patient Identification:  Name: Hali Tejeda  Age: 62 y.o.  Sex: female  :  1960  MRN: [id number]         Primary Care Physician: Richard Delgado MD    Subjective: patient is sitting up; reports that she is feeling better and doing well    Interval History: follow up for abdominal abscess, colon resection, diabetes, anemia    Objective:    Scheduled Meds:amLODIPine, 10 mg, Oral, Q24H  cefTRIAXone, 2 g, Intravenous, Q24H  famotidine, 20 mg, Intravenous, BID AC  fluconazole, 400 mg, Intravenous, Q24H  insulin glargine, 15 Units, Subcutaneous, Q12H  insulin regular, 0-7 Units, Subcutaneous, Q6H  metroNIDAZOLE, 500 mg, Intravenous, Q8H  sodium chloride, 10 mL, Intravenous, Q12H  sodium chloride, 10 mL, Intravenous, Q12H      Continuous Infusions:     Vital signs in last 24 hours:  Temp:  [97.3 °F (36.3 °C)-98.4 °F (36.9 °C)] 98.4 °F (36.9 °C)  Heart Rate:  [80-94] 88  Resp:  [18] 18  BP: (110-134)/(80-86) 134/86    Intake/Output:    Intake/Output Summary (Last 24 hours) at 2023 1411  Last data filed at 2023 1257  Gross per 24 hour   Intake 320 ml   Output 160 ml   Net 160 ml       Exam:  /86 (BP Location: Left arm, Patient Position: Sitting)   Pulse 88   Temp 98.4 °F (36.9 °C) (Oral)   Resp 18   Ht 154.9 cm (61\")   Wt 74.3 kg (163 lb 14.4 oz)   SpO2 98%   BMI 30.97 kg/m²     General Appearance:    Alert, cooperative, no distress, AAOx3                          Head:    Normocephalic, without obvious abnormality, atraumatic                           Eyes:    PERRL, conjunctivae/corneas clear, EOM's intact, both eyes                         Throat:   Lips, tongue, gums normal; oral mucosa pink and moist                           Neck:   Supple, symmetrical, trachea midline, no JVD                         Lungs:    Clear to auscultation bilaterally, respirations unlabored                 Chest Wall:    No tenderness or deformity              "             Heart:    Regular rate and rhythm, S1 and S2 normal, no murmur,no  rub or gallop                  Abdomen:     Soft, nontender, bowel sounds active, no masses, no organomegaly ; drain in place                 Extremities:   Extremities normal, atraumatic, no cyanosis or edema                        Pulses:   Pulses palpable in all extremities                            Skin:   Skin is warm and dry,  no rashes or palpable lesions                     Data Review:  Labs in chart were reviewed.  WBC   Date Value Ref Range Status   02/19/2023 14.36 (H) 3.40 - 10.80 10*3/mm3 Final     Hemoglobin   Date Value Ref Range Status   02/19/2023 9.3 (L) 12.0 - 15.9 g/dL Final     Hematocrit   Date Value Ref Range Status   02/19/2023 28.7 (L) 34.0 - 46.6 % Final     Platelets   Date Value Ref Range Status   02/19/2023 464 (H) 140 - 450 10*3/mm3 Final     Sodium   Date Value Ref Range Status   02/19/2023 132 (L) 136 - 145 mmol/L Final     Potassium   Date Value Ref Range Status   02/19/2023 3.8 3.5 - 5.2 mmol/L Final     Chloride   Date Value Ref Range Status   02/19/2023 101 98 - 107 mmol/L Final     CO2   Date Value Ref Range Status   02/19/2023 23.0 22.0 - 29.0 mmol/L Final     BUN   Date Value Ref Range Status   02/19/2023 13 8 - 23 mg/dL Final     Creatinine   Date Value Ref Range Status   02/19/2023 0.51 (L) 0.57 - 1.00 mg/dL Final     Glucose   Date Value Ref Range Status   02/19/2023 95 65 - 99 mg/dL Final     Calcium   Date Value Ref Range Status   02/19/2023 8.4 (L) 8.6 - 10.5 mg/dL Final     Magnesium   Date Value Ref Range Status   02/18/2023 1.8 1.6 - 2.4 mg/dL Final     Phosphorus   Date Value Ref Range Status   02/18/2023 3.1 2.5 - 4.5 mg/dL Final     AST (SGOT)   Date Value Ref Range Status   02/18/2023 11 1 - 32 U/L Final     ALT (SGPT)   Date Value Ref Range Status   02/18/2023 6 1 - 33 U/L Final     Alkaline Phosphatase   Date Value Ref Range Status   02/18/2023 69 39 - 117 U/L Final          Assessment:  Active Hospital Problems    Diagnosis  POA   • **Colitis [K52.9]  Yes   • Nausea and vomiting [R11.2]  Yes   • Stricture of sigmoid colon (HCC) [K56.699]  Unknown   • Left sided colitis with complication (HCC) [K51.519]  Unknown      Resolved Hospital Problems   No resolved problems to display.   abdominal abscess  Plan  Wbc is improving  Continue antibiotics and drain  hgb is stable  Blood sugar is controlled  Appreciate help from surgery  Medium risk  Valerie Leigh MD  2/19/2023  14:11 EST

## 2023-02-19 NOTE — SIGNIFICANT NOTE
02/19/23 1423   OTHER   Discipline physical therapy assistant   Rehab Time/Intention   Session Not Performed other (see comments)  (Pt up walking around the nursing station with MA no AD)   Recommendation   PT - Next Appointment 02/20/23

## 2023-02-19 NOTE — PROGRESS NOTES
"  Infectious Diseases Progress Note    Galdino Esteves MD     Psychiatric  Los: 23 days  Patient Identification:  Name: Hali Tejeda  Age: 62 y.o.  Sex: female  :  1960  MRN: [id number]         Primary Care Physician: Richard Delgado MD        Subjective: Abdominal pain is slightly better.  Able to tolerate oral intake and nausea is better.  Interval History: Underwent placement of wound VAC on her abdominal wound/incision.  TPN is started.  CT scan of the abdomen and pelvis performed on 2022 showed interval decrease in the size of the fluid collection in the left paracolic gutter as well as an area in the left lower quadrant with ill-defined subjacent stranding and tiny foci of gas slightly more conspicuous compared to prior imaging studies with no drainable focal fluid collection or definitive extraluminal enteric contrast noted.  CT scan of the abdomen and pelvis on 2023 reveals an area of fluid collection in the abdominal wall near the stoma.  Objective:    Scheduled Meds:amLODIPine, 10 mg, Oral, Q24H  cefTRIAXone, 2 g, Intravenous, Q24H  famotidine, 20 mg, Intravenous, BID AC  fluconazole, 400 mg, Intravenous, Q24H  insulin glargine, 15 Units, Subcutaneous, Q12H  insulin regular, 0-7 Units, Subcutaneous, Q6H  metroNIDAZOLE, 500 mg, Intravenous, Q8H  sodium chloride, 10 mL, Intravenous, Q12H  sodium chloride, 10 mL, Intravenous, Q12H      Continuous Infusions:     Vital signs in last 24 hours:  Temp:  [97.3 °F (36.3 °C)-98.4 °F (36.9 °C)] 97.3 °F (36.3 °C)  Heart Rate:  [80-94] 80  Resp:  [18] 18  BP: (110-128)/(68-82) 110/80    Intake/Output:    Intake/Output Summary (Last 24 hours) at 2023 0846  Last data filed at 2023 0644  Gross per 24 hour   Intake 560 ml   Output 120 ml   Net 440 ml       Exam:  /80 (BP Location: Right arm, Patient Position: Lying)   Pulse 80   Temp 97.3 °F (36.3 °C) (Oral)   Resp 18   Ht 154.9 cm (61\")   Wt 74.3 kg (163 lb 14.4 oz)   " SpO2 98%   BMI 30.97 kg/m²   Patient is examined using the personal protective equipment as per guidelines from infection control for this particular patient as enacted.  Hand washing was performed before and after patient interaction.  General Appearance: Comfortable appearing female who does not appear toxic at this time.                          Head:    Normocephalic, without obvious abnormality, atraumatic                           Eyes:    PERRL, conjunctivae/corneas clear, EOM's intact, both eyes                         Throat:   Lips, tongue, gums normal; oral mucosa pink and moist                           Neck:   Supple, symmetrical, trachea midline, no JVD                         Lungs:    Clear to auscultation bilaterally, respirations unlabored                 Chest Wall:    No tenderness or deformity                          Heart:  S1-S2 regular                  Abdomen:   Ostomy present with midline incision has wound VAC in place with no surrounding cellulitis.  Drain in place.  No significant tenderness in the peristomal area noted.                 Extremities:   Extremities normal, atraumatic, no cyanosis or edema                        Pulses:   Pulses palpable in all extremities                            Skin:   Skin is warm and dry,  no rashes or palpable lesions                  Neurologic: Grossly nonfocal       Data Review:    I reviewed the patient's new clinical results.  Results from last 7 days   Lab Units 02/19/23  0417 02/18/23 0328 02/17/23  0308 02/16/23  0341 02/15/23  0422 02/14/23  0238 02/13/23  0321   WBC 10*3/mm3 14.36* 18.10* 19.71* 20.90* 23.70* 25.66* 28.34*   HEMOGLOBIN g/dL 9.3* 9.1* 9.0* 9.5* 9.0* 9.2* 9.7*   PLATELETS 10*3/mm3 464* 531* 493* 514* 502* 500* 479*     Results from last 7 days   Lab Units 02/19/23  0417 02/18/23  0328 02/17/23  0308 02/16/23  0341 02/15/23  0422 02/14/23  0238 02/13/23  0321   SODIUM mmol/L 132* 135* 133* 133* 132* 134* 132*   POTASSIUM  mmol/L 3.8 4.1 4.3 4.3 4.4 4.0 4.0   CHLORIDE mmol/L 101 102 104 101 104 100 99   CO2 mmol/L 23.0 24.0 23.1 23.0 24.7 24.8 28.4   BUN mg/dL 13 12 11 11 13 8 7*   CREATININE mg/dL 0.51* 0.43* 0.45* 0.48* 0.42* 0.37* 0.37*   CALCIUM mg/dL 8.4* 8.6 8.1* 8.1* 8.3* 8.2* 8.5*   GLUCOSE mg/dL 95 150* 177* 184* 173* 150* 138*     Microbiology Results (last 10 days)     Procedure Component Value - Date/Time    Body Fluid Culture - Drainage, Peritoneum [[id number]] Collected: 02/14/23 1154    Lab Status: Final result Specimen: Drainage from Peritoneum Updated: 02/17/23 1057     Body Fluid Culture No growth at 3 days     Gram Stain Moderate (3+) WBCs per low power field      No organisms seen    Anaerobic Culture - Drainage, Peritoneum [[id number]]  (Normal) Collected: 02/14/23 1154    Lab Status: Final result Specimen: Drainage from Peritoneum Updated: 02/19/23 0721     Anaerobic Culture No anaerobes isolated at 5 days    Clostridioides difficile Toxin - Stool, Per Stoma [[id number]]  (Normal) Collected: 02/10/23 1243    Lab Status: Final result Specimen: Stool from Per Stoma Updated: 02/10/23 1341    Narrative:      The following orders were created for panel order Clostridioides difficile Toxin - Stool, Per Stoma.  Procedure                               Abnormality         Status                     ---------                               -----------         ------                     Clostridioides difficile...[[id number]]  Normal              Final result                 Please view results for these tests on the individual orders.    Clostridioides difficile Toxin, PCR - Stool, Per Stoma [[id number]]  (Normal) Collected: 02/10/23 1243    Lab Status: Final result Specimen: Stool from Per Stoma Updated: 02/10/23 1341     C. Difficile Toxins by PCR Negative    Narrative:      The result indicates the absence of toxigenic C. difficile from stool specimen.     Body Fluid Culture - Drainage, Peritoneum [[id number]]  (Abnormal)   (Susceptibility) Collected: 02/09/23 1458    Lab Status: Final result Specimen: Drainage from Peritoneum Updated: 02/11/23 0851     Body Fluid Culture Scant growth (1+) Escherichia coli     Gram Stain Few (2+) WBCs per low power field      No organisms seen    Susceptibility      Escherichia coli      RENETTA      Amikacin Susceptible      Ampicillin Resistant     Ampicillin + Sulbactam Resistant     Cefepime Susceptible      Ceftazidime Susceptible      Ceftriaxone Susceptible      Gentamicin Resistant     Levofloxacin Susceptible      Piperacillin + Tazobactam Resistant     Tobramycin Intermediate      Trimethoprim + Sulfamethoxazole Resistant                      Susceptibility Comments     Escherichia coli    Cefazolin sensitivity will not be reported for Enterobacteriaceae in non-urine isolates. If cefazolin is preferred, please call the microbiology lab to request an E-test.  With the exception of urinary-sourced infections, aminoglycosides should not be used as monotherapy.                     Assessment:    Colitis    Left sided colitis with complication (HCC)    Nausea and vomiting    Stricture of sigmoid colon (HCC)  1-intra-abdominal sepsis with intra-abdominal abscess in the setting of prolonged colitis, sigmoid stricture, endoscopic injury to colonic wall with microperforation and subsequent laparotomy partial colectomy and colostomy and intraoperative injury to the spleen with repair while being on antibiotic therapy-likely pathogen to consider in this situation would be enteric amberly along with selection of yeast due to prolonged antibiotic therapy-status post percutaneous drain placement on 2/9/2023  2-hypertension  3-malnourishment  4-possible postop abdominal wall/incisional infection/evolving abscess        Recommendations/Discussions:  · Continue with fluconazole ceftriaxone and Flagyl  · Given around abdominal wall fluid collection which may require drainage at that could explain persistent  leukocytosis.    Galdino Esteves MD  2/19/2023  08:46 EST    Parts of this note may be an electronic transcription/translation of spoken language to printed text using the Dragon dictation system.

## 2023-02-20 LAB
DEPRECATED RDW RBC AUTO: 47.2 FL (ref 37–54)
ERYTHROCYTE [DISTWIDTH] IN BLOOD BY AUTOMATED COUNT: 15 % (ref 12.3–15.4)
GLUCOSE BLDC GLUCOMTR-MCNC: 112 MG/DL (ref 70–130)
GLUCOSE BLDC GLUCOMTR-MCNC: 76 MG/DL (ref 70–130)
GLUCOSE BLDC GLUCOMTR-MCNC: 77 MG/DL (ref 70–130)
GLUCOSE BLDC GLUCOMTR-MCNC: 92 MG/DL (ref 70–130)
GLUCOSE BLDC GLUCOMTR-MCNC: 93 MG/DL (ref 70–130)
HCT VFR BLD AUTO: 29.7 % (ref 34–46.6)
HGB BLD-MCNC: 9.4 G/DL (ref 12–15.9)
MCH RBC QN AUTO: 27.4 PG (ref 26.6–33)
MCHC RBC AUTO-ENTMCNC: 31.6 G/DL (ref 31.5–35.7)
MCV RBC AUTO: 86.6 FL (ref 79–97)
PLATELET # BLD AUTO: 488 10*3/MM3 (ref 140–450)
PMV BLD AUTO: 9.8 FL (ref 6–12)
RBC # BLD AUTO: 3.43 10*6/MM3 (ref 3.77–5.28)
WBC NRBC COR # BLD: 13.51 10*3/MM3 (ref 3.4–10.8)

## 2023-02-20 PROCEDURE — 85027 COMPLETE CBC AUTOMATED: CPT | Performed by: SURGERY

## 2023-02-20 PROCEDURE — 82962 GLUCOSE BLOOD TEST: CPT

## 2023-02-20 PROCEDURE — 97110 THERAPEUTIC EXERCISES: CPT

## 2023-02-20 PROCEDURE — 99024 POSTOP FOLLOW-UP VISIT: CPT | Performed by: PHYSICIAN ASSISTANT

## 2023-02-20 PROCEDURE — 25010000002 CEFTRIAXONE PER 250 MG: Performed by: INTERNAL MEDICINE

## 2023-02-20 PROCEDURE — 25010000002 FLUCONAZOLE PER 200 MG: Performed by: INTERNAL MEDICINE

## 2023-02-20 RX ORDER — DICYCLOMINE HYDROCHLORIDE 10 MG/1
10 CAPSULE ORAL 3 TIMES DAILY PRN
Qty: 90 CAPSULE | Refills: 1 | OUTPATIENT
Start: 2023-02-20 | End: 2023-02-21 | Stop reason: HOSPADM

## 2023-02-20 RX ORDER — METRONIDAZOLE 500 MG/1
500 TABLET ORAL 3 TIMES DAILY
Qty: 21 TABLET | Refills: 0 | Status: SHIPPED | OUTPATIENT
Start: 2023-02-20 | End: 2023-02-20

## 2023-02-20 RX ORDER — FAMOTIDINE 20 MG/1
20 TABLET, FILM COATED ORAL 2 TIMES DAILY
Status: DISCONTINUED | OUTPATIENT
Start: 2023-02-20 | End: 2023-02-21 | Stop reason: HOSPADM

## 2023-02-20 RX ORDER — LEVOFLOXACIN 500 MG/1
500 TABLET, FILM COATED ORAL DAILY
Qty: 7 TABLET | Refills: 0 | Status: SHIPPED | OUTPATIENT
Start: 2023-02-20 | End: 2023-02-21 | Stop reason: HOSPADM

## 2023-02-20 RX ORDER — LEVOFLOXACIN 750 MG/1
750 TABLET ORAL DAILY
Qty: 7 TABLET | Refills: 0 | Status: SHIPPED | OUTPATIENT
Start: 2023-02-20 | End: 2023-02-20

## 2023-02-20 RX ADMIN — AMLODIPINE BESYLATE 10 MG: 10 TABLET ORAL at 08:06

## 2023-02-20 RX ADMIN — Medication 10 ML: at 08:09

## 2023-02-20 RX ADMIN — ACETAMINOPHEN 650 MG: 325 TABLET, FILM COATED ORAL at 00:05

## 2023-02-20 RX ADMIN — METRONIDAZOLE 500 MG: 500 INJECTION, SOLUTION INTRAVENOUS at 06:56

## 2023-02-20 RX ADMIN — ACETAMINOPHEN 650 MG: 325 TABLET, FILM COATED ORAL at 21:19

## 2023-02-20 RX ADMIN — FLUCONAZOLE IN SODIUM CHLORIDE 400 MG: 2 INJECTION, SOLUTION INTRAVENOUS at 15:29

## 2023-02-20 RX ADMIN — METRONIDAZOLE 500 MG: 500 INJECTION, SOLUTION INTRAVENOUS at 15:29

## 2023-02-20 RX ADMIN — FAMOTIDINE 20 MG: 20 TABLET, FILM COATED ORAL at 21:10

## 2023-02-20 RX ADMIN — FAMOTIDINE 20 MG: 10 INJECTION INTRAVENOUS at 08:06

## 2023-02-20 RX ADMIN — CEFTRIAXONE SODIUM 2 G: 2 INJECTION, POWDER, FOR SOLUTION INTRAMUSCULAR; INTRAVENOUS at 12:00

## 2023-02-20 RX ADMIN — METRONIDAZOLE 500 MG: 500 INJECTION, SOLUTION INTRAVENOUS at 21:10

## 2023-02-20 NOTE — NURSING NOTE
"   02/20/23 1320   Wound 02/03/23 1405 abdomen Incision   Placement Date/Time: 02/03/23 1405   Location: abdomen  Primary Wound Type: Incision   Base moist;red;bleeding;granulating  (sutures noted to distal wound base; bleeding with granufoam removal)   Periwound intact;dry   Periwound Skin Turgor soft   Edges open   Drainage Characteristics/Odor serosanguineous   Drainage Amount moderate  (350 ml to canister)   Care, Wound irrigated with;sterile normal saline;cleansed with;negative pressure wound therapy   Dressing Care dressing changed   Periwound Care barrier film applied  (spray prep)   NPWT (Negative Pressure Wound Therapy) 02/10/23 abdomen   Placement Date: 02/10/23   Location: abdomen   Therapy Setting continuous therapy   Dressing foam, black;foam, white;transparent dressing   Pressure Setting 125 mmHg   Sponges Inserted 3  (1 white to base, 2 black)   Sponges Removed 1   Finger sweep complete Yes   Colostomy LLQ   Placement date: If unknown, DO NOT use \"Add Comment\" note/Placement time: If unknown, DO NOT use \"Add Comment\" note: 02/03/23 1944   Inserted by: DR. GRANADO  Hand Hygiene Completed: Yes  Location: LLQ   Stomal Appliance 2 piece;Changed  (Steven 2 3/4\")   Stoma Appearance round;red   Peristomal Assessment Clean;Intact   Accessories/Skin Care skin barrier ring;cleansed with water   Stoma Function stool   Stool Color brown   Stool Consistency soft   Treatment Bag change;Site care   Output (mL) 250 mL     Wound/ostomy - wound vac and ostomy pouch changed, plan is d/c home tomorrow with home health, home vac is in room. Patient's pouch is quite full, she hasn't really been emptying herself, staff has. I had patient get up to toilet to empty to become familiar with how to do once home, patient elected to empty into a graduate but we discussed it will be easier for her to empty into toilet so doesn't have to clean stool out of a graduate, she agreed. Demonstrated for patient to clean tail with tissue " "and a wash cloth and lock and roll closure. We discussed disposable pouches rather than manipulating pouch to squeeze out stool, stool has gotten more of a soft, pasty consistency, patient likes the idea of disposable pouches much more, discussed that for now will need to use the drainable and when gets some samples of disposable can begin using. Stoma is about 55 mm width so needs a larger cutting surface than the 2 pice red [employer] can provide so placed patient in a Steven 2 3/4\" flat, patient may need convexity eventually but for now the flat with a barrier ring is working well. While in room [employer] customer service called patient and I was able to request samples of drainable and disposable 2 piece Sensura Kirt flex yellow be sent to patient. I discussed with patient the size differences between red and yellow and the 1 piece pouch with cutting surface and the size of cutting surface on Steven, I will request samples from Steven as well.     Wound vac dressing removed from midline abdominal wound, granufoam had mild adherence and bleeding from wound with removal, only scant slough to wound base and a suture is visible, white foam place to base and black atop that. Plan is to be changed M-W-F. Upon d/c, tubing from vac dressing can be connected to home avc that is in room.   "

## 2023-02-20 NOTE — THERAPY TREATMENT NOTE
Patient Name: Hali Tejeda  : 1960    MRN: [id number]                              Today's Date: 2023       Admit Date: 2023    Visit Dx:     ICD-10-CM ICD-9-CM   1. Colitis  K52.9 558.9   2. Nausea and vomiting, unspecified vomiting type  R11.2 787.01   3. Stricture of sigmoid colon (HCC)  K56.699 560.9     Patient Active Problem List   Diagnosis   • Left sided colitis with complication (HCC)   • Colitis   • Nausea and vomiting   • Stricture of sigmoid colon (HCC)   • Uterine anomaly   • Hypertension   • Avulsion fracture of distal fibula     Past Medical History:   Diagnosis Date   • Abnormal EKG 2020   • Avulsion fracture of distal fibula 2015   • H. pylori infection 2020   • Hepatic steatosis 2020   • Hiatal hernia    • HTN (hypertension)    • Hyperlipidemia    • Insomnia    • Left sided colitis with complication (HCC) 2023    ADMITTED TO [facility]   • LGSIL on Pap smear of cervix     (+) HPV   • LGSIL Pap smear of vagina 2016   • Snoring    • Stricture of sigmoid colon (HCC) 2023   • Uterine fibroid    • Vaginal atrophy      Past Surgical History:   Procedure Laterality Date   • BREAST LUMPECTOMY Left     benign   •  SECTION N/A    • COLON RESECTION N/A 2/3/2023    Procedure: COLON RESECTION LAPAROSCOPIC CONVERTED TO OPEN SIGMOID AND LEFT MOBILIZATION OF SPLENIC FLEXURE WITH DAVINCI ROBOT;  Surgeon: Lino Gaston MD;  Location: [facility];  Service: Robotics - DaVinci;  Laterality: N/A;   • COLONOSCOPY N/A 2014    COULD ONLY GET SCOPE TO 70 CM DUE TO SIGNIFICANT STRICTURE SECONDARY TO SEVERE DIVERTICULITIS OR CANCER, ACBE ORDERED, DR. PARAG HUDSON AT [place]   • COLONOSCOPY N/A 2023    MODERATE STENOSIS IN SIGMOID-DILATED, MANY DIVERTICULA IN SIGMOID AND DESCENDING, COPIOUS AMTS OF STOOL, MUCOSAL TEAR 55 CM FROM ANAL VERGE, DR. JENNI SMITH AT [facility]   • COLPOSCOPY N/A 2016   • ENDOSCOPY N/A 2023    GASTRITIS, SMALL  HIATAL HERNIA, DR. JENNI SMITH AT [facility]   • ENDOSCOPY N/A 09/15/2009    DR. PARAG HUDSON AT [place]   • GASTRIC SLEEVE LAPAROSCOPIC N/A 07/09/2020    WITH REMOVAL OF GASTRIC BAND, DR. OLIMPIA PALACIOS AT [facility]   • HYSTERECTOMY N/A 01/2005    WITH RSO   • LAPAROSCOPIC GASTRIC BANDING N/A 10/20/2019    DR. PARAG HUDSON AT [place]   • SALPINGO OOPHORECTOMY Left     LSO, IN TEEN YEARS   • WISDOM TOOTH EXTRACTION Bilateral       General Information     Row Name 02/20/23 1122          Physical Therapy Time and Intention    Document Type therapy note (daily note)  -     Mode of Treatment individual therapy;physical therapy  -     Row Name 02/20/23 1122          General Information    Patient Profile Reviewed yes  -     Existing Precautions/Restrictions fall  wound vac  -     Row Name 02/20/23 1122          Cognition    Orientation Status (Cognition) oriented x 4  -     Row Name 02/20/23 1122          Safety Issues, Functional Mobility    Impairments Affecting Function (Mobility) strength  -           User Key  (r) = Recorded By, (t) = Taken By, (c) = Cosigned By    Initials Name Provider Type     Mila Tam PTA Physical Therapist Assistant               Mobility     Row Name 02/20/23 1123          Bed Mobility    Supine-Sit Olden (Bed Mobility) supervision  -     Sit-Supine Olden (Bed Mobility) not tested  -     Assistive Device (Bed Mobility) bed rails  -     Row Name 02/20/23 1123          Sit-Stand Transfer    Sit-Stand Olden (Transfers) standby assist  -     Assistive Device (Sit-Stand Transfers) walker, front-wheeled  -     Row Name 02/20/23 1123          Gait/Stairs (Locomotion)    Olden Level (Gait) standby assist  -     Assistive Device (Gait) walker, front-wheeled  -     Distance in Feet (Gait) 150ft x2  -     Deviations/Abnormal Patterns (Gait) jasmin decreased  -           User Key  (r) = Recorded By, (t) = Taken By, (c) = Cosigned By     Initials Name Provider Type    Mila Diallo PTA Physical Therapist Assistant               Obj/Interventions    No documentation.                Goals/Plan    No documentation.                Clinical Impression     Row Name 02/20/23 1124          Pain    Pretreatment Pain Rating 3/10  -     Posttreatment Pain Rating 3/10  -     Pain Location - abdomen  -     Row Name 02/20/23 1615 02/20/23 1124       Plan of Care Review    Plan of Care Reviewed With -- patient  -    Progress -- improving  -    Outcome Evaluation Pt eager to get moving, amb 150ft x2 w/rwx, in room doesn't always need rwx, but pt is more indep with AD, CCP already arranged rwx and bsc and has been delivered to room; pt improved w/tfers today, seems more motivated this visit  - --    [facility] Name 02/20/23 1615          Therapy Assessment/Plan (PT)    Rehab Potential (PT) good, to achieve stated therapy goals  -     Criteria for Skilled Interventions Met (PT) yes  -[facility] Name 02/20/23 1615          Vital Signs    O2 Delivery Pre Treatment room air  -JM     Row Name 02/20/23 1615          Positioning and Restraints    Pre-Treatment Position in bed  -     Post Treatment Position chair  -     In Chair reclined;call light within reach;encouraged to call for assist;exit alarm on;notified nsg  -           User Key  (r) = Recorded By, (t) = Taken By, (c) = Cosigned By    Initials Name Provider Type    Mila Diallo PTA Physical Therapist Assistant               Outcome Measures     Row Name 02/20/23 1615 02/20/23 0759       How much help from another person do you currently need...    Turning from your back to your side while in flat bed without using bedrails? 4  - 4  -IR    Moving from lying on back to sitting on the side of a flat bed without bedrails? 4  -JM 3  -IR    Moving to and from a bed to a chair (including a wheelchair)? 3  -JM 3  -IR    Standing up from a chair using your arms (e.g., wheelchair, bedside  chair)? 3  -SUSY 4  -IR    Climbing 3-5 steps with a railing? 3  -SUSY 3  -IR    To walk in hospital room? 3  -SUSY 3  -IR    AM-PAC 6 Clicks Score (PT) 20  -SUSY 20  -IR    Highest level of mobility 6 --> Walked 10 steps or more  -JM 6 --> Walked 10 steps or more  -IR          User Key  (r) = Recorded By, (t) = Taken By, (c) = Cosigned By    Initials Name Provider Type    Mila Diallo PTA Physical Therapist Assistant    Danica Gagnon RN Registered Nurse                             Physical Therapy Education     Title: PT OT SLP Therapies (Done)     Topic: Physical Therapy (Done)     Point: Mobility training (Done)     Learning Progress Summary           Patient Eager, E,TB,D, VU,DU by SUSY at 2/20/2023 1618    Acceptance, E,TB,D, VU by SUSY at 2/16/2023 1030    Acceptance, E,TB,D, VU,NR by SUSY at 2/15/2023 1536    Eager, E,TB,D, VU by SUSY at 2/13/2023 1610    Acceptance, E,TB,D, VU,NR by  at 2/11/2023 1702    Acceptance, E,TB, VU,NR by  at 2/5/2023 0935    Acceptance, E,TB, VU,NR by  at 2/4/2023 0911    Acceptance, E, VU by  at 1/30/2023 1441                   Point: Home exercise program (Done)     Learning Progress Summary           Patient Eager, E,TB,D, VU,DU by SUSY at 2/20/2023 1618    Acceptance, E,TB,D, VU by SUSY at 2/16/2023 1030    Acceptance, E,TB,D, VU,NR by  at 2/15/2023 1536    Eager, E,TB,D, VU by SUSY at 2/13/2023 1610    Acceptance, E,TB,D, VU,NR by  at 2/11/2023 1702    Acceptance, E,TB, VU,NR by  at 2/5/2023 0935    Acceptance, E,TB, VU,NR by  at 2/4/2023 0911                   Point: Body mechanics (Done)     Learning Progress Summary           Patient Eager, E,TB,D, VU,DU by SUSY at 2/20/2023 1618    Acceptance, E,TB,D, VU by SUSY at 2/16/2023 1030    Acceptance, E,TB,D, VU,NR by SUSY at 2/15/2023 1536    Eager, E,TB,D, VU by SUSY at 2/13/2023 1610    Acceptance, E,TB,D, VU,NR by  at 2/11/2023 1702    Acceptance, E,TB, VU,NR by CS at 2/5/2023 0935    Acceptance, E,TB, VU,NR by CS at  2/4/2023 0911                   Point: Precautions (Done)     Learning Progress Summary           Patient Eager, E,TB,D, VU,DU by  at 2/20/2023 1618    Acceptance, E,TB,D, VU by  at 2/16/2023 1030    Acceptance, E,TB,D, VU,NR by  at 2/15/2023 1536    Eager, E,TB,D, VU by  at 2/13/2023 1610    Acceptance, E,TB,D, VU,NR by  at 2/11/2023 1702    Acceptance, E,TB, VU,NR by  at 2/5/2023 0935    Acceptance, E,TB, VU,NR by  at 2/4/2023 0911                               User Key     Initials Effective Dates Name Provider Type [facility] 03/07/18 -  Mila Tam PTA Physical Therapist Assistant PT     06/16/21 -  Gian Guillen, PT Physical Therapist PT     12/13/22 -  Sunni Wolfe, PT Physical Therapist PT     04/08/22 -  Lilibeth Herrera, PT Physical Therapist PT              PT Recommendation and Plan     Plan of Care Reviewed With: patient  Progress: improving  Outcome Evaluation: Pt eager to get moving, amb 150ft x2 w/rwx, in room doesn't always need rwx, but pt is more indep with AD, CCP already arranged rwx and bsc and has been delivered to room; pt improved w/tfers today, seems more motivated this visit     Time Calculation:    PT Charges     Row Name 02/20/23 1121             Time Calculation    Start Time 1055  -      Stop Time 1118  -      Time Calculation (min) 23 min  -      PT Received On 02/20/23  -      PT - Next Appointment 02/21/23  -            User Key  (r) = Recorded By, (t) = Taken By, (c) = Cosigned By    Initials Name Provider Type     Mila Tam PTA Physical Therapist Assistant              Therapy Charges for Today     Code Description Service Date Service Provider Modifiers Qty    84526142476 HC PT THER PROC EA 15 MIN 2/20/2023 Mila Tam PTA GP 2          PT G-Codes  Outcome Measure Options: AM-PAC 6 Clicks Basic Mobility (PT)  AM-PAC 6 Clicks Score (PT): 20  PT Discharge Summary  Anticipated Discharge Disposition (PT): home with  assist, home with home health    Mila Tam, PTA  2/20/2023

## 2023-02-20 NOTE — PLAN OF CARE
Goal Outcome Evaluation:  Plan of Care Reviewed With: patient        Progress: improving  Outcome Evaluation: Pt eager to get moving, amb 150ft x2 w/rwx, in room doesn't always need rwx, but pt is more indep with AD, CCP already arranged rwx and bsc and has been delivered to room; pt improved w/tfers today, seems more motivated this visit    Patient was intermittently wearing a face mask during this therapy encounter. Therapist used appropriate personal protective equipment including eye protection, mask, and gloves.  Mask used was standard procedure mask. Appropriate PPE was worn during the entire therapy session. Hand hygiene was completed before and after therapy session. Patient is not in enhanced droplet precautions.

## 2023-02-20 NOTE — PROGRESS NOTES
Progress Note    Pod 17    S: Pt states she is doing well today. Tolerating regular diet. Having ostomy output. Pain is tolerable.     O:  Temp:  [98.4 °F (36.9 °C)-98.9 °F (37.2 °C)] 98.9 °F (37.2 °C)  Heart Rate:  [80-88] 84  Resp:  [16-18] 16  BP: (120-134)/(82-99) 133/90      Intake/Output Summary (Last 24 hours) at 2/20/2023 0758  Last data filed at 2/20/2023 0406  Gross per 24 hour   Intake 240 ml   Output 100 ml   Net 140 ml       Abd:   soft, non-distended  Incision: clean, dry  Ostomy: Pink, patent, and productive of custard consistency brown stool.   Wound-vac in place over midline abdominal wounds.  NEW with scant amount of serous drainage.     Results from last 7 days   Lab Units 02/20/23  0338   WBC 10*3/mm3 13.51*   HEMOGLOBIN g/dL 9.4*   HEMATOCRIT % 29.7*   PLATELETS 10*3/mm3 488*       A/P: 62 y.o. female s/p robotic assisted laparoscopic left and sigmoid resection with colostomy converted open due to splenic injury 02/03/2023     - S/P LLQ IR Drain placement 02/09/2023. Pt treated empirically with Zosyn. Cultures with scant growth of E. Coli with resistance to Zosyn. ABx switched to Rocephin and Flagyl. Drain repositioned 02/14/2023. Repeat fluid cultures negative. Leukocytosis trending down.   - NEW Drain removed today.   - Pt stable from a surgical standpoint to be discharged home today with  after wound-vac for home use is delivered. D/C with Levofloxacin 500 mg QD x7 days. Will repeat outpatient CT of abdomen/pelvis in 4 days to reassess fluid collections.

## 2023-02-20 NOTE — PROGRESS NOTES
"Daily progress note    Primary care physician  Dr. Delgado    Chief complaint  Doing better with no new complaint and wants to go home.  Patient tolerating diet and off TPN.    History of present illness  62-year-old female with history of hypertension presented to [facility] emergency room with left lower quadrant abdominal pain for last 1 month.  Patient also have occasional loose stools.  Patient denies any nausea vomiting.  Patient recently diagnosed with colitis and treated with oral antibiotics without any improvement.  Patient has noticed blood in the stools.  Patient evaluated in ER with CT abdominal pelvis which shows worsening colitis and failed outpatient treatment admit for management.  Patient has no fever chills chest pain shortness of breath with sweats weight loss or weight gain.     REVIEW OF SYSTEMS  Unremarkable except weakness     PHYSICAL EXAM  Blood pressure 136/82, pulse 86, temperature 98.2 °F (36.8 °C), temperature source Oral, resp. rate 18, height 154.9 cm (61\"), weight 128 kg (283 lb 1.1 oz), SpO2 98 %.    GENERAL:  Awake and alert in no acute distress  HEENT:  Unremarkable  NECK:  Supple  CV: regular rhythm, normal rate  RESPIRATORY: normal effort, clear to auscultation bilaterally  ABDOMEN: soft, left lower quadrant tenderness colostomy in place  MUSCULOSKELETAL: no deformity  NEURO: alert, moves all extremities, follows commands  PSYCH:  calm, cooperative  SKIN: warm, dry     LAB RESULTS  Lab Results (last 24 hours)     Procedure Component Value Units Date/Time    POC Glucose Once [[id number]]  (Normal) Collected: 02/20/23 1121    Specimen: Blood Updated: 02/20/23 1122     Glucose 112 mg/dL      Comment: Meter: [id number] : [id number] Michelle EUCEDA       POC Glucose Once [[id number]]  (Normal) Collected: 02/20/23 0606    Specimen: Blood Updated: 02/20/23 0608     Glucose 77 mg/dL      Comment: Meter: [id number] : [id number] Han Kuo CNA       CBC (No " Diff) [[id number]]  (Abnormal) Collected: 02/20/23 0338    Specimen: Blood Updated: 02/20/23 0512     WBC 13.51 10*3/mm3      RBC 3.43 10*6/mm3      Hemoglobin 9.4 g/dL      Hematocrit 29.7 %      MCV 86.6 fL      MCH 27.4 pg      MCHC 31.6 g/dL      RDW 15.0 %      RDW-SD 47.2 fl      MPV 9.8 fL      Platelets 488 10*3/mm3     POC Glucose Once [[id number]]  (Normal) Collected: 02/20/23 0010    Specimen: Blood Updated: 02/20/23 0011     Glucose 76 mg/dL      Comment: Meter: [id number] : [id number] Shortina Kuo CNA       POC Glucose Once [[id number]]  (Normal) Collected: 02/19/23 1757    Specimen: Blood Updated: 02/19/23 1759     Glucose 99 mg/dL      Comment: Meter: [id number] : [id number] Phoenix EUCEDA           Imaging Results (Last 24 Hours)     ** No results found for the last 24 hours. **        Upper and lower endoscopy results noted and discussed with patient    Current Facility-Administered Medications:   •  acetaminophen (TYLENOL) tablet 650 mg, 650 mg, Oral, Q6H PRN, Adwoa Rogel PA-C, 650 mg at 02/20/23 0005  •  amLODIPine (NORVASC) tablet 10 mg, 10 mg, Oral, Q24H, Gideon Hope MD, 10 mg at 02/20/23 0806  •  cefTRIAXone (ROCEPHIN) 2 g in sodium chloride 0.9 % 100 mL IVPB-VTB, 2 g, Intravenous, Q24H, Galdino Esteves MD, Last Rate: 200 mL/hr at 02/20/23 1200, 2 g at 02/20/23 1200  •  dextrose (D50W) (25 g/50 mL) IV injection 25 g, 25 g, Intravenous, Q15 Min PRN, Beck Wei Jr., MD  •  dextrose (GLUTOSE) oral gel 15 g, 15 g, Oral, Q15 Min PRN, Beck Wei Jr., MD  •  famotidine (PEPCID) injection 20 mg, 20 mg, Intravenous, BID AC, Lilibeth Rahman PA-C, 20 mg at 02/20/23 0806  •  fluconazole (DIFLUCAN) IVPB 400 mg, 400 mg, Intravenous, Q24H, Galdino Esteves MD, Last Rate: 100 mL/hr at 02/20/23 1529, 400 mg at 02/20/23 1529  •  glucagon (GLUCAGEN) injection 1 mg, 1 mg, Intramuscular, Q15 Min PRN, Beck Wei Jr., MD  •  hydrALAZINE (APRESOLINE) injection 10 mg, 10 mg, Intravenous, Q4H  PRN, Gideon Hope MD, 10 mg at 23 1454  •  metroNIDAZOLE (FLAGYL) IVPB 500 mg, 500 mg, Intravenous, Q8H, Galdino sEteves MD, 500 mg at 23 1529  •  [] HYDROmorphone (DILAUDID) injection 0.25 mg, 0.25 mg, Intravenous, Q2H PRN, 0.25 mg at 23 0015 **AND** naloxone (NARCAN) injection 0.4 mg, 0.4 mg, Intravenous, Q5 Min PRN, Lino Gaston MD  •  nitroglycerin (NITROSTAT) SL tablet 0.4 mg, 0.4 mg, Sublingual, Q5 Min PRN, Lino Gaston MD  •  ondansetron (ZOFRAN) injection 4 mg, 4 mg, Intravenous, Q6H PRN, Lino Gaston MD, 4 mg at 23 1443  •  potassium chloride 10 mEq in 100 mL IVPB, 10 mEq, Intravenous, Q1H PRN, Matthew Soliz MD, Last Rate: 100 mL/hr at 23, 10 mEq at 23  •  potassium phosphate 45 mmol in sodium chloride 0.9 % 500 mL infusion, 45 mmol, Intravenous, PRN **OR** potassium phosphate 30 mmol in sodium chloride 0.9 % 250 mL infusion, 30 mmol, Intravenous, PRN **OR** potassium phosphate 15 mmol in sodium chloride 0.9 % 100 mL infusion, 15 mmol, Intravenous, PRN, 15 mmol at 23 1440 **OR** sodium phosphates 40 mmol in sodium chloride 0.9 % 500 mL IVPB, 40 mmol, Intravenous, PRN **OR** sodium phosphates 20 mmol in sodium chloride 0.9 % 250 mL IVPB, 20 mmol, Intravenous, PRN, Matthew Soliz MD  •  sodium chloride 0.9 % flush 10 mL, 10 mL, Intravenous, Q12H, Lilibeth Rahman PA-C, 10 mL at 23 0809  •  sodium chloride 0.9 % flush 10 mL, 10 mL, Intravenous, Q12H, Lilibeth Rahman PA-C, 10 mL at 23 0809  •  sodium chloride 0.9 % flush 10 mL, 10 mL, Intravenous, PRN, QuickLilibeth PA-C  •  sodium chloride 0.9 % flush 20 mL, 20 mL, Intravenous, PRN, QuickLilibeth PA-C  •  sodium chloride 0.9 % infusion 40 mL, 40 mL, Intravenous, PRN, QuickLilibeth PA-C     ASSESSMENT  Acute colitis with sigmoid stricture and microperforation s/p colon resection and colostomy  Abdominal fluid collection/abscess status post  CT-guided drainage placement and removal  Postop ileus  Acute kidney injury resolved  Hypertension   Gastroesophageal reflux disease    PLAN  CPM  Postop care  Diet as tolerated  IV antibiotics per infectious disease  Accu-Chek with low-dose sliding scale insulin  Pain management  Continue home medications  Stress ulcer DVT prophylaxis  Supportive care  PT OT  Discussed with family nursing staff   Discharge planning    SANG SINGLETARY MD    Copied text in this note has been reviewed and is accurate as of 02/20/23

## 2023-02-20 NOTE — PAYOR COMM NOTE
"Hali Aleman (62 y.o. Female)     PLEASE SEE ATTACHED FOR INPT CONTINUED STAY AUTH.     REF#[id number]    PLEASE CALL   OR  [phone]    THANK YOU    ANDREA CHAVES LPN CCP    Date of Birth   1960    Social Security Number       Address   [address] [facility] [address]    Home Phone   [phone]    MRN   [id number]       [place]   None    Marital Status   Single                            Admission Date   1/27/23    Admission Type   Emergency    Admitting Provider   Gideon Hope MD    Attending Provider   Gideon Hope MD    Department, Room/Bed   [address], [address]/1       Discharge Date       Discharge Disposition       Discharge Destination                               Attending Provider: Gideon Hope MD    Allergies: Hydrocodone, Sulfa Antibiotics    Isolation: None   Infection: None   Code Status: CPR    Ht: 154.9 cm (61\")   Wt: 128 kg (283 lb 1.1 oz)    Admission Cmt: None   Principal Problem: Colitis [K52.9]                 Active Insurance as of 1/27/2023     Primary Coverage     Payor Plan Insurance Group Employer/Plan Group    [facility] [employer] [facility] [employer] [facility] [employer] EMPLOYEE [id number]     Payor Plan Address Payor Plan Phone Number Payor Plan Fax Number Effective Dates    [address] [phone]  1/1/2022 - None Entered    [facility] [id number]       Subscriber Name Subscriber Birth Date Member ID       HALI LAEMAN 1960 [id number]                 Emergency Contacts      (Rel.) Home Phone Work Phone Mobile Phone    SANDRA ALEMAN (Daughter) [phone] -- --    AMAURI ABREU (Mother) [phone] -- [phone]            [place]: [facility] [id number]  Tax ID [id number]    Oxygen Therapy (last 3 days)     Date/Time SpO2 Device (Oxygen Therapy) Flow (L/min) Oxygen Concentration (%) ETCO2 (mmHg)    02/19/23 2341 98 -- -- -- --    02/19/23 2016 97 -- -- -- --    02/19/23 2001 -- room air -- -- --    02/19/23 1415 -- " room air -- -- --    23 1257 98 room air -- -- --    23 0903 -- room air -- -- --    23 0714 98 room air -- -- --    23 2311 98 nasal cannula 1 -- --    23 1911 99 nasal cannula 1 -- --    23 1336 -- room air -- -- --    23 1255 99 room air -- -- --    23 0855 -- room air -- -- --    23 0710 98 room air -- -- --    23 221 99 room air -- -- --    23 -- room air -- -- --    23 1918 100 room air -- -- --    23 1302 90 room air -- -- --    23 0850 -- room air -- -- --    23 0739 98 room air -- -- --    23 0049 -- room air -- -- --    23 0019 98 -- -- -- --        Intake & Output (last 3 days)        0701   07 0701   0700  07 0700  0701   0700    P.O. 1010 360 240     I.V. (mL/kg)        IV Piggyback  200      TPN        Total Intake(mL/kg) 1010 (13.5) 560 (7.5) 240 (1.9)     Urine (mL/kg/hr) 0 (0) 50 (0) 100 (0)     Drains 22 20      Stool 50 50 0     Wound 350       Total Output 422 120 100     Net +588 +440 +140             Urine Unmeasured Occurrence 9 x 3 x 4 x     Stool Unmeasured Occurrence  1 x 1 x          Lines, Drains & Airways     Active LDAs     Name Placement date Placement time Site Days    PICC Double Lumen 02/10/23 Right Basilic 02/10/23  1431  Basilic  9    Closed/Suction Drain 1 Left LLQ Pigtail 10 Fr. 23  1158  LLQ  5    Colostomy LLQ 23  1944  LLQ  16                Operative/Procedure Notes (last 72 hours)  Notes from 23 0742 through 23 0742   No notes of this type exist for this encounter.            Physician Progress Notes (last 72 hours)      Valerie Leigh MD at 23 1411          DAILY PROGRESS NOTE  [facility]    Patient Identification:  Name: Hail Tejeda  Age: 62 y.o.  Sex: female  :  1960  MRN: [id number]         Primary Care Physician: Richard Delgado MD    Subjective:  "patient is sitting up; reports that she is feeling better and doing well    Interval History: follow up for abdominal abscess, colon resection, diabetes, anemia    Objective:    Scheduled Meds:amLODIPine, 10 mg, Oral, Q24H  cefTRIAXone, 2 g, Intravenous, Q24H  famotidine, 20 mg, Intravenous, BID AC  fluconazole, 400 mg, Intravenous, Q24H  insulin glargine, 15 Units, Subcutaneous, Q12H  insulin regular, 0-7 Units, Subcutaneous, Q6H  metroNIDAZOLE, 500 mg, Intravenous, Q8H  sodium chloride, 10 mL, Intravenous, Q12H  sodium chloride, 10 mL, Intravenous, Q12H      Continuous Infusions:     Vital signs in last 24 hours:  Temp:  [97.3 °F (36.3 °C)-98.4 °F (36.9 °C)] 98.4 °F (36.9 °C)  Heart Rate:  [80-94] 88  Resp:  [18] 18  BP: (110-134)/(80-86) 134/86    Intake/Output:    Intake/Output Summary (Last 24 hours) at 2/19/2023 1411  Last data filed at 2/19/2023 1257  Gross per 24 hour   Intake 320 ml   Output 160 ml   Net 160 ml       Exam:  /86 (BP Location: Left arm, Patient Position: Sitting)   Pulse 88   Temp 98.4 °F (36.9 °C) (Oral)   Resp 18   Ht 154.9 cm (61\")   Wt 74.3 kg (163 lb 14.4 oz)   SpO2 98%   BMI 30.97 kg/m²     General Appearance:    Alert, cooperative, no distress, AAOx3                          Head:    Normocephalic, without obvious abnormality, atraumatic                           Eyes:    PERRL, conjunctivae/corneas clear, EOM's intact, both eyes                         Throat:   Lips, tongue, gums normal; oral mucosa pink and moist                           Neck:   Supple, symmetrical, trachea midline, no JVD                         Lungs:    Clear to auscultation bilaterally, respirations unlabored                 Chest Wall:    No tenderness or deformity                          Heart:    Regular rate and rhythm, S1 and S2 normal, no murmur,no  rub or gallop                  Abdomen:     Soft, nontender, bowel sounds active, no masses, no organomegaly ; drain in place                 " Extremities:   Extremities normal, atraumatic, no cyanosis or edema                        Pulses:   Pulses palpable in all extremities                            Skin:   Skin is warm and dry,  no rashes or palpable lesions                     Data Review:  Labs in chart were reviewed.  WBC   Date Value Ref Range Status   02/19/2023 14.36 (H) 3.40 - 10.80 10*3/mm3 Final     Hemoglobin   Date Value Ref Range Status   02/19/2023 9.3 (L) 12.0 - 15.9 g/dL Final     Hematocrit   Date Value Ref Range Status   02/19/2023 28.7 (L) 34.0 - 46.6 % Final     Platelets   Date Value Ref Range Status   02/19/2023 464 (H) 140 - 450 10*3/mm3 Final     Sodium   Date Value Ref Range Status   02/19/2023 132 (L) 136 - 145 mmol/L Final     Potassium   Date Value Ref Range Status   02/19/2023 3.8 3.5 - 5.2 mmol/L Final     Chloride   Date Value Ref Range Status   02/19/2023 101 98 - 107 mmol/L Final     CO2   Date Value Ref Range Status   02/19/2023 23.0 22.0 - 29.0 mmol/L Final     BUN   Date Value Ref Range Status   02/19/2023 13 8 - 23 mg/dL Final     Creatinine   Date Value Ref Range Status   02/19/2023 0.51 (L) 0.57 - 1.00 mg/dL Final     Glucose   Date Value Ref Range Status   02/19/2023 95 65 - 99 mg/dL Final     Calcium   Date Value Ref Range Status   02/19/2023 8.4 (L) 8.6 - 10.5 mg/dL Final     Magnesium   Date Value Ref Range Status   02/18/2023 1.8 1.6 - 2.4 mg/dL Final     Phosphorus   Date Value Ref Range Status   02/18/2023 3.1 2.5 - 4.5 mg/dL Final     AST (SGOT)   Date Value Ref Range Status   02/18/2023 11 1 - 32 U/L Final     ALT (SGPT)   Date Value Ref Range Status   02/18/2023 6 1 - 33 U/L Final     Alkaline Phosphatase   Date Value Ref Range Status   02/18/2023 69 39 - 117 U/L Final         Assessment:  Active Hospital Problems    Diagnosis  POA   • **Colitis [K52.9]  Yes   • Nausea and vomiting [R11.2]  Yes   • Stricture of sigmoid colon (HCC) [K59.407]  Unknown   • Left sided colitis with complication (HCC)  [K51.519]  Unknown      Resolved Hospital Problems   No resolved problems to display.   abdominal abscess  Plan  Wbc is improving  Continue antibiotics and drain  hgb is stable  Blood sugar is controlled  Appreciate help from surgery  Medium risk  Valerie Leigh MD  2023  14:11 EST      Electronically signed by Valerie Leigh MD at 23 1413     Shun Baron MD at 23 1354        Postoperative day 16 from left/sigmoid colectomy with mobilization of splenic flexure and colostomy    Tolerating regular diet, no nausea or vomiting, ostomy functioning    Remains afebrile with normal vital signs  WBC 14, decreased from 18 yesterday  Hemoglobin 9.3    Abdomen soft, ostomy functioning well, wound VAC in place    · On Rocephin and Flagyl.  CT-guided abscess drain performed on 2023.  Drain repositioned then exchanged 2023.  CT scan 2 days ago demonstrated interval reduction in fluid in the left lower quadrant.  · Repeat labs in a.m., white blood cell count trending in right direction    Electronically signed by Shun Baron MD at 23 1357     Valerie Leigh MD at 23 1839          DAILY PROGRESS NOTE  [facility]    Patient Identification:  Name: Hali Tejeda  Age: 62 y.o.  Sex: female  :  1960  MRN: [id number]         Primary Care Physician: Richard Delgado MD    Subjective: patient is doing well; tolerating a diet  Interval History: follow up for sigmoid colectomy, abdominal abscess and drain, diabetes, anemia    Objective:    Scheduled Meds:amLODIPine, 10 mg, Oral, Q24H  cefTRIAXone, 2 g, Intravenous, Q24H  famotidine, 20 mg, Intravenous, BID AC  fluconazole, 400 mg, Intravenous, Q24H  insulin glargine, 15 Units, Subcutaneous, Q12H  insulin regular, 0-7 Units, Subcutaneous, Q6H  metroNIDAZOLE, 500 mg, Intravenous, Q8H  sodium chloride, 10 mL, Intravenous, Q12H  sodium chloride, 10 mL, Intravenous, Q12H      Continuous Infusions:  "    Vital signs in last 24 hours:  Temp:  [98.1 °F (36.7 °C)-99.1 °F (37.3 °C)] 98.1 °F (36.7 °C)  Heart Rate:  [] 90  Resp:  [18] 18  BP: (113-164)/(68-97) 113/68    Intake/Output:    Intake/Output Summary (Last 24 hours) at 2/18/2023 1839  Last data filed at 2/18/2023 1824  Gross per 24 hour   Intake 480 ml   Output 120 ml   Net 360 ml       Exam:  /68 (BP Location: Right arm, Patient Position: Lying)   Pulse 90   Temp 98.1 °F (36.7 °C) (Oral)   Resp 18   Ht 154.9 cm (61\")   Wt 74.8 kg (164 lb 14.4 oz)   SpO2 99%   BMI 31.16 kg/m²     General Appearance:    Alert, cooperative, no distress, AAOx3                          Head:    Normocephalic, without obvious abnormality, atraumatic                           Eyes:    PERRL, conjunctivae/corneas clear, EOM's intact, both eyes                         Throat:   Lips, tongue, gums normal; oral mucosa pink and moist                           Neck:   Supple, symmetrical, trachea midline, no JVD                         Lungs:    Decreased breath sounds bilaterally, respirations unlabored                 Chest Wall:    No tenderness or deformity                          Heart:    Regular rate and rhythm, S1 and S2 normal, no murmur,no  rub or gallop                  Abdomen:     Soft, nontender, bowel sounds active, no masses, no organomegaly                  Extremities:   Extremities normal, atraumatic, no cyanosis or edema                        Pulses:   Pulses palpable in all extremities                            Skin:   Skin is warm and dry,  no rashes or palpable lesions                     Data Review:  Labs in chart were reviewed.  WBC   Date Value Ref Range Status   02/18/2023 18.10 (H) 3.40 - 10.80 10*3/mm3 Final     Hemoglobin   Date Value Ref Range Status   02/18/2023 9.1 (L) 12.0 - 15.9 g/dL Final     Hematocrit   Date Value Ref Range Status   02/18/2023 28.2 (L) 34.0 - 46.6 % Final     Platelets   Date Value Ref Range Status   02/18/2023 " 531 (H) 140 - 450 10*3/mm3 Final     Sodium   Date Value Ref Range Status   02/18/2023 135 (L) 136 - 145 mmol/L Final     Potassium   Date Value Ref Range Status   02/18/2023 4.1 3.5 - 5.2 mmol/L Final     Chloride   Date Value Ref Range Status   02/18/2023 102 98 - 107 mmol/L Final     CO2   Date Value Ref Range Status   02/18/2023 24.0 22.0 - 29.0 mmol/L Final     BUN   Date Value Ref Range Status   02/18/2023 12 8 - 23 mg/dL Final     Creatinine   Date Value Ref Range Status   02/18/2023 0.43 (L) 0.57 - 1.00 mg/dL Final     Glucose   Date Value Ref Range Status   02/18/2023 150 (H) 65 - 99 mg/dL Final     Calcium   Date Value Ref Range Status   02/18/2023 8.6 8.6 - 10.5 mg/dL Final     Magnesium   Date Value Ref Range Status   02/18/2023 1.8 1.6 - 2.4 mg/dL Final     Phosphorus   Date Value Ref Range Status   02/18/2023 3.1 2.5 - 4.5 mg/dL Final     AST (SGOT)   Date Value Ref Range Status   02/18/2023 11 1 - 32 U/L Final     ALT (SGPT)   Date Value Ref Range Status   02/18/2023 6 1 - 33 U/L Final     Alkaline Phosphatase   Date Value Ref Range Status   02/18/2023 69 39 - 117 U/L Final         Assessment:  Active Hospital Problems    Diagnosis  POA   • **Colitis [K52.9]  Yes   • Nausea and vomiting [R11.2]  Yes   • Stricture of sigmoid colon (HCC) [K56.699]  Unknown   • Left sided colitis with complication (HCC) [K51.519]  Unknown      Resolved Hospital Problems   No resolved problems to display.   obesity complicating all aspects of care  anemia    Plan:  Trend wbc, hgb  Continue antibiotics  Drain placed and working  Has regained bowel function  Monitor on telemetry  Notes and labs reviewed  Appreciate help from all  Medium risk    Valerie Leigh MD  2/18/2023  18:39 EST      Electronically signed by Valerie Leigh MD at 02/18/23 1841     Shun Baron MD at 02/18/23 1318        Postoperative day 15 from left/sigmoid colectomy with mobilization of splenic flexure and colostomy    Tolerating  regular diet, no nausea or vomiting, ostomy functioning.    Temperature 98.1, pulse 90, respiratory 18  Blood pressure 113/68  Abdomen soft, ostomy functioning, wound VAC in place.    Magnesium 1.8  Phosphorus 3.1  Potassium 3.1    WBC 18  Hemoglobin 9.1    · Advance to regular diet, DC TPN  · On Rocephin and Flagyl.  CT-guided abscess drain was performed on 2023.  Drain was repositioned and exchanged on 2023.  CT scan yesterday demonstrated interval reduction in fluid in the left lower quadrant.  I did not see anything that would definitively warrant further drainage at this time.  Continue Rocephin and Flagyl for now.    Electronically signed by Shun Baron MD at 23 1326     Galdino Esteves MD at 23 0896            Infectious Diseases Progress Note    Galdino Esteves MD     [facility]  Los: 22 days  Patient Identification:  Name: Hali Tejeda  Age: 62 y.o.  Sex: female  :  1960  MRN: [id number]         Primary Care Physician: Richard Delgado MD        Subjective: Feeling somewhat better.  Interval History: Underwent placement of wound VAC on her abdominal wound/incision.  TPN is started.  CT scan of the abdomen and pelvis performed on 2022 showed interval decrease in the size of the fluid collection in the left paracolic gutter as well as an area in the left lower quadrant with ill-defined subjacent stranding and tiny foci of gas slightly more conspicuous compared to prior imaging studies with no drainable focal fluid collection or definitive extraluminal enteric contrast noted.  CT scan of the abdomen and pelvis on 2023 reveals an area of fluid collection in the abdominal wall near the stoma.  Objective:    Scheduled Meds:amLODIPine, 10 mg, Oral, Q24H  famotidine, 20 mg, Intravenous, BID AC  Fat Emulsion Plant Based, 100 mL, Intravenous, Q24H (TPN)  fluconazole, 400 mg, Intravenous, Q24H  insulin glargine, 15 Units, Subcutaneous, Q12H  insulin  "regular, 0-7 Units, Subcutaneous, Q6H  metoclopramide, 10 mg, Intravenous, Q6H  metroNIDAZOLE, 500 mg, Intravenous, Q8H  sodium chloride, 10 mL, Intravenous, Q12H  sodium chloride, 10 mL, Intravenous, Q12H      Continuous Infusions:Adult Central 2-in-1 TPN, , Last Rate: 75 mL/hr at 02/17/23 1833  Pharmacy to Dose TPN,         Vital signs in last 24 hours:  Temp:  [98 °F (36.7 °C)-99.1 °F (37.3 °C)] 98.6 °F (37 °C)  Heart Rate:  [] 80  Resp:  [18-20] 18  BP: (129-164)/(74-97) 141/95    Intake/Output:    Intake/Output Summary (Last 24 hours) at 2/18/2023 0826  Last data filed at 2/18/2023 0616  Gross per 24 hour   Intake 1010 ml   Output 422 ml   Net 588 ml       Exam:  /95 (BP Location: Right arm, Patient Position: Lying)   Pulse 80   Temp 98.6 °F (37 °C) (Oral)   Resp 18   Ht 154.9 cm (61\")   Wt 74.8 kg (164 lb 14.4 oz)   SpO2 98%   BMI 31.16 kg/m²   Patient is examined using the personal protective equipment as per guidelines from infection control for this particular patient as enacted.  Hand washing was performed before and after patient interaction.  General Appearance: Comfortable appearing female who does not appear toxic at this time.                          Head:    Normocephalic, without obvious abnormality, atraumatic                           Eyes:    PERRL, conjunctivae/corneas clear, EOM's intact, both eyes                         Throat:   Lips, tongue, gums normal; oral mucosa pink and moist                           Neck:   Supple, symmetrical, trachea midline, no JVD                         Lungs:    Clear to auscultation bilaterally, respirations unlabored                 Chest Wall:    No tenderness or deformity                          Heart:  S1-S2 regular                  Abdomen:   Ostomy present with midline incision has wound VAC in place with no surrounding cellulitis.  Drain in place.  No significant tenderness in the peristomal area noted.                 Extremities:  "  Extremities normal, atraumatic, no cyanosis or edema                        Pulses:   Pulses palpable in all extremities                            Skin:   Skin is warm and dry,  no rashes or palpable lesions                  Neurologic: Grossly nonfocal       Data Review:    I reviewed the patient's new clinical results.  Results from last 7 days   Lab Units 02/18/23  0328 02/17/23  0308 02/16/23  0341 02/15/23  0422 02/14/23  0238 02/13/23  0321 02/12/23  0558   WBC 10*3/mm3 18.10* 19.71* 20.90* 23.70* 25.66* 28.34* 26.57*   HEMOGLOBIN g/dL 9.1* 9.0* 9.5* 9.0* 9.2* 9.7* 8.5*   PLATELETS 10*3/mm3 531* 493* 514* 502* 500* 479* 416     Results from last 7 days   Lab Units 02/18/23  0328 02/17/23  0308 02/16/23  0341 02/15/23  0422 02/14/23  0238 02/13/23 0321 02/12/23  0817 02/12/23  0748 02/12/23  0558   SODIUM mmol/L 135* 133* 133* 132* 134* 132* 135*  --  126*   POTASSIUM mmol/L 4.1 4.3 4.3 4.4 4.0 4.0 3.7   < > 7.5*   CHLORIDE mmol/L 102 104 101 104 100 99  --   --  96*   CO2 mmol/L 24.0 23.1 23.0 24.7 24.8 28.4  --   --  26.0   BUN mg/dL 12 11 11 13 8 7*  --   --  4*   CREATININE mg/dL 0.43* 0.45* 0.48* 0.42* 0.37* 0.37*  --   --  0.41*   CALCIUM mg/dL 8.6 8.1* 8.1* 8.3* 8.2* 8.5*  --   --  8.4*   GLUCOSE mg/dL 150* 177* 184* 173* 150* 138*  --   --  763*    < > = values in this interval not displayed.     Microbiology Results (last 10 days)     Procedure Component Value - Date/Time    Body Fluid Culture - Drainage, Peritoneum [[id number]] Collected: 02/14/23 1154    Lab Status: Final result Specimen: Drainage from Peritoneum Updated: 02/17/23 1057     Body Fluid Culture No growth at 3 days     Gram Stain Moderate (3+) WBCs per low power field      No organisms seen    Anaerobic Culture - Drainage, Peritoneum [[id number]]  (Normal) Collected: 02/14/23 1154    Lab Status: Preliminary result Specimen: Drainage from Peritoneum Updated: 02/17/23 0703     Anaerobic Culture No anaerobes isolated at 3 days     Clostridioides difficile Toxin - Stool, Per Stoma [[id number]]  (Normal) Collected: 02/10/23 1243    Lab Status: Final result Specimen: Stool from Per Stoma Updated: 02/10/23 1341    Narrative:      The following orders were created for panel order Clostridioides difficile Toxin - Stool, Per Stoma.  Procedure                               Abnormality         Status                     ---------                               -----------         ------                     Clostridioides difficile...[[id number]]  Normal              Final result                 Please view results for these tests on the individual orders.    Clostridioides difficile Toxin, PCR - Stool, Per Stoma [[id number]]  (Normal) Collected: 02/10/23 1243    Lab Status: Final result Specimen: Stool from Per Stoma Updated: 02/10/23 1341     C. Difficile Toxins by PCR Negative    Narrative:      The result indicates the absence of toxigenic C. difficile from stool specimen.     Body Fluid Culture - Drainage, Peritoneum [[id number]]  (Abnormal)  (Susceptibility) Collected: 02/09/23 1458    Lab Status: Final result Specimen: Drainage from Peritoneum Updated: 02/11/23 0851     Body Fluid Culture Scant growth (1+) Escherichia coli     Gram Stain Few (2+) WBCs per low power field      No organisms seen    Susceptibility      Escherichia coli      RENETTA      Amikacin Susceptible      Ampicillin Resistant     Ampicillin + Sulbactam Resistant     Cefepime Susceptible      Ceftazidime Susceptible      Ceftriaxone Susceptible      Gentamicin Resistant     Levofloxacin Susceptible      Piperacillin + Tazobactam Resistant     Tobramycin Intermediate      Trimethoprim + Sulfamethoxazole Resistant                      Susceptibility Comments     Escherichia coli    Cefazolin sensitivity will not be reported for Enterobacteriaceae in non-urine isolates. If cefazolin is preferred, please call the microbiology lab to request an E-test.  With the exception of  urinary-sourced infections, aminoglycosides should not be used as monotherapy.                     Assessment:    Colitis    Left sided colitis with complication (HCC)    Nausea and vomiting    Stricture of sigmoid colon (HCC)  1-intra-abdominal sepsis with intra-abdominal abscess in the setting of prolonged colitis, sigmoid stricture, endoscopic injury to colonic wall with microperforation and subsequent laparotomy partial colectomy and colostomy and intraoperative injury to the spleen with repair while being on antibiotic therapy-likely pathogen to consider in this situation would be enteric amberly along with selection of yeast due to prolonged antibiotic therapy-status post percutaneous drain placement on 2/9/2023  2-hypertension  3-malnourishment  4-possible postop abdominal wall/incisional infection/evolving abscess        Recommendations/Discussions:  · Continue with fluconazole ceftriaxone and Flagyl  · Given around abdominal wall fluid collection which may require drainage at that could explain persistent leukocytosis.    Galdino Esteves MD  2/18/2023  08:26 EST    Parts of this note may be an electronic transcription/translation of spoken language to printed text using the Dragon dictation system.      Electronically signed by Galdino Esteves MD at 02/19/23 0624     Adwoa Rogel PA-C at 02/17/23 1215        I met FRANNY Spear, to take down the wound vac and inspect the midline abdominal wound. There was a moderate amount of sloughing tissue in the wound, which I debrided with sterile scissors and a #15 blade. A significant a significant amount of slough was removed. A loose suture remained but was not causing patient discomfort. The wound was noted to track significantly in the cranial direction as well as to the patient's right, about 6cm each way. There was a small amount of sloughing tissue at the superior and inferior aspects of the wounds, which we will continue to monitor. Patient tolerated  "procedure well.    Electronically signed by Adwoa Rogel PA-C at 02/17/23 1541     Gideon Hope MD at 02/17/23 1058          Daily progress note    Primary care physician  Dr. Delgado    Chief complaint  Doing same with no new complaints and tolerating liquid diet    History of present illness  62-year-old female with history of hypertension presented to [facility] emergency room with left lower quadrant abdominal pain for last 1 month.  Patient also have occasional loose stools.  Patient denies any nausea vomiting.  Patient recently diagnosed with colitis and treated with oral antibiotics without any improvement.  Patient has noticed blood in the stools.  Patient evaluated in ER with CT abdominal pelvis which shows worsening colitis and failed outpatient treatment admit for management.  Patient has no fever chills chest pain shortness of breath with sweats weight loss or weight gain.     REVIEW OF SYSTEMS  Unremarkable except weakness     PHYSICAL EXAM  Blood pressure 138/87, pulse 95, temperature 98.6 °F (37 °C), temperature source Oral, resp. rate 18, height 154.9 cm (61\"), weight 75.7 kg (166 lb 14.2 oz), SpO2 98 %.    GENERAL:  Awake and alert in no acute distress  HEENT:  Unremarkable  NECK:  Supple  CV: regular rhythm, normal rate  RESPIRATORY: normal effort, clear to auscultation bilaterally  ABDOMEN: soft, left lower quadrant tenderness colostomy in place  MUSCULOSKELETAL: no deformity  NEURO: alert, moves all extremities, follows commands  PSYCH:  calm, cooperative  SKIN: warm, dry     LAB RESULTS  Lab Results (last 24 hours)     Procedure Component Value Units Date/Time    Body Fluid Culture - Drainage, Peritoneum [[id number]] Collected: 02/14/23 1154    Specimen: Drainage from Peritoneum Updated: 02/17/23 1057     Body Fluid Culture No growth at 3 days     Gram Stain Moderate (3+) WBCs per low power field      No organisms seen    Anaerobic Culture - Drainage, Peritoneum [[id number]]  (Normal) " Collected: 02/14/23 1154    Specimen: Drainage from Peritoneum Updated: 02/17/23 0703     Anaerobic Culture No anaerobes isolated at 3 days    Manual Differential [[id number]]  (Abnormal) Collected: 02/17/23 0308    Specimen: Blood Updated: 02/17/23 0435     Neutrophil % 79.0 %      Lymphocyte % 8.0 %      Monocyte % 9.0 %      Basophil % 1.0 %      Myelocyte % 3.0 %      Neutrophils Absolute 15.57 10*3/mm3      Lymphocytes Absolute 1.58 10*3/mm3      Monocytes Absolute 1.77 10*3/mm3      Basophils Absolute 0.20 10*3/mm3      Hypochromia Slight/1+     Microcytes Slight/1+     Polychromasia Slight/1+     WBC Morphology Normal     Platelet Morphology Normal    CBC & Differential [[id number]]  (Abnormal) Collected: 02/17/23 0308    Specimen: Blood Updated: 02/17/23 0419    Narrative:      The following orders were created for panel order CBC & Differential.  Procedure                               Abnormality         Status                     ---------                               -----------         ------                     CBC Auto Differential[[id number]]        Abnormal            Final result                 Please view results for these tests on the individual orders.    CBC Auto Differential [[id number]]  (Abnormal) Collected: 02/17/23 0308    Specimen: Blood Updated: 02/17/23 0419     WBC 19.71 10*3/mm3      RBC 3.18 10*6/mm3      Hemoglobin 9.0 g/dL      Hematocrit 27.0 %      MCV 84.9 fL      MCH 28.3 pg      MCHC 33.3 g/dL      RDW 14.2 %      RDW-SD 43.8 fl      MPV 9.5 fL      Platelets 493 10*3/mm3      nRBC 0.0 /100 WBC     Comprehensive Metabolic Panel [[id number]]  (Abnormal) Collected: 02/17/23 0308    Specimen: Blood Updated: 02/17/23 0417     Glucose 177 mg/dL      BUN 11 mg/dL      Creatinine 0.45 mg/dL      Sodium 133 mmol/L      Potassium 4.3 mmol/L      Chloride 104 mmol/L      CO2 23.1 mmol/L      Calcium 8.1 mg/dL      Total Protein 6.2 g/dL      Albumin 2.2 g/dL      ALT (SGPT) 6 U/L      AST  (SGOT) 12 U/L      Alkaline Phosphatase 59 U/L      Total Bilirubin 0.3 mg/dL      Globulin 4.0 gm/dL      A/G Ratio 0.6 g/dL      BUN/Creatinine Ratio 24.4     Anion Gap 5.9 mmol/L      eGFR 108.3 mL/min/1.73     Narrative:      GFR Normal >60  Chronic Kidney Disease <60  Kidney Failure <15      Phosphorus [[id number]]  (Normal) Collected: 02/17/23 0308    Specimen: Blood Updated: 02/17/23 0417     Phosphorus 2.7 mg/dL     Magnesium [[id number]]  (Normal) Collected: 02/17/23 0308    Specimen: Blood Updated: 02/17/23 0417     Magnesium 1.6 mg/dL     POC Glucose Once [[id number]]  (Abnormal) Collected: 02/17/23 0015    Specimen: Blood Updated: 02/17/23 0017     Glucose 203 mg/dL      Comment: Meter: [id number] : [id number] Han Kuo CNA       POC Glucose Once [[id number]]  (Abnormal) Collected: 02/16/23 1621    Specimen: Blood Updated: 02/16/23 1622     Glucose 171 mg/dL      Comment: Meter: [id number] : [id number] Ohara Aniah NA       POC Glucose Once [[id number]]  (Abnormal) Collected: 02/16/23 1105    Specimen: Blood Updated: 02/16/23 1107     Glucose 191 mg/dL      Comment: Meter: [id number] : [id number] Ohara Aniah NA           Imaging Results (Last 24 Hours)     ** No results found for the last 24 hours. **        Upper and lower endoscopy results noted and discussed with patient    Current Facility-Administered Medications:   •  acetaminophen (TYLENOL) tablet 650 mg, 650 mg, Oral, Q6H PRN, Adwoa Rogel PA-C, 650 mg at 02/17/23 0638  •  Adult Central 2-in-1 TPN, , Intravenous, Continuous, Lino Gaston MD, Last Rate: 75 mL/hr at 02/16/23 1830, New Bag at 02/16/23 1830  •  amLODIPine (NORVASC) tablet 10 mg, 10 mg, Oral, Q24H, Gideon Hope MD, 10 mg at 02/15/23 0930  •  cefTRIAXone (ROCEPHIN) 2 g in sodium chloride 0.9 % 100 mL IVPB-VTB, 2 g, Intravenous, Q24H, Beck Wei Jr., MD, Last Rate: 200 mL/hr at 02/16/23 1400, 2 g at 02/16/23 1400  •  dextrose (D50W) (25 g/50 mL) IV injection  25 g, 25 g, Intravenous, Q15 Min PRN, Bekc Wei Jr., MD  •  dextrose (GLUTOSE) oral gel 15 g, 15 g, Oral, Q15 Min PRN, Beck Wei Jr., MD  •  famotidine (PEPCID) injection 20 mg, 20 mg, Intravenous, BID Josiah NICHOLS Brittany A, PA-C, 20 mg at 23 1030  •  Fat Emulsion Plant Based (INTRALIPID,LIPOSYN) 20 % infusion 20 g, 100 mL, Intravenous, Q24H (TPN), Lino Gaston MD, Last Rate: 8.33 mL/hr at 23 1829, 20 g at 23 1829  •  fluconazole (DIFLUCAN) IVPB 400 mg, 400 mg, Intravenous, Q24H, Galdino Esteves MD, Last Rate: 100 mL/hr at 23 1543, 400 mg at 23 1543  •  glucagon (GLUCAGEN) injection 1 mg, 1 mg, Intramuscular, Q15 Min PRN, Beck Wei Jr., MD  •  hydrALAZINE (APRESOLINE) injection 10 mg, 10 mg, Intravenous, Q4H PRN, Gideon Hope MD, 10 mg at 23 1454  •  insulin glargine (LANTUS, SEMGLEE) injection 10 Units, 10 Units, Subcutaneous, Q12H, Gideon Hope MD, 10 Units at 23 0846  •  insulin regular (humuLIN R,novoLIN R) injection 0-7 Units, 0-7 Units, Subcutaneous, Q6H, Beck Wei Jr., MD, 2 Units at 23 0604  •  metoclopramide (REGLAN) injection 10 mg, 10 mg, Intravenous, Q6H, Lino Gaston MD, 10 mg at 23 0604  •  metroNIDAZOLE (FLAGYL) IVPB 500 mg, 500 mg, Intravenous, Q8H, Galdino Esteves MD, 500 mg at 23 0604  •  [] HYDROmorphone (DILAUDID) injection 0.25 mg, 0.25 mg, Intravenous, Q2H PRN, 0.25 mg at 23 0015 **AND** naloxone (NARCAN) injection 0.4 mg, 0.4 mg, Intravenous, Q5 Min PRN, Lino Gaston MD  •  nitroglycerin (NITROSTAT) SL tablet 0.4 mg, 0.4 mg, Sublingual, Q5 Min PRN, Lino Gaston MD  •  ondansetron (ZOFRAN) injection 4 mg, 4 mg, Intravenous, Q6H PRN, Lino Gaston MD, 4 mg at 23 0041  •  Pharmacy to Dose TPN, , Does not apply, Continuous PRN, Lilibeth Rahman PA-C  •  potassium chloride 10 mEq in 100 mL IVPB, 10 mEq, Intravenous, Q1H PRN, Matthew Soliz MD, Last Rate: 100 mL/hr at  02/11/23 2009, 10 mEq at 02/11/23 2009  •  potassium phosphate 45 mmol in sodium chloride 0.9 % 500 mL infusion, 45 mmol, Intravenous, PRN **OR** potassium phosphate 30 mmol in sodium chloride 0.9 % 250 mL infusion, 30 mmol, Intravenous, PRN **OR** potassium phosphate 15 mmol in sodium chloride 0.9 % 100 mL infusion, 15 mmol, Intravenous, PRN, 15 mmol at 02/12/23 1440 **OR** sodium phosphates 40 mmol in sodium chloride 0.9 % 500 mL IVPB, 40 mmol, Intravenous, PRN **OR** sodium phosphates 20 mmol in sodium chloride 0.9 % 250 mL IVPB, 20 mmol, Intravenous, PRN, Matthew Soliz MD  •  sodium chloride 0.9 % flush 10 mL, 10 mL, Intravenous, Q12H, Quick, Lilibeth A, PA-C, 10 mL at 02/16/23 2208  •  sodium chloride 0.9 % flush 10 mL, 10 mL, Intravenous, Q12H, Quick, Lilibeth A, PA-C, 10 mL at 02/17/23 1036  •  sodium chloride 0.9 % flush 10 mL, 10 mL, Intravenous, PRN, Quick, Lilibeth A, PA-C  •  sodium chloride 0.9 % flush 20 mL, 20 mL, Intravenous, PRN, Quick, Lilibeth A, PA-C  •  sodium chloride 0.9 % infusion 40 mL, 40 mL, Intravenous, PRN, Quick, Lilibeth A, PA-C     ASSESSMENT  Acute colitis with sigmoid stricture and microperforation s/p colon resection and colostomy  Abdominal fluid collection/abscess status post CT-guided drainage  Postop ileus  Acute kidney injury resolved  Hypertension   Gastroesophageal reflux disease    PLAN  CPM  Postop care  TPN  Clear liquid diet  IV antibiotics per infectious disease  Accu-Chek with low-dose sliding scale insulin  Blood pressure medications adjusted  Pain management  Continue home medications  Stress ulcer DVT prophylaxis  Supportive care  PT OT  Discussed with family nursing staff   Follow closely and further recommendation according to hospital course    SANG HOPE MD    Copied text in this note has been reviewed and is accurate as of 02/17/23        Electronically signed by Sang Hope MD at 02/17/23 1053     Galdino Esteves MD at 02/17/23 1052             Infectious Diseases Progress Note    Galdino Esteves MD     [facility]  Los: 21 days  Patient Identification:  Name: Hali Tejeda  Age: 62 y.o.  Sex: female  :  1960  MRN: [id number]         Primary Care Physician: Richard Delgado MD        Subjective: Feeling slightly better denies any fever and chills.  Nausea is still there but not as bad.  Able to tolerate oral intake.  Interval History: Underwent placement of wound VAC on her abdominal wound/incision.  TPN is started.  CT scan of the abdomen and pelvis performed on 2022 showed interval decrease in the size of the fluid collection in the left paracolic gutter as well as an area in the left lower quadrant with ill-defined subjacent stranding and tiny foci of gas slightly more conspicuous compared to prior imaging studies with no drainable focal fluid collection or definitive extraluminal enteric contrast noted.  Objective:    Scheduled Meds:amLODIPine, 10 mg, Oral, Q24H  cefTRIAXone, 2 g, Intravenous, Q24H  famotidine, 20 mg, Intravenous, BID AC  Fat Emulsion Plant Based, 100 mL, Intravenous, Q24H (TPN)  fluconazole, 400 mg, Intravenous, Q24H  insulin glargine, 10 Units, Subcutaneous, Q12H  insulin regular, 0-7 Units, Subcutaneous, Q6H  metoclopramide, 10 mg, Intravenous, Q6H  metroNIDAZOLE, 500 mg, Intravenous, Q8H  sodium chloride, 10 mL, Intravenous, Q12H  sodium chloride, 10 mL, Intravenous, Q12H      Continuous Infusions:Adult Central 2-in-1 TPN, , Last Rate: 75 mL/hr at 23 1830  Pharmacy to Dose TPN,         Vital signs in last 24 hours:  Temp:  [97.9 °F (36.6 °C)-99.4 °F (37.4 °C)] 98.6 °F (37 °C)  Heart Rate:  [86-95] 95  Resp:  [16-18] 18  BP: (123-153)/(73-87) 138/87    Intake/Output:    Intake/Output Summary (Last 24 hours) at 2023 1054  Last data filed at 2023 1027  Gross per 24 hour   Intake 23864.75 ml   Output 8 ml   Net 77499.75 ml       Exam:  /87 (BP Location: Right arm, Patient Position:  "Lying)   Pulse 95   Temp 98.6 °F (37 °C) (Oral)   Resp 18   Ht 154.9 cm (61\")   Wt 75.7 kg (166 lb 14.2 oz)   SpO2 98%   BMI 31.53 kg/m²   Patient is examined using the personal protective equipment as per guidelines from infection control for this particular patient as enacted.  Hand washing was performed before and after patient interaction.  General Appearance: Comfortable appearing female who does not appear toxic at this time.                          Head:    Normocephalic, without obvious abnormality, atraumatic                           Eyes:    PERRL, conjunctivae/corneas clear, EOM's intact, both eyes                         Throat:   Lips, tongue, gums normal; oral mucosa pink and moist                           Neck:   Supple, symmetrical, trachea midline, no JVD                         Lungs:    Clear to auscultation bilaterally, respirations unlabored                 Chest Wall:    No tenderness or deformity                          Heart:  S1-S2 regular                  Abdomen:   Ostomy present with midline incision has wound VAC in place with no surrounding cellulitis.  Drain in place.                 Extremities:   Extremities normal, atraumatic, no cyanosis or edema                        Pulses:   Pulses palpable in all extremities                            Skin:   Skin is warm and dry,  no rashes or palpable lesions                  Neurologic: Grossly nonfocal       Data Review:    I reviewed the patient's new clinical results.  Results from last 7 days   Lab Units 02/17/23  0308 02/16/23  0341 02/15/23  0422 02/14/23  0238 02/13/23  0321 02/12/23  0558 02/11/23  0506   WBC 10*3/mm3 19.71* 20.90* 23.70* 25.66* 28.34* 26.57* 25.18*   HEMOGLOBIN g/dL 9.0* 9.5* 9.0* 9.2* 9.7* 8.5* 8.7*   PLATELETS 10*3/mm3 493* 514* 502* 500* 479* 416 411     Results from last 7 days   Lab Units 02/17/23  0308 02/16/23  0341 02/15/23  0422 02/14/23  0238 02/13/23  0321 02/12/23  0817 02/12/23  0748 " 02/12/23  0558 02/11/23  0506   SODIUM mmol/L 133* 133* 132* 134* 132* 135*  --  126* 141   POTASSIUM mmol/L 4.3 4.3 4.4 4.0 4.0 3.7 3.7 7.5* 2.8*   CHLORIDE mmol/L 104 101 104 100 99  --   --  96* 106   CO2 mmol/L 23.1 23.0 24.7 24.8 28.4  --   --  26.0 30.0*   BUN mg/dL 11 11 13 8 7*  --   --  4* 3*   CREATININE mg/dL 0.45* 0.48* 0.42* 0.37* 0.37*  --   --  0.41* 0.37*   CALCIUM mg/dL 8.1* 8.1* 8.3* 8.2* 8.5*  --   --  8.4* 7.8*   GLUCOSE mg/dL 177* 184* 173* 150* 138*  --   --  763* 158*     Microbiology Results (last 10 days)     Procedure Component Value - Date/Time    Body Fluid Culture - Drainage, Peritoneum [[id number]] Collected: 02/14/23 1154    Lab Status: Preliminary result Specimen: Drainage from Peritoneum Updated: 02/16/23 1027     Body Fluid Culture No growth at 2 days     Gram Stain Moderate (3+) WBCs per low power field      No organisms seen    Anaerobic Culture - Drainage, Peritoneum [[id number]]  (Normal) Collected: 02/14/23 1154    Lab Status: Preliminary result Specimen: Drainage from Peritoneum Updated: 02/17/23 0703     Anaerobic Culture No anaerobes isolated at 3 days    Clostridioides difficile Toxin - Stool, Per Stoma [[id number]]  (Normal) Collected: 02/10/23 1243    Lab Status: Final result Specimen: Stool from Per Stoma Updated: 02/10/23 1341    Narrative:      The following orders were created for panel order Clostridioides difficile Toxin - Stool, Per Stoma.  Procedure                               Abnormality         Status                     ---------                               -----------         ------                     Clostridioides difficile...[[id number]]  Normal              Final result                 Please view results for these tests on the individual orders.    Clostridioides difficile Toxin, PCR - Stool, Per Stoma [[id number]]  (Normal) Collected: 02/10/23 1243    Lab Status: Final result Specimen: Stool from Per Stoma Updated: 02/10/23 1341     C. Difficile Toxins  by PCR Negative    Narrative:      The result indicates the absence of toxigenic C. difficile from stool specimen.     Body Fluid Culture - Drainage, Peritoneum [[id number]]  (Abnormal)  (Susceptibility) Collected: 02/09/23 1458    Lab Status: Final result Specimen: Drainage from Peritoneum Updated: 02/11/23 0851     Body Fluid Culture Scant growth (1+) Escherichia coli     Gram Stain Few (2+) WBCs per low power field      No organisms seen    Susceptibility      Escherichia coli      RENETTA      Amikacin Susceptible      Ampicillin Resistant     Ampicillin + Sulbactam Resistant     Cefepime Susceptible      Ceftazidime Susceptible      Ceftriaxone Susceptible      Gentamicin Resistant     Levofloxacin Susceptible      Piperacillin + Tazobactam Resistant     Tobramycin Intermediate      Trimethoprim + Sulfamethoxazole Resistant                      Susceptibility Comments     Escherichia coli    Cefazolin sensitivity will not be reported for Enterobacteriaceae in non-urine isolates. If cefazolin is preferred, please call the microbiology lab to request an E-test.  With the exception of urinary-sourced infections, aminoglycosides should not be used as monotherapy.                     Assessment:    Colitis    Left sided colitis with complication (HCC)    Nausea and vomiting    Stricture of sigmoid colon (HCC)  1-intra-abdominal sepsis with intra-abdominal abscess in the setting of prolonged colitis, sigmoid stricture, endoscopic injury to colonic wall with microperforation and subsequent laparotomy partial colectomy and colostomy and intraoperative injury to the spleen with repair while being on antibiotic therapy-likely pathogen to consider in this situation would be enteric amberly along with selection of yeast due to prolonged antibiotic therapy-status post percutaneous drain placement on 2/9/2023  2-hypertension  3-malnourishment  4-possible postop abdominal wall/incisional infection/evolving  abscess        Recommendations/Discussions:  Continue with fluconazole ceftriaxone and Flagyl  · Follow-up on the repeat CT scan of the abdomen and pelvis with intervention plans per colorectal surgery service.  · Monitor closely for complications of antibiotics including C. difficile infection but low threshold to repeat colostomy content for C. difficile toxin assay as her last testing on 2/10/2023 was negative.  · Fluconazole restarted as it was also stopped on 2/13/2023.  Galdino Esteves MD  2/17/2023  10:54 EST    Parts of this note may be an electronic transcription/translation of spoken language to printed text using the Dragon dictation system.      Electronically signed by Galdino Esteves MD at 02/17/23 1334     Adwoa Rogel PA-C at 02/17/23 0818     Attestation signed by Beck Wei Jr., MD at 02/17/23 1510    I have reviewed this documentation and agree.                  Colorectal Surgery Progress Note    POD 14    S: Positive ostomy output.  Positive ambulating.  Pain level unchanged.  Tolerating full liquid diet with intermittent nausea, but no vomiting.    O:  Temp:  [97.9 °F (36.6 °C)-99.4 °F (37.4 °C)] 98.6 °F (37 °C)  Heart Rate:  [86-95] 95  Resp:  [16-18] 18  BP: (123-153)/(73-87) 138/87    Intake/Output Summary (Last 24 hours) at 2/17/2023 0819  Last data filed at 2/17/2023 0431  Gross per 24 hour   Intake 95733.75 ml   Output 50 ml   Net 57714.75 ml     Abd:   soft, non distended. Wound vac in place. Drain serosanguinous, about 10cc in bulb.  Incisions: clean, dry, intact, no erythema    Results from last 7 days   Lab Units 02/17/23  0308   WBC 10*3/mm3 19.71*   HEMOGLOBIN g/dL 9.0*   HEMATOCRIT % 27.0*   PLATELETS 10*3/mm3 493*     Results from last 7 days   Lab Units 02/17/23  0308   SODIUM mmol/L 133*   POTASSIUM mmol/L 4.3   CHLORIDE mmol/L 104   CO2 mmol/L 23.1   BUN mg/dL 11   CREATININE mg/dL 0.45*   EGFR mL/min/1.73 108.3   GLUCOSE mg/dL 177*   CALCIUM mg/dL 8.1*   PHOSPHORUS mg/dL  2.7     Results from last 7 days   Lab Units 02/17/23  0308   MAGNESIUM mg/dL 1.6     A/P: 62 y.o. female s/p robotic assisted laparoscopic left and sigmoid resection with colostomy converted open due to splenic injury 02/03/2023  • Ok to advance to regular diet as tolerated. Can go back to full liquids if nausea increases.  • Repeat CT today to assess abdominal fluid collections.  • Body fluid culture from 2/14 final results pending. Appreciate infectious disease input.  • Continue wound vac. I plan to meet FRANNY Miles, today to examine midline abdominal wound.       Electronically signed by Beck Wei Jr., MD at 02/17/23 1510          Consult Notes (last 72 hours)      Theresa Gtz at 02/18/23 1120      Consult Orders    1. Inpatient Spiritual Care Consult [[id number]] ordered by Gideon Hope MD at 02/17/23 1250             Consult for Spiritual Care. Patient has strong masha, Christianity and family support. She welcomed conversation and prayer.    Electronically signed by Theresa Gtz at 02/18/23 0217

## 2023-02-20 NOTE — PLAN OF CARE
Goal Outcome Evaluation:  Plan of Care Reviewed With: patient        Progress: improving  Outcome Evaluation: Pt AOx4. Up with sba, took 2 laps aound nurse's station. Colorectal surgery removed NEW drain this morning. Pt expected to d/c tomorrow. All plans arranged. BSC and walker in room for pt to go home with. VSS. Safety maintained.

## 2023-02-20 NOTE — PLAN OF CARE
Goal Outcome Evaluation:              Outcome Evaluation: PT VSS, colostomy with good OP, NEW drain w/ scant amt of serosanguinous fluid and flushed, pt c/o pain x1 with tylenol giving relief, pt remains of RA, safety maintained, will CTM cl

## 2023-02-20 NOTE — CASE MANAGEMENT/SOCIAL WORK
Continued Stay Note  [facility]     Patient Name: Hali Tejeda  MRN: [id number]  Today's Date: 2/20/2023    Admit Date: 1/27/2023    Plan: Return home with [facility], DME from [facility] and wound vac from [facility]   Discharge Plan     Row Name 02/20/23 1333       Plan    Plan Return home with [facility], DME from [facility] and wound vac from [facility]    Patient/Family in Agreement with Plan yes    Plan Comments CCP met with patient at bedside to discuss discharge planning. Patient has been accepted by [facility] and [facility] has delivered a bedside commode and walker. Patient requests a hospital bed at discharge. Notified [facility] who has reviewed chart and states patient will not qualify for a hospital bed but patient could rent one via private pay. Discussed with patient at bedside who declines rental. [facility] has delivered a wound vac. Discussed with nursing and patient to request wound nurse place home vac today. Patient confirms she has received teaching for colostomy care. Shameka BECK RN/CCP               Discharge Codes    No documentation.               Expected Discharge Date and Time     Expected Discharge Date Expected Discharge Time    Feb 20, 2023             Adwoa Segura

## 2023-02-21 ENCOUNTER — HOME HEALTH ADMISSION (OUTPATIENT)
Dept: HOME HEALTH SERVICES | Facility: HOME HEALTHCARE | Age: 63
End: 2023-02-21
Payer: COMMERCIAL

## 2023-02-21 ENCOUNTER — TRANSCRIBE ORDERS (OUTPATIENT)
Dept: HOME HEALTH SERVICES | Facility: HOME HEALTHCARE | Age: 63
End: 2023-02-21
Payer: COMMERCIAL

## 2023-02-21 ENCOUNTER — READMISSION MANAGEMENT (OUTPATIENT)
Dept: CALL CENTER | Facility: HOSPITAL | Age: 63
End: 2023-02-21
Payer: COMMERCIAL

## 2023-02-21 VITALS
SYSTOLIC BLOOD PRESSURE: 112 MMHG | HEIGHT: 61 IN | DIASTOLIC BLOOD PRESSURE: 75 MMHG | HEART RATE: 86 BPM | RESPIRATION RATE: 18 BRPM | OXYGEN SATURATION: 100 % | WEIGHT: 161.6 LBS | TEMPERATURE: 98.4 F | BODY MASS INDEX: 30.51 KG/M2

## 2023-02-21 DIAGNOSIS — Z46.89 ENCOUNTER FOR MANAGEMENT OF WOUND VAC: ICD-10-CM

## 2023-02-21 DIAGNOSIS — Z43.3 COLOSTOMY CARE: Primary | ICD-10-CM

## 2023-02-21 LAB
ANION GAP SERPL CALCULATED.3IONS-SCNC: 7 MMOL/L (ref 5–15)
BASOPHILS # BLD AUTO: 0.05 10*3/MM3 (ref 0–0.2)
BASOPHILS NFR BLD AUTO: 0.4 % (ref 0–1.5)
BUN SERPL-MCNC: 6 MG/DL (ref 8–23)
BUN/CREAT SERPL: 12.2 (ref 7–25)
CALCIUM SPEC-SCNC: 8.5 MG/DL (ref 8.6–10.5)
CHLORIDE SERPL-SCNC: 104 MMOL/L (ref 98–107)
CO2 SERPL-SCNC: 25 MMOL/L (ref 22–29)
CREAT SERPL-MCNC: 0.49 MG/DL (ref 0.57–1)
DEPRECATED RDW RBC AUTO: 46.7 FL (ref 37–54)
EGFRCR SERPLBLD CKD-EPI 2021: 106.1 ML/MIN/1.73
EOSINOPHIL # BLD AUTO: 0.15 10*3/MM3 (ref 0–0.4)
EOSINOPHIL NFR BLD AUTO: 1.2 % (ref 0.3–6.2)
ERYTHROCYTE [DISTWIDTH] IN BLOOD BY AUTOMATED COUNT: 14.7 % (ref 12.3–15.4)
GLUCOSE SERPL-MCNC: 85 MG/DL (ref 65–99)
HCT VFR BLD AUTO: 29.4 % (ref 34–46.6)
HGB BLD-MCNC: 9.7 G/DL (ref 12–15.9)
IMM GRANULOCYTES # BLD AUTO: 0.39 10*3/MM3 (ref 0–0.05)
IMM GRANULOCYTES NFR BLD AUTO: 3.2 % (ref 0–0.5)
LYMPHOCYTES # BLD AUTO: 2.15 10*3/MM3 (ref 0.7–3.1)
LYMPHOCYTES NFR BLD AUTO: 17.4 % (ref 19.6–45.3)
MCH RBC QN AUTO: 28.5 PG (ref 26.6–33)
MCHC RBC AUTO-ENTMCNC: 33 G/DL (ref 31.5–35.7)
MCV RBC AUTO: 86.5 FL (ref 79–97)
MONOCYTES # BLD AUTO: 1.46 10*3/MM3 (ref 0.1–0.9)
MONOCYTES NFR BLD AUTO: 11.8 % (ref 5–12)
NEUTROPHILS NFR BLD AUTO: 66 % (ref 42.7–76)
NEUTROPHILS NFR BLD AUTO: 8.17 10*3/MM3 (ref 1.7–7)
NRBC BLD AUTO-RTO: 0.1 /100 WBC (ref 0–0.2)
PLATELET # BLD AUTO: 402 10*3/MM3 (ref 140–450)
PMV BLD AUTO: 9.6 FL (ref 6–12)
POTASSIUM SERPL-SCNC: 3.7 MMOL/L (ref 3.5–5.2)
RBC # BLD AUTO: 3.4 10*6/MM3 (ref 3.77–5.28)
SODIUM SERPL-SCNC: 136 MMOL/L (ref 136–145)
WBC NRBC COR # BLD: 12.37 10*3/MM3 (ref 3.4–10.8)

## 2023-02-21 PROCEDURE — 85025 COMPLETE CBC W/AUTO DIFF WBC: CPT | Performed by: HOSPITALIST

## 2023-02-21 PROCEDURE — 99024 POSTOP FOLLOW-UP VISIT: CPT | Performed by: PHYSICIAN ASSISTANT

## 2023-02-21 PROCEDURE — 80048 BASIC METABOLIC PNL TOTAL CA: CPT | Performed by: HOSPITALIST

## 2023-02-21 PROCEDURE — 25010000002 CEFTRIAXONE PER 250 MG: Performed by: INTERNAL MEDICINE

## 2023-02-21 RX ORDER — AMLODIPINE BESYLATE 10 MG/1
10 TABLET ORAL
Qty: 30 TABLET | Refills: 0 | Status: SHIPPED | OUTPATIENT
Start: 2023-02-22 | End: 2023-03-24

## 2023-02-21 RX ORDER — CEFDINIR 300 MG/1
300 CAPSULE ORAL EVERY 12 HOURS SCHEDULED
Status: DISCONTINUED | OUTPATIENT
Start: 2023-02-21 | End: 2023-02-21 | Stop reason: HOSPADM

## 2023-02-21 RX ORDER — CEFDINIR 300 MG/1
300 CAPSULE ORAL EVERY 12 HOURS SCHEDULED
Qty: 14 CAPSULE | Refills: 0 | Status: SHIPPED | OUTPATIENT
Start: 2023-02-21 | End: 2023-02-28

## 2023-02-21 RX ORDER — METRONIDAZOLE 500 MG/1
500 TABLET ORAL EVERY 8 HOURS SCHEDULED
Status: DISCONTINUED | OUTPATIENT
Start: 2023-02-21 | End: 2023-02-21 | Stop reason: HOSPADM

## 2023-02-21 RX ORDER — FLUCONAZOLE 200 MG/1
200 TABLET ORAL EVERY 24 HOURS
Status: DISCONTINUED | OUTPATIENT
Start: 2023-02-21 | End: 2023-02-21 | Stop reason: HOSPADM

## 2023-02-21 RX ORDER — METRONIDAZOLE 500 MG/1
500 TABLET ORAL EVERY 8 HOURS SCHEDULED
Qty: 21 TABLET | Refills: 0 | Status: SHIPPED | OUTPATIENT
Start: 2023-02-21 | End: 2023-02-28

## 2023-02-21 RX ORDER — FAMOTIDINE 20 MG/1
20 TABLET, FILM COATED ORAL 2 TIMES DAILY
Qty: 60 TABLET | Refills: 0 | Status: SHIPPED | OUTPATIENT
Start: 2023-02-21 | End: 2023-03-23

## 2023-02-21 RX ORDER — FLUCONAZOLE 200 MG/1
200 TABLET ORAL EVERY 24 HOURS
Qty: 7 TABLET | Refills: 0 | Status: SHIPPED | OUTPATIENT
Start: 2023-02-21 | End: 2023-02-28

## 2023-02-21 RX ADMIN — METRONIDAZOLE 500 MG: 500 INJECTION, SOLUTION INTRAVENOUS at 06:36

## 2023-02-21 RX ADMIN — CEFTRIAXONE SODIUM 2 G: 2 INJECTION, POWDER, FOR SOLUTION INTRAMUSCULAR; INTRAVENOUS at 11:46

## 2023-02-21 RX ADMIN — METRONIDAZOLE 500 MG: 500 TABLET, FILM COATED ORAL at 16:41

## 2023-02-21 RX ADMIN — FLUCONAZOLE 200 MG: 200 TABLET ORAL at 16:42

## 2023-02-21 RX ADMIN — AMLODIPINE BESYLATE 10 MG: 10 TABLET ORAL at 08:25

## 2023-02-21 RX ADMIN — FAMOTIDINE 20 MG: 20 TABLET, FILM COATED ORAL at 08:25

## 2023-02-21 NOTE — PROGRESS NOTES
Delayed charting  Infectious Diseases Progress Note    Galdino Esteves MD     [facility]  Patient Identification:  Name: Hali Tejeda  Age: 62 y.o.  Sex: female  :  1960  MRN: [id number]         Primary Care Physician: Richard Delgado MD        Subjective: Had an uneventful night still feeling better.  Tolerating diet.  Interval History: Underwent placement of wound VAC on her abdominal wound/incision.  TPN is started.  CT scan of the abdomen and pelvis performed on 2022 showed interval decrease in the size of the fluid collection in the left paracolic gutter as well as an area in the left lower quadrant with ill-defined subjacent stranding and tiny foci of gas slightly more conspicuous compared to prior imaging studies with no drainable focal fluid collection or definitive extraluminal enteric contrast noted.  CT scan of the abdomen and pelvis on 2023 reveals an area of fluid collection in the abdominal wall near the stoma.  Objective:    Scheduled Meds:amLODIPine, 10 mg, Oral, Q24H  cefTRIAXone, 2 g, Intravenous, Q24H  famotidine, 20 mg, Oral, BID  fluconazole, 400 mg, Intravenous, Q24H  metroNIDAZOLE, 500 mg, Intravenous, Q8H        Exam:  Temperature 98.9 pulse 84 respiratory rate 16 blood pressure 133/90  Patient is examined using the personal protective equipment as per guidelines from infection control for this particular patient as enacted.  Hand washing was performed before and after patient interaction.  General Appearance: Comfortable appearing female who does not appear toxic at this time.                          Head:    Normocephalic, without obvious abnormality, atraumatic                           Eyes:    PERRL, conjunctivae/corneas clear, EOM's intact, both eyes                         Throat:   Lips, tongue, gums normal; oral mucosa pink and moist                           Neck:   Supple, symmetrical, trachea midline, no JVD                         Lungs:    Clear  to auscultation bilaterally, respirations unlabored                 Chest Wall:    No tenderness or deformity                          Heart:  S1-S2 regular                  Abdomen:   Ostomy present with midline incision has wound VAC in place with no surrounding cellulitis.  Drain in place.  No significant tenderness in the peristomal area noted.                 Extremities:   Extremities normal, atraumatic, no cyanosis or edema                        Pulses:   Pulses palpable in all extremities                            Skin:   Skin is warm and dry,  no rashes or palpable lesions                  Neurologic: Grossly nonfocal       Data Review:    I reviewed the patient's new clinical results.  Results from last 7 days   Lab Units 02/20/23  0338 02/19/23  0417 02/18/23  0328 02/17/23  0308 02/16/23  0341 02/15/23  0422   WBC 10*3/mm3 13.51* 14.36* 18.10* 19.71* 20.90* 23.70*   HEMOGLOBIN g/dL 9.4* 9.3* 9.1* 9.0* 9.5* 9.0*   PLATELETS 10*3/mm3 488* 464* 531* 493* 514* 502*     Results from last 7 days   Lab Units 02/19/23  0417 02/18/23  0328 02/17/23  0308 02/16/23  0341 02/15/23  0422   SODIUM mmol/L 132* 135* 133* 133* 132*   POTASSIUM mmol/L 3.8 4.1 4.3 4.3 4.4   CHLORIDE mmol/L 101 102 104 101 104   CO2 mmol/L 23.0 24.0 23.1 23.0 24.7   BUN mg/dL 13 12 11 11 13   CREATININE mg/dL 0.51* 0.43* 0.45* 0.48* 0.42*   CALCIUM mg/dL 8.4* 8.6 8.1* 8.1* 8.3*   GLUCOSE mg/dL 95 150* 177* 184* 173*     Microbiology Results (last 10 days)     Procedure Component Value - Date/Time    Body Fluid Culture - Drainage, Peritoneum [[id number]] Collected: 02/14/23 1154    Lab Status: Final result Specimen: Drainage from Peritoneum Updated: 02/17/23 1057     Body Fluid Culture No growth at 3 days     Gram Stain Moderate (3+) WBCs per low power field      No organisms seen    Anaerobic Culture - Drainage, Peritoneum [[id number]]  (Normal) Collected: 02/14/23 1154    Lab Status: Final result Specimen: Drainage from Peritoneum Updated:  02/19/23 0721     Anaerobic Culture No anaerobes isolated at 5 days            Assessment:    Colitis    Left sided colitis with complication (HCC)    Nausea and vomiting    Stricture of sigmoid colon (HCC)  1-intra-abdominal sepsis with intra-abdominal abscess in the setting of prolonged colitis, sigmoid stricture, endoscopic injury to colonic wall with microperforation and subsequent laparotomy partial colectomy and colostomy and intraoperative injury to the spleen with repair while being on antibiotic therapy-likely pathogen to consider in this situation would be enteric amberly along with selection of yeast due to prolonged antibiotic therapy-status post percutaneous drain placement on 2/9/2023  2-hypertension  3-malnourishment  4-possible postop abdominal wall/incisional infection/evolving abscess        Recommendations/Discussions:  · Continue with fluconazole ceftriaxone and Flagyl  · Given around abdominal wall fluid collection which may require drainage at that could explain persistent leukocytosis.    Galdino Esteves MD    Parts of this note may be an electronic transcription/translation of spoken language to printed text using the Dragon dictation system.

## 2023-02-21 NOTE — PROGRESS NOTES
"Daily progress note    Primary care physician  Dr. Delgado    Chief complaint  Doing better with no new complaint and wants to go home.      History of present illness  62-year-old female with history of hypertension presented to [facility] emergency room with left lower quadrant abdominal pain for last 1 month.  Patient also have occasional loose stools.  Patient denies any nausea vomiting.  Patient recently diagnosed with colitis and treated with oral antibiotics without any improvement.  Patient has noticed blood in the stools.  Patient evaluated in ER with CT abdominal pelvis which shows worsening colitis and failed outpatient treatment admit for management.  Patient has no fever chills chest pain shortness of breath with sweats weight loss or weight gain.     REVIEW OF SYSTEMS  Unremarkable      PHYSICAL EXAM  Blood pressure 142/89, pulse 86, temperature 98.9 °F (37.2 °C), temperature source Oral, resp. rate 18, height 154.9 cm (61\"), weight 73.3 kg (161 lb 9.6 oz), SpO2 98 %.    GENERAL:  Awake and alert in no acute distress  HEENT:  Unremarkable  NECK:  Supple  CV: regular rhythm, normal rate  RESPIRATORY: normal effort, clear to auscultation bilaterally  ABDOMEN: soft, left lower quadrant tenderness colostomy in place  MUSCULOSKELETAL: no deformity  NEURO: alert, moves all extremities, follows commands  PSYCH:  calm, cooperative  SKIN: warm, dry     LAB RESULTS  Lab Results (last 24 hours)     Procedure Component Value Units Date/Time    Basic Metabolic Panel [[id number]]  (Abnormal) Collected: 02/21/23 0459    Specimen: Blood Updated: 02/21/23 0605     Glucose 85 mg/dL      BUN 6 mg/dL      Creatinine 0.49 mg/dL      Sodium 136 mmol/L      Potassium 3.7 mmol/L      Chloride 104 mmol/L      CO2 25.0 mmol/L      Calcium 8.5 mg/dL      BUN/Creatinine Ratio 12.2     Anion Gap 7.0 mmol/L      eGFR 106.1 mL/min/1.73     Narrative:      GFR Normal >60  Chronic Kidney Disease <60  Kidney Failure <15      CBC " & Differential [[id number]]  (Abnormal) Collected: 02/21/23 0459    Specimen: Blood Updated: 02/21/23 0545    Narrative:      The following orders were created for panel order CBC & Differential.  Procedure                               Abnormality         Status                     ---------                               -----------         ------                     CBC Auto Differential[[id number]]        Abnormal            Final result                 Please view results for these tests on the individual orders.    CBC Auto Differential [[id number]]  (Abnormal) Collected: 02/21/23 0459    Specimen: Blood Updated: 02/21/23 0545     WBC 12.37 10*3/mm3      RBC 3.40 10*6/mm3      Hemoglobin 9.7 g/dL      Hematocrit 29.4 %      MCV 86.5 fL      MCH 28.5 pg      MCHC 33.0 g/dL      RDW 14.7 %      RDW-SD 46.7 fl      MPV 9.6 fL      Platelets 402 10*3/mm3      Neutrophil % 66.0 %      Lymphocyte % 17.4 %      Monocyte % 11.8 %      Eosinophil % 1.2 %      Basophil % 0.4 %      Immature Grans % 3.2 %      Neutrophils, Absolute 8.17 10*3/mm3      Lymphocytes, Absolute 2.15 10*3/mm3      Monocytes, Absolute 1.46 10*3/mm3      Eosinophils, Absolute 0.15 10*3/mm3      Basophils, Absolute 0.05 10*3/mm3      Immature Grans, Absolute 0.39 10*3/mm3      nRBC 0.1 /100 WBC     POC Glucose Once [[id number]]  (Normal) Collected: 02/20/23 2053    Specimen: Blood Updated: 02/20/23 2054     Glucose 92 mg/dL      Comment: Meter: [id number] : [id number] Han Kuo CNA       POC Glucose Once [[id number]]  (Normal) Collected: 02/20/23 1618    Specimen: Blood Updated: 02/20/23 1620     Glucose 93 mg/dL      Comment: Meter: [id number] : [id number] Siegrist Kaitlyn NA           Imaging Results (Last 24 Hours)     ** No results found for the last 24 hours. **        Upper and lower endoscopy results noted and discussed with patient    Current Facility-Administered Medications:   •  amLODIPine (NORVASC) tablet 10 mg, 10 mg, Oral,  Q24H, Sang Hope MD, 10 mg at 02/21/23 0825  •  cefdinir (OMNICEF) capsule 300 mg, 300 mg, Oral, Q12H, Sang Hope MD  •  famotidine (PEPCID) tablet 20 mg, 20 mg, Oral, BID, Sang Hope MD, 20 mg at 02/21/23 0825  •  fluconazole (DIFLUCAN) tablet 200 mg, 200 mg, Oral, Q24H, Sang Hope MD  •  metroNIDAZOLE (FLAGYL) tablet 500 mg, 500 mg, Oral, Q8H, Sang Hope MD     ASSESSMENT  Acute colitis with sigmoid stricture and microperforation s/p colon resection and colostomy  Abdominal fluid collection/abscess status post CT-guided drainage placement and removal  Postop ileus  Acute kidney injury resolved  Hypertension   Gastroesophageal reflux disease    PLAN  Discharge home on oral antibiotics for 1 more week  Discharge summary dictated    SANG HOPE MD    Copied text in this note has been reviewed and is accurate as of 02/21/23

## 2023-02-21 NOTE — DISCHARGE SUMMARY
Discharge summary    Date of admission 1/27/2023  Date of discharge 2/21/2023    Final diagnosis  Acute colitis with sigmoid stricture and microperforation s/p colon resection and colostomy  Abdominal fluid collection/abscess status post CT-guided drainage placement and removal  Postop ileus  Acute kidney injury resolved  Hypertension   Gastroesophageal reflux disease    Discharge medications    Current Facility-Administered Medications:   •  amLODIPine (NORVASC) tablet 10 mg, 10 mg, Oral, Q24H, Gideon Hope MD, 10 mg at 02/21/23 0825  •  cefdinir (OMNICEF) capsule 300 mg, 300 mg, Oral, Q12H, Gideon Hope MD  •  famotidine (PEPCID) tablet 20 mg, 20 mg, Oral, BID, Gideon Hope MD, 20 mg at 02/21/23 0825  •  fluconazole (DIFLUCAN) tablet 200 mg, 200 mg, Oral, Q24H, Gideon Hope MD  •  metroNIDAZOLE (FLAGYL) tablet 500 mg, 500 mg, Oral, Q8H, Gideon Hope MD     Consults obtained  Gastroenterology  Colorectal surgery  Nephrology  Infectious disease  Nutrition      Procedures  Upper and lower endoscopy  Laparoscopic robotic assisted left and sigmoid colectomy converted to open mobilization of the splenic flexure  CT-guided abdominal drain placement on ninth of this month and successful repositioning and exchange of drain on 14th of this month     Hospital course  62-year-old -American female with history of hypertension admitted to emergency room with left lower quadrant abdominal pain for last 1 month with loose stools.  Patient evaluated in ER and found to have colitis admit for management.  Patient admitted treated with IV fluid IV antibiotics and symptomatic treatment for pain and ruled out for C. difficile colitis and GI panel was also negative but she continued to have pain and underwent upper and lower endoscopy which revealed hemorrhoids diverticulosis and stricture of the sigmoid colon.  Patient further evaluated by colorectal surgery and recommend colectomy with patient and family agrees.   Patient underwent laparoscopic robotic assisted colectomy which is changed to open surgery and also colostomy placed.  Patient continued to have abdominal pain and also started on empiric IV antibiotics.  Patient remained fully awake and alert but then developed acute kidney injury which resolved with hydration and adjustment of her medications.  Patient repeat CT scan showed abdominal fluid which she required CT-guided drainage tube placement followed by adjustment of her drainage tube with repositioning and exchange.  Patient finally shows improvement and remained on 3 antibiotics including Rocephin Flagyl and Diflucan which she will finish at home for 1 more week.  Patient tolerating regular diet and cleared for discharge from all consultant.    Discharge diet regular    Activity as tolerated    Medication as above    Follow-up with primary doctor in 1 week and follow-up with colorectal surgery infectious disease and gastroenterology per the instructions and take medication as directed.    SANG SINGLETARY MD

## 2023-02-21 NOTE — PROGRESS NOTES
[facility] will follow post hospital. Patient agreeable to service. Contact information confirmed. Verbal order received from FATIMAH Lucero with Dr. Lino Gaston for home health care. Patient denies any current home health services.

## 2023-02-21 NOTE — PROGRESS NOTES
"  Infectious Diseases Progress Note    Galdino Esteves MD     [facility]  Los: 25 days  Patient Identification:  Name: Hali Tejeda  Age: 62 y.o.  Sex: female  :  1960  MRN: [id number]         Primary Care Physician: Richard Delgado MD        Subjective:feeling better. Tolerating oral intake.  Interval History: Underwent placement of wound VAC on her abdominal wound/incision.  TPN is started.  CT scan of the abdomen and pelvis performed on 2022 showed interval decrease in the size of the fluid collection in the left paracolic gutter as well as an area in the left lower quadrant with ill-defined subjacent stranding and tiny foci of gas slightly more conspicuous compared to prior imaging studies with no drainable focal fluid collection or definitive extraluminal enteric contrast noted.  CT scan of the abdomen and pelvis on 2023 reveals an area of fluid collection in the abdominal wall near the stoma.  Objective:    Scheduled Meds:amLODIPine, 10 mg, Oral, Q24H  cefTRIAXone, 2 g, Intravenous, Q24H  famotidine, 20 mg, Oral, BID  fluconazole, 400 mg, Intravenous, Q24H  metroNIDAZOLE, 500 mg, Intravenous, Q8H      Continuous Infusions:     Vital signs in last 24 hours:  Temp:  [98.2 °F (36.8 °C)-99 °F (37.2 °C)] 98.9 °F (37.2 °C)  Heart Rate:  [60-98] 86  Resp:  [18-20] 18  BP: (131-157)/() 142/89    Intake/Output:    Intake/Output Summary (Last 24 hours) at 2023 0755  Last data filed at 2023 0411  Gross per 24 hour   Intake 238 ml   Output 350 ml   Net -112 ml       Exam:  /89 (BP Location: Left arm, Patient Position: Lying)   Pulse 86   Temp 98.9 °F (37.2 °C) (Oral)   Resp 18   Ht 154.9 cm (61\")   Wt 73.3 kg (161 lb 9.6 oz)   SpO2 98%   BMI 30.53 kg/m²   Patient is examined using the personal protective equipment as per guidelines from infection control for this particular patient as enacted.  Hand washing was performed before and after patient " interaction.  General Appearance: Comfortable appearing female who does not appear toxic at this time.                          Head:    Normocephalic, without obvious abnormality, atraumatic                           Eyes:    PERRL, conjunctivae/corneas clear, EOM's intact, both eyes                         Throat:   Lips, tongue, gums normal; oral mucosa pink and moist                           Neck:   Supple, symmetrical, trachea midline, no JVD                         Lungs:    Clear to auscultation bilaterally, respirations unlabored                 Chest Wall:    No tenderness or deformity                          Heart:  S1-S2 regular                  Abdomen:   Ostomy present with midline incision has wound VAC in place with no surrounding cellulitis.  Drain in place.  No significant tenderness in the peristomal area noted.                 Extremities:   Extremities normal, atraumatic, no cyanosis or edema                        Pulses:   Pulses palpable in all extremities                            Skin:   Skin is warm and dry,  no rashes or palpable lesions                  Neurologic: Grossly nonfocal       Data Review:    I reviewed the patient's new clinical results.  Results from last 7 days   Lab Units 02/21/23  0459 02/20/23  0338 02/19/23 0417 02/18/23 0328 02/17/23  0308 02/16/23  0341 02/15/23  0422   WBC 10*3/mm3 12.37* 13.51* 14.36* 18.10* 19.71* 20.90* 23.70*   HEMOGLOBIN g/dL 9.7* 9.4* 9.3* 9.1* 9.0* 9.5* 9.0*   PLATELETS 10*3/mm3 402 488* 464* 531* 493* 514* 502*     Results from last 7 days   Lab Units 02/21/23  0459 02/19/23 0417 02/18/23 0328 02/17/23  0308 02/16/23  0341 02/15/23  0422   SODIUM mmol/L 136 132* 135* 133* 133* 132*   POTASSIUM mmol/L 3.7 3.8 4.1 4.3 4.3 4.4   CHLORIDE mmol/L 104 101 102 104 101 104   CO2 mmol/L 25.0 23.0 24.0 23.1 23.0 24.7   BUN mg/dL 6* 13 12 11 11 13   CREATININE mg/dL 0.49* 0.51* 0.43* 0.45* 0.48* 0.42*   CALCIUM mg/dL 8.5* 8.4* 8.6 8.1* 8.1* 8.3*    GLUCOSE mg/dL 85 95 150* 177* 184* 173*     Microbiology Results (last 10 days)     Procedure Component Value - Date/Time    Body Fluid Culture - Drainage, Peritoneum [[id number]] Collected: 02/14/23 1154    Lab Status: Final result Specimen: Drainage from Peritoneum Updated: 02/17/23 1057     Body Fluid Culture No growth at 3 days     Gram Stain Moderate (3+) WBCs per low power field      No organisms seen    Anaerobic Culture - Drainage, Peritoneum [[id number]]  (Normal) Collected: 02/14/23 1154    Lab Status: Final result Specimen: Drainage from Peritoneum Updated: 02/19/23 0721     Anaerobic Culture No anaerobes isolated at 5 days            Assessment:    Colitis    Left sided colitis with complication (HCC)    Nausea and vomiting    Stricture of sigmoid colon (HCC)  1-intra-abdominal sepsis with intra-abdominal abscess in the setting of prolonged colitis, sigmoid stricture, endoscopic injury to colonic wall with microperforation and subsequent laparotomy partial colectomy and colostomy and intraoperative injury to the spleen with repair while being on antibiotic therapy-likely pathogen to consider in this situation would be enteric amberly along with selection of yeast due to prolonged antibiotic therapy-status post percutaneous drain placement on 2/9/2023  2-hypertension  3-malnourishment  4-possible postop abdominal wall/incisional infection/evolving abscess        Recommendations/Discussions:  · Continue with fluconazole ceftriaxone and Flagyl - can be switched to oral omnicef plus flagyl and diflucan for one more week with close colorectal follow up.      Galdino Esteves MD  2/21/2023  07:55 EST    Parts of this note may be an electronic transcription/translation of spoken language to printed text using the Dragon dictation system.

## 2023-02-21 NOTE — NURSING NOTE
Wound/ostomy - patient in bed, vac dressing in place, working well at 125, ostomy appliance that was placed yesterday intact with some stool to pouch, not enough to empty, stool is pasty and thickening, is getting to a good consistency for the disposable pouches. Discussed disposable pouches samples will be requested to be sent to her home through [facility] and [employer] and she will find which pouching system works best for her. For now, while in the hospital will need to use the drainable and empty pouch into the commode and enc patient to get some more practice with this, patient has been resistant to managing hands on care. Plan to d/c home today, home vac in room, discussed with patient's nurse to switch over to home machine upon d/c. Patient has a variety of pouches from [employer] and [facility] to take home with her.

## 2023-02-21 NOTE — PLAN OF CARE
Goal Outcome Evaluation:              Outcome Evaluation: Pt VSS, up to BSC, no c/o pain, anticipate dc today, safety maintained, will CTM cl

## 2023-02-21 NOTE — PROGRESS NOTES
Progress Note    Pod 18    S: No acute events overnight. Pt resting comfortably in bed. Walked multiple times around unit yesterday. Having ostomy output that has thickened since being on a regular diet. Wound-vac for home use delivered to room yesterday. Pt denies any concerns at this time.    O:  Temp:  [98.2 °F (36.8 °C)-99 °F (37.2 °C)] 98.9 °F (37.2 °C)  Heart Rate:  [83-98] 86  Resp:  [18] 18  BP: (131-157)/() 142/89      Intake/Output Summary (Last 24 hours) at 2/21/2023 0826  Last data filed at 2/21/2023 0411  Gross per 24 hour   Intake 238 ml   Output 350 ml   Net -112 ml       Abd: soft, non-distended  Wound-vac applied to midline abdominal wound.   Ostomy: PPP with custard consistency stool.     Results from last 7 days   Lab Units 02/21/23  0459   WBC 10*3/mm3 12.37*   HEMOGLOBIN g/dL 9.7*   HEMATOCRIT % 29.4*   PLATELETS 10*3/mm3 402     Results from last 7 days   Lab Units 02/21/23  0459 02/19/23  0417 02/18/23  0328   SODIUM mmol/L 136   < > 135*   POTASSIUM mmol/L 3.7   < > 4.1   CHLORIDE mmol/L 104   < > 102   CO2 mmol/L 25.0   < > 24.0   BUN mg/dL 6*   < > 12   CREATININE mg/dL 0.49*   < > 0.43*   EGFR mL/min/1.73 106.1   < > 109.4   GLUCOSE mg/dL 85   < > 150*   CALCIUM mg/dL 8.5*   < > 8.6   PHOSPHORUS mg/dL  --   --  3.1    < > = values in this interval not displayed.       A/P: 62 y.o. female s/p robotic assisted laparoscopic left and sigmoid resection with colostomy converted open due to splenic injury 02/03/2023     - S/P LLQ IR Drain placement 02/09/2023. Pt treated empirically with Zosyn. Cultures with scant growth of E. Coli with resistance to Zosyn. ABx switched to Rocephin and Flagyl. Drain repositioned 02/14/2023. Repeat fluid cultures negative. Leukocytosis continues to improve.  - Anticipate D/C today with  for ostomy and wound care. Pt instructed to start Levaquin 500 mg QD x7 days. Outpatient CT of abdomen/pelvis scheduled for 02/24/2023. Pt to follow up in the office on  03/06/2023.

## 2023-02-22 ENCOUNTER — HOME CARE VISIT (OUTPATIENT)
Dept: HOME HEALTH SERVICES | Facility: HOME HEALTHCARE | Age: 63
End: 2023-02-22
Payer: COMMERCIAL

## 2023-02-22 PROCEDURE — G0299 HHS/HOSPICE OF RN EA 15 MIN: HCPCS

## 2023-02-22 NOTE — PROGRESS NOTES
"Enter Query Response Below      Query Response:     yes         If applicable, please update the problem list.        Patient: Hali Tejeda        : 1960  Account: [id number]           Admit Date: 2023        How to Respond to this query:       a. Click New Note     b. Answer query within the yellow box.                c. Update the Problem List, if applicable.      If you have any questions about this query contact me at: [email or web address]     Dr. Gaston,    Based on Medicare billing guidelines [facility] are not allowed to use diagnoses from pathology reports as the sole basis for billing. Any findings noted on a pathology report must be affirmed by the treating physician before billing.    The pathologist reported that the specimen shows:    \"Final Diagnosis -- -- [facility]  Result:   1. Sigmoid Colon, Resection:               A. Diverticulosis with acute and chronic diverticulitis and peridiverticular abscesses with inflamed granulation tissue.               B. Associated marked acute and chronic serositis with fistula tract, organizing fat necrosis and fibrous adhesions.               C. No epithelial dysplasia nor malignancy identified.               D. Focal minimal active cryptitis noted, no well-developed crypt abscess formation nor granulomatous          inflammation identified.   E. Proximal and distal surgical margins are viable and negative for neoplasm/malignancy.  F. Seven benign reactive lymph nodes identified (0/7).     2.  Left Descending Colon, Partial Resection:                A. Colonic tissue with ischemic mucosal ulceration, transmural vascular congestion and edema, focal marked submucosal                      acute necrotizing inflammation with extension into muscularis propria and associated acute and chronic serositis.   B. Gross and microscopic changes consistent with anastomotic site with marked fibrous adhesion and organizing        blood " "clot.  C. No epithelial dysplasia nor malignancy identified.  D. Surgical margins appear viable.  E. Two benign reactive lymph nodes identified (0/2).\"    Is this finding consistent with your clinical impression and treatment plan for this patient?    - Yes  - No  - Other explanation for clinical impression and treatment plan_________  - Clinically indeterminable    By submitting this query, we are merely seeking further clarification of documentation to accurately reflect all conditions that you are monitoring, evaluating, treating or that extend the hospitalization or utilize additional resources of care. Please utilize your independent clinical judgment when addressing the question(s) above.     This query and your response, once completed, will be entered into the legal medical record.    Sincerely,  Liane Todd RN CCDS CCS  Clinical Documentation Integrity Program   "

## 2023-02-22 NOTE — OUTREACH NOTE
Prep Survey    Flowsheet Row Responses   [facility] facility patient discharged from? [place]   Is LACE score < 7 ? No   Eligibility Readm Mgmt   Discharge diagnosis Acute colitis with sigmoid stricture and microperforation s/p colon resection and colostomy   Does the patient have one of the following disease processes/diagnoses(primary or secondary)? General Surgery   Does the patient have Home health ordered? Yes   What is the Home health agency?  [facility]    Is there a DME ordered? Yes   What DME was ordered? BSC and Walker per [facility] per [employer]    Prep survey completed? Yes          Jaye ACUÑA - Registered Nurse

## 2023-02-22 NOTE — PAYOR COMM NOTE
"Hali Aleman (62 y.o. Female)     PLEASE SEE ATTACHED DC SUMMARY    REF#[id number]    THANK YOU    ANDREA CHAVES LPN CCP    Date of Birth   1960    Social Security Number       Address   [address] [facility] [address]    Home Phone   [phone]    MRN   [id number]       Mu-ism   None    Marital Status   Single                            Admission Date   1/27/23    Admission Type   Emergency    Admitting Provider   Gideon Hope MD    Attending Provider       Department, Room/Bed   [address], [address]/1       Discharge Date   2/21/2023    Discharge Disposition   Home or Self Care    Discharge Destination                               Attending Provider: (none)   Allergies: Hydrocodone, Sulfa Antibiotics    Isolation: None   Infection: None   Code Status: Prior    Ht: 154.9 cm (61\")   Wt: 73.3 kg (161 lb 9.6 oz)    Admission Cmt: None   Principal Problem: Colitis [K52.9]                 Active Insurance as of 1/27/2023     Primary Coverage     Payor Plan Insurance Group Employer/Plan Group    [facility] BLUE CROSS [employer] EMPL[employer]EE [id number]     Payor Plan Address Payor Plan Phone Number Payor Plan Fax Number Effective Dates    [address] [phone]  1/1/2022 - None Entered    [place] [address]       Subscriber Name Subscriber Birth Date Member ID       HALI ALEMAN 1960 [id number]                 Emergency Contacts      (Rel.) Home Phone Work Phone Mobile Phone    SANDRA ALEMAN (Daughter) [phone] -- --    AMAURI ABREU (Mother) [phone] -- [phone]               Discharge Summary      Gideon Hope MD at 02/21/23 1317        Discharge summary    Date of admission 1/27/2023  Date of discharge 2/21/2023    Final diagnosis  Acute colitis with sigmoid stricture and microperforation s/p colon resection and colostomy  Abdominal fluid collection/abscess status post CT-guided drainage placement and removal  Postop " ileus  Acute kidney injury resolved  Hypertension   Gastroesophageal reflux disease    Discharge medications    Current Facility-Administered Medications:   •  amLODIPine (NORVASC) tablet 10 mg, 10 mg, Oral, Q24H, Gideon Hope MD, 10 mg at 02/21/23 0825  •  cefdinir (OMNICEF) capsule 300 mg, 300 mg, Oral, Q12H, Gideon Hope MD  •  famotidine (PEPCID) tablet 20 mg, 20 mg, Oral, BID, Gideon Hope MD, 20 mg at 02/21/23 0825  •  fluconazole (DIFLUCAN) tablet 200 mg, 200 mg, Oral, Q24H, Gideon Hope MD  •  metroNIDAZOLE (FLAGYL) tablet 500 mg, 500 mg, Oral, Q8H, Gideon Hope MD     Consults obtained  Gastroenterology  Colorectal surgery  Nephrology  Infectious disease  Nutrition      Procedures  Upper and lower endoscopy  Laparoscopic robotic assisted left and sigmoid colectomy converted to open mobilization of the splenic flexure  CT-guided abdominal drain placement on ninth of this month and successful repositioning and exchange of drain on 14th of this month     Hospital course  62-year-old -American female with history of hypertension admitted to emergency room with left lower quadrant abdominal pain for last 1 month with loose stools.  Patient evaluated in ER and found to have colitis admit for management.  Patient admitted treated with IV fluid IV antibiotics and symptomatic treatment for pain and ruled out for C. difficile colitis and GI panel was also negative but she continued to have pain and underwent upper and lower endoscopy which revealed hemorrhoids diverticulosis and stricture of the sigmoid colon.  Patient further evaluated by colorectal surgery and recommend colectomy with patient and family agrees.  Patient underwent laparoscopic robotic assisted colectomy which is changed to open surgery and also colostomy placed.  Patient continued to have abdominal pain and also started on empiric IV antibiotics.  Patient remained fully awake and alert but then developed acute kidney injury which  resolved with hydration and adjustment of her medications.  Patient repeat CT scan showed abdominal fluid which she required CT-guided drainage tube placement followed by adjustment of her drainage tube with repositioning and exchange.  Patient finally shows improvement and remained on 3 antibiotics including Rocephin Flagyl and Diflucan which she will finish at home for 1 more week.  Patient tolerating regular diet and cleared for discharge from all consultant.    Discharge diet regular    Activity as tolerated    Medication as above    Follow-up with primary doctor in 1 week and follow-up with colorectal surgery infectious disease and gastroenterology per the instructions and take medication as directed.    SANG SINGLETARY MD      Electronically signed by Sang Singletary MD at 02/21/23 1328       Discharge Order (From admission, onward)     Start     Ordered    02/21/23 1315  Discharge patient  Once        Expected Discharge Date: 02/21/23    Discharge Disposition: Home or Self Care    Physician of Record for Attribution - Please select from Treatment Team: SANG SINGLETARY [[id number]]    Review needed by CMO to determine Physician of Record: No       Question Answer Comment   Physician of Record for Attribution - Please select from Treatment Team SANG SINGLETARY    Review needed by CMO to determine Physician of Record No        02/21/23 0897

## 2023-02-22 NOTE — CASE MANAGEMENT/SOCIAL WORK
Case Management Discharge Note      Final Note: home with BHH, wound vac from [facility], [facility]    Provided Post Acute Provider List?: N/A  N/A Provider List Comment: Denies needs. Plan is home    Selected Continued Care - Discharged on 2/21/2023 Admission date: 1/27/2023 - Discharge disposition: Home or Self Care    Destination    No services have been selected for the patient.              Durable Medical Equipment    No services have been selected for the patient.              Dialysis/Infusion    No services have been selected for the patient.              Home Medical Care Coordination complete.    Service Provider Selected Services Address Phone Fax Patient Preferred     [facility] ,  Home Nursing ,  Home Rehabilitation [address] [address] [phone] --          Therapy    No services have been selected for the patient.              Community Resources    No services have been selected for the patient.              Community & DME    No services have been selected for the patient.                  Transportation Services  Private: Car    Final Discharge Disposition Code: 06 - home with home health care

## 2023-02-23 VITALS — HEIGHT: 55 IN | BODY MASS INDEX: 196.92 KG/M2

## 2023-02-23 NOTE — HOME HEALTH
62F with history of HTN.  Recent hospitalization due to hemorrhoids, diverticulosis, and stricture of the sigmoid colon,  requiring colectomy.  Patient has wound vac to midline abdomen and LUQ colostomy.  Mother and cousing present for teaching.  Changed wound vac to abdomen.  T/I pt/CGs troubleshooting, and when to apply NS w/d.  Changed colostomy bag, and T/I pt/CGs.  Supplies ordered.  SN for wound vac change, T/I ostomy care and medication regime.  Medications reconciled.

## 2023-02-24 ENCOUNTER — HOME CARE VISIT (OUTPATIENT)
Dept: HOME HEALTH SERVICES | Facility: HOME HEALTHCARE | Age: 63
End: 2023-02-24
Payer: COMMERCIAL

## 2023-02-24 PROCEDURE — G0299 HHS/HOSPICE OF RN EA 15 MIN: HCPCS

## 2023-02-24 NOTE — CASE COMMUNICATION
SUPPLY ORDER PLACE WITH MEDLINE # [id number]  [id number] POUCHES 2 BOXES  11126 BARRIER 2 BOXES  ADHESIVE REMOVER SPRAY 2 CANS  1 IN PAPER TAPE 2 ROLLS  4X4 WOVEN STERILE GAUZE (90)  NORMAL SALINE 4 BOTTLES

## 2023-02-26 VITALS
HEART RATE: 72 BPM | RESPIRATION RATE: 16 BRPM | SYSTOLIC BLOOD PRESSURE: 122 MMHG | TEMPERATURE: 97.7 F | OXYGEN SATURATION: 98 % | DIASTOLIC BLOOD PRESSURE: 80 MMHG

## 2023-02-27 ENCOUNTER — HOME CARE VISIT (OUTPATIENT)
Dept: HOME HEALTH SERVICES | Facility: HOME HEALTHCARE | Age: 63
End: 2023-02-27
Payer: COMMERCIAL

## 2023-02-27 ENCOUNTER — TELEPHONE (OUTPATIENT)
Dept: SURGERY | Facility: CLINIC | Age: 63
End: 2023-02-27
Payer: COMMERCIAL

## 2023-02-27 PROCEDURE — G0299 HHS/HOSPICE OF RN EA 15 MIN: HCPCS

## 2023-02-28 ENCOUNTER — READMISSION MANAGEMENT (OUTPATIENT)
Dept: CALL CENTER | Facility: HOSPITAL | Age: 63
End: 2023-02-28
Payer: COMMERCIAL

## 2023-02-28 VITALS
OXYGEN SATURATION: 96 % | SYSTOLIC BLOOD PRESSURE: 110 MMHG | RESPIRATION RATE: 16 BRPM | DIASTOLIC BLOOD PRESSURE: 76 MMHG | TEMPERATURE: 98.4 F | HEART RATE: 98 BPM

## 2023-02-28 NOTE — OUTREACH NOTE
General Surgery Week 1 Survey    Flowsheet Row Responses   [facility] patient discharged from? [place]   Does the patient have one of the following disease processes/diagnoses(primary or secondary)? General Surgery   Week 1 attempt successful? Yes   Call start time 1608   Call end time 1616   Discharge diagnosis Acute colitis with sigmoid stricture and microperforation s/p colon resection and colostomy   Person spoke with today (if not patient) and relationship Patient   Meds reviewed with patient/caregiver? Yes   Does the patient have all medications related to this admission filled (includes all antibiotics, pain medications, etc.) Yes   Is the patient taking all medications as directed (includes completed medication regime)? Yes   Does the patient have a follow up appointment scheduled with their surgeon? Yes  [3/6]   Has the patient kept scheduled appointments due by today? Yes   What is the Home health agency?  [facility]    Has home health visited the patient within 72 hours of discharge? Yes   What DME was ordered? BSC and Walker per [facility] per [employer]    Has all DME been delivered? Yes   Psychosocial issues? No   Did the patient receive a copy of their discharge instructions? Yes   Nursing interventions Reviewed instructions with patient   What is the patient's perception of their health status since discharge? Improving   Nursing interventions Nurse provided patient education   Is the patient/caregiver able to teach back signs and symptoms of incisional infection? Increased redness, swelling or pain at the incisonal site, Increased drainage or bleeding, Fever   Is the patient/caregiver able to teach back steps to recovery at home? Set small, achievable goals for return to baseline health, Rest and rebuild strength, gradually increase activity, Eat a well-balance diet   If the patient is a current smoker, are they able to teach back resources for cessation? Not a smoker   Is the  patient/caregiver able to teach back the hierarchy of who to call/visit for symptoms/problems? PCP, Specialist, Home health nurse, Urgent Care, ED, 911 Yes   Week 1 call completed? Yes          ROE SEGOVIA - Registered Nurse

## 2023-03-01 ENCOUNTER — HOME CARE VISIT (OUTPATIENT)
Dept: HOME HEALTH SERVICES | Facility: HOME HEALTHCARE | Age: 63
End: 2023-03-01
Payer: COMMERCIAL

## 2023-03-01 VITALS
OXYGEN SATURATION: 94 % | HEART RATE: 95 BPM | TEMPERATURE: 98 F | DIASTOLIC BLOOD PRESSURE: 80 MMHG | RESPIRATION RATE: 16 BRPM | SYSTOLIC BLOOD PRESSURE: 118 MMHG

## 2023-03-01 PROCEDURE — G0299 HHS/HOSPICE OF RN EA 15 MIN: HCPCS

## 2023-03-03 ENCOUNTER — HOME CARE VISIT (OUTPATIENT)
Dept: HOME HEALTH SERVICES | Facility: HOME HEALTHCARE | Age: 63
End: 2023-03-03
Payer: COMMERCIAL

## 2023-03-03 VITALS
TEMPERATURE: 98.1 F | SYSTOLIC BLOOD PRESSURE: 124 MMHG | RESPIRATION RATE: 16 BRPM | OXYGEN SATURATION: 98 % | DIASTOLIC BLOOD PRESSURE: 70 MMHG | HEART RATE: 72 BPM

## 2023-03-03 PROCEDURE — G0299 HHS/HOSPICE OF RN EA 15 MIN: HCPCS

## 2023-03-06 ENCOUNTER — OFFICE VISIT (OUTPATIENT)
Dept: SURGERY | Facility: CLINIC | Age: 63
End: 2023-03-06
Payer: COMMERCIAL

## 2023-03-06 ENCOUNTER — HOME CARE VISIT (OUTPATIENT)
Dept: HOME HEALTH SERVICES | Facility: HOME HEALTHCARE | Age: 63
End: 2023-03-06
Payer: COMMERCIAL

## 2023-03-06 VITALS
DIASTOLIC BLOOD PRESSURE: 78 MMHG | BODY MASS INDEX: 36.49 KG/M2 | HEART RATE: 111 BPM | SYSTOLIC BLOOD PRESSURE: 112 MMHG | OXYGEN SATURATION: 100 % | HEIGHT: 55 IN | WEIGHT: 157.7 LBS

## 2023-03-06 VITALS
DIASTOLIC BLOOD PRESSURE: 80 MMHG | SYSTOLIC BLOOD PRESSURE: 112 MMHG | RESPIRATION RATE: 16 BRPM | TEMPERATURE: 98 F | OXYGEN SATURATION: 94 % | HEART RATE: 102 BPM

## 2023-03-06 DIAGNOSIS — K57.92 DIVERTICULITIS: ICD-10-CM

## 2023-03-06 DIAGNOSIS — Z12.11 SCREENING FOR MALIGNANT NEOPLASM OF COLON: Primary | ICD-10-CM

## 2023-03-06 PROCEDURE — G0299 HHS/HOSPICE OF RN EA 15 MIN: HCPCS

## 2023-03-06 PROCEDURE — 99024 POSTOP FOLLOW-UP VISIT: CPT | Performed by: PHYSICIAN ASSISTANT

## 2023-03-06 RX ORDER — DICYCLOMINE HYDROCHLORIDE 10 MG/1
10 CAPSULE ORAL 3 TIMES DAILY
COMMUNITY
End: 2023-03-29

## 2023-03-06 RX ORDER — SODIUM CHLORIDE, SODIUM LACTATE, POTASSIUM CHLORIDE, CALCIUM CHLORIDE 600; 310; 30; 20 MG/100ML; MG/100ML; MG/100ML; MG/100ML
30 INJECTION, SOLUTION INTRAVENOUS CONTINUOUS
OUTPATIENT
Start: 2023-08-10

## 2023-03-06 NOTE — PROGRESS NOTES
"Hali Tejeda is a 62 y.o. female in for follow up of Diverticulitis    Pt is S/P robotic assisted laparoscopic converted open left and sigmoid resection with colostomy on 02/03/2023 for diverticulitis with sigmoid stricture and microperforation. Hospital recovery complicated by intra-abdominal and abdominal wall abscesses. Pt was discharged from the hospital on 02/21/2023 with Cefdinir, Metronidazole, and Fluconazole. She was also scheduled for repeat CT of the abdomen/pelvis on 02/24/2023 to monitor intra-abdominal fluid collections.     Pt presents today for a post-op evaluation.   She completed the full course of antibiotics and anti-fungal.   She is tolerating PO intake. Eating small portions frequently throughout the day as she becomes full easily after undergoing a gastric sleeve in 2020.   Drinking protein shakes for supplemental nutrition.   Having good ostomy output. Empties pouch 2-3x daily. Stool is thick in consistency.   Occasional abdominal cramping, which resolves after taking Bentyl.   Home Health following Pt and providing wound-vac dressing changes MWF. Wound-vac dressing changed prior to arrival.   Pt no-showed for CT on 02/24/2023 as she is worried about cost.  She denies any N/V or fevers.   Energy gradually improving and she is walking more.     /78 (BP Location: Right arm, Patient Position: Sitting, Cuff Size: Adult)   Pulse 111   Ht 61 cm (24.02\")   Wt 71.5 kg (157 lb 11.2 oz)   SpO2 100%   .17 kg/m²   Body mass index is 192.17 kg/m².      PE:  Physical Exam  Constitutional:       General: She is not in acute distress.     Appearance: She is well-developed.   HENT:      Head: Normocephalic and atraumatic.   Abdominal:      General: There is no distension.      Palpations: Abdomen is soft.      Comments: Abdomen soft, non-distended. Ostomy: Pink and patent. No stool in bag. Changed PTA. Wound-vac in place.    Musculoskeletal:         General: Normal range of motion. "   Neurological:      Mental Status: She is alert.   Psychiatric:         Thought Content: Thought content normal.         Assessment:   1. Diverticulitis    - S/P robotic assisted laparoscopic converted open left and sigmoid resection with colostomy on 02/03/2023     Plan:  - Gradually increase physical activity   - Continue wound-vac. Did not replace wound-vac dressing today as it was just changed PTA. Reviewed images of wound uploaded by [employer]. Wound bed with pink granulation tissue.  - Will arrange follow up with Dr. Gaston in 6 months to discuss colostomy reversal. Will repeat colonoscopy prior to this as her last was incomplete.   - Follow up with me in 2 weeks for wound-check.

## 2023-03-08 ENCOUNTER — READMISSION MANAGEMENT (OUTPATIENT)
Dept: CALL CENTER | Facility: HOSPITAL | Age: 63
End: 2023-03-08
Payer: COMMERCIAL

## 2023-03-08 ENCOUNTER — HOME CARE VISIT (OUTPATIENT)
Dept: HOME HEALTH SERVICES | Facility: HOME HEALTHCARE | Age: 63
End: 2023-03-08
Payer: COMMERCIAL

## 2023-03-08 VITALS
SYSTOLIC BLOOD PRESSURE: 130 MMHG | HEART RATE: 88 BPM | DIASTOLIC BLOOD PRESSURE: 74 MMHG | TEMPERATURE: 97.1 F | RESPIRATION RATE: 18 BRPM | OXYGEN SATURATION: 96 %

## 2023-03-08 PROBLEM — Z12.11 SCREENING FOR MALIGNANT NEOPLASM OF COLON: Status: ACTIVE | Noted: 2023-03-08

## 2023-03-08 PROCEDURE — G0300 HHS/HOSPICE OF LPN EA 15 MIN: HCPCS

## 2023-03-08 NOTE — OUTREACH NOTE
General Surgery Week 2 Survey    Flowsheet Row Responses   [facility] patient discharged from? [place]   Does the patient have one of the following disease processes/diagnoses(primary or secondary)? General Surgery   Week 2 attempt successful? Yes   Call start time 1532   Call end time 1536   Discharge diagnosis Acute colitis with sigmoid stricture and microperforation s/p colon resection and colostomy   Person spoke with today (if not patient) and relationship Patient   Meds reviewed with patient/caregiver? Yes   Is the patient taking all medications as directed (includes completed medication regime)? Yes   Does the patient have a follow up appointment scheduled with their surgeon? Yes   Has the patient kept scheduled appointments due by today? Yes   What is the Home health agency?  [facility] continued   Home health comments  visit for wound vac change every 3rd day   Psychosocial issues? No   Did the patient receive a copy of their discharge instructions? Yes   Nursing interventions Reviewed instructions with patient   What is the patient's perception of their health status since discharge? Improving   Nursing interventions Nurse provided patient education   Is the patient/caregiver able to teach back steps to recovery at home? Eat a well-balance diet, Rest and rebuild strength, gradually increase activity   If the patient is a current smoker, are they able to teach back resources for cessation? Not a smoker   Is the patient/caregiver able to teach back the hierarchy of who to call/visit for symptoms/problems? PCP, Specialist, Home health nurse, Urgent Care, ED, 911 Yes   Week 2 call completed? Yes          ROE SEGOIVA - Registered Nurse

## 2023-03-09 ENCOUNTER — TELEPHONE (OUTPATIENT)
Dept: SURGERY | Facility: CLINIC | Age: 63
End: 2023-03-09
Payer: COMMERCIAL

## 2023-03-09 NOTE — HOME HEALTH
Patient has a ABD wound with ostomy.  Wound vac in place with suction of 125mmhg.  Wound vac change along with ostomy bag change.  Teach patient to do bag change.

## 2023-03-09 NOTE — TELEPHONE ENCOUNTER
Phone patient, no answer, left voice message to call the office and ask for me.    Regarding her [place] paperwork.

## 2023-03-10 ENCOUNTER — HOME CARE VISIT (OUTPATIENT)
Dept: HOME HEALTH SERVICES | Facility: HOME HEALTHCARE | Age: 63
End: 2023-03-10
Payer: COMMERCIAL

## 2023-03-10 VITALS
DIASTOLIC BLOOD PRESSURE: 72 MMHG | SYSTOLIC BLOOD PRESSURE: 108 MMHG | HEART RATE: 94 BPM | RESPIRATION RATE: 16 BRPM | TEMPERATURE: 97.8 F

## 2023-03-10 PROCEDURE — G0299 HHS/HOSPICE OF RN EA 15 MIN: HCPCS

## 2023-03-10 NOTE — HOME HEALTH
Wound vac and ostomy bag change to abdomen.  Supply request made.  VSS.  Measurements and pictures taken.

## 2023-03-13 ENCOUNTER — HOME CARE VISIT (OUTPATIENT)
Dept: HOME HEALTH SERVICES | Facility: HOME HEALTHCARE | Age: 63
End: 2023-03-13
Payer: COMMERCIAL

## 2023-03-13 VITALS
DIASTOLIC BLOOD PRESSURE: 78 MMHG | SYSTOLIC BLOOD PRESSURE: 126 MMHG | TEMPERATURE: 97 F | RESPIRATION RATE: 18 BRPM | HEART RATE: 86 BPM | OXYGEN SATURATION: 97 %

## 2023-03-13 VITALS
TEMPERATURE: 98.1 F | RESPIRATION RATE: 20 BRPM | HEART RATE: 93 BPM | OXYGEN SATURATION: 99 % | SYSTOLIC BLOOD PRESSURE: 124 MMHG | DIASTOLIC BLOOD PRESSURE: 74 MMHG

## 2023-03-13 PROCEDURE — G0300 HHS/HOSPICE OF LPN EA 15 MIN: HCPCS

## 2023-03-13 NOTE — CASE COMMUNICATION
SUPPLY ORDER WITH MEDLINE PO# [id number]  2 BOXES BARRIER STIRPS   2 BOXES BARRIER RING 8805  4 BOXES WAFER 59661  PO#[id number]: 1 BOX POUCHES 68304

## 2023-03-13 NOTE — HOME HEALTH
Today's Visit:     RN to pt home for on-call visit. Pt called and stated that she could not get ostomy bag to go onto appliance and needed assistance as she was leaking stool. RN attempted to trouble shoot over the telephone and pt requested nurse to visit to assist. RN made visit and pt was attempting to apply choloplast appliance with priyank bag. RN explained that she has 2 different products from 2 different companies. Pt stated she had recent shipment of supplies and isn't sure if she has what is needed. Pt had new pryiank appliance in recently delivered supplies. RN was then able to place 2 piece appliance and bag over ostomy without difficulty. Pt wound vac remained in place and pt has scheduled SN visit later today to have wound care completed.       Plan for next visit: wound care; CP assess

## 2023-03-14 ENCOUNTER — TELEPHONE (OUTPATIENT)
Dept: SURGERY | Facility: CLINIC | Age: 63
End: 2023-03-14

## 2023-03-14 NOTE — TELEPHONE ENCOUNTER
Hub staff attempted to follow warm transfer process and was unsuccessful     Caller: JAMIE ALEMAN   Relationship to patient: SELF   Best call back number: [phone]    Patient is needing TO SPEAK W/ ERIK IN REGARDS TO FMLA PAPERWORK

## 2023-03-14 NOTE — TELEPHONE ENCOUNTER
PHONED PATIENT, PATIENT VOICE UNDERSTANDING.    [facility] PAPERWORK HAS BEEN FILLED OUT, FAXED, & SCANNED INTO PATIENT'S CHART, WILL MAIL ORIGINAL PAPERWORK THAT HAS BEEN FILLED OUT SO PATIENT CAN HAVE THAT ON HAND.    THANK YOU.

## 2023-03-14 NOTE — HOME HEALTH
has a ABD wound with ostomy.  Wound vac in place with suction of 125mmhg.  Wound vac change along with ostomy bag change.  Teach patient to do bag change.

## 2023-03-15 ENCOUNTER — HOME CARE VISIT (OUTPATIENT)
Dept: HOME HEALTH SERVICES | Facility: HOME HEALTHCARE | Age: 63
End: 2023-03-15
Payer: COMMERCIAL

## 2023-03-15 VITALS
TEMPERATURE: 96.9 F | HEART RATE: 110 BPM | RESPIRATION RATE: 18 BRPM | DIASTOLIC BLOOD PRESSURE: 66 MMHG | OXYGEN SATURATION: 97 % | SYSTOLIC BLOOD PRESSURE: 88 MMHG

## 2023-03-15 PROCEDURE — G0299 HHS/HOSPICE OF RN EA 15 MIN: HCPCS

## 2023-03-15 NOTE — HOME HEALTH
PATIENT NO LONGER NEEDS WOUND VAC AS WOUND DEPTH IS LESS THAN 0.5 CM. NOTIFIED DR. GRANADO OF DISCONTINUED WOUND VAC AND INITIATION OF NORMAL SALINE MOISTENED GAUZE DRESSING CHANGES DAILY. PLACED THE PATIENT IN A GABY 57MM FLAT WITH A 35MM CUT TO FIT.   PATIENT HAS LOW BLOOD PRESSURE TODAY 88/66 SHE DENIES HAVING ANY LIGHTHEADEDNESS, DIZZINESS. CALLED PCP TO REPORT LOW BLOOD PRESSURE. PATIENT IS TO TAKE HER BLOOD PRESSURE 2-3 TIMES FOR THE NEXT FEW DAYS AND REPORT RECORDINGS BACK TO THE PCP. PATIENT TO INCREASE HER FLUID INTAKE AND EAT A BAG OF POTATO CHIPS TODAY. RN TO VISIT ON FRIDAY AND CHECK BLOOD PRESSURE.
0

## 2023-03-15 NOTE — CASE COMMUNICATION
MESSAGE RECEIVED FROM [employer]  QUANTITY ORDERED OF WAFER EXCEEDED QUANTITY ALLOWED BY INSURANCE.  QUANTITY PUT ON THIS ORDER 2

## 2023-03-17 ENCOUNTER — HOME CARE VISIT (OUTPATIENT)
Dept: HOME HEALTH SERVICES | Facility: HOME HEALTHCARE | Age: 63
End: 2023-03-17
Payer: COMMERCIAL

## 2023-03-17 VITALS
HEART RATE: 94 BPM | SYSTOLIC BLOOD PRESSURE: 102 MMHG | RESPIRATION RATE: 16 BRPM | TEMPERATURE: 98.1 F | DIASTOLIC BLOOD PRESSURE: 70 MMHG | OXYGEN SATURATION: 97 %

## 2023-03-17 PROCEDURE — G0299 HHS/HOSPICE OF RN EA 15 MIN: HCPCS

## 2023-03-22 ENCOUNTER — HOME CARE VISIT (OUTPATIENT)
Dept: HOME HEALTH SERVICES | Facility: HOME HEALTHCARE | Age: 63
End: 2023-03-22
Payer: COMMERCIAL

## 2023-03-22 VITALS
HEART RATE: 74 BPM | SYSTOLIC BLOOD PRESSURE: 102 MMHG | DIASTOLIC BLOOD PRESSURE: 70 MMHG | OXYGEN SATURATION: 98 % | RESPIRATION RATE: 16 BRPM | TEMPERATURE: 97.3 F

## 2023-03-22 PROCEDURE — G0299 HHS/HOSPICE OF RN EA 15 MIN: HCPCS

## 2023-03-23 ENCOUNTER — OFFICE VISIT (OUTPATIENT)
Dept: SURGERY | Facility: CLINIC | Age: 63
End: 2023-03-23
Payer: COMMERCIAL

## 2023-03-23 VITALS — SYSTOLIC BLOOD PRESSURE: 90 MMHG | DIASTOLIC BLOOD PRESSURE: 60 MMHG | HEART RATE: 97 BPM | OXYGEN SATURATION: 99 %

## 2023-03-23 DIAGNOSIS — K57.92 DIVERTICULITIS: Primary | ICD-10-CM

## 2023-03-23 PROCEDURE — 99024 POSTOP FOLLOW-UP VISIT: CPT | Performed by: PHYSICIAN ASSISTANT

## 2023-03-23 NOTE — PROGRESS NOTES
Hali Tejeda is a 62 y.o. female in for follow up of Diverticulitis    Pt is S/P robotic assisted laparoscopic converted open left and sigmoid resection with colostomy on 02/03/2023 for diverticulitis with sigmoid stricture and microperforation.   Pt presents today for a post-op evaluation and wound check.   Home Health visiting the home once a week as Pt is becoming more comfortable with her ostomy care.   Notes a small bump towards the bottom of her stoma.   Wound-vac for the midline abdominal wound was D/C last week as the wound has become more shallow.  Packing daily with wet-to-dry dressing.   Continues to eat small meals frequently throughout the day.  Pt is doing well overall and denies any other concerns at this time.     BP 90/60 (BP Location: Left arm, Patient Position: Sitting, Cuff Size: Small Adult)   Pulse 97   LMP  (LMP Unknown)   SpO2 99%   Breastfeeding No   There is no height or weight on file to calculate BMI.      PE:  Physical Exam  Constitutional:       General: She is not in acute distress.     Appearance: She is well-developed.   HENT:      Head: Normocephalic and atraumatic.   Abdominal:      General: There is no distension.      Palpations: Abdomen is soft.      Comments: Abdomen soft, non-distended.  Midline abdominal wound approximately 3.5 cm x 1.5 cm. Shallow with pink granulation tissue.   Ostomy in LLQ is PPP.  Small granuloma in the 7 O'clock positional with minimal bleeding.    Musculoskeletal:         General: Normal range of motion.   Neurological:      Mental Status: She is alert.   Psychiatric:         Thought Content: Thought content normal.         Assessment:   1. Diverticulitis    -  S/P robotic assisted laparoscopic converted open left and sigmoid resection with colostomy on 02/03/2023    Plan:  - Silver Nitrate applied to granuloma. Pt advised that a second Tx may be needed in the future.   - Continue daily wet-to-dry dressing changes for midline abdominal wound  -  Colonoscopy scheduled on 08/10/2023  - Follow up with Dr. Gaston 08/25/2023 to discuss ostomy reversal  - Follow up with me in 2 weeks for wound check.

## 2023-03-27 ENCOUNTER — TELEPHONE (OUTPATIENT)
Dept: SURGERY | Facility: CLINIC | Age: 63
End: 2023-03-27

## 2023-03-27 NOTE — TELEPHONE ENCOUNTER
Caller: JAMIE ALEMAN     Relationship to patient: SELF    Best call back number: [phone]    Patient is needing: PT NEEDS TO MAKE SURE HER [place] PAPERWORK HAS BEEN SENT TO HER EMPLOYER. PLEASE GIVE HER A CALL BACK ASAP.

## 2023-03-29 ENCOUNTER — HOME CARE VISIT (OUTPATIENT)
Dept: HOME HEALTH SERVICES | Facility: HOME HEALTHCARE | Age: 63
End: 2023-03-29
Payer: COMMERCIAL

## 2023-03-29 VITALS
SYSTOLIC BLOOD PRESSURE: 106 MMHG | TEMPERATURE: 98 F | RESPIRATION RATE: 18 BRPM | OXYGEN SATURATION: 98 % | HEART RATE: 98 BPM | DIASTOLIC BLOOD PRESSURE: 70 MMHG

## 2023-03-29 PROCEDURE — G0299 HHS/HOSPICE OF RN EA 15 MIN: HCPCS

## 2023-03-29 NOTE — CASE COMMUNICATION
THE FOLLOWING SUPPLIES WERE ORDERED 3/29/23:    ADHESIVE REMOVER SPRAY - 2 BOTTLES  4X4 BORDERED GAUZE - 2 BOXES    ORDER #[id number]

## 2023-03-29 NOTE — HOME HEALTH
Observed patient change ostomy bag and perform wound care without difficulty.  VSS.  Supplies ordered.

## 2023-04-05 ENCOUNTER — HOME CARE VISIT (OUTPATIENT)
Dept: HOME HEALTH SERVICES | Facility: HOME HEALTHCARE | Age: 63
End: 2023-04-05
Payer: COMMERCIAL

## 2023-04-05 VITALS
DIASTOLIC BLOOD PRESSURE: 64 MMHG | SYSTOLIC BLOOD PRESSURE: 118 MMHG | TEMPERATURE: 97.9 F | RESPIRATION RATE: 18 BRPM | HEART RATE: 74 BPM | OXYGEN SATURATION: 97 %

## 2023-04-05 PROCEDURE — G0300 HHS/HOSPICE OF LPN EA 15 MIN: HCPCS

## 2023-04-05 NOTE — HOME HEALTH
Patient has ABD wound and ostomy site.  Patient performed ostomy bag change in front of SN.  Patient did well, no issues noted.  She states she is comfortable doing her own bag change.  supplies were ordered for the patient.  Wound was assessed, measured and PIC was taken.  Clean with normal saline and do a wet to dry and cover with bordered drressing.

## 2023-04-12 ENCOUNTER — HOME CARE VISIT (OUTPATIENT)
Dept: HOME HEALTH SERVICES | Facility: HOME HEALTHCARE | Age: 63
End: 2023-04-12
Payer: COMMERCIAL

## 2023-04-12 VITALS — HEART RATE: 95 BPM | SYSTOLIC BLOOD PRESSURE: 128 MMHG | OXYGEN SATURATION: 96 % | DIASTOLIC BLOOD PRESSURE: 80 MMHG

## 2023-04-12 PROCEDURE — G0495 RN CARE TRAIN/EDU IN HH: HCPCS

## 2023-04-18 ENCOUNTER — OFFICE VISIT (OUTPATIENT)
Dept: SURGERY | Facility: CLINIC | Age: 63
End: 2023-04-18
Payer: COMMERCIAL

## 2023-04-18 VITALS
DIASTOLIC BLOOD PRESSURE: 76 MMHG | HEIGHT: 60 IN | BODY MASS INDEX: 30.82 KG/M2 | OXYGEN SATURATION: 98 % | HEART RATE: 87 BPM | WEIGHT: 157 LBS | SYSTOLIC BLOOD PRESSURE: 108 MMHG

## 2023-04-18 DIAGNOSIS — K57.92 DIVERTICULITIS: Primary | ICD-10-CM

## 2023-04-18 PROCEDURE — 99024 POSTOP FOLLOW-UP VISIT: CPT | Performed by: PHYSICIAN ASSISTANT

## 2023-04-18 RX ORDER — THIAMINE HCL 100 MG
TABLET ORAL DAILY
COMMUNITY

## 2023-04-18 RX ORDER — AMLODIPINE BESYLATE 10 MG/1
10 TABLET ORAL DAILY
COMMUNITY

## 2023-04-18 RX ORDER — BIOTIN 10000 MCG
CAPSULE ORAL
COMMUNITY

## 2023-04-18 NOTE — PROGRESS NOTES
"Hali Tejeda is a 62 y.o. female in for follow up of Diverticulitis    Pt is S/P robotic assisted laparoscopic converted open left and sigmoid resection with colostomy on 02/03/2023 for diverticulitis with sigmoid stricture and microperforation.   Pt presents today for a wound check of the midline abdominal wound.  Last Home Health visit was last week.   Pt performing wet-to-dry dressing changes daily and is comfortable with her ostomy care.     During her last office visit on 03/23/2023, Pt had a stomal granuloma, which was treated with Silver Nitrate.   Pt states the granuloma initially fell off, but now has a new area that popped up recently.   Pt is otherwise doing well and denies any other concerns at this time.      /76 (BP Location: Left arm, Patient Position: Sitting, Cuff Size: Adult)   Pulse 87   Ht 152.4 cm (60\")   Wt 71.2 kg (157 lb)   LMP  (LMP Unknown)   SpO2 98%   Breastfeeding No   BMI 30.66 kg/m²   Body mass index is 30.66 kg/m².      PE:  Physical Exam  Constitutional:       General: She is not in acute distress.     Appearance: She is well-developed.   HENT:      Head: Normocephalic and atraumatic.   Abdominal:      General: There is no distension.      Palpations: Abdomen is soft.      Comments: Abdomen soft, non-distended.  Ostomy: PPP. Custard consistency stool along stoma. Small granuloma in the 8 O'clock position. No active bleeding.   Midline abdominal wound shallow with hypergranulation tissue.    Musculoskeletal:         General: Normal range of motion.   Neurological:      Mental Status: She is alert.   Psychiatric:         Thought Content: Thought content normal.       Assessment:   1. Diverticulitis    -  S/P robotic assisted laparoscopic converted open left and sigmoid resection with colostomy on 02/03/2023     Plan:  - Silver Nitrate applied to granuloma and to abdominal wound.   - Continue daily wet-to-dry dressing changes for midline abdominal wound  - Colonoscopy " Instructed to call immediately if any new distortion, blurring, decreased vision or eye pain. scheduled on 08/10/2023  - Follow up with Dr. Gaston 08/25/2023 to discuss ostomy reversal  - Follow up with me in 2 weeks for wound check.

## 2023-05-02 ENCOUNTER — OFFICE VISIT (OUTPATIENT)
Dept: SURGERY | Facility: CLINIC | Age: 63
End: 2023-05-02
Payer: COMMERCIAL

## 2023-05-02 VITALS
BODY MASS INDEX: 31.35 KG/M2 | HEIGHT: 60 IN | DIASTOLIC BLOOD PRESSURE: 92 MMHG | WEIGHT: 159.7 LBS | SYSTOLIC BLOOD PRESSURE: 128 MMHG | HEART RATE: 79 BPM | OXYGEN SATURATION: 99 %

## 2023-05-02 DIAGNOSIS — K57.92 DIVERTICULITIS: Primary | ICD-10-CM

## 2023-05-02 NOTE — PROGRESS NOTES
"Hali Tejeda is a 62 y.o. female in for follow up of Diverticulitis    Pt is S/P robotic assisted laparoscopic converted open left and sigmoid resection with colostomy on 02/03/2023 for diverticulitis with sigmoid stricture and microperforation.   Pt presents today for a wound check of the midline abdominal wound.  She continues to perform wet-to-dry dressing changes daily.   Stomal granuloma treated with Silver Nitrate during last office visit. Pt states this is still present.   She is otherwise doing well and denies any other concerns at this time.     Ht 152.4 cm (60\")   Wt 72.4 kg (159 lb 11.2 oz)   LMP  (LMP Unknown)   BMI 31.19 kg/m²   Body mass index is 31.19 kg/m².      PE:  Physical Exam  Constitutional:       General: She is not in acute distress.     Appearance: She is well-developed.   HENT:      Head: Normocephalic and atraumatic.   Abdominal:      General: There is no distension.      Palpations: Abdomen is soft.      Comments: Abdomen soft, non-distended.   Ostomy: PPP with singular non-bleeding granuloma.   Midline abdominal wound with areas of hypergranulation tissue along inferior and superior aspects of incision.    Musculoskeletal:         General: Normal range of motion.   Neurological:      Mental Status: She is alert.   Psychiatric:         Thought Content: Thought content normal.         Assessment:   1. Diverticulitis    - S/P robotic assisted laparoscopic converted open left and sigmoid resection with colostomy on 02/03/2023    Plan:   - Silver nitrate applied to stomal granuloma as well as hypergranulation tissue along the midline abdominal wound.   - Colonoscopy scheduled on 08/10/2023  - Follow up with Dr. Gaston 08/25/2023 to discuss ostomy reversal  - Follow up with me in 2 weeks for wound check.         "

## 2023-05-10 ENCOUNTER — TELEPHONE (OUTPATIENT)
Dept: GASTROENTEROLOGY | Facility: CLINIC | Age: 63
End: 2023-05-10
Payer: COMMERCIAL

## 2023-05-10 NOTE — TELEPHONE ENCOUNTER
Results were discussed with patient during hospitalization. She is currently following up with Dr. Gaston.

## 2023-05-10 NOTE — TELEPHONE ENCOUNTER
----- Message from Seng Guerrero MD sent at 2/10/2023  2:09 PM EST -----  Please let patient know:    Biopsies from the stomach and duodenum were benign with no evidence of H pylori.     Please schedule follow up with Dr. Lutz in 2 months.

## 2023-05-16 ENCOUNTER — OFFICE VISIT (OUTPATIENT)
Dept: SURGERY | Facility: CLINIC | Age: 63
End: 2023-05-16
Payer: COMMERCIAL

## 2023-05-16 VITALS
SYSTOLIC BLOOD PRESSURE: 118 MMHG | DIASTOLIC BLOOD PRESSURE: 80 MMHG | BODY MASS INDEX: 31.02 KG/M2 | WEIGHT: 158 LBS | OXYGEN SATURATION: 99 % | HEART RATE: 77 BPM | HEIGHT: 60 IN

## 2023-05-16 DIAGNOSIS — K59.00 CONSTIPATION, UNSPECIFIED CONSTIPATION TYPE: ICD-10-CM

## 2023-05-16 DIAGNOSIS — K57.92 DIVERTICULITIS: Primary | ICD-10-CM

## 2023-05-16 PROCEDURE — 99214 OFFICE O/P EST MOD 30 MIN: CPT | Performed by: PHYSICIAN ASSISTANT

## 2023-05-16 NOTE — PROGRESS NOTES
"Hali Tejeda is a 62 y.o. female in for follow up of diverticulitis status post robotic assisted laparoscopic converted to open left and sigmoid colon resection with colostomy on 2/3/2023 for diverticulitis with sigmoid stricture and microperforation.    The patient has been seen regularly in our office for wound checks of her midline abdominal wound, having recently been performing wet-to-dry dressing changes.  At her visits, she is also been having silver nitrate applied to stomal granulomas.    The patient reports a good appetite and good energy levels.  She reports that she has been having harder stools, that distended her abdomen somewhat.  She has had some bleeding around the stoma.    /80 (BP Location: Left arm, Patient Position: Sitting)   Pulse 77   Ht 152.4 cm (60\")   Wt 71.7 kg (158 lb)   LMP  (LMP Unknown)   SpO2 99%   BMI 30.86 kg/m²   Body mass index is 30.86 kg/m².  -  Physical Exam  No acute distress  Chaperone present  Abdomen: midline wound is completely healed. Peristomal skin with mild bleeding of superficial small skin tearing, and stoma with scant bleeding appearing to be coming from the inferior portion of the stoma itself, where appliance appears to have been rubbing. Granuloma not actively bleeding.     Assessment:   1. Diverticulitis    2. Constipation, unspecified constipation type     status post robotic assisted laparoscopic converted to open left and sigmoid colon resection with colostomy on 2/3/2023 for diverticulitis with sigmoid stricture and microperforation    Plan:  • I have placed a referral to the wound ostomy clinic to address the peristomal skin tearing and rubbing of the appliance.  I also advised the patient to cut her appliance a bit wider to avoid it rubbing the stoma and causing bleeding.  • I also advised stool softeners to soften ostomy output and prevent bulging and distention  • The patient can follow-up here in our office as needed if stomal problems " persist, or if she has other problems.  Otherwise, follow-up as scheduled with Dr. Gaston on 8/25/2023.        Adwoa Rogel PA-C  Physician Assistant  Colorectal Surgery

## 2023-05-23 ENCOUNTER — HOSPITAL ENCOUNTER (OUTPATIENT)
Dept: WOUND CARE | Facility: HOSPITAL | Age: 63
Discharge: HOME | End: 2023-05-23
Payer: COMMERCIAL

## 2023-05-23 NOTE — NURSING NOTE
"[facility]- referral from Adwoa Rogel PA-C for outpatient visit. Patient had been to see Adwoa last week and patient noted bleeding at the colostomy. She thought the wafer might be rubbing on the stoma.   Patient reports that she has been using a 2 1/4\" wafer at times and also 2 3/4\". She feels like she needs 2 3/4\" so has ordered those. She has wafers at home but no pouches to match.   The wafer she had on was cut too small. The wafer was definitely laying over the stoma some and there were a few areas that appeared bruised/red. No bleeding today. I measured the stoma to be about 1 1/2\" tall and 1 7/8\" wide. I provided a new pattern for patient. She does need to use the 2 3/4\" wafer. I provided ordering numbers for the closed end.   She has our number is additional issues arise.   "

## 2023-06-16 ENCOUNTER — HOSPITAL ENCOUNTER (OUTPATIENT)
Facility: HOSPITAL | Age: 63
Setting detail: OBSERVATION
Discharge: HOME | End: 2023-06-18
Attending: EMERGENCY MEDICINE | Admitting: HOSPITALIST
Payer: COMMERCIAL

## 2023-06-16 ENCOUNTER — APPOINTMENT (OUTPATIENT)
Dept: CT IMAGING | Facility: HOSPITAL | Age: 63
End: 2023-06-16
Payer: COMMERCIAL

## 2023-06-16 DIAGNOSIS — K56.600 PARTIAL SMALL BOWEL OBSTRUCTION: Primary | ICD-10-CM

## 2023-06-16 DIAGNOSIS — N39.0 ACUTE UTI: ICD-10-CM

## 2023-06-16 LAB
ALBUMIN SERPL-MCNC: 3.9 G/DL (ref 3.5–5.2)
ALBUMIN/GLOB SERPL: 1.2 G/DL
ALP SERPL-CCNC: 80 U/L (ref 39–117)
ALT SERPL W P-5'-P-CCNC: 16 U/L (ref 1–33)
ANION GAP SERPL CALCULATED.3IONS-SCNC: 7.4 MMOL/L (ref 5–15)
AST SERPL-CCNC: 19 U/L (ref 1–32)
BACTERIA UR QL AUTO: ABNORMAL /HPF
BASOPHILS # BLD AUTO: 0.02 10*3/MM3 (ref 0–0.2)
BASOPHILS NFR BLD AUTO: 0.2 % (ref 0–1.5)
BILIRUB SERPL-MCNC: 0.7 MG/DL (ref 0–1.2)
BILIRUB UR QL STRIP: NEGATIVE
BUN SERPL-MCNC: 8 MG/DL (ref 8–23)
BUN/CREAT SERPL: 11.6 (ref 7–25)
CALCIUM SPEC-SCNC: 9.6 MG/DL (ref 8.6–10.5)
CHLORIDE SERPL-SCNC: 107 MMOL/L (ref 98–107)
CLARITY UR: CLEAR
CO2 SERPL-SCNC: 27.6 MMOL/L (ref 22–29)
COLOR UR: YELLOW
CREAT SERPL-MCNC: 0.69 MG/DL (ref 0.57–1)
DEPRECATED RDW RBC AUTO: 41.5 FL (ref 37–54)
EGFRCR SERPLBLD CKD-EPI 2021: 97.7 ML/MIN/1.73
EOSINOPHIL # BLD AUTO: 0.07 10*3/MM3 (ref 0–0.4)
EOSINOPHIL NFR BLD AUTO: 0.6 % (ref 0.3–6.2)
ERYTHROCYTE [DISTWIDTH] IN BLOOD BY AUTOMATED COUNT: 13 % (ref 12.3–15.4)
GLOBULIN UR ELPH-MCNC: 3.3 GM/DL
GLUCOSE SERPL-MCNC: 139 MG/DL (ref 65–99)
GLUCOSE UR STRIP-MCNC: NEGATIVE MG/DL
HCT VFR BLD AUTO: 45.3 % (ref 34–46.6)
HGB BLD-MCNC: 14.5 G/DL (ref 12–15.9)
HGB UR QL STRIP.AUTO: ABNORMAL
HYALINE CASTS UR QL AUTO: ABNORMAL /LPF
IMM GRANULOCYTES # BLD AUTO: 0.05 10*3/MM3 (ref 0–0.05)
IMM GRANULOCYTES NFR BLD AUTO: 0.4 % (ref 0–0.5)
INR PPP: 1.01 (ref 0.9–1.1)
KETONES UR QL STRIP: ABNORMAL
LEUKOCYTE ESTERASE UR QL STRIP.AUTO: ABNORMAL
LIPASE SERPL-CCNC: 20 U/L (ref 13–60)
LYMPHOCYTES # BLD AUTO: 2.36 10*3/MM3 (ref 0.7–3.1)
LYMPHOCYTES NFR BLD AUTO: 19.1 % (ref 19.6–45.3)
MCH RBC QN AUTO: 28 PG (ref 26.6–33)
MCHC RBC AUTO-ENTMCNC: 32 G/DL (ref 31.5–35.7)
MCV RBC AUTO: 87.6 FL (ref 79–97)
MONOCYTES # BLD AUTO: 0.78 10*3/MM3 (ref 0.1–0.9)
MONOCYTES NFR BLD AUTO: 6.3 % (ref 5–12)
NEUTROPHILS NFR BLD AUTO: 73.4 % (ref 42.7–76)
NEUTROPHILS NFR BLD AUTO: 9.07 10*3/MM3 (ref 1.7–7)
NITRITE UR QL STRIP: NEGATIVE
NRBC BLD AUTO-RTO: 0 /100 WBC (ref 0–0.2)
PH UR STRIP.AUTO: 6 [PH] (ref 5–8)
PLATELET # BLD AUTO: 248 10*3/MM3 (ref 140–450)
PMV BLD AUTO: 10.6 FL (ref 6–12)
POTASSIUM SERPL-SCNC: 4.9 MMOL/L (ref 3.5–5.2)
PROT SERPL-MCNC: 7.2 G/DL (ref 6–8.5)
PROT UR QL STRIP: ABNORMAL
PROTHROMBIN TIME: 13.5 SECONDS (ref 11.7–14.2)
RBC # BLD AUTO: 5.17 10*6/MM3 (ref 3.77–5.28)
RBC # UR STRIP: ABNORMAL /HPF
REF LAB TEST METHOD: ABNORMAL
SODIUM SERPL-SCNC: 142 MMOL/L (ref 136–145)
SP GR UR STRIP: 1.02 (ref 1–1.03)
SQUAMOUS #/AREA URNS HPF: ABNORMAL /HPF
UROBILINOGEN UR QL STRIP: ABNORMAL
WBC # UR STRIP: ABNORMAL /HPF
WBC NRBC COR # BLD: 12.35 10*3/MM3 (ref 3.4–10.8)

## 2023-06-16 PROCEDURE — 85025 COMPLETE CBC W/AUTO DIFF WBC: CPT | Performed by: EMERGENCY MEDICINE

## 2023-06-16 PROCEDURE — 80053 COMPREHEN METABOLIC PANEL: CPT | Performed by: EMERGENCY MEDICINE

## 2023-06-16 PROCEDURE — 74177 CT ABD & PELVIS W/CONTRAST: CPT

## 2023-06-16 PROCEDURE — 83690 ASSAY OF LIPASE: CPT | Performed by: EMERGENCY MEDICINE

## 2023-06-16 PROCEDURE — 99285 EMERGENCY DEPT VISIT HI MDM: CPT

## 2023-06-16 PROCEDURE — 87086 URINE CULTURE/COLONY COUNT: CPT | Performed by: EMERGENCY MEDICINE

## 2023-06-16 PROCEDURE — 81001 URINALYSIS AUTO W/SCOPE: CPT | Performed by: EMERGENCY MEDICINE

## 2023-06-16 PROCEDURE — 85610 PROTHROMBIN TIME: CPT | Performed by: EMERGENCY MEDICINE

## 2023-06-16 PROCEDURE — 25510000001 IOPAMIDOL 61 % SOLUTION: Performed by: EMERGENCY MEDICINE

## 2023-06-16 RX ORDER — SODIUM CHLORIDE 9 MG/ML
75 INJECTION, SOLUTION INTRAVENOUS CONTINUOUS
Status: DISCONTINUED | OUTPATIENT
Start: 2023-06-16 | End: 2023-06-18

## 2023-06-16 RX ADMIN — IOPAMIDOL 85 ML: 612 INJECTION, SOLUTION INTRAVENOUS at 22:21

## 2023-06-17 PROBLEM — K56.600 PARTIAL SMALL BOWEL OBSTRUCTION: Status: ACTIVE | Noted: 2023-06-17

## 2023-06-17 LAB
ANION GAP SERPL CALCULATED.3IONS-SCNC: 6.6 MMOL/L (ref 5–15)
BASOPHILS # BLD AUTO: 0.02 10*3/MM3 (ref 0–0.2)
BASOPHILS NFR BLD AUTO: 0.2 % (ref 0–1.5)
BUN SERPL-MCNC: 6 MG/DL (ref 8–23)
BUN/CREAT SERPL: 10.9 (ref 7–25)
CALCIUM SPEC-SCNC: 9.3 MG/DL (ref 8.6–10.5)
CHLORIDE SERPL-SCNC: 106 MMOL/L (ref 98–107)
CO2 SERPL-SCNC: 27.4 MMOL/L (ref 22–29)
CREAT SERPL-MCNC: 0.55 MG/DL (ref 0.57–1)
D-LACTATE SERPL-SCNC: 0.5 MMOL/L (ref 0.5–2)
DEPRECATED RDW RBC AUTO: 39.7 FL (ref 37–54)
EGFRCR SERPLBLD CKD-EPI 2021: 103.1 ML/MIN/1.73
EOSINOPHIL # BLD AUTO: 0.08 10*3/MM3 (ref 0–0.4)
EOSINOPHIL NFR BLD AUTO: 0.7 % (ref 0.3–6.2)
ERYTHROCYTE [DISTWIDTH] IN BLOOD BY AUTOMATED COUNT: 13 % (ref 12.3–15.4)
GLUCOSE SERPL-MCNC: 81 MG/DL (ref 65–99)
HCT VFR BLD AUTO: 41.4 % (ref 34–46.6)
HGB BLD-MCNC: 13.9 G/DL (ref 12–15.9)
IMM GRANULOCYTES # BLD AUTO: 0.03 10*3/MM3 (ref 0–0.05)
IMM GRANULOCYTES NFR BLD AUTO: 0.3 % (ref 0–0.5)
LYMPHOCYTES # BLD AUTO: 3.22 10*3/MM3 (ref 0.7–3.1)
LYMPHOCYTES NFR BLD AUTO: 29.8 % (ref 19.6–45.3)
MCH RBC QN AUTO: 28.5 PG (ref 26.6–33)
MCHC RBC AUTO-ENTMCNC: 33.6 G/DL (ref 31.5–35.7)
MCV RBC AUTO: 85 FL (ref 79–97)
MONOCYTES # BLD AUTO: 0.74 10*3/MM3 (ref 0.1–0.9)
MONOCYTES NFR BLD AUTO: 6.8 % (ref 5–12)
NEUTROPHILS NFR BLD AUTO: 6.73 10*3/MM3 (ref 1.7–7)
NEUTROPHILS NFR BLD AUTO: 62.2 % (ref 42.7–76)
NRBC BLD AUTO-RTO: 0 /100 WBC (ref 0–0.2)
NT-PROBNP SERPL-MCNC: 27.3 PG/ML (ref 0–900)
PLATELET # BLD AUTO: 224 10*3/MM3 (ref 140–450)
PMV BLD AUTO: 10.4 FL (ref 6–12)
POTASSIUM SERPL-SCNC: 3.9 MMOL/L (ref 3.5–5.2)
RBC # BLD AUTO: 4.87 10*6/MM3 (ref 3.77–5.28)
SODIUM SERPL-SCNC: 140 MMOL/L (ref 136–145)
WBC NRBC COR # BLD: 10.82 10*3/MM3 (ref 3.4–10.8)

## 2023-06-17 PROCEDURE — G0378 HOSPITAL OBSERVATION PER HR: HCPCS

## 2023-06-17 PROCEDURE — 99221 1ST HOSP IP/OBS SF/LOW 40: CPT | Performed by: SURGERY

## 2023-06-17 PROCEDURE — 96375 TX/PRO/DX INJ NEW DRUG ADDON: CPT

## 2023-06-17 PROCEDURE — 96365 THER/PROPH/DIAG IV INF INIT: CPT

## 2023-06-17 PROCEDURE — 85025 COMPLETE CBC W/AUTO DIFF WBC: CPT | Performed by: HOSPITALIST

## 2023-06-17 PROCEDURE — 83605 ASSAY OF LACTIC ACID: CPT | Performed by: HOSPITALIST

## 2023-06-17 PROCEDURE — 25010000002 CEFTRIAXONE PER 250 MG: Performed by: HOSPITALIST

## 2023-06-17 PROCEDURE — 96361 HYDRATE IV INFUSION ADD-ON: CPT

## 2023-06-17 PROCEDURE — 83880 ASSAY OF NATRIURETIC PEPTIDE: CPT | Performed by: HOSPITALIST

## 2023-06-17 PROCEDURE — 25010000002 CEFTRIAXONE PER 250 MG: Performed by: EMERGENCY MEDICINE

## 2023-06-17 PROCEDURE — 96376 TX/PRO/DX INJ SAME DRUG ADON: CPT

## 2023-06-17 PROCEDURE — 80048 BASIC METABOLIC PNL TOTAL CA: CPT | Performed by: HOSPITALIST

## 2023-06-17 RX ORDER — ONDANSETRON 2 MG/ML
4 INJECTION INTRAMUSCULAR; INTRAVENOUS EVERY 4 HOURS PRN
Status: DISCONTINUED | OUTPATIENT
Start: 2023-06-17 | End: 2023-06-18

## 2023-06-17 RX ORDER — MELATONIN
5000 DAILY
Status: DISCONTINUED | OUTPATIENT
Start: 2023-06-17 | End: 2023-06-17

## 2023-06-17 RX ORDER — HYDROMORPHONE HYDROCHLORIDE 1 MG/ML
0.5 INJECTION, SOLUTION INTRAMUSCULAR; INTRAVENOUS; SUBCUTANEOUS EVERY 4 HOURS PRN
Status: DISCONTINUED | OUTPATIENT
Start: 2023-06-17 | End: 2023-06-18

## 2023-06-17 RX ORDER — OMEGA-3S/DHA/EPA/FISH OIL/D3 300MG-1000
1000 CAPSULE ORAL DAILY
Status: DISCONTINUED | OUTPATIENT
Start: 2023-06-17 | End: 2023-06-18 | Stop reason: HOSPADM

## 2023-06-17 RX ORDER — PANTOPRAZOLE SODIUM 40 MG/10ML
40 INJECTION, POWDER, LYOPHILIZED, FOR SOLUTION INTRAVENOUS EVERY 12 HOURS SCHEDULED
Status: DISCONTINUED | OUTPATIENT
Start: 2023-06-17 | End: 2023-06-18

## 2023-06-17 RX ORDER — AMLODIPINE BESYLATE 5 MG/1
5 TABLET ORAL
Status: DISCONTINUED | OUTPATIENT
Start: 2023-06-17 | End: 2023-06-18 | Stop reason: HOSPADM

## 2023-06-17 RX ADMIN — AMLODIPINE BESYLATE 5 MG: 5 TABLET ORAL at 13:40

## 2023-06-17 RX ADMIN — SODIUM CHLORIDE 125 ML/HR: 9 INJECTION, SOLUTION INTRAVENOUS at 00:00

## 2023-06-17 RX ADMIN — CEFTRIAXONE SODIUM 1 G: 1 INJECTION, POWDER, FOR SOLUTION INTRAMUSCULAR; INTRAVENOUS at 21:10

## 2023-06-17 RX ADMIN — SODIUM CHLORIDE 75 ML/HR: 9 INJECTION, SOLUTION INTRAVENOUS at 18:00

## 2023-06-17 RX ADMIN — PANTOPRAZOLE SODIUM 40 MG: 40 INJECTION, POWDER, FOR SOLUTION INTRAVENOUS at 21:11

## 2023-06-17 RX ADMIN — CEFTRIAXONE SODIUM 1 G: 1 INJECTION, POWDER, FOR SOLUTION INTRAMUSCULAR; INTRAVENOUS at 00:00

## 2023-06-17 RX ADMIN — Medication 1000 UNITS: at 13:41

## 2023-06-17 RX ADMIN — PANTOPRAZOLE SODIUM 40 MG: 40 INJECTION, POWDER, FOR SOLUTION INTRAVENOUS at 09:26

## 2023-06-17 NOTE — PLAN OF CARE
Goal Outcome Evaluation:           Progress: improving  Outcome Evaluation: vss, no c/o abd pain or nausea. having formed soft stool per colostomy. kee cl liqs well

## 2023-06-17 NOTE — ED NOTES
"Nursing report ED to floor  Hali Tejeda  62 y.o.  female    HPI :   Chief Complaint   Patient presents with    Abdominal Pain       Admitting doctor:   Gideon Hope MD    Admitting diagnosis:   The primary encounter diagnosis was Partial small bowel obstruction. A diagnosis of Acute UTI was also pertinent to this visit.    Code status:   Current Code Status       Date Active Code Status Order ID Comments User Context       Prior            Allergies:   Sulfa antibiotics    Isolation:   No active isolations    Intake and Output    Intake/Output Summary (Last 24 hours) at 6/17/2023 0114  Last data filed at 6/17/2023 0035  Gross per 24 hour   Intake 100 ml   Output --   Net 100 ml       Weight:       06/16/23 2047   Weight: 70.8 kg (156 lb)       Most recent vitals:   Vitals:    06/16/23 2047 06/16/23 2057 06/16/23 2101 06/16/23 2230   BP:  136/90 137/91 137/91   BP Location:  Right arm     Patient Position:  Sitting     Pulse: 93  84 76   Resp: 18   18   Temp: 97.5 °F (36.4 °C)   97.7 °F (36.5 °C)   TempSrc: Tympanic   Oral   SpO2: 97%  99% 99%   Weight: 70.8 kg (156 lb)      Height: 152.4 cm (60\")          Active LDAs/IV Access:   Lines, Drains & Airways       Active LDAs       Name Placement date Placement time Site Days    Peripheral IV 06/16/23 2126 Right Antecubital 06/16/23 2126  Antecubital  less than 1    Colostomy LLQ 02/03/23  1944  LLQ  133                    Labs (abnormal labs have a star):   Labs Reviewed   COMPREHENSIVE METABOLIC PANEL - Abnormal; Notable for the following components:       Result Value    Glucose 139 (*)     All other components within normal limits    Narrative:     GFR Normal >60  Chronic Kidney Disease <60  Kidney Failure <15     URINALYSIS W/ MICROSCOPIC IF INDICATED (NO CULTURE) - Abnormal; Notable for the following components:    Ketones, UA Trace (*)     Blood, UA Moderate (2+) (*)     Protein, UA Trace (*)     Leuk Esterase, UA Moderate (2+) (*)     All other components " within normal limits   CBC WITH AUTO DIFFERENTIAL - Abnormal; Notable for the following components:    WBC 12.35 (*)     Lymphocyte % 19.1 (*)     Neutrophils, Absolute 9.07 (*)     All other components within normal limits   URINALYSIS, MICROSCOPIC ONLY - Abnormal; Notable for the following components:    RBC, UA 21-30 (*)     WBC, UA Too Numerous to Count (*)     All other components within normal limits   PROTIME-INR - Normal   LIPASE - Normal   URINE CULTURE   CBC AND DIFFERENTIAL    Narrative:     The following orders were created for panel order CBC & Differential.  Procedure                               Abnormality         Status                     ---------                               -----------         ------                     CBC Auto Differential[[id number]]        Abnormal            Final result                 Please view results for these tests on the individual orders.       EKG:   No orders to display       Meds given in ED:   Medications   sodium chloride 0.9 % infusion (125 mL/hr Intravenous New Bag 6/17/23 0000)   iopamidol (ISOVUE-300) 61 % injection 100 mL (85 mL Intravenous Given 6/16/23 2221)   cefTRIAXone (ROCEPHIN) 1 g in sodium chloride 0.9 % 100 mL IVPB-VTB (0 g Intravenous Stopped 6/17/23 0035)       Imaging results:  CT Abdomen Pelvis With Contrast    Result Date: 6/16/2023  1.  Findings concerning for small bowel obstruction within the mid abdomen as described in more detail above. There is a suggestion of a more proximal transition point is well. The entrance of the left parastomal hernia and closed-loop obstruction cannot be excluded. Involved bowel loops also have a thickened appearance suggestive of coexistent enteritis. 2.  Postsurgical changes from recent colectomy and left lower quadrant colostomy formation are present. The degree of fat stranding and soft tissue thickening has improved since 02/17/2023; however the persistence is somewhat atypical. 3.  Other findings as  above.  This report was finalized on 2023 11:16 PM by Dr. Ovidio Gallardo M.D.       Ambulatory status:   - ad clara    Social issues:   Social History     Socioeconomic History    Marital status: Single   Tobacco Use    Smoking status: Former     Types: Cigarettes     Quit date: 2004     Years since quittin.3     Passive exposure: Past    Smokeless tobacco: Never    Tobacco comments:     Quit    Vaping Use    Vaping Use: Never used   Substance and Sexual Activity    Alcohol use: Yes     Alcohol/week: 1.0 standard drink     Types: 1 Glasses of wine per week     Comment: occ wine    Drug use: Never    Sexual activity: Defer     Birth control/protection: Hysterectomy, Post-menopausal       NIH Stroke Scale:         Mila Villafana RN  23 01:14 EDT

## 2023-06-17 NOTE — ED PROVIDER NOTES
" EMERGENCY DEPARTMENT ENCOUNTER    Room Number:    Date seen:  2023  PCP: Richard Delgado MD  Historian: Patient      HPI:  Chief Complaint: Abdominal pain  Context: Hali Tejeda is a 62 y.o. female who presents to the ED c/o abdominal pain since yesterday.  The patient denies nausea or vomiting.  She states she is still had good output in her colostomy today.  She denies dysuria.  The patient states she just feels swollen and \"tight\".  She states that hope this is just gas.  She states she had abdominal surgery in February with the patient and the ostomy and is hopeful to have it reversed in August.      PAST MEDICAL HISTORY  Active Ambulatory Problems     Diagnosis Date Noted    Left sided colitis with complication 2023    Colitis 2023    Nausea and vomiting 2023    Stricture of sigmoid colon 2023    Uterine anomaly 2023    Hypertension 2023    Avulsion fracture of distal fibula 2015    Screening for malignant neoplasm of colon 2023     Resolved Ambulatory Problems     Diagnosis Date Noted    No Resolved Ambulatory Problems     Past Medical History:   Diagnosis Date    Abnormal EKG 2020    Colostomy in place     H. pylori infection 2020    Hepatic steatosis 2020    Hiatal hernia     HTN (hypertension)     Hyperlipidemia     Insomnia     LGSIL on Pap smear of cervix     LGSIL Pap smear of vagina 2016    Snoring     Uterine fibroid     Vaginal atrophy          REVIEW OF SYSTEMS  All systems reviewed and negative except for those discussed in HPI.       PAST SURGICAL HISTORY  Past Surgical History:   Procedure Laterality Date    BREAST LUMPECTOMY Left     benign     SECTION N/A     COLON RESECTION N/A 2/3/2023    Procedure: COLON RESECTION LAPAROSCOPIC CONVERTED TO OPEN SIGMOID AND LEFT MOBILIZATION OF SPLENIC FLEXURE WITH DAVINCI ROBOT;  Surgeon: Lino Gaston MD;  Location: [facility];  Service: Robotics - DaVinci;  " Laterality: N/A;    COLONOSCOPY N/A 2014    COULD ONLY GET SCOPE TO 70 CM DUE TO SIGNIFICANT STRICTURE SECONDARY TO SEVERE DIVERTICULITIS OR CANCER, ACBE ORDERED, DR. PARAG HUDSON AT [place]    COLONOSCOPY N/A 2023    MODERATE STENOSIS IN SIGMOID-DILATED, MANY DIVERTICULA IN SIGMOID AND DESCENDING, COPIOUS AMTS OF STOOL, MUCOSAL TEAR 55 CM FROM ANAL VERGE, DR. JENNI SMITH AT [facility]    COLPOSCOPY N/A 2016    ENDOSCOPY N/A 2023    GASTRITIS, SMALL HIATAL HERNIA, DR. JENNI SMITH AT [facility]    ENDOSCOPY N/A 09/15/2009    DR. PARAG HUDSON AT [place]    GASTRIC SLEEVE LAPAROSCOPIC N/A 2020    WITH REMOVAL OF GASTRIC BAND, DR. OLIMPIA PALACIOS AT  [place]    HYSTERECTOMY N/A 2005    WITH RSO    LAPAROSCOPIC GASTRIC BANDING N/A 10/20/2019    DR. PARAG HUDSON AT [place]    SALPINGO OOPHORECTOMY Left     LSO, IN TEEN YEARS    WISDOM TOOTH EXTRACTION Bilateral          FAMILY HISTORY  Family History   Problem Relation Age of Onset    Diabetes Mother     Gout Brother     Colon cancer Paternal Grandmother     Cancer Paternal Grandmother     Cancer Paternal Aunt     Colon cancer Paternal Aunt          SOCIAL HISTORY  Social History     Socioeconomic History    Marital status: Single   Tobacco Use    Smoking status: Former     Types: Cigarettes     Quit date: 2004     Years since quittin.3     Passive exposure: Past    Smokeless tobacco: Never    Tobacco comments:     Quit    Vaping Use    Vaping Use: Never used   Substance and Sexual Activity    Alcohol use: Yes     Alcohol/week: 1.0 standard drink     Types: 1 Glasses of wine per week     Comment: occ wine    Drug use: Never    Sexual activity: Defer     Birth control/protection: Hysterectomy, Post-menopausal         ALLERGIES  Sulfa antibiotics      PHYSICAL EXAM  ED Triage Vitals   Temp Heart Rate Resp BP SpO2   23   97.5 °F (36.4 °C) 93 18 136/90 97 %      Temp src Heart  Rate Source Patient Position BP Location FiO2 (%)   06/16/23 2047 06/16/23 2047 06/16/23 2057 06/16/23 2057 --   Tympanic Monitor Sitting Right arm        Physical Exam      GENERAL: 62-year-old female in mild distress  HENT: NCAT: nares patent: Neck supple  EYES: no scleral icterus  CV: regular rhythm, normal rate  RESPIRATORY: normal effort  ABDOMEN: Softly distended with mild diffuse tenderness but no guarding or rebound: Diminished bowel sounds: Ostomy in left lower quadrant with formed stool: Healed midline surgical incision.  MUSCULOSKELETAL: no deformity  NEURO: alert with nonfocal neuro exam  PSYCH:  calm, cooperative  SKIN: warm, dry    Vital signs and nursing notes reviewed.      LAB RESULTS  Recent Results (from the past 24 hour(s))   Comprehensive Metabolic Panel    Collection Time: 06/16/23  9:19 PM    Specimen: Blood   Result Value Ref Range    Glucose 139 (H) 65 - 99 mg/dL    BUN 8 8 - 23 mg/dL    Creatinine 0.69 0.57 - 1.00 mg/dL    Sodium 142 136 - 145 mmol/L    Potassium 4.9 3.5 - 5.2 mmol/L    Chloride 107 98 - 107 mmol/L    CO2 27.6 22.0 - 29.0 mmol/L    Calcium 9.6 8.6 - 10.5 mg/dL    Total Protein 7.2 6.0 - 8.5 g/dL    Albumin 3.9 3.5 - 5.2 g/dL    ALT (SGPT) 16 1 - 33 U/L    AST (SGOT) 19 1 - 32 U/L    Alkaline Phosphatase 80 39 - 117 U/L    Total Bilirubin 0.7 0.0 - 1.2 mg/dL    Globulin 3.3 gm/dL    A/G Ratio 1.2 g/dL    BUN/Creatinine Ratio 11.6 7.0 - 25.0    Anion Gap 7.4 5.0 - 15.0 mmol/L    eGFR 97.7 >60.0 mL/min/1.73   Protime-INR    Collection Time: 06/16/23  9:19 PM    Specimen: Blood   Result Value Ref Range    Protime 13.5 11.7 - 14.2 Seconds    INR 1.01 0.90 - 1.10   Lipase    Collection Time: 06/16/23  9:19 PM    Specimen: Blood   Result Value Ref Range    Lipase 20 13 - 60 U/L   Urinalysis With Microscopic If Indicated (No Culture) - Urine, Clean Catch    Collection Time: 06/16/23  9:19 PM    Specimen: Urine, Clean Catch   Result Value Ref Range    Color, UA Yellow Yellow, Straw     Appearance, UA Clear Clear    pH, UA 6.0 5.0 - 8.0    Specific Gravity, UA 1.022 1.005 - 1.030    Glucose, UA Negative Negative    Ketones, UA Trace (A) Negative    Bilirubin, UA Negative Negative    Blood, UA Moderate (2+) (A) Negative    Protein, UA Trace (A) Negative    Leuk Esterase, UA Moderate (2+) (A) Negative    Nitrite, UA Negative Negative    Urobilinogen, UA 0.2 E.U./dL 0.2 - 1.0 E.U./dL   CBC Auto Differential    Collection Time: 06/16/23  9:19 PM    Specimen: Blood   Result Value Ref Range    WBC 12.35 (H) 3.40 - 10.80 10*3/mm3    RBC 5.17 3.77 - 5.28 10*6/mm3    Hemoglobin 14.5 12.0 - 15.9 g/dL    Hematocrit 45.3 34.0 - 46.6 %    MCV 87.6 79.0 - 97.0 fL    MCH 28.0 26.6 - 33.0 pg    MCHC 32.0 31.5 - 35.7 g/dL    RDW 13.0 12.3 - 15.4 %    RDW-SD 41.5 37.0 - 54.0 fl    MPV 10.6 6.0 - 12.0 fL    Platelets 248 140 - 450 10*3/mm3    Neutrophil % 73.4 42.7 - 76.0 %    Lymphocyte % 19.1 (L) 19.6 - 45.3 %    Monocyte % 6.3 5.0 - 12.0 %    Eosinophil % 0.6 0.3 - 6.2 %    Basophil % 0.2 0.0 - 1.5 %    Immature Grans % 0.4 0.0 - 0.5 %    Neutrophils, Absolute 9.07 (H) 1.70 - 7.00 10*3/mm3    Lymphocytes, Absolute 2.36 0.70 - 3.10 10*3/mm3    Monocytes, Absolute 0.78 0.10 - 0.90 10*3/mm3    Eosinophils, Absolute 0.07 0.00 - 0.40 10*3/mm3    Basophils, Absolute 0.02 0.00 - 0.20 10*3/mm3    Immature Grans, Absolute 0.05 0.00 - 0.05 10*3/mm3    nRBC 0.0 0.0 - 0.2 /100 WBC   Urinalysis, Microscopic Only - Urine, Clean Catch    Collection Time: 06/16/23  9:19 PM    Specimen: Urine, Clean Catch   Result Value Ref Range    RBC, UA 21-30 (A) None Seen, 0-2 /HPF    WBC, UA Too Numerous to Count (A) None Seen, 0-2 /HPF    Bacteria, UA None Seen None Seen /HPF    Squamous Epithelial Cells, UA 0-2 None Seen, 0-2 /HPF    Hyaline Casts, UA 13-20 None Seen /LPF    Methodology Automated Microscopy        Ordered the above labs and reviewed the results.        RADIOLOGY  CT Abdomen Pelvis With Contrast    Result Date:  6/16/2023  CT ABDOMEN AND PELVIS WITH IV CONTRAST  HISTORY: Upper gastrointestinal pain, recent colectomy in February this year  TECHNIQUE: Radiation dose reduction techniques were utilized, including automated exposure control and exposure modulation based on body size. 3 mm images were obtained through the abdomen and pelvis after the administration of IV contrast.  COMPARISON: CT abdomen pelvis 02/17/2023  FINDINGS:  The appendix is unremarkable. Postsurgical changes from sleeve gastrectomy are present. Postsurgical changes from recent colectomy are present with left lower quadrant colostomy formation. Overall the postsurgical changes of ill-defined fat stranding and soft tissue thickening throughout the abdomen and pelvis has improved since 02/17/2023. There is a parastomal hernia containing a short segment of small bowel. There is a short segment of dilation of the mid small bowel measuring up to 4 cm with apparent transition point within the left mid abdomen (axial image 61) with decompression of small bowel loops distally. There is concern for a more proximal transition point as well as small bowel loops enter the left parastomal hernia (image 54). The involve bowel loops also have a thickened, hyperenhancing appearance. Small amount of free fluid is interposed between these bowel loops. The liver, gallbladder, pancreas, spleen and adrenal glands have an unremarkable postcontrast CT appearance. Subcentimeter renal lesions are too small to characterize. Contrast excretion is present within the bilateral renal collecting systems and nephrolithiasis to be missed. There is no hydronephrosis. No abdominopelvic adenopathy by size criteria.  The bladder is decompressed and cannot be evaluated. Small amount of free fluid is present pelvis. No suspicious lytic blastic osseous lesions are present.      1.  Findings concerning for small bowel obstruction within the mid abdomen as described in more detail above. There is  a suggestion of a more proximal transition point is well. The entrance of the left parastomal hernia and closed-loop obstruction cannot be excluded. Involved bowel loops also have a thickened appearance suggestive of coexistent enteritis. 2.  Postsurgical changes from recent colectomy and left lower quadrant colostomy formation are present. The degree of fat stranding and soft tissue thickening has improved since 02/17/2023; however the persistence is somewhat atypical. 3.  Other findings as above.  This report was finalized on 6/16/2023 11:16 PM by Dr. Ovidio Gallardo M.D.       Ordered the above noted radiological studies. Reviewed by me in PACS.            PROCEDURES  Procedures          MEDICATIONS GIVEN IN ER  Medications   sodium chloride 0.9 % infusion (125 mL/hr Intravenous New Bag 6/17/23 0000)   iopamidol (ISOVUE-300) 61 % injection 100 mL (85 mL Intravenous Given 6/16/23 2221)   cefTRIAXone (ROCEPHIN) 1 g in sodium chloride 0.9 % 100 mL IVPB-VTB (0 g Intravenous Stopped 6/17/23 0035)             MEDICAL DECISION MAKING, PROGRESS, and CONSULTS    All labs have been independently reviewed by me.  All radiology studies have been reviewed by me and I have also reviewed the radiology report.   EKG's independently viewed and interpreted by me.  Discussion below represents my analysis of pertinent findings related to patient's condition, differential diagnosis, treatment plan and final disposition.      Additional sources:  - Discussed/ obtained information from independent historians: Patient's family members here who confirms the patient does appear to be bloated    - External (non-ED) record review: I reviewed the patient's admission from January 27 through February 21, 2023 when she was admitted for acute colitis with sigmoid stricture and perforation.  During that time she had a colon resection and colostomy.  She also had abdominal fluid collection that required CT-guided drainage.      - Shared decision  making: After shared decision-making discussion between myself and the patient we agree she needs to be admitted to the hospital for further evaluation and care      Orders placed during this visit:  Orders Placed This Encounter   Procedures    Urine Culture - Urine,    CT Abdomen Pelvis With Contrast    Comprehensive Metabolic Panel    Protime-INR    Lipase    Urinalysis With Microscopic If Indicated (No Culture) - Urine, Clean Catch    CBC Auto Differential    Urinalysis, Microscopic Only - Urine, Clean Catch    Surgery (on-call MD unless specified)    LIPPS (on-call MD unless specified)    Initiate Observation Status    CBC & Differential         Differential diagnosis:  My differential diagnosis includes but is not limited to gastritis, pancreatitis, cholecystitis, appendicitis, diverticulitis, urinary tract infection, kidney stone, or bowel obstruction.        Independent interpretation of labs, radiology studies, and discussions with consultants:  ED Course as of 06/17/23 0044   Fri Jun 16, 2023 2108 I will obtain labs and CT scan for further evaluation. [GP]   2340 The patient's CT scan was concerning for a small bowel obstruction.  The patient does have a UTI.  I will cover her with a gram of Rocephin and send off urine culture and consult surgery. [GP]   2349 On repeat examination the patient is not vomiting and states she has had some stool output today. [GP]   2352 I discussed the case with Dr. Herrera from surgery.  She will consult on the case. [GP]   Sat Jun 17, 2023   0002 I discussed the case with Dr. Hope who will admit the patient to a [facility] bed for further evaluation and care. [GP]      ED Course User Index  [GP] Donovan Cisneros MD               DIAGNOSIS  Final diagnoses:   Partial small bowel obstruction   Acute UTI         DISPOSITION  ADMISSION    Discussed treatment plan and reason for admission with pt/family and admitting physician.  Pt/family voiced understanding of the plan for  admission for further testing/treatment as needed.            Latest Documented Vital Signs:  As of 00:44 EDT  BP- 137/91 HR- 76 Temp- 97.7 °F (36.5 °C) (Oral) O2 sat- 99%--      --------------------  Please note that portions of this were completed with a voice recognition program.       Note Disclaimer: At [facility], we believe that sharing information builds trust and better relationships. You are receiving this note because you are receiving care at [facility] or recently visited. It is possible you will see health information before a provider has talked with you about it. This kind of information can be easy to misunderstand. To help you fully understand what it means for your health, we urge you to discuss this note with your provider.             Donovan Cisneros MD  06/17/23 0045

## 2023-06-17 NOTE — CONSULTS
General Surgery Consultation    Consulting Physician: Jennifer Herrera MD      Reason for consultation: Small bowel obstruction    CC: Abdominal pain, bloating    HPI:   The patient is a very pleasant 62 y.o. female who presented to the hospital with abdominal pain, bloating and decreased ostomy output.  CT scan was concerning for small bowel obstruction.  Since being admitted, she reports her pain has improved, she does not have any nausea, and her ostomy has began producing gas and stool.  She states the bloating and pain began after eating a salad and drinking 2% milk.    Past Medical History:  Past Medical History:   Diagnosis Date    Abnormal EKG 2020    Avulsion fracture of distal fibula 2015    Colostomy in place     FOLLOWED BY DR. JESSI GASTON    H. pylori infection 2020    Hepatic steatosis 2020    Hiatal hernia     HTN (hypertension)     Hyperlipidemia     Insomnia     Left sided colitis with complication 2023    ADMITTED TO [facility]    LGSIL on Pap smear of cervix     (+) HPV    LGSIL Pap smear of vagina 2016    Snoring     Stricture of sigmoid colon 2023    Uterine fibroid     Vaginal atrophy        Past Surgical History:  Past Surgical History:   Procedure Laterality Date    BREAST LUMPECTOMY Left     benign     SECTION N/A     COLON RESECTION N/A 2/3/2023    Procedure: COLON RESECTION LAPAROSCOPIC CONVERTED TO OPEN SIGMOID AND LEFT MOBILIZATION OF SPLENIC FLEXURE WITH DAVINCI ROBOT;  Surgeon: Jessi Gaston MD;  Location: [facility];  Service: Robotics - DaVinci;  Laterality: N/A;    COLONOSCOPY N/A 2014    COULD ONLY GET SCOPE TO 70 CM DUE TO SIGNIFICANT STRICTURE SECONDARY TO SEVERE DIVERTICULITIS OR CANCER, ACBE ORDERED, DR. PARAG HUDSON AT [place]    COLONOSCOPY N/A 2023    MODERATE STENOSIS IN SIGMOID-DILATED, MANY DIVERTICULA IN SIGMOID AND DESCENDING, COPIOUS AMTS OF STOOL, MUCOSAL TEAR 55 CM FROM ANAL VERGE, DR. JENNI SMITH AT [facility]     COLPOSCOPY N/A 2016    ENDOSCOPY N/A 2023    GASTRITIS, SMALL HIATAL HERNIA, DR. JENNI SMITH AT [facility]    ENDOSCOPY N/A 09/15/2009    DR. PARAG HUDSON AT [place]    GASTRIC SLEEVE LAPAROSCOPIC N/A 2020    WITH REMOVAL OF GASTRIC BAND, DR. OLIMPIA PALACIOS AT  [facility]    HYSTERECTOMY N/A 2005    WITH RSO    LAPAROSCOPIC GASTRIC BANDING N/A 10/20/2019    DR. PARAG HUDSON AT [place]    SALPINGO OOPHORECTOMY Left     LSO, IN TEEN YEARS    WISDOM TOOTH EXTRACTION Bilateral        Medications:  Medications Prior to Admission   Medication Sig Dispense Refill Last Dose    amLODIPine (NORVASC) 10 MG tablet Take 1 tablet by mouth Daily.       Biotin 10 MG capsule Take  by mouth.       Oral Electrolytes (EMERGEN-C ELECTRO MIX PO) Take  by mouth. (Patient not taking: Reported on 2023)       vitamin D3 125 MCG (5000 UT) capsule capsule Take 1 capsule by mouth Daily.          Allergies:   Allergies   Allergen Reactions    Sulfa Antibiotics Itching       Social History:   Social History     Socioeconomic History    Marital status: Single   Tobacco Use    Smoking status: Former     Types: Cigarettes     Quit date: 2004     Years since quittin.3     Passive exposure: Past    Smokeless tobacco: Never    Tobacco comments:     Quit    Vaping Use    Vaping Use: Never used   Substance and Sexual Activity    Alcohol use: Yes     Alcohol/week: 1.0 standard drink     Types: 1 Glasses of wine per week     Comment: occ wine    Drug use: Never    Sexual activity: Defer     Birth control/protection: Hysterectomy, Post-menopausal       Family History:   Family History   Problem Relation Age of Onset    Diabetes Mother     Gout Brother     Colon cancer Paternal Grandmother     Cancer Paternal Grandmother     Cancer Paternal Aunt     Colon cancer Paternal Aunt        Review of Systems:  Constitutional: denies any weight changes, fatigue, or weakness  Eyes: denies blurred/double vision or scleral  icterus  Cardiovascular: denies chest pain, palpitations, or edemas  Respiratory: denies cough, sputum, or dyspnea  Gastrointestinal: Reports bloating, pain, decreased ostomy output  Genitourinary: denies dysuria or hematuria  Endocrine: denies cold intolerance, lethargy, or flushing  Hematologic: denies excessive bruising or bleeding  Musculoskeletal: denies weakness, joint swelling, joint pain, or stiffness  Neurologic: denies seizures, CVA, paresthesia, or peripheral neuropathy  Skin: denies change in nevi, rashes, masses, or jaundice     All other systems reviewed and were negative.    Physical Exam:   Vitals:    06/17/23 0901   BP: 136/82   Pulse: 63   Resp: 18   Temp: 98 °F (36.7 °C)   SpO2: 99%     Height: 60 inches  Weight: 70.8 kg  BMI: 30.47  GENERAL: awake and alert, no acute distress, oriented to person, place, and time  HEENT: normocephalic, atraumatic, no scleral icterus, moist mucous membranes  NECK: Supple, there is no thyromegaly or lymphadenopathy  RESPIRATORY: clear to auscultation, no wheezes, rales or rhonchi, symmetric air entry  CARDIOVASCULAR: regular rate and rhythm    GASTROINTESTINAL: Soft, nondistended, slightly tender to palpation, ostomy pink patent and productive of stool  MUSCULOSKELETAL: no cyanosis, clubbing, or edema   NEUROLOGIC: alert and oriented, normal speech, cranial nerves 2-12 grossly intact, no focal deficits   SKIN: Moist, warm, no rashes, no jaundice      Diagnostic work-up:     Pertinent labs:   Results from last 7 days   Lab Units 06/17/23  0549 06/16/23  2119   WBC 10*3/mm3 10.82* 12.35*   HEMOGLOBIN g/dL 13.9 14.5   HEMATOCRIT % 41.4 45.3   PLATELETS 10*3/mm3 224 248     Results from last 7 days   Lab Units 06/17/23  0549 06/16/23  2119   SODIUM mmol/L 140 142   POTASSIUM mmol/L 3.9 4.9   CHLORIDE mmol/L 106 107   CO2 mmol/L 27.4 27.6   BUN mg/dL 6* 8   CREATININE mg/dL 0.55* 0.69   CALCIUM mg/dL 9.3 9.6   BILIRUBIN mg/dL  --  0.7   ALK PHOS U/L  --  80   ALT (SGPT)  U/L  --  16   AST (SGOT) U/L  --  19   GLUCOSE mg/dL 81 139*       Imaging:  CT images showed some abnormally dilated loops of small bowel concerning for small bowel obstruction.    Assessment and plan:   The patient is a 62 y.o. female with history of colostomy who presented with small bowel obstruction that seems to be resolving on its own.    Okay to start clear liquid diet and advance as tolerated.  Discharge once tolerating a GI soft diet if continues to have ostomy output.      Jennifer Herrera MD  General, Robotic, and Endoscopic Surgery  [facility]     [address], [address]  [place], [place], [address]  P: [phone]  F: [phone]

## 2023-06-17 NOTE — PLAN OF CARE
Cimzia 400 mg administered subcutaneous in divided doses of 200 mg each in right and left abdomen.     Pt instructed on s/s to report to MD/RN.     Instructed to wait in clinic x 15 min. Post injection.   Pt verbalized understanding and tolerated injection well without adverse events.    Goal Outcome Evaluation:  Plan of Care Reviewed With: patient        Progress: no change  Outcome Evaluation: Pt admitted through the ED w/ c/o abdominal pain/partial SBO, A/O, up ad clara, on room air, NPO w/ sips w/meds, IVF infusing, colostomy present to LLQ, mother at bedside

## 2023-06-18 ENCOUNTER — APPOINTMENT (OUTPATIENT)
Dept: GENERAL RADIOLOGY | Facility: HOSPITAL | Age: 63
End: 2023-06-18
Payer: COMMERCIAL

## 2023-06-18 VITALS
DIASTOLIC BLOOD PRESSURE: 77 MMHG | TEMPERATURE: 97.2 F | SYSTOLIC BLOOD PRESSURE: 121 MMHG | RESPIRATION RATE: 16 BRPM | WEIGHT: 156 LBS | BODY MASS INDEX: 30.63 KG/M2 | HEIGHT: 60 IN | OXYGEN SATURATION: 100 % | HEART RATE: 75 BPM

## 2023-06-18 LAB
ALBUMIN SERPL-MCNC: 3.4 G/DL (ref 3.5–5.2)
ALBUMIN/GLOB SERPL: 1.1 G/DL
ALP SERPL-CCNC: 63 U/L (ref 39–117)
ALT SERPL W P-5'-P-CCNC: 10 U/L (ref 1–33)
ANION GAP SERPL CALCULATED.3IONS-SCNC: 9 MMOL/L (ref 5–15)
AST SERPL-CCNC: 16 U/L (ref 1–32)
BASOPHILS # BLD AUTO: 0.03 10*3/MM3 (ref 0–0.2)
BASOPHILS NFR BLD AUTO: 0.4 % (ref 0–1.5)
BILIRUB SERPL-MCNC: 0.4 MG/DL (ref 0–1.2)
BUN SERPL-MCNC: 5 MG/DL (ref 8–23)
BUN/CREAT SERPL: 10.2 (ref 7–25)
CALCIUM SPEC-SCNC: 8.9 MG/DL (ref 8.6–10.5)
CHLORIDE SERPL-SCNC: 108 MMOL/L (ref 98–107)
CHOLEST SERPL-MCNC: 200 MG/DL (ref 0–200)
CO2 SERPL-SCNC: 26 MMOL/L (ref 22–29)
CREAT SERPL-MCNC: 0.49 MG/DL (ref 0.57–1)
DEPRECATED RDW RBC AUTO: 39.8 FL (ref 37–54)
EGFRCR SERPLBLD CKD-EPI 2021: 106.1 ML/MIN/1.73
EOSINOPHIL # BLD AUTO: 0.09 10*3/MM3 (ref 0–0.4)
EOSINOPHIL NFR BLD AUTO: 1.1 % (ref 0.3–6.2)
ERYTHROCYTE [DISTWIDTH] IN BLOOD BY AUTOMATED COUNT: 12.8 % (ref 12.3–15.4)
GLOBULIN UR ELPH-MCNC: 3.1 GM/DL
GLUCOSE SERPL-MCNC: 67 MG/DL (ref 65–99)
HBA1C MFR BLD: <4.3 % (ref 4.8–5.6)
HCT VFR BLD AUTO: 42 % (ref 34–46.6)
HDLC SERPL-MCNC: 67 MG/DL (ref 40–60)
HGB BLD-MCNC: 13.4 G/DL (ref 12–15.9)
IMM GRANULOCYTES # BLD AUTO: 0.03 10*3/MM3 (ref 0–0.05)
IMM GRANULOCYTES NFR BLD AUTO: 0.4 % (ref 0–0.5)
LDLC SERPL CALC-MCNC: 121 MG/DL (ref 0–100)
LDLC/HDLC SERPL: 1.79 {RATIO}
LYMPHOCYTES # BLD AUTO: 2.76 10*3/MM3 (ref 0.7–3.1)
LYMPHOCYTES NFR BLD AUTO: 34.5 % (ref 19.6–45.3)
MCH RBC QN AUTO: 27.5 PG (ref 26.6–33)
MCHC RBC AUTO-ENTMCNC: 31.9 G/DL (ref 31.5–35.7)
MCV RBC AUTO: 86.1 FL (ref 79–97)
MONOCYTES # BLD AUTO: 0.53 10*3/MM3 (ref 0.1–0.9)
MONOCYTES NFR BLD AUTO: 6.6 % (ref 5–12)
NEUTROPHILS NFR BLD AUTO: 4.55 10*3/MM3 (ref 1.7–7)
NEUTROPHILS NFR BLD AUTO: 57 % (ref 42.7–76)
NRBC BLD AUTO-RTO: 0 /100 WBC (ref 0–0.2)
PLATELET # BLD AUTO: 213 10*3/MM3 (ref 140–450)
PMV BLD AUTO: 10.2 FL (ref 6–12)
POTASSIUM SERPL-SCNC: 3.9 MMOL/L (ref 3.5–5.2)
PROT SERPL-MCNC: 6.5 G/DL (ref 6–8.5)
RBC # BLD AUTO: 4.88 10*6/MM3 (ref 3.77–5.28)
SODIUM SERPL-SCNC: 143 MMOL/L (ref 136–145)
TRIGL SERPL-MCNC: 66 MG/DL (ref 0–150)
TSH SERPL DL<=0.05 MIU/L-ACNC: 1.06 UIU/ML (ref 0.27–4.2)
VLDLC SERPL-MCNC: 12 MG/DL (ref 5–40)
WBC NRBC COR # BLD: 7.99 10*3/MM3 (ref 3.4–10.8)

## 2023-06-18 PROCEDURE — 80061 LIPID PANEL: CPT | Performed by: HOSPITALIST

## 2023-06-18 PROCEDURE — 99232 SBSQ HOSP IP/OBS MODERATE 35: CPT | Performed by: SURGERY

## 2023-06-18 PROCEDURE — 83036 HEMOGLOBIN GLYCOSYLATED A1C: CPT | Performed by: HOSPITALIST

## 2023-06-18 PROCEDURE — 74018 RADEX ABDOMEN 1 VIEW: CPT

## 2023-06-18 PROCEDURE — 96376 TX/PRO/DX INJ SAME DRUG ADON: CPT

## 2023-06-18 PROCEDURE — G0378 HOSPITAL OBSERVATION PER HR: HCPCS

## 2023-06-18 PROCEDURE — 80050 GENERAL HEALTH PANEL: CPT | Performed by: HOSPITALIST

## 2023-06-18 RX ORDER — AMLODIPINE BESYLATE 5 MG/1
5 TABLET ORAL
Qty: 30 TABLET | Refills: 0 | Status: SHIPPED | OUTPATIENT
Start: 2023-06-19 | End: 2023-07-19

## 2023-06-18 RX ORDER — PANTOPRAZOLE SODIUM 40 MG/1
40 TABLET, DELAYED RELEASE ORAL
Qty: 30 TABLET | Refills: 0 | Status: SHIPPED | OUTPATIENT
Start: 2023-06-19 | End: 2023-07-19

## 2023-06-18 RX ORDER — PANTOPRAZOLE SODIUM 40 MG/1
40 TABLET, DELAYED RELEASE ORAL
Status: DISCONTINUED | OUTPATIENT
Start: 2023-06-19 | End: 2023-06-18 | Stop reason: HOSPADM

## 2023-06-18 RX ADMIN — Medication 1000 UNITS: at 10:50

## 2023-06-18 RX ADMIN — PANTOPRAZOLE SODIUM 40 MG: 40 INJECTION, POWDER, FOR SOLUTION INTRAVENOUS at 10:51

## 2023-06-18 RX ADMIN — AMLODIPINE BESYLATE 5 MG: 5 TABLET ORAL at 10:50

## 2023-06-18 RX ADMIN — SODIUM CHLORIDE 75 ML/HR: 9 INJECTION, SOLUTION INTRAVENOUS at 10:51

## 2023-06-18 NOTE — DISCHARGE SUMMARY
Discharge summary    Date of admission 6/16/2023  Date of discharge 6/18/2023    Final diagnosis  Partial small bowel obstruction resolved  Acute UTI completed antibiotics  Recent colon resection and colostomy secondary to colitis with sigmoid stricture  Hypertension  Gastroesophageal reflux disease    Discharge medications    Current Facility-Administered Medications:     amLODIPine (NORVASC) tablet 5 mg, 5 mg, Oral, Q24H, Sang Hope MD, 5 mg at 06/18/23 1050    cholecalciferol (VITAMIN D3) tablet 1,000 Units, 1,000 Units, Oral, Daily, Sang Hope MD, 1,000 Units at 06/18/23 1050    [START ON 6/19/2023] pantoprazole (PROTONIX) EC tablet 40 mg, 40 mg, Oral, Q AM, Sang Hope MD     Consults obtained  General surgery    Procedures  None    Hospital course  67-year-old -American female with history of colitis sigmoid stricture underwent colon resection and colostomy admitted through emergency room with abdominal pain.  Patient work-up in ER revealed partial small bowel obstruction also found to have mild UTI admit for management.  Patient admitted treated with IV fluid IV antibiotics bowel rest and general surgery consult obtained and received symptomatic treatment for abdominal pain nausea vomiting.  Patient responded to the treatment started on clear liquid diet and advance as tolerated.  Patient tolerating regular diet and wants to go home and clear for discharge.  Patient antibiotics discontinued at the time of discharge.  Patient colostomy working fine.    Discharge diet regular    Activity as tolerated    Medications as above    Follow-up with prime doctor in 1 week and follow-up with general surgery per the instructions and take medication as directed.    SANG HOPE MD

## 2023-06-18 NOTE — PLAN OF CARE
Goal Outcome Evaluation:  Plan of Care Reviewed With: patient        Progress: improving  Outcome Evaluation: VSS, up ad clara, on room air, A/O, IVF infusing, colostomy to LLQ, no c/o pain, no c/o nausea

## 2023-06-18 NOTE — PROGRESS NOTES
"Daily progress note     Primary care physician   Dr. Delgado    Chief complaint  Doing better with no abdominal pain nausea vomiting and tolerating regular diet and colostomy working.  Patient wants to go home.    History of present illness  62-year-old -American female who is well-known to our service with history of colitis sigmoid stricture underwent colon resection colostomy in February this year presented to [facility] emergency room with abdominal pain started yesterday with no nausea vomiting diarrhea.  Patient has decreased colostomy output.  Patient also denies any fever chills night sweats weight loss or weight gain.  Patient work-up in ER revealed acute UTI and partial small bowel obstruction admit for management.     REVIEW OF SYSTEMS  All systems reviewed and negative except for those discussed in HPI.     PHYSICAL EXAM  Blood pressure 121/77, pulse 75, temperature 97.2 °F (36.2 °C), temperature source Oral, resp. rate 16, height 152.4 cm (60\"), weight 70.8 kg (156 lb), SpO2 100 %, not currently breastfeeding.    GENERAL: Awake and alert in no distress   HENT: Unremarkable  NECK: Supple  CV: regular rhythm, normal rate S1-S2  RESPIRATORY: normal effort and moving air bilaterally  ABDOMEN: Softly distended with mild diffuse tenderness but no guarding or rebound: Diminished bowel sounds: Ostomy in left lower quadrant with formed stool:  MUSCULOSKELETAL: no deformity  NEURO: alert with nonfocal neuro exam  PSYCH:  calm, cooperative  SKIN: warm, dry     LAB RESULTS  Lab Results (last 24 hours)       Procedure Component Value Units Date/Time    TSH [[id number]]  (Normal) Collected: 06/18/23 0457    Specimen: Blood Updated: 06/18/23 0556     TSH 1.060 uIU/mL     Comprehensive Metabolic Panel [[id number]]  (Abnormal) Collected: 06/18/23 0457    Specimen: Blood Updated: 06/18/23 0550     Glucose 67 mg/dL      BUN 5 mg/dL      Creatinine 0.49 mg/dL      Sodium 143 mmol/L      Potassium 3.9 mmol/L  "     Chloride 108 mmol/L      CO2 26.0 mmol/L      Calcium 8.9 mg/dL      Total Protein 6.5 g/dL      Albumin 3.4 g/dL      ALT (SGPT) 10 U/L      AST (SGOT) 16 U/L      Alkaline Phosphatase 63 U/L      Total Bilirubin 0.4 mg/dL      Globulin 3.1 gm/dL      A/G Ratio 1.1 g/dL      BUN/Creatinine Ratio 10.2     Anion Gap 9.0 mmol/L      eGFR 106.1 mL/min/1.73     Narrative:      GFR Normal >60  Chronic Kidney Disease <60  Kidney Failure <15      Lipid Panel [[id number]]  (Abnormal) Collected: 06/18/23 0457    Specimen: Blood Updated: 06/18/23 0550     Total Cholesterol 200 mg/dL      Triglycerides 66 mg/dL      HDL Cholesterol 67 mg/dL      LDL Cholesterol  121 mg/dL      VLDL Cholesterol 12 mg/dL      LDL/HDL Ratio 1.79    Narrative:      Cholesterol Reference Ranges  (U.S. Department of Health and Human Services ATP III Classifications)    Desirable          <200 mg/dL  Borderline High    200-239 mg/dL  High Risk          >240 mg/dL      Triglyceride Reference Ranges  (U.S. Department of Health and Human Services ATP III Classifications)    Normal           <150 mg/dL  Borderline High  150-199 mg/dL  High             200-499 mg/dL  Very High        >500 mg/dL    HDL Reference Ranges  (U.S. Department of Health and Human Services ATP III Classifications)    Low     <40 mg/dl (major risk factor for CHD)  High    >60 mg/dl ('negative' risk factor for CHD)        LDL Reference Ranges  (U.S. Department of Health and Human Services ATP III Classifications)    Optimal          <100 mg/dL  Near Optimal     100-129 mg/dL  Borderline High  130-159 mg/dL  High             160-189 mg/dL  Very High        >189 mg/dL    Hemoglobin A1c [[id number]]  (Abnormal) Collected: 06/18/23 0457    Specimen: Blood Updated: 06/18/23 0543     Hemoglobin A1C <4.30 %     Narrative:      Hemoglobin A1C Ranges:    Increased Risk for Diabetes  5.7% to 6.4%  Diabetes                     >= 6.5%  Diabetic Goal                < 7.0%    CBC &  Differential [[id number]]  (Normal) Collected: 06/18/23 0457    Specimen: Blood Updated: 06/18/23 0534    Narrative:      The following orders were created for panel order CBC & Differential.  Procedure                               Abnormality         Status                     ---------                               -----------         ------                     CBC Auto Differential[[id number]]        Normal              Final result                 Please view results for these tests on the individual orders.    CBC Auto Differential [[id number]]  (Normal) Collected: 06/18/23 0457    Specimen: Blood Updated: 06/18/23 0534     WBC 7.99 10*3/mm3      RBC 4.88 10*6/mm3      Hemoglobin 13.4 g/dL      Hematocrit 42.0 %      MCV 86.1 fL      MCH 27.5 pg      MCHC 31.9 g/dL      RDW 12.8 %      RDW-SD 39.8 fl      MPV 10.2 fL      Platelets 213 10*3/mm3      Neutrophil % 57.0 %      Lymphocyte % 34.5 %      Monocyte % 6.6 %      Eosinophil % 1.1 %      Basophil % 0.4 %      Immature Grans % 0.4 %      Neutrophils, Absolute 4.55 10*3/mm3      Lymphocytes, Absolute 2.76 10*3/mm3      Monocytes, Absolute 0.53 10*3/mm3      Eosinophils, Absolute 0.09 10*3/mm3      Basophils, Absolute 0.03 10*3/mm3      Immature Grans, Absolute 0.03 10*3/mm3      nRBC 0.0 /100 WBC     Urine Culture - Urine, Urine, Clean Catch [[id number]] Collected: 06/16/23 2119    Specimen: Urine, Clean Catch Updated: 06/17/23 1858     Urine Culture >100,000 CFU/mL Mixed Mishel Isolated    Narrative:      Call if further workup needed.    Specimen contains mixed organisms of questionable pathogenicity suggestive of contamination. If symptoms persist, suggest recollection.  Colonization of the urinary tract without infection is common. Treatment is discouraged unless the patient is symptomatic, pregnant, or undergoing an invasive urologic procedure.    BNP [[id number]]  (Normal) Collected: 06/17/23 1208    Specimen: Blood Updated: 06/17/23 1251     proBNP 27.3  pg/mL     Narrative:      Among patients with dyspnea, NT-proBNP is highly sensitive for the detection of acute congestive heart failure. In addition NT-proBNP of <300 pg/ml effectively rules out acute congestive heart failure with 99% negative predictive value.    Results may be falsely decreased if patient taking Biotin.            Imaging Results (Last 24 Hours)       Procedure Component Value Units Date/Time    XR Abdomen KUB [[id number]] Collected: 06/18/23 1040     Updated: 06/18/23 1045    Narrative:      XR ABDOMEN KUB-     INDICATIONS: Small bowel obstruction     TECHNIQUE: Supine views of the abdomen     COMPARISON: 02/07/202image from CT from 06/16/2023 3     FINDINGS:      The bowel gas pattern is nonspecific, with mildly dilated small bowel  in the left aspect of the abdomen. Gas is also seen in the colon. No  supine evidence for free intraperitoneal gas. Follow-up as indications  persist.             Impression:         As described.     This report was finalized on 6/18/2023 10:42 AM by Dr. Eriberto Hazel M.D.               Current Facility-Administered Medications:     amLODIPine (NORVASC) tablet 5 mg, 5 mg, Oral, Q24H, Gideon Hope MD, 5 mg at 06/18/23 1050    cefTRIAXone (ROCEPHIN) 1 g in sodium chloride 0.9 % 100 mL IVPB-VTB, 1 g, Intravenous, Q24H, Gideon Hope MD, Last Rate: 200 mL/hr at 06/17/23 2110, 1 g at 06/17/23 2110    cholecalciferol (VITAMIN D3) tablet 1,000 Units, 1,000 Units, Oral, Daily, Gideon Hope MD, 1,000 Units at 06/18/23 1050    HYDROmorphone (DILAUDID) injection 0.5 mg, 0.5 mg, Intravenous, Q4H PRN, Gideon Hope MD    ondansetron (ZOFRAN) injection 4 mg, 4 mg, Intravenous, Q4H PRN, Gideon Hope MD    pantoprazole (PROTONIX) injection 40 mg, 40 mg, Intravenous, Q12H, Gideon Hope MD, 40 mg at 06/18/23 1051    sodium chloride 0.9 % infusion, 75 mL/hr, Intravenous, Continuous, Gideon Hope MD, Last Rate: 75 mL/hr at 06/18/23 1051, 75 mL/hr at 06/18/23 1051      ASSESSMENT  Partial small bowel obstruction  Acute mild UTI completed antibiotics  Recent colon resection and colostomy secondary to colitis with sigmoid stricture  Hypertension  Gastroesophageal reflux disease    PLAN  Discharge home  Discharge summary dictated    SANG SINGLETARY MD    Copied text in this note has been reviewed and is accurate as of 06/18/23

## 2023-06-18 NOTE — PROGRESS NOTES
"General Surgery Progress Note         S: Doing well.  Tolerated regular diet.  Having ostomy output.    O:/77 (BP Location: Right arm, Patient Position: Lying)   Pulse 75   Temp 97.2 °F (36.2 °C) (Oral)   Resp 16   Ht 152.4 cm (60\")   Wt 70.8 kg (156 lb)   LMP  (LMP Unknown)   SpO2 100%   BMI 30.47 kg/m²     Intake & Output (last day)         06/17 0701 06/18 0700 06/18 0701 06/19 0700    P.O. 0 480    I.V. (mL/kg) 1563.3 (22.1)     IV Piggyback 100     Total Intake(mL/kg) 1663.3 (23.5) 480 (6.8)    Urine (mL/kg/hr) 1200 (0.7) 200 (0.5)    Stool 0     Total Output 1200 200    Net +463.3 +280          Urine Unmeasured Occurrence 3 x             GENERAL: awake, alert, no apparent distress  HEENT: normochephalic, atraumatic, moist mucus membranes, clear sclera   CHEST: clear to auscultation, no wheezes, no increased work of breathing  CARDIAC: regular rate and rhythm    ABDOMEN: Soft, nondistended, nontender  EXTREMITIES: no cyanosis, clubbing or edema    SKIN: Warm and moist, no rashes    LABS  Results from last 7 days   Lab Units 06/18/23 0457 06/17/23  0549 06/16/23  2119   WBC 10*3/mm3 7.99 10.82* 12.35*   HEMOGLOBIN g/dL 13.4 13.9 14.5   HEMATOCRIT % 42.0 41.4 45.3   PLATELETS 10*3/mm3 213 224 248     Results from last 7 days   Lab Units 06/18/23  0457 06/17/23  0549 06/16/23  2119   SODIUM mmol/L 143 140 142   POTASSIUM mmol/L 3.9 3.9 4.9   CHLORIDE mmol/L 108* 106 107   CO2 mmol/L 26.0 27.4 27.6   BUN mg/dL 5* 6* 8   CREATININE mg/dL 0.49* 0.55* 0.69   CALCIUM mg/dL 8.9 9.3 9.6   BILIRUBIN mg/dL 0.4  --  0.7   ALK PHOS U/L 63  --  80   ALT (SGPT) U/L 10  --  16   AST (SGOT) U/L 16  --  19   GLUCOSE mg/dL 67 81 139*     Results from last 7 days   Lab Units 06/16/23  2119   INR  1.01         A/P: 62 y.o. female with small bowel obstruction that has now resolved.  -Okay for discharge  -Follow-up with Dr. Gaston as scheduled      JOHAN PLATA MD  General, Robotic and Endoscopic Surgery  [facility]    [address], [address]  [place], [place], [address]  P: [phone]  F: [phone]

## 2023-06-19 LAB — BACTERIA SPEC AEROBE CULT: NORMAL

## 2023-06-19 NOTE — PROGRESS NOTES
Case Management Discharge Note      Final Note: Discharged home. Dayanna Diaz RN         Selected Continued Care - Discharged on 6/18/2023 Admission date: 6/16/2023 - Discharge disposition: Home or Self Care             Transportation Services  Private: Car    Final Discharge Disposition Code: 01 - home or self-care

## 2023-08-24 ENCOUNTER — TELEPHONE (OUTPATIENT)
Dept: SURGERY | Facility: CLINIC | Age: 63
End: 2023-08-24

## 2023-08-24 NOTE — TELEPHONE ENCOUNTER
Caller: Hali Tejeda    Relationship to patient: Self    Best call back number: [phone]     Chief complaint:     Type of visit: COLONOSCOPY 8.31.23    Requested date:      If rescheduling, when is the original appointment:      Additional notes:SCHEDULE CONFLICT PLEASE CANCEL.

## 2023-08-25 ENCOUNTER — OFFICE VISIT (OUTPATIENT)
Dept: SURGERY | Facility: CLINIC | Age: 63
End: 2023-08-25
Payer: COMMERCIAL

## 2023-08-25 VITALS
HEART RATE: 81 BPM | HEIGHT: 60 IN | WEIGHT: 161 LBS | OXYGEN SATURATION: 98 % | SYSTOLIC BLOOD PRESSURE: 110 MMHG | BODY MASS INDEX: 31.61 KG/M2 | DIASTOLIC BLOOD PRESSURE: 68 MMHG

## 2023-08-25 DIAGNOSIS — K57.92 DIVERTICULITIS: ICD-10-CM

## 2023-08-25 DIAGNOSIS — Z12.11 SCREENING FOR COLORECTAL CANCER: Primary | ICD-10-CM

## 2023-08-25 DIAGNOSIS — Z12.12 SCREENING FOR COLORECTAL CANCER: Primary | ICD-10-CM

## 2023-08-25 PROCEDURE — 99213 OFFICE O/P EST LOW 20 MIN: CPT | Performed by: COLON & RECTAL SURGERY

## 2023-08-25 RX ORDER — CHLORHEXIDINE GLUCONATE 4 G/100ML
SOLUTION TOPICAL 2 TIMES DAILY
Qty: 236 ML | Refills: 0 | Status: SHIPPED | OUTPATIENT
Start: 2023-08-25

## 2023-08-25 RX ORDER — CELECOXIB 200 MG/1
200 CAPSULE ORAL ONCE
OUTPATIENT
Start: 2023-12-19 | End: 2023-08-25

## 2023-08-25 RX ORDER — ACETAMINOPHEN 500 MG
1000 TABLET ORAL ONCE
OUTPATIENT
Start: 2023-12-19 | End: 2023-08-25

## 2023-08-25 RX ORDER — METRONIDAZOLE 500 MG/100ML
500 INJECTION, SOLUTION INTRAVENOUS ONCE
OUTPATIENT
Start: 2023-12-19 | End: 2023-08-25

## 2023-08-25 RX ORDER — SCOLOPAMINE TRANSDERMAL SYSTEM 1 MG/1
1 PATCH, EXTENDED RELEASE TRANSDERMAL CONTINUOUS
OUTPATIENT
Start: 2023-12-19 | End: 2023-12-22

## 2023-08-25 RX ORDER — CLOBETASOL PROPIONATE 0.5 MG/G
1 OINTMENT TOPICAL 2 TIMES DAILY
COMMUNITY
Start: 2023-08-16

## 2023-08-25 RX ORDER — PANTOPRAZOLE SODIUM 40 MG/1
40 TABLET, DELAYED RELEASE ORAL DAILY
COMMUNITY
Start: 2023-08-04

## 2023-08-25 RX ORDER — ALVIMOPAN 12 MG/1
12 CAPSULE ORAL ONCE
OUTPATIENT
Start: 2023-12-19 | End: 2023-08-25

## 2023-08-25 RX ORDER — AMLODIPINE BESYLATE 10 MG/1
10 TABLET ORAL DAILY
COMMUNITY
Start: 2023-06-21

## 2023-08-25 RX ORDER — CEFAZOLIN SODIUM 2 G/100ML
2 INJECTION, SOLUTION INTRAVENOUS ONCE
OUTPATIENT
Start: 2023-12-19 | End: 2023-08-25

## 2023-08-25 RX ORDER — GABAPENTIN 300 MG/1
600 CAPSULE ORAL 3 TIMES DAILY
OUTPATIENT
Start: 2023-12-19

## 2023-08-25 NOTE — PROGRESS NOTES
Hali Tejeda is a 62 y.o. female status post left and sigmoid resection with end colostomy because of complicated diverticulitis. It was a splenic injury at that time.    The patient reports she has been doing well. She returned to work the last week of 2023 as a . She states she had to cancel her colonoscopy due to her work schedule. She would like to proceed with the reverse ostomy surgery after the colonoscopy is complete. In 2023 patient was admitted for a partial small bowel obstruction. She was treated with IV fluid, IV antibiotics and bowel rest. She currently reports symptoms of flatulence and GERD.       Past Medical History:   Diagnosis Date    Abnormal EKG 2020    Avulsion fracture of distal fibula 2015    Colostomy in place     FOLLOWED BY DR. LINO GASTON    H. pylori infection 2020    Hepatic steatosis 2020    Hiatal hernia     HTN (hypertension)     Hyperlipidemia     Insomnia     Left sided colitis with complication 2023    ADMITTED TO [facility]    LGSIL on Pap smear of cervix     (+) HPV    LGSIL Pap smear of vagina 2016    Snoring     Stricture of sigmoid colon 2023    Uterine fibroid     Vaginal atrophy      Past Surgical History:   Procedure Laterality Date    BREAST LUMPECTOMY Left     benign     SECTION N/A     COLON RESECTION N/A 2/3/2023    Procedure: COLON RESECTION LAPAROSCOPIC CONVERTED TO OPEN SIGMOID AND LEFT MOBILIZATION OF SPLENIC FLEXURE WITH DAVINCI ROBOT;  Surgeon: Lino Gaston MD;  Location: [facility];  Service: Robotics - DaVinci;  Laterality: N/A;    COLONOSCOPY N/A 2014    COULD ONLY GET SCOPE TO 70 CM DUE TO SIGNIFICANT STRICTURE SECONDARY TO SEVERE DIVERTICULITIS OR CANCER, ACBE ORDERED, DR. PARAG HUDSON AT [facility]    COLONOSCOPY N/A 2023    MODERATE STENOSIS IN SIGMOID-DILATED, MANY DIVERTICULA IN SIGMOID AND DESCENDING, COPIOUS AMTS OF STOOL, MUCOSAL TEAR 55 CM FROM ANAL VERGE, DR. ARTEAGA  "LUIS AT [facility]    COLPOSCOPY N/A 03/04/2016    ENDOSCOPY N/A 02/01/2023    GASTRITIS, SMALL HIATAL HERNIA, DR. JENNI SMITH AT [facility]    ENDOSCOPY N/A 09/15/2009    DR. PARAG HUDSON AT [facility]    GASTRIC SLEEVE LAPAROSCOPIC N/A 07/09/2020    WITH REMOVAL OF GASTRIC BAND, DR. OLIMPIA PALACIOS AT  [facility]    HYSTERECTOMY N/A 01/2005    WITH RSO    LAPAROSCOPIC GASTRIC BANDING N/A 10/20/2019    DR. PARAG HUDSON AT [facility]    SALPINGO OOPHORECTOMY Left     LSO, IN TEEN YEARS    WISDOM TOOTH EXTRACTION Bilateral          /68 (BP Location: Left arm, Patient Position: Sitting, Cuff Size: Adult)   Pulse 81   Ht 152.4 cm (60\")   Wt 73 kg (161 lb)   LMP  (LMP Unknown)   SpO2 98%   Breastfeeding No   BMI 31.44 kg/mý   Body mass index is 31.44 kg/mý.      PE:  Physical Exam  Patient has an upper midline incision that is well healed. She has ostomy in left lower quadrant that is pink, patent, and productive.    Assessment:   1. Screening for colorectal cancer    2. Diverticulitis         1. Status post left and sigmoid resection with end colostomy because of complicated diverticulitis    Plan:  - A colonoscopy will be obtained on11/06/2023. I will see her in between the colonoscopy and reverse ostomy surgery so that we can talk about more details. Advised to take Gas-X and Tums symptoms of flatulence and GERD.       Transcribed from ambient dictation for Lino Gaston MD by Mila Valles.  08/25/23   11:59 EDT    Patient or patient representative verbalized consent to the visit recording.  I have personally performed the services described in this document as transcribed by the above individual, and it is both accurate and complete.         "

## 2023-09-01 ENCOUNTER — APPOINTMENT (OUTPATIENT)
Dept: CT IMAGING | Facility: HOSPITAL | Age: 63
End: 2023-09-01
Payer: COMMERCIAL

## 2023-09-01 ENCOUNTER — HOSPITAL ENCOUNTER (OUTPATIENT)
Facility: HOSPITAL | Age: 63
Setting detail: OBSERVATION
Discharge: HOME | End: 2023-09-02
Attending: EMERGENCY MEDICINE | Admitting: INTERNAL MEDICINE
Payer: COMMERCIAL

## 2023-09-01 DIAGNOSIS — K56.600 PARTIAL INTESTINAL OBSTRUCTION, UNSPECIFIED CAUSE: Primary | ICD-10-CM

## 2023-09-01 LAB
ALBUMIN SERPL-MCNC: 4.2 G/DL (ref 3.5–5.2)
ALBUMIN/GLOB SERPL: 1.3 G/DL
ALP SERPL-CCNC: 87 U/L (ref 39–117)
ALT SERPL W P-5'-P-CCNC: 13 U/L (ref 1–33)
ANION GAP SERPL CALCULATED.3IONS-SCNC: 9.5 MMOL/L (ref 5–15)
AST SERPL-CCNC: 19 U/L (ref 1–32)
BACTERIA UR QL AUTO: ABNORMAL /HPF
BASOPHILS # BLD AUTO: 0.03 10*3/MM3 (ref 0–0.2)
BASOPHILS NFR BLD AUTO: 0.3 % (ref 0–1.5)
BILIRUB SERPL-MCNC: 0.4 MG/DL (ref 0–1.2)
BILIRUB UR QL STRIP: NEGATIVE
BUN SERPL-MCNC: 10 MG/DL (ref 8–23)
BUN/CREAT SERPL: 13.2 (ref 7–25)
CALCIUM SPEC-SCNC: 9.5 MG/DL (ref 8.6–10.5)
CHLORIDE SERPL-SCNC: 108 MMOL/L (ref 98–107)
CLARITY UR: CLEAR
CO2 SERPL-SCNC: 26.5 MMOL/L (ref 22–29)
COLOR UR: YELLOW
CREAT SERPL-MCNC: 0.76 MG/DL (ref 0.57–1)
D-LACTATE SERPL-SCNC: 0.9 MMOL/L (ref 0.5–2)
DEPRECATED RDW RBC AUTO: 44.2 FL (ref 37–54)
EGFRCR SERPLBLD CKD-EPI 2021: 88.2 ML/MIN/1.73
EOSINOPHIL # BLD AUTO: 0.03 10*3/MM3 (ref 0–0.4)
EOSINOPHIL NFR BLD AUTO: 0.3 % (ref 0.3–6.2)
ERYTHROCYTE [DISTWIDTH] IN BLOOD BY AUTOMATED COUNT: 13.6 % (ref 12.3–15.4)
GLOBULIN UR ELPH-MCNC: 3.3 GM/DL
GLUCOSE SERPL-MCNC: 106 MG/DL (ref 65–99)
GLUCOSE UR STRIP-MCNC: NEGATIVE MG/DL
HCT VFR BLD AUTO: 45.3 % (ref 34–46.6)
HGB BLD-MCNC: 15 G/DL (ref 12–15.9)
HGB UR QL STRIP.AUTO: ABNORMAL
HOLD SPECIMEN: NORMAL
HOLD SPECIMEN: NORMAL
HYALINE CASTS UR QL AUTO: ABNORMAL /LPF
IMM GRANULOCYTES # BLD AUTO: 0.02 10*3/MM3 (ref 0–0.05)
IMM GRANULOCYTES NFR BLD AUTO: 0.2 % (ref 0–0.5)
KETONES UR QL STRIP: NEGATIVE
LEUKOCYTE ESTERASE UR QL STRIP.AUTO: ABNORMAL
LIPASE SERPL-CCNC: 29 U/L (ref 13–60)
LYMPHOCYTES # BLD AUTO: 1.75 10*3/MM3 (ref 0.7–3.1)
LYMPHOCYTES NFR BLD AUTO: 16.2 % (ref 19.6–45.3)
MCH RBC QN AUTO: 29.4 PG (ref 26.6–33)
MCHC RBC AUTO-ENTMCNC: 33.1 G/DL (ref 31.5–35.7)
MCV RBC AUTO: 88.8 FL (ref 79–97)
MONOCYTES # BLD AUTO: 0.53 10*3/MM3 (ref 0.1–0.9)
MONOCYTES NFR BLD AUTO: 4.9 % (ref 5–12)
NEUTROPHILS NFR BLD AUTO: 78.1 % (ref 42.7–76)
NEUTROPHILS NFR BLD AUTO: 8.47 10*3/MM3 (ref 1.7–7)
NITRITE UR QL STRIP: NEGATIVE
NRBC BLD AUTO-RTO: 0 /100 WBC (ref 0–0.2)
PH UR STRIP.AUTO: 8 [PH] (ref 5–8)
PLATELET # BLD AUTO: 224 10*3/MM3 (ref 140–450)
PMV BLD AUTO: 10.2 FL (ref 6–12)
POTASSIUM SERPL-SCNC: 3.6 MMOL/L (ref 3.5–5.2)
PROT SERPL-MCNC: 7.5 G/DL (ref 6–8.5)
PROT UR QL STRIP: NEGATIVE
RBC # BLD AUTO: 5.1 10*6/MM3 (ref 3.77–5.28)
RBC # UR STRIP: ABNORMAL /HPF
REF LAB TEST METHOD: ABNORMAL
SODIUM SERPL-SCNC: 144 MMOL/L (ref 136–145)
SP GR UR STRIP: 1.01 (ref 1–1.03)
SQUAMOUS #/AREA URNS HPF: ABNORMAL /HPF
UROBILINOGEN UR QL STRIP: ABNORMAL
WBC # UR STRIP: ABNORMAL /HPF
WBC NRBC COR # BLD: 10.83 10*3/MM3 (ref 3.4–10.8)
WHOLE BLOOD HOLD COAG: NORMAL
WHOLE BLOOD HOLD SPECIMEN: NORMAL

## 2023-09-01 PROCEDURE — 25010000002 ONDANSETRON PER 1 MG: Performed by: PHYSICIAN ASSISTANT

## 2023-09-01 PROCEDURE — 83605 ASSAY OF LACTIC ACID: CPT | Performed by: PHYSICIAN ASSISTANT

## 2023-09-01 PROCEDURE — 25510000001 IOPAMIDOL 61 % SOLUTION: Performed by: EMERGENCY MEDICINE

## 2023-09-01 PROCEDURE — G0378 HOSPITAL OBSERVATION PER HR: HCPCS

## 2023-09-01 PROCEDURE — 99283 EMERGENCY DEPT VISIT LOW MDM: CPT

## 2023-09-01 PROCEDURE — 83690 ASSAY OF LIPASE: CPT | Performed by: PHYSICIAN ASSISTANT

## 2023-09-01 PROCEDURE — 96375 TX/PRO/DX INJ NEW DRUG ADDON: CPT

## 2023-09-01 PROCEDURE — 96374 THER/PROPH/DIAG INJ IV PUSH: CPT

## 2023-09-01 PROCEDURE — 85025 COMPLETE CBC W/AUTO DIFF WBC: CPT | Performed by: PHYSICIAN ASSISTANT

## 2023-09-01 PROCEDURE — 81001 URINALYSIS AUTO W/SCOPE: CPT | Performed by: PHYSICIAN ASSISTANT

## 2023-09-01 PROCEDURE — 99285 EMERGENCY DEPT VISIT HI MDM: CPT

## 2023-09-01 PROCEDURE — 96361 HYDRATE IV INFUSION ADD-ON: CPT

## 2023-09-01 PROCEDURE — 80053 COMPREHEN METABOLIC PANEL: CPT | Performed by: PHYSICIAN ASSISTANT

## 2023-09-01 PROCEDURE — 25010000002 MORPHINE PER 10 MG: Performed by: EMERGENCY MEDICINE

## 2023-09-01 PROCEDURE — 74177 CT ABD & PELVIS W/CONTRAST: CPT

## 2023-09-01 RX ORDER — AMOXICILLIN 250 MG
2 CAPSULE ORAL 2 TIMES DAILY
Status: DISCONTINUED | OUTPATIENT
Start: 2023-09-01 | End: 2023-09-02 | Stop reason: HOSPADM

## 2023-09-01 RX ORDER — BISACODYL 10 MG
10 SUPPOSITORY, RECTAL RECTAL DAILY PRN
Status: DISCONTINUED | OUTPATIENT
Start: 2023-09-01 | End: 2023-09-02 | Stop reason: HOSPADM

## 2023-09-01 RX ORDER — ACETAMINOPHEN 325 MG/1
650 TABLET ORAL EVERY 4 HOURS PRN
Status: DISCONTINUED | OUTPATIENT
Start: 2023-09-01 | End: 2023-09-02 | Stop reason: HOSPADM

## 2023-09-01 RX ORDER — SODIUM CHLORIDE 9 MG/ML
75 INJECTION, SOLUTION INTRAVENOUS CONTINUOUS
Status: DISCONTINUED | OUTPATIENT
Start: 2023-09-01 | End: 2023-09-02 | Stop reason: HOSPADM

## 2023-09-01 RX ORDER — BISACODYL 5 MG/1
5 TABLET, DELAYED RELEASE ORAL DAILY PRN
Status: DISCONTINUED | OUTPATIENT
Start: 2023-09-01 | End: 2023-09-02 | Stop reason: HOSPADM

## 2023-09-01 RX ORDER — ONDANSETRON 2 MG/ML
4 INJECTION INTRAMUSCULAR; INTRAVENOUS EVERY 6 HOURS PRN
Status: DISCONTINUED | OUTPATIENT
Start: 2023-09-01 | End: 2023-09-02 | Stop reason: HOSPADM

## 2023-09-01 RX ORDER — POLYETHYLENE GLYCOL 3350 17 G/17G
17 POWDER, FOR SOLUTION ORAL DAILY PRN
Status: DISCONTINUED | OUTPATIENT
Start: 2023-09-01 | End: 2023-09-02 | Stop reason: HOSPADM

## 2023-09-01 RX ORDER — SODIUM CHLORIDE 0.9 % (FLUSH) 0.9 %
10 SYRINGE (ML) INJECTION AS NEEDED
Status: DISCONTINUED | OUTPATIENT
Start: 2023-09-01 | End: 2023-09-02 | Stop reason: HOSPADM

## 2023-09-01 RX ORDER — ONDANSETRON 2 MG/ML
4 INJECTION INTRAMUSCULAR; INTRAVENOUS ONCE
Status: COMPLETED | OUTPATIENT
Start: 2023-09-01 | End: 2023-09-01

## 2023-09-01 RX ORDER — MORPHINE SULFATE 2 MG/ML
4 INJECTION, SOLUTION INTRAMUSCULAR; INTRAVENOUS ONCE
Status: COMPLETED | OUTPATIENT
Start: 2023-09-01 | End: 2023-09-01

## 2023-09-01 RX ORDER — MELATONIN
5000 DAILY
Status: DISCONTINUED | OUTPATIENT
Start: 2023-09-02 | End: 2023-09-02 | Stop reason: HOSPADM

## 2023-09-01 RX ORDER — ACETAMINOPHEN 160 MG/5ML
650 SOLUTION ORAL EVERY 4 HOURS PRN
Status: DISCONTINUED | OUTPATIENT
Start: 2023-09-01 | End: 2023-09-02 | Stop reason: HOSPADM

## 2023-09-01 RX ORDER — PANTOPRAZOLE SODIUM 40 MG/1
40 TABLET, DELAYED RELEASE ORAL DAILY
Status: DISCONTINUED | OUTPATIENT
Start: 2023-09-02 | End: 2023-09-02 | Stop reason: HOSPADM

## 2023-09-01 RX ORDER — AMLODIPINE BESYLATE 10 MG/1
10 TABLET ORAL DAILY
Status: DISCONTINUED | OUTPATIENT
Start: 2023-09-02 | End: 2023-09-02 | Stop reason: HOSPADM

## 2023-09-01 RX ORDER — ACETAMINOPHEN 650 MG/1
650 SUPPOSITORY RECTAL EVERY 4 HOURS PRN
Status: DISCONTINUED | OUTPATIENT
Start: 2023-09-01 | End: 2023-09-02 | Stop reason: HOSPADM

## 2023-09-01 RX ADMIN — IOPAMIDOL 85 ML: 612 INJECTION, SOLUTION INTRAVENOUS at 16:41

## 2023-09-01 RX ADMIN — MORPHINE SULFATE 4 MG: 2 INJECTION, SOLUTION INTRAMUSCULAR; INTRAVENOUS at 15:31

## 2023-09-01 RX ADMIN — ONDANSETRON 4 MG: 2 INJECTION INTRAMUSCULAR; INTRAVENOUS at 15:31

## 2023-09-01 RX ADMIN — SODIUM CHLORIDE 500 ML: 9 INJECTION, SOLUTION INTRAVENOUS at 15:26

## 2023-09-01 RX ADMIN — SODIUM CHLORIDE 75 ML/HR: 9 INJECTION, SOLUTION INTRAVENOUS at 21:22

## 2023-09-01 NOTE — ED PROVIDER NOTES
EMERGENCY DEPARTMENT ENCOUNTER    Room Number:  31/31  Date of encounter:  9/1/2023  PCP: Richard Delgado MD  Historian: Patient  Full history not obtainable due to: None    HPI:  Chief Complaint: Abdominal pain    Context: Hali Tejeda is a 62 y.o. female with a PMH significant for colitis, hypertension, diverticulitis who presents to the ED c/o abdominal pain.  She describes an aching and nonradiating sensation across the upper abdomen that she states feels similar to prior episode of diverticulitis.  She did have a diverting colostomy procedure earlier this year for similar symptoms when she was diagnosed with diverticulitis at that time.  Symptoms have been progressing over the past 1 to 2 days with associated nausea and vomiting.  Denies noticing any blood in the vomit.  No changes to stool in the ostomy bag.  No sick contacts at home.  No recent antibiotics.      MEDICAL RECORD REVIEW:    Upon review of the medical record it appears the patient was evaluated in the office with colorectal surgery on 5/16/2023 for diverticulitis.  The patient had a normal lipase on 6/16/2023 and a normal INR on that date.    PAST MEDICAL HISTORY    Active Ambulatory Problems     Diagnosis Date Noted    Left sided colitis with complication 01/24/2023    Colitis 01/27/2023    Nausea and vomiting 01/27/2023    Stricture of sigmoid colon 01/27/2023    Uterine anomaly 02/03/2023    Hypertension 02/03/2023    Avulsion fracture of distal fibula 02/24/2015    Screening for malignant neoplasm of colon 03/08/2023    Partial small bowel obstruction 06/17/2023    Diverticulitis 08/25/2023     Resolved Ambulatory Problems     Diagnosis Date Noted    No Resolved Ambulatory Problems     Past Medical History:   Diagnosis Date    Abnormal EKG 06/2020    Colostomy in place     H. pylori infection 07/2020    Hepatic steatosis 07/2020    Hiatal hernia     HTN (hypertension)     Hyperlipidemia     Insomnia     LGSIL on Pap smear of cervix 2015     LGSIL Pap smear of vagina 2016    Snoring     Uterine fibroid     Vaginal atrophy          PAST SURGICAL HISTORY  Past Surgical History:   Procedure Laterality Date    BREAST LUMPECTOMY Left     benign     SECTION N/A     COLON RESECTION N/A 2/3/2023    Procedure: COLON RESECTION LAPAROSCOPIC CONVERTED TO OPEN SIGMOID AND LEFT MOBILIZATION OF SPLENIC FLEXURE WITH DAVINCI ROBOT;  Surgeon: Lino Gaston MD;  Location: [facility];  Service: Robotics - DaVinci;  Laterality: N/A;    COLONOSCOPY N/A 2014    COULD ONLY GET SCOPE TO 70 CM DUE TO SIGNIFICANT STRICTURE SECONDARY TO SEVERE DIVERTICULITIS OR CANCER, ACBE ORDERED, DR. PARAG HUDSON AT [facility]    COLONOSCOPY N/A 2023    MODERATE STENOSIS IN SIGMOID-DILATED, MANY DIVERTICULA IN SIGMOID AND DESCENDING, COPIOUS AMTS OF STOOL, MUCOSAL TEAR 55 CM FROM ANAL VERGE, DR. JENNI SMITH AT [facility]    COLPOSCOPY N/A 2016    ENDOSCOPY N/A 2023    GASTRITIS, SMALL HIATAL HERNIA, DR. JENNI SMITH AT [facility]    ENDOSCOPY N/A 09/15/2009    DR. PARAG HUDSON AT [facility]    GASTRIC SLEEVE LAPAROSCOPIC N/A 2020    WITH REMOVAL OF GASTRIC BAND, DR. OLIMPIA PALACIOS AT  [facility]    HYSTERECTOMY N/A 2005    WITH RSO    LAPAROSCOPIC GASTRIC BANDING N/A 10/20/2019    DR. PARAG HUDSON AT [facility]    SALPINGO OOPHORECTOMY Left     LSO, IN TEEN YEARS    WISDOM TOOTH EXTRACTION Bilateral          FAMILY HISTORY  Family History   Problem Relation Age of Onset    Diabetes Mother     Gout Brother     Colon cancer Paternal Grandmother     Cancer Paternal Grandmother     Cancer Paternal Aunt     Colon cancer Paternal Aunt          SOCIAL HISTORY  Social History     Socioeconomic History    Marital status: Single   Tobacco Use    Smoking status: Former     Types: Cigarettes     Quit date: 2004     Years since quittin.5     Passive exposure: Past    Smokeless tobacco: Never    Tobacco comments:     Quit    Vaping Use    Vaping Use: Never used    Substance and Sexual Activity    Alcohol use: Yes     Alcohol/week: 1.0 standard drink     Types: 1 Glasses of wine per week     Comment: occ wine    Drug use: Never    Sexual activity: Defer     Birth control/protection: Hysterectomy, Post-menopausal         ALLERGIES  Sulfa antibiotics        REVIEW OF SYSTEMS    All systems reviewed and marked as negative except as listed in HPI     PHYSICAL EXAM    I have reviewed the triage vital signs and nursing notes.    ED Triage Vitals   Temp Heart Rate Resp BP SpO2   09/01/23 1456 09/01/23 1456 09/01/23 1456 09/01/23 1502 09/01/23 1456   97.7 °F (36.5 °C) 100 18 142/92 98 %      Temp src Heart Rate Source Patient Position BP Location FiO2 (%)   09/01/23 1456 09/01/23 1456 09/01/23 1502 09/01/23 1502 --   Oral Monitor Sitting Right arm        Physical Exam  Constitutional:       General: She is not in acute distress.     Appearance: She is well-developed.   HENT:      Head: Normocephalic and atraumatic.   Eyes:      General: No scleral icterus.     Conjunctiva/sclera: Conjunctivae normal.   Neck:      Trachea: No tracheal deviation.   Cardiovascular:      Rate and Rhythm: Regular rhythm. Tachycardia present.   Pulmonary:      Effort: Pulmonary effort is normal.      Breath sounds: Normal breath sounds.   Abdominal:      Palpations: Abdomen is soft.      Tenderness: There is abdominal tenderness in the epigastric area.       Musculoskeletal:         General: No deformity.      Cervical back: Normal range of motion.   Lymphadenopathy:      Cervical: No cervical adenopathy.   Skin:     General: Skin is warm and dry.   Neurological:      Mental Status: She is alert and oriented to person, place, and time.   Psychiatric:         Behavior: Behavior normal.       Vital signs and nursing notes reviewed.            LAB RESULTS  Recent Results (from the past 24 hour(s))   Comprehensive Metabolic Panel    Collection Time: 09/01/23  3:25 PM    Specimen: Blood   Result Value Ref  Range    Glucose 106 (H) 65 - 99 mg/dL    BUN 10 8 - 23 mg/dL    Creatinine 0.76 0.57 - 1.00 mg/dL    Sodium 144 136 - 145 mmol/L    Potassium 3.6 3.5 - 5.2 mmol/L    Chloride 108 (H) 98 - 107 mmol/L    CO2 26.5 22.0 - 29.0 mmol/L    Calcium 9.5 8.6 - 10.5 mg/dL    Total Protein 7.5 6.0 - 8.5 g/dL    Albumin 4.2 3.5 - 5.2 g/dL    ALT (SGPT) 13 1 - 33 U/L    AST (SGOT) 19 1 - 32 U/L    Alkaline Phosphatase 87 39 - 117 U/L    Total Bilirubin 0.4 0.0 - 1.2 mg/dL    Globulin 3.3 gm/dL    A/G Ratio 1.3 g/dL    BUN/Creatinine Ratio 13.2 7.0 - 25.0    Anion Gap 9.5 5.0 - 15.0 mmol/L    eGFR 88.2 >60.0 mL/min/1.73   Lipase    Collection Time: 09/01/23  3:25 PM    Specimen: Blood   Result Value Ref Range    Lipase 29 13 - 60 U/L   Green Top (Gel)    Collection Time: 09/01/23  3:25 PM   Result Value Ref Range    Extra Tube Hold for add-ons.    Lavender Top    Collection Time: 09/01/23  3:25 PM   Result Value Ref Range    Extra Tube hold for add-on    Gold Top - SST    Collection Time: 09/01/23  3:25 PM   Result Value Ref Range    Extra Tube Hold for add-ons.    Light Blue Top    Collection Time: 09/01/23  3:25 PM   Result Value Ref Range    Extra Tube Hold for add-ons.    CBC Auto Differential    Collection Time: 09/01/23  3:25 PM    Specimen: Blood   Result Value Ref Range    WBC 10.83 (H) 3.40 - 10.80 10*3/mm3    RBC 5.10 3.77 - 5.28 10*6/mm3    Hemoglobin 15.0 12.0 - 15.9 g/dL    Hematocrit 45.3 34.0 - 46.6 %    MCV 88.8 79.0 - 97.0 fL    MCH 29.4 26.6 - 33.0 pg    MCHC 33.1 31.5 - 35.7 g/dL    RDW 13.6 12.3 - 15.4 %    RDW-SD 44.2 37.0 - 54.0 fl    MPV 10.2 6.0 - 12.0 fL    Platelets 224 140 - 450 10*3/mm3    Neutrophil % 78.1 (H) 42.7 - 76.0 %    Lymphocyte % 16.2 (L) 19.6 - 45.3 %    Monocyte % 4.9 (L) 5.0 - 12.0 %    Eosinophil % 0.3 0.3 - 6.2 %    Basophil % 0.3 0.0 - 1.5 %    Immature Grans % 0.2 0.0 - 0.5 %    Neutrophils, Absolute 8.47 (H) 1.70 - 7.00 10*3/mm3    Lymphocytes, Absolute 1.75 0.70 - 3.10 10*3/mm3     Monocytes, Absolute 0.53 0.10 - 0.90 10*3/mm3    Eosinophils, Absolute 0.03 0.00 - 0.40 10*3/mm3    Basophils, Absolute 0.03 0.00 - 0.20 10*3/mm3    Immature Grans, Absolute 0.02 0.00 - 0.05 10*3/mm3    nRBC 0.0 0.0 - 0.2 /100 WBC   Lactic Acid, Plasma    Collection Time: 09/01/23  3:26 PM    Specimen: Blood   Result Value Ref Range    Lactate 0.9 0.5 - 2.0 mmol/L   Urinalysis With Microscopic If Indicated (No Culture) - Urine, Clean Catch    Collection Time: 09/01/23  6:01 PM    Specimen: Urine, Clean Catch   Result Value Ref Range    Color, UA Yellow Yellow, Straw    Appearance, UA Clear Clear    pH, UA 8.0 5.0 - 8.0    Specific Gravity, UA 1.011 1.005 - 1.030    Glucose, UA Negative Negative    Ketones, UA Negative Negative    Bilirubin, UA Negative Negative    Blood, UA Trace (A) Negative    Protein, UA Negative Negative    Leuk Esterase, UA Small (1+) (A) Negative    Nitrite, UA Negative Negative    Urobilinogen, UA 0.2 E.U./dL 0.2 - 1.0 E.U./dL   Urinalysis, Microscopic Only - Urine, Clean Catch    Collection Time: 09/01/23  6:01 PM    Specimen: Urine, Clean Catch   Result Value Ref Range    RBC, UA 6-12 (A) None Seen, 0-2 /HPF    WBC, UA 13-20 (A) None Seen, 0-2 /HPF    Bacteria, UA None Seen None Seen /HPF    Squamous Epithelial Cells, UA 0-2 None Seen, 0-2 /HPF    Hyaline Casts, UA None Seen None Seen /LPF    Methodology Automated Microscopy        Ordered the above labs and independently reviewed the results.        RADIOLOGY  CT Abdomen Pelvis With Contrast    Result Date: 9/1/2023  CT ABDOMEN AND PELVIS WITH IV CONTRAST  HISTORY: 62-year-old female with upper abdominal pain. Diverticulitis history. Hysterectomy, gastric sleeve, sigmoid colectomy in the past.  TECHNIQUE: Radiation dose reduction techniques were utilized, including automated exposure control and exposure modulation based on body size. 3 mm images were obtained through the abdomen and pelvis after the administration of IV contrast. Compared  with previous CT 06/16/2023.  FINDINGS: 1. The small bowel within the left lower abdominal parastomal hernia and the small bowel proximal to the hernia are much less distended and also less thickened. There is also significantly less mesenteric congestion. There may be a low-grade partial obstruction, but the findings are much less prominent than on the previous CT.  There is no acute abnormality at the liver, gallbladder, spleen, pancreas, adrenals, or kidneys. No evidence for acute pneumonia and there are no effusions at the visualized lower lung fields.       I ordered the above noted radiological studies. Independently reviewed by me and discussed with radiologist.  See dictation above for official radiology interpretation.      PROCEDURES    Procedures        MEDICATIONS GIVEN IN ER    Medications   sodium chloride 0.9 % flush 10 mL (has no administration in time range)   sodium chloride 0.9 % bolus 500 mL (0 mL Intravenous Stopped 9/1/23 1726)   ondansetron (ZOFRAN) injection 4 mg (4 mg Intravenous Given 9/1/23 1531)   morphine injection 4 mg (4 mg Intravenous Given 9/1/23 1531)   iopamidol (ISOVUE-300) 61 % injection 100 mL (85 mL Intravenous Given 9/1/23 1641)         PROGRESS, DATA ANALYSIS, CONSULTS, AND MEDICAL DECISION MAKING    All labs have been independently interpreted by me.  All radiology studies have been interpreted by me.  Discussion below represents my analysis of pertinent findings related to patient's condition, differential diagnosis, treatment plan and final disposition.    Patient presentation and evaluation consistent with partial small bowel obstruction.  The recommendation from general surgery is to admit to [facility] for surgical consultation in the morning and continued symptom control overnight.  Patient agreeable to this plan.    - Chronic or social conditions impacting care: None      DIFFERENTIAL DIAGNOSIS INCLUDE BUT NOT LIMITED TO:     Differential diagnosis includes but is not  limited to:  - Hepatobiliary pathology such as cholecystitis, cholangitis, and symptomatic cholelithiasis  - Pancreatitis  - Dyspepsia  - Small bowel obstruction  - Appendicitis  - Diverticulitis  - UTI including pyelonephritis  - Ureteral stone  - Zoster  - Colitis, including infectious and ischemic  - Atypical ACS      Orders placed during this visit:  Orders Placed This Encounter   Procedures    CT Abdomen Pelvis With Contrast    [place] Draw    Comprehensive Metabolic Panel    Lipase    Urinalysis With Microscopic If Indicated (No Culture) - Urine, Clean Catch    Lactic Acid, Plasma    CBC Auto Differential    Urinalysis, Microscopic Only - Urine, Clean Catch    Surgery (on-call MD unless specified)    [facility] (on-call MD unless specified) Details    Insert Peripheral IV    Initiate Observation Status    CBC & Differential    Green Top (Gel)    Lavender Top    Gold Top - SST    Light Blue Top         ED Course as of 09/01/23 1951   Fri Sep 01, 2023   1720 CT Abdomen Pelvis With Contrast  My independent interpretation of the CT of the abdomen pelvis is no free air [TR]   1916 I discussed the case with Dr Gaston with general surgery at this time regarding the patient.  I discussed work-up, results, concerns.  I discussed the consulting provider's desire for admission to the hospitalist service for continued monitoring, symptom control, general surgical assessment in the morning.   [DC]   1951 I discussed the case with MD Lu with [facility] at this time regarding the patient.  I discussed work-up, results, concerns.  I discussed the consulting provider's desire for obs admit.    [DC]      ED Course User Index  [DC] Arturo Rock PA  [TR] Jarrod Cruz MD       AS OF 19:51 EDT VITALS:    BP - 123/83  HR - 66  TEMP - 97.7 °F (36.5 °C) (Oral)  02 SATS - 99%      1952 I rechecked the patient.  I discussed the patient's labs, radiology findings (including all incidental findings), diagnosis, and plan for admission. The  patient understands and agrees with the plan.      DIAGNOSIS  Final diagnoses:   Partial intestinal obstruction, unspecified cause         DISPOSITION  Admit    Pt masked in first look. I wore a surgical mask throughout my encounters with the pt. I performed hand hygiene on entry into the pt room and upon exit.     Dictated utilizing Dragon dictation     Note Disclaimer: At [facility], we believe that sharing information builds trust and better relationships. You are receiving this note because you recently visited [facility]. It is possible you will see health information before a provider has talked with you about it. This kind of information can be easy to misunderstand. To help you fully understand what it means for your health, we urge you to discuss this note with your provider.      Arturo Rock PA  09/01/23 1952

## 2023-09-01 NOTE — ED NOTES
Nursing report ED to floor  Hali Tejeda  62 y.o.  female    HPI :   Chief Complaint   Patient presents with    Abdominal Pain       Admitting doctor:   Valerie Leigh MD    Admitting diagnosis:   The encounter diagnosis was Partial intestinal obstruction, unspecified cause.    Code status:   Current Code Status       Date Active Code Status Order ID Comments User Context       Prior            Allergies:   Sulfa antibiotics    Isolation:   No active isolations    Intake and Output    Intake/Output Summary (Last 24 hours) at 9/1/2023 1958  Last data filed at 9/1/2023 1726  Gross per 24 hour   Intake 500 ml   Output --   Net 500 ml       Weight:       09/01/23  1459   Weight: 72.6 kg (160 lb)       Most recent vitals:   Vitals:    09/01/23 1502 09/01/23 1701 09/01/23 1731 09/01/23 1901   BP: 142/92 139/83 128/89 123/83   BP Location: Right arm      Patient Position: Sitting      Pulse:  75 69 66   Resp:       Temp:       TempSrc:       SpO2:  98% 99% 99%   Weight:       Height:           Active LDAs/IV Access:   Lines, Drains & Airways       Active LDAs       Name Placement date Placement time Site Days    Peripheral IV 09/01/23 1526 Left Antecubital 09/01/23  1526  Antecubital  less than 1    Colostomy LLQ 02/03/23  1944  LLQ  209                    Labs (abnormal labs have a star):   Labs Reviewed   COMPREHENSIVE METABOLIC PANEL - Abnormal; Notable for the following components:       Result Value    Glucose 106 (*)     Chloride 108 (*)     All other components within normal limits    Narrative:     GFR Normal >60  Chronic Kidney Disease <60  Kidney Failure <15     URINALYSIS W/ MICROSCOPIC IF INDICATED (NO CULTURE) - Abnormal; Notable for the following components:    Blood, UA Trace (*)     Leuk Esterase, UA Small (1+) (*)     All other components within normal limits   CBC WITH AUTO DIFFERENTIAL - Abnormal; Notable for the following components:    WBC 10.83 (*)     Neutrophil % 78.1 (*)     Lymphocyte %  16.2 (*)     Monocyte % 4.9 (*)     Neutrophils, Absolute 8.47 (*)     All other components within normal limits   URINALYSIS, MICROSCOPIC ONLY - Abnormal; Notable for the following components:    RBC, UA 6-12 (*)     WBC, UA 13-20 (*)     All other components within normal limits   LIPASE - Normal   LACTIC ACID, PLASMA - Normal   RAINBOW DRAW    Narrative:     The following orders were created for panel order [place] Draw.  Procedure                               Abnormality         Status                     ---------                               -----------         ------                     Green Top (Gel)[[id number]]                                  Final result               Lavender Top[[id number]]                                     Final result               Gold Top - SST[[id number]]                                   Final result               Light Blue Top[[id number]]                                   Final result                 Please view results for these tests on the individual orders.   CBC AND DIFFERENTIAL    Narrative:     The following orders were created for panel order CBC & Differential.  Procedure                               Abnormality         Status                     ---------                               -----------         ------                     CBC Auto Differential[[id number]]        Abnormal            Final result                 Please view results for these tests on the individual orders.   GREEN TOP   LAVENDER TOP   GOLD TOP - SST   LIGHT BLUE TOP       EKG:   No orders to display       Meds given in ED:   Medications   sodium chloride 0.9 % flush 10 mL (has no administration in time range)   sodium chloride 0.9 % bolus 500 mL (0 mL Intravenous Stopped 9/1/23 1726)   ondansetron (ZOFRAN) injection 4 mg (4 mg Intravenous Given 9/1/23 1531)   morphine injection 4 mg (4 mg Intravenous Given 9/1/23 1531)   iopamidol (ISOVUE-300) 61 % injection 100 mL (85 mL Intravenous Given  23 1641)       Imaging results:  No radiology results for the last day    Ambulatory status:   -     Social issues:   Social History     Socioeconomic History    Marital status: Single   Tobacco Use    Smoking status: Former     Types: Cigarettes     Quit date: 2004     Years since quittin.5     Passive exposure: Past    Smokeless tobacco: Never    Tobacco comments:     Quit    Vaping Use    Vaping Use: Never used   Substance and Sexual Activity    Alcohol use: Yes     Alcohol/week: 1.0 standard drink     Types: 1 Glasses of wine per week     Comment: occ wine    Drug use: Never    Sexual activity: Defer     Birth control/protection: Hysterectomy, Post-menopausal       NIH Stroke Scale:       Antonio Fernandez RN  23 19:58 EDT

## 2023-09-01 NOTE — ED PROVIDER NOTES
MD ATTESTATION NOTE    The MUNA and I have discussed this patient's history, physical exam, and treatment plan.  I have reviewed the documentation and personally had a face to face interaction with the patient. I affirm the documentation and agree with the treatment and plan.  The attached note describes my personal findings.    I provided a substantive portion of the care of this patient. I personally performed the physical exam, in its entirety.    Independent Historians: Patient, family    A complete HPI/ROS/PMH/PSH/SH/FH are unobtainable due to: Nothing    Chronic or social conditions impacting patient care (social determinants of health): None    Hali Tejeda is a 62 y.o. female who presents to the ED c/o acute abdominal pain.  The patient reports that today around 2 PM she developed epigastric abdominal pain.  She reports that it was severe.  She reports she has a history of a colostomy.  She states it has been functioning.  She does report nausea and vomiting.  She denies any change of her colostomy function.          Prescription drug monitoring program review:        On exam:  GENERAL: Awake, alert, no acute distress  SKIN: Warm, dry  HENT: Normocephalic, atraumatic  EYES: no scleral icterus  CV: regular rhythm, regular rate  RESPIRATORY: normal effort, lungs clear  ABDOMEN: soft, mild epigastric tenderness, nondistended  MUSCULOSKELETAL: no deformity  NEURO: alert, moves all extremities, follows commands                                                             Labs  No results found for this or any previous visit (from the past 24 hour(s)).      Radiology  No Radiology Exams Resulted Within Past 24 Hours    Medical Decision Making:  ED Course as of 09/04/23 0819   Fri Sep 01, 2023   1720 CT Abdomen Pelvis With Contrast  My independent interpretation of the CT of the abdomen pelvis is no free air [TR]   1916 I discussed the case with Dr Gaston with general surgery at this time regarding the patient.  I  discussed work-up, results, concerns.  I discussed the consulting provider's desire for admission to the hospitalist service for continued monitoring, symptom control, general surgical assessment in the morning.   [DC]   1951 I discussed the case with MD Lu with [facility] at this time regarding the patient.  I discussed work-up, results, concerns.  I discussed the consulting provider's desire for obs admit.    [DC]      ED Course User Index  [DC] Arturo Rock PA  [TR] Jarrod Cruz MD       The patient presents with acute abdominal pain in the setting of prior abdominal surgeries.  We will obtain chemistries blood counts as well as urinalysis.  We will CT her abdomen pelvis given her prior abdominal surgeries and acute pain today.  My differential diagnosis includes bowel obstruction, pancreatitis, gastritis, cholecystitis, and others.    Procedures:  Procedures            Diagnosis  Final diagnoses:   Partial intestinal obstruction, unspecified cause       Note Disclaimer: At [facility], we believe that sharing information builds trust and better relationships. You are receiving this note because you recently visited [facility]. It is possible you will see health information before a provider has talked with you about it. This kind of information can be easy to misunderstand. To help you fully understand what it means for your health, we urge you to discuss this note with your provider.       Jarrod Cruz MD  09/01/23 1456       Jarrod Cruz MD  09/04/23 2748

## 2023-09-02 VITALS
HEART RATE: 66 BPM | DIASTOLIC BLOOD PRESSURE: 80 MMHG | SYSTOLIC BLOOD PRESSURE: 130 MMHG | OXYGEN SATURATION: 99 % | HEIGHT: 60 IN | TEMPERATURE: 97.2 F | WEIGHT: 153 LBS | BODY MASS INDEX: 30.04 KG/M2 | RESPIRATION RATE: 18 BRPM

## 2023-09-02 LAB
ANION GAP SERPL CALCULATED.3IONS-SCNC: 9 MMOL/L (ref 5–15)
BASOPHILS # BLD AUTO: 0.02 10*3/MM3 (ref 0–0.2)
BASOPHILS NFR BLD AUTO: 0.2 % (ref 0–1.5)
BUN SERPL-MCNC: 7 MG/DL (ref 8–23)
BUN/CREAT SERPL: 12.7 (ref 7–25)
CALCIUM SPEC-SCNC: 9 MG/DL (ref 8.6–10.5)
CHLORIDE SERPL-SCNC: 109 MMOL/L (ref 98–107)
CO2 SERPL-SCNC: 24 MMOL/L (ref 22–29)
CREAT SERPL-MCNC: 0.55 MG/DL (ref 0.57–1)
DEPRECATED RDW RBC AUTO: 41.4 FL (ref 37–54)
EGFRCR SERPLBLD CKD-EPI 2021: 103.1 ML/MIN/1.73
EOSINOPHIL # BLD AUTO: 0.07 10*3/MM3 (ref 0–0.4)
EOSINOPHIL NFR BLD AUTO: 0.8 % (ref 0.3–6.2)
ERYTHROCYTE [DISTWIDTH] IN BLOOD BY AUTOMATED COUNT: 13.2 % (ref 12.3–15.4)
GLUCOSE SERPL-MCNC: 74 MG/DL (ref 65–99)
HCT VFR BLD AUTO: 39.1 % (ref 34–46.6)
HGB BLD-MCNC: 12.9 G/DL (ref 12–15.9)
IMM GRANULOCYTES # BLD AUTO: 0.04 10*3/MM3 (ref 0–0.05)
IMM GRANULOCYTES NFR BLD AUTO: 0.5 % (ref 0–0.5)
LYMPHOCYTES # BLD AUTO: 2.86 10*3/MM3 (ref 0.7–3.1)
LYMPHOCYTES NFR BLD AUTO: 33.6 % (ref 19.6–45.3)
MCH RBC QN AUTO: 28.7 PG (ref 26.6–33)
MCHC RBC AUTO-ENTMCNC: 33 G/DL (ref 31.5–35.7)
MCV RBC AUTO: 86.9 FL (ref 79–97)
MONOCYTES # BLD AUTO: 0.52 10*3/MM3 (ref 0.1–0.9)
MONOCYTES NFR BLD AUTO: 6.1 % (ref 5–12)
NEUTROPHILS NFR BLD AUTO: 5.01 10*3/MM3 (ref 1.7–7)
NEUTROPHILS NFR BLD AUTO: 58.8 % (ref 42.7–76)
NRBC BLD AUTO-RTO: 0 /100 WBC (ref 0–0.2)
PLATELET # BLD AUTO: 213 10*3/MM3 (ref 140–450)
PMV BLD AUTO: 10.1 FL (ref 6–12)
POTASSIUM SERPL-SCNC: 4.3 MMOL/L (ref 3.5–5.2)
RBC # BLD AUTO: 4.5 10*6/MM3 (ref 3.77–5.28)
SODIUM SERPL-SCNC: 142 MMOL/L (ref 136–145)
WBC NRBC COR # BLD: 8.52 10*3/MM3 (ref 3.4–10.8)

## 2023-09-02 PROCEDURE — G0378 HOSPITAL OBSERVATION PER HR: HCPCS

## 2023-09-02 PROCEDURE — 80048 BASIC METABOLIC PNL TOTAL CA: CPT | Performed by: INTERNAL MEDICINE

## 2023-09-02 PROCEDURE — 36415 COLL VENOUS BLD VENIPUNCTURE: CPT | Performed by: INTERNAL MEDICINE

## 2023-09-02 PROCEDURE — 96361 HYDRATE IV INFUSION ADD-ON: CPT

## 2023-09-02 PROCEDURE — 85025 COMPLETE CBC W/AUTO DIFF WBC: CPT | Performed by: INTERNAL MEDICINE

## 2023-09-02 PROCEDURE — 99213 OFFICE O/P EST LOW 20 MIN: CPT | Performed by: SURGERY

## 2023-09-02 RX ADMIN — AMLODIPINE BESYLATE 10 MG: 10 TABLET ORAL at 08:49

## 2023-09-02 RX ADMIN — SENNOSIDES AND DOCUSATE SODIUM 2 TABLET: 50; 8.6 TABLET ORAL at 08:49

## 2023-09-02 RX ADMIN — PANTOPRAZOLE SODIUM 40 MG: 40 TABLET, DELAYED RELEASE ORAL at 08:49

## 2023-09-02 RX ADMIN — Medication 5000 UNITS: at 08:49

## 2023-09-02 RX ADMIN — SODIUM CHLORIDE 75 ML/HR: 9 INJECTION, SOLUTION INTRAVENOUS at 09:54

## 2023-09-02 NOTE — DISCHARGE SUMMARY
Date of Admission: 9/1/2023  Date of Discharge:  9/2/2023  Primary Care Physician: Richard Delgado MD     Discharge Diagnosis:  Active Hospital Problems    Diagnosis  POA    **Partial bowel obstruction [K56.600]  Yes      Resolved Hospital Problems   No resolved problems to display.       Presenting Problem/History of Present Illness from H&P:  Partial bowel obstruction [K56.600]  Partial intestinal obstruction, unspecified cause [K56.600]     62 year old female who presented to the emergency room with nausea, vomiting and abdominal pain; she has a history of diverticulitis and has had a colostomy placed; she denies fever or chills      Hospital Course:  The patient is a 62 y.o. female who presented with partial small bowel obstruction and parastomal hernia.  Both clinically improved overnight and she began to have return of bowel function with stool output in the ostomy.  She was evaluated by surgery and can discharge as long as she is tolerating her advance diet.  She should follow-up with colorectal surgery as planned to discuss colonoscopy in November and colostomy reversal which is tentatively planned for December.    Exam Today:  General AA NAD  HEENT NCAT  CV RRR  Lungs CTA B  Abdomen ND NT, ostomy in place  Extremity no cyanosis or edema  Neuro CN II to XII grossly intact  Psych normal mood and affect    Results:  CT Abdomen/Pelvis  1. The small bowel within the left lower abdominal parastomal hernia and  the small bowel proximal to the hernia are much less distended and also  less thickened. There is also significantly less mesenteric congestion.  There may be a low-grade partial obstruction, but the findings are much  less prominent than on the previous CT.     There is no acute abnormality at the liver, gallbladder, spleen,  pancreas, adrenals, or kidneys. No evidence for acute pneumonia and  there are no effusions at the visualized lower lung fields.    Procedures Performed:         Consults:    Consults       Date and Time Order Name Status Description    9/2/2023  8:45 AM Inpatient General Surgery Consult Completed     9/1/2023  7:16 PM [facility] (on-call MD unless specified) Details      9/1/2023  6:14 PM Surgery (on-call MD unless specified) Completed              Discharge Disposition:  Home or Self Care    Discharge Medications:     Discharge Medications        Continue These Medications        Instructions Start Date   amLODIPine 10 MG tablet  Commonly known as: NORVASC   10 mg, Oral, Daily      Biotin 10 MG capsule   Oral      chlorhexidine 4 % external liquid  Commonly known as: HIBICLENS   Topical, 2 Times Daily, Shower With Hibiclens Solution Twice The Day Before Surgery      clobetasol 0.05 % ointment  Commonly known as: TEMOVATE   1 application , Topical, 2 Times Daily      EMERGEN-C ELECTRO MIX PO   Oral      pantoprazole 40 MG EC tablet  Commonly known as: PROTONIX   40 mg, Oral, Daily      vitamin D3 125 MCG (5000 UT) capsule capsule   5,000 Units, Oral, Daily               Discharge Diet:   Diet Instructions       Advance Diet As Tolerated -Target Diet: Home diet      Target Diet: Home diet            Activity at Discharge:   Activity Instructions       Activity as Tolerated              Follow-up Appointments:   Follow-up Information       Richard Delgado MD .    Specialty: Internal Medicine  Contact information:  [address] [place] [id number] [phone]               Lino Gaston MD Follow up.    Specialty: Colon and Rectal Surgery  Contact information:  [address] [id number] [phone]                             Test Results Pending at Discharge:       Dylon Katz MD  09/02/23  12:07 EDT    Time Spent on Discharge Activities: >30 minutes    Dictated portions using Dragon dictation software.

## 2023-09-02 NOTE — H&P
HISTORY AND PHYSICAL   [facility]        Date of Admission: 2023  Patient Identification:  Name: Hali Tejeda  Age: 62 y.o.  Sex: female  :  1960  MRN: [id number]                     Primary Care Physician: Richard Delgado MD    Chief Complaint:  62 year old female who presented to the emergency room with nausea, vomiting and abdominal pain; she has a history of diverticulitis and has had a colostomy placed; she denies fever or chills     History of Present Illness:   As above    Past Medical History:  Past Medical History:   Diagnosis Date    Abnormal EKG 2020    Avulsion fracture of distal fibula 2015    Colostomy in place     FOLLOWED BY DR. JESSI GASTON    H. pylori infection 2020    Hepatic steatosis 2020    Hiatal hernia     HTN (hypertension)     Hyperlipidemia     Insomnia     Left sided colitis with complication 2023    ADMITTED TO [facility]    LGSIL on Pap smear of cervix     (+) HPV    LGSIL Pap smear of vagina 2016    Snoring     Stricture of sigmoid colon 2023    Uterine fibroid     Vaginal atrophy      Past Surgical History:  Past Surgical History:   Procedure Laterality Date    BREAST LUMPECTOMY Left     benign     SECTION N/A     COLON RESECTION N/A 2/3/2023    Procedure: COLON RESECTION LAPAROSCOPIC CONVERTED TO OPEN SIGMOID AND LEFT MOBILIZATION OF SPLENIC FLEXURE WITH DAVINCI ROBOT;  Surgeon: Jessi Gaston MD;  Location: [facility];  Service: Robotics - DaVinci;  Laterality: N/A;    COLONOSCOPY N/A 2014    COULD ONLY GET SCOPE TO 70 CM DUE TO SIGNIFICANT STRICTURE SECONDARY TO SEVERE DIVERTICULITIS OR CANCER, ACBE ORDERED, DR. PARAG HUDSON AT [place]    COLONOSCOPY N/A 2023    MODERATE STENOSIS IN SIGMOID-DILATED, MANY DIVERTICULA IN SIGMOID AND DESCENDING, COPIOUS AMTS OF STOOL, MUCOSAL TEAR 55 CM FROM ANAL VERGE, DR. JENNI SMITH AT [facility]    COLPOSCOPY N/A 2016    ENDOSCOPY N/A 2023     GASTRITIS, SMALL HIATAL HERNIA, DR. JENNI SMITH AT [facility]    ENDOSCOPY N/A 09/15/2009    DR. PARAG HUDSON AT [place]    GASTRIC SLEEVE LAPAROSCOPIC N/A 2020    WITH REMOVAL OF GASTRIC BAND, DR. OLIMPIA PALACIOS AT  [facility]    HYSTERECTOMY N/A 2005    WITH RSO    LAPAROSCOPIC GASTRIC BANDING N/A 10/20/2019    DR. PARAG HUDSON AT [place]    SALPINGO OOPHORECTOMY Left     LSO, IN TEEN YEARS    WISDOM TOOTH EXTRACTION Bilateral       Home Meds:  Medications Prior to Admission   Medication Sig Dispense Refill Last Dose    amLODIPine (NORVASC) 10 MG tablet Take 1 tablet by mouth Daily.   2023    Biotin 10 MG capsule Take  by mouth.   2023    pantoprazole (PROTONIX) 40 MG EC tablet Take 1 tablet by mouth Daily.   2023    vitamin D3 125 MCG (5000 UT) capsule capsule Take 1 capsule by mouth Daily.   2023    chlorhexidine (HIBICLENS) 4 % external liquid Apply  topically to the appropriate area as directed 2 (Two) Times a Day. Shower With Hibiclens Solution Twice The Day Before Surgery 236 mL 0     clobetasol (TEMOVATE) 0.05 % ointment Apply 1 application  topically to the appropriate area as directed 2 (Two) Times a Day.       Oral Electrolytes (EMERGEN-C ELECTRO MIX PO) Take  by mouth.          Allergies:  Allergies   Allergen Reactions    Sulfa Antibiotics Itching     Immunizations:  Immunization History   Administered Date(s) Administered    COVID-19 (MODERNA) 1st,2nd,3rd Dose Monovalent 2021, 2021, 04/15/2021, 04/15/2021, 2021, 2022     Social History:   Social History     Social History Narrative    Not on file     Social History     Socioeconomic History    Marital status: Single   Tobacco Use    Smoking status: Former     Types: Cigarettes     Quit date: 2004     Years since quittin.5     Passive exposure: Past    Smokeless tobacco: Never    Tobacco comments:     Quit    Vaping Use    Vaping Use: Never used   Substance and Sexual Activity    Alcohol use: Yes      "Alcohol/week: 1.0 standard drink     Types: 1 Glasses of wine per week     Comment: occ wine    Drug use: Never    Sexual activity: Defer     Birth control/protection: Hysterectomy, Post-menopausal       Family History:  Family History   Problem Relation Age of Onset    Diabetes Mother     Gout Brother     Colon cancer Paternal Grandmother     Cancer Paternal Grandmother     Cancer Paternal Aunt     Colon cancer Paternal Aunt         Review of Systems  See history of present illness and past medical history.  Patient denies headache, dizziness, syncope, falls, trauma, change in vision, change in hearing, change in taste, changes in weight, changes in appetite, focal weakness, numbness, or paresthesia.  Patient denies chest pain, palpitations, dyspnea, orthopnea, PND, cough, sinus pressure, rhinorrhea, epistaxis, hemoptysis  hematemesis, diarrhea, constipation or hematochezia.  Denies cold or heat intolerance, polydipsia, polyuria, polyphagia. Denies hematuria, pyuria, dysuria, hesitancy, frequency or urgency. Denies consumption of raw and under cooked meats foods or change in water source.  Denies fever, chills, sweats, night sweats.  Denies missing any routine medications.     Objective:  T Max 24 hrs: Temp (24hrs), Av.7 °F (36.5 °C), Min:97.6 °F (36.4 °C), Max:97.7 °F (36.5 °C)    Vitals Ranges:   Temp:  [97.6 °F (36.4 °C)-97.7 °F (36.5 °C)] 97.6 °F (36.4 °C)  Heart Rate:  [] 61  Resp:  [18] 18  BP: (114-150)/(83-92) 150/85      Exam:  /85 (BP Location: Right arm, Patient Position: Lying)   Pulse 61   Temp 97.6 °F (36.4 °C) (Oral)   Resp 18   Ht 152.4 cm (60\")   Wt 69.4 kg (153 lb)   LMP  (LMP Unknown)   SpO2 100%   BMI 29.88 kg/m²     General Appearance:    Alert, cooperative, no distress, appears stated age   Head:    Normocephalic, without obvious abnormality, atraumatic   Eyes:    PERRL, conjunctivae/corneas clear, EOM's intact, both eyes   Ears:    Normal external ear canals, both ears "   Nose:   Nares normal, septum midline, mucosa normal, no drainage    or sinus tenderness   Throat:   Lips, mucosa, and tongue normal   Neck:   Supple, symmetrical, trachea midline, no adenopathy;     thyroid:  no enlargement/tenderness/nodules; no carotid    bruit or JVD   Back:     Symmetric, no curvature, ROM normal, no CVA tenderness   Lungs:     Decreased breath sounds bilaterally, respirations unlabored   Chest Wall:    No tenderness or deformity    Heart:    Regular rate and rhythm, S1 and S2 normal, no murmur, rub   or gallop   Abdomen:     Soft, nontender, bowel sounds active all four quadrants,     no masses, no hepatomegaly, no splenomegaly   Extremities:   Extremities normal, atraumatic, no cyanosis or edema                       .    Data Review:  Labs in chart were reviewed.  WBC   Date Value Ref Range Status   09/01/2023 10.83 (H) 3.40 - 10.80 10*3/mm3 Final     Hemoglobin   Date Value Ref Range Status   09/01/2023 15.0 12.0 - 15.9 g/dL Final     Hematocrit   Date Value Ref Range Status   09/01/2023 45.3 34.0 - 46.6 % Final     Platelets   Date Value Ref Range Status   09/01/2023 224 140 - 450 10*3/mm3 Final     Sodium   Date Value Ref Range Status   09/01/2023 144 136 - 145 mmol/L Final     Potassium   Date Value Ref Range Status   09/01/2023 3.6 3.5 - 5.2 mmol/L Final     Comment:     Slight hemolysis detected by analyzer. Results may be affected.     Chloride   Date Value Ref Range Status   09/01/2023 108 (H) 98 - 107 mmol/L Final     CO2   Date Value Ref Range Status   09/01/2023 26.5 22.0 - 29.0 mmol/L Final     BUN   Date Value Ref Range Status   09/01/2023 10 8 - 23 mg/dL Final     Creatinine   Date Value Ref Range Status   09/01/2023 0.76 0.57 - 1.00 mg/dL Final     Glucose   Date Value Ref Range Status   09/01/2023 106 (H) 65 - 99 mg/dL Final     Calcium   Date Value Ref Range Status   09/01/2023 9.5 8.6 - 10.5 mg/dL Final                Imaging Results (All)       Procedure Component Value  Units Date/Time    CT Abdomen Pelvis With Contrast [[id number]] Collected: 09/01/23 1733     Updated: 09/01/23 1733    Narrative:      CT ABDOMEN AND PELVIS WITH IV CONTRAST     HISTORY: 62-year-old female with upper abdominal pain. Diverticulitis  history. Hysterectomy, gastric sleeve, sigmoid colectomy in the past.     TECHNIQUE: Radiation dose reduction techniques were utilized, including  automated exposure control and exposure modulation based on body size.   3 mm images were obtained through the abdomen and pelvis after the  administration of IV contrast. Compared with previous CT 06/16/2023.     FINDINGS:  1. The small bowel within the left lower abdominal parastomal hernia and  the small bowel proximal to the hernia are much less distended and also  less thickened. There is also significantly less mesenteric congestion.  There may be a low-grade partial obstruction, but the findings are much  less prominent than on the previous CT.     There is no acute abnormality at the liver, gallbladder, spleen,  pancreas, adrenals, or kidneys. No evidence for acute pneumonia and  there are no effusions at the visualized lower lung fields.                 Assessment:  Active Hospital Problems    Diagnosis  POA    **Partial bowel obstruction [K56.600]  Yes      Resolved Hospital Problems   No resolved problems to display.   Nausea and vomiting  Abdominal pain  Hypertension  Colostomy  hyperglycemia    Plan:  Will continue fluids  Surgery to see  She is having output from her colostomy and the pain has improved  Trend labs  Dw patient and ed provider    Valerie Leigh MD  9/1/2023  23:47 EDT

## 2023-09-02 NOTE — CONSULTS
General Surgery Consultation    Consulting Physician: Amie Fisher MD  Referring Physician: Dylon Katz MD    Reason for consultation: Bowel obstruction    CC: Abdominal pain, nausea, vomiting    HPI:   The patient is a very pleasant 62 y.o. female that presented to the hospital emergency room yesterday evening with acute onset nausea, vomiting, and severe 10 out of 10 stabbing abdominal pain that started around 2 PM yesterday afternoon.  She was in her normal state of health beforehand and came to the emergency room after the onset of the pain.  She had no change in her colostomy output, with no blood in the stool or diarrhea.  The emesis was nonbloody and nonbilious.  While in the ER last night she had a CT abdomen/pelvis performed which demonstrated a small bowel obstruction, improved compared to her previous hospital stay in  where CT findings were similar.  Since being admitted, she has had resolution of her symptoms with no further pain, nausea, or vomiting.  She has soft brown stool in her colostomy appliance.    Past Medical History:  History of diverticular colonic stricture  History of H. pylori  Hypertension  Hyperlipidemia    Past Surgical History:  Open sigmoid/descending colectomy with colostomy  Laparoscopic gastric sleeve with gastric band removal  Laparoscopic gastric band  Laparoscopic salpingo-oophorectomy    Left breast lumpectomy    Medications:  Medications Prior to Admission   Medication Sig Dispense Refill Last Dose    amLODIPine (NORVASC) 10 MG tablet Take 1 tablet by mouth Daily.   2023    Biotin 10 MG capsule Take  by mouth.   2023    pantoprazole (PROTONIX) 40 MG EC tablet Take 1 tablet by mouth Daily.   2023    vitamin D3 125 MCG (5000 UT) capsule capsule Take 1 capsule by mouth Daily.   2023    chlorhexidine (HIBICLENS) 4 % external liquid Apply  topically to the appropriate area as directed 2 (Two) Times a Day. Shower With Hibiclens Solution Twice  The Day Before Surgery 236 mL 0     clobetasol (TEMOVATE) 0.05 % ointment Apply 1 application  topically to the appropriate area as directed 2 (Two) Times a Day.       Oral Electrolytes (EMERGEN-C ELECTRO MIX PO) Take  by mouth.          Allergies: Sulfa antibiotics (itching)    Social History: Single, former smoker, social alcohol use occasionally, drives a bus    Family History: Mother with history of diabetes, brother with history of gout, paternal grandmother with history of colon cancer, paternal aunt with history of colon cancer    Review of Systems:  Constitutional: denies any weight changes, fatigue, or weakness  Eyes: denies blurred/double vision or scleral icterus  Cardiovascular: denies chest pain, palpitations, or edemas  Respiratory: denies cough, sputum, or dyspnea  Gastrointestinal: Positive for abdominal pain, nausea, and vomiting (now resolved); denies melena, hematochezia, diarrhea, or constipation  Genitourinary: denies dysuria or hematuria  Endocrine: denies cold intolerance, lethargy, or flushing  Hematologic: denies excessive bruising or bleeding  Musculoskeletal: denies weakness, joint swelling, joint pain, or stiffness  Neurologic: denies seizures, CVA, paresthesia, or peripheral neuropathy  Skin: denies change in nevi, rashes, masses, or jaundice     All other systems reviewed and were negative.    Physical Exam:   Vitals:    09/02/23 0848   BP: 135/89   Pulse: 61   Resp: 16   Temp: 97.1 °F (36.2 °C)   SpO2: 99%     Height: 152 cm  Weight: 69 kg  BMI: 29.88  GENERAL: awake and alert, no acute distress, oriented to person, place, and time  HEENT: normocephalic, atraumatic, no scleral icterus, moist mucous membranes  NECK: Supple, there is no thyromegaly or lymphadenopathy  RESPIRATORY: clear to auscultation, no wheezes, rales or rhonchi, symmetric air entry  CARDIOVASCULAR: regular rate and rhythm    GASTROINTESTINAL: Soft, nontender, nondistended, reducible midline incisional hernia, midline  incision is well-healed, colostomy in left upper quadrant has soft brown stool in the bag  MUSCULOSKELETAL: no cyanosis, clubbing, or edema   NEUROLOGIC: alert and oriented, normal speech, cranial nerves 2-12 grossly intact, no focal deficits   SKIN: Moist, warm, no rashes, no jaundice      Diagnostic workup:     Pertinent labs:   Results from last 7 days   Lab Units 09/02/23  0529 09/01/23  1525   WBC 10*3/mm3 8.52 10.83*   HEMOGLOBIN g/dL 12.9 15.0   HEMATOCRIT % 39.1 45.3   PLATELETS 10*3/mm3 213 224     Results from last 7 days   Lab Units 09/02/23  0529 09/01/23  1525   SODIUM mmol/L 142 144   POTASSIUM mmol/L 4.3 3.6   CHLORIDE mmol/L 109* 108*   CO2 mmol/L 24.0 26.5   BUN mg/dL 7* 10   CREATININE mg/dL 0.55* 0.76   CALCIUM mg/dL 9.0 9.5   BILIRUBIN mg/dL  --  0.4   ALK PHOS U/L  --  87   ALT (SGPT) U/L  --  13   AST (SGOT) U/L  --  19   GLUCOSE mg/dL 74 106*       IMAGING:  CT ABDOMEN/PELVIS:  FINDINGS:  The small bowel within the left lower abdominal parastomal hernia and the small bowel proximal to the hernia are much less distended and also less thickened. There is also significantly less mesenteric congestion. There may be a low-grade partial obstruction, but the findings are much less prominent than on the previous CT. There is no acute abnormality at the liver, gallbladder, spleen, pancreas, adrenals, or kidneys. No evidence for acute pneumonia and there are no effusions at the visualized lower lung fields.    Assessment and plan:     The patient is a 62 y.o. female with partial small bowel obstruction, clinically resolving    I will advance her to regular diet.  She has already had return of bowel function and resolution of her symptoms.  If she is able to tolerate regular diet today, she can likely be discharged home after dinner tonight.  She will not require any surgical intervention.  I reviewed her CT scan that was done last night which actually looks better compared to the previous CT in June  when she was here with a similar bowel obstruction.  Ultimately, she can follow-up with Dr. Gaston with the colorectal surgery service to discuss colostomy reversal as planned in December, with colonoscopy ahead of time in November.    Amie Fisher MD  General, Robotic, and Endoscopic Surgery  [facility]    [address], [address]  [place] [address]  P: [phone]  F: [phone]

## 2023-09-05 NOTE — CASE MANAGEMENT/SOCIAL WORK
Case Management Discharge Note      Final Note: Home         Selected Continued Care - Discharged on 9/2/2023 Admission date: 9/1/2023 - Discharge disposition: Home or Self Care      Destination    No services have been selected for the patient.             Durable Medical Equipment    No services have been selected for the patient.             Dialysis/Infusion    No services have been selected for the patient.             Home Medical Care    No services have been selected for the patient.             Therapy    No services have been selected for the patient.             Community Resources    No services have been selected for the patient.             Community & DME    No services have been selected for the patient.                      Final Discharge Disposition Code: 01 - home or self-care

## 2023-11-06 ENCOUNTER — ANESTHESIA (OUTPATIENT)
Dept: GASTROENTEROLOGY | Facility: HOSPITAL | Age: 63
End: 2023-11-06
Payer: COMMERCIAL

## 2023-11-06 ENCOUNTER — ANESTHESIA EVENT (OUTPATIENT)
Dept: GASTROENTEROLOGY | Facility: HOSPITAL | Age: 63
End: 2023-11-06
Payer: COMMERCIAL

## 2023-11-06 ENCOUNTER — HOSPITAL ENCOUNTER (OUTPATIENT)
Facility: HOSPITAL | Age: 63
Setting detail: HOSPITAL OUTPATIENT SURGERY
Discharge: HOME | End: 2023-11-06
Attending: COLON & RECTAL SURGERY | Admitting: COLON & RECTAL SURGERY
Payer: COMMERCIAL

## 2023-11-06 VITALS
OXYGEN SATURATION: 99 % | WEIGHT: 159.9 LBS | RESPIRATION RATE: 16 BRPM | DIASTOLIC BLOOD PRESSURE: 98 MMHG | BODY MASS INDEX: 31.39 KG/M2 | HEART RATE: 64 BPM | SYSTOLIC BLOOD PRESSURE: 138 MMHG | HEIGHT: 60 IN

## 2023-11-06 PROCEDURE — 25010000002 PROPOFOL 10 MG/ML EMULSION: Performed by: NURSE ANESTHETIST, CERTIFIED REGISTERED

## 2023-11-06 PROCEDURE — 25810000003 LACTATED RINGERS PER 1000 ML: Performed by: COLON & RECTAL SURGERY

## 2023-11-06 RX ORDER — PROPOFOL 10 MG/ML
VIAL (ML) INTRAVENOUS AS NEEDED
Status: DISCONTINUED | OUTPATIENT
Start: 2023-11-06 | End: 2023-11-06 | Stop reason: SURG

## 2023-11-06 RX ORDER — LIDOCAINE HYDROCHLORIDE 20 MG/ML
INJECTION, SOLUTION INFILTRATION; PERINEURAL AS NEEDED
Status: DISCONTINUED | OUTPATIENT
Start: 2023-11-06 | End: 2023-11-06 | Stop reason: SURG

## 2023-11-06 RX ORDER — PHENOL 1.4 %
1200 AEROSOL, SPRAY (ML) MUCOUS MEMBRANE DAILY
COMMUNITY

## 2023-11-06 RX ORDER — SODIUM CHLORIDE 0.9 % (FLUSH) 0.9 %
10 SYRINGE (ML) INJECTION AS NEEDED
Status: DISCONTINUED | OUTPATIENT
Start: 2023-11-06 | End: 2023-11-06 | Stop reason: HOSPADM

## 2023-11-06 RX ORDER — SODIUM CHLORIDE, SODIUM LACTATE, POTASSIUM CHLORIDE, CALCIUM CHLORIDE 600; 310; 30; 20 MG/100ML; MG/100ML; MG/100ML; MG/100ML
1000 INJECTION, SOLUTION INTRAVENOUS CONTINUOUS
Status: DISCONTINUED | OUTPATIENT
Start: 2023-11-06 | End: 2023-11-06 | Stop reason: HOSPADM

## 2023-11-06 RX ADMIN — PROPOFOL 300 MCG/KG/MIN: 10 INJECTION, EMULSION INTRAVENOUS at 07:32

## 2023-11-06 RX ADMIN — LIDOCAINE HYDROCHLORIDE 100 MG: 20 INJECTION, SOLUTION INFILTRATION; PERINEURAL at 07:32

## 2023-11-06 RX ADMIN — PROPOFOL 100 MG: 10 INJECTION, EMULSION INTRAVENOUS at 07:32

## 2023-11-06 RX ADMIN — SODIUM CHLORIDE, POTASSIUM CHLORIDE, SODIUM LACTATE AND CALCIUM CHLORIDE 1000 ML: 600; 310; 30; 20 INJECTION, SOLUTION INTRAVENOUS at 07:19

## 2023-11-06 NOTE — ANESTHESIA PREPROCEDURE EVALUATION
Anesthesia Evaluation     Patient summary reviewed and Nursing notes reviewed                Airway   Mallampati: II  TM distance: >3 FB  Neck ROM: full  Dental      Pulmonary - negative pulmonary ROS   Cardiovascular     ECG reviewed  Rhythm: regular  Rate: normal    (+) hypertension, hyperlipidemia      Neuro/Psych- negative ROS  GI/Hepatic/Renal/Endo    (+) obesity, hiatal hernia, liver disease    Musculoskeletal (-) negative ROS    Abdominal    Substance History   (+) alcohol use     OB/GYN negative ob/gyn ROS         Other                      Anesthesia Plan    ASA 2     MAC     intravenous induction     Anesthetic plan, risks, benefits, and alternatives have been provided, discussed and informed consent has been obtained with: patient.    Plan discussed with CRNA.    CODE STATUS:

## 2023-11-06 NOTE — ANESTHESIA POSTPROCEDURE EVALUATION
Patient: Hali Tejeda    Procedure Summary       Date: 11/06/23 [facility] / [facility]    Anesthesia Start: 0728 Anesthesia Stop: 0751    Procedure: COLONOSCOPY through ostomy to cecum and through rectum to 20cm Diagnosis:       Screening for malignant neoplasm of colon      (Screening for malignant neoplasm of colon [Z12.11])    Surgeons: Lino Gaston MD Provider: Peter Valladares MD    Anesthesia Type: MAC ASA Status: 2            Anesthesia Type: MAC    Vitals  Vitals Value Taken Time   BP     Temp     Pulse 67 11/06/23 0752   Resp     SpO2 99 % 11/06/23 0752   Vitals shown include unfiled device data.        Post Anesthesia Care and Evaluation    Patient location during evaluation: PACU  Patient participation: complete - patient participated  Level of consciousness: awake and alert  Pain management: adequate    Airway patency: patent  Anesthetic complications: No anesthetic complications    Cardiovascular status: acceptable  Respiratory status: acceptable  Hydration status: acceptable    Comments: --------------------            11/06/23               0701     --------------------   BP:       132/88     Pulse:      77       Resp:       14       SpO2:      99%      --------------------

## 2023-11-06 NOTE — DISCHARGE INSTRUCTIONS
For the next 24 hours patient needs to be with a responsible adult.    For 24 hours DO NOT drive, operate machinery, appliances, drink alcohol, make important decisions or sign legal documents.    Start with a light or bland diet if you are feeling sick to your stomach otherwise advance to regular diet as tolerated.    Follow recommendations on procedure report if provided by your doctor.    Call Dr Gaston for problems [phone]     Problems may include but not limited to: large amounts of bleeding, trouble breathing, repeated vomiting, severe unrelieved pain, fever or chills.

## 2023-11-06 NOTE — H&P
Hali Tejeda is a 62 y.o. female  who is referred by Jessi Gaston MD for a colonoscopy. She   has an indications: screening for colon cancer.     She denies any change in bowel function, melena, or hematochezia.    Past Medical History:   Diagnosis Date    Abnormal EKG 2020    Avulsion fracture of distal fibula 2015    Colostomy in place     FOLLOWED BY DR. JESSI GASTON    H. pylori infection 2020    Hepatic steatosis 2020    Hiatal hernia     HTN (hypertension)     Hyperlipidemia     Insomnia     Left sided colitis with complication 2023    ADMITTED TO [facility]    LGSIL on Pap smear of cervix     (+) HPV    LGSIL Pap smear of vagina 2016    Nausea and vomiting 2023    Snoring     Stricture of sigmoid colon 2023    Uterine fibroid     Vaginal atrophy        Past Surgical History:   Procedure Laterality Date    BREAST LUMPECTOMY Left     benign     SECTION N/A     COLON RESECTION N/A 2/3/2023    Procedure: COLON RESECTION LAPAROSCOPIC CONVERTED TO OPEN SIGMOID AND LEFT MOBILIZATION OF SPLENIC FLEXURE WITH DAVINCI ROBOT;  Surgeon: Jessi Gaston MD;  Location: [facility];  Service: Robotics - DaVinci;  Laterality: N/A;    COLONOSCOPY N/A 2014    COULD ONLY GET SCOPE TO 70 CM DUE TO SIGNIFICANT STRICTURE SECONDARY TO SEVERE DIVERTICULITIS OR CANCER, ACBE ORDERED, DR. PARAG HUDSON AT [place]    COLONOSCOPY N/A 2023    MODERATE STENOSIS IN SIGMOID-DILATED, MANY DIVERTICULA IN SIGMOID AND DESCENDING, COPIOUS AMTS OF STOOL, MUCOSAL TEAR 55 CM FROM ANAL VERGE, DR. JENNI SMITH AT [facility]    COLPOSCOPY N/A 2016    ENDOSCOPY N/A 2023    GASTRITIS, SMALL HIATAL HERNIA, DR. JENNI SMITH AT [facility]    ENDOSCOPY N/A 09/15/2009    DR. PARAG HUDSON AT [place]    GASTRIC SLEEVE LAPAROSCOPIC N/A 2020    WITH REMOVAL OF GASTRIC BAND, DR. OLIMPIA PALACIOS AT  [facility]    HYSTERECTOMY N/A 2005    WITH RSO    LAPAROSCOPIC GASTRIC BANDING N/A 10/20/2019     DR. PARAG HUDSON AT [place]    SALPINGO OOPHORECTOMY Left     LSO, IN TEEN YEARS    WISDOM TOOTH EXTRACTION Bilateral        Medications Prior to Admission   Medication Sig Dispense Refill Last Dose    amLODIPine (NORVASC) 10 MG tablet Take 1 tablet by mouth Daily.   2023    Biotin 10 MG capsule Take  by mouth.   Past Week    calcium carbonate (OS-JONNATHAN) 600 MG tablet Take 2 tablets by mouth Daily.   Past Week    clobetasol (TEMOVATE) 0.05 % ointment Apply 1 application  topically to the appropriate area as directed 2 (Two) Times a Day.   Past Month    pantoprazole (PROTONIX) 40 MG EC tablet Take 1 tablet by mouth Daily.   Past Month    vitamin D3 125 MCG (5000 UT) capsule capsule Take 1 capsule by mouth Daily.   Past Week    chlorhexidine (HIBICLENS) 4 % external liquid Apply  topically to the appropriate area as directed 2 (Two) Times a Day. Shower With Hibiclens Solution Twice The Day Before Surgery 236 mL 0 Unknown    Oral Electrolytes (EMERGEN-C ELECTRO MIX PO) Take  by mouth.   Unknown       Allergies   Allergen Reactions    Sulfa Antibiotics Itching       Family History   Problem Relation Age of Onset    Diabetes Mother     Gout Brother     Colon cancer Paternal Grandmother     Cancer Paternal Grandmother     Cancer Paternal Aunt     Colon cancer Paternal Aunt        Social History     Socioeconomic History    Marital status: Single   Tobacco Use    Smoking status: Former     Types: Cigarettes     Quit date: 2004     Years since quittin.7     Passive exposure: Past    Smokeless tobacco: Never    Tobacco comments:     Quit    Vaping Use    Vaping Use: Never used   Substance and Sexual Activity    Alcohol use: Yes     Alcohol/week: 1.0 standard drink of alcohol     Types: 1 Glasses of wine per week     Comment: occ wine    Drug use: Never    Sexual activity: Defer     Birth control/protection: Hysterectomy, Post-menopausal       Review of Systems   Gastrointestinal:  Negative for abdominal  pain, nausea and vomiting.   All other systems reviewed and are negative.      Vitals:    11/06/23 0701   BP: 132/88   Pulse: 77   Resp: 14   SpO2: 99%         Physical Exam  Constitutional:       Appearance: She is well-developed.   HENT:      Head: Normocephalic and atraumatic.   Eyes:      Pupils: Pupils are equal, round, and reactive to light.   Cardiovascular:      Rate and Rhythm: Regular rhythm.   Pulmonary:      Effort: Pulmonary effort is normal.   Abdominal:      General: There is no distension.      Palpations: Abdomen is soft.   Musculoskeletal:         General: Normal range of motion.   Skin:     General: Skin is warm and dry.   Neurological:      Mental Status: She is alert and oriented to person, place, and time.   Psychiatric:         Thought Content: Thought content normal.         Judgment: Judgment normal.           Assessment & Plan      indications: screening for colon cancer         I recommend colonoscopy.  I described risks, benefits of the procedure with the patient including but not limited to bleeding, infection, possibility of perforation and possible polypectomy. All of the patient's questions were answered and they would like to proceed with the above recommendations.

## 2023-12-15 ENCOUNTER — OFFICE VISIT (OUTPATIENT)
Dept: SURGERY | Facility: CLINIC | Age: 63
End: 2023-12-15
Payer: COMMERCIAL

## 2023-12-15 ENCOUNTER — PRE-ADMISSION TESTING (OUTPATIENT)
Dept: PREADMISSION TESTING | Facility: HOSPITAL | Age: 63
End: 2023-12-15
Payer: COMMERCIAL

## 2023-12-15 ENCOUNTER — TELEPHONE (OUTPATIENT)
Dept: SURGERY | Facility: CLINIC | Age: 63
End: 2023-12-15

## 2023-12-15 VITALS
SYSTOLIC BLOOD PRESSURE: 120 MMHG | HEIGHT: 60 IN | BODY MASS INDEX: 32.93 KG/M2 | HEART RATE: 79 BPM | OXYGEN SATURATION: 99 % | WEIGHT: 167.7 LBS | DIASTOLIC BLOOD PRESSURE: 74 MMHG

## 2023-12-15 VITALS
BODY MASS INDEX: 31.34 KG/M2 | OXYGEN SATURATION: 100 % | HEART RATE: 78 BPM | WEIGHT: 166 LBS | HEIGHT: 61 IN | DIASTOLIC BLOOD PRESSURE: 89 MMHG | TEMPERATURE: 97.9 F | SYSTOLIC BLOOD PRESSURE: 133 MMHG | RESPIRATION RATE: 20 BRPM

## 2023-12-15 DIAGNOSIS — K57.92 DIVERTICULITIS: ICD-10-CM

## 2023-12-15 DIAGNOSIS — K57.92 DIVERTICULITIS: Primary | ICD-10-CM

## 2023-12-15 LAB
ABO GROUP BLD: NORMAL
ANION GAP SERPL CALCULATED.3IONS-SCNC: 11.1 MMOL/L (ref 5–15)
BLD GP AB SCN SERPL QL: NEGATIVE
BUN SERPL-MCNC: 8 MG/DL (ref 8–23)
BUN/CREAT SERPL: 11.4 (ref 7–25)
CALCIUM SPEC-SCNC: 9.7 MG/DL (ref 8.6–10.5)
CHLORIDE SERPL-SCNC: 107 MMOL/L (ref 98–107)
CO2 SERPL-SCNC: 25.9 MMOL/L (ref 22–29)
CREAT SERPL-MCNC: 0.7 MG/DL (ref 0.57–1)
DEPRECATED RDW RBC AUTO: 43.3 FL (ref 37–54)
EGFRCR SERPLBLD CKD-EPI 2021: 96.7 ML/MIN/1.73
ERYTHROCYTE [DISTWIDTH] IN BLOOD BY AUTOMATED COUNT: 13.1 % (ref 12.3–15.4)
GLUCOSE SERPL-MCNC: 68 MG/DL (ref 65–99)
HCT VFR BLD AUTO: 46.6 % (ref 34–46.6)
HGB BLD-MCNC: 15.1 G/DL (ref 12–15.9)
MCH RBC QN AUTO: 29.4 PG (ref 26.6–33)
MCHC RBC AUTO-ENTMCNC: 32.4 G/DL (ref 31.5–35.7)
MCV RBC AUTO: 90.7 FL (ref 79–97)
PLATELET # BLD AUTO: 235 10*3/MM3 (ref 140–450)
PMV BLD AUTO: 11 FL (ref 6–12)
POTASSIUM SERPL-SCNC: 4.7 MMOL/L (ref 3.5–5.2)
QT INTERVAL: 429 MS
QTC INTERVAL: 450 MS
RBC # BLD AUTO: 5.14 10*6/MM3 (ref 3.77–5.28)
RH BLD: POSITIVE
SODIUM SERPL-SCNC: 144 MMOL/L (ref 136–145)
T&S EXPIRATION DATE: NORMAL
WBC NRBC COR # BLD AUTO: 9.01 10*3/MM3 (ref 3.4–10.8)

## 2023-12-15 PROCEDURE — 86901 BLOOD TYPING SEROLOGIC RH(D): CPT

## 2023-12-15 PROCEDURE — 80048 BASIC METABOLIC PNL TOTAL CA: CPT

## 2023-12-15 PROCEDURE — 86900 BLOOD TYPING SEROLOGIC ABO: CPT

## 2023-12-15 PROCEDURE — 85027 COMPLETE CBC AUTOMATED: CPT

## 2023-12-15 PROCEDURE — 36415 COLL VENOUS BLD VENIPUNCTURE: CPT

## 2023-12-15 PROCEDURE — 93005 ELECTROCARDIOGRAM TRACING: CPT

## 2023-12-15 PROCEDURE — 86850 RBC ANTIBODY SCREEN: CPT

## 2023-12-15 RX ORDER — ACETAMINOPHEN 500 MG
1000 TABLET ORAL EVERY 6 HOURS PRN
COMMUNITY

## 2023-12-15 RX ORDER — MULTIPLE VITAMINS W/ MINERALS TAB 9MG-400MCG
1 TAB ORAL DAILY
COMMUNITY

## 2023-12-15 RX ORDER — IBUPROFEN 200 MG
400 TABLET ORAL EVERY 6 HOURS PRN
COMMUNITY

## 2023-12-15 NOTE — PROGRESS NOTES
Hali Tejeda is a 63 y.o. female in for follow up.    The patient has had a gastric sleeve in the past, had diverticulitis, and had a resection done. It was very difficult and the colostomy was done. She now comes down the colostomy taken down.    The patient reports she has been doing well. She states she is going to be home with family and recovering. The patient reports she will probably be out for 6 to 8 weeks. She states she does not think she will be coming back in 2024 or 2024. The patient reports she is retiring in 2 more years, which is in . She states she is hoping she will not be on the wound VAC. The patient reports she is on her last box of bags. She states she is going to stop and try to see Julia to see if she can give her a couple of extra bags.     Past Medical History:   Diagnosis Date    Abnormal EKG 2020    Arthritis     right knee    Avulsion fracture of distal fibula 2015    Colostomy in place     FOLLOWED BY DR. LINO GRANADO     left side    H. pylori infection 2020    Hepatic steatosis 2020    Hiatal hernia     History of transfusion     no reaction    HTN (hypertension)     Hyperlipidemia     Insomnia     Left sided colitis with complication 2023    ADMITTED TO [facility]    LGSIL on Pap smear of cervix     (+) HPV    LGSIL Pap smear of vagina 2016    Nausea and vomiting 2023    Snoring     Stricture of sigmoid colon 2023    Uterine fibroid     Vaginal atrophy      Past Surgical History:   Procedure Laterality Date    BREAST LUMPECTOMY Left     benign     SECTION N/A     COLON RESECTION N/A 2023    Procedure: COLON RESECTION LAPAROSCOPIC CONVERTED TO OPEN SIGMOID AND LEFT MOBILIZATION OF SPLENIC FLEXURE WITH DAVINCI ROBOT;  Surgeon: Lino Granado MD;  Location: [facility];  Service: Robotics - DaVinci;  Laterality: N/A;    COLONOSCOPY N/A 2014    COULD ONLY GET SCOPE TO 70 CM DUE TO SIGNIFICANT STRICTURE  "SECONDARY TO SEVERE DIVERTICULITIS OR CANCER, ACBE ORDERED, DR. PARAG HUDSON AT [place]    COLONOSCOPY N/A 02/01/2023    MODERATE STENOSIS IN SIGMOID-DILATED, MANY DIVERTICULA IN SIGMOID AND DESCENDING, COPIOUS AMTS OF STOOL, MUCOSAL TEAR 55 CM FROM ANAL VERGE, DR. JENNI SMITH AT [facility]    COLONOSCOPY N/A 11/06/2023    ENTIRE COLON WNL, RESCOPE IN 10 YRS, DR. JESSI GRANADO AT [facility]    COLPOSCOPY N/A 03/04/2016    ENDOSCOPY N/A 02/01/2023    GASTRITIS, SMALL HIATAL HERNIA, DR. JENNI SMITH AT [facility]    ENDOSCOPY N/A 09/15/2009    DR. PARAG HUDSON AT [place]    GASTRIC SLEEVE LAPAROSCOPIC N/A 07/09/2020    WITH REMOVAL OF GASTRIC BAND, DR. OLIMPIA PALACIOS AT  [facility]    HYSTERECTOMY N/A 01/2005    WITH RSO    LAPAROSCOPIC GASTRIC BANDING N/A 10/20/2009    DR. PARAG HUDSON AT [place]    SALPINGO OOPHORECTOMY Left     LSO, IN TEEN YEARS    WISDOM TOOTH EXTRACTION Bilateral          /74 (BP Location: Right arm, Patient Position: Sitting, Cuff Size: Large Adult)   Pulse 79   Ht 152.4 cm (60\")   Wt 76.1 kg (167 lb 11.2 oz)   LMP  (LMP Unknown)   SpO2 99%   Breastfeeding No   BMI 32.75 kg/m²   Body mass index is 32.75 kg/m².      PE:  Physical Exam  Constitutional:       General: She is not in acute distress.     Appearance: She is well-developed.   HENT:      Head: Normocephalic and atraumatic.   Abdominal:      General: There is no distension.      Palpations: Abdomen is soft.      Comments: Left lower quadrant colostomy   Musculoskeletal:         General: Normal range of motion.   Neurological:      Mental Status: She is alert.   Psychiatric:         Thought Content: Thought content normal.         Plan:  Plan to do a laparoscopic colostomy takedown, possible diverting loop ileostomy on Tuesday.   I described with patient typical surgical time, postop recovery including the enhanced recovery protocol, pain management, and restrictions. I discussed with patient risks including but not limited to anastomotic breakdown " and possible need for ostomy, bleeding, infection (interabdominal, abdominal wall or subcutaneous), pneumonia, DVT, PE, heart attack, or stroke,  the benefits, and alternatives.  The patient had opportunity to ask questions.  I answered all questions.  Patient understands and wishes to proceed with procedure.    Transcribed from ambient dictation for Lino Gaston MD by Sarina Hair.  12/15/23   14:27 EST    Patient or patient representative verbalized consent to the visit recording.     This patient was evaluated by me, recommendations made, documentation reviewed, edited, and revised by me, Lino Gaston MD

## 2023-12-15 NOTE — DISCHARGE INSTRUCTIONS
Take the following medications the morning of surgery:    Amlodipine    pantopazole    If you are on prescription narcotic pain medication to control your pain you may also take that medication the morning of surgery.    General Instructions:    Follow your surgeons instructions regarding when to stop solid foods and when to stop liquids.   Verify with your surgeon if you are to complete a bowel prep and when to do so.  Patients who avoid smoking, chewing tobacco and alcohol for 4 weeks prior to surgery have a reduced risk of post-operative complications.  Quit smoking as many days before surgery as you can.  Do not smoke, use chewing tobacco or drink alcohol the day of surgery.   If applicable bring your C-PAP/ BI-PAP machine in with you to preop day of surgery.  Bring any papers given to you in the doctor’s office.  Wear clean comfortable clothes.  Do not wear contact lenses, false eyelashes or make-up.  Bring a case for your glasses.   Bring crutches or walker if applicable.  Remove all piercings.  Leave jewelry and any other valuables at home.  Hair extensions with metal clips must be removed prior to surgery.  The Pre-Admission Testing nurse will instruct you to bring medications if unable to obtain an accurate list in Pre-Admission Testing.        If you were given a blood bank ID arm band remember to bring it with you the day of surgery.    Preventing a Surgical Site Infection:  For 2 to 3 days before surgery, avoid shaving with a razor because the razor can irritate skin and make it easier to develop an infection.    Any areas of open skin can increase the risk of a post-operative wound infection by allowing bacteria to enter and travel throughout the body.  Notify your surgeon if you have any skin wounds / rashes even if it is not near the expected surgical site.  The area will need assessed to determine if surgery should be delayed until it is healed.  The night prior to surgery shower using a fresh bar of  anti-bacterial soap (such as Dial) and clean washcloth.  Sleep in a clean bed with clean clothing.  Do not allow pets to sleep with you.  Shower on the morning of surgery using a fresh bar of anti-bacterial soap (such as Dial) and clean washcloth.  Dry with a clean towel and dress in clean clothing.  Ask your surgeon if you will be receiving antibiotics prior to surgery.  Make sure you, your family, and all healthcare providers clean their hands with soap and water or an alcohol based hand  before caring for you or your wound.    Day of surgery:  Your arrival time is approximately two hours before your scheduled surgery time.  Upon arrival, a Pre-op nurse and Anesthesiologist will review your health history, obtain vital signs, and answer questions you may have.  The only belongings needed at this time will be a list of your home medications and if applicable your C-PAP/BI-PAP machine.  A Pre-op nurse will start an IV and you may receive medication in preparation for surgery, including something to help you relax.     Please be aware that surgery does come with discomfort.  We want to make every effort to control your discomfort so please discuss any uncontrolled symptoms with your nurse.   Your doctor will most likely have prescribed pain medications.      If you are going home after surgery you will receive individualized written care instructions before being discharged.  A responsible adult must drive you to and from the hospital on the day of your surgery and stay with you for 24 hours.  Discharge prescriptions can be filled by the hospital pharmacy during regular pharmacy hours.  If you are having surgery late in the day/evening your prescription may be e-prescribed to your pharmacy.  Please verify your pharmacy hours or chose a 24 hour pharmacy to avoid not having access to your prescription because your pharmacy has closed for the day.    If you are staying overnight following surgery, you will be  transported to your hospital room following the recovery period.  [facility] has all private rooms.    If you have any questions please call Pre-Admission Testing at [phone].  Deductibles and co-payments are collected on the day of service. Please be prepared to pay the required co-pay, deductible or deposit on the day of service as defined by your plan.    Call your surgeon immediately if you experience any of the following symptoms:  Sore Throat  Shortness of Breath or difficulty breathing  Cough  Chills  Body soreness or muscle pain  Headache  Fever  New loss of taste or smell  Do not arrive for your surgery ill.  Your procedure will need to be rescheduled to another time.  You will need to call your physician before the day of surgery to avoid any unnecessary exposure to hospital staff as well as other patients.  CHLORHEXIDINE CLOTH INSTRUCTIONS  The morning of surgery follow these instructions using the Chlorhexidine cloths you've been given.  These steps reduce bacteria on the body.  Do not use the cloths near your eyes, ears mouth, genitalia or on open wounds.  Throw the cloths away after use but do not try to flush them down a toilet.      Open and remove one cloth at a time from the package.    Leave the cloth unfolded and begin the bathing.  Massage the skin with the cloths using gentle pressure to remove bacteria.  Do not scrub harshly.   Follow the steps below with one 2% CHG cloth per area (6 total cloths).  One cloth for neck, shoulders and chest.  One cloth for both arms, hands, fingers and underarms (do underarms last).  One cloth for the abdomen followed by groin.  One cloth for right leg and foot including between the toes.  One cloth for left leg and foot including between the toes.  The last cloth is to be used for the back of the neck, back and buttocks.    Allow the CHG to air dry 3 minutes on the skin which will give it time to work and decrease the chance of irritation.   The skin may feel sticky until it is dry.  Do not rinse with water or any other liquid or you will lose the beneficial effects of the CHG.  If mild skin irritation occurs, do rinse the skin to remove the CHG.  Report this to the nurse at time of admission.  Do not apply lotions, creams, ointments, deodorants or perfumes after using the clothes. Dress in clean clothes before coming to the hospital.

## 2023-12-15 NOTE — H&P (VIEW-ONLY)
Hali Tejeda is a 63 y.o. female in for follow up.    The patient has had a gastric sleeve in the past, had diverticulitis, and had a resection done. It was very difficult and the colostomy was done. She now comes down the colostomy taken down.    The patient reports she has been doing well. She states she is going to be home with family and recovering. The patient reports she will probably be out for 6 to 8 weeks. She states she does not think she will be coming back in 2024 or 2024. The patient reports she is retiring in 2 more years, which is in . She states she is hoping she will not be on the wound VAC. The patient reports she is on her last box of bags. She states she is going to stop and try to see Julia to see if she can give her a couple of extra bags.     Past Medical History:   Diagnosis Date    Abnormal EKG 2020    Arthritis     right knee    Avulsion fracture of distal fibula 2015    Colostomy in place     FOLLOWED BY DR. LINO GRANADO     left side    H. pylori infection 2020    Hepatic steatosis 2020    Hiatal hernia     History of transfusion     no reaction    HTN (hypertension)     Hyperlipidemia     Insomnia     Left sided colitis with complication 2023    ADMITTED TO [facility]    LGSIL on Pap smear of cervix     (+) HPV    LGSIL Pap smear of vagina 2016    Nausea and vomiting 2023    Snoring     Stricture of sigmoid colon 2023    Uterine fibroid     Vaginal atrophy      Past Surgical History:   Procedure Laterality Date    BREAST LUMPECTOMY Left     benign     SECTION N/A     COLON RESECTION N/A 2023    Procedure: COLON RESECTION LAPAROSCOPIC CONVERTED TO OPEN SIGMOID AND LEFT MOBILIZATION OF SPLENIC FLEXURE WITH DAVINCI ROBOT;  Surgeon: Lino Granado MD;  Location: [facility];  Service: Robotics - DaVinci;  Laterality: N/A;    COLONOSCOPY N/A 2014    COULD ONLY GET SCOPE TO 70 CM DUE TO SIGNIFICANT STRICTURE  "SECONDARY TO SEVERE DIVERTICULITIS OR CANCER, ACBE ORDERED, DR. PARAG HUDSON AT [place]    COLONOSCOPY N/A 02/01/2023    MODERATE STENOSIS IN SIGMOID-DILATED, MANY DIVERTICULA IN SIGMOID AND DESCENDING, COPIOUS AMTS OF STOOL, MUCOSAL TEAR 55 CM FROM ANAL VERGE, DR. JENNI SMITH AT [facility]    COLONOSCOPY N/A 11/06/2023    ENTIRE COLON WNL, RESCOPE IN 10 YRS, DR. JESSI GRANADO AT [facility]    COLPOSCOPY N/A 03/04/2016    ENDOSCOPY N/A 02/01/2023    GASTRITIS, SMALL HIATAL HERNIA, DR. JENNI SMITH AT [facility]    ENDOSCOPY N/A 09/15/2009    DR. PARAG HUDSON AT [place]    GASTRIC SLEEVE LAPAROSCOPIC N/A 07/09/2020    WITH REMOVAL OF GASTRIC BAND, DR. OLIMPIA PALACIOS AT  [place]    HYSTERECTOMY N/A 01/2005    WITH RSO    LAPAROSCOPIC GASTRIC BANDING N/A 10/20/2009    DR. PARAG HUDSON AT [place]    SALPINGO OOPHORECTOMY Left     LSO, IN TEEN YEARS    WISDOM TOOTH EXTRACTION Bilateral          /74 (BP Location: Right arm, Patient Position: Sitting, Cuff Size: Large Adult)   Pulse 79   Ht 152.4 cm (60\")   Wt 76.1 kg (167 lb 11.2 oz)   LMP  (LMP Unknown)   SpO2 99%   Breastfeeding No   BMI 32.75 kg/m²   Body mass index is 32.75 kg/m².      PE:  Physical Exam  Constitutional:       General: She is not in acute distress.     Appearance: She is well-developed.   HENT:      Head: Normocephalic and atraumatic.   Abdominal:      General: There is no distension.      Palpations: Abdomen is soft.      Comments: Left lower quadrant colostomy   Musculoskeletal:         General: Normal range of motion.   Neurological:      Mental Status: She is alert.   Psychiatric:         Thought Content: Thought content normal.         Plan:  Plan to do a laparoscopic colostomy takedown, possible diverting loop ileostomy on Tuesday.   I described with patient typical surgical time, postop recovery including the enhanced recovery protocol, pain management, and restrictions. I discussed with patient risks including but not limited to anastomotic breakdown " and possible need for ostomy, bleeding, infection (interabdominal, abdominal wall or subcutaneous), pneumonia, DVT, PE, heart attack, or stroke,  the benefits, and alternatives.  The patient had opportunity to ask questions.  I answered all questions.  Patient understands and wishes to proceed with procedure.    Transcribed from ambient dictation for Lino Gaston MD by Sarnia Hair.  12/15/23   14:27 EST    Patient or patient representative verbalized consent to the visit recording.     This patient was evaluated by me, recommendations made, documentation reviewed, edited, and revised by me, Lino Gaston MD

## 2023-12-15 NOTE — TELEPHONE ENCOUNTER
Caller: JAMIE ALEMAN    Relationship to patient: SELF    Best call back number: [phone] (home)       Patient is needing: PLEASE  INFORM PAT THAT I WILL ARRIVE FOR SRG 12.19.23 @ 530AM. PAT HAS ME LISTED TO ARRIVE @ 515AM.

## 2023-12-15 NOTE — NURSING NOTE
12/15/23 1717   Stoma Site Marking   Procedure Marking For ileostomy   Site Marked RUQ: right upper quadrant   Patient Assessment Screen rectus muscle identified;marked within patient's visual field;bony prominences avoided;waistband avoided;creases/scars avoided   How Was Patient Marked? surgical marker;transparent dressing   Patient Position During Marking sitting;standing     Wound/ostomy - patient was seen in PAT and marked for possible loop ileostomy at the time of her colostomy takedown on 12/19 with Dr. Gaston. Abdomen assessed, marked for the ileostomy at same location on the R abdomen as colostomy is on L. Patient has had success with managing the colostomy as far as location of stoma is concerned. Provided patient with irrigation sleeves to fit her Steven barrier and explained how to use for her bowel prep.

## 2023-12-19 ENCOUNTER — HOSPITAL ENCOUNTER (INPATIENT)
Facility: HOSPITAL | Age: 63
LOS: 3 days | Discharge: HOME HEALTH | End: 2023-12-22
Attending: COLON & RECTAL SURGERY | Admitting: COLON & RECTAL SURGERY
Payer: COMMERCIAL

## 2023-12-19 ENCOUNTER — ANESTHESIA (OUTPATIENT)
Dept: PERIOP | Facility: HOSPITAL | Age: 63
End: 2023-12-19
Payer: COMMERCIAL

## 2023-12-19 ENCOUNTER — ANESTHESIA EVENT (OUTPATIENT)
Dept: PERIOP | Facility: HOSPITAL | Age: 63
End: 2023-12-19
Payer: COMMERCIAL

## 2023-12-19 DIAGNOSIS — K57.92 DIVERTICULITIS: ICD-10-CM

## 2023-12-19 DIAGNOSIS — K56.699 COLONIC STRICTURE: Primary | ICD-10-CM

## 2023-12-19 PROCEDURE — 25010000002 ACETAMINOPHEN 10 MG/ML SOLUTION: Performed by: COLON & RECTAL SURGERY

## 2023-12-19 PROCEDURE — 25010000002 METRONIDAZOLE 500 MG/100ML SOLUTION: Performed by: COLON & RECTAL SURGERY

## 2023-12-19 PROCEDURE — 25010000002 PROPOFOL 200 MG/20ML EMULSION: Performed by: STUDENT IN AN ORGANIZED HEALTH CARE EDUCATION/TRAINING PROGRAM

## 2023-12-19 PROCEDURE — 44227 LAP CLOSE ENTEROSTOMY: CPT | Performed by: COLON & RECTAL SURGERY

## 2023-12-19 PROCEDURE — 25010000002 INDOCYANINE GREEN 25 MG RECONSTITUTED SOLUTION: Performed by: STUDENT IN AN ORGANIZED HEALTH CARE EDUCATION/TRAINING PROGRAM

## 2023-12-19 PROCEDURE — 25810000003 SODIUM CHLORIDE PER 500 ML: Performed by: COLON & RECTAL SURGERY

## 2023-12-19 PROCEDURE — 25010000002 MIDAZOLAM PER 1 MG: Performed by: ANESTHESIOLOGY

## 2023-12-19 PROCEDURE — 25010000002 SUGAMMADEX 200 MG/2ML SOLUTION: Performed by: STUDENT IN AN ORGANIZED HEALTH CARE EDUCATION/TRAINING PROGRAM

## 2023-12-19 PROCEDURE — 0 BUPIVACAINE LIPOSOME 1.3 % SUSPENSION: Performed by: ANESTHESIOLOGY

## 2023-12-19 PROCEDURE — 25010000002 ONDANSETRON PER 1 MG: Performed by: STUDENT IN AN ORGANIZED HEALTH CARE EDUCATION/TRAINING PROGRAM

## 2023-12-19 PROCEDURE — 88305 TISSUE EXAM BY PATHOLOGIST: CPT | Performed by: COLON & RECTAL SURGERY

## 2023-12-19 PROCEDURE — 8E0W4CZ ROBOTIC ASSISTED PROCEDURE OF TRUNK REGION, PERCUTANEOUS ENDOSCOPIC APPROACH: ICD-10-PCS | Performed by: COLON & RECTAL SURGERY

## 2023-12-19 PROCEDURE — C9290 INJ, BUPIVACAINE LIPOSOME: HCPCS | Performed by: ANESTHESIOLOGY

## 2023-12-19 PROCEDURE — 44227 LAP CLOSE ENTEROSTOMY: CPT | Performed by: PHYSICIAN ASSISTANT

## 2023-12-19 PROCEDURE — 25010000002 CEFAZOLIN IN DEXTROSE 2-4 GM/100ML-% SOLUTION: Performed by: COLON & RECTAL SURGERY

## 2023-12-19 PROCEDURE — 88304 TISSUE EXAM BY PATHOLOGIST: CPT | Performed by: COLON & RECTAL SURGERY

## 2023-12-19 PROCEDURE — 0DBE4ZZ EXCISION OF LARGE INTESTINE, PERCUTANEOUS ENDOSCOPIC APPROACH: ICD-10-PCS | Performed by: COLON & RECTAL SURGERY

## 2023-12-19 PROCEDURE — C9250 ARTISS FIBRIN SEALANT: HCPCS | Performed by: COLON & RECTAL SURGERY

## 2023-12-19 PROCEDURE — 25010000002 DEXAMETHASONE SODIUM PHOSPHATE 20 MG/5ML SOLUTION: Performed by: STUDENT IN AN ORGANIZED HEALTH CARE EDUCATION/TRAINING PROGRAM

## 2023-12-19 PROCEDURE — 25810000003 LACTATED RINGERS PER 1000 ML: Performed by: ANESTHESIOLOGY

## 2023-12-19 PROCEDURE — 25010000002 BUPIVACAINE (PF) 0.25 % SOLUTION: Performed by: ANESTHESIOLOGY

## 2023-12-19 PROCEDURE — 25010000002 HYDROMORPHONE PER 4 MG: Performed by: STUDENT IN AN ORGANIZED HEALTH CARE EDUCATION/TRAINING PROGRAM

## 2023-12-19 PROCEDURE — 25010000002 MAGNESIUM SULFATE PER 500 MG OF MAGNESIUM: Performed by: STUDENT IN AN ORGANIZED HEALTH CARE EDUCATION/TRAINING PROGRAM

## 2023-12-19 PROCEDURE — 25010000002 FENTANYL CITRATE (PF) 50 MCG/ML SOLUTION: Performed by: STUDENT IN AN ORGANIZED HEALTH CARE EDUCATION/TRAINING PROGRAM

## 2023-12-19 PROCEDURE — 0DBP4ZZ EXCISION OF RECTUM, PERCUTANEOUS ENDOSCOPIC APPROACH: ICD-10-PCS | Performed by: COLON & RECTAL SURGERY

## 2023-12-19 PROCEDURE — 25810000003 LACTATED RINGERS PER 1000 ML: Performed by: COLON & RECTAL SURGERY

## 2023-12-19 DEVICE — CIRCULAR MECH XL SEAL 25MM: Type: IMPLANTABLE DEVICE | Site: ABDOMEN | Status: FUNCTIONAL

## 2023-12-19 RX ORDER — ACETAMINOPHEN 10 MG/ML
1000 INJECTION, SOLUTION INTRAVENOUS ONCE
Status: COMPLETED | OUTPATIENT
Start: 2023-12-19 | End: 2023-12-19

## 2023-12-19 RX ORDER — FENTANYL CITRATE 50 UG/ML
50 INJECTION, SOLUTION INTRAMUSCULAR; INTRAVENOUS ONCE AS NEEDED
Status: DISCONTINUED | OUTPATIENT
Start: 2023-12-19 | End: 2023-12-19 | Stop reason: HOSPADM

## 2023-12-19 RX ORDER — BUPIVACAINE HYDROCHLORIDE 2.5 MG/ML
INJECTION, SOLUTION EPIDURAL; INFILTRATION; INTRACAUDAL
Status: COMPLETED | OUTPATIENT
Start: 2023-12-19 | End: 2023-12-19

## 2023-12-19 RX ORDER — CELECOXIB 200 MG/1
200 CAPSULE ORAL EVERY 12 HOURS SCHEDULED
Qty: 6 CAPSULE | Refills: 0 | Status: COMPLETED | OUTPATIENT
Start: 2023-12-19 | End: 2023-12-22

## 2023-12-19 RX ORDER — CEFAZOLIN SODIUM 2 G/100ML
2 INJECTION, SOLUTION INTRAVENOUS ONCE
Status: COMPLETED | OUTPATIENT
Start: 2023-12-19 | End: 2023-12-19

## 2023-12-19 RX ORDER — FAMOTIDINE 10 MG/ML
20 INJECTION, SOLUTION INTRAVENOUS ONCE
Status: COMPLETED | OUTPATIENT
Start: 2023-12-19 | End: 2023-12-19

## 2023-12-19 RX ORDER — NITROGLYCERIN 0.4 MG/1
0.4 TABLET SUBLINGUAL
Status: DISCONTINUED | OUTPATIENT
Start: 2023-12-19 | End: 2023-12-22 | Stop reason: HOSPADM

## 2023-12-19 RX ORDER — AMLODIPINE BESYLATE 5 MG/1
5 TABLET ORAL EVERY MORNING
Status: DISCONTINUED | OUTPATIENT
Start: 2023-12-20 | End: 2023-12-22 | Stop reason: HOSPADM

## 2023-12-19 RX ORDER — INDOCYANINE GREEN AND WATER 25 MG
KIT INJECTION AS NEEDED
Status: DISCONTINUED | OUTPATIENT
Start: 2023-12-19 | End: 2023-12-19 | Stop reason: SURG

## 2023-12-19 RX ORDER — SODIUM CHLORIDE 9 MG/ML
INJECTION, SOLUTION INTRAVENOUS AS NEEDED
Status: DISCONTINUED | OUTPATIENT
Start: 2023-12-19 | End: 2023-12-19 | Stop reason: HOSPADM

## 2023-12-19 RX ORDER — MAGNESIUM SULFATE HEPTAHYDRATE 500 MG/ML
INJECTION, SOLUTION INTRAMUSCULAR; INTRAVENOUS AS NEEDED
Status: DISCONTINUED | OUTPATIENT
Start: 2023-12-19 | End: 2023-12-19 | Stop reason: SURG

## 2023-12-19 RX ORDER — KETAMINE HCL IN NACL, ISO-OSM 100MG/10ML
SYRINGE (ML) INJECTION AS NEEDED
Status: DISCONTINUED | OUTPATIENT
Start: 2023-12-19 | End: 2023-12-19 | Stop reason: SURG

## 2023-12-19 RX ORDER — NALOXONE HCL 0.4 MG/ML
0.4 VIAL (ML) INJECTION
Status: DISCONTINUED | OUTPATIENT
Start: 2023-12-19 | End: 2023-12-22 | Stop reason: HOSPADM

## 2023-12-19 RX ORDER — GABAPENTIN 400 MG/1
400 CAPSULE ORAL EVERY 8 HOURS SCHEDULED
Status: COMPLETED | OUTPATIENT
Start: 2023-12-19 | End: 2023-12-22

## 2023-12-19 RX ORDER — EPHEDRINE SULFATE 50 MG/ML
INJECTION INTRAVENOUS AS NEEDED
Status: DISCONTINUED | OUTPATIENT
Start: 2023-12-19 | End: 2023-12-19 | Stop reason: SURG

## 2023-12-19 RX ORDER — DEXAMETHASONE SODIUM PHOSPHATE 4 MG/ML
INJECTION, SOLUTION INTRA-ARTICULAR; INTRALESIONAL; INTRAMUSCULAR; INTRAVENOUS; SOFT TISSUE AS NEEDED
Status: DISCONTINUED | OUTPATIENT
Start: 2023-12-19 | End: 2023-12-19 | Stop reason: SURG

## 2023-12-19 RX ORDER — SCOLOPAMINE TRANSDERMAL SYSTEM 1 MG/1
1 PATCH, EXTENDED RELEASE TRANSDERMAL CONTINUOUS
Status: DISPENSED | OUTPATIENT
Start: 2023-12-19 | End: 2023-12-22

## 2023-12-19 RX ORDER — LIDOCAINE HYDROCHLORIDE 10 MG/ML
0.5 INJECTION, SOLUTION INFILTRATION; PERINEURAL ONCE AS NEEDED
Status: DISCONTINUED | OUTPATIENT
Start: 2023-12-19 | End: 2023-12-19 | Stop reason: HOSPADM

## 2023-12-19 RX ORDER — MIDAZOLAM HYDROCHLORIDE 1 MG/ML
1 INJECTION INTRAMUSCULAR; INTRAVENOUS
Status: DISCONTINUED | OUTPATIENT
Start: 2023-12-19 | End: 2023-12-19 | Stop reason: HOSPADM

## 2023-12-19 RX ORDER — PHENYLEPHRINE HCL IN 0.9% NACL 1 MG/10 ML
SYRINGE (ML) INTRAVENOUS AS NEEDED
Status: DISCONTINUED | OUTPATIENT
Start: 2023-12-19 | End: 2023-12-19 | Stop reason: SURG

## 2023-12-19 RX ORDER — SODIUM CHLORIDE, SODIUM LACTATE, POTASSIUM CHLORIDE, CALCIUM CHLORIDE 600; 310; 30; 20 MG/100ML; MG/100ML; MG/100ML; MG/100ML
9 INJECTION, SOLUTION INTRAVENOUS CONTINUOUS
Status: DISCONTINUED | OUTPATIENT
Start: 2023-12-19 | End: 2023-12-19

## 2023-12-19 RX ORDER — FENTANYL CITRATE 50 UG/ML
INJECTION, SOLUTION INTRAMUSCULAR; INTRAVENOUS AS NEEDED
Status: DISCONTINUED | OUTPATIENT
Start: 2023-12-19 | End: 2023-12-19 | Stop reason: SURG

## 2023-12-19 RX ORDER — SODIUM CHLORIDE 0.9 % (FLUSH) 0.9 %
3 SYRINGE (ML) INJECTION EVERY 12 HOURS SCHEDULED
Status: DISCONTINUED | OUTPATIENT
Start: 2023-12-19 | End: 2023-12-19 | Stop reason: HOSPADM

## 2023-12-19 RX ORDER — ONDANSETRON 2 MG/ML
INJECTION INTRAMUSCULAR; INTRAVENOUS AS NEEDED
Status: DISCONTINUED | OUTPATIENT
Start: 2023-12-19 | End: 2023-12-19 | Stop reason: SURG

## 2023-12-19 RX ORDER — LIDOCAINE HYDROCHLORIDE 20 MG/ML
INJECTION, SOLUTION INFILTRATION; PERINEURAL AS NEEDED
Status: DISCONTINUED | OUTPATIENT
Start: 2023-12-19 | End: 2023-12-19 | Stop reason: SURG

## 2023-12-19 RX ORDER — BUPIVACAINE HYDROCHLORIDE AND EPINEPHRINE 2.5; 5 MG/ML; UG/ML
INJECTION, SOLUTION EPIDURAL; INFILTRATION; INTRACAUDAL; PERINEURAL AS NEEDED
Status: DISCONTINUED | OUTPATIENT
Start: 2023-12-19 | End: 2023-12-19 | Stop reason: HOSPADM

## 2023-12-19 RX ORDER — GABAPENTIN 300 MG/1
600 CAPSULE ORAL 3 TIMES DAILY
Status: DISCONTINUED | OUTPATIENT
Start: 2023-12-19 | End: 2023-12-19 | Stop reason: HOSPADM

## 2023-12-19 RX ORDER — ENOXAPARIN SODIUM 100 MG/ML
40 INJECTION SUBCUTANEOUS DAILY
Status: DISCONTINUED | OUTPATIENT
Start: 2023-12-20 | End: 2023-12-22 | Stop reason: HOSPADM

## 2023-12-19 RX ORDER — CELECOXIB 200 MG/1
200 CAPSULE ORAL ONCE
Status: COMPLETED | OUTPATIENT
Start: 2023-12-19 | End: 2023-12-19

## 2023-12-19 RX ORDER — ACETAMINOPHEN 500 MG
1000 TABLET ORAL EVERY 6 HOURS
Status: DISCONTINUED | OUTPATIENT
Start: 2023-12-19 | End: 2023-12-22 | Stop reason: HOSPADM

## 2023-12-19 RX ORDER — ALVIMOPAN 12 MG/1
12 CAPSULE ORAL ONCE
Status: COMPLETED | OUTPATIENT
Start: 2023-12-19 | End: 2023-12-19

## 2023-12-19 RX ORDER — SODIUM CHLORIDE, SODIUM LACTATE, POTASSIUM CHLORIDE, CALCIUM CHLORIDE 600; 310; 30; 20 MG/100ML; MG/100ML; MG/100ML; MG/100ML
50 INJECTION, SOLUTION INTRAVENOUS CONTINUOUS
Status: DISCONTINUED | OUTPATIENT
Start: 2023-12-19 | End: 2023-12-21

## 2023-12-19 RX ORDER — PROPOFOL 10 MG/ML
INJECTION, EMULSION INTRAVENOUS AS NEEDED
Status: DISCONTINUED | OUTPATIENT
Start: 2023-12-19 | End: 2023-12-19 | Stop reason: SURG

## 2023-12-19 RX ORDER — HYDROMORPHONE HYDROCHLORIDE 1 MG/ML
0.25 INJECTION, SOLUTION INTRAMUSCULAR; INTRAVENOUS; SUBCUTANEOUS
Status: DISCONTINUED | OUTPATIENT
Start: 2023-12-19 | End: 2023-12-22 | Stop reason: HOSPADM

## 2023-12-19 RX ORDER — ACETAMINOPHEN 500 MG
1000 TABLET ORAL ONCE
Status: COMPLETED | OUTPATIENT
Start: 2023-12-19 | End: 2023-12-19

## 2023-12-19 RX ORDER — SODIUM CHLORIDE 0.9 % (FLUSH) 0.9 %
3-10 SYRINGE (ML) INJECTION AS NEEDED
Status: DISCONTINUED | OUTPATIENT
Start: 2023-12-19 | End: 2023-12-19 | Stop reason: HOSPADM

## 2023-12-19 RX ORDER — ROCURONIUM BROMIDE 10 MG/ML
INJECTION, SOLUTION INTRAVENOUS AS NEEDED
Status: DISCONTINUED | OUTPATIENT
Start: 2023-12-19 | End: 2023-12-19 | Stop reason: SURG

## 2023-12-19 RX ORDER — METRONIDAZOLE 500 MG/100ML
500 INJECTION, SOLUTION INTRAVENOUS ONCE
Status: COMPLETED | OUTPATIENT
Start: 2023-12-19 | End: 2023-12-19

## 2023-12-19 RX ORDER — ONDANSETRON 2 MG/ML
4 INJECTION INTRAMUSCULAR; INTRAVENOUS EVERY 6 HOURS PRN
Status: DISCONTINUED | OUTPATIENT
Start: 2023-12-19 | End: 2023-12-22 | Stop reason: HOSPADM

## 2023-12-19 RX ORDER — KETAMINE HCL IN NACL, ISO-OSM 100MG/10ML
10 SYRINGE (ML) INJECTION
Status: DISCONTINUED | OUTPATIENT
Start: 2023-12-19 | End: 2023-12-19 | Stop reason: HOSPADM

## 2023-12-19 RX ORDER — HYDROMORPHONE HYDROCHLORIDE 1 MG/ML
0.25 INJECTION, SOLUTION INTRAMUSCULAR; INTRAVENOUS; SUBCUTANEOUS
Status: DISCONTINUED | OUTPATIENT
Start: 2023-12-19 | End: 2023-12-19 | Stop reason: HOSPADM

## 2023-12-19 RX ORDER — ALVIMOPAN 12 MG/1
12 CAPSULE ORAL 2 TIMES DAILY
Status: DISCONTINUED | OUTPATIENT
Start: 2023-12-20 | End: 2023-12-20

## 2023-12-19 RX ORDER — PANTOPRAZOLE SODIUM 40 MG/1
40 TABLET, DELAYED RELEASE ORAL EVERY MORNING
Status: DISCONTINUED | OUTPATIENT
Start: 2023-12-20 | End: 2023-12-22 | Stop reason: HOSPADM

## 2023-12-19 RX ORDER — NALOXONE HCL 0.4 MG/ML
0.4 VIAL (ML) INJECTION
Status: DISCONTINUED | OUTPATIENT
Start: 2023-12-19 | End: 2023-12-19 | Stop reason: HOSPADM

## 2023-12-19 RX ADMIN — FENTANYL CITRATE 25 MCG: 50 INJECTION, SOLUTION INTRAMUSCULAR; INTRAVENOUS at 08:22

## 2023-12-19 RX ADMIN — BUPIVACAINE HYDROCHLORIDE 20 ML: 2.5 INJECTION, SOLUTION EPIDURAL; INFILTRATION; INTRACAUDAL; PERINEURAL at 07:40

## 2023-12-19 RX ADMIN — PROPOFOL 150 MG: 10 INJECTION, EMULSION INTRAVENOUS at 07:34

## 2023-12-19 RX ADMIN — ROCURONIUM BROMIDE 20 MG: 10 INJECTION, SOLUTION INTRAVENOUS at 08:17

## 2023-12-19 RX ADMIN — HYDROMORPHONE HYDROCHLORIDE 0.25 MG: 1 INJECTION, SOLUTION INTRAMUSCULAR; INTRAVENOUS; SUBCUTANEOUS at 11:33

## 2023-12-19 RX ADMIN — ROCURONIUM BROMIDE 20 MG: 10 INJECTION, SOLUTION INTRAVENOUS at 09:44

## 2023-12-19 RX ADMIN — Medication 20 MG: at 08:06

## 2023-12-19 RX ADMIN — BUPIVACAINE 20 ML: 13.3 INJECTION, SUSPENSION, LIPOSOMAL INFILTRATION at 07:40

## 2023-12-19 RX ADMIN — CELECOXIB 200 MG: 200 CAPSULE ORAL at 21:00

## 2023-12-19 RX ADMIN — EPHEDRINE SULFATE 5 MG: 50 INJECTION INTRAVENOUS at 09:05

## 2023-12-19 RX ADMIN — CEFAZOLIN SODIUM 2 G: 2 INJECTION, SOLUTION INTRAVENOUS at 07:22

## 2023-12-19 RX ADMIN — MAGNESIUM SULFATE HEPTAHYDRATE 1 G: 500 INJECTION, SOLUTION INTRAMUSCULAR; INTRAVENOUS at 09:43

## 2023-12-19 RX ADMIN — SCOPALAMINE 1 PATCH: 1 PATCH, EXTENDED RELEASE TRANSDERMAL at 06:43

## 2023-12-19 RX ADMIN — SODIUM CHLORIDE, POTASSIUM CHLORIDE, SODIUM LACTATE AND CALCIUM CHLORIDE 9 ML/HR: 600; 310; 30; 20 INJECTION, SOLUTION INTRAVENOUS at 07:06

## 2023-12-19 RX ADMIN — LIDOCAINE HYDROCHLORIDE 100 MG: 20 INJECTION, SOLUTION INFILTRATION; PERINEURAL at 07:34

## 2023-12-19 RX ADMIN — SODIUM CHLORIDE, POTASSIUM CHLORIDE, SODIUM LACTATE AND CALCIUM CHLORIDE 50 ML/HR: 600; 310; 30; 20 INJECTION, SOLUTION INTRAVENOUS at 13:39

## 2023-12-19 RX ADMIN — ACETAMINOPHEN 1000 MG: 500 TABLET ORAL at 06:44

## 2023-12-19 RX ADMIN — GABAPENTIN 400 MG: 400 CAPSULE ORAL at 13:39

## 2023-12-19 RX ADMIN — GABAPENTIN 400 MG: 400 CAPSULE ORAL at 21:00

## 2023-12-19 RX ADMIN — ROCURONIUM BROMIDE 50 MG: 10 INJECTION, SOLUTION INTRAVENOUS at 07:35

## 2023-12-19 RX ADMIN — Medication 10 MG: at 10:45

## 2023-12-19 RX ADMIN — CELECOXIB 200 MG: 200 CAPSULE ORAL at 06:44

## 2023-12-19 RX ADMIN — LIDOCAINE HYDROCHLORIDE 80 MG: 20 INJECTION, SOLUTION INFILTRATION; PERINEURAL at 10:47

## 2023-12-19 RX ADMIN — ACETAMINOPHEN 1000 MG: 500 TABLET ORAL at 17:22

## 2023-12-19 RX ADMIN — GABAPENTIN 600 MG: 300 CAPSULE ORAL at 06:44

## 2023-12-19 RX ADMIN — SUGAMMADEX 200 MG: 100 INJECTION, SOLUTION INTRAVENOUS at 10:49

## 2023-12-19 RX ADMIN — ALVIMOPAN 12 MG: 12 CAPSULE ORAL at 06:44

## 2023-12-19 RX ADMIN — INDOCYANINE GREEN 7.5 MG: KIT INTRAVENOUS at 09:42

## 2023-12-19 RX ADMIN — ACETAMINOPHEN 1000 MG: 10 INJECTION, SOLUTION INTRAVENOUS at 11:26

## 2023-12-19 RX ADMIN — MIDAZOLAM HYDROCHLORIDE 1 MG: 1 INJECTION, SOLUTION INTRAMUSCULAR; INTRAVENOUS at 07:02

## 2023-12-19 RX ADMIN — Medication 100 MCG: at 08:40

## 2023-12-19 RX ADMIN — ONDANSETRON 4 MG: 2 INJECTION INTRAMUSCULAR; INTRAVENOUS at 10:47

## 2023-12-19 RX ADMIN — FAMOTIDINE 20 MG: 10 INJECTION INTRAVENOUS at 07:05

## 2023-12-19 RX ADMIN — METRONIDAZOLE 500 MG: 500 INJECTION, SOLUTION INTRAVENOUS at 07:16

## 2023-12-19 RX ADMIN — FENTANYL CITRATE 25 MCG: 50 INJECTION, SOLUTION INTRAMUSCULAR; INTRAVENOUS at 09:10

## 2023-12-19 RX ADMIN — DEXAMETHASONE SODIUM PHOSPHATE 8 MG: 4 INJECTION, SOLUTION INTRAMUSCULAR; INTRAVENOUS at 07:45

## 2023-12-19 RX ADMIN — Medication 20 MG: at 09:26

## 2023-12-19 NOTE — OP NOTE
Surgeon: Lino Gaston MD    Surgical  Assistant: Adwoa Rogel PA-C     Preoperative diagnosis: Diverticulitis [K57.92]    Post-Op Diagnosis Codes:     * Diverticulitis [K57.92]    Procedure: Colostomy takedown LAPAROSCOPIC WITH DAVINCI ROBOT, * Panel 2 does not exist *    Estimated Blood Loss: 50 mL    Specimens:   Specimens       ID Source Type Tests Collected By Collected At Frozen?    A Large Intestine, Left / Descending Colon Tissue TISSUE PATHOLOGY EXAM   Lino Gaston MD 12/19/23 0830 No    Description: Left Colon, Colostomy Stoma    B Large Intestine, Transverse Colon Tissue TISSUE PATHOLOGY EXAM   Lino Gaston MD 12/19/23 1007 No    Description: Transverse Colon    C Large Intestine, Rectum Tissue TISSUE PATHOLOGY EXAM   Lino Gaston MD 12/19/23 1007 No    Description: Rectum           Order Name Source Comment Collection Info Order Time   TISSUE PATHOLOGY EXAM Large Intestine, Left / Descending Colon  Collected By: Lino Gaston MD 12/19/2023  8:31 AM     Release to patient   Routine Release            Indication:  Hali Tejeda is a 63 y.o. female who comes in with Diverticulitis  Patient understands risks, benefits,and alternatives wishes to proceed.      Procedure Details:    Assistant: Adwoa Rogel PA-C was responsible for performing the following activities: Retraction, Suction, Irrigation, Suturing, Closing and Placing Dressing and their skilled assistance was necessary for the success of this case   did a pursestring around the end of the colon.  I placed a 25 mm circular stapler anvil inside the colon and tied the suture down.  I then placed the colon with the anvil in the abdomen.  I had to close the fascia in order to get pneumoperitoneum.  I closed the fascia that I could identify which was difficult given that she had a large midline hernia from her previous gastric bypass surgery.  I used a #1 PDS to close the fascia in interrupted fashion.  I left a small opening in the fascia to put the mini gel point to have access to the abdomen.  We were then able to inflate through the gel point.  I placed 4 trocars under direct visualization into the abdomen in a horizontal fashion from the right ASIS up to under the left ribs.  We docked the robot and placed all the instruments and under direct visualization.  We were able to place and assist trocar in the right lower quadrant.  We then put the patient in steep Trendelenburg.  I went to the console.  I lysed adhesions with scissors off the rectal stump.  There was small bowel attached.  I was able to get into the retrorectal space and mobilized the rectum to the lower part of the rectum.  I then mobilized the rectum anteriorly.  I had the assistant go below and bring up the EEA sizers.  We did this small and then medium.  The end of the staple line of the rectum started display.  I was able to use that to bring the pin out of the stapler.  The assistant brought up the 25 EEA stapler.  We had the pin come out through the thinnest portion.  I then brought the colon with the anvil down to meet the pen.  I made sure the colon was then appropriate alignment.  The assistant closed the stapler and then fired.  There were 2 complete donuts.  Those were sent off a specimen.  I then clamped the colon above the anastomosis and put the anastomosis under water.  The assistant inflated the rectum with a rigid sigmoidoscope.  There was no leak identified.  We did this several  times moving the anastomosis around.  I placed 2-0 Vicryl Lembert sutures around the entire anastomosis since the rectum side was a little thin.  Then placed Tisseel around the entire anastomosis.  We undocked the robot.  I scrubbed back in.  I then finished closing the fascia after taking out the gel point.  We irrigated out all the subcutaneous tissue.  We closed the skin of the trocar sites with 4-0 Monocryl.  We placed a wound VAC in the left lower quadrant as the dressing for the previous ostomy site.  All instrument, needles, Ray-Maldonado's, laps counts were all correct.  Patient was stable throughout the entire case.  The patient was taken to recovery.    Assistant: Adwoa Rogel PA-C was responsible for performing the following activities: Retraction, Suction, Irrigation, Suturing, Closing and Placing Dressing and their skilled assistance was necessary for the success of this case

## 2023-12-19 NOTE — ADDENDUM NOTE
Addendum  created 12/19/23 1323 by Severiano Martinez MD    Attestation recorded in Intraprocedure, Intraprocedure Attestations deleted, Intraprocedure Attestations filed

## 2023-12-19 NOTE — ANESTHESIA PROCEDURE NOTES
Airway  Urgency: elective    Date/Time: 12/19/2023 7:38 AM  Airway not difficult    General Information and Staff    Patient location during procedure: OR  Anesthesiologist: Severiano Martinez MD  CRNA/CAA: Dc Oliver CRNA    Indications and Patient Condition  Indications for airway management: airway protection    Preoxygenated: yes  MILS not maintained throughout  Mask difficulty assessment: 1 - vent by mask    Final Airway Details  Final airway type: endotracheal airway      Successful airway: ETT  Cuffed: yes   Successful intubation technique: direct laryngoscopy  Endotracheal tube insertion site: oral  Blade: Zenaida  Blade size: 3  ETT size (mm): 7.0  Cormack-Lehane Classification: grade IIa - partial view of glottis  Placement verified by: capnometry   Cuff volume (mL): 8  Measured from: lips  ETT/EBT  to lips (cm): 21  Number of attempts at approach: 1  Assessment: lips, teeth, and gum same as pre-op and atraumatic intubation

## 2023-12-19 NOTE — CONSULTS
Chaplain Osborne went to visit Pt at their request.  provided spiritual care and emotional support.Pt and  discussed Pt's masha and specific biblical passages and their meaning to the Pt.  prayed with Pt for their health and the health of their loved ones.  will attempt to check in with Pt on 12/20.

## 2023-12-19 NOTE — ANESTHESIA PROCEDURE NOTES
Peripheral Block      Patient reassessed immediately prior to procedure    Patient location during procedure: OR  Reason for block: at surgeon's request and post-op pain management  Preanesthetic Checklist  Completed: patient identified, IV checked, site marked, risks and benefits discussed, surgical consent, monitors and equipment checked, pre-op evaluation and timeout performed  Prep:  Pt Position: supine  Sterile barriers:alcohol skin prep, cap, gloves, mask and washed/disinfected hands  Prep: ChloraPrep  Patient monitoring: blood pressure monitoring and continuous pulse oximetry  Procedure    Sedation: yes  Performed under: general  Guidance:ultrasound guided    ULTRASOUND INTERPRETATION.  Using ultrasound guidance a 21 G gauge needle was placed in close proximity to the nerve, at which point, under ultrasound guidance anesthetic was injected in the area of the nerve and spread of the anesthesia was seen on ultrasound in close proximity thereto.  There were no abnormalities seen on ultrasound; a digital image was taken; and the patient tolerated the procedure with no complications. Images:still images obtained, printed/placed on chart    Laterality:Bilateral  Block Type:TAP  Injection Technique:single-shot  Needle Type:echogenic  Needle Gauge:21 G  Resistance on Injection: none    Medications Used: bupivacaine liposome (EXPAREL) 1.3 % injection - Infiltration   20 mL - 12/19/2023 7:40:00 AM  bupivacaine PF (MARCAINE) 0.25 % injection - Injection   20 mL - 12/19/2023 7:40:00 AM      Post Assessment  Injection Assessment: negative aspiration for heme, no paresthesia on injection and incremental injection  Patient Tolerance:comfortable throughout block  Complications:no

## 2023-12-19 NOTE — DISCHARGE PLACEMENT REQUEST
"Hali Aleman (63 y.o. Female)       Date of Birth   1960    Social Security Number       Address   [address] [place] [address]    Home Phone   [phone]    MRN   [id number]       Episcopalian   None    Marital Status   Single                            Admission Date   12/19/23    Admission Type   Elective    Admitting Provider   Lino Gaston MD    Attending Provider   Lino Gaston MD    Department, Room/Bed   [address], [address]/1       Discharge Date       Discharge Disposition       Discharge Destination                                 Attending Provider: Lino Gaston MD    Allergies: Sulfa Antibiotics    Isolation: None   Infection: None   Code Status: CPR    Ht: 153.7 cm (60.5\")   Wt: 73 kg (161 lb)    Admission Cmt: None   Principal Problem: Diverticulitis [K57.92]                   Active Insurance as of 12/19/2023       Primary Coverage       Payor Plan Insurance Group Employer/Plan Group    [facility] [employer] EMPL[employer] [id number]       Payor Plan Address Payor Plan Phone Number Payor Plan Fax Number Effective Dates    [address] [phone]  1/1/2022 - None Entered    [facility] [id number]         Subscriber Name Subscriber Birth Date Member ID       HALI ALEMAN 1960 [id number]                     Emergency Contacts        (Rel.) Home Phone Work Phone Mobile Phone    SANDRA ALEMAN (Daughter) [phone] -- --    AMAURI ABREU (Mother) -- -- [phone]                "

## 2023-12-19 NOTE — ANESTHESIA PREPROCEDURE EVALUATION
Anesthesia Evaluation     Patient summary reviewed and Nursing notes reviewed                Airway   Mallampati: II  TM distance: >3 FB  Neck ROM: full  Dental      Pulmonary - negative pulmonary ROS   Cardiovascular     ECG reviewed  Rhythm: regular  Rate: normal    (+) hypertension, hyperlipidemia      Neuro/Psych- negative ROS  GI/Hepatic/Renal/Endo    (+) obesity, hiatal hernia, liver disease    Musculoskeletal (-) negative ROS    Abdominal    Substance History   (+) alcohol use     OB/GYN negative ob/gyn ROS         Other                          Anesthesia Plan    ASA 2     general     (TAP)  intravenous induction     Anesthetic plan, risks, benefits, and alternatives have been provided, discussed and informed consent has been obtained with: patient.    Plan discussed with CRNA.      CODE STATUS:

## 2023-12-19 NOTE — ANESTHESIA POSTPROCEDURE EVALUATION
Patient: Hali Tejeda    Procedure Summary       Date: 12/19/23 Room / Location: [facility] OR [address] [place] [address] MAIN OR    Anesthesia Start: 0727 Anesthesia Stop: 1106    Procedure: Colostomy takedown LAPAROSCOPIC WITH DAVINCI ROBOT (Abdomen) Diagnosis:       Diverticulitis      (Diverticulitis [K57.92])    Surgeons: Lino Gaston MD Provider: Severiano Martinez MD    Anesthesia Type: general ASA Status: 2            Anesthesia Type: general    Vitals  Vitals Value Taken Time   /80 12/19/23 1245   Temp 35.7 °C (96.3 °F) 12/19/23 1245   Pulse 74 12/19/23 1253   Resp 16 12/19/23 1245   SpO2 100 % 12/19/23 1253   Vitals shown include unfiled device data.        Post Anesthesia Care and Evaluation    Patient location during evaluation: bedside  Patient participation: complete - patient participated  Level of consciousness: awake and alert  Pain score: 0  Pain management: adequate    Airway patency: patent  Anesthetic complications: No anesthetic complications    Cardiovascular status: acceptable  Respiratory status: acceptable  Hydration status: acceptable    Comments: /81 (BP Location: Right arm, Patient Position: Lying)   Pulse 74   Temp 36.7 °C (98.1 °F) (Oral)   Resp 18   LMP  (LMP Unknown)   SpO2 95%

## 2023-12-19 NOTE — NURSING NOTE
CWON note: consult received for wound VAC, placed in OR by Dr Gaston today. Will plan to change on Friday to get pt on M-W-F schedule.

## 2023-12-19 NOTE — BRIEF OP NOTE
COLON RESECTION LAPAROSCOPIC SIGMOID WITH DAVINCI ROBOT  Progress Note    Hali Tejeda  12/19/2023    Pre-op Diagnosis:   Diverticulitis [K57.92]       Post-Op Diagnosis Codes:     * Diverticulitis [K57.92]    Procedure/CPT® Codes:        Procedure(s):  Colostomy takedown LAPAROSCOPIC WITH DAVINCI ROBOT              Surgeon(s):  Lino Gaston MD    Anesthesia: General    Staff:   Circulator: Shameka Gomez, ZAC; Christen Damon, ZAC; Jeniffer Menezes, ZAC  Scrub Person: Arianne Perez; Maye Trevino  Assistant: Adwoa Rogel PA-C  Assistant: Adwoa Rogel PA-C      Estimated Blood Loss: 50 mL    Urine Voided: 250 mL    Specimens:                Specimens       ID Source Type Tests Collected By Collected At Frozen?    A Large Intestine, Left / Descending Colon Tissue TISSUE PATHOLOGY EXAM   Lino Gaston MD 12/19/23 0830 No    Description: Left Colon, Colostomy Stoma    B Large Intestine, Transverse Colon Tissue TISSUE PATHOLOGY EXAM   Lino Gaston MD 12/19/23 1007 No    Description: Transverse Colon    C Large Intestine, Rectum Tissue TISSUE PATHOLOGY EXAM   Lino Gaston MD 12/19/23 1007 No    Description: Rectum                  Drains:   Urethral Catheter Non-latex 16 Fr. (Active)       [REMOVED] Closed/Suction Drain 1 Left LLQ Pigtail 10 Fr. (Removed)       [REMOVED] Colostomy LLQ (Removed)       Findings:         Complications:         Lino Gaston MD     Date: 12/19/2023  Time: 11:14 EST

## 2023-12-19 NOTE — NURSING NOTE
12/19/23 1640   Wound 12/19/23 0921 abdomen Incision   Placement Date/Time: 12/19/23 0921   Location: abdomen  Primary Wound Type: (c) Incision   Dressing Appearance intact  (vac sealed but not working properly)   Drainage Characteristics/Odor serosanguineous   Drainage Amount scant   NPWT (Negative Pressure Wound Therapy) 12/19/23 1047 abdomen   Placement Date/Time: 12/19/23 1047   Location: abdomen   Therapy Setting continuous therapy   Dressing   (track pad removed, foam packing left in the wound, placed new piece of drape, cut hole and placed new track pad and cushioned with piece of black foam)   Pressure Setting 125 mmHg     Wound/ostomy - call received from nurse regarding wound vac not working correctly. Drainage noted to tubing but not moving at all, nurse reports the drainage has not moved as it typically does and was concerned. Track pad noted to L abdomen ostomy take down site and round disk on track pad not making complete contact with foam and this often interferes with functionality. New track pad placed adding piece of round foam to cushion, therapy resumed and working normally.

## 2023-12-19 NOTE — PLAN OF CARE
Problem: Adult Inpatient Plan of Care  Goal: Plan of Care Review  Outcome: Ongoing, Progressing  Flowsheets (Taken 12/19/2023 1232)  Progress: improving  Plan of Care Reviewed With: patient  Outcome Evaluation: Pt admitted to [facility] after ostomy take down surgery with Dr. Gaston. Wound vac in place. Pain controlled with ERAS. +ambulation in hallway with staff. Tolerating clear liquid diet. IS at bedside. Parrish remains in place. Plan of care ongoing.

## 2023-12-20 LAB
ANION GAP SERPL CALCULATED.3IONS-SCNC: 8.1 MMOL/L (ref 5–15)
APTT PPP: 33.6 SECONDS (ref 22.7–35.4)
BASOPHILS # BLD AUTO: 0.03 10*3/MM3 (ref 0–0.2)
BASOPHILS NFR BLD AUTO: 0.2 % (ref 0–1.5)
BUN SERPL-MCNC: 9 MG/DL (ref 8–23)
BUN/CREAT SERPL: 12.9 (ref 7–25)
CALCIUM SPEC-SCNC: 8.9 MG/DL (ref 8.6–10.5)
CHLORIDE SERPL-SCNC: 105 MMOL/L (ref 98–107)
CO2 SERPL-SCNC: 24.9 MMOL/L (ref 22–29)
CREAT SERPL-MCNC: 0.7 MG/DL (ref 0.57–1)
DEPRECATED RDW RBC AUTO: 38.8 FL (ref 37–54)
EGFRCR SERPLBLD CKD-EPI 2021: 96.7 ML/MIN/1.73
EOSINOPHIL # BLD AUTO: 0.01 10*3/MM3 (ref 0–0.4)
EOSINOPHIL NFR BLD AUTO: 0.1 % (ref 0.3–6.2)
ERYTHROCYTE [DISTWIDTH] IN BLOOD BY AUTOMATED COUNT: 12.6 % (ref 12.3–15.4)
GLUCOSE SERPL-MCNC: 110 MG/DL (ref 65–99)
HCT VFR BLD AUTO: 36.4 % (ref 34–46.6)
HGB BLD-MCNC: 11.8 G/DL (ref 12–15.9)
IMM GRANULOCYTES # BLD AUTO: 0.1 10*3/MM3 (ref 0–0.05)
IMM GRANULOCYTES NFR BLD AUTO: 0.6 % (ref 0–0.5)
INR PPP: 1.18 (ref 0.9–1.1)
LAB AP CASE REPORT: NORMAL
LYMPHOCYTES # BLD AUTO: 1.82 10*3/MM3 (ref 0.7–3.1)
LYMPHOCYTES NFR BLD AUTO: 11.3 % (ref 19.6–45.3)
MAGNESIUM SERPL-MCNC: 2.2 MG/DL (ref 1.6–2.4)
MCH RBC QN AUTO: 27.9 PG (ref 26.6–33)
MCHC RBC AUTO-ENTMCNC: 32.4 G/DL (ref 31.5–35.7)
MCV RBC AUTO: 86.1 FL (ref 79–97)
MONOCYTES # BLD AUTO: 1.2 10*3/MM3 (ref 0.1–0.9)
MONOCYTES NFR BLD AUTO: 7.4 % (ref 5–12)
NEUTROPHILS NFR BLD AUTO: 12.98 10*3/MM3 (ref 1.7–7)
NEUTROPHILS NFR BLD AUTO: 80.4 % (ref 42.7–76)
NRBC BLD AUTO-RTO: 0 /100 WBC (ref 0–0.2)
PATH REPORT.FINAL DX SPEC: NORMAL
PATH REPORT.GROSS SPEC: NORMAL
PLATELET # BLD AUTO: 209 10*3/MM3 (ref 140–450)
PMV BLD AUTO: 10.5 FL (ref 6–12)
POTASSIUM SERPL-SCNC: 4.3 MMOL/L (ref 3.5–5.2)
PROTHROMBIN TIME: 15.2 SECONDS (ref 11.7–14.2)
RBC # BLD AUTO: 4.23 10*6/MM3 (ref 3.77–5.28)
SODIUM SERPL-SCNC: 138 MMOL/L (ref 136–145)
WBC NRBC COR # BLD AUTO: 16.14 10*3/MM3 (ref 3.4–10.8)

## 2023-12-20 PROCEDURE — 83735 ASSAY OF MAGNESIUM: CPT | Performed by: COLON & RECTAL SURGERY

## 2023-12-20 PROCEDURE — 25010000002 ONDANSETRON PER 1 MG: Performed by: COLON & RECTAL SURGERY

## 2023-12-20 PROCEDURE — 80048 BASIC METABOLIC PNL TOTAL CA: CPT | Performed by: COLON & RECTAL SURGERY

## 2023-12-20 PROCEDURE — 25810000003 LACTATED RINGERS PER 1000 ML: Performed by: COLON & RECTAL SURGERY

## 2023-12-20 PROCEDURE — 25010000002 ENOXAPARIN PER 10 MG: Performed by: COLON & RECTAL SURGERY

## 2023-12-20 PROCEDURE — 99024 POSTOP FOLLOW-UP VISIT: CPT | Performed by: PHYSICIAN ASSISTANT

## 2023-12-20 PROCEDURE — 85025 COMPLETE CBC W/AUTO DIFF WBC: CPT | Performed by: COLON & RECTAL SURGERY

## 2023-12-20 PROCEDURE — 85610 PROTHROMBIN TIME: CPT | Performed by: COLON & RECTAL SURGERY

## 2023-12-20 PROCEDURE — 85730 THROMBOPLASTIN TIME PARTIAL: CPT | Performed by: COLON & RECTAL SURGERY

## 2023-12-20 RX ADMIN — CELECOXIB 200 MG: 200 CAPSULE ORAL at 20:58

## 2023-12-20 RX ADMIN — GABAPENTIN 400 MG: 400 CAPSULE ORAL at 06:11

## 2023-12-20 RX ADMIN — ACETAMINOPHEN 1000 MG: 500 TABLET ORAL at 18:26

## 2023-12-20 RX ADMIN — AMLODIPINE BESYLATE 5 MG: 5 TABLET ORAL at 06:15

## 2023-12-20 RX ADMIN — ONDANSETRON HYDROCHLORIDE 4 MG: 2 INJECTION, SOLUTION INTRAMUSCULAR; INTRAVENOUS at 00:15

## 2023-12-20 RX ADMIN — GABAPENTIN 400 MG: 400 CAPSULE ORAL at 21:03

## 2023-12-20 RX ADMIN — ACETAMINOPHEN 1000 MG: 500 TABLET ORAL at 06:11

## 2023-12-20 RX ADMIN — SODIUM CHLORIDE, POTASSIUM CHLORIDE, SODIUM LACTATE AND CALCIUM CHLORIDE 50 ML/HR: 600; 310; 30; 20 INJECTION, SOLUTION INTRAVENOUS at 09:03

## 2023-12-20 RX ADMIN — CELECOXIB 200 MG: 200 CAPSULE ORAL at 08:13

## 2023-12-20 RX ADMIN — ACETAMINOPHEN 1000 MG: 500 TABLET ORAL at 00:12

## 2023-12-20 RX ADMIN — ACETAMINOPHEN 1000 MG: 500 TABLET ORAL at 12:04

## 2023-12-20 RX ADMIN — GABAPENTIN 400 MG: 400 CAPSULE ORAL at 14:01

## 2023-12-20 RX ADMIN — ENOXAPARIN SODIUM 40 MG: 100 INJECTION SUBCUTANEOUS at 08:13

## 2023-12-20 RX ADMIN — PANTOPRAZOLE SODIUM 40 MG: 40 TABLET, DELAYED RELEASE ORAL at 06:11

## 2023-12-20 RX ADMIN — ALVIMOPAN 12 MG: 12 CAPSULE ORAL at 08:16

## 2023-12-20 NOTE — SIGNIFICANT NOTE
12/20/23 0949   OTHER   Discipline physical therapist   Rehab Time/Intention   Session Not Performed other (see comments)  (PT eval order received post op. pt up with nsg staff, has ambulated multiple laps around nurses station, no acute PT needs identified. spoke with ZAC Encarnacion)

## 2023-12-20 NOTE — CASE MANAGEMENT/SOCIAL WORK
Continued Stay Note  [facility]     Patient Name: Hali Tejeda  MRN: [id number]  Today's Date: 12/20/2023    Admit Date: 12/19/2023    Plan: Home with HH (referrals pending) for wound vac dressing changes. [facility] to provide wound vac at D/C. Family to transport at D/C.   Discharge Plan       Row Name 12/20/23 1433       Plan    Plan Home with HH (referrals pending) for wound vac dressing changes. [facility] to provide wound vac at D/C. Family to transport at D/C.    Patient/Family in Agreement with Plan yes    Plan Comments Met with pt. at bedside. Explained roll of . Face sheet and pharmacy verified. Pt lives alone in a single-story home.  There are 2 steps to enter home. Home DME includes ostomy supplies, walker, commode, cane, B/P cuff and a walking boot.  Pt is independent with ADLs and uses no DME at baseline. 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 Pt has never been to Rehab. She has used [facility] and requests [facility] see her at D/C for wound vac dressing changes. Referral placed in Epic and discussed with Sosa/RO. Per Sosa/RO, unsure if they will be able to accept due to staffing over holiday. Additional HH referrals placed in Epic. Notified [facility] that pt will need wound vac at D/C. Faxed wound vac order to [facility]. Requested wound vac be delivered tomorrow for possible D/C. Pt's PCP is Rufina Delgado MD. Pt normally drives herself to [address]. At discharge, family will transport. Explained that CCP would follow to assess for discharge needs.  Jesse Alexandra RN-BC                   Discharge Codes    No documentation.                 Expected Discharge Date and Time       Expected Discharge Date Expected Discharge Time    Dec 21, 2023               Jesse Alexandra RN

## 2023-12-20 NOTE — PROGRESS NOTES
Patient is tolerated full liquids  Wants something more to eat  Is had 2 small bowel movements  Abdomen is soft  Advance to regular diet  Working on wound VAC for home    Home in the next day or 2

## 2023-12-20 NOTE — PROGRESS NOTES
Colorectal Surgery Progress Note    POD 1     S: Had 2 very small bowel movements. positive ambulating. Pain controlled with ERAS.  Tolerating clear liquid diet without nausea. Parrish in place.    O:  Temp:  [95.2 °F (35.1 °C)-98.2 °F (36.8 °C)] 98.2 °F (36.8 °C)  Heart Rate:  [60-81] 69  Resp:  [10-18] 18  BP: (113-139)/(69-95) 130/74    Intake/Output Summary (Last 24 hours) at 12/20/2023 0823  Last data filed at 12/20/2023 0756  Gross per 24 hour   Intake 2630 ml   Output 2500 ml   Net 130 ml     Abd:   soft, non-distended. Wound vac in place in left lower quadrant.  Incisions: clean, dry, intact, no erythema    Results from last 7 days   Lab Units 12/20/23  0504   WBC 10*3/mm3 16.14*   HEMOGLOBIN g/dL 11.8*   HEMATOCRIT % 36.4   PLATELETS 10*3/mm3 209     Results from last 7 days   Lab Units 12/20/23  0447   SODIUM mmol/L 138   POTASSIUM mmol/L 4.3   CHLORIDE mmol/L 105   CO2 mmol/L 24.9   BUN mg/dL 9   CREATININE mg/dL 0.70   EGFR mL/min/1.73 96.7   GLUCOSE mg/dL 110*   CALCIUM mg/dL 8.9     Results from last 7 days   Lab Units 12/20/23  0447   MAGNESIUM mg/dL 2.2     A/P: 63 y.o. female s/p Colostomy takedown LAPAROSCOPIC WITH DAVINCI ROBOT  Continue Parrish catheter until tomorrow morning  Continue Entereg until larger bowel movement  Advance to full liquid diet  Continue ambulation and out of bed to chair  Continue wound VAC

## 2023-12-20 NOTE — CASE MANAGEMENT/SOCIAL WORK
Case Management/Social Work    Patient Name:  Hali Tejeda  YOB: 1960  MRN: [id number]  Admit Date:  12/19/2023        Abd wound measurements per Dr Gaston, 8 x 6 x 5 cm. Jesse Alexandra RN         Electronically signed by:  Jesse Alexandra RN  12/20/23 13:17 EST

## 2023-12-20 NOTE — PLAN OF CARE
Goal Outcome Evaluation:         Pt ambulated at nurse station during dayshift but did not want to ambulate for me tonight so far, said she would do so later but was very tired.  Wound vac site looks good/no signs of infection at this time.  Stark with clear yellow urine output.  Continuous IVFs, oral hydration promoted.  Pt denies pain or nausea.  SR-SB.    Pt walked 5 laps around the nurse station and back and forth from B/R twice for a total of 648 ft.  Denies dizziness or pain.  Pt had 2 BM in BR but flushed before I could visualize, she reported a moderate sized BM and a large BM and was heard passing gas.  Pt also using IS numerous repetitions and doing well.  No current orders to remove stark so left indwelling at this time.               Problem: Adult Inpatient Plan of Care  Goal: Plan of Care Review  Outcome: Ongoing, Progressing  Goal: Absence of Hospital-Acquired Illness or Injury  Outcome: Ongoing, Progressing  Intervention: Identify and Manage Fall Risk  Description: Perform standard risk assessment on admission using a validated tool or comprehensive approach appropriate to the patient; reassess fall risk frequently, with change in status or transfer to another level of care.  Communicate fall injury risk to interprofessional healthcare team.  Determine need for increased observation, equipment and environmental modification, such as low bed, signage and supportive, nonskid footwear.  Adjust safety measures to individual developmental age, stage and identified risk factors.  Reinforce the importance of safety and physical activity with patient and family.  Perform regular intentional rounding to assess need for position change, pain assessment and personal needs, including assistance with toileting.  Recent Flowsheet Documentation  Taken 12/20/2023 0220 by Dari Reynaga, RN  Safety Promotion/Fall Prevention: safety round/check completed  Taken 12/20/2023 0012 by Dari Reynaga, RN  Safety  Promotion/Fall Prevention: safety round/check completed  Taken 12/19/2023 2217 by Dari Reynaga RN  Safety Promotion/Fall Prevention: safety round/check completed  Taken 12/19/2023 1959 by Dari Reynaga RN  Safety Promotion/Fall Prevention: safety round/check completed  Intervention: Prevent Skin Injury  Description: Perform a screening for skin injury risk, such as pressure or moisture associated skin damage on admission and at regular intervals throughout hospital stay.  Keep all areas of skin (especially folds) clean and dry.  Maintain adequate skin hydration.  Relieve and redistribute pressure and protect bony prominences; implement measures based on patient-specific risk factors.  Match turning and repositioning schedule to clinical condition.  Encourage weight shift frequently; assist with reposition if unable to complete independently.  Float heels off bed; avoid pressure on the Achilles tendon.  Keep skin free from extended contact with medical devices.  Encourage functional activity and mobility, as early as tolerated.  Use aids (e.g., slide boards, mechanical lift) during transfer.  Recent Flowsheet Documentation  Taken 12/20/2023 0220 by Dari Reynaga RN  Body Position: position changed independently  Taken 12/20/2023 0012 by Dari Reynaga RN  Body Position: position changed independently  Skin Protection:   adhesive use limited   tubing/devices free from skin contact   transparent dressing maintained  Taken 12/19/2023 2217 by Dari Reynaga RN  Body Position:   position changed independently   weight shifting   tilted   left  Taken 12/19/2023 1959 by Dari Reynaga RN  Body Position:   position changed independently   sitting up in bed  Skin Protection:   adhesive use limited   transparent dressing maintained   tubing/devices free from skin contact  Intervention: Prevent and Manage VTE (Venous Thromboembolism) Risk  Description: Assess for VTE (venous thromboembolism)  risk.  Encourage and assist with early ambulation.  Initiate and maintain compression or other therapy, as indicated, based on identified risk in accordance with organizational protocol and provider order.  Encourage both active and passive leg exercises while in bed, if unable to ambulate.  Recent Flowsheet Documentation  Taken 12/20/2023 0012 by Dari Reynaga RN  Activity Management: activity encouraged  VTE Prevention/Management:   bilateral   sequential compression devices on  Taken 12/19/2023 1959 by Dari Reynaga RN  Activity Management: activity encouraged  VTE Prevention/Management:   bilateral   sequential compression devices on  Goal: Optimal Comfort and Wellbeing  Outcome: Ongoing, Progressing  Intervention: Provide Person-Centered Care  Description: Use a family-focused approach to care.  Develop trust and rapport by proactively providing information, encouraging questions, addressing concerns and offering reassurance.  Acknowledge emotional response to hospitalization.  Recognize and utilize personal coping strategies.  Honor spiritual and cultural preferences.  Recent Flowsheet Documentation  Taken 12/20/2023 0012 by Dari Reynaga RN  Trust Relationship/Rapport:   choices provided   care explained   questions encouraged   reassurance provided   thoughts/feelings acknowledged   emotional support provided  Taken 12/19/2023 1959 by Dari Reynaga RN  Trust Relationship/Rapport:   choices provided   care explained   questions answered   thoughts/feelings acknowledged   reassurance provided   questions encouraged   emotional support provided  Goal: Readiness for Transition of Care  Outcome: Ongoing, Progressing     Problem: Pain Acute  Goal: Acceptable Pain Control and Functional Ability  Outcome: Ongoing, Progressing  Intervention: Prevent or Manage Pain  Description: Evaluate pain level, effect of treatment and patient response at regular intervals.  Minimize painful stimuli; coordinate  care and adjust environment (e.g., light, noise, unnecessary movement); promote sleep/rest.  Match pharmacologic analgesia to severity and type of pain mechanism (e.g., neuropathic, muscle, inflammatory); consider multimodal approach (e.g., nonopioid, opioid, adjuvant).  Provide medication at regular intervals; titrate to patient response; premedicate for painful procedures.  Manage breakthrough pain with additional doses; consider rotation or switching medication.  Monitor for signs of substance tolerance (increased dose to reach desired effect, decreased effect with same dose).  Manage medication-induced effects, such as constipation, nausea, pruritus, urinary retention, somnolence and dizziness.  Provide multimodal interventions, such as as physical activity, therapeutic exercise, yoga, TENS (transcutaneous electrical nerve stimulation) and manual therapy.  Train in functional activity modifications, such as body mechanics, posture, ergonomics, energy conservation and activity pacing.  Consider addition of complementary or alternative therapy, such as acupuncture, hypnosis or therapeutic touch.  Recent Flowsheet Documentation  Taken 12/20/2023 0220 by Dari Reynaga RN  Medication Review/Management:   medications reviewed   high-risk medications identified  Taken 12/20/2023 0012 by Dari Reynaga RN  Sensory Stimulation Regulation:   care clustered   lighting decreased   television on  Bowel Elimination Promotion:   adequate fluid intake promoted   ambulation promoted  Sleep/Rest Enhancement:   awakenings minimized   consistent schedule promoted   room darkened  Medication Review/Management:   medications reviewed   high-risk medications identified  Taken 12/19/2023 2217 by Dari Reynaga RN  Medication Review/Management:   medications reviewed   high-risk medications identified  Taken 12/19/2023 1959 by Dari Reynaga, RN  Sensory Stimulation Regulation:   care clustered   lighting decreased    television on  Bowel Elimination Promotion:   adequate fluid intake promoted   ambulation promoted  Sleep/Rest Enhancement:   awakenings minimized   consistent schedule promoted   room darkened  Medication Review/Management:   medications reviewed   high-risk medications identified  Intervention: Optimize Psychosocial Wellbeing  Description: Facilitate patient’s self-control over pain by providing pain information and allowing choices in treatment.  Consider and address emotional response to pain.  Explore and promote use of coping strategies; address barriers to successful coping.  Evaluate and assist with psychosocial, cultural and spiritual factors impacting pain.  Modify pain perception using techniques, such as distraction, mindfulness, guided imagery, meditation or music.  Assess for risk factors for developing chronic pain, such as depression, fear, pain avoidance and pain catastrophizing.  Consider referral for ongoing coping support, such as education, relaxation training and role of thoughts.  Recent Flowsheet Documentation  Taken 12/20/2023 0012 by Dari Reynaga, RN  Supportive Measures:   active listening utilized   decision-making supported  Diversional Activities:   television   smartphone  Taken 12/19/2023 1959 by Dari Reynaga, RN  Supportive Measures:   active listening utilized   decision-making supported  Diversional Activities:   television   smartphone

## 2023-12-20 NOTE — PAYOR COMM NOTE
"Hali Aleman (63 y.o. Female)          U/D FOR [id number]     CONTACT RUTH REYNAGAMICHAEL  P# [phone]  F# [phone]         Date of Birth   1960    Social Security Number       Address   [address] [place] [address]    Home Phone   [phone]    MRN   [id number]       Caodaism   None    Marital Status   Single                            Admission Date   12/19/23    Admission Type   Elective    Admitting Provider   Lino Gaston MD    Attending Provider   Lino Gaston MD    Department, Room/Bed   [address], [address]/1       Discharge Date       Discharge Disposition       Discharge Destination                                 Attending Provider: Lino Gaston MD    Allergies: Sulfa Antibiotics    Isolation: None   Infection: None   Code Status: CPR    Ht: 153.7 cm (60.5\")   Wt: 73 kg (161 lb)    Admission Cmt: None   Principal Problem: Diverticulitis [K57.92]                   Active Insurance as of 12/19/2023       Primary Coverage       Payor Plan Insurance Group Employer/Plan Group    [employer] [facility] [employer] EMPLOYEE [id number]       Payor Plan Address Payor Plan Phone Number Payor Plan Fax Number Effective Dates    [address] [phone]  1/1/2022 - None Entered    [place] [address]         Subscriber Name Subscriber Birth Date Member ID       HALI ALEMAN 1960 [id number]                     Emergency Contacts        (Rel.) Home Phone Work Phone Mobile Phone    SANDRA ALEMAN (Daughter) [phone] -- --    BRADYAMAURI (Mother) -- -- [phone]                 Operative/Procedure Notes (last 48 hours)        Lino Gaston MD at 12/19/23 0807          COLON RESECTION LAPAROSCOPIC SIGMOID WITH DAVINCI ROBOT  Progress Note    Hali Aleman  12/19/2023    Pre-op Diagnosis:   Diverticulitis [K57.92]       Post-Op Diagnosis Codes:     * Diverticulitis [K57.92]    Procedure/CPT® " Codes:        Procedure(s):  Colostomy takedown LAPAROSCOPIC WITH DAVINCI ROBOT              Surgeon(s):  Lino Gaston MD    Anesthesia: General    Staff:   Circulator: Shameka Gomez, ZAC; Christen Damon, ZAC; Jeniffer Menezes, ZAC  Scrub Person: Arianne Perez; Maye Trevino  Assistant: Adwoa Rogel PA-C  Assistant: Adwoa Rogel PA-C      Estimated Blood Loss: 50 mL    Urine Voided: 250 mL    Specimens:                Specimens       ID Source Type Tests Collected By Collected At Frozen?    A Large Intestine, Left / Descending Colon Tissue TISSUE PATHOLOGY EXAM   Lino Gaston MD 12/19/23 0830 No    Description: Left Colon, Colostomy Stoma    B Large Intestine, Transverse Colon Tissue TISSUE PATHOLOGY EXAM   Lino Gaston MD 12/19/23 1007 No    Description: Transverse Colon    C Large Intestine, Rectum Tissue TISSUE PATHOLOGY EXAM   Lino Gaston MD 12/19/23 1007 No    Description: Rectum                  Drains:   Urethral Catheter Non-latex 16 Fr. (Active)       [REMOVED] Closed/Suction Drain 1 Left LLQ Pigtail 10 Fr. (Removed)       [REMOVED] Colostomy LLQ (Removed)       Findings:         Complications:         Lino Gaston MD     Date: 12/19/2023  Time: 11:14 EST          Electronically signed by Lino Gaston MD at 12/19/23 1114          Physician Progress Notes (last 24 hours)        Adwoa Rogel PA-C at 12/20/23 0644          Colorectal Surgery Progress Note    POD 1     S: Had 2 very small bowel movements. positive ambulating. Pain controlled with ERAS.  Tolerating clear liquid diet without nausea. Parrish in place.    O:  Temp:  [95.2 °F (35.1 °C)-98.2 °F (36.8 °C)] 98.2 °F (36.8 °C)  Heart Rate:  [60-81] 69  Resp:  [10-18] 18  BP: (113-139)/(69-95) 130/74    Intake/Output Summary (Last 24 hours) at 12/20/2023 0823  Last data filed at 12/20/2023 0756  Gross per 24 hour   Intake 2630 ml   Output 2500 ml   Net 130 ml     Abd:   soft, non-distended. Wound vac in place in left lower  quadrant.  Incisions: clean, dry, intact, no erythema    Results from last 7 days   Lab Units 12/20/23  0504   WBC 10*3/mm3 16.14*   HEMOGLOBIN g/dL 11.8*   HEMATOCRIT % 36.4   PLATELETS 10*3/mm3 209     Results from last 7 days   Lab Units 12/20/23  0447   SODIUM mmol/L 138   POTASSIUM mmol/L 4.3   CHLORIDE mmol/L 105   CO2 mmol/L 24.9   BUN mg/dL 9   CREATININE mg/dL 0.70   EGFR mL/min/1.73 96.7   GLUCOSE mg/dL 110*   CALCIUM mg/dL 8.9     Results from last 7 days   Lab Units 12/20/23  0447   MAGNESIUM mg/dL 2.2     A/P: 63 y.o. female s/p Colostomy takedown LAPAROSCOPIC WITH DAVINCI ROBOT  Continue Parrish catheter until tomorrow morning  Continue Entereg until larger bowel movement  Advance to full liquid diet  Continue ambulation and out of bed to chair  Continue wound VAC      Electronically signed by Adwoa Rogel PA-C at 12/20/23 0809          Consult Notes (last 24 hours)        Dylon Phipps at 12/19/23 1428        Consult Orders    1. Inpatient Spiritual Care Consult [[id number]] ordered by Lino Gaston MD at 12/19/23 1343                  Dylon went to visit Pt at their request.  provided spiritual care and emotional support.Pt and  discussed Pt's masha and specific biblical passages and their meaning to the Pt.  prayed with Pt for their health and the health of their loved ones.  will attempt to check in with Pt on 12/20.     Electronically signed by Dylon Phipps at 12/19/23 7813

## 2023-12-20 NOTE — NURSING NOTE
12/20/23 1050   Wound 12/19/23 0921 abdomen Incision   Placement Date/Time: 12/19/23 0921   Location: abdomen  Primary Wound Type: (c) Incision   Dressing Appearance intact  (vac dressing, black foam)   Drainage Characteristics/Odor serosanguineous   Drainage Amount scant  (approx 20 ml to canister)   NPWT (Negative Pressure Wound Therapy) 12/19/23 1047 abdomen   Placement Date/Time: 12/19/23 1047   Location: abdomen   Therapy Setting continuous therapy   Pressure Setting 125 mmHg     Wound/ostomy - vac functioning without difficulty, small amount of drainage to canister, good seal. Patient has had wound vac therapy before so is familiar with what is entailed. Patient will need home health and wound vac for d/c home. Clarified with Adwoa Rogel PA-C that wound vac is to be changed per protocol by wound nurses. Since dressing was just placed in OR yesterday, next change will be due on Friday 12/22 and then M-W-F thereafter.

## 2023-12-20 NOTE — CASE MANAGEMENT/SOCIAL WORK
Discharge Planning Assessment  [facility]     Patient Name: Hali Tejeda  MRN: [id number]  Today's Date: 12/20/2023    Admit Date: 12/19/2023    Plan: Home with HH (referrals pending) for wound vac dressing changes. [facility] to provide wound vac at D/C. Family to transport at D/C.   Discharge Needs Assessment       Row Name 12/20/23 1420       Living Environment    People in Home alone    Current Living Arrangements home    Potentially Unsafe Housing Conditions none    Primary Care Provided by self    Provides Primary Care For no one, unable/limited ability to care for self    Family Caregiver if Needed child(paige), adult    Quality of Family Relationships involved;helpful;supportive    Able to Return to Prior Arrangements yes       Resource/Environmental Concerns    Resource/Environmental Concerns none    Transportation Concerns none       Transition Planning    Patient/Family Anticipates Transition to home    Patient/Family Anticipated Services at Transition home health care    Transportation Anticipated family or friend will provide       Discharge Needs Assessment    Readmission Within the Last 30 Days no previous admission in last 30 days    Equipment Currently Used at Home colostomy/ostomy supplies;walker, rolling;commode;cane, straight;bp cuff;orthotic device    Concerns to be Addressed care coordination/care conferences;discharge planning    Anticipated Changes Related to Illness none    Equipment Needed After Discharge wound care supplies;negative pressure wound therapy device    Outpatient/Agency/Support Group Needs other (see comments)  HH for wound vac dressing changes    Discharge Facility/Level of Care Needs home with home health    Provided Post Acute Provider List? Refused    Refused Provider List Comment Requests [facility] see as she has used them in the past.    Provided Post Acute Provider Quality & Resource List? Refused    Current Discharge Risk chronically ill;lives alone                   Discharge  Plan       Row Name 12/20/23 2030       Plan    Plan Home with HH (referrals pending) for wound vac dressing changes. [facility] to provide wound vac at D/C. Family to transport at D/C.    Patient/Family in Agreement with Plan yes    Plan Comments Met with pt. at bedside. Explained roll of . Face sheet and pharmacy verified. Pt lives alone in a single-story home.  There are 2 steps to enter home. Home DME includes ostomy supplies, walker, commode, cane, B/P cuff and a walking boot.  Pt is independent with ADLs and uses no DME at baseline. 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 Pt has never been to Rehab. She has used [facility] and requests [facility] see her at D/C for wound vac dressing changes. Referral placed in Epic and discussed with Sosa/RO. Per Sosa/RO, unsure if they will be able to accept due to staffing over holiday. Additional HH referrals placed in Epic. Notified Yasir/MIKALA that pt will need wound vac at D/C. Faxed wound vac order to [facility]. Requested wound vac be delivered tomorrow for possible D/C. Pt's PCP is Rufina Delgado MD. Pt normally drives herself to appointments drives. At discharge, family will transport. Explained that CCP would follow to assess for discharge needs.  Jesse Alexandra RN-BC                  Continued Care and Services - Admitted Since 12/19/2023       Durable Medical Equipment Coordination complete.      Service Provider Request Status Selected Services Address Phone Fax Patient Preferred    [facility]  Selected Durable Medical Equipment [address] [address] [phone] --              Home Medical Care       Service Provider Request Status Selected Services Address Phone Fax Patient Preferred    [facility],[place] Considering N/A [address] [facility], [address], [facility] [address] [phone] --     [facility] Pending - Request Sent N/A [address], [place]  [address] [phone] --    [facility] Pending - Request Sent N/A [address] [facility] [address] [phone] --    [facility] Pending - Request Sent N/A [address] [facility] [address] [phone] --    [facility] [facility] Declined  Insurance Denial, Inadequate staffing N/A [address] [facility], [address] [id number] [phone] --                  Expected Discharge Date and Time       Expected Discharge Date Expected Discharge Time    Dec 21, 2023            Demographic Summary       Row Name 12/20/23 1419       General Information    Admission Type inpatient    Arrived From PACU/recovery room    Reason for Consult care coordination/care conference;discharge planning    Preferred Language English                   Functional Status       Row Name 12/20/23 1419       Functional Status    Usual Activity Tolerance good    Current Activity Tolerance moderate       Functional Status, IADL    Medications independent    Meal Preparation independent    Housekeeping independent    Laundry independent    Shopping independent       Mental Status    General Appearance WDL WDL       Mental Status Summary    Recent Changes in Mental Status/Cognitive Functioning no changes       Employment/    Employment Status employed full-time    Current or Previous Occupation other (see comments)    Employment/ Comments 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               Jesse Alexandra RN

## 2023-12-20 NOTE — PLAN OF CARE
Problem: Adult Inpatient Plan of Care  Goal: Plan of Care Review  Outcome: Ongoing, Progressing  Flowsheets (Taken 12/20/2023 0503)  Progress: improving  Plan of Care Reviewed With: patient  Outcome Evaluation: Pt vitals stable. No c/o pain ERAS pt. Positive bm and ambulating. Tolerated Reg diet for dinner. Parrish cath in place. Pt safety maintained

## 2023-12-21 PROCEDURE — 25010000002 ENOXAPARIN PER 10 MG: Performed by: COLON & RECTAL SURGERY

## 2023-12-21 PROCEDURE — 25810000003 LACTATED RINGERS PER 1000 ML: Performed by: COLON & RECTAL SURGERY

## 2023-12-21 PROCEDURE — 99024 POSTOP FOLLOW-UP VISIT: CPT | Performed by: PHYSICIAN ASSISTANT

## 2023-12-21 RX ADMIN — PANTOPRAZOLE SODIUM 40 MG: 40 TABLET, DELAYED RELEASE ORAL at 08:54

## 2023-12-21 RX ADMIN — ACETAMINOPHEN 1000 MG: 500 TABLET ORAL at 23:48

## 2023-12-21 RX ADMIN — GABAPENTIN 400 MG: 400 CAPSULE ORAL at 05:33

## 2023-12-21 RX ADMIN — GABAPENTIN 400 MG: 400 CAPSULE ORAL at 22:45

## 2023-12-21 RX ADMIN — ENOXAPARIN SODIUM 40 MG: 100 INJECTION SUBCUTANEOUS at 08:53

## 2023-12-21 RX ADMIN — GABAPENTIN 400 MG: 400 CAPSULE ORAL at 14:25

## 2023-12-21 RX ADMIN — ACETAMINOPHEN 1000 MG: 500 TABLET ORAL at 01:33

## 2023-12-21 RX ADMIN — SODIUM CHLORIDE, POTASSIUM CHLORIDE, SODIUM LACTATE AND CALCIUM CHLORIDE 50 ML/HR: 600; 310; 30; 20 INJECTION, SOLUTION INTRAVENOUS at 04:17

## 2023-12-21 RX ADMIN — ACETAMINOPHEN 1000 MG: 500 TABLET ORAL at 18:29

## 2023-12-21 RX ADMIN — CELECOXIB 200 MG: 200 CAPSULE ORAL at 21:21

## 2023-12-21 RX ADMIN — CELECOXIB 200 MG: 200 CAPSULE ORAL at 08:53

## 2023-12-21 RX ADMIN — ACETAMINOPHEN 1000 MG: 500 TABLET ORAL at 12:08

## 2023-12-21 RX ADMIN — ACETAMINOPHEN 1000 MG: 500 TABLET ORAL at 05:33

## 2023-12-21 RX ADMIN — AMLODIPINE BESYLATE 5 MG: 5 TABLET ORAL at 08:53

## 2023-12-21 NOTE — NURSING NOTE
12/21/23 1050   Wound 12/19/23 0921 abdomen Incision   Placement Date/Time: 12/19/23 0921   Location: abdomen  Primary Wound Type: (c) Incision   Dressing Appearance intact  (vac dressing)   Periwound dry   Periwound Temperature warm   Periwound Skin Turgor soft   NPWT (Negative Pressure Wound Therapy) 12/19/23 1047 abdomen   Placement Date/Time: 12/19/23 1047   Location: abdomen   Therapy Setting continuous therapy  (intact seal, therapy on)   Pressure Setting 125 mmHg     Wound/ostomy - vac dressing looks good, plan is to d/c to home tomorrow but home health will not see for first visit until 12/26 (Tuesday) due to the holiday. Vac has been approved by . Will change vac dressing Friday and place on home vac. Dressing is in a good location and not likely to have any issues but we will instruct patient on removal of vac dressing and placed wet to dry dressing if any problem was to arise. Patient has had a wound vac before so is familiar with the therapy.

## 2023-12-21 NOTE — PROGRESS NOTES
Patient's had several small bowel movements  Eating a regular diet  Looking forward to going home    Abdomen is soft  Wound VAC in place    Working on home health for wound VAC  Likely home tomorrow

## 2023-12-21 NOTE — CASE MANAGEMENT/SOCIAL WORK
Continued Stay Note  [facility]     Patient Name: Hali Tejeda  MRN: [id number]  Today's Date: 12/21/2023    Admit Date: 12/19/2023    Plan: Home with [employer] wound vac and [facility]. Pt to have wound vac changed here tomorrow then [facility] will change on Tues at home. Per Yasir/MIKALA wound vac approved by insurance.   Discharge Plan       Row Name 12/21/23 1013       Plan    Plan Home with [employer] wound vac and [facility]. Pt to have wound vac changed here tomorrow then [facility] will change on Tues at home. Per Yasir/MIKALA wound vac approved by insurance.    Patient/Family in Agreement with Plan yes    Plan Comments Unable to find accepting HH due to holiday weekend and staffing. Discussed with Dr Gaston and plan will be to have wound vac changed here tomorrow prior to D/C then [facility] will see at home Tuesday and change wound vac dressing. Discussed with [facility] and they have accepted. Notified Yasir/MIKALA that pt will D/C tomorrow after wound vac change. Per Shana, wound vac has been approved by insurance and he will notify wound nurse to supply home wound vac. Jesse Alexandra RN-BC                   Discharge Codes    No documentation.                 Expected Discharge Date and Time       Expected Discharge Date Expected Discharge Time    Dec 22, 2023               Jesse Alexandra RN

## 2023-12-21 NOTE — PLAN OF CARE
Problem: Adult Inpatient Plan of Care  Goal: Plan of Care Review  Outcome: Ongoing, Progressing  Flowsheets (Taken 12/21/2023 3962)  Progress: improving  Plan of Care Reviewed With: patient  Outcome Evaluation: Pt vitals stable. Pt ambulating in mg. Positive bm and urine. Wound vac in place. Tolerating diet. Wound vac dressing to be changed tomorrow. Pt safety maintained

## 2023-12-21 NOTE — PLAN OF CARE
Goal Outcome Evaluation:  Plan of Care Reviewed With: patient        Progress: improving  Outcome Evaluation: Pt admitted 12/19/2023 for diverticulitis . Room air . A & O X 4 . Pt tolerated regular diet. Pain controled tylenol 1000 mg every 6 hr. Lactated ringer 50 ml/hr continues . No complaints or concerns per pt. Safety maintained.          Hospitalist Nephrology Cardiology Hospitalist

## 2023-12-21 NOTE — PROGRESS NOTES
Progress Note    Pod 2      S: Pt sitting up in chair. Some abdominal tenderness, but well controlled with ERAS. Tolerating a regular diet and passing loose stools. Denies any N/V. Positive ambulation.     O:  Temp:  [97.7 °F (36.5 °C)-99.3 °F (37.4 °C)] 97.9 °F (36.6 °C)  Heart Rate:  [68-78] 75  Resp:  [18] 18  BP: (110-138)/(68-82) 130/82      Intake/Output Summary (Last 24 hours) at 12/21/2023 1440  Last data filed at 12/21/2023 1300  Gross per 24 hour   Intake 640 ml   Output 1600 ml   Net -960 ml       Abd:   soft, non-distended  Wound-vac in place over abdominal wound.       A/P: 63 y.o. female with s/p Colostomy takedown LAPAROSCOPIC WITH DAVINCI ROBOT.    - Pt doing well. Anticipate D/C home tomorrow with Home Health once wound-vac for home use is delivered.

## 2023-12-22 ENCOUNTER — DOCUMENTATION (OUTPATIENT)
Dept: HOME HEALTH SERVICES | Facility: HOME HEALTHCARE | Age: 63
End: 2023-12-22
Payer: COMMERCIAL

## 2023-12-22 ENCOUNTER — HOME HEALTH ADMISSION (OUTPATIENT)
Dept: HOME HEALTH SERVICES | Facility: HOME HEALTHCARE | Age: 63
End: 2023-12-22
Payer: COMMERCIAL

## 2023-12-22 ENCOUNTER — TRANSCRIBE ORDERS (OUTPATIENT)
Dept: HOME HEALTH SERVICES | Facility: HOME HEALTHCARE | Age: 63
End: 2023-12-22
Payer: COMMERCIAL

## 2023-12-22 VITALS
DIASTOLIC BLOOD PRESSURE: 77 MMHG | WEIGHT: 161 LBS | HEIGHT: 61 IN | HEART RATE: 71 BPM | RESPIRATION RATE: 18 BRPM | BODY MASS INDEX: 30.4 KG/M2 | SYSTOLIC BLOOD PRESSURE: 123 MMHG | OXYGEN SATURATION: 97 % | TEMPERATURE: 97.5 F

## 2023-12-22 DIAGNOSIS — Z46.89 ENCOUNTER FOR MANAGEMENT OF WOUND VAC: Primary | ICD-10-CM

## 2023-12-22 DIAGNOSIS — Z98.890 S/P COLOSTOMY TAKEDOWN: ICD-10-CM

## 2023-12-22 PROCEDURE — 99024 POSTOP FOLLOW-UP VISIT: CPT | Performed by: PHYSICIAN ASSISTANT

## 2023-12-22 PROCEDURE — 25010000002 ENOXAPARIN PER 10 MG: Performed by: COLON & RECTAL SURGERY

## 2023-12-22 RX ORDER — OXYCODONE HYDROCHLORIDE 5 MG/1
5 TABLET ORAL EVERY 4 HOURS PRN
Qty: 12 TABLET | Refills: 0 | Status: SHIPPED | OUTPATIENT
Start: 2023-12-22

## 2023-12-22 RX ORDER — AMLODIPINE BESYLATE 5 MG/1
5 TABLET ORAL EVERY MORNING
Qty: 30 TABLET | Refills: 0 | Status: SHIPPED | OUTPATIENT
Start: 2023-12-23 | End: 2024-01-22

## 2023-12-22 RX ORDER — GABAPENTIN 300 MG/1
300 CAPSULE ORAL 2 TIMES DAILY
Qty: 28 CAPSULE | Refills: 0 | Status: SHIPPED | OUTPATIENT
Start: 2023-12-22 | End: 2024-01-05

## 2023-12-22 RX ADMIN — ACETAMINOPHEN 1000 MG: 500 TABLET ORAL at 12:57

## 2023-12-22 RX ADMIN — ACETAMINOPHEN 1000 MG: 500 TABLET ORAL at 05:58

## 2023-12-22 RX ADMIN — PANTOPRAZOLE SODIUM 40 MG: 40 TABLET, DELAYED RELEASE ORAL at 08:07

## 2023-12-22 RX ADMIN — GABAPENTIN 400 MG: 400 CAPSULE ORAL at 05:58

## 2023-12-22 RX ADMIN — AMLODIPINE BESYLATE 5 MG: 5 TABLET ORAL at 08:07

## 2023-12-22 RX ADMIN — CELECOXIB 200 MG: 200 CAPSULE ORAL at 08:07

## 2023-12-22 RX ADMIN — ENOXAPARIN SODIUM 40 MG: 100 INJECTION SUBCUTANEOUS at 08:06

## 2023-12-22 NOTE — PROGRESS NOTES
[facility] will follow post hospital requested. Patient agreeable to service. Contact information confirmed. Verbal order received from Shivani with Dr. Lino Gaston for home health nursing with start of care 12/26/23.

## 2023-12-22 NOTE — NURSING NOTE
CWOCN- changed VAC dressing for patient discharge. Patient tolerated well. Wound bed looks great- clean, granulating. Placed 1 piece of black foam with a piece over top for the track pad.      23 1156   Wound 23 abdomen Incision   Placement Date/Time: 23   Location: abdomen  Primary Wound Type: (c) Incision   Dressing Appearance intact   Base moist;red   Periwound dry   Periwound Temperature warm   Periwound Skin Turgor soft   Edges open   Drainage Characteristics/Odor serosanguineous   Drainage Amount scant   Care, Wound cleansed with;sterile normal saline;negative pressure wound therapy   Dressing Care dressing changed   Periwound Care dry periwound area maintained   Wound Output (mL) 100   NPWT (Negative Pressure Wound Therapy) 23 abdomen   Placement Date/Time: 23   Location: abdomen   Therapy Setting continuous therapy   Dressing foam, black;transparent dressing   Pressure Setting 125 mmHg   Sponges Inserted 1   Sponges Removed 1   Finger sweep complete Yes     Wound Negative Pressure Therapy education provided (NPWT/VAC)    [x] Battery power/electric cord  [x] Showering  [x] Troubleshooting:   [x] Check/change canister   [x] Seal leaks with extra drape  [x] Change in 2 hours if no seal- place NS wet to dry dressing  [x] Resources: Home Health, NPWT company    Recommendations:    [x]Home 3M Machine: ActiVAC  []Other:     [x] Therapy settin      mmHg  [] Granufoam Dressing, Small  [x] Granufoam Dressing, Medium  [] Granufoam Dressing, Large  [] Simplace Dressing, Medium  [] Granufoam Bridge XG Dressing  [] Granufoam Silver Dressing  [] Contact layer (mepitel, versatel)  [] Small white foam   [] Large white foam

## 2023-12-22 NOTE — DISCHARGE SUMMARY
Date of Admission: 12/19/2023  Date of Discharge: 12/22/23      Presenting Problem/History of Present Illness  Diverticulitis [K57.92]  Colonic stricture [K56.699]     Discharge Diagnosis:  Active Hospital Problems    Diagnosis  POA    **Diverticulitis [K57.92]  Unknown    Colonic stricture [K56.699]  Yes      Resolved Hospital Problems   No resolved problems to display.        Procedures Performed  Procedure(s):  Colostomy takedown LAPAROSCOPIC WITH DAVMiNameI ROBOT    Hospital Course  Patient is a 63 y.o. female presented for a robotic-assisted laparoscopic colostomy takedown 12/19/2023. Wound-vac applied to wound to facilitate healing. Hospital recovery course has been uncomplicated. Pt was initially started on a clear liquid diet following her procedure and is now tolerating a regular diet. Having bowel and bladder function. Pain controlled with ERAS. Pt comfortable being D/C home today with Home Health who will perform dressing changes 3x weekly.      Consults:   Consults       No orders found from 11/20/2023 to 12/20/2023.            Discharge Disposition  [place]-Health [place] [facility]    Discharge Medications     Discharge Medications        Changes to Medications        Instructions Start Date   amLODIPine 5 MG tablet  Commonly known as: NORVASC  What changed:   medication strength  how much to take   5 mg, Oral, Every Morning   Start Date: December 23, 2023            Continue These Medications        Instructions Start Date   acetaminophen 500 MG tablet  Commonly known as: TYLENOL   1,000 mg, Oral, Every 6 Hours PRN      Biotin 5000 MCG capsule   1 capsule, Oral, Daily      clobetasol 0.05 % ointment  Commonly known as: TEMOVATE   1 application , Topical, 2 Times Daily      ibuprofen 200 MG tablet  Commonly known as: ADVIL,MOTRIN   400 mg, Oral, Every 6 Hours PRN      Multivitamin Adults 50+ tablet tablet   1 tablet, Oral, Daily      pantoprazole 40 MG EC tablet  Commonly known as: PROTONIX   40 mg, Oral, Every  Morning      vitamin D3 125 MCG (5000 UT) capsule capsule   5,000 Units, Oral, Daily               Discharge Diet:   Diet Instructions       Diet: Regular/House Diet; Thin (IDDSI 0)      Discharge Diet: Regular/House Diet    Fluid Consistency: Thin (IDDSI 0)            Activity at Discharge:   Activity Instructions       Driving Restrictions      Type of Restriction: Driving    Driving Restrictions: No Driving Until Next Appointment    Gradually Increase Activity Until at Pre-Hospitalization Level      Lifting Restrictions      Type of Restriction: Lifting    Lifting Restrictions: Lifting Restriction (Indicate Limit)    Weight Limit (Pounds): 15    Length of Lifting Restriction: until office appt            Follow-up Appointments  Future Appointments   Date Time Provider Department Center   1/8/2024  1:30 PM Lilibeth Rahman PA-C [facility]  [facility]     Additional Instructions for the Follow-ups that You Need to Schedule       Ambulatory Referral to Home Health ([facility])   As directed      Face to Face Visit Date: 12/22/2023   Follow-up provider for Plan of Care?: I will be treating the patient on an ongoing basis.  Please send me the Plan of Care for signature.   Follow-up provider: JESSI GRANADO [[id number]]   Reason/Clinical Findings: Abdominal wound   Describe mobility limitations that make leaving home difficult: Post-op   Nursing/Therapeutic Services Requested: Skilled Nursing   Skilled nursing orders: Wound vac application and instruction   Frequency: 1 Week 1        Call MD With Problems / Concerns   As directed      Instructions: temp >101. Drainage from wound. vomitting.    Order Comments: Instructions: temp >101. Drainage from wound. vomitting.         Discharge Follow-up with Specified Provider: Alek; 2 Weeks   As directed      To: Alek   Follow Up: 2 Weeks                Discharge Diet:   As tolerated  Activity at Discharge:   As tolerated  Follow-up Appointments  2 wks

## 2023-12-22 NOTE — PROGRESS NOTES
Progress Note    POD3      S: Pt doing well. Tolerating regular diet and having bowel function. Denies any new concerns at this time.     O:  Temp:  [97.5 °F (36.4 °C)-98.4 °F (36.9 °C)] 97.5 °F (36.4 °C)  Heart Rate:  [71-84] 71  Resp:  [18] 18  BP: (100-130)/(65-82) 123/77      Intake/Output Summary (Last 24 hours) at 12/22/2023 0803  Last data filed at 12/22/2023 0500  Gross per 24 hour   Intake 400 ml   Output 1300 ml   Net -900 ml       Abd:  soft, non-distended  Wound-vac in place.       A/P: 63 y.o. female with s/p Colostomy takedown LAPAROSCOPIC WITH DAVINCI ROBOT    - Anticipate D/C home today with [facility] when [employer] wound-vac is delivered.

## 2023-12-22 NOTE — CASE MANAGEMENT/SOCIAL WORK
Case Management Discharge Note      Final Note: Home with [employer] wound vac and [facility]. Wound vac dressing changed today prior to D/C. [facility] will see Tues for next wound vac dressing change. [employer] wound vac provided for home. Family transport.    Provided Post Acute Provider List?: Refused  Refused Provider List Comment: Requests [facility] see as she has used them in the past.  Provided Post Acute Provider Quality & Resource List?: Refused    Selected Continued Care - Admitted Since 12/19/2023       Destination    No services have been selected for the patient.                Durable Medical Equipment Coordination complete.      Service Provider Selected Services Address Phone Fax Patient Preferred    [facility] [id number] [id number] [phone] --              Dialysis/Infusion    No services have been selected for the patient.                Home Medical Care Coordination complete.      Service Provider Selected Services Address Phone Fax Patient Preferred     [facility] [address] [address] [phone] --              Therapy    No services have been selected for the patient.                Community Resources    No services have been selected for the patient.                Community & DME    No services have been selected for the patient.                    Transportation Services  Private: Car    Final Discharge Disposition Code: 06 - home with home health care

## 2023-12-23 ENCOUNTER — READMISSION MANAGEMENT (OUTPATIENT)
Dept: CALL CENTER | Facility: HOSPITAL | Age: 63
End: 2023-12-23
Payer: COMMERCIAL

## 2023-12-23 NOTE — OUTREACH NOTE
Prep Survey      Flowsheet Row Responses   [facility] patient discharged from? [place]   Is LACE score < 7 ? No   Eligibility Readm Mgmt   Discharge diagnosis Colostomy takedown LAPAROSCOPIC WITH DAVINCI ROBOT   Does the patient have one of the following disease processes/diagnoses(primary or secondary)? General Surgery   Does the patient have Home health ordered? Yes   What is the Home health agency?  [facility] starting 12/26/23   Is there a DME ordered? Yes   What DME was ordered? Wound Vac placed in hospital prior to d/c,  will adjust wound vac on 12/26/23   Prep survey completed? Yes            Salina SHARMA - Registered Nurse

## 2023-12-26 ENCOUNTER — READMISSION MANAGEMENT (OUTPATIENT)
Dept: CALL CENTER | Facility: HOSPITAL | Age: 63
End: 2023-12-26
Payer: COMMERCIAL

## 2023-12-26 ENCOUNTER — HOME CARE VISIT (OUTPATIENT)
Dept: HOME HEALTH SERVICES | Facility: HOME HEALTHCARE | Age: 63
End: 2023-12-26
Payer: COMMERCIAL

## 2023-12-26 VITALS
OXYGEN SATURATION: 99 % | DIASTOLIC BLOOD PRESSURE: 70 MMHG | HEART RATE: 67 BPM | RESPIRATION RATE: 18 BRPM | TEMPERATURE: 98.1 F | SYSTOLIC BLOOD PRESSURE: 118 MMHG

## 2023-12-26 PROCEDURE — G0299 HHS/HOSPICE OF RN EA 15 MIN: HCPCS

## 2023-12-26 NOTE — HOME HEALTH
63F with recent robotic-assisted laparoscopic colostomy takedown 12/19/2023. Wound-vac applied to wound to facilitate healing. Hospital recovery course has been uncomplicated. Pt was initially started on a clear liquid diet following her procedure and is now tolerating a regular diet. Having bowel and bladder function. Pain controlled with ERAS. WOUND VAC DRESSING CHANGE TO ABDOMINAL WOUND ON 12/26, 12/28, & 12/30/23 THEN START MONDAY, WEDNESDAY, FRIDAY CHANGES.  Medications reconciled and are all found in home. Lives at home alone.  Ambulates without assistive device. SN FOC aftercare following surgery on the digestive system.  SN to teqach and instruct patient on aftercare following surgery on the digestive system and medication regime.

## 2023-12-26 NOTE — OUTREACH NOTE
General Surgery Week 1 Survey      Flowsheet Row Responses   [facility] patient discharged from? [place]   Does the patient have one of the following disease processes/diagnoses(primary or secondary)? General Surgery   Week 1 attempt successful? Yes   Call start time 1610   Unsuccessful attempts Attempt 1   Call end time 1612   Discharge diagnosis Colostomy takedown LAPAROSCOPIC WITH DAVINCI ROBOT   Meds reviewed with patient/caregiver? Yes   Is the patient having any side effects they believe may be caused by any medication additions or changes? No   Does the patient have all medications related to this admission filled (includes all antibiotics, pain medications, etc.) Yes   Is the patient taking all medications as directed (includes completed medication regime)? Yes   Does the patient have a follow up appointment scheduled with their surgeon? Yes   Has the patient kept scheduled appointments due by today? N/A   What is the Home health agency?   [facility]  starting 12/26/23   Has home health visited the patient within 72 hours of discharge? Yes   What DME was ordered? Wound Vac placed in hospital prior to d/c,  will adjust wound vac on 12/26/23   Did the patient receive a copy of their discharge instructions? Yes   Nursing interventions Reviewed instructions with patient   What is the patient's perception of their health status since discharge? Improving   Nursing interventions Nurse provided patient education   Is the patient /caregiver able to teach back basic post-op care? Lifting as instructed by MD in discharge instructions, Keep incision areas clean,dry and protected   Is the patient/caregiver able to teach back signs and symptoms of incisional infection? Increased redness, swelling or pain at the incisonal site, Increased drainage or bleeding, Incisional warmth, Pus or odor from incision, Fever   Is the patient/caregiver able to teach back steps to recovery at home? Make a list of questions for  surgeon's appointment   If the patient is a current smoker, are they able to teach back resources for cessation? Not a smoker   Is the patient/caregiver able to teach back the hierarchy of who to call/visit for symptoms/problems? PCP, Specialist, Home health nurse, Urgent Care, ED, 911 Yes   Week 1 call completed? Yes   Wrap up additional comments pt doing well, HH seesthree week   Call end time 1612            LANDY ARMANDO - Registered Nurse

## 2023-12-28 ENCOUNTER — HOME CARE VISIT (OUTPATIENT)
Dept: HOME HEALTH SERVICES | Facility: HOME HEALTHCARE | Age: 63
End: 2023-12-28
Payer: COMMERCIAL

## 2023-12-28 PROCEDURE — G0300 HHS/HOSPICE OF LPN EA 15 MIN: HCPCS

## 2023-12-29 VITALS
RESPIRATION RATE: 18 BRPM | OXYGEN SATURATION: 98 % | DIASTOLIC BLOOD PRESSURE: 68 MMHG | HEART RATE: 65 BPM | TEMPERATURE: 97.3 F | SYSTOLIC BLOOD PRESSURE: 120 MMHG

## 2023-12-29 NOTE — HOME HEALTH
Patient is a wound vac patient to her abd.  She is a ostoy reversal.  Wound vac dressing replaced.  No issues are noted.  Pre and Post Pics were obtained.

## 2023-12-30 ENCOUNTER — HOME CARE VISIT (OUTPATIENT)
Dept: HOME HEALTH SERVICES | Facility: HOME HEALTHCARE | Age: 63
End: 2023-12-30
Payer: COMMERCIAL

## 2023-12-30 VITALS
HEART RATE: 88 BPM | RESPIRATION RATE: 16 BRPM | TEMPERATURE: 97.9 F | DIASTOLIC BLOOD PRESSURE: 82 MMHG | OXYGEN SATURATION: 99 % | SYSTOLIC BLOOD PRESSURE: 108 MMHG

## 2023-12-30 PROCEDURE — G0299 HHS/HOSPICE OF RN EA 15 MIN: HCPCS

## 2023-12-30 NOTE — HOME HEALTH
"Routine Visit Note:    Skill/education provided:  C/P ASSESS VAC CHANGE     Patient/caregiver response:\"I  CAN DO W-D\"    Plan for next visit: C/P ASSES WOUND MEASUREMENT AND VAC CHANGE     Other pertinent info:"

## 2024-01-01 ENCOUNTER — HOME CARE VISIT (OUTPATIENT)
Dept: HOME HEALTH SERVICES | Facility: HOME HEALTHCARE | Age: 64
End: 2024-01-01
Payer: COMMERCIAL

## 2024-01-01 VITALS
OXYGEN SATURATION: 98 % | DIASTOLIC BLOOD PRESSURE: 70 MMHG | SYSTOLIC BLOOD PRESSURE: 122 MMHG | RESPIRATION RATE: 16 BRPM | HEART RATE: 78 BPM | TEMPERATURE: 98.2 F

## 2024-01-01 PROCEDURE — G0299 HHS/HOSPICE OF RN EA 15 MIN: HCPCS

## 2024-01-01 NOTE — HOME HEALTH
Routine Visit Note:    Skill/education provided: wound vac changed with no complications     Plan for next visit: wound vac change

## 2024-01-03 ENCOUNTER — HOME CARE VISIT (OUTPATIENT)
Dept: HOME HEALTH SERVICES | Facility: HOME HEALTHCARE | Age: 64
End: 2024-01-03
Payer: COMMERCIAL

## 2024-01-03 VITALS
TEMPERATURE: 97.2 F | SYSTOLIC BLOOD PRESSURE: 120 MMHG | OXYGEN SATURATION: 98 % | DIASTOLIC BLOOD PRESSURE: 82 MMHG | RESPIRATION RATE: 18 BRPM | HEART RATE: 88 BPM

## 2024-01-03 PROCEDURE — G0299 HHS/HOSPICE OF RN EA 15 MIN: HCPCS

## 2024-01-05 ENCOUNTER — HOME CARE VISIT (OUTPATIENT)
Dept: HOME HEALTH SERVICES | Facility: HOME HEALTHCARE | Age: 64
End: 2024-01-05
Payer: COMMERCIAL

## 2024-01-05 ENCOUNTER — READMISSION MANAGEMENT (OUTPATIENT)
Dept: CALL CENTER | Facility: HOSPITAL | Age: 64
End: 2024-01-05
Payer: COMMERCIAL

## 2024-01-05 VITALS
SYSTOLIC BLOOD PRESSURE: 122 MMHG | HEART RATE: 71 BPM | OXYGEN SATURATION: 98 % | DIASTOLIC BLOOD PRESSURE: 64 MMHG | TEMPERATURE: 97.3 F | RESPIRATION RATE: 18 BRPM

## 2024-01-05 PROCEDURE — G0300 HHS/HOSPICE OF LPN EA 15 MIN: HCPCS

## 2024-01-05 NOTE — OUTREACH NOTE
General Surgery Week 2 Survey      Flowsheet Row Responses   [facility] patient discharged from? [place]   Does the patient have one of the following disease processes/diagnoses(primary or secondary)? General Surgery   Week 2 attempt successful? Yes   Call start time 1717   Call end time 1723   Discharge diagnosis Colostomy takedown LAPAROSCOPIC WITH DAVINCI ROBOT   Person spoke with today (if not patient) and relationship Patient   Meds reviewed with patient/caregiver? Yes   Does the patient have all medications related to this admission filled (includes all antibiotics, pain medications, etc.) Yes   Is the patient taking all medications as directed (includes completed medication regime)? Yes   Does the patient have a follow up appointment scheduled with their surgeon? Yes  [1/8]   Has the patient kept scheduled appointments due by today? N/A   What is the Home health agency?  [facility]26/23   Has home health visited the patient within 72 hours of discharge? Yes   Home health comments HH seeing for wound vac dressing changes.   Psychosocial issues? No   Did the patient receive a copy of their discharge instructions? Yes   Nursing interventions Reviewed instructions with patient   What is the patient's perception of their health status since discharge? Improving   Is the patient/caregiver able to teach back steps to recovery at home? Set small, achievable goals for return to baseline health, Rest and rebuild strength, gradually increase activity, Eat a well-balance diet   If the patient is a current smoker, are they able to teach back resources for cessation? Not a smoker   Is the patient/caregiver able to teach back the hierarchy of who to call/visit for symptoms/problems? PCP, Specialist, Home health nurse, Urgent Care, ED, 911 Yes   Week 2 call completed? Yes   Is the patient interested in additional calls from an ambulatory ? No   Would this patient benefit from a Referral to  Amb Social Work? No   Wrap up additional comments Patient reports that she is doing well. Reports still on the wound vac, but hoping for it to be removed soon. Reports eating well and passing bowel movement. Denies pain.   Call end time 1724            ROE SEGOVIA - Registered Nurse

## 2024-01-08 ENCOUNTER — OFFICE VISIT (OUTPATIENT)
Dept: SURGERY | Facility: CLINIC | Age: 64
End: 2024-01-08
Payer: COMMERCIAL

## 2024-01-08 VITALS
WEIGHT: 166.9 LBS | DIASTOLIC BLOOD PRESSURE: 76 MMHG | OXYGEN SATURATION: 97 % | HEART RATE: 83 BPM | HEIGHT: 61 IN | BODY MASS INDEX: 31.51 KG/M2 | SYSTOLIC BLOOD PRESSURE: 130 MMHG

## 2024-01-08 DIAGNOSIS — K57.92 DIVERTICULITIS: Primary | ICD-10-CM

## 2024-01-08 PROCEDURE — 99024 POSTOP FOLLOW-UP VISIT: CPT | Performed by: PHYSICIAN ASSISTANT

## 2024-01-08 NOTE — HOME HEALTH
Patient has wound vac to her ABD which is now healed to the point that the wound vac is not indicated.  She sees her surgeon on Monday.

## 2024-01-08 NOTE — PROGRESS NOTES
"Hali Tejeda is a 63 y.o. female in for follow up of Diverticulitis    Pt is S/P Robotic assisted laparoscopic left and sigmoid resection with colostomy converted open due to splenic injury 02/03/2023 for acute colitis with sigmoid stricture and microperforation. She then underwent a robotic-assisted laparoscopic colostomy takedown 12/19/2023. A wound-vac placed over abdominal wound to facilitate healing and Pt was D/C from the hospital 12/22/2023. Pt states that the wound-vac started malfunctioning 2 days ago and therefore a wet-to-dry dressing was placed.     Today, she endorses a good appetite.  S/P gastric sleeve and eats smaller meals.   Good energy level.  Having more regular BMs since surgery.   No fevers.       /76 (BP Location: Left arm, Patient Position: Sitting, Cuff Size: Adult)   Pulse 83   Ht 154.9 cm (61\")   Wt 75.7 kg (166 lb 14.4 oz)   LMP  (LMP Unknown)   SpO2 97%   Breastfeeding No   BMI 31.54 kg/m²   Body mass index is 31.54 kg/m².      PE:  Physical Exam  Constitutional:       General: She is not in acute distress.     Appearance: She is well-developed.   HENT:      Head: Normocephalic and atraumatic.   Abdominal:      General: There is no distension.      Palpations: Abdomen is soft.      Comments: Abdomen soft, non-distended.   LLQ wound measuring 2.5 cm x 1.5 cm.  Wound bed with healthy pink granulation tissue.    Musculoskeletal:         General: Normal range of motion.   Neurological:      Mental Status: She is alert.   Psychiatric:         Thought Content: Thought content normal.         Review of Medical Record:    Colonoscopy 11/06/2023:  - Entire Colon WNL  - Rescope: 10 Years  - Dr. Lino Gaston, [facility]    Assessment:   1. Diverticulitis    - S/P Robotic assisted laparoscopic left and sigmoid resection with colostomy converted open due to splenic injury 02/03/2023   - S/P robotic-assisted laparoscopic colostomy takedown 12/19/2023.    Plan:  - D/C wound-vac and continue " wet-to-dry dressings.   - Increase physical activity as tolerated.   - Follow up in 2 weeks for wound-check.

## 2024-01-09 ENCOUNTER — HOME CARE VISIT (OUTPATIENT)
Dept: HOME HEALTH SERVICES | Facility: HOME HEALTHCARE | Age: 64
End: 2024-01-09
Payer: COMMERCIAL

## 2024-01-10 ENCOUNTER — HOME CARE VISIT (OUTPATIENT)
Dept: HOME HEALTH SERVICES | Facility: HOME HEALTHCARE | Age: 64
End: 2024-01-10
Payer: COMMERCIAL

## 2024-01-10 VITALS
HEART RATE: 73 BPM | SYSTOLIC BLOOD PRESSURE: 130 MMHG | RESPIRATION RATE: 18 BRPM | TEMPERATURE: 97.4 F | OXYGEN SATURATION: 99 % | DIASTOLIC BLOOD PRESSURE: 82 MMHG

## 2024-01-10 PROCEDURE — G0493 RN CARE EA 15 MIN HH/HOSPICE: HCPCS

## 2024-01-17 ENCOUNTER — HOME CARE VISIT (OUTPATIENT)
Dept: HOME HEALTH SERVICES | Facility: HOME HEALTHCARE | Age: 64
End: 2024-01-17
Payer: COMMERCIAL

## 2024-01-17 VITALS
DIASTOLIC BLOOD PRESSURE: 88 MMHG | SYSTOLIC BLOOD PRESSURE: 146 MMHG | RESPIRATION RATE: 18 BRPM | HEART RATE: 73 BPM | OXYGEN SATURATION: 99 % | TEMPERATURE: 98 F

## 2024-01-17 PROCEDURE — G0493 RN CARE EA 15 MIN HH/HOSPICE: HCPCS

## 2024-01-17 NOTE — ANESTHESIA PROCEDURE NOTES
Airway  Urgency: elective    Date/Time: 2/3/2023 1:31 PM  Airway not difficult    General Information and Staff    Patient location during procedure: OR  Anesthesiologist: Peter Hayes MD  CRNA/CAA: Adolph Rodney CRNA    Indications and Patient Condition  Indications for airway management: airway protection    Preoxygenated: yes  MILS not maintained throughout  Mask difficulty assessment: 1 - vent by mask    Final Airway Details  Final airway type: endotracheal airway      Successful airway: ETT  Cuffed: yes   Successful intubation technique: direct laryngoscopy  Endotracheal tube insertion site: oral  Blade: Zenaida  Blade size: 3  ETT size (mm): 7.0  Cormack-Lehane Classification: grade I - full view of glottis  Placement verified by: chest auscultation and capnometry   Cuff volume (mL): 10  Measured from: teeth  ETT/EBT  to teeth (cm): 22  Number of attempts at approach: 1  Assessment: lips, teeth, and gum same as pre-op and atraumatic intubation          
Peripheral Block      Patient reassessed immediately prior to procedure    Patient location during procedure: OR  Start time: 2/3/2023 1:27 PM  Stop time: 2/3/2023 1:39 PM  Reason for block: at surgeon's request and post-op pain management  Performed by  Anesthesiologist: Osmar Palmer MD  Preanesthetic Checklist  Completed: patient identified, IV checked, site marked, risks and benefits discussed, surgical consent, monitors and equipment checked, pre-op evaluation and timeout performed  Prep:  Pt Position: prone  Sterile barriers:cap, gloves, mask and washed/disinfected hands  Prep: ChloraPrep  Patient monitoring: blood pressure monitoring, continuous pulse oximetry and EKG  Procedure    Sedation: yes  Performed under: general (IV sedation)  Guidance:ultrasound guided    ULTRASOUND INTERPRETATION.  Using ultrasound guidance a 21 G gauge needle was placed in close proximity to the nerve, at which point, under ultrasound guidance anesthetic was injected in the area of the nerve and spread of the anesthesia was seen on ultrasound in close proximity thereto.  There were no abnormalities seen on ultrasound; a digital image was taken; and the patient tolerated the procedure with no complications. Images:still images obtained    Laterality:Bilateral  Block Type:TAP  Injection Technique:single-shotNeedle Gauge:21 G      Medications Used: bupivacaine PF (MARCAINE) 0.25 % injection - Injection   30 mL - 2/3/2023 1:27:00 PM  bupivacaine liposome (EXPAREL) 1.3 % injection - Infiltration   20 mL - 2/3/2023 1:35:00 PM      Medications  Comment:One half to each side    Post Assessment  Injection Assessment: negative aspiration for heme, no paresthesia on injection and incremental injection  Patient Tolerance:comfortable throughout block  Complications:no  Additional Notes  Incremental negative aspiration and injections.  Ultrasound guidance was used for needle placement and to view and verify medication disbursement.          
none

## 2024-01-24 ENCOUNTER — HOME CARE VISIT (OUTPATIENT)
Dept: HOME HEALTH SERVICES | Facility: HOME HEALTHCARE | Age: 64
End: 2024-01-24
Payer: COMMERCIAL

## 2024-01-24 ENCOUNTER — OFFICE VISIT (OUTPATIENT)
Dept: SURGERY | Facility: CLINIC | Age: 64
End: 2024-01-24
Payer: COMMERCIAL

## 2024-01-24 VITALS
WEIGHT: 167.9 LBS | SYSTOLIC BLOOD PRESSURE: 118 MMHG | DIASTOLIC BLOOD PRESSURE: 72 MMHG | BODY MASS INDEX: 31.7 KG/M2 | HEIGHT: 61 IN | OXYGEN SATURATION: 99 % | HEART RATE: 71 BPM

## 2024-01-24 VITALS
TEMPERATURE: 98 F | HEART RATE: 82 BPM | SYSTOLIC BLOOD PRESSURE: 130 MMHG | DIASTOLIC BLOOD PRESSURE: 80 MMHG | OXYGEN SATURATION: 100 % | RESPIRATION RATE: 18 BRPM

## 2024-01-24 DIAGNOSIS — K57.92 DIVERTICULITIS: Primary | ICD-10-CM

## 2024-01-24 PROCEDURE — G0493 RN CARE EA 15 MIN HH/HOSPICE: HCPCS

## 2024-01-24 PROCEDURE — 99024 POSTOP FOLLOW-UP VISIT: CPT | Performed by: PHYSICIAN ASSISTANT

## 2024-01-24 RX ORDER — AMLODIPINE BESYLATE 10 MG/1
1 TABLET ORAL DAILY
COMMUNITY
Start: 2024-01-10

## 2024-01-24 NOTE — PROGRESS NOTES
"Hali Tejeda is a 63 y.o. female in for follow up of Diverticulitis    Pt is S/P Robotic assisted laparoscopic left and sigmoid resection with colostomy converted open due to splenic injury 02/03/2023 for acute colitis with sigmoid stricture and microperforation. She then underwent a robotic-assisted laparoscopic colostomy takedown 12/19/2023. A wound-vac placed over abdominal wound to facilitate healing and Pt was D/C from the hospital 12/22/2023.     Wound-vac D/C 01/06/2024 and switched to wet-to-dry dressings.   Presents today for a wound check.   Doing well. Wound is small per Pt.  Ready to return to work.  No concerns at this time.     /72   Pulse 71   Ht 154.9 cm (61\")   Wt 76.2 kg (167 lb 14.4 oz)   LMP  (LMP Unknown)   SpO2 99%   BMI 31.72 kg/m²   Body mass index is 31.72 kg/m².      PE:  Physical Exam  Constitutional:       General: She is not in acute distress.     Appearance: She is well-developed.   HENT:      Head: Normocephalic and atraumatic.   Abdominal:      General: There is no distension.      Palpations: Abdomen is soft.      Comments: Wound is approximately 1.5 cm x 0.5 cm. Base of wound is at skin level with hypergranulation tissue present.    Musculoskeletal:         General: Normal range of motion.   Neurological:      Mental Status: She is alert.   Psychiatric:         Thought Content: Thought content normal.         Review of Medical Record:     Colonoscopy 11/06/2023:  - Entire Colon WNL  - Rescope: 10 Years  - Dr. Lino Gaston, [facility]    Assessment:   1. Diverticulitis    - S/P Robotic assisted laparoscopic left and sigmoid resection with colostomy converted open due to splenic injury 02/03/2023   - S/P robotic-assisted laparoscopic colostomy takedown 12/19/2023.     Plan:  - Removed hypergranulation tissue and applied silver nitrate to wound bed.   - Wet-to-dry dressings no longer required. Pt may keep a bandage over the area until fully healed.   - Okay to return to work " 01/29/2024 without restrictions.   - Follow up in 2 weeks for wound-check.

## 2024-02-07 ENCOUNTER — OFFICE VISIT (OUTPATIENT)
Dept: SURGERY | Facility: CLINIC | Age: 64
End: 2024-02-07
Payer: COMMERCIAL

## 2024-02-07 VITALS
BODY MASS INDEX: 32.13 KG/M2 | SYSTOLIC BLOOD PRESSURE: 128 MMHG | OXYGEN SATURATION: 100 % | WEIGHT: 170.2 LBS | DIASTOLIC BLOOD PRESSURE: 82 MMHG | HEIGHT: 61 IN | HEART RATE: 88 BPM

## 2024-02-07 DIAGNOSIS — K57.92 DIVERTICULITIS: Primary | ICD-10-CM

## 2024-02-07 PROCEDURE — 99024 POSTOP FOLLOW-UP VISIT: CPT | Performed by: PHYSICIAN ASSISTANT

## 2024-02-07 NOTE — PROGRESS NOTES
"Hali Tejeda is a 63 y.o. female in for follow up of Diverticulitis    Pt is S/P robotic-assisted laparoscopic left and sigmoid resection with colostomy converted open due to splenic injury 02/03/2023 for acute colitis with sigmoid stricture and microperforation. She then underwent a robotic-assisted laparoscopic colostomy takedown 12/19/2023. A wound-vac placed over abdominal wound to facilitate healing and Pt was D/C from the hospital 12/22/2023. Wound-vac D/C 01/06/2024 and switched to wet-to-dry dressings.     Pt presents today for a wound-check.   States wound has completely healed, but continues to wear a bandage over top for comfort.   Intermittent constipation, which improves with increased fluid intake and prunes.   Taking a multivitamin and asking if this is okay to continue.   Returned to work 01/29/2024 and is going well.  Pt denies any other concerns at this time.     /82 (BP Location: Right arm, Patient Position: Sitting, Cuff Size: Adult)   Pulse 88   Ht 154.9 cm (61\")   Wt 77.2 kg (170 lb 3.2 oz)   LMP  (LMP Unknown)   SpO2 100%   BMI 32.16 kg/m²   Body mass index is 32.16 kg/m².      PE:  Physical Exam  Constitutional:       General: She is not in acute distress.     Appearance: She is well-developed.   HENT:      Head: Normocephalic and atraumatic.   Abdominal:      General: There is no distension.      Palpations: Abdomen is soft.      Comments: Abdomen soft, non-distended. Scar noted to RLQ from previous ostomy site.    Musculoskeletal:         General: Normal range of motion.   Neurological:      Mental Status: She is alert.   Psychiatric:         Thought Content: Thought content normal.         Review of Medical Record:    Colonoscopy 11/06/2023:  - Entire Colon WNL  - Rescope: 10 Years  - Dr. Lino Gaston, [facility]    Assessment:   1. Diverticulitis    - S/P Robotic assisted laparoscopic left and sigmoid resection with colostomy converted open 02/03/2023   - S/P robotic-assisted " laparoscopic colostomy takedown 12/19/2023.     Plan:  - Pt healed well following colostomy takedown  - Okay to continue multivitamins   - Next colonoscopy due 2033  - Follow up as needed

## 2024-03-22 ENCOUNTER — OFFICE VISIT (OUTPATIENT)
Dept: SURGERY | Facility: CLINIC | Age: 64
End: 2024-03-22
Payer: COMMERCIAL

## 2024-03-22 VITALS
SYSTOLIC BLOOD PRESSURE: 126 MMHG | BODY MASS INDEX: 32.1 KG/M2 | OXYGEN SATURATION: 99 % | HEART RATE: 77 BPM | DIASTOLIC BLOOD PRESSURE: 80 MMHG | HEIGHT: 61 IN | WEIGHT: 170 LBS

## 2024-03-22 DIAGNOSIS — R19.00 ABDOMINAL WALL BULGE: Primary | ICD-10-CM

## 2024-03-22 PROCEDURE — 99212 OFFICE O/P EST SF 10 MIN: CPT | Performed by: COLON & RECTAL SURGERY

## 2024-03-22 NOTE — PROGRESS NOTES
"Hali Tejeda is a 63 y.o. female in for follow up of Abdominal wall bulge    History of Present Illness  The patient is status post laparoscopic robot-assisted colostomy takedown. Wound VAC was placed over the colostomy site to heal. This surgery was done on 12/19/2023 and she was discharged before [place]. She has seen FATIMAH Lucero in the office for wound checks and had good healing.    She is constipated when she tries to have a bowel movement. She has been taking a stool softener every night along with a gas pill. Sometimes, she has to strain a little bit to have a bowel movement. She took MiraLAX with orange Gatorade, which seemed to help. She was having 2 to 3 bowel movements a day, but then it started slimming down to 1 or 2. She did not have a bowel movement at all until early this morning at 3:30 AM and then she had another one again around 11:00 AM. She tries to drink as much water. She likes to drink tea. She tries to drink 3 bottles of water. She also drinks ginger ale. She eats 3 to 4 prunes at nighttime, which makes her have a bowel movement. She eats peanut butter and applesauce. She does not eat banana much because she found out that it is high in iron and potassium. She used to take iron with her vitamin pill.    She has noticed a protrusion in her stomach for almost 2 months. She has never had this protrusion before. She denies any pain. She is still trying to keep her weight down. She went to a center at [facility] about red lighting, but she did not like it. She lost a lot of weight. She does not want to do red lighting right now. She has had a gastric sleeve.   She is retiring next year.       /80 (BP Location: Left arm, Patient Position: Sitting, Cuff Size: Adult)   Pulse 77   Ht 154.9 cm (61\")   Wt 77.1 kg (170 lb)   LMP  (LMP Unknown)   SpO2 99%   Breastfeeding No   BMI 32.12 kg/m²   Body mass index is 32.12 kg/m².      PE:  Physical Exam  Physical Exam  Abdominal " wall, the upper abdomen is lax and bulging. Left lower quadrant where the ostomy was taken down is well healed.      Assessment:   1. Abdominal wall bulge         Plan:  Assessment & Plan  1. Status post laparoscopic robot-assisted colostomy takedown.  She may have a hernia. I will get a CAT scan. I will refer her to a general surgeon that specializes in hernias.    2. Constipation.  I will prescribe a fiber supplement like Metamucil or FiberCon 2 pills every day. She will start with half a dose of MiraLAX.          Patient or patient representative verbalized consent for the use of Ambient Listening during the visit with  Lino Gaston MD for chart documentation. 3/28/2024  14:00 EDT

## 2024-04-13 ENCOUNTER — HOSPITAL ENCOUNTER (OUTPATIENT)
Facility: HOSPITAL | Age: 64
Discharge: HOME | End: 2024-04-13
Payer: COMMERCIAL

## 2024-04-13 DIAGNOSIS — R19.00 ABDOMINAL WALL BULGE: ICD-10-CM

## 2024-04-13 PROCEDURE — 0 DIATRIZOATE MEGLUMINE & SODIUM PER 1 ML: Performed by: COLON & RECTAL SURGERY

## 2024-04-13 PROCEDURE — 74177 CT ABD & PELVIS W/CONTRAST: CPT

## 2024-04-13 PROCEDURE — 25510000001 IOPAMIDOL 61 % SOLUTION: Performed by: COLON & RECTAL SURGERY

## 2024-04-13 RX ADMIN — IOPAMIDOL 85 ML: 612 INJECTION, SOLUTION INTRAVENOUS at 14:41

## 2024-04-13 RX ADMIN — DIATRIZOATE MEGLUMINE AND DIATRIZOATE SODIUM 30 ML: 600; 100 SOLUTION ORAL; RECTAL at 13:25

## 2024-04-17 ENCOUNTER — TELEPHONE (OUTPATIENT)
Dept: SURGERY | Facility: CLINIC | Age: 64
End: 2024-04-17
Payer: COMMERCIAL

## 2024-04-17 NOTE — TELEPHONE ENCOUNTER
"Relay     \"CT showed scar tissue at previous ostomy site. It was negative for a hernia, but did show diastasis recti, or the separation of the midline abdominal muscles. This is likely the reason for the \"protrusion\" or bulge she is experiencing. Does not necessarily require repair, but can send referral to general surgery if she would like to discuss options.  \"                  Called pt, no answer, left message to return call.  "

## 2024-04-18 DIAGNOSIS — M62.08 DIASTASIS RECTI: Primary | ICD-10-CM

## 2024-04-18 NOTE — TELEPHONE ENCOUNTER
Called and spoke with Pt. Answered all questions. Pt requesting to speak to surgeon to discuss possible surgical options. Referral to general surgery placed.

## 2024-04-18 NOTE — TELEPHONE ENCOUNTER
Pt wants to know what causes diastasis recti. Wants to know if this is because if the reversal surgery. Pt would like referral to general surgery

## 2024-04-25 ENCOUNTER — TELEPHONE (OUTPATIENT)
Dept: SURGERY | Facility: CLINIC | Age: 64
End: 2024-04-25
Payer: COMMERCIAL

## 2024-04-25 NOTE — TELEPHONE ENCOUNTER
Patient was calling regarding the referral that was sent to the general surgeons office for Diastasis recti.  She was told by the office that they do not do that surgery and she would need to see a plastic surgeon.  Patient would now like to be referred to a plastic surgeon.

## 2024-05-01 ENCOUNTER — TELEPHONE (OUTPATIENT)
Dept: SURGERY | Facility: CLINIC | Age: 64
End: 2024-05-01
Payer: COMMERCIAL

## 2024-05-01 DIAGNOSIS — M62.08 DIASTASIS RECTI: Primary | ICD-10-CM

## 2024-05-06 ENCOUNTER — TELEPHONE (OUTPATIENT)
Dept: SURGERY | Facility: CLINIC | Age: 64
End: 2024-05-06

## 2024-05-06 NOTE — TELEPHONE ENCOUNTER
Provider: DR. GRANADO    Caller: Hali Aleman    Relationship: Self    Best call back number: [phone]    What is the medical concern/diagnosis: STOMACH MUSCLE REPAIR    What specialty or service is being requested: GENERAL SURGERY    What is the provider, practice or medical service name: TBD    What is the office location: [address] [place] [address]    What is the office phone number: [phone]    Any additional details: MS. ALEMAN NEEDS A REFERRAL TO GENERAL SURGERY ([facility]  TO REPAIR HER STOMACH MUSCLES. THE PLASTIC SURGERY DEPARTMENT WILL ONLY DO HER TUMMY TUCK.     PER PATIENT'S INSURANCE, THE MUSCLE REPAIR SURGERY WILL ONLY BE COVERED IF PERFORMED BY A GENERAL SURGEON. PATIENT WAS ADVISED THAT THE PLASTIC SURGEON AND GENERAL SURGEON WILL NEED TO WORK TOGETHER FOR THE SURGERIES.     PLEASE CALL MS. ALEMAN TO DISCUSS.     PER PATIENT, YOU CAN LEAVE A VOICEMAIL TODAY, OR CAN CALL 10:30 AM - 1:00 PM TOMORROW.

## 2024-05-09 NOTE — PLAN OF CARE
Goal Outcome Evaluation:  Plan of Care Reviewed With: patient        Progress: improving     Pt a&ox4. Pt observed up ad clara. Started pt on IVF. No pain medication given. No complaints of pain at this time. Pt resting. Will continue to monitor.       
Goal Outcome Evaluation:  Plan of Care Reviewed With: patient        Progress: improving  Outcome Evaluation: A&Ox4. VSS. Up ad clara. Regular diet tolerated at lunch and dinner, so pt was discharged per MD orders.         
done

## 2024-05-28 ENCOUNTER — OFFICE VISIT (OUTPATIENT)
Dept: SURGERY | Facility: CLINIC | Age: 64
End: 2024-05-28
Payer: COMMERCIAL

## 2024-05-28 VITALS
SYSTOLIC BLOOD PRESSURE: 102 MMHG | BODY MASS INDEX: 34.55 KG/M2 | HEIGHT: 61 IN | WEIGHT: 183 LBS | DIASTOLIC BLOOD PRESSURE: 72 MMHG

## 2024-05-28 DIAGNOSIS — K43.2 INCISIONAL HERNIA, WITHOUT OBSTRUCTION OR GANGRENE: Primary | ICD-10-CM

## 2024-05-28 DIAGNOSIS — E66.9 OBESITY (BMI 30.0-34.9): ICD-10-CM

## 2024-05-28 PROBLEM — E66.811 OBESITY (BMI 30.0-34.9): Status: ACTIVE | Noted: 2024-05-28

## 2024-05-28 PROCEDURE — 99213 OFFICE O/P EST LOW 20 MIN: CPT | Performed by: SURGERY

## 2024-05-28 RX ORDER — GINGER ROOT/GINGER ROOT EXT 262.5 MG
1 CAPSULE ORAL
COMMUNITY

## 2024-05-28 NOTE — PROGRESS NOTES
Date: May 28, 2024   Patient Name: Hali Tejeda   Medical Record #: [id number]   : 1960  Age: 63 y.o.  Sex: female      Referring MD:  Lilibeth Rahman PA-C  [address] [facility]  [address]  [place] [id number]    Reason for Visit  Chief Complaint   Patient presents with    DIASTASIS RECTI        History of Present Illness:     Hali Tejeda is a 63 y.o. female who was referred by FATIMAH Correa for evaluation of incisional hernia.  The patient has history of robotic assisted laparoscopic converted to open sigmoid colectomy with colostomy on 2023 that was done by Dr. Gaston.  The patient then underwent robotic assisted laparoscopic colostomy takedown on 2023 also done by Dr. Gaston.  She reports that after a month from her last surgery she noticed bulging on her abdominal wall.  She recently underwent CT scan of the abdomen and pelvis that confirmed patient having a large incisional hernia.  She is now symptomatic but is very uncomfortable about bulging of the abdominal wall that has been getting larger with time.  She reports intermittent episode of constipation.  She has gained 25 pounds in the past several months.  She denies any obstructive symptoms.    Past Medical History    Patient Active Problem List   Diagnosis    Left sided colitis with complication    Colitis    Nausea and vomiting    Stricture of sigmoid colon    Uterine anomaly    Hypertension    Avulsion fracture of distal fibula    Screening for malignant neoplasm of colon    Partial small bowel obstruction    Diverticulitis    Partial bowel obstruction    Colonic stricture     Past Surgical History    Past Surgical History:   Procedure Laterality Date    BREAST LUMPECTOMY Left     benign     SECTION N/A     COLON RESECTION N/A 2023    Procedure: COLON RESECTION LAPAROSCOPIC CONVERTED TO OPEN SIGMOID AND LEFT MOBILIZATION OF SPLENIC FLEXURE WITH DAVINCI ROBOT;  Surgeon: Lino Gaston MD;  Location: [facility]  MAIN OR;  Service: Robotics - DaVinci;  Laterality: N/A;    COLON RESECTION N/A 12/19/2023    Procedure: Colostomy takedown LAPAROSCOPIC WITH DAVINCI ROBOT;  Surgeon: Jessi Gaston MD;  Location: [facility] MAIN OR;  Service: Robotics - DaVinci;  Laterality: N/A;    COLONOSCOPY N/A 12/31/2014    COULD ONLY GET SCOPE TO 70 CM DUE TO SIGNIFICANT STRICTURE SECONDARY TO SEVERE DIVERTICULITIS OR CANCER, ACBE ORDERED, DR. PARAG HUDSON AT [facility]    COLONOSCOPY N/A 02/01/2023    MODERATE STENOSIS IN SIGMOID-DILATED, MANY DIVERTICULA IN SIGMOID AND DESCENDING, COPIOUS AMTS OF STOOL, MUCOSAL TEAR 55 CM FROM ANAL VERGE, DR. JENNI SMITH AT [facility]    COLONOSCOPY N/A 11/06/2023    ENTIRE COLON WNL, RESCOPE IN 10 YRS, DR. JESSI GASTON AT [facility]    COLPOSCOPY N/A 03/04/2016    ENDOSCOPY N/A 02/01/2023    GASTRITIS, SMALL HIATAL HERNIA, DR. JENNI SMITH AT [facility]    ENDOSCOPY N/A 09/15/2009    DR. PARAG HUDSON AT [facility]    GASTRIC SLEEVE LAPAROSCOPIC N/A 07/09/2020    WITH REMOVAL OF GASTRIC BAND, DR. OLIMPIA PALACIOS [facility]    HYSTERECTOMY N/A 01/2005    WITH RSO    LAPAROSCOPIC GASTRIC BANDING N/A 10/20/2009    DR. PARAG HUDSON AT [facility]    SALPINGO OOPHORECTOMY Left     LSO, IN TEEN YEARS    WISDOM TOOTH EXTRACTION Bilateral      Allergies    Allergies   Allergen Reactions    Sulfa Antibiotics Itching     Medications  Current Outpatient Medications   Medication Sig Dispense Refill    acetaminophen (TYLENOL) 500 MG tablet Take 2 tablets by mouth Every 6 (Six) Hours As Needed for Mild Pain. Indications: Pain      amLODIPine (NORVASC) 10 MG tablet Take 1 tablet by mouth Daily.      Biotin 5000 MCG capsule Take 1 capsule by mouth Daily. Indications: Baldness      Calcium Carb-Cholecalciferol (Calcium 600+D3) 600-20 MG-MCG tablet Take 1 tablet by mouth.      clobetasol (TEMOVATE) 0.05 % ointment Apply 1 Application topically to the appropriate area as directed 2 (Two) Times a Day. Indications: Inflammation      multivitamin with minerals  "(Multivitamin Adults 50+) tablet tablet Take 1 tablet by mouth Daily. Indications: Vitamin Deficiency      pantoprazole (PROTONIX) 40 MG EC tablet Take 1 tablet by mouth Every Morning. Indications: Heartburn      Simethicone 125 MG tablet Take 1 tablet by mouth Daily.      vitamin D3 125 MCG (5000 UT) capsule capsule Take 1 capsule by mouth Daily. Indications: Vitamin D Deficiency       No current facility-administered medications for this visit.     Social History  Social History     Socioeconomic History    Marital status: Single   Tobacco Use    Smoking status: Former     Current packs/day: 0.00     Types: Cigarettes     Start date: 1996     Quit date: 2004     Years since quittin.3     Passive exposure: Past    Smokeless tobacco: Never    Tobacco comments:     3-4 cigarettes a day   Vaping Use    Vaping status: Never Used   Substance and Sexual Activity    Alcohol use: Not Currently     Comment: wine 1-2 monthly    Drug use: Never    Sexual activity: Defer     Birth control/protection: Hysterectomy, Post-menopausal     Family History  Family History   Problem Relation Age of Onset    Diabetes Mother     Gout Brother     Cancer Paternal Aunt     Colon cancer Paternal Aunt     Colon cancer Paternal Grandmother     Cancer Paternal Grandmother     Malig Hyperthermia Neg Hx      Review of Systems    Review of Systems    CONSTITUTIONAL: Denies fevers, chills, reports weight gain  RESPIRATORY: Denies chronic cough or sob  CARDIAC: Denies chest pain, palpitations, edema  GI: Denies dyspepsia, reflux, heartburn, nausea, vomiting  All other review of systems is negative other than HPI       Physical Exam  /72   Ht 154.9 cm (61\")   Wt 83 kg (183 lb)   LMP  (LMP Unknown)   BMI 34.58 kg/m²   Body mass index is 34.58 kg/m².  General: Alert, no acute distress  HEENT: normochephalic, atraumatic, no scleral icterus. Moist oral mucosa.   Lungs: clear to auscultation and percussion, no chest deformities " noted.  Cardiovascular:  Regular rate and rhythm  Extremities: No clubbing cyanosis or edema  Skin: No rashes, moist and warm.   Neuro: alert and oriented, normal speech, cranial nerves 2-12 grossly intact, no focal deficit   Abdominal exam: There is a large midline incision.  There is an incision in the left lower quadrant.  There is a large incisional hernia from the subxiphoid area to the infraumbilical area.  There is no tenderness.  There is separation of the rectus muscles of approximately 15 cm.    Medical Decision Making  Test Results:  CT scan of the abdomen and pelvis that was performed on 4/13/2024 show evidence of very large incisional hernia with separation of the rectus muscle of approximately 15 cm over the upper abdomen 12 cm over the mid abdomen.  There is a very small rectus muscle superiorly bilaterally.  The rectus muscle on the left side at the mid abdomen is almost completely gone.  There is no evidence of incarceration strangulation.  There is history of prior gastric sleeve, prior sigmoid colonic anastomosis    Labs 12/20/2023: White blood cell count 16.1, hemoglobin 11.8, platelets 209    Impression:  This is a 63 y.o. year old female patient with a very large incisional hernia.   The patient is currently obese with a BMI that approaches Body mass index is 34.58 kg/m²..    She is interested in undergoing abdominoplasty at the same time of her hernia repair.  She has an appointment to see Dr. Saab at the beginning in June.  I discussed with the patient about further step.  I think that in order to get the best result from hernia repair that would likely include either robotic assisted laparoscopic incisional hernia repair with bilateral component separation and mesh placement versus open incisional hernia repair with component separation and mesh placement if abdominoplasty is planned during the same time that she should at least lose 15 to 20 pounds to decrease the risk of hernia  recurrence.  She does not have prohibitive risk for hernia repair but since she is asymptomatic I recommend weight loss.    Plan:  She will follow-up in my office in 6 weeks for recheck  I will discuss with Dr. Katiana Peña MD  General, Minimally Invasive and Endoscopic Surgery  [facility]    [address], [address]  [place], [place], [address]  P: [phone]  F: [phone]

## 2024-06-24 ENCOUNTER — TELEPHONE (OUTPATIENT)
Dept: SURGERY | Facility: CLINIC | Age: 64
End: 2024-06-24
Payer: COMMERCIAL

## 2024-06-24 NOTE — TELEPHONE ENCOUNTER
Dr. Dupont office called stating Mrs. Tejeda will not be using their service because she can't afford it.

## 2024-07-23 ENCOUNTER — OFFICE VISIT (OUTPATIENT)
Dept: SURGERY | Facility: CLINIC | Age: 64
End: 2024-07-23
Payer: COMMERCIAL

## 2024-07-23 VITALS
SYSTOLIC BLOOD PRESSURE: 102 MMHG | HEIGHT: 61 IN | DIASTOLIC BLOOD PRESSURE: 60 MMHG | BODY MASS INDEX: 34.74 KG/M2 | WEIGHT: 184 LBS

## 2024-07-23 DIAGNOSIS — K43.2 INCISIONAL HERNIA, WITHOUT OBSTRUCTION OR GANGRENE: Primary | ICD-10-CM

## 2024-07-23 PROCEDURE — 99214 OFFICE O/P EST MOD 30 MIN: CPT | Performed by: SURGERY

## 2024-07-23 NOTE — LETTER
2024     Lino Gaston MD  [address]  [facility] [address]  [place] [id number]    Patient: Hali Tejeda   YOB: 1960   Date of Visit: 2024       Dear Lino Gaston MD,    Thank you for referring Hali Tejeda to me for evaluation. Below is a copy of my consult note.    If you have questions, please do not hesitate to call me. I look forward to following Hali along with you.         Sincerely,        Eladio Peña MD        CC: No Recipients    CC: Incisional hernia follow-up     HPI: 63-year-old female that is here today for follow-up for discussion for possible incisional hernia repair.  Patient has been slowly losing weight although she thinks that she should be able to lose more weight.  She has an appointment with her bariatric surgeon to see if he can get recommendation for plastic surgery approval.  She is working with plastic surgeon to try to get the procedure approved.  She denies any complaints  From her prior history the patient patient has history of sigmoid colonic stricture and underwent robotic converted to open sigmoid colon resection with colostomy that was done by Dr. Gaston in .  Postoperatively, she have intra-abdominal abscess that was drained twice by IR.  Patient eventually got better and was eventually taken to operative room for colostomy takedown that was done robotically.     PMH: Hypertension, obesity, colitis, nausea and vomiting, diverticulitis, colonic stricture     PSH:   -[place] teeth extraction  -Left salpingo-oophorectomy  -Left breast lumpectomy  -   -Open hysterectomy   -Laparoscopic gastric banding   -Colonoscopy   -Colposcopy   -Laparoscopic sleeve gastrectomy   -Colonoscopy and upper endoscopy   -Robotic converted to open sigmoid colectomy with colostomy 2023.  Dr. Gaston  -Robotic assisted laparoscopic colostomy takedown 2023.  Dr. Gaston    MEDS: Tylenol, Norvasc, biotin,  "calcium, Temovate, multivitamins, Protonix, simethicone, vitamin D3     ALL: Sulfa antibiotics    FH and SH:   Paternal aunt and paternal grandmother with colon cancer  Mother with diabetes  History of smoking, quit in 2004.  Denies drinking or taking any drugs     ROS:   Constitutional: denies any weight changes, fatigue or weakness.  HEENT: Denies hearing loss and rhinorrhea  Cardiovascular: denies chest pain, palpitations, edemas.  Respiratory: denies cough, sputum, SOB.  Gastrointestinal: denies N&V, abd pain, diarrhea, constipation.  Genitourinary: denies dysuria, frequency.  Endocrine: denies cold intolerance, lethargy and flushing.  Hem: denies excessive bruising and postop bleeding.  Musculoskeletal: denies weakness, joint swelling, pain or stiffness.  Neuro: denies seizures, CVA, paresthesia, or peripheral neuropathy.   Skin: denies change in nevi, rashes, masses.     PE:   Vitals:    07/23/24 1557   BP: 102/60   Weight: 83.5 kg (184 lb)   Height: 154.9 cm (61\")     Body mass index is 34.77 kg/m².   Alert and oriented ×3, no acute distress.  Head is normocephalic and atraumatic.  Neck is supple there is no thyromegaly or lymphadenopathy  Chest is clear bilaterally there is no added sounds  Regular rate and rhythm, no murmurs  Abdomen is soft and nontender, is nondistended, bowel sounds are positive.  There is no rebound or guarding is and there is no peritoneal signs.  There is a large upper abdominal incisional hernia.  No signs of incarceration strangulation.  Well-healed incisions.  Rectus muscle separation of approximately 15 cm.  No clubbing cyanosis or edema in lower or upper extremities     Diagnostic studies:   CT scan of the abdomen and pelvis that was performed on 4/13/2024 show evidence of very large incisional hernia with separation of the rectus muscle of approximately 15 cm over the upper abdomen 12 cm over the mid abdomen.  There is a very small rectus muscle superiorly bilaterally.  The " rectus muscle on the left side at the mid abdomen is almost completely gone.  There is no evidence of incarceration strangulation.  There is history of prior gastric sleeve, prior sigmoid colonic anastomosis    Operative report from 2/3/2023 was reviewed.  There were adhesions from small bowel to the sigmoid colon and a tedious dissection at the surgical area in the left lower quadrant but there is no mentions of large amount of adhesions preventing the completion of the procedure robotically.  The procedure was finally converted to open due to capsular tear of the spleen.    Operative report from 12/27/2023 was reviewed colostomy takedown was achieved robotically without complication.  No description of dense intra-abdominal adhesions.     Assessment and plan    The patient is a very pleasant 63-year-old female with a large incisional hernia.  Patient is interested in panniculectomy at the same time of hernia repair.  I think that depending on the final decision whether she will be able to afford and arrange to have panniculectomy at the same time that we will plan the approach.  I think that if she cannot have panniculectomy the same time that robotic assisted laparoscopic incisional hernia repair with component separation can be attempted with the risk that this may need to be converted to an open procedure due to intra-abdominal adhesions.  Patient will need a bilateral component separation in the form of the transversus abdominis muscle release.  She is trying to lose weight and she is doing a great job with.  Patient would like to follow-up with me at the end of August after she sees her bariatric surgeon and knows better if her panniculectomy will be covered by insurance.    Continue weight loss  Follow-up in my office at the end of August  She understood     Eladio Peña MD  General, Minimally Invasive and Endoscopic Surgery  [facility]     [address], [address]  [place], [place],  [address]  P: [phone]  F: [phone]

## 2024-07-24 NOTE — PROGRESS NOTES
CC: Incisional hernia follow-up     HPI: 63-year-old female that is here today for follow-up for discussion for possible incisional hernia repair.  Patient has been slowly losing weight although she thinks that she should be able to lose more weight.  She has an appointment with her bariatric surgeon to see if he can get recommendation for plastic surgery approval.  She is working with plastic surgeon to try to get the procedure approved.  She denies any complaints  From her prior history the patient patient has history of sigmoid colonic stricture and underwent robotic converted to open sigmoid colon resection with colostomy that was done by Dr. Gaston in .  Postoperatively, she have intra-abdominal abscess that was drained twice by IR.  Patient eventually got better and was eventually taken to operative room for colostomy takedown that was done robotically.     PMH: Hypertension, obesity, colitis, nausea and vomiting, diverticulitis, colonic stricture     PSH:   -[place] teeth extraction  -Left salpingo-oophorectomy  -Left breast lumpectomy  -   -Open hysterectomy   -Laparoscopic gastric banding   -Colonoscopy   -Colposcopy   -Laparoscopic sleeve gastrectomy   -Colonoscopy and upper endoscopy   -Robotic converted to open sigmoid colectomy with colostomy 2023.  Dr. Gaston  -Robotic assisted laparoscopic colostomy takedown 2023.  Dr. Gaston    MEDS: Tylenol, Norvasc, biotin, calcium, Temovate, multivitamins, Protonix, simethicone, vitamin D3     ALL: Sulfa antibiotics    FH and SH:   Paternal aunt and paternal grandmother with colon cancer  Mother with diabetes  History of smoking, quit in .  Denies drinking or taking any drugs     ROS:   Constitutional: denies any weight changes, fatigue or weakness.  HEENT: Denies hearing loss and rhinorrhea  Cardiovascular: denies chest pain, palpitations, edemas.  Respiratory: denies cough, sputum, SOB.  Gastrointestinal: denies  "N&V, abd pain, diarrhea, constipation.  Genitourinary: denies dysuria, frequency.  Endocrine: denies cold intolerance, lethargy and flushing.  Hem: denies excessive bruising and postop bleeding.  Musculoskeletal: denies weakness, joint swelling, pain or stiffness.  Neuro: denies seizures, CVA, paresthesia, or peripheral neuropathy.   Skin: denies change in nevi, rashes, masses.     PE:   Vitals:    07/23/24 1557   BP: 102/60   Weight: 83.5 kg (184 lb)   Height: 154.9 cm (61\")     Body mass index is 34.77 kg/m².   Alert and oriented ×3, no acute distress.  Head is normocephalic and atraumatic.  Neck is supple there is no thyromegaly or lymphadenopathy  Chest is clear bilaterally there is no added sounds  Regular rate and rhythm, no murmurs  Abdomen is soft and nontender, is nondistended, bowel sounds are positive.  There is no rebound or guarding is and there is no peritoneal signs.  There is a large upper abdominal incisional hernia.  No signs of incarceration strangulation.  Well-healed incisions.  Rectus muscle separation of approximately 15 cm.  No clubbing cyanosis or edema in lower or upper extremities     Diagnostic studies:   CT scan of the abdomen and pelvis that was performed on 4/13/2024 show evidence of very large incisional hernia with separation of the rectus muscle of approximately 15 cm over the upper abdomen 12 cm over the mid abdomen.  There is a very small rectus muscle superiorly bilaterally.  The rectus muscle on the left side at the mid abdomen is almost completely gone.  There is no evidence of incarceration strangulation.  There is history of prior gastric sleeve, prior sigmoid colonic anastomosis    Operative report from 2/3/2023 was reviewed.  There were adhesions from small bowel to the sigmoid colon and a tedious dissection at the surgical area in the left lower quadrant but there is no mentions of large amount of adhesions preventing the completion of the procedure robotically.  The " procedure was finally converted to open due to capsular tear of the spleen.    Operative report from 12/27/2023 was reviewed colostomy takedown was achieved robotically without complication.  No description of dense intra-abdominal adhesions.     Assessment and plan    The patient is a very pleasant 63-year-old female with a large incisional hernia.  Patient is interested in panniculectomy at the same time of hernia repair.  I think that depending on the final decision whether she will be able to afford and arrange to have panniculectomy at the same time that we will plan the approach.  I think that if she cannot have panniculectomy the same time that robotic assisted laparoscopic incisional hernia repair with component separation can be attempted with the risk that this may need to be converted to an open procedure due to intra-abdominal adhesions.  Patient will need a bilateral component separation in the form of the transversus abdominis muscle release.  She is trying to lose weight and she is doing a great job with.  Patient would like to follow-up with me at the end of August after she sees her bariatric surgeon and knows better if her panniculectomy will be covered by insurance.    Continue weight loss  Follow-up in my office at the end of August  She understood     Eladio Peña MD  General, Minimally Invasive and Endoscopic Surgery  [facility]     [address], [address]  [place], [place], [address]  P: [phone]  F: [phone]

## 2024-08-23 ENCOUNTER — OFFICE VISIT (OUTPATIENT)
Dept: SURGERY | Facility: CLINIC | Age: 64
End: 2024-08-23
Payer: COMMERCIAL

## 2024-08-23 VITALS
SYSTOLIC BLOOD PRESSURE: 122 MMHG | BODY MASS INDEX: 35.5 KG/M2 | DIASTOLIC BLOOD PRESSURE: 76 MMHG | WEIGHT: 188 LBS | HEIGHT: 61 IN

## 2024-08-23 DIAGNOSIS — E66.9 OBESITY (BMI 30-39.9): ICD-10-CM

## 2024-08-23 DIAGNOSIS — K43.2 INCISIONAL HERNIA, WITHOUT OBSTRUCTION OR GANGRENE: Primary | ICD-10-CM

## 2024-08-23 PROCEDURE — 99213 OFFICE O/P EST LOW 20 MIN: CPT | Performed by: SURGERY

## 2024-08-23 NOTE — PROGRESS NOTES
"CC: Incisional hernia follow-up     HPI: Patient is a very pleasant 63-year-old female that is here today for incisional hernia.  The patient reports only moderate amount of discomfort over the right groin.  She has no pain over the hernia itself.  Patient has not been able to lose that much weight since the last visit.  She is trying to get her bariatric surgeon to submit to insurance paperwork necessary so panniculectomy can be covered by insurance.    The patient has history of sigmoid colonic stricture and underwent robotic converted to open sigmoid colon resection with colostomy that was done by Dr. Gaston in 2/3/2023.  Postoperatively, she have intra-abdominal abscess that was drained twice by IR.   She eventually underwent robotic colostomy takedown on 2023 by Dr. Gaston    PMH: Hypertension, obesity, colitis, nausea and vomiting, diverticulitis, colonic stricture     PSH:   -[place] teeth extraction  -Left salpingo-oophorectomy  -Left breast lumpectomy  -   -Open hysterectomy   -Laparoscopic gastric banding   -Colonoscopy   -Colposcopy   -Laparoscopic sleeve gastrectomy   -Colonoscopy and upper endoscopy   -Robotic converted to open sigmoid colectomy with colostomy 2023.  Dr. Gaston  -Robotic assisted laparoscopic colostomy takedown 2023.  Dr. Gaston     MEDS: Tylenol, Norvasc, biotin, calcium, Temovate, multivitamins, Protonix, simethicone, vitamin D3     ALL: Sulfa antibiotics    FH and SH:   Paternal aunt and paternal grandmother with colon cancer  Mother with diabetes  History of smoking, quit in .  Denies drinking or taking any drugs     ROS:   As per HPI    PE:   Vitals:    24 1050   BP: 122/76   Weight: 85.3 kg (188 lb)   Height: 154.9 cm (61\")     Body mass index is 35.52 kg/m².  From 34.7 on last visit on 2024  Alert and oriented ×3, no acute distress.  Head is normocephalic and atraumatic.  Neck is supple   Breathing comfortable  Abdomen is " soft and nontender, is nondistended, bowel sounds are positive.  There is no rebound or guarding is and there is no peritoneal signs.  There is a large upper abdominal incisional hernia.  No signs of incarceration strangulation.  Well-healed incisions.  Rectus muscle separation of approximately 15 cm     Diagnostic studies:   CT scan of the abdomen and pelvis that was performed on 4/13/2024 show evidence of very large incisional hernia with separation of the rectus muscle of approximately 15 cm over the upper abdomen 12 cm over the mid abdomen.  There is a very small rectus muscle superiorly bilaterally.  The rectus muscle on the left side at the mid abdomen is almost completely gone.  There is no evidence of incarceration strangulation.  There is history of prior gastric sleeve, prior sigmoid colonic anastomosis     Operative report from 2/3/2023 was reviewed.  There were adhesions from small bowel to the sigmoid colon and a tedious dissection at the surgical area in the left lower quadrant but there is no mentions of large amount of adhesions preventing the completion of the procedure robotically.  The procedure was finally converted to open due to capsular tear of the spleen.     Operative report from 12/27/2023 was reviewed colostomy takedown was achieved robotically without complication.  No description of dense intra-abdominal adhesions.     Assessment and plan    The patient is a very pleasant 63-year-old female with large incisional hernia.  Patient is interested in undergoing panniculectomy at the same time.  She is trying to get her medical insurance to approve the procedure so Dr. Saab can perform it.  I discussed with the patient about further step.  I recommend that she undergoes incisional hernia repair with bilateral component separation and mesh placement either robotically or open.  She tells me that if panniculectomy cannot be approved by insurance then she will want to have the procedure open  so excess skin around the midline can be removed.  I discussed with the patient that there is increased morbidity with open procedure with increased risk of wound related complications, increase hospital stays.  She states will think about it.  She has not been able to lose that much weight.  Discussed with the patient about the possibility of starting intermittent fasting to help increase her chances of weight loss success.  She will try    Patient to follow-up in my office whenever she figures out with her insurance about medical necessity for panniculectomy or if she becomes more symptomatic from her hernia.    She understood     Eladio Peña MD  General, Minimally Invasive and Endoscopic Surgery  [facility]     [address], [address]  [place], [place], [address]  P: [phone]  F: [phone]

## 2024-09-20 ENCOUNTER — PREP FOR SURGERY (OUTPATIENT)
Dept: OTHER | Facility: HOSPITAL | Age: 64
End: 2024-09-20
Payer: COMMERCIAL

## 2024-09-20 ENCOUNTER — OFFICE VISIT (OUTPATIENT)
Dept: SURGERY | Facility: CLINIC | Age: 64
End: 2024-09-20
Payer: COMMERCIAL

## 2024-09-20 VITALS
BODY MASS INDEX: 35.5 KG/M2 | SYSTOLIC BLOOD PRESSURE: 104 MMHG | HEIGHT: 61 IN | WEIGHT: 188 LBS | DIASTOLIC BLOOD PRESSURE: 68 MMHG

## 2024-09-20 DIAGNOSIS — K43.2 INCISIONAL HERNIA, WITHOUT OBSTRUCTION OR GANGRENE: Primary | ICD-10-CM

## 2024-09-20 PROCEDURE — 99214 OFFICE O/P EST MOD 30 MIN: CPT | Performed by: SURGERY

## 2024-09-20 RX ORDER — SCOLOPAMINE TRANSDERMAL SYSTEM 1 MG/1
1 PATCH, EXTENDED RELEASE TRANSDERMAL CONTINUOUS
OUTPATIENT
Start: 2024-09-20 | End: 2024-09-23

## 2024-09-20 RX ORDER — ACETAMINOPHEN 500 MG
1000 TABLET ORAL ONCE
OUTPATIENT
Start: 2024-09-20 | End: 2024-09-20

## 2024-09-20 RX ORDER — FLUTICASONE PROPIONATE 50 MCG
2 SPRAY, SUSPENSION (ML) NASAL DAILY
COMMUNITY
Start: 2024-09-03

## 2024-09-20 RX ORDER — CELECOXIB 100 MG/1
200 CAPSULE ORAL ONCE
OUTPATIENT
Start: 2024-09-20 | End: 2024-09-20

## 2024-09-20 RX ORDER — GABAPENTIN 100 MG/1
600 CAPSULE ORAL ONCE
OUTPATIENT
Start: 2024-09-20 | End: 2024-09-20

## 2024-09-20 RX ORDER — TRAZODONE HYDROCHLORIDE 50 MG/1
50 TABLET, FILM COATED ORAL
COMMUNITY
Start: 2024-09-03

## 2024-12-12 ENCOUNTER — PRE-ADMISSION TESTING (OUTPATIENT)
Dept: PREADMISSION TESTING | Facility: HOSPITAL | Age: 64
End: 2024-12-12
Payer: COMMERCIAL

## 2024-12-12 VITALS
DIASTOLIC BLOOD PRESSURE: 77 MMHG | SYSTOLIC BLOOD PRESSURE: 127 MMHG | BODY MASS INDEX: 35.33 KG/M2 | HEIGHT: 62 IN | TEMPERATURE: 97.5 F | WEIGHT: 192 LBS | OXYGEN SATURATION: 98 % | RESPIRATION RATE: 18 BRPM | HEART RATE: 74 BPM

## 2024-12-12 DIAGNOSIS — K43.2 INCISIONAL HERNIA, WITHOUT OBSTRUCTION OR GANGRENE: ICD-10-CM

## 2024-12-12 LAB
ANION GAP SERPL CALCULATED.3IONS-SCNC: 11.4 MMOL/L (ref 5–15)
BUN SERPL-MCNC: 8 MG/DL (ref 8–23)
BUN/CREAT SERPL: 9.5 (ref 7–25)
CALCIUM SPEC-SCNC: 9.3 MG/DL (ref 8.6–10.5)
CHLORIDE SERPL-SCNC: 107 MMOL/L (ref 98–107)
CO2 SERPL-SCNC: 24.6 MMOL/L (ref 22–29)
CREAT SERPL-MCNC: 0.84 MG/DL (ref 0.57–1)
DEPRECATED RDW RBC AUTO: 43.4 FL (ref 37–54)
EGFRCR SERPLBLD CKD-EPI 2021: 77.2 ML/MIN/1.73
ERYTHROCYTE [DISTWIDTH] IN BLOOD BY AUTOMATED COUNT: 14.1 % (ref 12.3–15.4)
GLUCOSE SERPL-MCNC: 92 MG/DL (ref 65–99)
HCT VFR BLD AUTO: 39.7 % (ref 34–46.6)
HGB BLD-MCNC: 13.1 G/DL (ref 12–15.9)
MCH RBC QN AUTO: 28.2 PG (ref 26.6–33)
MCHC RBC AUTO-ENTMCNC: 33 G/DL (ref 31.5–35.7)
MCV RBC AUTO: 85.6 FL (ref 79–97)
MRSA DNA SPEC QL NAA+PROBE: NORMAL
PLATELET # BLD AUTO: 270 10*3/MM3 (ref 140–450)
PMV BLD AUTO: 9.9 FL (ref 6–12)
POTASSIUM SERPL-SCNC: 3.4 MMOL/L (ref 3.5–5.2)
QT INTERVAL: 431 MS
QTC INTERVAL: 466 MS
RBC # BLD AUTO: 4.64 10*6/MM3 (ref 3.77–5.28)
SODIUM SERPL-SCNC: 143 MMOL/L (ref 136–145)
WBC NRBC COR # BLD AUTO: 9.87 10*3/MM3 (ref 3.4–10.8)

## 2024-12-12 PROCEDURE — 93005 ELECTROCARDIOGRAM TRACING: CPT

## 2024-12-12 PROCEDURE — 36415 COLL VENOUS BLD VENIPUNCTURE: CPT

## 2024-12-12 PROCEDURE — 85027 COMPLETE CBC AUTOMATED: CPT

## 2024-12-12 PROCEDURE — 80048 BASIC METABOLIC PNL TOTAL CA: CPT

## 2024-12-12 PROCEDURE — 87641 MR-STAPH DNA AMP PROBE: CPT

## 2024-12-12 RX ORDER — SIMETHICONE 125 MG
125 TABLET,CHEWABLE ORAL EVERY 6 HOURS PRN
COMMUNITY

## 2024-12-12 RX ORDER — DOCUSATE SODIUM 100 MG/1
100 CAPSULE, LIQUID FILLED ORAL NIGHTLY
COMMUNITY

## 2024-12-12 NOTE — DISCHARGE INSTRUCTIONS
Take the following medications the morning of surgery:  AMLODIPINE & PANTOPRAZOLE      If you are on prescription narcotic pain medication to control your pain you may also take that medication the morning of surgery.      General Instructions:     Do not eat solid food after midnight the night before surgery.  Clear liquids day of surgery are allowed but must be stopped at least two hours before your hospital arrival time.       Allowed clear liquids      Water, sodas, and tea or coffee with no cream or milk added.       12 to 20 ounces of a clear liquid that contains carbohydrates is recommended.  If non-diabetic, have Gatorade or Powerade.  If diabetic, have G2 or Powerade Zero.     Do not have liquids red in color.  Do not consume chicken, beef, pork or vegetable broth or bouillon cubes of any variety as they are not considered clear liquids and are not allowed.      Infants may have breast milk up to four hours before surgery.  Infants drinking formula may drink formula up to six hours before surgery.   Patients who avoid smoking, chewing tobacco and alcohol for 4 weeks prior to surgery have a reduced risk of post-operative complications.  Quit smoking as many days before surgery as you can.  Do not smoke, use chewing tobacco or drink alcohol the day of surgery.   If applicable bring your C-PAP/ BI-PAP machine in with you to preop day of surgery.  Bring any papers given to you in the doctor’s office.  Wear clean comfortable clothes.  Do not wear contact lenses, false eyelashes or make-up.  Bring a case for your glasses.   Bring crutches or walker if applicable.  Remove all piercings.  Leave jewelry and any other valuables at home.  Hair extensions with metal clips must be removed prior to surgery.  The Pre-Admission Testing nurse will instruct you to bring medications if unable to obtain an accurate list in Pre-Admission Testing.            Preventing a Surgical Site Infection:  For 2 to 3 days before surgery,  avoid shaving with a razor because the razor can irritate skin and make it easier to develop an infection.    Any areas of open skin can increase the risk of a post-operative wound infection by allowing bacteria to enter and travel throughout the body.  Notify your surgeon if you have any skin wounds / rashes even if it is not near the expected surgical site.  The area will need assessed to determine if surgery should be delayed until it is healed.  The night prior to surgery shower using a fresh bar of anti-bacterial soap (such as Dial) and clean washcloth.  Sleep in a clean bed with clean clothing.  Do not allow pets to sleep with you.  Shower on the morning of surgery using a fresh bar of anti-bacterial soap (such as Dial) and clean washcloth.  Dry with a clean towel and dress in clean clothing.  Ask your surgeon if you will be receiving antibiotics prior to surgery.  Make sure you, your family, and all healthcare providers clean their hands with soap and water or an alcohol based hand  before caring for you or your wound.    Day of surgery:  Your arrival time is approximately two hours before your scheduled surgery time.  Please note if you have an early arrival time the surgery doors do not open before 5:00 AM.  Upon arrival, a Pre-op nurse and Anesthesiologist will review your health history, obtain vital signs, and answer questions you may have.  The only belongings needed at this time will be a list of your home medications and if applicable your C-PAP/BI-PAP machine.  A Pre-op nurse will start an IV and you may receive medication in preparation for surgery, including something to help you relax.     Please be aware that surgery does come with discomfort.  We want to make every effort to control your discomfort so please discuss any uncontrolled symptoms with your nurse.   Your doctor will most likely have prescribed pain medications.      If you are going home after surgery you will receive  individualized written care instructions before being discharged.  A responsible adult must drive you to and from the hospital on the day of your surgery and ideally stay with you through the night.   .  Discharge prescriptions can be filled by the hospital pharmacy during regular pharmacy hours.  If you are having surgery late in the day/evening your prescription may be e-prescribed to your pharmacy.  Please verify your pharmacy hours or chose a 24 hour pharmacy to avoid not having access to your prescription because your pharmacy has closed for the day.    If you are staying overnight following surgery, you will be transported to your hospital room following the recovery period.  [facility] has all private rooms.    If you have any questions please call Pre-Admission Testing at [phone].  Deductibles and co-payments are collected on the day of service. Please be prepared to pay the required co-pay, deductible or deposit on the day of service as defined by your plan.    Call your surgeon immediately if you experience any of the following symptoms:  Sore Throat  Shortness of Breath or difficulty breathing  Cough  Chills  Body soreness or muscle pain  Headache  Fever  New loss of taste or smell  Do not arrive for your surgery ill.  Your procedure will need to be rescheduled to another time.  You will need to call your physician before the day of surgery to avoid any unnecessary exposure to hospital staff as well as other patients.            CHLORHEXIDINE CLOTH INSTRUCTIONS  The morning of surgery follow these instructions using the Chlorhexidine cloths you've been given.  These steps reduce bacteria on the body.  Do not use the cloths near your eyes, ears mouth, genitalia or on open wounds.  Throw the cloths away after use but do not try to flush them down a toilet.      Open and remove one cloth at a time from the package.    Leave the cloth unfolded and begin the bathing.  Massage the skin with  the cloths using gentle pressure to remove bacteria.  Do not scrub harshly.   Follow the steps below with one 2% CHG cloth per area (6 total cloths).  One cloth for neck, shoulders and chest.  One cloth for both arms, hands, fingers and underarms (do underarms last).  One cloth for the abdomen followed by groin.  One cloth for right leg and foot including between the toes.  One cloth for left leg and foot including between the toes.  The last cloth is to be used for the back of the neck, back and buttocks.    Allow the CHG to air dry 3 minutes on the skin which will give it time to work and decrease the chance of irritation.  The skin may feel sticky until it is dry.  Do not rinse with water or any other liquid or you will lose the beneficial effects of the CHG.  If mild skin irritation occurs, do rinse the skin to remove the CHG.  Report this to the nurse at time of admission.  Do not apply lotions, creams, ointments, deodorants or perfumes after using the clothes. Dress in clean clothes before coming to the hospital.

## 2024-12-19 ENCOUNTER — HOSPITAL ENCOUNTER (INPATIENT)
Facility: HOSPITAL | Age: 64
LOS: 5 days | Discharge: HOME | End: 2024-12-24
Attending: PLASTIC SURGERY | Admitting: SURGERY
Payer: COMMERCIAL

## 2024-12-19 ENCOUNTER — ANESTHESIA (OUTPATIENT)
Dept: PERIOP | Facility: HOSPITAL | Age: 64
End: 2024-12-19
Payer: COMMERCIAL

## 2024-12-19 ENCOUNTER — ANESTHESIA EVENT (OUTPATIENT)
Dept: PERIOP | Facility: HOSPITAL | Age: 64
End: 2024-12-19
Payer: COMMERCIAL

## 2024-12-19 DIAGNOSIS — K43.9 HERNIA OF ABDOMINAL WALL: ICD-10-CM

## 2024-12-19 DIAGNOSIS — K43.2 INCISIONAL HERNIA, WITHOUT OBSTRUCTION OR GANGRENE: ICD-10-CM

## 2024-12-19 PROCEDURE — 25010000002 GENTAMICIN PER 80 MG: Performed by: PLASTIC SURGERY

## 2024-12-19 PROCEDURE — 25010000002 LIDOCAINE 1% - EPINEPHRINE 1:100000 1 %-1:100000 SOLUTION 30 ML VIAL: Performed by: PLASTIC SURGERY

## 2024-12-19 PROCEDURE — 25010000002 HYDROMORPHONE 1 MG/ML SOLUTION: Performed by: NURSE ANESTHETIST, CERTIFIED REGISTERED

## 2024-12-19 PROCEDURE — 25010000002 PROPOFOL 10 MG/ML EMULSION: Performed by: NURSE ANESTHETIST, CERTIFIED REGISTERED

## 2024-12-19 PROCEDURE — 25010000002 LIDOCAINE PER 10 MG: Performed by: NURSE ANESTHETIST, CERTIFIED REGISTERED

## 2024-12-19 PROCEDURE — 25810000003 LACTATED RINGERS PER 1000 ML: Performed by: ANESTHESIOLOGY

## 2024-12-19 PROCEDURE — 88302 TISSUE EXAM BY PATHOLOGIST: CPT | Performed by: PLASTIC SURGERY

## 2024-12-19 PROCEDURE — 25010000002 DEXAMETHASONE PER 1 MG: Performed by: NURSE ANESTHETIST, CERTIFIED REGISTERED

## 2024-12-19 PROCEDURE — 25010000002 CEFOXITIN PER 1 G: Performed by: SURGERY

## 2024-12-19 PROCEDURE — C1781 MESH (IMPLANTABLE): HCPCS | Performed by: PLASTIC SURGERY

## 2024-12-19 PROCEDURE — 25810000003 SODIUM CHLORIDE 0.9 % SOLUTION 250 ML FLEX CONT: Performed by: NURSE ANESTHETIST, CERTIFIED REGISTERED

## 2024-12-19 PROCEDURE — 25010000002 GLYCOPYRROLATE 0.2 MG/ML SOLUTION: Performed by: NURSE ANESTHETIST, CERTIFIED REGISTERED

## 2024-12-19 PROCEDURE — 0DNW0ZZ RELEASE PERITONEUM, OPEN APPROACH: ICD-10-PCS | Performed by: SURGERY

## 2024-12-19 PROCEDURE — 15734 MUSCLE-SKIN GRAFT TRUNK: CPT | Performed by: SURGERY

## 2024-12-19 PROCEDURE — 25010000002 PHENYLEPHRINE 10 MG/ML SOLUTION 5 ML VIAL: Performed by: NURSE ANESTHETIST, CERTIFIED REGISTERED

## 2024-12-19 PROCEDURE — 0JB80ZZ EXCISION OF ABDOMEN SUBCUTANEOUS TISSUE AND FASCIA, OPEN APPROACH: ICD-10-PCS | Performed by: PLASTIC SURGERY

## 2024-12-19 PROCEDURE — 0WUF0JZ SUPPLEMENT ABDOMINAL WALL WITH SYNTHETIC SUBSTITUTE, OPEN APPROACH: ICD-10-PCS | Performed by: SURGERY

## 2024-12-19 PROCEDURE — 25010000002 CEFOXITIN PER 1 G: Performed by: NURSE ANESTHETIST, CERTIFIED REGISTERED

## 2024-12-19 PROCEDURE — 25010000002 MAGNESIUM SULFATE PER 500 MG OF MAGNESIUM: Performed by: NURSE ANESTHETIST, CERTIFIED REGISTERED

## 2024-12-19 PROCEDURE — 49595 RPR AA HRN 1ST > 10 RDC: CPT | Performed by: SURGERY

## 2024-12-19 PROCEDURE — 25010000003 BUPIVACAINE LIPOSOME 1.3 % SUSPENSION 20 ML VIAL: Performed by: SURGERY

## 2024-12-19 PROCEDURE — 25010000002 CEFAZOLIN PER 500 MG: Performed by: PLASTIC SURGERY

## 2024-12-19 PROCEDURE — 49595 RPR AA HRN 1ST > 10 RDC: CPT | Performed by: SPECIALIST/TECHNOLOGIST, OTHER

## 2024-12-19 PROCEDURE — 25010000002 LIDOCAINE 2% SOLUTION: Performed by: NURSE ANESTHETIST, CERTIFIED REGISTERED

## 2024-12-19 PROCEDURE — 25010000002 ONDANSETRON PER 1 MG: Performed by: NURSE ANESTHETIST, CERTIFIED REGISTERED

## 2024-12-19 PROCEDURE — C9290 INJ, BUPIVACAINE LIPOSOME: HCPCS | Performed by: SURGERY

## 2024-12-19 PROCEDURE — S0260 H&P FOR SURGERY: HCPCS | Performed by: SURGERY

## 2024-12-19 PROCEDURE — 15734 MUSCLE-SKIN GRAFT TRUNK: CPT | Performed by: SPECIALIST/TECHNOLOGIST, OTHER

## 2024-12-19 PROCEDURE — 0HX7XZZ TRANSFER ABDOMEN SKIN, EXTERNAL APPROACH: ICD-10-PCS | Performed by: PLASTIC SURGERY

## 2024-12-19 PROCEDURE — 25810000003 LACTATED RINGERS PER 1000 ML: Performed by: NURSE ANESTHETIST, CERTIFIED REGISTERED

## 2024-12-19 PROCEDURE — 25010000002 SUGAMMADEX 200 MG/2ML SOLUTION: Performed by: NURSE ANESTHETIST, CERTIFIED REGISTERED

## 2024-12-19 PROCEDURE — 25010000002 FENTANYL CITRATE (PF) 50 MCG/ML SOLUTION: Performed by: NURSE ANESTHETIST, CERTIFIED REGISTERED

## 2024-12-19 PROCEDURE — 25810000003 SODIUM CHLORIDE 0.9 % SOLUTION: Performed by: SURGERY

## 2024-12-19 PROCEDURE — 63710000001 ONDANSETRON ODT 4 MG TABLET DISPERSIBLE: Performed by: PLASTIC SURGERY

## 2024-12-19 DEVICE — VIOLET ANTIBACTERIAL POLYDIOXANONE, KNOTLESS TISSUE CONTROL DEVICE
Type: IMPLANTABLE DEVICE | Site: ABDOMEN | Status: FUNCTIONAL
Brand: STRATAFIX

## 2024-12-19 DEVICE — ARISTA AH ABSORBABLE HEMOSTATIC PARTICLES, 3G BELLOWS CONTAINER
Type: IMPLANTABLE DEVICE | Site: ABDOMEN | Status: FUNCTIONAL
Brand: ARISTA

## 2024-12-19 DEVICE — KNOTLESS TISSUE CONTROL DEVICE, UNDYED UNIDIRECTIONAL (ANTIBACTERIAL) SYNTHETIC ABSORBABLE DEVICE
Type: IMPLANTABLE DEVICE | Site: ABDOMEN | Status: FUNCTIONAL
Brand: STRATAFIX

## 2024-12-19 DEVICE — PHASIX MESH, 14" X 14" (35 CM X 35 CM), SQUARE
Type: IMPLANTABLE DEVICE | Site: ABDOMEN | Status: FUNCTIONAL
Brand: PHASIX

## 2024-12-19 DEVICE — CLIP LIGAT VASC HORIZON TI MD/LG GRN 6CT: Type: IMPLANTABLE DEVICE | Site: ABDOMEN | Status: FUNCTIONAL

## 2024-12-19 RX ORDER — PREGABALIN 50 MG/1
50 CAPSULE ORAL EVERY 12 HOURS SCHEDULED
Status: DISCONTINUED | OUTPATIENT
Start: 2024-12-19 | End: 2024-12-24 | Stop reason: HOSPADM

## 2024-12-19 RX ORDER — METHOCARBAMOL 500 MG/1
500 TABLET, FILM COATED ORAL EVERY 8 HOURS PRN
Status: DISCONTINUED | OUTPATIENT
Start: 2024-12-19 | End: 2024-12-24 | Stop reason: HOSPADM

## 2024-12-19 RX ORDER — ATROPINE SULFATE 0.4 MG/ML
0.4 INJECTION, SOLUTION INTRAMUSCULAR; INTRAVENOUS; SUBCUTANEOUS ONCE AS NEEDED
Status: DISCONTINUED | OUTPATIENT
Start: 2024-12-19 | End: 2024-12-19 | Stop reason: HOSPADM

## 2024-12-19 RX ORDER — CELECOXIB 100 MG/1
200 CAPSULE ORAL ONCE
Status: COMPLETED | OUTPATIENT
Start: 2024-12-19 | End: 2024-12-19

## 2024-12-19 RX ORDER — GLYCOPYRROLATE 0.2 MG/ML
INJECTION INTRAMUSCULAR; INTRAVENOUS AS NEEDED
Status: DISCONTINUED | OUTPATIENT
Start: 2024-12-19 | End: 2024-12-19 | Stop reason: SURG

## 2024-12-19 RX ORDER — POLYETHYLENE GLYCOL 3350 17 G/17G
17 POWDER, FOR SOLUTION ORAL DAILY PRN
Status: DISCONTINUED | OUTPATIENT
Start: 2024-12-19 | End: 2024-12-24 | Stop reason: HOSPADM

## 2024-12-19 RX ORDER — HYDROCODONE BITARTRATE AND ACETAMINOPHEN 5; 325 MG/1; MG/1
1 TABLET ORAL ONCE AS NEEDED
Status: DISCONTINUED | OUTPATIENT
Start: 2024-12-19 | End: 2024-12-19 | Stop reason: HOSPADM

## 2024-12-19 RX ORDER — OXYCODONE AND ACETAMINOPHEN 7.5; 325 MG/1; MG/1
1 TABLET ORAL EVERY 4 HOURS PRN
Status: DISCONTINUED | OUTPATIENT
Start: 2024-12-19 | End: 2024-12-19 | Stop reason: HOSPADM

## 2024-12-19 RX ORDER — BISACODYL 5 MG/1
5 TABLET, DELAYED RELEASE ORAL DAILY PRN
Status: DISCONTINUED | OUTPATIENT
Start: 2024-12-19 | End: 2024-12-24 | Stop reason: HOSPADM

## 2024-12-19 RX ORDER — LABETALOL HYDROCHLORIDE 5 MG/ML
5 INJECTION, SOLUTION INTRAVENOUS
Status: DISCONTINUED | OUTPATIENT
Start: 2024-12-19 | End: 2024-12-19 | Stop reason: HOSPADM

## 2024-12-19 RX ORDER — ACETAMINOPHEN 500 MG
1000 TABLET ORAL ONCE
Status: DISCONTINUED | OUTPATIENT
Start: 2024-12-19 | End: 2024-12-19 | Stop reason: HOSPADM

## 2024-12-19 RX ORDER — OXYCODONE HYDROCHLORIDE 5 MG/1
5 TABLET ORAL EVERY 6 HOURS PRN
Status: DISCONTINUED | OUTPATIENT
Start: 2024-12-19 | End: 2024-12-24 | Stop reason: HOSPADM

## 2024-12-19 RX ORDER — CEFOXITIN 2 G/1
INJECTION, POWDER, FOR SOLUTION INTRAVENOUS AS NEEDED
Status: DISCONTINUED | OUTPATIENT
Start: 2024-12-19 | End: 2024-12-19 | Stop reason: SURG

## 2024-12-19 RX ORDER — PANTOPRAZOLE SODIUM 40 MG/1
40 TABLET, DELAYED RELEASE ORAL EVERY MORNING
Status: DISCONTINUED | OUTPATIENT
Start: 2024-12-20 | End: 2024-12-24 | Stop reason: HOSPADM

## 2024-12-19 RX ORDER — ENOXAPARIN SODIUM 100 MG/ML
40 INJECTION SUBCUTANEOUS DAILY
Status: DISCONTINUED | OUTPATIENT
Start: 2024-12-20 | End: 2024-12-24 | Stop reason: HOSPADM

## 2024-12-19 RX ORDER — SODIUM CHLORIDE 9 MG/ML
INJECTION, SOLUTION INTRAVENOUS AS NEEDED
Status: DISCONTINUED | OUTPATIENT
Start: 2024-12-19 | End: 2024-12-19 | Stop reason: HOSPADM

## 2024-12-19 RX ORDER — LIDOCAINE HYDROCHLORIDE 10 MG/ML
0.5 INJECTION, SOLUTION INFILTRATION; PERINEURAL ONCE AS NEEDED
Status: DISCONTINUED | OUTPATIENT
Start: 2024-12-19 | End: 2024-12-19 | Stop reason: HOSPADM

## 2024-12-19 RX ORDER — ONDANSETRON 2 MG/ML
4 INJECTION INTRAMUSCULAR; INTRAVENOUS EVERY 6 HOURS PRN
Status: DISCONTINUED | OUTPATIENT
Start: 2024-12-19 | End: 2024-12-24 | Stop reason: HOSPADM

## 2024-12-19 RX ORDER — CELECOXIB 200 MG/1
400 CAPSULE ORAL ONCE
Status: DISCONTINUED | OUTPATIENT
Start: 2024-12-19 | End: 2024-12-19 | Stop reason: HOSPADM

## 2024-12-19 RX ORDER — SODIUM CHLORIDE 0.9 % (FLUSH) 0.9 %
3-10 SYRINGE (ML) INJECTION AS NEEDED
Status: DISCONTINUED | OUTPATIENT
Start: 2024-12-19 | End: 2024-12-19 | Stop reason: HOSPADM

## 2024-12-19 RX ORDER — NALOXONE HCL 0.4 MG/ML
0.2 VIAL (ML) INJECTION AS NEEDED
Status: DISCONTINUED | OUTPATIENT
Start: 2024-12-19 | End: 2024-12-19 | Stop reason: HOSPADM

## 2024-12-19 RX ORDER — PROMETHAZINE HYDROCHLORIDE 25 MG/1
25 SUPPOSITORY RECTAL ONCE AS NEEDED
Status: DISCONTINUED | OUTPATIENT
Start: 2024-12-19 | End: 2024-12-19 | Stop reason: HOSPADM

## 2024-12-19 RX ORDER — SODIUM CHLORIDE 0.9 % (FLUSH) 0.9 %
3 SYRINGE (ML) INJECTION EVERY 12 HOURS SCHEDULED
Status: DISCONTINUED | OUTPATIENT
Start: 2024-12-19 | End: 2024-12-24 | Stop reason: HOSPADM

## 2024-12-19 RX ORDER — ONDANSETRON 4 MG/1
4 TABLET, ORALLY DISINTEGRATING ORAL EVERY 6 HOURS PRN
Status: DISCONTINUED | OUTPATIENT
Start: 2024-12-19 | End: 2024-12-24 | Stop reason: HOSPADM

## 2024-12-19 RX ORDER — SIMETHICONE 125 MG
125 TABLET,CHEWABLE ORAL EVERY 6 HOURS PRN
Status: DISCONTINUED | OUTPATIENT
Start: 2024-12-19 | End: 2024-12-24 | Stop reason: HOSPADM

## 2024-12-19 RX ORDER — ACETAMINOPHEN 500 MG
1000 TABLET ORAL ONCE
Status: COMPLETED | OUTPATIENT
Start: 2024-12-19 | End: 2024-12-19

## 2024-12-19 RX ORDER — EPHEDRINE SULFATE 50 MG/ML
5 INJECTION, SOLUTION INTRAVENOUS ONCE AS NEEDED
Status: DISCONTINUED | OUTPATIENT
Start: 2024-12-19 | End: 2024-12-19 | Stop reason: HOSPADM

## 2024-12-19 RX ORDER — SODIUM CHLORIDE 9 MG/ML
125 INJECTION, SOLUTION INTRAVENOUS CONTINUOUS
Status: DISCONTINUED | OUTPATIENT
Start: 2024-12-19 | End: 2024-12-21

## 2024-12-19 RX ORDER — HYDROMORPHONE HYDROCHLORIDE 1 MG/ML
0.5 INJECTION, SOLUTION INTRAMUSCULAR; INTRAVENOUS; SUBCUTANEOUS
Status: DISCONTINUED | OUTPATIENT
Start: 2024-12-19 | End: 2024-12-24 | Stop reason: HOSPADM

## 2024-12-19 RX ORDER — LIDOCAINE HYDROCHLORIDE ANHYDROUS AND DEXTROSE MONOHYDRATE 5; 400 G/100ML; MG/100ML
INJECTION, SOLUTION INTRAVENOUS CONTINUOUS PRN
Status: DISCONTINUED | OUTPATIENT
Start: 2024-12-19 | End: 2024-12-19 | Stop reason: SURG

## 2024-12-19 RX ORDER — ONDANSETRON 2 MG/ML
INJECTION INTRAMUSCULAR; INTRAVENOUS AS NEEDED
Status: DISCONTINUED | OUTPATIENT
Start: 2024-12-19 | End: 2024-12-19 | Stop reason: SURG

## 2024-12-19 RX ORDER — PROMETHAZINE HYDROCHLORIDE 25 MG/1
25 TABLET ORAL ONCE AS NEEDED
Status: DISCONTINUED | OUTPATIENT
Start: 2024-12-19 | End: 2024-12-19 | Stop reason: HOSPADM

## 2024-12-19 RX ORDER — FENTANYL CITRATE 50 UG/ML
INJECTION, SOLUTION INTRAMUSCULAR; INTRAVENOUS AS NEEDED
Status: DISCONTINUED | OUTPATIENT
Start: 2024-12-19 | End: 2024-12-19 | Stop reason: SURG

## 2024-12-19 RX ORDER — ONDANSETRON 2 MG/ML
4 INJECTION INTRAMUSCULAR; INTRAVENOUS ONCE AS NEEDED
Status: DISCONTINUED | OUTPATIENT
Start: 2024-12-19 | End: 2024-12-19 | Stop reason: HOSPADM

## 2024-12-19 RX ORDER — AMOXICILLIN 250 MG
2 CAPSULE ORAL 2 TIMES DAILY PRN
Status: DISCONTINUED | OUTPATIENT
Start: 2024-12-19 | End: 2024-12-24 | Stop reason: HOSPADM

## 2024-12-19 RX ORDER — MAGNESIUM HYDROXIDE 1200 MG/15ML
LIQUID ORAL AS NEEDED
Status: DISCONTINUED | OUTPATIENT
Start: 2024-12-19 | End: 2024-12-19 | Stop reason: HOSPADM

## 2024-12-19 RX ORDER — SODIUM CHLORIDE, SODIUM LACTATE, POTASSIUM CHLORIDE, CALCIUM CHLORIDE 600; 310; 30; 20 MG/100ML; MG/100ML; MG/100ML; MG/100ML
INJECTION, SOLUTION INTRAVENOUS CONTINUOUS PRN
Status: DISCONTINUED | OUTPATIENT
Start: 2024-12-19 | End: 2024-12-19 | Stop reason: SURG

## 2024-12-19 RX ORDER — DOCUSATE SODIUM 100 MG/1
100 CAPSULE, LIQUID FILLED ORAL NIGHTLY
Status: DISCONTINUED | OUTPATIENT
Start: 2024-12-19 | End: 2024-12-22

## 2024-12-19 RX ORDER — SODIUM CHLORIDE 9 MG/ML
40 INJECTION, SOLUTION INTRAVENOUS AS NEEDED
Status: DISCONTINUED | OUTPATIENT
Start: 2024-12-19 | End: 2024-12-24 | Stop reason: HOSPADM

## 2024-12-19 RX ORDER — ONDANSETRON 4 MG/1
4 TABLET, ORALLY DISINTEGRATING ORAL EVERY 6 HOURS PRN
Status: DISCONTINUED | OUTPATIENT
Start: 2024-12-19 | End: 2024-12-19 | Stop reason: HOSPADM

## 2024-12-19 RX ORDER — FENTANYL CITRATE 50 UG/ML
50 INJECTION, SOLUTION INTRAMUSCULAR; INTRAVENOUS
Status: DISCONTINUED | OUTPATIENT
Start: 2024-12-19 | End: 2024-12-19 | Stop reason: HOSPADM

## 2024-12-19 RX ORDER — LIDOCAINE HYDROCHLORIDE 20 MG/ML
INJECTION, SOLUTION INFILTRATION; PERINEURAL AS NEEDED
Status: DISCONTINUED | OUTPATIENT
Start: 2024-12-19 | End: 2024-12-19 | Stop reason: SURG

## 2024-12-19 RX ORDER — ROCURONIUM BROMIDE 10 MG/ML
INJECTION, SOLUTION INTRAVENOUS AS NEEDED
Status: DISCONTINUED | OUTPATIENT
Start: 2024-12-19 | End: 2024-12-19 | Stop reason: SURG

## 2024-12-19 RX ORDER — FLUTICASONE PROPIONATE 50 MCG
2 SPRAY, SUSPENSION (ML) NASAL AS NEEDED
Status: DISCONTINUED | OUTPATIENT
Start: 2024-12-19 | End: 2024-12-24 | Stop reason: HOSPADM

## 2024-12-19 RX ORDER — GABAPENTIN 300 MG/1
300 CAPSULE ORAL ONCE
Status: DISCONTINUED | OUTPATIENT
Start: 2024-12-19 | End: 2024-12-19 | Stop reason: HOSPADM

## 2024-12-19 RX ORDER — ACETAMINOPHEN 500 MG
1000 TABLET ORAL EVERY 6 HOURS PRN
Status: DISCONTINUED | OUTPATIENT
Start: 2024-12-19 | End: 2024-12-24 | Stop reason: HOSPADM

## 2024-12-19 RX ORDER — MAGNESIUM SULFATE HEPTAHYDRATE 500 MG/ML
INJECTION, SOLUTION INTRAMUSCULAR; INTRAVENOUS AS NEEDED
Status: DISCONTINUED | OUTPATIENT
Start: 2024-12-19 | End: 2024-12-19 | Stop reason: SURG

## 2024-12-19 RX ORDER — FAMOTIDINE 10 MG/ML
20 INJECTION, SOLUTION INTRAVENOUS ONCE
Status: COMPLETED | OUTPATIENT
Start: 2024-12-19 | End: 2024-12-19

## 2024-12-19 RX ORDER — SODIUM CHLORIDE 0.9 % (FLUSH) 0.9 %
3 SYRINGE (ML) INJECTION EVERY 12 HOURS SCHEDULED
Status: DISCONTINUED | OUTPATIENT
Start: 2024-12-19 | End: 2024-12-19 | Stop reason: HOSPADM

## 2024-12-19 RX ORDER — BISACODYL 10 MG
10 SUPPOSITORY, RECTAL RECTAL DAILY PRN
Status: DISCONTINUED | OUTPATIENT
Start: 2024-12-19 | End: 2024-12-24 | Stop reason: HOSPADM

## 2024-12-19 RX ORDER — HYDROMORPHONE HYDROCHLORIDE 1 MG/ML
0.5 INJECTION, SOLUTION INTRAMUSCULAR; INTRAVENOUS; SUBCUTANEOUS
Status: DISCONTINUED | OUTPATIENT
Start: 2024-12-19 | End: 2024-12-19 | Stop reason: HOSPADM

## 2024-12-19 RX ORDER — OXYCODONE HYDROCHLORIDE 5 MG/1
10 TABLET ORAL ONCE
Status: COMPLETED | OUTPATIENT
Start: 2024-12-19 | End: 2024-12-19

## 2024-12-19 RX ORDER — SODIUM CHLORIDE, SODIUM LACTATE, POTASSIUM CHLORIDE, CALCIUM CHLORIDE 600; 310; 30; 20 MG/100ML; MG/100ML; MG/100ML; MG/100ML
9 INJECTION, SOLUTION INTRAVENOUS CONTINUOUS
Status: DISCONTINUED | OUTPATIENT
Start: 2024-12-19 | End: 2024-12-20

## 2024-12-19 RX ORDER — TRANEXAMIC ACID 100 MG/ML
INJECTION, SOLUTION INTRAVENOUS AS NEEDED
Status: DISCONTINUED | OUTPATIENT
Start: 2024-12-19 | End: 2024-12-19 | Stop reason: SURG

## 2024-12-19 RX ORDER — MIDAZOLAM HYDROCHLORIDE 1 MG/ML
1 INJECTION, SOLUTION INTRAMUSCULAR; INTRAVENOUS
Status: DISCONTINUED | OUTPATIENT
Start: 2024-12-19 | End: 2024-12-19 | Stop reason: HOSPADM

## 2024-12-19 RX ORDER — IPRATROPIUM BROMIDE AND ALBUTEROL SULFATE 2.5; .5 MG/3ML; MG/3ML
3 SOLUTION RESPIRATORY (INHALATION) ONCE AS NEEDED
Status: DISCONTINUED | OUTPATIENT
Start: 2024-12-19 | End: 2024-12-19 | Stop reason: HOSPADM

## 2024-12-19 RX ORDER — SCOLOPAMINE TRANSDERMAL SYSTEM 1 MG/1
1 PATCH, EXTENDED RELEASE TRANSDERMAL CONTINUOUS
Status: DISPENSED | OUTPATIENT
Start: 2024-12-19 | End: 2024-12-22

## 2024-12-19 RX ORDER — FLUMAZENIL 0.1 MG/ML
0.2 INJECTION INTRAVENOUS AS NEEDED
Status: DISCONTINUED | OUTPATIENT
Start: 2024-12-19 | End: 2024-12-19 | Stop reason: HOSPADM

## 2024-12-19 RX ORDER — SODIUM CHLORIDE 0.9 % (FLUSH) 0.9 %
3-10 SYRINGE (ML) INJECTION AS NEEDED
Status: DISCONTINUED | OUTPATIENT
Start: 2024-12-19 | End: 2024-12-24 | Stop reason: HOSPADM

## 2024-12-19 RX ORDER — GABAPENTIN 300 MG/1
600 CAPSULE ORAL ONCE
Status: COMPLETED | OUTPATIENT
Start: 2024-12-19 | End: 2024-12-19

## 2024-12-19 RX ORDER — EPHEDRINE SULFATE 50 MG/ML
INJECTION INTRAVENOUS AS NEEDED
Status: DISCONTINUED | OUTPATIENT
Start: 2024-12-19 | End: 2024-12-19 | Stop reason: SURG

## 2024-12-19 RX ORDER — DEXAMETHASONE SODIUM PHOSPHATE 4 MG/ML
INJECTION, SOLUTION INTRA-ARTICULAR; INTRALESIONAL; INTRAMUSCULAR; INTRAVENOUS; SOFT TISSUE AS NEEDED
Status: DISCONTINUED | OUTPATIENT
Start: 2024-12-19 | End: 2024-12-19 | Stop reason: SURG

## 2024-12-19 RX ORDER — FENTANYL CITRATE 50 UG/ML
50 INJECTION, SOLUTION INTRAMUSCULAR; INTRAVENOUS ONCE AS NEEDED
Status: DISCONTINUED | OUTPATIENT
Start: 2024-12-19 | End: 2024-12-19 | Stop reason: HOSPADM

## 2024-12-19 RX ORDER — TRAZODONE HYDROCHLORIDE 50 MG/1
50 TABLET, FILM COATED ORAL NIGHTLY PRN
Status: DISCONTINUED | OUTPATIENT
Start: 2024-12-19 | End: 2024-12-24 | Stop reason: HOSPADM

## 2024-12-19 RX ORDER — SODIUM CHLORIDE, SODIUM LACTATE, POTASSIUM CHLORIDE, CALCIUM CHLORIDE 600; 310; 30; 20 MG/100ML; MG/100ML; MG/100ML; MG/100ML
125 INJECTION, SOLUTION INTRAVENOUS CONTINUOUS
Status: DISCONTINUED | OUTPATIENT
Start: 2024-12-19 | End: 2024-12-20

## 2024-12-19 RX ORDER — DIPHENHYDRAMINE HYDROCHLORIDE 50 MG/ML
12.5 INJECTION INTRAMUSCULAR; INTRAVENOUS
Status: DISCONTINUED | OUTPATIENT
Start: 2024-12-19 | End: 2024-12-19 | Stop reason: HOSPADM

## 2024-12-19 RX ORDER — MIDAZOLAM HYDROCHLORIDE 1 MG/ML
0.5 INJECTION, SOLUTION INTRAMUSCULAR; INTRAVENOUS
Status: DISCONTINUED | OUTPATIENT
Start: 2024-12-19 | End: 2024-12-19 | Stop reason: HOSPADM

## 2024-12-19 RX ORDER — HYDRALAZINE HYDROCHLORIDE 20 MG/ML
5 INJECTION INTRAMUSCULAR; INTRAVENOUS
Status: DISCONTINUED | OUTPATIENT
Start: 2024-12-19 | End: 2024-12-19 | Stop reason: HOSPADM

## 2024-12-19 RX ORDER — AMLODIPINE BESYLATE 10 MG/1
10 TABLET ORAL DAILY
Status: DISCONTINUED | OUTPATIENT
Start: 2024-12-20 | End: 2024-12-24 | Stop reason: HOSPADM

## 2024-12-19 RX ORDER — PROPOFOL 10 MG/ML
VIAL (ML) INTRAVENOUS AS NEEDED
Status: DISCONTINUED | OUTPATIENT
Start: 2024-12-19 | End: 2024-12-19 | Stop reason: SURG

## 2024-12-19 RX ORDER — PHENYLEPHRINE HCL IN 0.9% NACL 1 MG/10 ML
SYRINGE (ML) INTRAVENOUS AS NEEDED
Status: DISCONTINUED | OUTPATIENT
Start: 2024-12-19 | End: 2024-12-19 | Stop reason: SURG

## 2024-12-19 RX ADMIN — SODIUM CHLORIDE, SODIUM LACTATE, POTASSIUM CHLORIDE, CALCIUM CHLORIDE 9 ML/HR: 20; 30; 600; 310 INJECTION, SOLUTION INTRAVENOUS at 08:49

## 2024-12-19 RX ADMIN — FENTANYL CITRATE 25 MCG: 50 INJECTION, SOLUTION INTRAMUSCULAR; INTRAVENOUS at 15:12

## 2024-12-19 RX ADMIN — DOCUSATE SODIUM 100 MG: 100 CAPSULE, LIQUID FILLED ORAL at 22:02

## 2024-12-19 RX ADMIN — Medication 200 MCG: at 09:59

## 2024-12-19 RX ADMIN — SUGAMMADEX 400 MG: 100 INJECTION, SOLUTION INTRAVENOUS at 15:12

## 2024-12-19 RX ADMIN — LIDOCAINE HYDROCHLORIDE 100 MG: 20 INJECTION, SOLUTION INFILTRATION; PERINEURAL at 09:33

## 2024-12-19 RX ADMIN — OXYCODONE HYDROCHLORIDE 5 MG: 5 TABLET ORAL at 22:01

## 2024-12-19 RX ADMIN — MAGNESIUM SULFATE HEPTAHYDRATE 2 G: 500 INJECTION, SOLUTION INTRAMUSCULAR; INTRAVENOUS at 10:09

## 2024-12-19 RX ADMIN — ROCURONIUM BROMIDE 10 MG: 10 INJECTION, SOLUTION INTRAVENOUS at 13:54

## 2024-12-19 RX ADMIN — PHENYLEPHRINE HYDROCHLORIDE 0.25 MCG/KG/MIN: 10 INJECTION, SOLUTION INTRAVENOUS at 13:48

## 2024-12-19 RX ADMIN — DEXAMETHASONE SODIUM PHOSPHATE 8 MG: 4 INJECTION, SOLUTION INTRA-ARTICULAR; INTRALESIONAL; INTRAMUSCULAR; INTRAVENOUS; SOFT TISSUE at 09:44

## 2024-12-19 RX ADMIN — Medication 200 MCG: at 13:44

## 2024-12-19 RX ADMIN — GLYCOPYRROLATE 0.1 MG: 0.2 INJECTION INTRAMUSCULAR; INTRAVENOUS at 09:44

## 2024-12-19 RX ADMIN — ROCURONIUM BROMIDE 20 MG: 10 INJECTION, SOLUTION INTRAVENOUS at 10:07

## 2024-12-19 RX ADMIN — ROCURONIUM BROMIDE 50 MG: 10 INJECTION, SOLUTION INTRAVENOUS at 09:33

## 2024-12-19 RX ADMIN — ONDANSETRON 4 MG: 2 INJECTION INTRAMUSCULAR; INTRAVENOUS at 15:12

## 2024-12-19 RX ADMIN — Medication 100 MCG: at 11:59

## 2024-12-19 RX ADMIN — Medication 100 MCG: at 13:15

## 2024-12-19 RX ADMIN — SODIUM CHLORIDE, POTASSIUM CHLORIDE, SODIUM LACTATE AND CALCIUM CHLORIDE: 600; 310; 30; 20 INJECTION, SOLUTION INTRAVENOUS at 09:23

## 2024-12-19 RX ADMIN — CEFOXITIN SODIUM 2 G: 2 POWDER, FOR SOLUTION INTRAVENOUS at 11:18

## 2024-12-19 RX ADMIN — Medication 3 ML: at 22:02

## 2024-12-19 RX ADMIN — SODIUM CHLORIDE, POTASSIUM CHLORIDE, SODIUM LACTATE AND CALCIUM CHLORIDE: 600; 310; 30; 20 INJECTION, SOLUTION INTRAVENOUS at 13:42

## 2024-12-19 RX ADMIN — Medication 200 MCG: at 12:34

## 2024-12-19 RX ADMIN — ROCURONIUM BROMIDE 10 MG: 10 INJECTION, SOLUTION INTRAVENOUS at 12:03

## 2024-12-19 RX ADMIN — ONDANSETRON 4 MG: 4 TABLET, ORALLY DISINTEGRATING ORAL at 08:50

## 2024-12-19 RX ADMIN — Medication 100 MCG: at 12:29

## 2024-12-19 RX ADMIN — Medication 200 MCG: at 10:30

## 2024-12-19 RX ADMIN — HYDROMORPHONE HYDROCHLORIDE 0.5 MG: 1 INJECTION, SOLUTION INTRAMUSCULAR; INTRAVENOUS; SUBCUTANEOUS at 11:14

## 2024-12-19 RX ADMIN — HYDROMORPHONE HYDROCHLORIDE 0.5 MG: 1 INJECTION, SOLUTION INTRAMUSCULAR; INTRAVENOUS; SUBCUTANEOUS at 10:39

## 2024-12-19 RX ADMIN — ROCURONIUM BROMIDE 10 MG: 10 INJECTION, SOLUTION INTRAVENOUS at 11:02

## 2024-12-19 RX ADMIN — SODIUM CHLORIDE, POTASSIUM CHLORIDE, SODIUM LACTATE AND CALCIUM CHLORIDE: 600; 310; 30; 20 INJECTION, SOLUTION INTRAVENOUS at 11:26

## 2024-12-19 RX ADMIN — Medication 200 MCG: at 13:05

## 2024-12-19 RX ADMIN — GABAPENTIN 600 MG: 300 CAPSULE ORAL at 08:57

## 2024-12-19 RX ADMIN — FENTANYL CITRATE 25 MCG: 50 INJECTION, SOLUTION INTRAMUSCULAR; INTRAVENOUS at 09:33

## 2024-12-19 RX ADMIN — PROPOFOL 150 MG: 10 INJECTION, EMULSION INTRAVENOUS at 09:33

## 2024-12-19 RX ADMIN — EPHEDRINE SULFATE 5 MG: 50 INJECTION INTRAVENOUS at 13:15

## 2024-12-19 RX ADMIN — OXYCODONE HYDROCHLORIDE 10 MG: 5 TABLET ORAL at 08:50

## 2024-12-19 RX ADMIN — ROCURONIUM BROMIDE 20 MG: 10 INJECTION, SOLUTION INTRAVENOUS at 10:29

## 2024-12-19 RX ADMIN — Medication 100 MCG: at 09:50

## 2024-12-19 RX ADMIN — CEFOXITIN SODIUM 2000 MG: 2 POWDER, FOR SOLUTION INTRAVENOUS at 09:18

## 2024-12-19 RX ADMIN — EPHEDRINE SULFATE 5 MG: 50 INJECTION INTRAVENOUS at 13:19

## 2024-12-19 RX ADMIN — ACETAMINOPHEN 1000 MG: 500 TABLET, FILM COATED ORAL at 08:50

## 2024-12-19 RX ADMIN — ROCURONIUM BROMIDE 10 MG: 10 INJECTION, SOLUTION INTRAVENOUS at 12:38

## 2024-12-19 RX ADMIN — CELECOXIB 200 MG: 200 CAPSULE ORAL at 08:51

## 2024-12-19 RX ADMIN — SCOPOLAMINE 1 PATCH: 1.5 PATCH, EXTENDED RELEASE TRANSDERMAL at 08:49

## 2024-12-19 RX ADMIN — PROPOFOL 50 MCG/KG/MIN: 10 INJECTION, EMULSION INTRAVENOUS at 09:46

## 2024-12-19 RX ADMIN — CEFOXITIN SODIUM 2 G: 2 POWDER, FOR SOLUTION INTRAVENOUS at 13:18

## 2024-12-19 RX ADMIN — SODIUM CHLORIDE 100 ML/HR: 9 INJECTION, SOLUTION INTRAVENOUS at 19:11

## 2024-12-19 RX ADMIN — CEFOXITIN SODIUM 2 G: 2 POWDER, FOR SOLUTION INTRAVENOUS at 15:18

## 2024-12-19 RX ADMIN — FAMOTIDINE 20 MG: 10 INJECTION INTRAVENOUS at 08:48

## 2024-12-19 RX ADMIN — ROCURONIUM BROMIDE 10 MG: 10 INJECTION, SOLUTION INTRAVENOUS at 11:16

## 2024-12-19 RX ADMIN — LIDOCAINE HYDROCHLORIDE 1.5 MG/KG/HR: 4 INJECTION, SOLUTION INTRAVENOUS at 09:46

## 2024-12-19 RX ADMIN — TRANEXAMIC ACID 1000 MG: 1 INJECTION, SOLUTION INTRAVENOUS at 14:25

## 2024-12-19 RX ADMIN — Medication 200 MCG: at 13:31

## 2024-12-19 RX ADMIN — PREGABALIN 50 MG: 50 CAPSULE ORAL at 22:01

## 2024-12-19 RX ADMIN — ROCURONIUM BROMIDE 20 MG: 10 INJECTION, SOLUTION INTRAVENOUS at 11:35

## 2024-12-19 NOTE — OP NOTE
Date of procedure: 12/19/2024    Primary Surgeon: Dr. Peña    Assistant: Halie Hardin CSA dosing charge for retraction, exposure    Procedure performed:   -Open incisional hernia repair 14 x 17 cm with mesh placement  -Myofascial cutaneous flap release right side (retrorectus and transversus abdominis muscle)  -Myofascial cutaneous flap release left side (retrorectus and transversus abdominis muscle)    Anesthesia: General    EBL: 500 cc    Complications: None    Findings: Large incisional hernia, redundant skin and subcutaneous tissue in the upper abdomen requiring intraoperative plastic surgery consultation for excess skin treatment    Implants: 35 x 35 cm phasix mesh    Condition of the patient: Stable    Indications: 64-year-old female with history of multiple abdominal operations.  She had developed a large incisional hernia with loss of domain.  We discussed with the patient about treatment options and I recommend that she undergoes open incisional hernia repair with bilateral component separation.  Risk and benefits of procedure including bleeding, infection, wound complications, chronic pain, mesh infection, hernia recurrence discussed in detail with the patient that verbalized understanding and agreed with the plan.  Patient has decided to undergo abdominoplasty by Dr. Saab that we will share the case with me.    Description: Patient was taken to operative room where he was placed in the OR table in the supine position.  Preoperative antibiotics were given and SCDs were placed.  Patient underwent general endotracheal anesthesia.  Patient abdomen was then prepped and draped in usual sterile fashion.  Timeout was performed the patient was correctly identified.  Half percent Marcaine with epinephrine was injected over the midline.  With scalpel an incision was performed from the epigastric to the infraumbilical area.  Dissection was carried into the subcutaneous tissue and abdominal wall fascia.   Abdominal cavity was entered.  Upon exploration of the abdominal cavity there was no evidence of injury from the procedure.  There were adhesions from the omentum to the right posterior abdominal wall.  There were adhesions from small bowel to the epigastric area.  Sharp and blunt dissection of intra-abdominal adhesions was performed with blunt dissection and electrocoagulation.  I was able to free the abdominal wall from adhesions.  The patient had a very large incisional hernia.  I started creating retrorectus flap over the left side of the abdomen.  This was done by transecting the posterior rectus sheath 5 mm lateral to the linea alba.  Retrorectus sheath was dissected from rectus muscle superiorly and inferiorly.  Preperitoneal space was then entered in the lower abdomen and dissection was performed from the peritoneum to the space of Retzius.  There was no evidence of inguinal hernia, round ligament was transected in between clips.  I then proceeded to start the transversus abdominis muscle release initially from the inferior portion where the arcuate ligament was recognized and dissected from the transversus abdominis fascia.  The posterior lamella of the internal oblique muscle was transected and dissection continue all the way to the mid aspect of the abdominal wall.  I then proceeded to transect the posterior lamella of the internal oblique superiorly and transversus abdominis muscle was found.  This was transected.  Dissection of the transversus abdominis fascia from the transversus abdominis muscle was performed laterally all the way to the psoas muscle, superiorly to the level of the diaphragm.  Transection of the fascia superiorly was medialized so to have access to the subxiphoid space.  Happy with the dissection I proceeded to continue with the component separation over the right side.  Posterior rectus sheath was transected 5 mm lateral to the linea alba.  Dissection from the posterior rectus sheath  to the muscle was performed bluntly and with sharp dissection.  There were adhesions from the posterior rectus sheath to the muscle and dissection was a little bit tedious on the superior aspect of it.  Proximal posterior lamella of internal oblique muscle was transected and transversus abdominis muscle was then transected and  from the transversus abdominis fascia.  This was performed superiorly and  from the diaphragmatic fibers that were preserved.  I then continued the transection of the fascia medially to allow correction of the subxiphoid space on the right and left side.  I then dissected peritoneal attachments from the space of Retzius and from the inguinal canal.  There was no evidence of right inguinal hernia.  Round ligament was dissected circumferentially and transected between clips.  I then continued separation of the transverse abdominal fascia from the internal oblique.  Transection of the posterior sheath of the internal oblique muscle was performed and separation of the transversus abdominis muscle was continued superiorly until both sides of the component separation met at the middle of the abdominal wall.  Dissection continue all the way laterally to the psoas muscle, superiorly to the fibers of the diaphragm and inferiorly to the space of Retzius.  I then explored the abdominal cavity there was no evidence of injury from the procedure.  I proceeded to close the posterior rectus sheath with running 2-0 Vicryl suture.  Instrument sponge count were correct prior to closing the posterior rectus sheath.  There was minimal tension at the closure.  Several rents of the peritoneum were closed with interrupted 2-0 Vicryl suture.  I then brought to the operative field a 35 x 35 cm phasic's mesh that was placed over the posterior rectus sheath in a matt-shaped configuration.  The mesh was secured inferiorly with 2-0 PDS x 2 to the Jack's ligament and superiorly to the subxiphoid  area.  Irrigation performed there was no evidence of bleeding.  Cesar hemostatic agent was placed over the surgical bed.  Tisseel was sprayed over the mesh.  219 Polish David drains were placed through the abdominal wall fascia superiorly on the right and the left in place over the mesh.  I then proceeded to close subcutaneous flaps to allow closure of the anterior rectus sheath.  This was done bilaterally.  Hernia sacs were dissected and sent for pathological analysis.  Then the abdominal wall was closed with running #1 PDS from the superior and inferior portion of the wounds.  The abdominal wall closed with moderate tension.  Drains were secured in place with 2-0 nylon.  Patient had redundant skin flaps over the upper abdomen and I consulted Dr. Saab about performing abdominoplasty that will be added to his planned procedure.  He assessed the skin flap and he decided he will proceed with abdominoplasty in the upper abdomen also.  At this moment instrument sponge count were correct.  Dr. Saab then continue with the abdominoplasty and panniculectomy.    Eladio Peña MD, FACS  General, Minimally Invasive and Endoscopic Surgery  [facility] [address], [address]  [place], [place], [address]  P: [phone]  F: [phone]

## 2024-12-19 NOTE — H&P
Allergies  Reviewed Allergies  Medications  Reviewed Medications  amLODIPine 10 mg tablet  TAKE 1 TABLET BY MOUTH EVERY DAY  05/21/24   filled surescripts  amLODIPine 5 mg tablet  TAKE 1 TABLET BY MOUTH EVERY DAY  12/22/23   filled surescripts  azithromycin 250 mg tablet  TAKE 1 TABLET BY MOUTH AS DIRECTED TAKE 2 TABLETS TODAY THEN 1 DAILY  06/10/24   filled surescripts  clobetasoL 0.05 % topical ointment  PLEASE SEE ATTACHED FOR DETAILED DIRECTIONS  10/14/23   filled surescripts  gabapentin 300 mg capsule  TAKE 1 CAPSULE BY MOUTH 2 (TWO) TIMES A DAY FOR 14 DAYS.  12/22/23   filled surescripts  methylPREDNISolone 4 mg tablets in a dose pack  TAKE 6 TABLETS ON DAY 1 AS DIRECTED ON PACKAGE AND DECREASE BY 1 TAB EACH DAY FOR A TOTAL OF 6 DAYS  06/10/24   filled surescripts  ondansetron 4 mg disintegrating tablet  04/08/24   filled surescripts  oxyCODONE 5 mg tablet  PLEASE SEE ATTACHED FOR DETAILED DIRECTIONS  12/22/23   filled surescripts  pantoprazole 40 mg tablet,delayed release  TAKE 1 TABLET BY MOUTH EVERY DAY IN THE MORNING  04/30/24   filled surescripts  promethazine-DM 6.25 mg-15 mg/5 mL oral syrup  TAKE 5 MILLILITERS BY MOUTH EVERY 4 TO 6 HOURS AS NEEDED FOR COUGH  06/10/24   filled surescripts  topiramate 25 mg tablet  04/08/24   filled surescripts  Problems  None recorded.  GYN History  (not configured)  Screening  None recorded.  HPI  Pleasant [age]yo lady with concerns of abominal skin excess. Does have history of abdominal surgery and significant bulge or hernia and interested in correction of this at the same time.  ROS  Patient reports abdominal pain but reports no nausea and no vomiting. She reports no fever, no night sweats, no significant weight gain, and no significant weight loss. She reports no dry eyes and no vision change. She reports no difficulty hearing. She reports no frequent nosebleeds. She reports no sore throat and no bleeding gums. She reports no chest pain, no arm pain on exertion, and  no shortness of breath when walking. She reports no cough, no wheezing, and no shortness of breath. She reports no muscle aches and no muscle weakness. She reports no abnormal mole and no non-healing areas. She reports no gait dysfunction and no paralysis. She reports no dementia and no delirium.  Physical Exam  Chaperone: Chaperone: present.    Constitutional: General Appearance: healthy-appearing, well-nourished, and well-developed. Level of Distress: NAD. Ambulation: ambulating normally.    Psychiatric: Mental Status: normal mood and affect and active and alert.    Head: Head: normocephalic and atraumatic.    Eyes: Lids and Conjunctivae: non-injected. Pupils: PERRLA. EOM: EOMI.    Cardiovascular: Heart Auscultation: RRR.    Abdomen: Bowel Sounds: ventral incison and hernia, gr2 panniculus.    Musculoskeletal:: Motor Strength and Tone: normal tone and motor strength. Joints, Bones, and Muscles: normal movement of all extremities. Extremities: no cyanosis or edema.    Neurologic: Gait and Station: normal gait and station.    Skin: Inspection and palpation: no rash or lesions.  Assessment / Plan  Discussed panniculectomy at the time of her hernia repair.  Understands the goal is removal of the lower abdominal skin and that the upper abdomen may still have some laxity.  Discussed risks of loss of umbilicus, wound healing, need for additiaonl surgery, bleedidng, infection.

## 2024-12-19 NOTE — PLAN OF CARE
Goal Outcome Evaluation:              Outcome Evaluation: Pt. A/O x4. O2 at 2 liters n/c. Abd dressing w/o drainage noted. Abd bindser over wound. 4 NEW drains to abd wall. SCD's to BLE. Pt. denies c/o pain.

## 2024-12-19 NOTE — H&P
CC: Incisional hernia follow-up     HPI: Patient is a very pleasant 63-year-old female that is here today for incisional hernia repair.  She has seen Dr. Saab that would perform abdominoplasty at the same time.     Patient has history of sigmoid colonic stricture and underwent robotic converted to open sigmoid colon resection with colostomy that was done by Dr. Gaston in 2/3/2023.  Postoperatively, she have intra-abdominal abscess that was drained twice by IR.   She eventually underwent robotic colostomy takedown on 2023 by Dr. Gaston     PMH: Hypertension, obesity, colitis, nausea and vomiting, diverticulitis, colonic stricture     PSH:   -[place] teeth extraction  -Left salpingo-oophorectomy  -Left breast lumpectomy  -   -Open hysterectomy   -Laparoscopic gastric banding   -Colonoscopy   -Colposcopy   -Laparoscopic sleeve gastrectomy   -Colonoscopy and upper endoscopy   -Robotic converted to open sigmoid colectomy with colostomy 2023.  Dr. Gaston  -Robotic assisted laparoscopic colostomy takedown 2023.  Dr. Gaston     MEDS: Flonase, trazodone, amlodipine, clobetasol, Protonix, vitamin D3, biotin, multivitamins, Tylenol, Linzess     ALL: Sulfa antibiotic     FH and SH: Family history is not on contributory.  Denies smoking drinking or taking any drugs     ROS:   As per HPI     PE:   Body mass index is 35.54 kg/m².   Alert and oriented ×3, no acute distress.  Head is normocephalic and atraumatic.  Neck is supple there is no thyromegaly or lymphadenopathy  Chest is clear bilaterally there is no added sounds  Regular rate and rhythm, no murmurs  Abdomen is soft and nontender.  There is a large incisional hernia over the abdomen, worse in the upper abdomen.  Multiple well-healed scars  No clubbing cyanosis or edema in lower or upper extremities     Diagnostic studies:   CT scan of the abdomen and pelvis that was performed on 2024 show evidence of very large incisional  hernia with separation of the rectus muscle of approximately 15 cm over the upper abdomen 12 cm over the mid abdomen.  There is a very small rectus muscle superiorly bilaterally.  The rectus muscle on the left side at the mid abdomen is almost completely gone.  There is no evidence of incarceration strangulation.  There is history of prior gastric sleeve, prior sigmoid colonic anastomosis     Operative report from 2/3/2023 was reviewed.  There were adhesions from small bowel to the sigmoid colon and a tedious dissection at the surgical area in the left lower quadrant but there is no mentions of large amount of adhesions preventing the completion of the procedure robotically.  The procedure was finally converted to open due to capsular tear of the spleen.     Operative report from 12/27/2023 was reviewed colostomy takedown was achieved robotically without complication.  No description of dense intra-abdominal adhesions.     Labs 12/12/2024: MRSA screen normal, potassium 3.4, normal CBC     Assessment and plan     The patient is a very pleasant 63-year-old female with multiple abdominal operations that have left her with very large incisional hernia with loss of the mind.  She is interested in incisional hernia repair.  I offered her open incisional hernia repair with bilateral component separation and mesh placement.  She is interested in undergoing abdominoplasty that will be done at the same time by Dr. Saab.  Discussed with the patient about risk and benefits of the procedure that include bleeding, infection, mesh infection, chronic pain, hernia recurrence that can be up to approximately 20% after repair, wound complications, need for wound vacs, chronic antibiotic use.  She understand that she will need to have several drains after surgery that will sequentially be removed.  Pain control will try to be with nonnarcotic pain medication.  Will encourage early ambulation.  Patient to continue to lose weight  prior to undergoing surgical repair.     Patient verbalized understanding and agreed with the plan.

## 2024-12-19 NOTE — PERIOPERATIVE NURSING NOTE
Patient has duplicate med orders from Dr Peña and Dr Wermerling---verified from Dr Wermerling patient should receive Celebrex 200mg and Neurontin 600mg

## 2024-12-19 NOTE — ANESTHESIA POSTPROCEDURE EVALUATION
Patient: Hali Tejeda    Procedure Summary       Date: 12/19/24 Room / Location: [facility] OR [address] [place] [address] MAIN OR    Anesthesia Start: 0923 Anesthesia Stop: 1539    Procedures:       PANNICULECTOMY and adjacent tissue rearrangement (Abdomen)      open INCISIONAL HERNIA REPAIR with mesh and bilateral component separation (Abdomen) Diagnosis:     Surgeons: Del Saab MD; Eladio Peña MD Provider: Matthew Washburn MD    Anesthesia Type: general ASA Status: 2            Anesthesia Type: general    Vitals  Vitals Value Taken Time   /76 12/19/24 1730   Temp 37 °C (98.6 °F) 12/19/24 1700   Pulse 84 12/19/24 1736   Resp 16 12/19/24 1730   SpO2 99 % 12/19/24 1736   Vitals shown include unfiled device data.        Post Anesthesia Care and Evaluation    Patient location during evaluation: PACU  Patient participation: complete - patient participated  Level of consciousness: awake  Pain management: adequate    Airway patency: patent  Anesthetic complications: No anesthetic complications  PONV Status: none  Cardiovascular status: acceptable  Respiratory status: acceptable  Hydration status: acceptable

## 2024-12-19 NOTE — OP NOTE
Pre-Operative Diagnosis: Abdominal lipodystrophy, skin defect following abdominal hernia repair    Post-Operative Diagnosis: Same    Procedure Performed:   1.  Abdominal panniculectomy  2.  Abdominal skin adjacent tissue rearrangement 20 x 20 cm    Surgeon: DENZEL Saab MD    Assistant: Anna Caceres PA-C  Please note Ms. Caceres's assistance was critical to the successful outcome of the case.  She was the first and only assistant and no other qualified assistance was available.  She directly assisted with flap mobilization and identification of vascular pedicle, hemostasis, immaculate wound closure.    Anesthesia: General    Estimated Blood Loss:  20    Specimens: None    Complications: None    Indications: She presented referral Dr. Peña after discussing repair of her very large ventral hernia.  She also had significant lower abdominal skin excess.  In order appropriately.  Hernia large skin defect was expected with significant undermining along with her symptomatic lower abdominal panniculus which had been refractory to conservative management.  We discussed panniculectomy and then intraoperatively the large amount of undermining required mobilization of some surrounding skin to fill the defect and eliminate the dead space.    We discussed risks, benefits and alternatives including but not limited to: bleeding, infection, asymmetry, poor or slow wound healing, need for further surgery, possible recurrence.  The patient elected to proceed.    Description of Procedure: The patient was met in the preoperative holding area.  All questions were answered and informed consent was assured.      She was marked in standing position and pinch test of the lower abdomen was performed to approximate panniculectomy skin excision.  She was transferred to the operating room placed supine on the table with arms secured and padded.    After induction of appropriate anesthesia, a timeout was performed correctly identifying the  patient, operative site, and procedure to be performed.  All present were in agreement.    Landmarks were confirmed with Dr. Peña and the planned excision of the midline attenuated scar to gain access to the hernia sac.  After completion of the hernia repair there is a large midline defect and significant lateral undermining with skin that likely would be left devascularize without mobilization of healthy tissue into the defect.  The midline incision was extended inferiorly into the excess lower abdominal skin unable to be then mobilized toward the abdominal midline creating 2 random pattern skin flaps measuring 10 x 20 cm each.  The midline wound was copiously irrigated and immaculate hemostasis was achieved.  The flaps were then advanced with progressive tension sutures toward the midline with 2-0 PDS anchoring the skin flaps to the abdominal wall and then in the midline a running #1 strata fix suture used to reapproximate Jay Jay's fascia and anchoring this down to the abdominal wall over a drain.    Panniculectomy was then performed with incision made in the lower abdomen just above the flexion crease.  It was beveled superiorly until reaching the abdominal wall.  The pinch test was then performed again and marks adjusted to the appropriate level tension after the mobilization of the midline wound.  This upper incision was then incised and then carried down to the abdominal wall meeting the inferior dissection.  This wound was then copiously irrigated and immaculate hemostasis was achieved.  Drain was placed.  Closure was performed of the Jay Jay layer fascia with a running #1 strata fix suture.  The remaining skin closure was performed with deep dermal closure with 3-0 Monocryl and INSORB staples, 3-0 Monocryl strata fix in the intracuticular.  Incisions dressed with Steri-Strips and she was placed in a well-padded abdominal binder.     The patient was then aroused from anesthesia with ease and transferred to  the postoperative care area in good condition. All sponge, needle, and instrument counts were correct.

## 2024-12-19 NOTE — ANESTHESIA PREPROCEDURE EVALUATION
Anesthesia Evaluation     Patient summary reviewed and Nursing notes reviewed   NPO Solid Status: > 8 hours  NPO Liquid Status: > 2 hours           Airway   Mallampati: II  TM distance: >3 FB  Neck ROM: full  Dental      Pulmonary - negative pulmonary ROS   Cardiovascular     ECG reviewed  Rhythm: regular  Rate: normal    (+) hypertension, hyperlipidemia      Neuro/Psych- negative ROS  GI/Hepatic/Renal/Endo    (+) obesity, hiatal hernia, liver disease    Musculoskeletal     Abdominal    Substance History   (+) alcohol use     OB/GYN negative ob/gyn ROS         Other   arthritis,                       Anesthesia Plan    ASA 2     general     intravenous induction     Anesthetic plan, risks, benefits, and alternatives have been provided, discussed and informed consent has been obtained with: patient.    Plan discussed with CRNA.      CODE STATUS:

## 2024-12-19 NOTE — ANESTHESIA PROCEDURE NOTES
Airway  Urgency: elective    Date/Time: 12/19/2024 9:37 AM  Airway not difficult    General Information and Staff    Patient location during procedure: OR    Indications and Patient Condition  Indications for airway management: airway protection    Preoxygenated: yes  Mask difficulty assessment: 2 - vent by mask + OA or adjuvant +/- NMBA    Final Airway Details  Final airway type: endotracheal airway      Successful airway: ETT  Cuffed: yes   Successful intubation technique: direct laryngoscopy  Facilitating devices/methods: intubating stylet  Endotracheal tube insertion site: oral  Blade: Zenaida  Blade size: 3  ETT size (mm): 7.0  Cormack-Lehane Classification: grade I - full view of glottis  Placement verified by: chest auscultation and capnometry   Measured from: lips  ETT/EBT  to lips (cm): 21  Number of attempts at approach: 1  Assessment: lips, teeth, and gum same as pre-op and atraumatic intubation

## 2024-12-20 PROBLEM — K43.2 INCISIONAL HERNIA: Status: ACTIVE | Noted: 2024-12-20

## 2024-12-20 LAB
ANION GAP SERPL CALCULATED.3IONS-SCNC: 8.7 MMOL/L (ref 5–15)
BASOPHILS # BLD AUTO: 0.06 10*3/MM3 (ref 0–0.2)
BASOPHILS NFR BLD AUTO: 0.3 % (ref 0–1.5)
BUN SERPL-MCNC: 17 MG/DL (ref 8–23)
BUN/CREAT SERPL: 10.9 (ref 7–25)
CALCIUM SPEC-SCNC: 8.4 MG/DL (ref 8.6–10.5)
CHLORIDE SERPL-SCNC: 105 MMOL/L (ref 98–107)
CO2 SERPL-SCNC: 20.3 MMOL/L (ref 22–29)
CREAT SERPL-MCNC: 1.56 MG/DL (ref 0.57–1)
DEPRECATED RDW RBC AUTO: 41.9 FL (ref 37–54)
EGFRCR SERPLBLD CKD-EPI 2021: 36.7 ML/MIN/1.73
EOSINOPHIL # BLD AUTO: 0 10*3/MM3 (ref 0–0.4)
EOSINOPHIL NFR BLD AUTO: 0 % (ref 0.3–6.2)
ERYTHROCYTE [DISTWIDTH] IN BLOOD BY AUTOMATED COUNT: 13.5 % (ref 12.3–15.4)
GLUCOSE SERPL-MCNC: 129 MG/DL (ref 65–99)
HCT VFR BLD AUTO: 33 % (ref 34–46.6)
HGB BLD-MCNC: 10.9 G/DL (ref 12–15.9)
IMM GRANULOCYTES # BLD AUTO: 0.14 10*3/MM3 (ref 0–0.05)
IMM GRANULOCYTES NFR BLD AUTO: 0.7 % (ref 0–0.5)
LYMPHOCYTES # BLD AUTO: 1.75 10*3/MM3 (ref 0.7–3.1)
LYMPHOCYTES NFR BLD AUTO: 8.5 % (ref 19.6–45.3)
MCH RBC QN AUTO: 28.3 PG (ref 26.6–33)
MCHC RBC AUTO-ENTMCNC: 33 G/DL (ref 31.5–35.7)
MCV RBC AUTO: 85.7 FL (ref 79–97)
MONOCYTES # BLD AUTO: 1.67 10*3/MM3 (ref 0.1–0.9)
MONOCYTES NFR BLD AUTO: 8.1 % (ref 5–12)
NEUTROPHILS NFR BLD AUTO: 17.09 10*3/MM3 (ref 1.7–7)
NEUTROPHILS NFR BLD AUTO: 82.4 % (ref 42.7–76)
NRBC BLD AUTO-RTO: 0 /100 WBC (ref 0–0.2)
PLATELET # BLD AUTO: 277 10*3/MM3 (ref 140–450)
PMV BLD AUTO: 10.2 FL (ref 6–12)
POTASSIUM SERPL-SCNC: 4.3 MMOL/L (ref 3.5–5.2)
RBC # BLD AUTO: 3.85 10*6/MM3 (ref 3.77–5.28)
SODIUM SERPL-SCNC: 134 MMOL/L (ref 136–145)
WBC NRBC COR # BLD AUTO: 20.71 10*3/MM3 (ref 3.4–10.8)

## 2024-12-20 PROCEDURE — 80048 BASIC METABOLIC PNL TOTAL CA: CPT | Performed by: SURGERY

## 2024-12-20 PROCEDURE — 85025 COMPLETE CBC W/AUTO DIFF WBC: CPT | Performed by: SURGERY

## 2024-12-20 PROCEDURE — 25010000002 ENOXAPARIN PER 10 MG: Performed by: SURGERY

## 2024-12-20 PROCEDURE — 99024 POSTOP FOLLOW-UP VISIT: CPT | Performed by: SURGERY

## 2024-12-20 PROCEDURE — 25810000003 SODIUM CHLORIDE 0.9 % SOLUTION: Performed by: SURGERY

## 2024-12-20 RX ADMIN — SODIUM CHLORIDE 125 ML/HR: 9 INJECTION, SOLUTION INTRAVENOUS at 22:33

## 2024-12-20 RX ADMIN — OXYCODONE HYDROCHLORIDE 5 MG: 5 TABLET ORAL at 04:41

## 2024-12-20 RX ADMIN — PREGABALIN 50 MG: 50 CAPSULE ORAL at 09:41

## 2024-12-20 RX ADMIN — METHOCARBAMOL TABLETS 500 MG: 500 TABLET, COATED ORAL at 04:41

## 2024-12-20 RX ADMIN — PANTOPRAZOLE SODIUM 40 MG: 40 TABLET, DELAYED RELEASE ORAL at 04:41

## 2024-12-20 RX ADMIN — ENOXAPARIN SODIUM 40 MG: 100 INJECTION SUBCUTANEOUS at 09:41

## 2024-12-20 RX ADMIN — AMLODIPINE BESYLATE 10 MG: 10 TABLET ORAL at 09:41

## 2024-12-20 RX ADMIN — Medication 3 ML: at 20:06

## 2024-12-20 RX ADMIN — SODIUM CHLORIDE 125 ML/HR: 9 INJECTION, SOLUTION INTRAVENOUS at 14:46

## 2024-12-20 RX ADMIN — OXYCODONE HYDROCHLORIDE 5 MG: 5 TABLET ORAL at 22:31

## 2024-12-20 RX ADMIN — PREGABALIN 50 MG: 50 CAPSULE ORAL at 20:06

## 2024-12-20 RX ADMIN — DOCUSATE SODIUM 100 MG: 100 CAPSULE, LIQUID FILLED ORAL at 20:06

## 2024-12-20 RX ADMIN — SENNOSIDES AND DOCUSATE SODIUM 2 TABLET: 50; 8.6 TABLET ORAL at 09:41

## 2024-12-20 RX ADMIN — SODIUM CHLORIDE 1000 ML: 9 INJECTION, SOLUTION INTRAVENOUS at 12:21

## 2024-12-20 RX ADMIN — OXYCODONE HYDROCHLORIDE 5 MG: 5 TABLET ORAL at 10:56

## 2024-12-20 NOTE — PROGRESS NOTES
Discharge Planning Assessment  [facility]     Patient Name: Hali Tejeda  MRN: [id number]  Today's Date: 12/20/2024    Admit Date: 12/19/2024    Plan: Home no needs   Discharge Needs Assessment       Row Name 12/20/24 1218       Living Environment    People in Home alone    Current Living Arrangements home    Primary Care Provided by self    Provides Primary Care For no one    Family Caregiver if Needed other relative(s);grandchild(paige), adult    Quality of Family Relationships helpful;involved;supportive    Able to Return to Prior Arrangements yes       Resource/Environmental Concerns    Resource/Environmental Concerns none       Transition Planning    Patient/Family Anticipates Transition to home    Patient/Family Anticipated Services at Transition none    Transportation Anticipated family or friend will provide       Discharge Needs Assessment    Equipment Currently Used at Home walker, rolling;commode    Concerns to be Addressed no discharge needs identified    Equipment Needed After Discharge none    Provided Post Acute Provider List? N/A    N/A Provider List Comment Denies needs. Plan is home                   Discharge Plan       Row Name 12/20/24 1219       Plan    Plan Home no needs    Patient/Family in Agreement with Plan yes    Plan Comments IMM noted. Introduced self and role of CCP. Facesheet verified. Patient lives alone in a house. Stated she is independent with ADL's and continues to drive. Has a walker and BSC if needed. Has used home health in the past and belives it was with [facility].  Has never been to a rehab facility.  [place] plan is to [place] to [place]. Stated her mother will be coming to stay with her for a few days at [place]. Stated her family will assist as needed and will provide transportation at [place]. Denies any needs/equipment.                  Continued Care and Services - Admitted Since 12/19/2024    No active coordination exists for this encounter.          Demographic Summary        Row Name 12/20/24 1216       General Information    Admission Type inpatient    Arrived From home    Required Notices Provided Important Message from Medicare    Reason for Consult discharge planning    Preferred Language English                   Functional Status       Row Name 12/20/24 1216       Functional Status    Usual Activity Tolerance good    Current Activity Tolerance moderate       Functional Status, IADL    Medications independent    Meal Preparation independent    Housekeeping independent    Laundry independent    Shopping independent       Mental Status    General Appearance WDL WDL                   Psychosocial       Row Name 12/20/24 1217       Values/Beliefs    Spiritual, Cultural Beliefs, Sabianist Practices, Values that Affect Care no       Behavior WDL    Behavior WDL WDL       Emotion Mood WDL    Emotion/Mood/Affect WDL WDL       Speech WDL    Speech WDL WDL       Perceptual State WDL    Perceptual State WDL WDL       Coping/Stress    Sources of Support adult child(paige);other family members    Reaction to Health Status adjusting    Understanding of Condition and Treatment adequate understanding of treatment       Developmental Stage (Eriksson's Stages of Development)    Developmental Stage Stage 7 (35-65 years/Middle Adulthood) Generativity vs. Stagnation                   Abuse/Neglect       Row Name 12/20/24 1217       Personal Safety    Feels Unsafe at Home or Work/School no    Feels Threatened by Someone no    Does Anyone Try to Keep You From Having Contact with Others or Doing Things Outside Your Home? no    Physical Signs of Abuse Present no               Dayanna Diaz, RN

## 2024-12-20 NOTE — PLAN OF CARE
Goal Outcome Evaluation:  Plan of Care Reviewed With: patient        Progress: improving  Outcome Evaluation: vss, oxycodone for pain, up with assist, ambulated in the hallway, tolerating clear liquid diet, encourage to increase fluid intake and to use incentive spirometer, stark to BSD, abdominal binder in place with 4 maida's, continue to monitor the pt.

## 2024-12-20 NOTE — PROGRESS NOTES
Postoperative day 1 status post incisional hernia repair with bilateral component separation, mesh placement, panniculectomy and abdominal adjacent tissue rearrangement    S: No events overnight.  Patient feels well.  Low urine output this morning.  Denies any nausea or vomiting.  She has been able to tolerate clear liquid diet.    O:   Vitals:    12/20/24 0430 12/20/24 0922 12/20/24 1335 12/20/24 1733   BP: 93/64 152/65 112/73 145/83   BP Location: Right arm Right arm Left arm Left arm   Patient Position: Lying Lying Sitting Lying   Pulse: 79 99 81 94   Resp: 16 16 16 16   Temp: 99.5 °F (37.5 °C) 99 °F (37.2 °C) 97.3 °F (36.3 °C) 99.3 °F (37.4 °C)   TempSrc: Oral Oral Oral Oral   SpO2: 97% 93% 96% 92%   Weight:       Height:          David drain:  Drains in between 5 and 45 cc.  1 drain with 130 cc, all drains are serosanguineous    Alert, no acute distress  Breathing comfortable  Regular rate rhythm  Abdomen soft, probably tender, nondistended, incisions with dressing clean dry and intact    White blood cell count 20,000, hemoglobin 10.9 from 13 preop, platelets within normal limits, sodium 134, CO2 20.3, creatinine 1.5 from 0.8, glucose 129, calcium 8.4, otherwise normal labs    Assessment and plan    Postoperative day 1 s/p incisional hernia repair with bilateral component separation, mesh placement, panniculectomy and abdominal adjacent tissue rearrangement.  Patient with postoperative acute kidney injury likely related to dehydration.  She is otherwise doing extremely well, leukocytosis as expected.  Slightly drop of hemoglobin as expected postop.  There is no signs of active bleeding other than small amount of blood and the lower drains.    Continue normal saline 125 cc, bolus liter of fluid now  Advance to full liquid diet  SCDs, continue Lovenox  Physical therapy, out of bed to chair and ambulate  Continue drain care    She understood

## 2024-12-20 NOTE — PROGRESS NOTES
SUBJECTIVE:   She is doing well, has tolerated some p.o. intake with clear liquids.  She has been up and walking a little bit and is now sitting in a chair.  No particular concerns.    OBJECTIVE:  Vitals:    12/20/24 1335   BP: 112/73   Pulse: 81   Resp: 16   Temp: 97.3 °F (36.3 °C)   SpO2: 96%     Physical Exam  Incisions clean dry and intact, no evidence of edge necrosis, no ecchymosis, drains starting to thin out most then serosanguineous drainage.  No palpable fluid collection  PLAN:  Postop day 1 large ventral hernia repair and panniculectomy and abdominal adjacent tissue rearrangement.  -She is doing well from plastic surgery standpoint  - Antibiotics per general surgery  -Okay to ambulate ad clara. another activity can increase per general surgery plans  - ok to change dry dressings as needed, keep abdominal binder in place  -We discussed her drains may stay in a couple of weeks, she has to follow-up in my office on 1220

## 2024-12-21 LAB
ANION GAP SERPL CALCULATED.3IONS-SCNC: 7.8 MMOL/L (ref 5–15)
BASOPHILS # BLD AUTO: 0.04 10*3/MM3 (ref 0–0.2)
BASOPHILS NFR BLD AUTO: 0.2 % (ref 0–1.5)
BUN SERPL-MCNC: 22 MG/DL (ref 8–23)
BUN/CREAT SERPL: 22.2 (ref 7–25)
CALCIUM SPEC-SCNC: 8 MG/DL (ref 8.6–10.5)
CHLORIDE SERPL-SCNC: 107 MMOL/L (ref 98–107)
CO2 SERPL-SCNC: 19.2 MMOL/L (ref 22–29)
CREAT SERPL-MCNC: 0.99 MG/DL (ref 0.57–1)
CYTO UR: NORMAL
DEPRECATED RDW RBC AUTO: 42.2 FL (ref 37–54)
EGFRCR SERPLBLD CKD-EPI 2021: 63.4 ML/MIN/1.73
EOSINOPHIL # BLD AUTO: 0.15 10*3/MM3 (ref 0–0.4)
EOSINOPHIL NFR BLD AUTO: 0.9 % (ref 0.3–6.2)
ERYTHROCYTE [DISTWIDTH] IN BLOOD BY AUTOMATED COUNT: 13.1 % (ref 12.3–15.4)
GLUCOSE SERPL-MCNC: 99 MG/DL (ref 65–99)
HCT VFR BLD AUTO: 29.2 % (ref 34–46.6)
HGB BLD-MCNC: 9.4 G/DL (ref 12–15.9)
IMM GRANULOCYTES # BLD AUTO: 0.14 10*3/MM3 (ref 0–0.05)
IMM GRANULOCYTES NFR BLD AUTO: 0.8 % (ref 0–0.5)
LAB AP CASE REPORT: NORMAL
LYMPHOCYTES # BLD AUTO: 2.58 10*3/MM3 (ref 0.7–3.1)
LYMPHOCYTES NFR BLD AUTO: 15.1 % (ref 19.6–45.3)
MCH RBC QN AUTO: 28.4 PG (ref 26.6–33)
MCHC RBC AUTO-ENTMCNC: 32.2 G/DL (ref 31.5–35.7)
MCV RBC AUTO: 88.2 FL (ref 79–97)
MONOCYTES # BLD AUTO: 1.42 10*3/MM3 (ref 0.1–0.9)
MONOCYTES NFR BLD AUTO: 8.3 % (ref 5–12)
NEUTROPHILS NFR BLD AUTO: 12.71 10*3/MM3 (ref 1.7–7)
NEUTROPHILS NFR BLD AUTO: 74.7 % (ref 42.7–76)
NRBC BLD AUTO-RTO: 0 /100 WBC (ref 0–0.2)
PATH REPORT.FINAL DX SPEC: NORMAL
PATH REPORT.GROSS SPEC: NORMAL
PLATELET # BLD AUTO: 223 10*3/MM3 (ref 140–450)
PMV BLD AUTO: 10.2 FL (ref 6–12)
POTASSIUM SERPL-SCNC: 4 MMOL/L (ref 3.5–5.2)
RBC # BLD AUTO: 3.31 10*6/MM3 (ref 3.77–5.28)
SODIUM SERPL-SCNC: 134 MMOL/L (ref 136–145)
WBC NRBC COR # BLD AUTO: 17.04 10*3/MM3 (ref 3.4–10.8)

## 2024-12-21 PROCEDURE — 99024 POSTOP FOLLOW-UP VISIT: CPT | Performed by: STUDENT IN AN ORGANIZED HEALTH CARE EDUCATION/TRAINING PROGRAM

## 2024-12-21 PROCEDURE — 63710000001 ONDANSETRON ODT 4 MG TABLET DISPERSIBLE: Performed by: SURGERY

## 2024-12-21 PROCEDURE — 25010000002 ENOXAPARIN PER 10 MG: Performed by: SURGERY

## 2024-12-21 PROCEDURE — G0378 HOSPITAL OBSERVATION PER HR: HCPCS

## 2024-12-21 PROCEDURE — 85025 COMPLETE CBC W/AUTO DIFF WBC: CPT | Performed by: SURGERY

## 2024-12-21 PROCEDURE — 97161 PT EVAL LOW COMPLEX 20 MIN: CPT

## 2024-12-21 PROCEDURE — 80048 BASIC METABOLIC PNL TOTAL CA: CPT | Performed by: SURGERY

## 2024-12-21 RX ADMIN — ENOXAPARIN SODIUM 40 MG: 100 INJECTION SUBCUTANEOUS at 09:18

## 2024-12-21 RX ADMIN — MAGNESIUM HYDROXIDE 15 ML: 1200 LIQUID ORAL at 20:40

## 2024-12-21 RX ADMIN — MAGNESIUM HYDROXIDE 15 ML: 1200 LIQUID ORAL at 16:47

## 2024-12-21 RX ADMIN — Medication 3 ML: at 20:40

## 2024-12-21 RX ADMIN — AMLODIPINE BESYLATE 10 MG: 10 TABLET ORAL at 09:18

## 2024-12-21 RX ADMIN — DOCUSATE SODIUM 100 MG: 100 CAPSULE, LIQUID FILLED ORAL at 20:40

## 2024-12-21 RX ADMIN — OXYCODONE HYDROCHLORIDE 5 MG: 5 TABLET ORAL at 20:40

## 2024-12-21 RX ADMIN — PREGABALIN 50 MG: 50 CAPSULE ORAL at 09:18

## 2024-12-21 RX ADMIN — ONDANSETRON 4 MG: 4 TABLET, ORALLY DISINTEGRATING ORAL at 23:36

## 2024-12-21 RX ADMIN — PANTOPRAZOLE SODIUM 40 MG: 40 TABLET, DELAYED RELEASE ORAL at 06:17

## 2024-12-21 RX ADMIN — OXYCODONE HYDROCHLORIDE 5 MG: 5 TABLET ORAL at 06:17

## 2024-12-21 RX ADMIN — SODIUM CHLORIDE 125 ML/HR: 9 INJECTION, SOLUTION INTRAVENOUS at 06:31

## 2024-12-21 RX ADMIN — PREGABALIN 50 MG: 50 CAPSULE ORAL at 20:40

## 2024-12-21 NOTE — PLAN OF CARE
Goal Outcome Evaluation:  Plan of Care Reviewed With: patient        Progress: improving  Outcome Evaluation: Went over the plan of care and answered all questions. Vitals stable and pain is well controlled. Patient is up with assitance and tolerating her diet. Parrish removed and patient voiding. No other issues this shift.

## 2024-12-21 NOTE — PLAN OF CARE
Goal Outcome Evaluation:  Plan of Care Reviewed With: patient        Progress: improving  Outcome Evaluation: Pt. has been up in the chair for most of the day, Pain is well controlled. UOP has finally increased after a 1000 cc fluid bolus. NEW drains with minimal amts. of seroussangiouness drainage. Using IS q 2 hours. IVF's infusing NS at 125 cc per hour.

## 2024-12-21 NOTE — PLAN OF CARE
Goal Outcome Evaluation:  Plan of Care Reviewed With: patient           Outcome Evaluation: VSS, oxycodone for pain control, walked unit, F/C remains. care performed, NEW drains with serosangious drainage, repositioning in bed, bed remains flexed, IVF infusing, ab dressing CDI, ab binder in place,  encouraging use of IS, will continue to monitor.

## 2024-12-21 NOTE — THERAPY EVALUATION
Patient Name: Hali Tejeda  : 1960    MRN: [id number]                              Today's Date: 2024       Admit Date: 2024    Visit Dx:     ICD-10-CM ICD-9-CM   1. Incisional hernia, without obstruction or gangrene  K43.2 553.21   2. Hernia of abdominal wall  K43.9 553.20     Patient Active Problem List   Diagnosis    Left sided colitis with complication    Colitis    Nausea and vomiting    Stricture of sigmoid colon    Uterine anomaly    Hypertension    Avulsion fracture of distal fibula    Screening for malignant neoplasm of colon    Partial small bowel obstruction    Diverticulitis    Partial bowel obstruction    Colonic stricture    Obesity (BMI 30.0-34.9)    Incisional hernia     Past Medical History:   Diagnosis Date    Abnormal EKG 2020    Arthritis     right knee    Avulsion fracture of distal fibula 2015    Excess skin of abdomen     GERD (gastroesophageal reflux disease)     H. pylori infection 2020    Hepatic steatosis 2020    Hiatal hernia     History of transfusion     no reaction    HTN (hypertension)     Hyperlipidemia     Insomnia     Left sided colitis with complication 2023    ADMITTED TO [facility]    LGSIL on Pap smear of cervix     (+) HPV    LGSIL Pap smear of vagina 2016    Nausea and vomiting 2023    Snoring     Stricture of sigmoid colon 2023    Vaginal atrophy     Ventral hernia      Past Surgical History:   Procedure Laterality Date    BREAST BIOPSY      Left breast    BREAST LUMPECTOMY Left     benign     SECTION N/A     COLON RESECTION N/A 2023    Procedure: COLON RESECTION LAPAROSCOPIC CONVERTED TO OPEN SIGMOID AND LEFT MOBILIZATION OF SPLENIC FLEXURE WITH DAVINCI ROBOT;  Surgeon: Lino Gaston MD;  Location: [facility];  Service: Robotics - DaVinci;  Laterality: N/A;    COLON RESECTION N/A 2023    Procedure: Colostomy takedown LAPAROSCOPIC WITH DAVINCI ROBOT;  Surgeon: Lino Gaston MD;   Location: [facility] OR;  Service: Robotics - DaVinci;  Laterality: N/A;    COLONOSCOPY N/A 12/31/2014    COULD ONLY GET SCOPE TO 70 CM DUE TO SIGNIFICANT STRICTURE SECONDARY TO SEVERE DIVERTICULITIS OR CANCER, ACBE ORDERED, DR. PARAG HUDSON AT [facility]    COLONOSCOPY N/A 02/01/2023    MODERATE STENOSIS IN SIGMOID-DILATED, MANY DIVERTICULA IN SIGMOID AND DESCENDING, COPIOUS AMTS OF STOOL, MUCOSAL TEAR 55 CM FROM ANAL VERGE, DR. JENNI SMITH AT [facility]    COLONOSCOPY N/A 11/06/2023    ENTIRE COLON WNL, RESCOPE IN 10 YRS, DR. JESSI GRANADO AT [facility]    COLPOSCOPY N/A 03/04/2016    ENDOSCOPY N/A 02/01/2023    GASTRITIS, SMALL HIATAL HERNIA, DR. JENNI SMITH AT [facility]    ENDOSCOPY N/A 09/15/2009    DR. PARAG HUDSON AT [facility]    GASTRIC SLEEVE LAPAROSCOPIC N/A 07/09/2020    WITH REMOVAL OF GASTRIC BAND, DR. OLIMPIA PALACIOS AT  [facility]    HYSTERECTOMY N/A 01/2005    WITH RSO    LAPAROSCOPIC GASTRIC BANDING N/A 10/20/2009    DR. PARAG HUDSON AT [facility]    PANNICULECTOMY N/A 12/19/2024    Procedure: PANNICULECTOMY and adjacent tissue rearrangement;  Surgeon: Del Saab MD;  Location: [facility];  Service: Plastics;  Laterality: N/A;    SALPINGO OOPHORECTOMY Left     LSO, IN TEEN YEARS    VENTRAL/INCISIONAL HERNIA REPAIR N/A 12/19/2024    Procedure: open INCISIONAL HERNIA REPAIR with mesh and bilateral component separation;  Surgeon: Eladio Peña MD;  Location: [facility];  Service: General;  Laterality: N/A;    WISDOM TOOTH EXTRACTION Bilateral       General Information       Row Name 12/21/24 6401          Physical Therapy Time and Intention    Document Type evaluation  -SV     Mode of Treatment individual therapy;physical therapy  -SV       Row Name 12/21/24 105          General Information    Patient Profile Reviewed yes  -SV     Prior Level of Function independent:  -SV     Existing Precautions/Restrictions fall;oxygen therapy device and L/min  -SV     Barriers to Rehab none identified  -SV        Row Name 12/21/24 1053          Living Environment    People in Home alone  -SV       Row Name 12/21/24 1053          Home Main Entrance    Number of Stairs, Main Entrance two  -SV       Row Name 12/21/24 1053          Cognition    Orientation Status (Cognition) oriented x 4  -SV       Row Name 12/21/24 1053          Safety Issues/Impairments Affecting Functional Mobility    Impairments Affecting Function (Mobility) balance;endurance/activity tolerance;shortness of breath;pain  -SV               User Key  (r) = Recorded By, (t) = Taken By, (c) = Cosigned By      Initials Name Provider Type     Nano Abel, PT Physical Therapist                   Mobility       Row Name 12/21/24 1200          Bed Mobility    Bed Mobility supine-sit  -SV     Supine-Sit Pender (Bed Mobility) standby assist;contact guard  -     Assistive Device (Bed Mobility) head of bed elevated;bed rails  -       Row Name 12/21/24 1200          Sit-Stand Transfer    Sit-Stand Pender (Transfers) contact guard;standby assist  -     Assistive Device (Sit-Stand Transfers) walker, front-wheeled  -SV       Row Name 12/21/24 1200          Gait/Stairs (Locomotion)    Pender Level (Gait) contact guard  -     Assistive Device (Gait) walker, front-wheeled  -SV     Distance in Feet (Gait) 200  -SV     Comment, (Gait/Stairs) desat  to 88% on RA  -SV               User Key  (r) = Recorded By, (t) = Taken By, (c) = Cosigned By      Initials Name Provider Type    SV Nano Abel, PT Physical Therapist                   Obj/Interventions       Row Name 12/21/24 1201          Range of Motion Comprehensive    General Range of Motion no range of motion deficits identified  -       Row Name 12/21/24 1201          Strength Comprehensive (MMT)    General Manual Muscle Testing (MMT) Assessment no strength deficits identified  -SV     Comment, General Manual Muscle Testing (MMT) Assessment obs> 3/5 with mobility x 4 ext  -SV       Row  Name 12/21/24 1201          Balance    Balance Assessment sitting static balance;standing static balance  -SV     Static Sitting Balance supervision  -SV     Static Standing Balance standby assist  -SV     Position/Device Used, Standing Balance walker, rolling  -SV       Row Name 12/21/24 1201          Sensory Assessment (Somatosensory)    Sensory Assessment (Somatosensory) not tested  -SV               User Key  (r) = Recorded By, (t) = Taken By, (c) = Cosigned By      Initials Name Provider Type    SV Nano Abel, PT Physical Therapist                   Goals/Plan       Row Name 12/21/24 1203          Gait Training Goal 1 (PT)    Activity/Assistive Device (Gait Training Goal 1, PT) gait (walking locomotion)  -SV     Buckner Level (Gait Training Goal 1, PT) independent  -SV     Distance (Gait Training Goal 1, PT) 200' with least vs no AD  -SV     Time Frame (Gait Training Goal 1, PT) 1 week  -SV       Row Name 12/21/24 1203          Therapy Assessment/Plan (PT)    Planned Therapy Interventions (PT) gait training;patient/family education;strengthening;transfer training  -SV               User Key  (r) = Recorded By, (t) = Taken By, (c) = Cosigned By      Initials Name Provider Type    SV Nano Abel, PT Physical Therapist                   Clinical Impression       Row Name 12/21/24 1202          Pain    Pretreatment Pain Rating 5/10  -SV     Pain Location abdomen  -SV       Row Name 12/21/24 1202          Plan of Care Review    Plan of Care Reviewed With patient  -SV       Row Name 12/21/24 1202          Therapy Assessment/Plan (PT)    Rehab Potential (PT) good  -SV     Criteria for Skilled Interventions Met (PT) yes  -SV     Therapy Frequency (PT) 5 times/wk  -SV     Predicted Duration of Therapy Intervention (PT) 1 week  -SV       Row Name 12/21/24 1202          Vital Signs    O2 Delivery Pre Treatment room air  -SV     Intra SpO2 (%) 88  -SV     O2 Delivery Intra Treatment room air  -SV     Post  SpO2 (%) 90  -SV     Pre Patient Position Supine  -SV     Intra Patient Position Standing  -SV     Post Patient Position Sitting  -SV       Row Name 12/21/24 1202          Positioning and Restraints    Pre-Treatment Position in bed  -SV     Post Treatment Position chair  -SV     In Chair notified nsg;call light within reach;encouraged to call for assist;exit alarm on;reclined  -SV               User Key  (r) = Recorded By, (t) = Taken By, (c) = Cosigned By      Initials Name Provider Type     Nano Abel, PT Physical Therapist                   Outcome Measures       Row Name 12/21/24 1203 12/21/24 0800       How much help from another person do you currently need...    Turning from your back to your side while in flat bed without using bedrails? 4  -SV 3  -RM    Moving from lying on back to sitting on the side of a flat bed without bedrails? 3  -SV 3  -RM    Moving to and from a bed to a chair (including a wheelchair)? 3  -SV 3  -RM    Standing up from a chair using your arms (e.g., wheelchair, bedside chair)? 3  -SV 3  -RM    Climbing 3-5 steps with a railing? 3  -SV 3  -RM    To walk in hospital room? 3  -SV 3  -RM    AM-PAC 6 Clicks Score (PT) 19  -SV 18  -RM              User Key  (r) = Recorded By, (t) = Taken By, (c) = Cosigned By      Initials Name Provider Type    Nano Roland, PT Physical Therapist    Yelena Lala RN Registered Nurse                                 Physical Therapy Education       Title: PT OT SLP Therapies (In Progress)       Topic: Physical Therapy (In Progress)       Point: Mobility training (In Progress)       Learning Progress Summary            Patient Acceptance, E, NR by  at 12/21/2024 1204                      Point: Home exercise program (Not Started)       Learner Progress:  Not documented in this visit.              Point: Body mechanics (Not Started)       Learner Progress:  Not documented in this visit.              Point: Precautions (Not Started)        Learner Progress:  Not documented in this visit.                              User Key       Initials Effective Dates Name Provider Type Discipline    SV 07/11/23 -  Nano Abel, PT Physical Therapist PT                  PT Recommendation and Plan  Planned Therapy Interventions (PT): gait training, patient/family education, strengthening, transfer training        Time Calculation:         PT Charges       Row Name 12/21/24 1207             Time Calculation    Start Time 1053  -SV      Stop Time 1112  -SV      Time Calculation (min) 19 min  -SV      PT Received On 12/21/24  -      PT - Next Appointment 12/23/24  -      PT Goal Re-Cert Due Date 12/28/24  -                User Key  (r) = Recorded By, (t) = Taken By, (c) = Cosigned By      Initials Name Provider Type    SV Nano Abel, PT Physical Therapist                  Therapy Charges for Today       Code Description Service Date Service Provider Modifiers Qty    09561638697 HC PT EVAL LOW COMPLEXITY 2 12/21/2024 Nano Abel, PT GP 1            PT G-Codes  AM-PAC 6 Clicks Score (PT): 19  PT Discharge Summary  Anticipated Discharge Disposition (PT): home    Nano Abel PT  12/21/2024

## 2024-12-21 NOTE — PLAN OF CARE
Goal Outcome Evaluation:  Plan of Care Reviewed With: patient            Pt admit after hernia repair with panniculectomy. PLOF indep all aspects. She lives alone with 2 KELLE. Her mother will be staying with her at d/c. Pt moved to eob with sba HOB elevated and bed rail use. STS to rwx with cga. She amb 200' with cga . She appeared soa with desat noted at 88% upon return to recliner. She did not improve to > 90% and was placed on 2 L. RN notified. Pt demonstrated unsteady gait and impaired endurance. She will likely benefit from cont skilled PT to address deficits. She may need rwx or bsc at d/c.     Anticipated Discharge Disposition (PT): home

## 2024-12-21 NOTE — PROGRESS NOTES
"General Surgery Progress Note    Chief complaint: Postop day 2 incisional hernia repair with bilateral TAR, mesh placement by Dr. Peña, and panniculectomy, abdominal tissue rearrangement by Dr. Saab    Interval history: Patient states she is feeling okay today.  She denies any nausea or vomiting.  Her pain is adequately controlled.  She has been walking and out of bed.  Her Stark catheter is still in place.  She has not had any bowel movement or passed any gas.    Vital signs:  /72 (BP Location: Left arm, Patient Position: Lying)   Pulse 87   Temp 98.3 °F (36.8 °C) (Oral)   Resp 16   Ht 156.2 cm (61.5\")   Wt 87.1 kg (192 lb)   LMP  (LMP Unknown)   SpO2 94%   BMI 35.69 kg/m²     Intake/output:  Intake & Output (last day)         12/20 0701  12/21 0700 12/21 0701  12/22 0700    P.O. 240 340    I.V. (mL/kg) 2955.6 (33.9)     Total Intake(mL/kg) 3195.6 (36.7) 340 (3.9)    Urine (mL/kg/hr) 1425 (0.7)     Drains 310     Blood      Total Output 1735     Net +1460.6 +340                  Physical exam:  NEURO: awake, alert, no gross focal deficits, sitting up in chair  PSYCH: interactive, cooperative  PULM: normal respirations on room air  CV: well perfused, no peripheral edema  GI: abdomen soft, appropriately tender, incisions c/d with iodoform gauze and fluffs dressings with abdominal binder in place; all four drains with serosanguinous fluid, only a scant amount in drains 2 and 3 (lower drains)  : stark draining clear yellow urine    Diagnostics:  Results from last 7 days   Lab Units 12/21/24  0336 12/20/24  0537   WBC 10*3/mm3 17.04* 20.71*   HEMOGLOBIN g/dL 9.4* 10.9*   HEMATOCRIT % 29.2* 33.0*   PLATELETS 10*3/mm3 223 277     Results from last 7 days   Lab Units 12/21/24  0337 12/20/24  0537   SODIUM mmol/L 134* 134*   POTASSIUM mmol/L 4.0 4.3   CHLORIDE mmol/L 107 105   CO2 mmol/L 19.2* 20.3*   BUN mg/dL 22 17   CREATININE mg/dL 0.99 1.56*   CALCIUM mg/dL 8.0* 8.4*   GLUCOSE mg/dL 99 129*       "   Assessment:  Postop day 2 incisional hernia repair with bilateral TAR, mesh placement by Dr. Peña, and panniculectomy, abdominal tissue rearrangement by Dr. Katiana SANCHEZ. Tolerating diet without nausea. No gas/BM yet.  Drains with serosanguinous output. Incisions healing in good order. Pain controlled.    Plan:  DC stark.  Continue GI low irritant diet. Discontinue IV fluids.  Add milk of magnesia BID. Monitor bowel function.  Out of bed, ambulate, IS.   Continue abdominal binder.  Drains 2 and 3 (lower drains) to stay in place per Dr. Saab.  Upper drains to remain until output less than 30cc/day.    Hailey Swift MD  General, Robotic and Endoscopic Surgery  [facility]    [address], [address]  [place], [place], [address]  P: [phone]  F: [phone]

## 2024-12-22 LAB
ANION GAP SERPL CALCULATED.3IONS-SCNC: 6 MMOL/L (ref 5–15)
BASOPHILS # BLD AUTO: 0.03 10*3/MM3 (ref 0–0.2)
BASOPHILS NFR BLD AUTO: 0.2 % (ref 0–1.5)
BUN SERPL-MCNC: 13 MG/DL (ref 8–23)
BUN/CREAT SERPL: 16.9 (ref 7–25)
CALCIUM SPEC-SCNC: 8.2 MG/DL (ref 8.6–10.5)
CHLORIDE SERPL-SCNC: 112 MMOL/L (ref 98–107)
CO2 SERPL-SCNC: 21 MMOL/L (ref 22–29)
CREAT SERPL-MCNC: 0.77 MG/DL (ref 0.57–1)
DEPRECATED RDW RBC AUTO: 39.8 FL (ref 37–54)
EGFRCR SERPLBLD CKD-EPI 2021: 85.7 ML/MIN/1.73
EOSINOPHIL # BLD AUTO: 0.19 10*3/MM3 (ref 0–0.4)
EOSINOPHIL NFR BLD AUTO: 1.3 % (ref 0.3–6.2)
ERYTHROCYTE [DISTWIDTH] IN BLOOD BY AUTOMATED COUNT: 13 % (ref 12.3–15.4)
GLUCOSE SERPL-MCNC: 147 MG/DL (ref 65–99)
HCT VFR BLD AUTO: 28.7 % (ref 34–46.6)
HGB BLD-MCNC: 9.4 G/DL (ref 12–15.9)
IMM GRANULOCYTES # BLD AUTO: 0.13 10*3/MM3 (ref 0–0.05)
IMM GRANULOCYTES NFR BLD AUTO: 0.9 % (ref 0–0.5)
LYMPHOCYTES # BLD AUTO: 1.53 10*3/MM3 (ref 0.7–3.1)
LYMPHOCYTES NFR BLD AUTO: 10.5 % (ref 19.6–45.3)
MCH RBC QN AUTO: 28.1 PG (ref 26.6–33)
MCHC RBC AUTO-ENTMCNC: 32.8 G/DL (ref 31.5–35.7)
MCV RBC AUTO: 85.7 FL (ref 79–97)
MONOCYTES # BLD AUTO: 1 10*3/MM3 (ref 0.1–0.9)
MONOCYTES NFR BLD AUTO: 6.8 % (ref 5–12)
NEUTROPHILS NFR BLD AUTO: 11.75 10*3/MM3 (ref 1.7–7)
NEUTROPHILS NFR BLD AUTO: 80.3 % (ref 42.7–76)
NRBC BLD AUTO-RTO: 0 /100 WBC (ref 0–0.2)
PLATELET # BLD AUTO: 242 10*3/MM3 (ref 140–450)
PMV BLD AUTO: 9.9 FL (ref 6–12)
POTASSIUM SERPL-SCNC: 3.7 MMOL/L (ref 3.5–5.2)
RBC # BLD AUTO: 3.35 10*6/MM3 (ref 3.77–5.28)
SODIUM SERPL-SCNC: 139 MMOL/L (ref 136–145)
WBC NRBC COR # BLD AUTO: 14.63 10*3/MM3 (ref 3.4–10.8)

## 2024-12-22 PROCEDURE — 85025 COMPLETE CBC W/AUTO DIFF WBC: CPT | Performed by: STUDENT IN AN ORGANIZED HEALTH CARE EDUCATION/TRAINING PROGRAM

## 2024-12-22 PROCEDURE — 99024 POSTOP FOLLOW-UP VISIT: CPT | Performed by: STUDENT IN AN ORGANIZED HEALTH CARE EDUCATION/TRAINING PROGRAM

## 2024-12-22 PROCEDURE — G0378 HOSPITAL OBSERVATION PER HR: HCPCS

## 2024-12-22 PROCEDURE — 80048 BASIC METABOLIC PNL TOTAL CA: CPT | Performed by: STUDENT IN AN ORGANIZED HEALTH CARE EDUCATION/TRAINING PROGRAM

## 2024-12-22 PROCEDURE — 25010000002 ENOXAPARIN PER 10 MG: Performed by: SURGERY

## 2024-12-22 RX ORDER — AMOXICILLIN 250 MG
1 CAPSULE ORAL 2 TIMES DAILY
Status: DISCONTINUED | OUTPATIENT
Start: 2024-12-22 | End: 2024-12-24 | Stop reason: HOSPADM

## 2024-12-22 RX ADMIN — MAGNESIUM HYDROXIDE 15 ML: 1200 LIQUID ORAL at 20:30

## 2024-12-22 RX ADMIN — OXYCODONE HYDROCHLORIDE 5 MG: 5 TABLET ORAL at 08:17

## 2024-12-22 RX ADMIN — OXYCODONE HYDROCHLORIDE 5 MG: 5 TABLET ORAL at 14:18

## 2024-12-22 RX ADMIN — OXYCODONE HYDROCHLORIDE 5 MG: 5 TABLET ORAL at 20:30

## 2024-12-22 RX ADMIN — SENNOSIDES AND DOCUSATE SODIUM 1 TABLET: 50; 8.6 TABLET ORAL at 14:18

## 2024-12-22 RX ADMIN — METHOCARBAMOL TABLETS 500 MG: 500 TABLET, COATED ORAL at 22:01

## 2024-12-22 RX ADMIN — SENNOSIDES AND DOCUSATE SODIUM 2 TABLET: 50; 8.6 TABLET ORAL at 22:03

## 2024-12-22 RX ADMIN — PREGABALIN 50 MG: 50 CAPSULE ORAL at 08:18

## 2024-12-22 RX ADMIN — TRAZODONE HYDROCHLORIDE 50 MG: 50 TABLET ORAL at 22:01

## 2024-12-22 RX ADMIN — PREGABALIN 50 MG: 50 CAPSULE ORAL at 20:30

## 2024-12-22 RX ADMIN — AMLODIPINE BESYLATE 10 MG: 10 TABLET ORAL at 08:17

## 2024-12-22 RX ADMIN — ENOXAPARIN SODIUM 40 MG: 100 INJECTION SUBCUTANEOUS at 08:18

## 2024-12-22 RX ADMIN — Medication 3 ML: at 22:05

## 2024-12-22 RX ADMIN — PANTOPRAZOLE SODIUM 40 MG: 40 TABLET, DELAYED RELEASE ORAL at 06:19

## 2024-12-22 NOTE — PLAN OF CARE
Goal Outcome Evaluation:  Plan of Care Reviewed With: patient        Progress: improving  Outcome Evaluation: Abdominal incisions are clean and dry, abdominal binder on, maida x4 has serosang output, pain managed with Oxycodone, ambulating in mg, given stool softener, passing flatus but no bowel movement this shift, no c/o nausea, vss, afebrile, saline locked, IS up 500, on 1.5L O2.

## 2024-12-22 NOTE — PLAN OF CARE
Goal Outcome Evaluation:  Plan of Care Reviewed With: patient           Outcome Evaluation: VSS, encouraging use of IS, walked unit, NEW drains x4, abd binder in place, voiding, on GI soft diet, will continue to monitor.

## 2024-12-22 NOTE — PROGRESS NOTES
"General Surgery Progress Note    Chief complaint: Postop day 3 incisional hernia repair with bilateral TAR, mesh placement by Dr. Peña, and panniculectomy, abdominal tissue rearrangement by Dr. Saab    Interval history: Patient states she is feeling a little more uncomfortable today, states she can't get comfortable sleeping and she has not passed gas or had a bowel movement yet.  She is tolerating her regular diet without nausea.  She has been out of bed.  She has voided since her Parrish came out.    Vital signs:  /75 (BP Location: Left arm, Patient Position: Lying)   Pulse 82   Temp 96.4 °F (35.8 °C) (Oral)   Resp 16   Ht 156.2 cm (61.5\")   Wt 87.1 kg (192 lb)   LMP  (LMP Unknown)   SpO2 92%   BMI 35.69 kg/m²     Intake/output:  Intake & Output (last day)         12/21 0701  12/22 0700 12/22 0701  12/23 0700    P.O. 340 240    I.V. (mL/kg)      Total Intake(mL/kg) 340 (3.9) 240 (2.8)    Urine (mL/kg/hr) 300 (0.1) 0 (0)    Drains 130 45    Total Output 430 45    Net -90 +195          Urine Unmeasured Occurrence 3 x 1 x            Physical exam:  Sitting up in bed eating prior to exam  NEURO: awake, alert, no gross focal deficits  PSYCH: interactive, cooperative  PULM: normal respirations on room air  CV: well perfused, no peripheral edema  GI: abdomen soft, appropriately tender, incisions c/d with iodoform gauze and fluffs dressings with abdominal binder in place; all four drains with scant serosanguinous fluid  : No Parrish    Diagnostics:  Results from last 7 days   Lab Units 12/22/24  0946 12/21/24  0336 12/20/24  0537   WBC 10*3/mm3 14.63* 17.04* 20.71*   HEMOGLOBIN g/dL 9.4* 9.4* 10.9*   HEMATOCRIT % 28.7* 29.2* 33.0*   PLATELETS 10*3/mm3 242 223 277     Results from last 7 days   Lab Units 12/22/24  0946 12/21/24  0337 12/20/24  0537   SODIUM mmol/L 139 134* 134*   POTASSIUM mmol/L 3.7 4.0 4.3   CHLORIDE mmol/L 112* 107 105   CO2 mmol/L 21.0* 19.2* 20.3*   BUN mg/dL 13 22 17   CREATININE " mg/dL 0.77 0.99 1.56*   CALCIUM mg/dL 8.2* 8.0* 8.4*   GLUCOSE mg/dL 147* 99 129*         Assessment:  Postop day 3 incisional hernia repair with bilateral TAR, mesh placement by Dr. Peña, and panniculectomy, abdominal tissue rearrangement by Dr. Katiana SCHNEIDER. Tolerating diet without nausea. No gas/BM yet.  Drains with decreasing serosanguinous output. Incisions healing in good order.     Plan:  Continue GI low irritant diet.   Continue milk of magnesia BID.  Add docusate senna.  Monitor bowel function.  Out of bed, ambulate, IS.   Continue abdominal binder.  Drains 2 and 3 (lower drains) to stay in place per Dr. Saab.  Upper drains to remain until output less than 30cc/day.    Hailey Swift MD  General, Robotic and Endoscopic Surgery  [facility]    [address], [address]  [place], [place], [address]  P: [phone]  F: [phone]

## 2024-12-23 LAB
BASOPHILS # BLD AUTO: 0.03 10*3/MM3 (ref 0–0.2)
BASOPHILS NFR BLD AUTO: 0.2 % (ref 0–1.5)
DEPRECATED RDW RBC AUTO: 40.3 FL (ref 37–54)
EOSINOPHIL # BLD AUTO: 0.26 10*3/MM3 (ref 0–0.4)
EOSINOPHIL NFR BLD AUTO: 2 % (ref 0.3–6.2)
ERYTHROCYTE [DISTWIDTH] IN BLOOD BY AUTOMATED COUNT: 12.9 % (ref 12.3–15.4)
HCT VFR BLD AUTO: 29 % (ref 34–46.6)
HGB BLD-MCNC: 9.5 G/DL (ref 12–15.9)
IMM GRANULOCYTES # BLD AUTO: 0.21 10*3/MM3 (ref 0–0.05)
IMM GRANULOCYTES NFR BLD AUTO: 1.6 % (ref 0–0.5)
LYMPHOCYTES # BLD AUTO: 1.68 10*3/MM3 (ref 0.7–3.1)
LYMPHOCYTES NFR BLD AUTO: 12.8 % (ref 19.6–45.3)
MCH RBC QN AUTO: 28.3 PG (ref 26.6–33)
MCHC RBC AUTO-ENTMCNC: 32.8 G/DL (ref 31.5–35.7)
MCV RBC AUTO: 86.3 FL (ref 79–97)
MONOCYTES # BLD AUTO: 1.02 10*3/MM3 (ref 0.1–0.9)
MONOCYTES NFR BLD AUTO: 7.8 % (ref 5–12)
NEUTROPHILS NFR BLD AUTO: 75.6 % (ref 42.7–76)
NEUTROPHILS NFR BLD AUTO: 9.88 10*3/MM3 (ref 1.7–7)
NRBC BLD AUTO-RTO: 0 /100 WBC (ref 0–0.2)
PLATELET # BLD AUTO: 300 10*3/MM3 (ref 140–450)
PMV BLD AUTO: 10.6 FL (ref 6–12)
RBC # BLD AUTO: 3.36 10*6/MM3 (ref 3.77–5.28)
WBC NRBC COR # BLD AUTO: 13.08 10*3/MM3 (ref 3.4–10.8)

## 2024-12-23 PROCEDURE — 99024 POSTOP FOLLOW-UP VISIT: CPT | Performed by: SURGERY

## 2024-12-23 PROCEDURE — 25010000002 ENOXAPARIN PER 10 MG: Performed by: SURGERY

## 2024-12-23 PROCEDURE — 85025 COMPLETE CBC W/AUTO DIFF WBC: CPT | Performed by: SURGERY

## 2024-12-23 RX ADMIN — Medication 3 ML: at 09:17

## 2024-12-23 RX ADMIN — PREGABALIN 50 MG: 50 CAPSULE ORAL at 08:55

## 2024-12-23 RX ADMIN — SENNOSIDES AND DOCUSATE SODIUM 1 TABLET: 50; 8.6 TABLET ORAL at 08:55

## 2024-12-23 RX ADMIN — ACETAMINOPHEN 1000 MG: 500 TABLET, FILM COATED ORAL at 12:39

## 2024-12-23 RX ADMIN — AMLODIPINE BESYLATE 10 MG: 10 TABLET ORAL at 08:55

## 2024-12-23 RX ADMIN — PANTOPRAZOLE SODIUM 40 MG: 40 TABLET, DELAYED RELEASE ORAL at 06:21

## 2024-12-23 RX ADMIN — ACETAMINOPHEN 1000 MG: 500 TABLET, FILM COATED ORAL at 20:34

## 2024-12-23 RX ADMIN — PREGABALIN 50 MG: 50 CAPSULE ORAL at 20:34

## 2024-12-23 RX ADMIN — SENNOSIDES AND DOCUSATE SODIUM 1 TABLET: 50; 8.6 TABLET ORAL at 20:34

## 2024-12-23 RX ADMIN — ENOXAPARIN SODIUM 40 MG: 100 INJECTION SUBCUTANEOUS at 08:55

## 2024-12-23 NOTE — PROGRESS NOTES
Continued Stay Note  [facility]     Patient Name: Hali Tejeda  MRN: [id number]  Today's Date: 12/23/2024    Admit Date: 12/19/2024    Plan: Home no needs   Discharge Plan       Row Name 12/23/24 1243       Plan    Plan Home no needs    Plan Comments Met with patient who confirmed DC plan is to return home. Family will assist as needed and will provide transportation at DC. Denies any needs/equipment. Stated she has a walker and BSC if  needed.                   Discharge Codes    No documentation.                 Expected Discharge Date and Time       Expected Discharge Date Expected Discharge Time    Dec 24, 2024               Dayanna Diaz RN

## 2024-12-23 NOTE — PLAN OF CARE
Goal Outcome Evaluation:  Plan of Care Reviewed With: patient        Progress: improving  Outcome Evaluation: weaned off oxygen.  up to chair, walked in mg. states had BM this am.  NEW 1 removed by MD.   3 NEW drains remain.  midline and transverse incisions CDI.  abdominal binder exchanged.  IS encouraged.  tylenol for discomfort per pt request.  possible home tomorrow.

## 2024-12-23 NOTE — PLAN OF CARE
Goal Outcome Evaluation:  Plan of Care Reviewed With: patient        Progress: improving  Outcome Evaluation: vss, oxycodone for pain, voiding freely, low transverse incision with steri strip and abdominal binder, 4 maida's. up ad clara, encourage to use incentive spirometer, on 2L/NC, continue to monitor the pt.

## 2024-12-23 NOTE — SIGNIFICANT NOTE
12/23/24 1541   OTHER   Discipline physical therapy assistant   Rehab Time/Intention   Session Not Performed patient/family declined treatment;other (see comments)  (Pt declined PT w/ pt and RN reporting that pt had already been up and amb a short time ago. Pt further stated she has amb 2x already today. PT will follow up tomorrow.)   Recommendation   PT - Next Appointment 12/24/24

## 2024-12-23 NOTE — PROGRESS NOTES
Postoperative day 4 status post incisional hernia repair with bilateral component separation and mesh placement and panniculectomy and abdominal tissue rearrangement    S: Patient is doing fine and reports feeling uncomfortable with the abdominal binder in place.  Otherwise she has been able to tolerate regular diet and she is having bowel function.  Feels short of breath when moving from chair to the bed.    O:   Vitals:    12/22/24 2022 12/22/24 2300 12/23/24 0500 12/23/24 0951   BP: 143/84  136/84 129/81   BP Location: Left arm  Right arm Left arm   Patient Position: Lying  Lying Sitting   Pulse: 106  87 85   Resp: 18  16 18   Temp: 98.2 °F (36.8 °C)  98.3 °F (36.8 °C) 98 °F (36.7 °C)   TempSrc: Oral  Oral Oral   SpO2: 90% 95% 97% 93%   Weight:       Height:          Drain #1 30 cc  Drain #2 45 cc  Drain #3 55 cc  Drain #4 40 cc  Alert, no acute distress  Breathing comfortable  Regular rate rhythm  Abdomen soft, appropriate tender, no rebound or guarding no peritoneal sign, incision clean dry and intact, all drains serosanguineous    White blood cell count 13,000, downtrending, hemoglobin stable at 9.5, platelets 300,000  All other labs stable    Assessment and plan    Postoperative day 4, slowly progressing.  Pain is well-controlled.  Still a little bit short of breath when ambulating.  She has not been working with incentive spirometer as much as that she do.  I will remove drain #1  Okay for her to ambulate   Continue SCDs and Lovenox  Continue regular diet  We will DC milk of magnesia, start MiraLAX as needed, stool softeners    Likely home tomorrow

## 2024-12-23 NOTE — PROGRESS NOTES
SUBJECTIVE:   Feeling well, has had return of bowel function.  Has been walking, voiding spontaneously.    OBJECTIVE:  Vitals:    12/23/24 1241   BP:    Pulse:    Resp:    Temp:    SpO2: 93%     Physical Exam  Incisions clean dry and intact, no evidence of edge necrosis, no ecchymosis, drains starting to thin out most then serosanguineous drainage. No palpable fluid collection     Lower abodmen drain outputs 40 and 50/day    PLAN:  Postop day 4large ventral hernia repair and panniculectomy and abdominal adjacent tissue rearrangement.  -She is doing well from plastic surgery standpoint  - Antibiotics per general surgery  -Okay to ambulate ad clara. another activity can increase per general surgery plans  - ok to change dry dressings as needed, keep abdominal binder in place  -We discussed her drains may stay in a couple of weeks, she has to follow-up in my office next week

## 2024-12-24 VITALS
HEIGHT: 62 IN | SYSTOLIC BLOOD PRESSURE: 137 MMHG | OXYGEN SATURATION: 96 % | DIASTOLIC BLOOD PRESSURE: 84 MMHG | BODY MASS INDEX: 35.33 KG/M2 | RESPIRATION RATE: 18 BRPM | HEART RATE: 97 BPM | TEMPERATURE: 96.9 F | WEIGHT: 192 LBS

## 2024-12-24 PROBLEM — K43.2 INCISIONAL HERNIA: Status: RESOLVED | Noted: 2024-12-20 | Resolved: 2024-12-24

## 2024-12-24 LAB
BASOPHILS # BLD AUTO: 0.06 10*3/MM3 (ref 0–0.2)
BASOPHILS NFR BLD AUTO: 0.5 % (ref 0–1.5)
DEPRECATED RDW RBC AUTO: 42.3 FL (ref 37–54)
EOSINOPHIL # BLD AUTO: 0.33 10*3/MM3 (ref 0–0.4)
EOSINOPHIL NFR BLD AUTO: 2.5 % (ref 0.3–6.2)
ERYTHROCYTE [DISTWIDTH] IN BLOOD BY AUTOMATED COUNT: 13.1 % (ref 12.3–15.4)
HCT VFR BLD AUTO: 30 % (ref 34–46.6)
HGB BLD-MCNC: 9.3 G/DL (ref 12–15.9)
IMM GRANULOCYTES # BLD AUTO: 0.33 10*3/MM3 (ref 0–0.05)
IMM GRANULOCYTES NFR BLD AUTO: 2.5 % (ref 0–0.5)
LYMPHOCYTES # BLD AUTO: 2.61 10*3/MM3 (ref 0.7–3.1)
LYMPHOCYTES NFR BLD AUTO: 19.9 % (ref 19.6–45.3)
MCH RBC QN AUTO: 27.4 PG (ref 26.6–33)
MCHC RBC AUTO-ENTMCNC: 31 G/DL (ref 31.5–35.7)
MCV RBC AUTO: 88.2 FL (ref 79–97)
MONOCYTES # BLD AUTO: 0.99 10*3/MM3 (ref 0.1–0.9)
MONOCYTES NFR BLD AUTO: 7.5 % (ref 5–12)
NEUTROPHILS NFR BLD AUTO: 67.1 % (ref 42.7–76)
NEUTROPHILS NFR BLD AUTO: 8.82 10*3/MM3 (ref 1.7–7)
NRBC BLD AUTO-RTO: 0 /100 WBC (ref 0–0.2)
PLATELET # BLD AUTO: 300 10*3/MM3 (ref 140–450)
PMV BLD AUTO: 10.3 FL (ref 6–12)
RBC # BLD AUTO: 3.4 10*6/MM3 (ref 3.77–5.28)
WBC NRBC COR # BLD AUTO: 13.14 10*3/MM3 (ref 3.4–10.8)

## 2024-12-24 PROCEDURE — 85025 COMPLETE CBC W/AUTO DIFF WBC: CPT | Performed by: SURGERY

## 2024-12-24 PROCEDURE — 97530 THERAPEUTIC ACTIVITIES: CPT

## 2024-12-24 PROCEDURE — 25010000002 ENOXAPARIN PER 10 MG: Performed by: SURGERY

## 2024-12-24 RX ORDER — PREGABALIN 50 MG/1
50 CAPSULE ORAL EVERY 12 HOURS SCHEDULED
Qty: 10 CAPSULE | Refills: 0 | Status: SHIPPED | OUTPATIENT
Start: 2024-12-24 | End: 2024-12-29

## 2024-12-24 RX ORDER — ONDANSETRON 4 MG/1
4 TABLET, ORALLY DISINTEGRATING ORAL EVERY 8 HOURS PRN
Qty: 20 TABLET | Refills: 0 | Status: SHIPPED | OUTPATIENT
Start: 2024-12-24

## 2024-12-24 RX ADMIN — PREGABALIN 50 MG: 50 CAPSULE ORAL at 08:27

## 2024-12-24 RX ADMIN — PANTOPRAZOLE SODIUM 40 MG: 40 TABLET, DELAYED RELEASE ORAL at 06:26

## 2024-12-24 RX ADMIN — SENNOSIDES AND DOCUSATE SODIUM 1 TABLET: 50; 8.6 TABLET ORAL at 08:27

## 2024-12-24 RX ADMIN — ACETAMINOPHEN 1000 MG: 500 TABLET, FILM COATED ORAL at 06:26

## 2024-12-24 RX ADMIN — AMLODIPINE BESYLATE 10 MG: 10 TABLET ORAL at 08:27

## 2024-12-24 RX ADMIN — ENOXAPARIN SODIUM 40 MG: 100 INJECTION SUBCUTANEOUS at 08:28

## 2024-12-24 NOTE — PLAN OF CARE
Goal Outcome Evaluation:  Plan of Care Reviewed With: patient           Outcome Evaluation: VSS, up ad clara, tylenol for pain, no nausea, 3 NEW drains, midline and transverse incisions ab binder on, IS encouraged, 1LO2 NC overnight, will continue to monitor.

## 2024-12-24 NOTE — DISCHARGE SUMMARY
Admission date: 12/19/2024   Discharge date: 12/24/2024     Admission diagnosis: Incisional hernia, without obstruction or gangrene [K43.2]  Incisional hernia [K43.2]     Discharge diagnosis: Incisional hernia     Procedure:   -Open incisional hernia repair with bilateral component separation and mesh placement 12/9/2024.  Dr. Peña  -Abdominoplasty and abdominal tissue rearrangement 12/9/2024.  [facility]: Patient was admitted to the hospital after undergoing incisional hernia repair.  Postoperative course was uneventful.  She had good pain control after surgery.  She started on clear liquid diet on postoperative day 1 and diet was slowly advanced.  She tolerated diet.  She started having bowel function on postoperative day 3.  Pain was well-controlled.  She was afebrile.  She had upper drains removed on postoperative day 3 and 4.  She remains having serious output from below her drains.  Vital signs were within normal limits.  Physical exam was within normal limits as expected.  Patient was discharged home in stable condition on postoperative day 4.     Meds:      Your medication list        START taking these medications        Instructions Last Dose Given Next Dose Due   ondansetron ODT 4 MG disintegrating tablet  Commonly known as: ZOFRAN-ODT      Take 1 tablet by mouth Every 8 (Eight) Hours As Needed for Nausea or Vomiting.       pregabalin 50 MG capsule  Commonly known as: LYRICA      Take 1 capsule by mouth Every 12 (Twelve) Hours for 5 days.              CONTINUE taking these medications        Instructions Last Dose Given Next Dose Due   acetaminophen 500 MG tablet  Commonly known as: TYLENOL      Take 2 tablets by mouth Every 6 (Six) Hours As Needed for Mild Pain. Indications: Pain       amLODIPine 10 MG tablet  Commonly known as: NORVASC      Take 1 tablet by mouth Daily.       Biotin 5000 MCG capsule      Take 1 capsule by mouth Daily. Indications: Baldness       docusate sodium  100 MG capsule  Commonly known as: COLACE      Take 1 capsule by mouth Every Night.       fluticasone 50 MCG/ACT nasal spray  Commonly known as: FLONASE      2 sprays by Each Nare route As Needed.       Multivitamin Adults 50+ tablet tablet      Take 1 tablet by mouth Daily. Indications: Vitamin Deficiency       pantoprazole 40 MG EC tablet  Commonly known as: PROTONIX      Take 1 tablet by mouth Every Morning. Indications: Heartburn       simethicone 125 MG chewable tablet  Commonly known as: MYLICON      Chew 1 tablet Every 6 (Six) Hours As Needed for Flatulence.       traZODone 50 MG tablet  Commonly known as: DESYREL      Take 1 tablet by mouth every night at bedtime.       vitamin D3 125 MCG (5000 UT) capsule capsule      Take 1 capsule by mouth Daily. Indications: Vitamin D Deficiency                 Where to Get Your Medications        These medications were sent to [facility] [place] [id number]      Hours: Monday to Friday 7 AM to 6 PM, Saturday & Sunday 8 AM to 4:30 PM (Closed 12 PM to 12:30 PM) Phone: [phone]   ondansetron ODT 4 MG disintegrating tablet  pregabalin 50 MG capsule         Instructions:    - No lifting for 6 weeks.  - No driving while taking pain medications  - Take medications as prescribed.   - Sponge bath only    Follow up: Dr. Peña in 10 days, the office will call for appointment  Follow-up with Dr. Saab as previously scheduled     Eladio Peña MD  General, Minimally Invasive and Endoscopic Surgery  [facility]

## 2024-12-24 NOTE — CASE MANAGEMENT/SOCIAL WORK
Case Management Discharge Note      Final Note: Home with family    Provided Post Acute Provider List?: N/A  N/A Provider List Comment: Denies needs. Plan is home    Selected Continued Care - Admitted Since 12/19/2024       Destination    No services have been selected for the patient.                Durable Medical Equipment    No services have been selected for the patient.                Dialysis/Infusion    No services have been selected for the patient.                Home Medical Care    No services have been selected for the patient.                Therapy    No services have been selected for the patient.                Community Resources    No services have been selected for the patient.                Community & DME    No services have been selected for the patient.                    Transportation Services  Private: Car    Final Discharge Disposition Code: 01 - home or self-care

## 2024-12-24 NOTE — THERAPY TREATMENT NOTE
Patient Name: Hali Tejeda  : 1960    MRN: [id number]                              Today's Date: 2024       Admit Date: 2024    Visit Dx:     ICD-10-CM ICD-9-CM   1. Incisional hernia, without obstruction or gangrene  K43.2 553.21   2. Hernia of abdominal wall  K43.9 553.20     Patient Active Problem List   Diagnosis    Left sided colitis with complication    Colitis    Nausea and vomiting    Stricture of sigmoid colon    Uterine anomaly    Hypertension    Avulsion fracture of distal fibula    Screening for malignant neoplasm of colon    Partial small bowel obstruction    Diverticulitis    Partial bowel obstruction    Colonic stricture    Obesity (BMI 30.0-34.9)     Past Medical History:   Diagnosis Date    Abnormal EKG 2020    Arthritis     right knee    Avulsion fracture of distal fibula 2015    Excess skin of abdomen     GERD (gastroesophageal reflux disease)     H. pylori infection 2020    Hepatic steatosis 2020    Hiatal hernia     History of transfusion     no reaction    HTN (hypertension)     Hyperlipidemia     Insomnia     Left sided colitis with complication 2023    ADMITTED TO [facility]    LGSIL on Pap smear of cervix     (+) HPV    LGSIL Pap smear of vagina 2016    Nausea and vomiting 2023    Snoring     Stricture of sigmoid colon 2023    Vaginal atrophy     Ventral hernia      Past Surgical History:   Procedure Laterality Date    BREAST BIOPSY      Left breast    BREAST LUMPECTOMY Left     benign     SECTION N/A     COLON RESECTION N/A 2023    Procedure: COLON RESECTION LAPAROSCOPIC CONVERTED TO OPEN SIGMOID AND LEFT MOBILIZATION OF SPLENIC FLEXURE WITH DAVINCI ROBOT;  Surgeon: Lino Gaston MD;  Location: [facility];  Service: Robotics - DaVinci;  Laterality: N/A;    COLON RESECTION N/A 2023    Procedure: Colostomy takedown LAPAROSCOPIC WITH DAVINCI ROBOT;  Surgeon: Lino Gaston MD;  Location: [facility]  OR;  Service: Robotics - DaVinci;  Laterality: N/A;    COLONOSCOPY N/A 12/31/2014    COULD ONLY GET SCOPE TO 70 CM DUE TO SIGNIFICANT STRICTURE SECONDARY TO SEVERE DIVERTICULITIS OR CANCER, ACBE ORDERED, DR. PARAG HUDSON AT [facility]    COLONOSCOPY N/A 02/01/2023    MODERATE STENOSIS IN SIGMOID-DILATED, MANY DIVERTICULA IN SIGMOID AND DESCENDING, COPIOUS AMTS OF STOOL, MUCOSAL TEAR 55 CM FROM ANAL VERGE, DR. JENNI SMITH AT [facility]    COLONOSCOPY N/A 11/06/2023    ENTIRE COLON WNL, RESCOPE IN 10 YRS, DR. JESSI GRANADO AT [facility]    COLPOSCOPY N/A 03/04/2016    ENDOSCOPY N/A 02/01/2023    GASTRITIS, SMALL HIATAL HERNIA, DR. JENNI SMITH AT [facility]    ENDOSCOPY N/A 09/15/2009    DR. PARAG HUDSON AT [facility]    GASTRIC SLEEVE LAPAROSCOPIC N/A 07/09/2020    WITH REMOVAL OF GASTRIC BAND, DR. OLIMPIA PALACIOS AT  [facility]    HYSTERECTOMY N/A 01/2005    WITH RSO    LAPAROSCOPIC GASTRIC BANDING N/A 10/20/2009    DR. PARAG HUDSON AT [facility]    PANNICULECTOMY N/A 12/19/2024    Procedure: PANNICULECTOMY and adjacent tissue rearrangement;  Surgeon: Del Saab MD;  Location: [facility];  Service: Plastics;  Laterality: N/A;    SALPINGO OOPHORECTOMY Left     LSO, IN TEEN YEARS    VENTRAL/INCISIONAL HERNIA REPAIR N/A 12/19/2024    Procedure: open INCISIONAL HERNIA REPAIR with mesh and bilateral component separation;  Surgeon: Eladio Peña MD;  Location: [facility];  Service: General;  Laterality: N/A;    WISDOM TOOTH EXTRACTION Bilateral       General Information       Row Name 12/24/24 1030          Physical Therapy Time and Intention    Document Type therapy note (daily note)  -SV     Mode of Treatment individual therapy;physical therapy  -SV               User Key  (r) = Recorded By, (t) = Taken By, (c) = Cosigned By      Initials Name Provider Type    SV Nano Abel, PT Physical Therapist                   Mobility       Row Name 12/24/24 1151          Bed Mobility    Comment, (Bed Mobility) NT up on eob   -SV       Row Name 12/24/24 1151          Sit-Stand Transfer    Sit-Stand [place] (Transfers) modified independence;supervision  -SV     Comment, (Sit-Stand Transfer) no AD  -SV       Row Name 12/24/24 1151          Gait/Stairs (Locomotion)    [place] Level (Gait) standby assist  -SV     Distance in Feet (Gait) 800  slow guarded pace due ot abd pain, no lob or unsteadiness, vc for standing rest period due to soa, pt is verbose and was able to conversate while amb  -SV               User Key  (r) = Recorded By, (t) = Taken By, (c) = Cosigned By      Initials Name Provider Type    Nano Roland, PT Physical Therapist                   Obj/Interventions    No documentation.                  Goals/Plan    No documentation.                  Clinical Impression       Row Name 12/24/24 1153          Pain    Pre/Posttreatment Pain Comment no report of abd pain  -SV       Row Name 12/24/24 1153          Vital Signs    O2 Delivery Pre Treatment room air  -SV     Intra SpO2 (%) 89  briefly after 400' amb , 91% in < one minute  -SV     O2 Delivery Intra Treatment room air  -SV     O2 Delivery Post Treatment room air  -SV     Pre Patient Position Sitting  -SV     Intra Patient Position Standing  -SV     Post Patient Position Sitting  -SV       Row Name 12/24/24 1153          Positioning and Restraints    Pre-Treatment Position sitting in chair/recliner  -SV     Post Treatment Position other  -SV     Other Position return to room independently  -SV               User Key  (r) = Recorded By, (t) = Taken By, (c) = Cosigned By      Initials Name Provider Type    Nano Roland, PT Physical Therapist                   Outcome Measures       Row Name 12/24/24 1154 12/24/24 0800       How much help from another person do you currently need...    Turning from your back to your side while in flat bed without using bedrails? 4  -SV 4  -RM    Moving from lying on back to sitting on the side of a flat bed without  bedrails? 4  -SV 4  -RM    Moving to and from a bed to a chair (including a wheelchair)? 4  -SV 4  -RM    Standing up from a chair using your arms (e.g., wheelchair, bedside chair)? 4  -SV 4  -RM    Climbing 3-5 steps with a railing? 4  -SV 4  -RM    To walk in hospital room? 4  -SV 4  -RM    AM-PAC 6 Clicks Score (PT) 24  -SV 24  -RM              User Key  (r) = Recorded By, (t) = Taken By, (c) = Cosigned By      Initials Name Provider Type     Nano Abel, PT Physical Therapist    Yelena Lala, RN Registered Nurse                                 Physical Therapy Education       Title: PT OT SLP Therapies (Resolved)       Topic: Physical Therapy (Resolved)       Point: Mobility training (Resolved)       Learning Progress Summary            Patient Acceptance, E,TB, VU by MT at 12/23/2024 0532    Acceptance, E, NR by  at 12/21/2024 1204                      Point: Home exercise program (Resolved)       Learner Progress:  Not documented in this visit.              Point: Body mechanics (Resolved)       Learner Progress:  Not documented in this visit.              Point: Precautions (Resolved)       Learner Progress:  Not documented in this visit.                              User Key       Initials Effective Dates Name Provider Type Discipline    MT 06/16/21 -  Davi Iqbal, RN Registered Nurse Nurse     07/11/23 -  Nano Abel, PT Physical Therapist PT                  PT Recommendation and Plan  Planned Therapy Interventions (PT): gait training, patient/family education, strengthening, transfer training        Time Calculation:         PT Charges       Row Name 12/24/24 1158             Time Calculation    Start Time 1030  -SV      Stop Time 1046  -SV      Time Calculation (min) 16 min  -SV      PT Received On 12/24/24  -      PT - Next Appointment 12/26/24  -SV                User Key  (r) = Recorded By, (t) = Taken By, (c) = Cosigned By      Initials Name Provider Type     [place]  Nano ROBLES, PT Physical Therapist                  Therapy Charges for Today       Code Description Service Date Service Provider Modifiers Qty    17847211056 HC PT THERAPEUTIC ACT EA 15 MIN 12/24/2024 Nano Abel, PT GP 1            PT G-Codes  AM-PAC 6 Clicks Score (PT): 24  PT Discharge Summary  Anticipated Discharge Disposition (PT): home    Nano Abel, PT  12/24/2024

## 2024-12-24 NOTE — PROGRESS NOTES
SUBJECTIVE:   Feeling well, has been walking, +BM    OBJECTIVE:  Vitals:    12/24/24 0528   BP: 141/87   Pulse: 76   Resp: 20   Temp: 98.1 °F (36.7 °C)   SpO2: 94%     Physical Exam  Incisions clean dry and intact, no evidence of edge necrosis, no ecchymosis, drains starting to thin out most then serosanguineous drainage. No palpable fluid collection     PLAN:  Postop day  large ventral hernia repair and panniculectomy and abdominal adjacent tissue rearrangement.  -She is doing well from plastic surgery standpoint  - Antibiotics per general surgery  -Okay to ambulate ad clara. another activity can increase per general surgery plans  - ok to change dry dressings as needed, keep abdominal binder in place  -We discussed her drains may stay in a couple of weeks, she has to follow-up in my office 12/31

## 2024-12-24 NOTE — PLAN OF CARE
Goal Outcome Evaluation:  Plan of Care Reviewed With: patient         Pt up on eob on RA no alarm in place. She agreed to amb. Sba/sup for STS no AD readjusting abd corset/drains. Pt amb 400'x2 with sba. Pt reached out for railing during amb. No lob or unsteadiness noted with railing or during periods without. O2 sat noted at 89% after 400' but returned to >90% in < minute. Pt soa a times but was speaking quite a lot during amb. Vc provided for standing rest break due to soa. Pt tolerated well and is planning home at d/c       Anticipated Discharge Disposition (PT): home

## 2024-12-24 NOTE — PAYOR COMM NOTE
"Hali Aleman (64 y.o. Female)        PLEASE SEE ATTACHED DC SUMMARY    REF#[id number]     THANK YOU    ANDREA CHAVES LPN CCP   Date of Birth   1960    Social Security Number       Address   [address] [place] [address]    Home Phone   [phone]    MRN   [id number]       [facility]ism   [facility]    Marital Status   Single                            Admission Date   12/19/24    Admission Type   Elective    Admitting Provider   Eladio Peña MD    Attending Provider       Department, Room/Bed   [facility] [address], P687/1       Discharge Date   12/24/2024    Discharge Disposition   Home or Self Care    Discharge Destination                                 Attending Provider: (none)   Allergies: Sulfa Antibiotics    Isolation: None   Infection: None   Code Status: CPR    Ht: 156.2 cm (61.5\")   Wt: 87.1 kg (192 lb)    Admission Cmt: None   Principal Problem: Incisional hernia [K43.2]                   Active Insurance as of 12/19/2024       Primary Coverage       Payor Plan Insurance Group Employer/Plan Group    [employer] EMPLOYEE [id number]       Payor Plan Address Payor Plan Phone Number Payor Plan Fax Number Effective Dates    [address] [phone]  1/1/2022 - None Entered    [facility] [id number]         Subscriber Name Subscriber Birth Date Member ID       HALI ALEMAN 1960 [id number]               Secondary Coverage       Payor Plan Insurance Group Employer/Plan Group    MEDICARE MEDICARE A ONLY        Payor Plan Address Payor Plan Phone Number Payor Plan Fax Number Effective Dates    [address] [phone]  11/1/2024 - None Entered    [facility] [id number]         Subscriber Name Subscriber Birth Date Member ID       HALI ALEMAN 1960 [id number]                     Emergency Contacts        (Rel.) Home Phone Work Phone Mobile Phone    AJ MICHELLE (Relative) -- -- [phone]    " SANDRA ALEMAN (Daughter) [phone] --    AMAURI ABREU (Mother) -- -- [phone]    Johnnie Gee (Zohra) -- -- [phone]    Johnnie Gee -- -- --                 Discharge Summary        Eladio Peña MD at 12/24/24 1000          Admission date: 12/19/2024   Discharge date: 12/24/2024     Admission diagnosis: Incisional hernia, without obstruction or gangrene [K43.2]  Incisional hernia [K43.2]     Discharge diagnosis: Incisional hernia     Procedure:   -Open incisional hernia repair with bilateral component separation and mesh placement 12/9/2024.  Dr. Peña  -Abdominoplasty and abdominal tissue rearrangement 12/9/2024.  Dr. Saab    [facility] course: Patient was admitted to the hospital after undergoing incisional hernia repair.  Postoperative course was uneventful.  She had good pain control after surgery.  She started on clear liquid diet on postoperative day 1 and diet was slowly advanced.  She tolerated diet.  She started having bowel function on postoperative day 3.  Pain was well-controlled.  She was afebrile.  She had upper drains removed on postoperative day 3 and 4.  She remains having serious output from below her drains.  Vital signs were within normal limits.  Physical exam was within normal limits as expected.  Patient was discharged home in stable condition on postoperative day 4.     Meds:      Your medication list        START taking these medications        Instructions Last Dose Given Next Dose Due   ondansetron ODT 4 MG disintegrating tablet  Commonly known as: ZOFRAN-ODT      Take 1 tablet by mouth Every 8 (Eight) Hours As Needed for Nausea or Vomiting.       pregabalin 50 MG capsule  Commonly known as: LYRICA      Take 1 capsule by mouth Every 12 (Twelve) Hours for 5 days.              CONTINUE taking these medications        Instructions Last Dose Given Next Dose Due   acetaminophen 500 MG tablet  Commonly known as: TYLENOL      Take 2  tablets by mouth Every 6 (Six) Hours As Needed for Mild Pain. Indications: Pain       amLODIPine 10 MG tablet  Commonly known as: NORVASC      Take 1 tablet by mouth Daily.       Biotin 5000 MCG capsule      Take 1 capsule by mouth Daily. Indications: Baldness       docusate sodium 100 MG capsule  Commonly known as: COLACE      Take 1 capsule by mouth Every Night.       fluticasone 50 MCG/ACT nasal spray  Commonly known as: FLONASE      2 sprays by Each Nare route As Needed.       Multivitamin Adults 50+ tablet tablet      Take 1 tablet by mouth Daily. Indications: Vitamin Deficiency       pantoprazole 40 MG EC tablet  Commonly known as: PROTONIX      Take 1 tablet by mouth Every Morning. Indications: Heartburn       simethicone 125 MG chewable tablet  Commonly known as: MYLICON      Chew 1 tablet Every 6 (Six) Hours As Needed for Flatulence.       traZODone 50 MG tablet  Commonly known as: DESYREL      Take 1 tablet by mouth every night at bedtime.       vitamin D3 125 MCG (5000 UT) capsule capsule      Take 1 capsule by mouth Daily. Indications: Vitamin D Deficiency                 Where to Get Your Medications        These medications were sent to [facility] [place] [address]      Hours: Monday to Friday 7 AM to 6 PM, Saturday & Sunday 8 AM to 4:30 PM (Closed 12 PM to 12:30 PM) Phone: [phone]   ondansetron ODT 4 MG disintegrating tablet  pregabalin 50 MG capsule         Instructions:    - No lifting for 6 weeks.  - No driving while taking pain medications  - Take medications as prescribed.   - Sponge bath only    Follow up: Dr. Peña in 10 days, the office will call for appointment  Follow-up with Dr. Saab as previously scheduled     Eladio Peña MD  General, Minimally Invasive and Endoscopic Surgery  [facility]     Electronically signed by Eladio Peña MD at 12/24/24 1003       Discharge Order (From admission, onward)        Start     Ordered    12/24/24 0955  Discharge patient  Once        Expected Discharge Date: 12/24/24   Discharge Disposition: Home or Self Care   Physician of Record for Attribution - Please select from Treatment Team: LORNA MALDONADO [[id number]]   Review needed by CMO to determine Physician of Record: No      Question Answer Comment   Physician of Record for Attribution - Please select from Treatment Team LORNA MALDONADO    Review needed by CMO to determine Physician of Record No        12/24/24 1000                   lock

## 2024-12-25 ENCOUNTER — READMISSION MANAGEMENT (OUTPATIENT)
Dept: CALL CENTER | Facility: HOSPITAL | Age: 64
End: 2024-12-25
Payer: COMMERCIAL

## 2024-12-25 NOTE — OUTREACH NOTE
Infusion Nursing Note:  Gillian Burk presents today for Venofer.    Patient seen by provider today: No   present during visit today: Not Applicable.    Note: N/A.    Intravenous Access:  Peripheral IV placed.    Treatment Conditions:  Not Applicable.      Post Infusion Assessment:  Patient tolerated infusion without incident.  Patient observed for 15 minutes post  per protocol.  Blood return noted pre and post infusion.  Site patent and intact, free from redness, edema or discomfort.  No evidence of extravasations.  Access discontinued per protocol.       Discharge Plan:   Discharge instructions reviewed with: Patient.  Patient and/or family verbalized understanding of discharge instructions and all questions answered.  Patient discharged in stable condition accompanied by: daughter.  Departure Mode: Ambulatory.    Keysha East RN                       Prep Survey      Flowsheet Row Responses   [facility] [place] operated by [facility] patient discharged from? [place]   Is LACE score < 7 ? No   Eligibility Readm Mgmt   Discharge diagnosis Incisional hernia,PANNICULECTOMY and adjacent tissue rearrangement   Does the patient have one of the following disease processes/diagnoses(primary or secondary)? General Surgery   Does the patient have Home health ordered? No   Is there a DME ordered? No   Prep survey completed? Yes            Lolita Alonso Registered Nurse

## 2024-12-26 ENCOUNTER — TELEPHONE (OUTPATIENT)
Dept: SURGERY | Facility: CLINIC | Age: 64
End: 2024-12-26
Payer: COMMERCIAL

## 2024-12-26 NOTE — TELEPHONE ENCOUNTER
Called and spoke with pt as per Dr. Peña's request. I asked if there was any possibility pt can come in on 1/3/24 in the morning. Pt said she is unable to make morning appointments since she will not have a ride. It looks like Dr. Peña will be in surgery all afternoon on 1/3/24. Can pt come between surgeries on the 3rd? Or would you like to keep 1/2/24 at 2 pm?

## 2024-12-26 NOTE — TELEPHONE ENCOUNTER
----- Message from Eladio Peña sent at 12/24/2024 10:03 AM EST -----  Please call the patient for an appointment January second or third.    Thank you, Aura Wolfe

## 2024-12-26 NOTE — TELEPHONE ENCOUNTER
Called and spoke with pt, relayed the message Dr. ePña sent. I scheduled her an appointment for 1/2/25 at 2:00 pm.

## 2024-12-30 ENCOUNTER — TELEPHONE (OUTPATIENT)
Dept: SURGERY | Facility: CLINIC | Age: 64
End: 2024-12-30
Payer: COMMERCIAL

## 2024-12-30 ENCOUNTER — READMISSION MANAGEMENT (OUTPATIENT)
Dept: CALL CENTER | Facility: HOSPITAL | Age: 64
End: 2024-12-30
Payer: COMMERCIAL

## 2024-12-30 NOTE — OUTREACH NOTE
General Surgery Week 1 Survey      Flowsheet Row Responses   [facility] patient discharged from? [place]   Does the patient have one of the following disease processes/diagnoses(primary or secondary)? General Surgery   Week 1 attempt successful? Yes   Call start time 1559   Unsuccessful attempts Attempt 1   Call end time 1607   Discharge diagnosis Incisional hernia,PANNICULECTOMY and adjacent tissue rearrangement   Meds reviewed with patient/caregiver? Yes   Is the patient having any side effects they believe may be caused by any medication additions or changes? No   Does the patient have all medications related to this admission filled (includes all antibiotics, pain medications, etc.) Yes   Is the patient taking all medications as directed (includes completed medication regime)? Yes   Does the patient have a follow up appointment scheduled with their surgeon? Yes  [12/31/24]   Has the patient kept scheduled appointments due by today? N/A   Has home health visited the patient within 72 hours of discharge? N/A   Psychosocial issues? No   Comments pt reports her maida drain has some output, emptys once a day.   Did the patient receive a copy of their discharge instructions? Yes   Nursing interventions Reviewed instructions with patient   What is the patient's perception of their health status since discharge? Improving  [occasional sob with exertion]   Nursing interventions Nurse provided patient education   Is the patient /caregiver able to teach back basic post-op care? Keep incision areas clean,dry and protected, Lifting as instructed by MD in discharge instructions, Take showers only when approved by MD-sponge bathe until then, No tub bath, swimming, or hot tub until instructed by MD, Drive as instructed by MD in discharge instructions   Is the patient/caregiver able to teach back signs and symptoms of incisional infection? Increased redness, swelling or pain at the incisonal site, Increased drainage or  bleeding   Is the patient/caregiver able to teach back steps to recovery at home? Set small, achievable goals for return to baseline health   Is the patient/caregiver able to teach back the hierarchy of who to call/visit for symptoms/problems? PCP, Specialist, Home health nurse, Urgent Care, ED, 911 Yes   Week 1 call completed? Yes   Is the patient interested in additional calls from an ambulatory ? No   Would this patient benefit from a Referral to [facility]? No   Call end time 1607            Maye SHARMA - Registered Nurse

## 2024-12-30 NOTE — TELEPHONE ENCOUNTER
Patient wants to be sween 12/31/24, but you are booked. Is it ok for us to Double book for a post op? Please Advise, Thank You

## 2025-01-02 NOTE — PROGRESS NOTES
"Enter Query Response Below      Query Response:   Acute kidney injury (JUANY) due to dehydration ruled in              If applicable, please update the problem list.   Patient: Hali Tejeda        : 1960  Account: [id number]           Admit Date: 2024        How to Respond to this query:       a. Click New Note     b. Answer query within the yellow box.                c. Update the Problem List, if applicable.      If you have any questions about this query contact me at: [email or web address]     ,    Risk factors: 64-year-old female with a history of \"Hypertension, obesity, colitis, nausea and vomiting, diverticulitis, colonic stricture\" and \"multiple abdominal operations that have left her with very large incisional hernia\" per H&P.   Clinical Indicators: Presented on  for scheduled incisional hernia repair with bilateral component separation and mesh placement and abdominoplasty. Progress note () states, \"Patient with postoperative acute kidney injury likely related to dehydration.\" Creatinine- 1.56 (), 0.99 (), 0.77 (). Discharge summary does not note acute kidney injury.  Treatment: Creatinine monitoring, sodium chloride bolus 1000ml ( @ 1221), sodium chloride infusion at 125ml/hr (-).     Please clarify the following:    Acute kidney injury (JUANY) due to dehydration ruled in  Acute kidney injury due to dehydration (JUANY) ruled out  Other- specify______  Unable to determine    By submitting this query, we are merely seeking further clarification of documentation to accurately reflect all conditions that you are monitoring, evaluating, treating or that extend the hospitalization or utilize additional resources of care. Please utilize your independent clinical judgment when addressing the question(s) above.     This query and your response, once completed, will be entered into the legal medical record.    Sincerely,  Ida Nation RN  Clinical " Documentation Integrity Program

## 2025-01-09 ENCOUNTER — READMISSION MANAGEMENT (OUTPATIENT)
Dept: CALL CENTER | Facility: HOSPITAL | Age: 65
End: 2025-01-09
Payer: COMMERCIAL

## 2025-01-09 NOTE — OUTREACH NOTE
General Surgery Week 2 Survey      Flowsheet Row Responses   [facility] patient discharged from? [place]   Does the patient have one of the following disease processes/diagnoses(primary or secondary)? General Surgery   Week 2 attempt successful? No  [Reached Johnnie Gee-states will alert patient to future f/u calls.]   Unsuccessful attempts Attempt 1            Antoinette RINCON - Registered Nurse

## 2025-01-14 ENCOUNTER — READMISSION MANAGEMENT (OUTPATIENT)
Dept: CALL CENTER | Facility: HOSPITAL | Age: 65
End: 2025-01-14
Payer: COMMERCIAL

## 2025-01-14 ENCOUNTER — HOSPITAL ENCOUNTER (OUTPATIENT)
Dept: GENERAL RADIOLOGY | Facility: HOSPITAL | Age: 65
Discharge: HOME | End: 2025-01-14
Admitting: PLASTIC SURGERY
Payer: COMMERCIAL

## 2025-01-14 DIAGNOSIS — R06.02 SHORTNESS OF BREATH: ICD-10-CM

## 2025-01-14 PROCEDURE — 71046 X-RAY EXAM CHEST 2 VIEWS: CPT

## 2025-01-14 NOTE — OUTREACH NOTE
General Surgery Week 2 Survey      Flowsheet Row Responses   [facility] patient discharged from? [place]   Does the patient have one of the following disease processes/diagnoses(primary or secondary)? General Surgery   Week 2 attempt successful? Yes   Call start time 1121   Call end time 1126   Discharge diagnosis Open incisional hernia repair with bilateral component separation and mesh placement 12/9/2024.  Dr. Peña  -Abdominoplasty and abdominal tissue rearrangement 12/9/2024.  Dr. Saab   Person spoke with today (if not patient) and relationship Patient   Meds reviewed with patient/caregiver? Yes   Does the patient have all medications related to this admission filled (includes all antibiotics, pain medications, etc.) Yes   Is the patient taking all medications as directed (includes completed medication regime)? Yes   Does the patient have a follow up appointment scheduled with their surgeon? Yes   Has the patient kept scheduled appointments due by today? Yes   Comments Plastic surgery dr silveira today and Dr Casey spaint 1/17   Has home health visited the patient within 72 hours of discharge? N/A   Psychosocial issues? No   Comments Patient reports that she still has one drain in.   Did the patient receive a copy of their discharge instructions? Yes   Nursing interventions Reviewed instructions with patient   What is the patient's perception of their health status since discharge? Improving   Is the patient /caregiver able to teach back basic post-op care? Keep incision areas clean,dry and protected, Lifting as instructed by MD in discharge instructions   Is the patient/caregiver able to teach back signs and symptoms of incisional infection? Increased redness, swelling or pain at the incisonal site, Increased drainage or bleeding, Pus or odor from incision, Fever   Is the patient/caregiver able to teach back steps to recovery at home? Set small, achievable goals for return to baseline health   If the  patient is a current smoker, are they able to teach back resources for cessation? Not a smoker   Is the patient/caregiver able to teach back the hierarchy of who to call/visit for symptoms/problems? PCP, Specialist, Home health nurse, Urgent Care, ED, 911 Yes   Week 2 call completed? Yes   Graduated Yes   Is the patient interested in additional calls from an ambulatory ? No   Would this patient benefit from a Referral to [facility]? No   Graduated/Revoked comments Patient reports that she is doing well. She states has appts in place, seeing both surgeons this week. Denies any questions or needs.   Call end time 1126            ROE SEGOVIA - Registered Nurse

## 2025-01-17 ENCOUNTER — OFFICE VISIT (OUTPATIENT)
Dept: SURGERY | Facility: CLINIC | Age: 65
End: 2025-01-17
Payer: COMMERCIAL

## 2025-01-17 VITALS
SYSTOLIC BLOOD PRESSURE: 130 MMHG | HEIGHT: 62 IN | HEART RATE: 87 BPM | OXYGEN SATURATION: 96 % | BODY MASS INDEX: 32.5 KG/M2 | DIASTOLIC BLOOD PRESSURE: 84 MMHG | WEIGHT: 176.6 LBS

## 2025-01-17 DIAGNOSIS — Z48.89 ENCOUNTER FOR POSTOPERATIVE WOUND CHECK: Primary | ICD-10-CM

## 2025-01-17 DIAGNOSIS — Z51.89 VISIT FOR WOUND CHECK: ICD-10-CM

## 2025-01-17 DIAGNOSIS — T81.30XA WOUND DEHISCENCE: ICD-10-CM

## 2025-01-17 PROCEDURE — 99024 POSTOP FOLLOW-UP VISIT: CPT | Performed by: SURGERY

## 2025-01-17 NOTE — PROGRESS NOTES
"CC: Follow-up after incisional hernia repair with bilateral component separation and mesh placement with abdominoplasty    S: Patient is a very pleasant 64-year-old female that is here today for postoperative check after undergoing open incisional hernia repair with bilateral component separation and mesh placement as well as panniculectomy and abdominoplasty that was done by Dr. Saab on 12/19/2024.  The patient states she has been doing fine and denies any complaint other than drainage from the lower aspect of the wound.  She has seen Dr. Saab in the office and was recommended that she start wet-to-dry dressing changes.  She has been performing wet-to-dry dressing changes twice a day.  Denies any fevers or chills.  Minimal abdominal discomfort no abdominal pain.    O:   Vitals:    01/17/25 1127   BP: 130/84   Pulse: 87   SpO2: 96%   Weight: 80.1 kg (176 lb 9.6 oz)   Height: 156.2 cm (61.5\")      Alert, no acute distress  Breathing comfortable  Abdomen soft, nontender nondistended.  Over the inferior aspect of the incision at the junction between the midline and as well as the lower abdominal incision there is evidence of skin necrosis without signs of infection.  The necrosis has triangular shape and measures approximately 4 x 3 cm.  There is no drainage or signs of infection.   Hernia repair otherwise is intact    Pathology report  Final Diagnosis   1.  Soft tissue, excision:  A.  Fibroserous membrane with mild chronic active inflammation, focal suture granuloma and fibrosis consistent with hernia sac.     Assessment and plan    64-year-old female status post open incisional hernia repair with bilateral component separation mesh placement and abdominoplasty.  Patient has necrosis of the skin in the lower aspect of the wound.  I sharply debrided the necrotic eschar and she had healthy subcutaneous tissue underlying it.  I packed the wound with saline and gauze.  Recommended the patient start dressing " changes with saline and gauze twice a day.  Her hernia repair is intact and there is no signs of recurrence or infection.  Patient to follow-up with Dr. Saab next week for wound check.  Patient to follow-up in my office in 2 weeks for reassessment.    She understood

## 2025-01-17 NOTE — LETTER
"January 17, 2025     Del Saab MD  [address]  [address] [address]    Patient: Hali Tejeda   YOB: 1960   Date of Visit: 1/17/2025     Dear Del Saab MD:       Thank you for referring Hali Tejeda to me for evaluation. Below are the relevant portions of my assessment and plan of care.    If you have questions, please do not hesitate to call me. I look forward to following Hali along with you.         Sincerely,        Eladio Peña MD        CC: MD Casey Kwok, Eladio Nelson MD  01/17/25 1846  Sign when Signing Visit  CC: Follow-up after incisional hernia repair with bilateral component separation and mesh placement with abdominoplasty    S: Patient is a very pleasant 64-year-old female that is here today for postoperative check after undergoing open incisional hernia repair with bilateral component separation and mesh placement as well as panniculectomy and abdominoplasty that was done by Dr. Saab on 12/19/2024.  The patient states she has been doing fine and denies any complaint other than drainage from the lower aspect of the wound.  She has seen Dr. Saab in the office and was recommended that she start wet-to-dry dressing changes.  She has been performing wet-to-dry dressing changes twice a day.  Denies any fevers or chills.  Minimal abdominal discomfort no abdominal pain.    O:   Vitals:    01/17/25 1127   BP: 130/84   Pulse: 87   SpO2: 96%   Weight: 80.1 kg (176 lb 9.6 oz)   Height: 156.2 cm (61.5\")      Alert, no acute distress  Breathing comfortable  Abdomen soft, nontender nondistended.  Over the inferior aspect of the incision at the junction between the midline and as well as the lower abdominal incision there is evidence of skin necrosis without signs of infection.  The necrosis has triangular shape and measures approximately 4 x 3 cm.  There is no drainage or signs of infection.   Hernia repair " otherwise is intact    Pathology report  Final Diagnosis   1.  Soft tissue, excision:  A.  Fibroserous membrane with mild chronic active inflammation, focal suture granuloma and fibrosis consistent with hernia sac.     Assessment and plan    64-year-old female status post open incisional hernia repair with bilateral component separation mesh placement and abdominoplasty.  Patient has necrosis of the skin in the lower aspect of the wound.  I sharply debrided the necrotic eschar and she had healthy subcutaneous tissue underlying it.  I packed the wound with saline and gauze.  Recommended the patient start dressing changes with saline and gauze twice a day.  Her hernia repair is intact and there is no signs of recurrence or infection.  Patient to follow-up with Dr. Saab next week for wound check.  Patient to follow-up in my office in 2 weeks for reassessment.    She understood

## 2025-01-31 ENCOUNTER — OFFICE VISIT (OUTPATIENT)
Dept: SURGERY | Facility: CLINIC | Age: 65
End: 2025-01-31
Payer: COMMERCIAL

## 2025-01-31 VITALS
SYSTOLIC BLOOD PRESSURE: 122 MMHG | OXYGEN SATURATION: 97 % | HEART RATE: 90 BPM | BODY MASS INDEX: 31.5 KG/M2 | DIASTOLIC BLOOD PRESSURE: 86 MMHG | WEIGHT: 171.2 LBS | HEIGHT: 62 IN

## 2025-01-31 DIAGNOSIS — Z51.89 VISIT FOR WOUND CHECK: Primary | ICD-10-CM

## 2025-01-31 PROCEDURE — 99024 POSTOP FOLLOW-UP VISIT: CPT | Performed by: SURGERY

## 2025-01-31 RX ORDER — AZITHROMYCIN 250 MG/1
250 TABLET, FILM COATED ORAL DAILY
COMMUNITY

## 2025-01-31 RX ORDER — DEXTROMETHORPHAN HYDROBROMIDE AND PROMETHAZINE HYDROCHLORIDE 15; 6.25 MG/5ML; MG/5ML
SYRUP ORAL 4 TIMES DAILY PRN
COMMUNITY

## 2025-01-31 NOTE — LETTER
"January 31, 2025     Rufina Delgado MD  [address]  [facility] [address]    Patient: Hali Tejeda   YOB: 1960   Date of Visit: 1/31/2025     Dear Rufina Delgado MD:       Thank you for referring Hali Tejeda to me for evaluation. Below are the relevant portions of my assessment and plan of care.    If you have questions, please do not hesitate to call me. I look forward to following Hali along with you.         Sincerely,        Eladio Peña MD        CC: No Recipients    Eladio Peña MD  01/31/25 1797  Sign when Signing Visit  CC: Follow-up after incisional hernia repair with bilateral component separation and mesh placement with abdominoplasty     S: Patient is here today for follow-up after undergoing incisional hernia repair with bilateral component separation panniculectomy and abdominoplasty.  Patient follow-up with Dr. Saab last week.  She has been performing wet-to-dry dressing changes with saline and gauze.  She states the wound is healing slowly.  She denies any fevers chills nausea or vomiting.  She reports no abdominal pain.    O:   Vitals:    01/31/25 0958   BP: 122/86   Pulse: 90   SpO2: 97%   Weight: 77.7 kg (171 lb 3.2 oz)   Height: 156.2 cm (61.5\")   Alert, no acute distress  Breathing comfortable  Regular rate rhythm  Abdomen soft, nontender distended, inferior aspect of the incision is open with granulation tissue at the base.  There is no signs of infection.  There is no drainage.  There is no signs of hernia recurrence    Assessment and plan    64-year-old female status post open incisional hernia repair with bilateral component separation, mesh placement and abdominoplasty.  She has open wound on the inferior aspect that is healing slowly.  Degeneration tissue at the base.  Discussed with the patient about the need to continue wet-to-dry dressing changes with saline and gauze 2-3 times a day.  Discussed with the patient about the need " to increase protein intake.    She will follow-up with Dr. Saab in 2 weeks.  She will follow-up in my office in 1 week.  She understood    Eladio Peña MD, FACS  General, Minimally Invasive and Endoscopic Surgery  [facility]    [address] [place] [address], [address]  [place], [place], [address]  P: [phone]  F: [phone]

## 2025-01-31 NOTE — PROGRESS NOTES
"CC: Follow-up after incisional hernia repair with bilateral component separation and mesh placement with abdominoplasty     S: Patient is here today for follow-up after undergoing incisional hernia repair with bilateral component separation panniculectomy and abdominoplasty.  Patient follow-up with Dr. Saab last week.  She has been performing wet-to-dry dressing changes with saline and gauze.  She states the wound is healing slowly.  She denies any fevers chills nausea or vomiting.  She reports no abdominal pain.    O:   Vitals:    01/31/25 0958   BP: 122/86   Pulse: 90   SpO2: 97%   Weight: 77.7 kg (171 lb 3.2 oz)   Height: 156.2 cm (61.5\")   Alert, no acute distress  Breathing comfortable  Regular rate rhythm  Abdomen soft, nontender distended, inferior aspect of the incision is open with granulation tissue at the base.  There is no signs of infection.  There is no drainage.  There is no signs of hernia recurrence    Assessment and plan    64-year-old female status post open incisional hernia repair with bilateral component separation, mesh placement and abdominoplasty.  She has open wound on the inferior aspect that is healing slowly.  Degeneration tissue at the base.  Discussed with the patient about the need to continue wet-to-dry dressing changes with saline and gauze 2-3 times a day.  Discussed with the patient about the need to increase protein intake.    She will follow-up with Dr. Saab in 2 weeks.  She will follow-up in my office in 1 week.  She understood    Eladio Peña MD, FACS  General, Minimally Invasive and Endoscopic Surgery  [facility] [address], [address]  [place], [place], [address]  P: [phone]  F: [phone]      "

## 2025-02-28 ENCOUNTER — OFFICE VISIT (OUTPATIENT)
Dept: SURGERY | Facility: CLINIC | Age: 65
End: 2025-02-28
Payer: COMMERCIAL

## 2025-02-28 VITALS
HEIGHT: 61 IN | BODY MASS INDEX: 32.28 KG/M2 | SYSTOLIC BLOOD PRESSURE: 120 MMHG | DIASTOLIC BLOOD PRESSURE: 84 MMHG | OXYGEN SATURATION: 98 % | WEIGHT: 171 LBS | HEART RATE: 77 BPM

## 2025-02-28 DIAGNOSIS — Z51.89 VISIT FOR WOUND CHECK: Primary | ICD-10-CM

## 2025-02-28 PROCEDURE — 99024 POSTOP FOLLOW-UP VISIT: CPT | Performed by: SURGERY

## 2025-02-28 NOTE — PROGRESS NOTES
"Cc: Follow-up    S: Patient is a very pleasant 64-year-old female that is here today for follow-up after undergoing incisional hernia repair with bilateral component separation, panniculectomy, abdominoplasty and mesh placement on swelling 10/20/2024.  The patient developed wound seroma in the lower aspect of the wound.  She has been performing wet-to-dry dressing changes with saline and gauze 3 times a day.  The wound has been healing great.  She denies any problem.  She wants to go back to work    O:   Vitals:    02/28/25 1100   BP: 120/84   Pulse: 77   SpO2: 98%   Weight: 77.6 kg (171 lb)   Height: 156.2 cm (61.5\")      Alert, no acute distress  Breathing comfortable  Abdomen soft, nontender nondistended, there is a small open wound in the lower aspect of the incision there is 2 x 2-1/2 cm.  The wound is half centimeter deep.  Degeneration tissue at the base.  There is no signs of infection.  There is no evidence of hernia recurrence    Assessment and plan    64-year-old female status post incisional hernia repair with bilateral component separation and mesh placement.  Open wound, healing well continue wet-to-dry dressing changes    Follow-up in my office in a month  Can go back to work anytime, no restrictions  "

## 2025-04-01 ENCOUNTER — OFFICE VISIT (OUTPATIENT)
Dept: SURGERY | Facility: CLINIC | Age: 65
End: 2025-04-01
Payer: COMMERCIAL

## 2025-04-01 VITALS
SYSTOLIC BLOOD PRESSURE: 124 MMHG | DIASTOLIC BLOOD PRESSURE: 74 MMHG | WEIGHT: 170.8 LBS | HEIGHT: 62 IN | BODY MASS INDEX: 31.43 KG/M2 | OXYGEN SATURATION: 99 % | HEART RATE: 87 BPM

## 2025-04-01 DIAGNOSIS — Z51.89 VISIT FOR WOUND CHECK: Primary | ICD-10-CM

## 2025-04-01 PROCEDURE — 99212 OFFICE O/P EST SF 10 MIN: CPT | Performed by: SURGERY

## 2025-04-01 NOTE — PROGRESS NOTES
"CC: Follow-up after incisional hernia repair with bilateral component separation and mesh placement with abdominoplasty     S: Patient is here today for follow-up after undergoing incisional hernia repair with bilateral component separation with mesh placement, panniculectomy and abdominoplasty on 12/19/2024.  Patient had dehiscence of the lower aspect of the wound.  The wound has now completely healed.  She denies any complaint.    O:   Vitals:    04/01/25 1301   BP: 124/74   BP Location: Left arm   Patient Position: Sitting   Cuff Size: Adult   Pulse: 87   SpO2: 99%   Weight: 77.5 kg (170 lb 12.8 oz)   Height: 156.2 cm (61.5\")      Alert, no acute distress  Abdomen soft, nontender, nondistended, incision well-healed.  There is dry skin over the lower aspect of the abdominal skin.    Assessment and plan    64-year-old female status post open incisional hernia repair with bilateral component separation and mesh placement.  Patient open wound has completely healed and she is doing great.  There is no signs of hernia recurrence    Discussed with the patient about the need to follow-up in my office in July for recheck.  No lifting restrictions.     She will follow-up in my office as scheduled    She understood    "

## (undated) DEVICE — SOL ANTISTICK CAUTRY ELECTROLUBE LF

## (undated) DEVICE — KT CANSTR VAC WND W/ISOLYSER SENSATRAC 500CC 5CS

## (undated) DEVICE — GLV SURG SENSICARE PI LF PF 7.5 GRN STRL

## (undated) DEVICE — DISPOSABLE GRASPER CARTRIDGE: Brand: DIRECT DRIVE REPOSABLE GRASPERS

## (undated) DEVICE — MEDI-VAC YANKAUER SUCTION HANDLE W/BULBOUS TIP: Brand: CARDINAL HEALTH

## (undated) DEVICE — SEALANT WND FIBRIN TISSEEL PREFIL/SYR/PRIMAFZ 10ML

## (undated) DEVICE — BLADELESS OBTURATOR: Brand: WECK VISTA

## (undated) DEVICE — GLV SURG SENSICARE PI PF LF 7 GRN STRL

## (undated) DEVICE — ADAPT CLN BIOGUARD AIR/H2O DISP

## (undated) DEVICE — DRAPE,TOWEL,LARGE,INVISISHIELD: Brand: MEDLINE

## (undated) DEVICE — COLUMN DRAPE

## (undated) DEVICE — SUT VIC 2/0 UR6 27IN J602H

## (undated) DEVICE — LAPAROVUE VISIBILITY SYSTEM LAPAROSCOPIC SOLUTIONS: Brand: LAPAROVUE

## (undated) DEVICE — JACKSON-PRATT 100CC BULB RESERVOIR: Brand: CARDINAL HEALTH

## (undated) DEVICE — ANTIBACTERIAL UNDYED BRAIDED (POLYGLACTIN 910), SYNTHETIC ABSORBABLE SUTURE: Brand: COATED VICRYL

## (undated) DEVICE — PENCL ES MEGADINE EZ/CLEAN BUTN W/HOLSTR 10FT

## (undated) DEVICE — TUBING, SUCTION, 1/4" X 10', STRAIGHT: Brand: MEDLINE

## (undated) DEVICE — SOL NACL 0.9PCT 1000ML

## (undated) DEVICE — STPLR SKIN SUBCUTICULAR INSORB 2030

## (undated) DEVICE — LOU LITHOTOMY ROBOTIC: Brand: MEDLINE INDUSTRIES, INC.

## (undated) DEVICE — SPONGE,LAP,18"X18",DLX,XR,ST,5/PK,40/PK: Brand: MEDLINE

## (undated) DEVICE — TTL1LYR 16FR10ML 100%SIL TMPST TR: Brand: MEDLINE

## (undated) DEVICE — SENSR O2 OXIMAX FNGR A/ 18IN NONSTR

## (undated) DEVICE — LN SMPL CO2 SHTRM SD STREAM W/M LUER

## (undated) DEVICE — PENCL SMOKE/EVAC MEGADYNE TELESCP 10FT

## (undated) DEVICE — COVER,MAYO STAND,STERILE: Brand: MEDLINE

## (undated) DEVICE — Device

## (undated) DEVICE — ARM DRAPE

## (undated) DEVICE — PATIENT RETURN ELECTRODE, SINGLE-USE, CONTACT QUALITY MONITORING, ADULT, WITH 9FT CORD, FOR PATIENTS WEIGING OVER 33LBS. (15KG): Brand: MEGADYNE

## (undated) DEVICE — INTENDED FOR TISSUE SEPARATION, AND OTHER PROCEDURES THAT REQUIRE A SHARP SURGICAL BLADE TO PUNCTURE OR CUT.: Brand: BARD-PARKER ® CARBON RIB-BACK BLADES

## (undated) DEVICE — DRAPE,UNDERBUTTOCKS,PCH,STERILE: Brand: MEDLINE

## (undated) DEVICE — MSK PROC CURAPLEX O2 2/ADAPT 7FT

## (undated) DEVICE — VIOLET BRAIDED (POLYGLACTIN 910), SYNTHETIC ABSORBABLE SUTURE: Brand: COATED VICRYL

## (undated) DEVICE — 3M™ IOBAN™ 2 ANTIMICROBIAL INCISE DRAPE 6650EZ: Brand: IOBAN™ 2

## (undated) DEVICE — SUT ANTIBAC VICRYL/PLS ABS TIE COAT SZ3/0 12X18IN UD

## (undated) DEVICE — KT DRSNG VAC SIMPLACE MD 5PK

## (undated) DEVICE — SUT POLY BR TP 2STRND 1/8X30IN

## (undated) DEVICE — GLV SURG BIOGEL LTX PF 7 1/2

## (undated) DEVICE — SUT MNCRYL 3/0 PS2 18IN MCP497G

## (undated) DEVICE — IRRIGATOR BULB ASEPTO 60CC STRL

## (undated) DEVICE — COUNT NDL MAG FM/BLCK 40COUNT

## (undated) DEVICE — HYPODERMIC SAFETY NEEDLE: Brand: MONOJECT

## (undated) DEVICE — FRCP BX RADJAW4 NDL 2.8 240CM LG OG BX40

## (undated) DEVICE — KT ANTI FOG W/FLD AND SPNG

## (undated) DEVICE — WOUND RETRACTOR AND PROTECTOR: Brand: ALEXIS WOUND PROTECTOR-RETRACTOR

## (undated) DEVICE — BIOPATCH™ ANTIMICROBIAL DRESSING WITH CHLORHEXIDINE GLUCONATE IS A HYDROPHILLIC POLYURETHANE ABSORPTIVE FOAM WITH CHLORHEXIDINE GLUCONATE (CHG) WHICH INHIBITS BACTERIAL GROWTH UNDER THE DRESSING. THE DRESSING IS INTENDED TO BE USED TO ABSORB EXUDATE, COVER A WOUND CAUSED BY VASCULAR AND NONVASCULAR PERCUTANEOUS MEDICAL DEVICES DURING SURGERY, AS WELL AS REDUCE LOCAL INFECTION AND COLONIZATION OF MICROORGANISMS.: Brand: BIOPATCH

## (undated) DEVICE — 3M™ STERI-DRAPE™ INSTRUMENT POUCH 1018L: Brand: STERI-DRAPE™

## (undated) DEVICE — BARRIER, ABSORBABLE, ADHESION: Brand: SEPRAFILM® PROCEDURE PACK

## (undated) DEVICE — SUT VIC 0 TIES 18IN J912G

## (undated) DEVICE — SUCTION IRRIGATOR: Brand: ENDOWRIST

## (undated) DEVICE — SUT PDS 1 CT1 36IN Z347H

## (undated) DEVICE — ST TBG AIRSEAL FLTR TRI LUM

## (undated) DEVICE — ELECTRD BLD EZ CLN MOD XLNG 2.75IN

## (undated) DEVICE — TUBING, SUCTION, 1/4" X 20', STRAIGHT: Brand: MEDLINE INDUSTRIES, INC.

## (undated) DEVICE — TOWEL,OR,DSP,ST,NATURAL,DLX,4/PK,20PK/CS: Brand: MEDLINE

## (undated) DEVICE — PROXIMATE RH ROTATING HEAD SKIN STAPLERS (35 WIDE) CONTAINS 35 STAINLESS STEEL STAPLES: Brand: PROXIMATE

## (undated) DEVICE — SEAL

## (undated) DEVICE — ACCESS PLATFORM FOR MINIMALLY INVASIVE SURGERY.: Brand: GELPORT® LAPAROSCOPIC  SYSTEM

## (undated) DEVICE — GLV SURG BIOGEL LTX PF 8 1/2

## (undated) DEVICE — KT ORCA ORCAPOD DISP STRL

## (undated) DEVICE — DRAPE,UTILITY,TAPE,15X26,STERILE: Brand: MEDLINE

## (undated) DEVICE — PK PROC MAJ 40

## (undated) DEVICE — 1000ML,PRESSURE INFUSER W/STOPCOCK: Brand: MEDLINE

## (undated) DEVICE — 3M™ IOBAN™ 2 ANTIMICROBIAL INCISE DRAPE 6648EZ: Brand: IOBAN™ 2

## (undated) DEVICE — APPL HEMOS FIBRIN DUPLOTIP W/SNPLK 5MM 32CM

## (undated) DEVICE — GAUZE,SPONGE,4"X4",16PLY,XRAY,STRL,LF: Brand: MEDLINE

## (undated) DEVICE — THE STERILE LIGHT HANDLE COVER IS USED WITH STERIS SURGICAL LIGHTING AND VISUALIZATION SYSTEMS.

## (undated) DEVICE — TIP COVER ACCESSORY

## (undated) DEVICE — SUT ETHLN 3/0 PC5 18IN 1893G

## (undated) DEVICE — ENDOPATH XCEL BLADELESS TROCARS WITH STABILITY SLEEVES: Brand: ENDOPATH XCEL

## (undated) DEVICE — LEGGINGS, PAIR, CLEAR, STERILE: Brand: MEDLINE

## (undated) DEVICE — BITEBLOCK OMNI BLOC

## (undated) DEVICE — REDUCER: Brand: ENDOWRIST

## (undated) DEVICE — GOWN,NON-REINFORCED,SIRUS,SET IN SLV,XL: Brand: MEDLINE

## (undated) DEVICE — SPNG LAP 18X18IN LF STRL PK/5

## (undated) DEVICE — 2, DISPOSABLE SUCTION/IRRIGATOR WITH DISPOSABLE TIP: Brand: STRYKEFLOW

## (undated) DEVICE — VESSEL SEALER EXTEND: Brand: ENDOWRIST

## (undated) DEVICE — KNOTLESS TISSUE CONTROL DEVICE, VIOLET UNIDIRECTIONAL (ANTIBACTERIAL) SYNTHETIC ABSORBABLE DEVICE
Type: IMPLANTABLE DEVICE | Site: ABDOMEN | Status: NON-FUNCTIONAL
Brand: STRATAFIX

## (undated) DEVICE — STERILE LATEX POWDER-FREE SURGICAL GLOVESWITH NITRILE COATING: Brand: PROTEXIS

## (undated) DEVICE — SUT ETHLN 2/0 30IN 628H

## (undated) DEVICE — PK UNIV COMPL 40

## (undated) DEVICE — NEEDLE, QUINCKE 22GX3.5": Brand: MEDLINE INDUSTRIES, INC.

## (undated) DEVICE — SUT MNCRYL PLS ANTIB UD 4/0 PS2 18IN

## (undated) DEVICE — GOWN SURG AERO CHROME XL

## (undated) DEVICE — STPLR SKIN VISISTAT WD 35CT

## (undated) DEVICE — GOWN,SIRUS,NON REINFRCD,LARGE,SET IN SL: Brand: MEDLINE

## (undated) DEVICE — RESERVOIR,SUCTION,100CC,SILICONE: Brand: MEDLINE

## (undated) DEVICE — DRN WND JP RND W TROC SIL 15F 3/16IN

## (undated) DEVICE — SEALANT FIBRIN HUMN ARTISS PREFIL/SYR FZ 10ML

## (undated) DEVICE — CANNULA SEAL

## (undated) DEVICE — BANDAGE,GAUZE,BULKEE II,4.5"X4.1YD,STRL: Brand: MEDLINE

## (undated) DEVICE — ST TISSOMAT SPRY

## (undated) DEVICE — TROCAR SITE CLOSURE DEVICE: Brand: ENDO CLOSE

## (undated) DEVICE — APPL CHLORAPREP HI/LITE 26ML ORNG

## (undated) DEVICE — CANN O2 ETCO2 FITS ALL CONN CO2 SMPL A/ 7IN DISP LF

## (undated) DEVICE — DEV COND GAS LAP INSUFLOW W/LUER CONN

## (undated) DEVICE — MSK ENDO PORT O2 POM ELITE CURAPLEX A/

## (undated) DEVICE — TROC BLADLES ANCHORPORT/OPTI LP 8X120MM 1P/U

## (undated) DEVICE — SYR LL TP 10ML STRL

## (undated) DEVICE — 1LYRTR 16FR10ML100%SIL UMS SNP: Brand: MEDLINE INDUSTRIES, INC.

## (undated) DEVICE — ENSEAL TRIO TEMPERATURE CONTOLLED TISSUE SEALING TECHNOLOGY DISPOSABLE TISSUE SEALING DEVICE TAPTRONIC TRIGGER ACTIVATED POWER 3MM CURVED JAW: Brand: ENSEAL

## (undated) DEVICE — LAPAROSCOPIC SMOKE FILTRATION SYSTEM: Brand: PALL LAPAROSHIELD® PLUS LAPAROSCOPIC SMOKE FILTRATION SYSTEM

## (undated) DEVICE — MARKER,SKIN,WI/RULER AND LABELS: Brand: MEDLINE